# Patient Record
Sex: FEMALE | Race: WHITE | Employment: OTHER | URBAN - METROPOLITAN AREA
[De-identification: names, ages, dates, MRNs, and addresses within clinical notes are randomized per-mention and may not be internally consistent; named-entity substitution may affect disease eponyms.]

---

## 2017-08-13 PROBLEM — E87.5 HYPERKALEMIA: Status: ACTIVE | Noted: 2017-08-13

## 2017-08-13 PROBLEM — N17.9 AKI (ACUTE KIDNEY INJURY) (HCC): Status: ACTIVE | Noted: 2017-08-13

## 2017-08-13 PROBLEM — A41.9 SEPSIS (HCC): Status: ACTIVE | Noted: 2017-08-13

## 2017-08-13 PROBLEM — E87.20 METABOLIC ACIDEMIA: Status: ACTIVE | Noted: 2017-08-13

## 2017-08-14 PROBLEM — S81.802A WOUND OF LEFT LOWER EXTREMITY: Status: ACTIVE | Noted: 2017-08-14

## 2017-08-14 PROBLEM — I10 ESSENTIAL HYPERTENSION: Chronic | Status: ACTIVE | Noted: 2017-08-14

## 2017-08-14 PROBLEM — E87.20 METABOLIC ACIDEMIA: Status: RESOLVED | Noted: 2017-08-13 | Resolved: 2017-08-14

## 2017-08-14 PROBLEM — E03.9 ACQUIRED HYPOTHYROIDISM: Chronic | Status: ACTIVE | Noted: 2017-08-14

## 2017-08-14 PROBLEM — E87.29 HIGH ANION GAP METABOLIC ACIDOSIS: Status: ACTIVE | Noted: 2017-08-14

## 2017-08-17 PROBLEM — D69.6 THROMBOCYTOPENIA (HCC): Status: ACTIVE | Noted: 2017-08-17

## 2017-08-19 PROBLEM — R79.83 HOMOCYSTEINEMIA: Status: ACTIVE | Noted: 2017-08-19

## 2017-09-06 PROBLEM — I89.0 LYMPHEDEMA OF BOTH LOWER EXTREMITIES: Chronic | Status: ACTIVE | Noted: 2017-09-06

## 2017-09-06 PROBLEM — L97.222 SKIN ULCER OF LEFT CALF WITH FAT LAYER EXPOSED (HCC): Chronic | Status: ACTIVE | Noted: 2017-09-06

## 2017-09-27 PROBLEM — S81.802A WOUND OF LEFT LOWER EXTREMITY: Status: RESOLVED | Noted: 2017-08-14 | Resolved: 2017-09-27

## 2017-09-27 PROBLEM — D69.6 THROMBOCYTOPENIA (HCC): Status: RESOLVED | Noted: 2017-08-17 | Resolved: 2017-09-27

## 2017-09-27 PROBLEM — A41.9 SEPSIS (HCC): Status: RESOLVED | Noted: 2017-08-13 | Resolved: 2017-09-27

## 2017-09-27 PROBLEM — E87.5 HYPERKALEMIA: Status: RESOLVED | Noted: 2017-08-13 | Resolved: 2017-09-27

## 2017-09-27 PROBLEM — I89.0 LYMPHEDEMA OF BOTH LOWER EXTREMITIES: Chronic | Status: RESOLVED | Noted: 2017-09-06 | Resolved: 2017-09-27

## 2017-09-27 PROBLEM — E87.29 HIGH ANION GAP METABOLIC ACIDOSIS: Status: RESOLVED | Noted: 2017-08-14 | Resolved: 2017-09-27

## 2017-09-27 PROBLEM — S72.001A CLOSED RIGHT HIP FRACTURE (HCC): Status: ACTIVE | Noted: 2017-09-27

## 2017-09-27 PROBLEM — R79.83 HOMOCYSTEINEMIA: Status: RESOLVED | Noted: 2017-08-19 | Resolved: 2017-09-27

## 2017-09-27 PROBLEM — N17.9 AKI (ACUTE KIDNEY INJURY) (HCC): Status: RESOLVED | Noted: 2017-08-13 | Resolved: 2017-09-27

## 2017-09-27 PROBLEM — L97.222 SKIN ULCER OF LEFT CALF WITH FAT LAYER EXPOSED (HCC): Chronic | Status: RESOLVED | Noted: 2017-09-06 | Resolved: 2017-09-27

## 2017-09-28 PROBLEM — S32.301A CLOSED FRACTURE OF RIGHT ILIAC WING (HCC): Status: ACTIVE | Noted: 2017-09-27

## 2017-10-18 PROBLEM — S91.102A OPEN WOUND OF LEFT GREAT TOE: Chronic | Status: ACTIVE | Noted: 2017-10-18

## 2017-11-22 PROBLEM — S91.102A OPEN WOUND OF LEFT GREAT TOE: Chronic | Status: RESOLVED | Noted: 2017-10-18 | Resolved: 2017-11-22

## 2017-11-27 PROBLEM — Z79.01 CHRONIC ANTICOAGULATION: Status: ACTIVE | Noted: 2017-11-27

## 2017-12-06 PROBLEM — L97.221 SKIN ULCER OF LEFT CALF, LIMITED TO BREAKDOWN OF SKIN (HCC): Chronic | Status: ACTIVE | Noted: 2017-09-06

## 2017-12-13 PROBLEM — L97.221 SKIN ULCER OF LEFT CALF, LIMITED TO BREAKDOWN OF SKIN (HCC): Chronic | Status: RESOLVED | Noted: 2017-09-06 | Resolved: 2017-12-13

## 2019-06-05 ENCOUNTER — OFFICE VISIT (OUTPATIENT)
Dept: CARDIOLOGY CLINIC | Age: 79
End: 2019-06-05
Payer: MEDICARE

## 2019-06-05 VITALS
HEART RATE: 60 BPM | HEIGHT: 62 IN | WEIGHT: 178.6 LBS | DIASTOLIC BLOOD PRESSURE: 60 MMHG | SYSTOLIC BLOOD PRESSURE: 122 MMHG | RESPIRATION RATE: 16 BRPM | BODY MASS INDEX: 32.87 KG/M2

## 2019-06-05 DIAGNOSIS — I10 ESSENTIAL HYPERTENSION: ICD-10-CM

## 2019-06-05 DIAGNOSIS — I48.0 PAF (PAROXYSMAL ATRIAL FIBRILLATION) (HCC): Primary | ICD-10-CM

## 2019-06-05 DIAGNOSIS — Z79.01 CHRONIC ANTICOAGULATION: ICD-10-CM

## 2019-06-05 PROCEDURE — 99214 OFFICE O/P EST MOD 30 MIN: CPT | Performed by: INTERNAL MEDICINE

## 2019-06-05 PROCEDURE — 93000 ELECTROCARDIOGRAM COMPLETE: CPT | Performed by: INTERNAL MEDICINE

## 2019-06-05 NOTE — PROGRESS NOTES
Immunologic/ Lymphatic / Endocrine: No anemia or bleeding tendency    Social History     Socioeconomic History    Marital status:       Spouse name: Not on file    Number of children: Not on file    Years of education: Not on file    Highest education level: Not on file   Occupational History    Not on file   Social Needs    Financial resource strain: Not on file    Food insecurity:     Worry: Not on file     Inability: Not on file    Transportation needs:     Medical: Not on file     Non-medical: Not on file   Tobacco Use    Smoking status: Never Smoker    Smokeless tobacco: Never Used   Substance and Sexual Activity    Alcohol use: No    Drug use: No    Sexual activity: Not Currently   Lifestyle    Physical activity:     Days per week: Not on file     Minutes per session: Not on file    Stress: Not on file   Relationships    Social connections:     Talks on phone: Not on file     Gets together: Not on file     Attends Congregation service: Not on file     Active member of club or organization: Not on file     Attends meetings of clubs or organizations: Not on file     Relationship status: Not on file    Intimate partner violence:     Fear of current or ex partner: Not on file     Emotionally abused: Not on file     Physically abused: Not on file     Forced sexual activity: Not on file   Other Topics Concern    Not on file   Social History Narrative    Not on file       Past Medical History:   Diagnosis Date    Acquired hypothyroidism 8/14/2017    Arthritis     Blood transfusion reaction     Chronic kidney disease     History of blood transfusion     Hypertension     Lymphedema of both lower extremities 9/6/2017    Open wound of left great toe 10/18/2017    Other disorders of kidney and ureter in diseases classified elsewhere     Pelvic fracture ([address]     Skin ulcer of left calf with fat layer exposed ([facility] 9/6/2017    Skin ulcer of left calf, limited to breakdown of skin ([address] 9/6/2017    Ulcer of left lower leg ([facility] 3/24/2014    Venous stasis ulcer of left calf limited to breakdown of skin ([facility] 3/24/2014       PHYSICAL EXAM:  CONSTITUTIONAL:  Well developed, well nourished    Vitals:    06/05/19 1028   BP: 122/60   Pulse: 60   Resp: 16   Weight: 178 lb 9.6 oz (81 kg)   Height: 5' 2\" (1.575 m)     HEAD & FACE: Normocephalic. Symmetric. EYES: No xanthelasma. Conjunctivae not injected. EARS, NOSE, MOUTH & THROAT: Good dentition. No oral pallor or cyanosis. NECK: No JVD at 30 degrees. No thyromegaly. RESPIRATORY: Clear to auscultation and percussion in all fields. No use of accessory muscle or intercostal retractions. CARDIOVASCULAR: Regular rate and rhythm. No lifts or thrills on palpitation. Auscultation with normal S1-S2 in intensity and splitting. No carotid bruits. Abdominal aorta not enlarged. Femoral arteries without bruits. Pedal pulses 1+. 1+ bilateral edema. ABDOMEN: Soft without hepatic or splenic enlargement. No tenderness. MUSCULOSKELETAL: No kyphosis or scoliosis of the back. Good muscle strength and tone. No muscle atrophy. Slow gait and inability to undergo exercise stress testing. EXTREMITIES: No clubbing or cyanosis. SKIN: No Xanthomas or ulcerations. NEUROLOGIC: Oriented to time, place and person. Normal mood and affect. LYMPHATIC:  No palpable neck or supraclavicular nodes. No splenomegaly. EKG: Normal sinus rhythm. No change compared to prior tracing. ASSESSMENT:                                                     ORDERS:       Diagnosis Orders   1. PAF (paroxysmal atrial fibrillation) (HCC)  EKG 12 lead   2. Essential hypertension  EKG 12 lead   3. Chronic anticoagulation  EKG 12 lead     Above assessment cardiac issues stable. PLAN:   See above orders. Reviewed prior records and testing. .  Assessment of medication compliance. Same dose of Eliquis pending review of recent labs.   Discussed risks benefits of anticoagulation in detail with patient. After thorough discussion we'll continue chronic [address]. Discussed issues that would prompt earlier evaluation. Same cardiac medications. Follow-up office visit in 12 months.

## 2022-09-20 ENCOUNTER — HOSPITAL ENCOUNTER (INPATIENT)
Age: 82
LOS: 5 days | Discharge: NURSING, REHAB OR LONG-TERM CARE | DRG: 291 | End: 2022-09-26
Attending: STUDENT IN AN ORGANIZED HEALTH CARE EDUCATION/TRAINING PROGRAM | Admitting: INTERNAL MEDICINE
Payer: MEDICARE

## 2022-09-20 ENCOUNTER — APPOINTMENT (OUTPATIENT)
Dept: CT IMAGING | Age: 82
DRG: 291 | End: 2022-09-20
Payer: MEDICARE

## 2022-09-20 ENCOUNTER — APPOINTMENT (OUTPATIENT)
Dept: GENERAL RADIOLOGY | Age: 82
DRG: 291 | End: 2022-09-20
Payer: MEDICARE

## 2022-09-20 DIAGNOSIS — I21.4 NSTEMI (NON-ST ELEVATED MYOCARDIAL INFARCTION) (HCC): Primary | ICD-10-CM

## 2022-09-20 DIAGNOSIS — R29.6 FALLS FREQUENTLY: ICD-10-CM

## 2022-09-20 DIAGNOSIS — N30.01 ACUTE CYSTITIS WITH HEMATURIA: ICD-10-CM

## 2022-09-20 LAB
ALBUMIN SERPL-MCNC: 3.9 G/DL (ref 3.5–5.2)
ALP BLD-CCNC: 85 U/L (ref 35–104)
ALT SERPL-CCNC: 12 U/L (ref 0–32)
ANION GAP SERPL CALCULATED.3IONS-SCNC: 12 MMOL/L (ref 7–16)
AST SERPL-CCNC: 28 U/L (ref 0–31)
BACTERIA: ABNORMAL /HPF
BASOPHILS ABSOLUTE: 0 E9/L (ref 0–0.2)
BASOPHILS RELATIVE PERCENT: 0 % (ref 0–2)
BILIRUB SERPL-MCNC: 0.5 MG/DL (ref 0–1.2)
BILIRUBIN URINE: NEGATIVE
BLOOD, URINE: ABNORMAL
BUN BLDV-MCNC: 29 MG/DL (ref 6–23)
CALCIUM SERPL-MCNC: 9 MG/DL (ref 8.6–10.2)
CHLORIDE BLD-SCNC: 101 MMOL/L (ref 98–107)
CLARITY: CLEAR
CO2: 23 MMOL/L (ref 22–29)
COLOR: YELLOW
CREAT SERPL-MCNC: 1.1 MG/DL (ref 0.5–1)
CRYSTALS, UA: ABNORMAL /HPF
EOSINOPHILS ABSOLUTE: 0 E9/L (ref 0.05–0.5)
EOSINOPHILS RELATIVE PERCENT: 0 % (ref 0–6)
GFR AFRICAN AMERICAN: 58
GFR NON-AFRICAN AMERICAN: 48 ML/MIN/1.73
GLUCOSE BLD-MCNC: 144 MG/DL (ref 74–99)
GLUCOSE URINE: NEGATIVE MG/DL
HCT VFR BLD CALC: 33.4 % (ref 34–48)
HEMOGLOBIN: 10.8 G/DL (ref 11.5–15.5)
HYPOCHROMIA: ABNORMAL
KETONES, URINE: NEGATIVE MG/DL
LEUKOCYTE ESTERASE, URINE: ABNORMAL
LYMPHOCYTES ABSOLUTE: 0.71 E9/L (ref 1.5–4)
LYMPHOCYTES RELATIVE PERCENT: 3.5 % (ref 20–42)
MCH RBC QN AUTO: 28.2 PG (ref 26–35)
MCHC RBC AUTO-ENTMCNC: 32.3 % (ref 32–34.5)
MCV RBC AUTO: 87.2 FL (ref 80–99.9)
MONOCYTES ABSOLUTE: 0.71 E9/L (ref 0.1–0.95)
MONOCYTES RELATIVE PERCENT: 3.5 % (ref 2–12)
NEUTROPHILS ABSOLUTE: 16.55 E9/L (ref 1.8–7.3)
NEUTROPHILS RELATIVE PERCENT: 93 % (ref 43–80)
NITRITE, URINE: POSITIVE
NUCLEATED RED BLOOD CELLS: 0 /100 WBC
PDW BLD-RTO: 13.6 FL (ref 11.5–15)
PH UA: 6.5 (ref 5–9)
PLATELET # BLD: 178 E9/L (ref 130–450)
PMV BLD AUTO: 9.9 FL (ref 7–12)
POLYCHROMASIA: ABNORMAL
POTASSIUM REFLEX MAGNESIUM: 4.6 MMOL/L (ref 3.5–5)
PRO-BNP: 3134 PG/ML (ref 0–450)
PROTEIN UA: NEGATIVE MG/DL
RBC # BLD: 3.83 E12/L (ref 3.5–5.5)
RBC UA: ABNORMAL /HPF (ref 0–2)
SODIUM BLD-SCNC: 136 MMOL/L (ref 132–146)
SPECIFIC GRAVITY UA: 1.01 (ref 1–1.03)
TOTAL PROTEIN: 7 G/DL (ref 6.4–8.3)
TROPONIN, HIGH SENSITIVITY: 86 NG/L (ref 0–9)
UROBILINOGEN, URINE: 1 E.U./DL
WAXY CASTS: ABNORMAL /LPF
WBC # BLD: 17.8 E9/L (ref 4.5–11.5)
WBC UA: ABNORMAL /HPF (ref 0–5)

## 2022-09-20 PROCEDURE — 73030 X-RAY EXAM OF SHOULDER: CPT

## 2022-09-20 PROCEDURE — 71045 X-RAY EXAM CHEST 1 VIEW: CPT

## 2022-09-20 PROCEDURE — 70450 CT HEAD/BRAIN W/O DYE: CPT

## 2022-09-20 PROCEDURE — 73562 X-RAY EXAM OF KNEE 3: CPT

## 2022-09-20 PROCEDURE — 87088 URINE BACTERIA CULTURE: CPT

## 2022-09-20 PROCEDURE — 81001 URINALYSIS AUTO W/SCOPE: CPT

## 2022-09-20 PROCEDURE — 6370000000 HC RX 637 (ALT 250 FOR IP): Performed by: STUDENT IN AN ORGANIZED HEALTH CARE EDUCATION/TRAINING PROGRAM

## 2022-09-20 PROCEDURE — 80053 COMPREHEN METABOLIC PANEL: CPT

## 2022-09-20 PROCEDURE — 84484 ASSAY OF TROPONIN QUANT: CPT

## 2022-09-20 PROCEDURE — 6360000004 HC RX CONTRAST MEDICATION: Performed by: RADIOLOGY

## 2022-09-20 PROCEDURE — 85025 COMPLETE CBC W/AUTO DIFF WBC: CPT

## 2022-09-20 PROCEDURE — 93005 ELECTROCARDIOGRAM TRACING: CPT | Performed by: STUDENT IN AN ORGANIZED HEALTH CARE EDUCATION/TRAINING PROGRAM

## 2022-09-20 PROCEDURE — 71275 CT ANGIOGRAPHY CHEST: CPT

## 2022-09-20 PROCEDURE — 73521 X-RAY EXAM HIPS BI 2 VIEWS: CPT

## 2022-09-20 PROCEDURE — 2580000003 HC RX 258: Performed by: STUDENT IN AN ORGANIZED HEALTH CARE EDUCATION/TRAINING PROGRAM

## 2022-09-20 PROCEDURE — 99285 EMERGENCY DEPT VISIT HI MDM: CPT

## 2022-09-20 PROCEDURE — 83880 ASSAY OF NATRIURETIC PEPTIDE: CPT

## 2022-09-20 RX ORDER — ACETAMINOPHEN 325 MG/1
650 TABLET ORAL ONCE
Status: COMPLETED | OUTPATIENT
Start: 2022-09-20 | End: 2022-09-20

## 2022-09-20 RX ORDER — 0.9 % SODIUM CHLORIDE 0.9 %
500 INTRAVENOUS SOLUTION INTRAVENOUS ONCE
Status: COMPLETED | OUTPATIENT
Start: 2022-09-20 | End: 2022-09-21

## 2022-09-20 RX ADMIN — SODIUM CHLORIDE 500 ML: 9 INJECTION, SOLUTION INTRAVENOUS at 22:51

## 2022-09-20 RX ADMIN — IOPAMIDOL 70 ML: 755 INJECTION, SOLUTION INTRAVENOUS at 21:25

## 2022-09-20 RX ADMIN — ACETAMINOPHEN 650 MG: 325 TABLET ORAL at 22:54

## 2022-09-20 ASSESSMENT — PAIN DESCRIPTION - LOCATION: LOCATION: KNEE

## 2022-09-20 ASSESSMENT — PAIN SCALES - GENERAL
PAINLEVEL_OUTOF10: 8
PAINLEVEL_OUTOF10: 6

## 2022-09-20 ASSESSMENT — PAIN DESCRIPTION - ORIENTATION: ORIENTATION: RIGHT

## 2022-09-20 NOTE — Clinical Note
Discharge Plan[de-identified] Other/[facility])   Telemetry/Cardiac Monitoring Required?: Yes

## 2022-09-20 NOTE — ED PROVIDER NOTES
normal.   Cardiovascular:      Rate and Rhythm: Normal rate and regular rhythm. Heart sounds: Normal heart sounds. No murmur heard. Pulmonary:      Effort: Pulmonary effort is normal. No respiratory distress. Breath sounds: Normal breath sounds. No stridor. Abdominal:      General: There is no distension. Palpations: Abdomen is soft. There is no mass. Musculoskeletal:         General: No swelling or tenderness. Cervical back: Normal range of motion and neck supple. Comments: Chronic skin changes to the bilateral lower extremities     Skin:     General: Skin is warm and dry. Coloration: Skin is not jaundiced or pale. Neurological:      General: No focal deficit present. Mental Status: She is alert. Cranial Nerves: No cranial nerve deficit. Procedures    MDM       ED Course as of 09/23/22 0153   Tue Sep 20, 2022   2358 EKG: This EKG is signed and interpreted by me. Rate: 99  Rhythm: Sinus  Interpretation: non-specific EKG, no ST-Elevations or Depressions; , QRS 74, QTc 420  Comparison: no significant changes when compared to the prior    [BB]   Wed Sep 21, 2022   0117 Spoke with April for Dr. Mc Miguel, discussed the patient. She is requesting we speak with cardiology prior to admission. [BB]      ED Course User Index  [BB] Roly MckeonDO Mcginnisothy CLAIRE Kaitlin presents to the ED for evaluation of fall. The patient was hypoxic on exam therefor labs were ordered. Workup in the ED revealed imaging was negative for acute findings. Labs however were consistent with UTI for which patient was given rocephin. EKG was not showing an ST Elevations or depressions. Troponin of 86 with repeat of 101. The patient denied any chest pain or shortness of breath. Spoke with cardiology not felt to be cardiac in nature, and patient is able to stay at [address] [facility]. Patient is already on anticoagulation.  Patient requires continued workup and management of their symptoms and will be admitted to the hospital for further evaluation and treatment.      --------------------------------------------- PAST HISTORY ---------------------------------------------  Past Medical History:  has a past medical history of Acquired hypothyroidism, Arthritis, Blood transfusion reaction, Chronic kidney disease, History of blood transfusion, Hypertension, Lymphedema of both lower extremities, Open wound of left great toe, Other disorders of kidney and ureter in diseases classified elsewhere, Pelvic fracture ([address] Skin ulcer of left calf with fat layer exposed ([address] Skin ulcer of left calf, limited to breakdown of skin ([address] Ulcer of left lower leg ([address] and Venous stasis ulcer of left calf limited to breakdown of skin ([address] Past Surgical History:  has a past surgical history that includes fracture surgery (2010); Tonsillectomy; Hip fracture surgery (Left, 08/08/2014); Colonoscopy; Endoscopy, colon, diagnostic; Total hip arthroplasty (Left, 10/17/2014); and eye surgery. Social History:  reports that she has never smoked. She has never used smokeless tobacco. She reports that she does not drink alcohol and does not use drugs. Family History: family history includes Mult Sclerosis in her father. The patients home medications have been reviewed.     Allergies: Aspirin, Other, and Tape [adhesive tape]    -------------------------------------------------- RESULTS -------------------------------------------------    LABS:  Results for orders placed or performed during the hospital encounter of 09/20/22   Culture, Urine    Specimen: Urine, clean catch   Result Value Ref Range    Urine Culture, Routine       Growth present, evaluating for:  Gram negative rods  Gram positive organism     Culture, Blood 1    Specimen: Blood   Result Value Ref Range    Blood Culture, Routine 24 Hours no growth    Culture, Blood 2    Specimen: Blood   Result Value Ref Range    Culture, Blood 2 24 Hours no growth    CBC with Auto Differential   Result Value Ref Range    WBC 17.8 (H) 4.5 - 11.5 E9/L    RBC 3.83 3.50 - 5.50 E12/L    Hemoglobin 10.8 (L) 11.5 - 15.5 g/dL    Hematocrit 33.4 (L) 34.0 - 48.0 %    MCV 87.2 80.0 - 99.9 fL    MCH 28.2 26.0 - 35.0 pg    MCHC 32.3 32.0 - 34.5 %    RDW 13.6 11.5 - 15.0 fL    Platelets 676 634 - 345 E9/L    MPV 9.9 7.0 - 12.0 fL    Neutrophils % 93.0 (H) 43.0 - 80.0 %    Lymphocytes % 3.5 (L) 20.0 - 42.0 %    Monocytes % 3.5 2.0 - 12.0 %    Eosinophils % 0.0 0.0 - 6.0 %    Basophils % 0.0 0.0 - 2.0 %    Neutrophils Absolute 16.55 (H) 1.80 - 7.30 E9/L    Lymphocytes Absolute 0.71 (L) 1.50 - 4.00 E9/L    Monocytes Absolute 0.71 0.10 - 0.95 E9/L    Eosinophils Absolute 0.00 (L) 0.05 - 0.50 E9/L    Basophils Absolute 0.00 0.00 - 0.20 E9/L    nRBC 0.0 /100 WBC    Polychromasia 1+     Hypochromia 1+    Comprehensive Metabolic Panel w/ Reflex to MG   Result Value Ref Range    Sodium 136 132 - 146 mmol/L    Potassium reflex Magnesium 4.6 3.5 - 5.0 mmol/L    Chloride 101 98 - 107 mmol/L    CO2 23 22 - 29 mmol/L    Anion Gap 12 7 - 16 mmol/L    Glucose 144 (H) 74 - 99 mg/dL    BUN 29 (H) 6 - 23 mg/dL    Creatinine 1.1 (H) 0.5 - 1.0 mg/dL    GFR Non-African American 48 >=60 mL/min/1.73    GFR African American 58     Calcium 9.0 8.6 - 10.2 mg/dL    Total Protein 7.0 6.4 - 8.3 g/dL    Albumin 3.9 3.5 - 5.2 g/dL    Total Bilirubin 0.5 0.0 - 1.2 mg/dL    Alkaline Phosphatase 85 35 - 104 U/L    ALT 12 0 - 32 U/L    AST 28 0 - 31 U/L   Troponin   Result Value Ref Range    Troponin, High Sensitivity 86 (H) 0 - 9 ng/L   Brain Natriuretic Peptide   Result Value Ref Range    Pro-BNP 3,134 (H) 0 - 450 pg/mL   Urinalysis with Microscopic   Result Value Ref Range    Color, UA Yellow Straw/Yellow    Clarity, UA Clear Clear    Glucose, Ur Negative Negative mg/dL    Bilirubin Urine Negative Negative    Ketones, Urine Negative Negative mg/dL    Specific Gravity, UA 1.010 1.005 - 1.030    Blood, Urine LARGE (A) Negative    pH, UA 6.5 5.0 - 9.0    Protein, UA Negative Negative mg/dL    Urobilinogen, Urine 1.0 <2.0 E.U./dL    Nitrite, Urine POSITIVE (A) Negative    Leukocyte Esterase, Urine SMALL (A) Negative    Waxy Casts, UA 1-2 (A) None Seen /LPF    WBC, UA 0-1 0 - 5 /HPF    RBC, UA 2-5 0 - 2 /HPF    Bacteria, UA MANY (A) None Seen /HPF    Crystals, UA Rare (A) None Seen /HPF   Troponin   Result Value Ref Range    Troponin, High Sensitivity 101 (H) 0 - 9 ng/L   Basic Metabolic Panel w/ Reflex to MG   Result Value Ref Range    Sodium 136 132 - 146 mmol/L    Potassium reflex Magnesium 4.1 3.5 - 5.0 mmol/L    Chloride 101 98 - 107 mmol/L    CO2 23 22 - 29 mmol/L    Anion Gap 12 7 - 16 mmol/L    Glucose 122 (H) 74 - 99 mg/dL    BUN 28 (H) 6 - 23 mg/dL    Creatinine 1.1 (H) 0.5 - 1.0 mg/dL    GFR Non-African American 48 >=60 mL/min/1.73    GFR African American 58     Calcium 8.7 8.6 - 10.2 mg/dL   CBC with Auto Differential   Result Value Ref Range    WBC 13.0 (H) 4.5 - 11.5 E9/L    RBC 3.48 (L) 3.50 - 5.50 E12/L    Hemoglobin 9.9 (L) 11.5 - 15.5 g/dL    Hematocrit 30.0 (L) 34.0 - 48.0 %    MCV 86.2 80.0 - 99.9 fL    MCH 28.4 26.0 - 35.0 pg    MCHC 33.0 32.0 - 34.5 %    RDW 13.7 11.5 - 15.0 fL    Platelets 829 123 - 103 E9/L    MPV 10.5 7.0 - 12.0 fL    Neutrophils % 92.3 (H) 43.0 - 80.0 %    Immature Granulocytes % 0.5 0.0 - 5.0 %    Lymphocytes % 4.2 (L) 20.0 - 42.0 %    Monocytes % 2.9 2.0 - 12.0 %    Eosinophils % 0.0 0.0 - 6.0 %    Basophils % 0.1 0.0 - 2.0 %    Neutrophils Absolute 11.99 (H) 1.80 - 7.30 E9/L    Immature Granulocytes # 0.06 E9/L    Lymphocytes Absolute 0.54 (L) 1.50 - 4.00 E9/L    Monocytes Absolute 0.37 0.10 - 0.95 E9/L    Eosinophils Absolute 0.00 (L) 0.05 - 0.50 E9/L    Basophils Absolute 0.01 0.00 - 0.20 E9/L    Polychromasia 1+     Poikilocytes 1+     Ovalocytes 1+    Lipid, Fasting   Result Value Ref Range    Cholesterol, Fasting 125 0 - 199 mg/dL    Triglyceride, Fasting 77 0 - 149 mg/dL    HDL 44 >40 mg/dL    LDL Calculated 66 0 - 99 mg/dL    VLDL Cholesterol Calculated 15 mg/dL   Hemoglobin A1C   Result Value Ref Range    Hemoglobin A1C 5.3 4.0 - 5.6 %   Troponin   Result Value Ref Range    Troponin, High Sensitivity 110 (H) 0 - 9 ng/L   TSH   Result Value Ref Range    TSH 1.340 0.270 - 4.200 uIU/mL   Troponin   Result Value Ref Range    Troponin, High Sensitivity 142 (H) 0 - 9 ng/L   Troponin   Result Value Ref Range    Troponin, High Sensitivity 112 (H) 0 - 9 ng/L   Troponin   Result Value Ref Range    Troponin, High Sensitivity 103 (H) 0 - 9 ng/L   CK   Result Value Ref Range    Total  (H) 20 - 180 U/L   Basic Metabolic Panel   Result Value Ref Range    Sodium 139 132 - 146 mmol/L    Potassium 3.7 3.5 - 5.0 mmol/L    Chloride 107 98 - 107 mmol/L    CO2 24 22 - 29 mmol/L    Anion Gap 8 7 - 16 mmol/L    Glucose 105 (H) 74 - 99 mg/dL    BUN 34 (H) 6 - 23 mg/dL    Creatinine 1.1 (H) 0.5 - 1.0 mg/dL    GFR Non-African American 48 >=60 mL/min/1.73    GFR African American 58     Calcium 7.9 (L) 8.6 - 10.2 mg/dL   Troponin   Result Value Ref Range    Troponin, High Sensitivity 101 (H) 0 - 9 ng/L   CK   Result Value Ref Range    Total  (H) 20 - 180 U/L   EKG 12 Lead   Result Value Ref Range    Ventricular Rate 99 BPM    Atrial Rate 99 BPM    P-R Interval 162 ms    QRS Duration 74 ms    Q-T Interval 328 ms    QTc Calculation (Bazett) 420 ms    P Axis 114 degrees    R Axis 44 degrees    T Axis 36 degrees   EKG 12 Lead   Result Value Ref Range    Ventricular Rate 76 BPM    Atrial Rate 76 BPM    P-R Interval 164 ms    QRS Duration 86 ms    Q-T Interval 402 ms    QTc Calculation (Bazett) 452 ms    P Axis 31 degrees    R Axis 30 degrees    T Axis 25 degrees       RADIOLOGY:  CTA PULMONARY W CONTRAST   Final Result   1. No evidence of pulmonary embolism or acute pulmonary abnormality. 2.  Bibasilar atelectasis.       RECOMMENDATIONS:   Unavailable         XR SHOULDER RIGHT (MIN 2 VIEWS)   Final Result   No evidence of acute trauma. Decreased acromial humeral interval.         XR KNEE LEFT (3 VIEWS)   Final Result   No acute abnormality of the knee. Patellofemoral and medial compartment degenerative changes. XR KNEE RIGHT (3 VIEWS)   Final Result   No acute abnormality of the knee. Narrowing and degenerative changes patellofemoral and medial compartments. XR HIP BILATERAL W AP PELVIS (2 VIEWS)   Final Result   No findings to suggest acute fracture or dislocation. XR CHEST PORTABLE   Final Result   No acute process. CT Head WO Contrast   Final Result   No acute intracranial abnormality.             ------------------------- NURSING NOTES AND VITALS REVIEWED ---------------------------  Date / Time Roomed:  9/20/2022  5:02 PM  ED Bed Assignment:  [id number]    The nursing notes within the ED encounter and vital signs as below have been reviewed. Patient Vitals for the past 24 hrs:   BP Temp Temp src Pulse Resp SpO2 Weight   09/22/22 1934 (!) 133/47 97.9 °F (36.6 °C) Oral 68 16 97 % --   09/22/22 1507 (!) 112/54 97.2 °F (36.2 °C) Oral 64 16 95 % --   09/22/22 1230 (!) 109/57 98.2 °F (36.8 °C) Oral 78 16 95 % --   09/22/22 0730 (!) 101/58 99.2 °F (37.3 °C) Oral 64 18 93 % --   09/22/22 0331 (!) 110/57 98 °F (36.7 °C) Oral 57 16 94 % 181 lb 8 oz (82.3 kg)       Oxygen Saturation Interpretation: Normal    ------------------------------------------ PROGRESS NOTES ------------------------------------------  Counseling:  I have spoken with the patient and discussed todays results, in addition to providing specific details for the plan of care and counseling regarding the diagnosis and prognosis. Their questions are answered at this time and they are agreeable with the plan of admission.    --------------------------------- ADDITIONAL PROVIDER NOTES ---------------------------------  Consultations:  Spoke with April for Dr. Valentin Hood. Discussed case.   They will admit the patient. This patient's ED course included: a personal history and physicial examination, re-evaluation prior to disposition, multiple bedside re-evaluations, IV medications, cardiac monitoring, continuous pulse oximetry, and complex medical decision making and emergency management    This patient has remained hemodynamically stable during their ED course. Diagnosis:  1. NSTEMI (non-ST elevated myocardial infarction) ([address]    2. Falls frequently    3. Acute cystitis with hematuria        Disposition:  Patient's disposition: Admit to telemetry  Patient's condition is stable.      Rebel Ortega DO  09/23/22 [id number]

## 2022-09-21 PROBLEM — R79.89 ELEVATED TROPONIN: Status: ACTIVE | Noted: 2022-09-21
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
## 2022-09-21 PROBLEM — R77.8 ELEVATED TROPONIN: Status: ACTIVE | Noted: 2022-09-21

## 2022-09-21 PROBLEM — D72.829 LEUKOCYTOSIS: Status: ACTIVE | Noted: 2022-09-21

## 2022-09-21 PROBLEM — I21.4 NSTEMI (NON-ST ELEVATED MYOCARDIAL INFARCTION) (HCC): Status: ACTIVE | Noted: 2022-09-21

## 2022-09-21 PROBLEM — N39.0 UTI (URINARY TRACT INFECTION): Status: ACTIVE | Noted: 2022-09-21

## 2022-09-21 PROBLEM — R09.02 HYPOXIA: Status: ACTIVE | Noted: 2022-09-21

## 2022-09-21 LAB
ANION GAP SERPL CALCULATED.3IONS-SCNC: 12 MMOL/L (ref 7–16)
BASOPHILS ABSOLUTE: 0.01 E9/L (ref 0–0.2)
BASOPHILS RELATIVE PERCENT: 0.1 % (ref 0–2)
BUN BLDV-MCNC: 28 MG/DL (ref 6–23)
CALCIUM SERPL-MCNC: 8.7 MG/DL (ref 8.6–10.2)
CHLORIDE BLD-SCNC: 101 MMOL/L (ref 98–107)
CHOLESTEROL, FASTING: 125 MG/DL (ref 0–199)
CO2: 23 MMOL/L (ref 22–29)
CREAT SERPL-MCNC: 1.1 MG/DL (ref 0.5–1)
EKG ATRIAL RATE: 76 BPM
EKG P AXIS: 31 DEGREES
EKG P-R INTERVAL: 164 MS
EKG Q-T INTERVAL: 402 MS
EKG QRS DURATION: 86 MS
EKG QTC CALCULATION (BAZETT): 452 MS
EKG R AXIS: 30 DEGREES
EKG T AXIS: 25 DEGREES
EKG VENTRICULAR RATE: 76 BPM
EOSINOPHILS ABSOLUTE: 0 E9/L (ref 0.05–0.5)
EOSINOPHILS RELATIVE PERCENT: 0 % (ref 0–6)
GFR AFRICAN AMERICAN: 58
GFR NON-AFRICAN AMERICAN: 48 ML/MIN/1.73
GLUCOSE BLD-MCNC: 122 MG/DL (ref 74–99)
HBA1C MFR BLD: 5.3 % (ref 4–5.6)
HCT VFR BLD CALC: 30 % (ref 34–48)
HDLC SERPL-MCNC: 44 MG/DL
HEMOGLOBIN: 9.9 G/DL (ref 11.5–15.5)
IMMATURE GRANULOCYTES #: 0.06 E9/L
IMMATURE GRANULOCYTES %: 0.5 % (ref 0–5)
LDL CHOLESTEROL CALCULATED: 66 MG/DL (ref 0–99)
LV EF: 63 %
LVEF MODALITY: NORMAL
LYMPHOCYTES ABSOLUTE: 0.54 E9/L (ref 1.5–4)
LYMPHOCYTES RELATIVE PERCENT: 4.2 % (ref 20–42)
MCH RBC QN AUTO: 28.4 PG (ref 26–35)
MCHC RBC AUTO-ENTMCNC: 33 % (ref 32–34.5)
MCV RBC AUTO: 86.2 FL (ref 80–99.9)
MONOCYTES ABSOLUTE: 0.37 E9/L (ref 0.1–0.95)
MONOCYTES RELATIVE PERCENT: 2.9 % (ref 2–12)
NEUTROPHILS ABSOLUTE: 11.99 E9/L (ref 1.8–7.3)
NEUTROPHILS RELATIVE PERCENT: 92.3 % (ref 43–80)
OVALOCYTES: ABNORMAL
PDW BLD-RTO: 13.7 FL (ref 11.5–15)
PLATELET # BLD: 165 E9/L (ref 130–450)
PMV BLD AUTO: 10.5 FL (ref 7–12)
POIKILOCYTES: ABNORMAL
POLYCHROMASIA: ABNORMAL
POTASSIUM REFLEX MAGNESIUM: 4.1 MMOL/L (ref 3.5–5)
RBC # BLD: 3.48 E12/L (ref 3.5–5.5)
SODIUM BLD-SCNC: 136 MMOL/L (ref 132–146)
TOTAL CK: 437 U/L (ref 20–180)
TRIGLYCERIDE, FASTING: 77 MG/DL (ref 0–149)
TROPONIN, HIGH SENSITIVITY: 101 NG/L (ref 0–9)
TROPONIN, HIGH SENSITIVITY: 110 NG/L (ref 0–9)
TROPONIN, HIGH SENSITIVITY: 112 NG/L (ref 0–9)
TROPONIN, HIGH SENSITIVITY: 142 NG/L (ref 0–9)
TSH SERPL DL<=0.05 MIU/L-ACNC: 1.34 UIU/ML (ref 0.27–4.2)
VLDLC SERPL CALC-MCNC: 15 MG/DL
WBC # BLD: 13 E9/L (ref 4.5–11.5)

## 2022-09-21 PROCEDURE — 2580000003 HC RX 258: Performed by: STUDENT IN AN ORGANIZED HEALTH CARE EDUCATION/TRAINING PROGRAM

## 2022-09-21 PROCEDURE — 97165 OT EVAL LOW COMPLEX 30 MIN: CPT

## 2022-09-21 PROCEDURE — 2060000000 HC ICU INTERMEDIATE R&B

## 2022-09-21 PROCEDURE — 85025 COMPLETE CBC W/AUTO DIFF WBC: CPT

## 2022-09-21 PROCEDURE — 93306 TTE W/DOPPLER COMPLETE: CPT

## 2022-09-21 PROCEDURE — 97530 THERAPEUTIC ACTIVITIES: CPT

## 2022-09-21 PROCEDURE — 96374 THER/PROPH/DIAG INJ IV PUSH: CPT

## 2022-09-21 PROCEDURE — 99222 1ST HOSP IP/OBS MODERATE 55: CPT | Performed by: INTERNAL MEDICINE

## 2022-09-21 PROCEDURE — APPSS60 APP SPLIT SHARED TIME 46-60 MINUTES

## 2022-09-21 PROCEDURE — 93005 ELECTROCARDIOGRAM TRACING: CPT

## 2022-09-21 PROCEDURE — 97161 PT EVAL LOW COMPLEX 20 MIN: CPT

## 2022-09-21 PROCEDURE — 36415 COLL VENOUS BLD VENIPUNCTURE: CPT

## 2022-09-21 PROCEDURE — 80061 LIPID PANEL: CPT

## 2022-09-21 PROCEDURE — 83036 HEMOGLOBIN GLYCOSYLATED A1C: CPT

## 2022-09-21 PROCEDURE — 6360000002 HC RX W HCPCS: Performed by: STUDENT IN AN ORGANIZED HEALTH CARE EDUCATION/TRAINING PROGRAM

## 2022-09-21 PROCEDURE — 2580000003 HC RX 258: Performed by: NURSE PRACTITIONER

## 2022-09-21 PROCEDURE — 87040 BLOOD CULTURE FOR BACTERIA: CPT

## 2022-09-21 PROCEDURE — 80048 BASIC METABOLIC PNL TOTAL CA: CPT

## 2022-09-21 PROCEDURE — 84484 ASSAY OF TROPONIN QUANT: CPT

## 2022-09-21 PROCEDURE — 2500000003 HC RX 250 WO HCPCS: Performed by: INTERNAL MEDICINE

## 2022-09-21 PROCEDURE — 84443 ASSAY THYROID STIM HORMONE: CPT

## 2022-09-21 PROCEDURE — 82550 ASSAY OF CK (CPK): CPT

## 2022-09-21 PROCEDURE — 6370000000 HC RX 637 (ALT 250 FOR IP): Performed by: NURSE PRACTITIONER

## 2022-09-21 RX ORDER — SODIUM CHLORIDE 0.9 % (FLUSH) 0.9 %
5-40 SYRINGE (ML) INJECTION EVERY 12 HOURS SCHEDULED
Status: DISCONTINUED | OUTPATIENT
Start: 2022-09-21 | End: 2022-09-26 | Stop reason: HOSPADM

## 2022-09-21 RX ORDER — ACETAMINOPHEN 650 MG/1
650 SUPPOSITORY RECTAL EVERY 6 HOURS PRN
Status: DISCONTINUED | OUTPATIENT
Start: 2022-09-21 | End: 2022-09-26 | Stop reason: HOSPADM

## 2022-09-21 RX ORDER — SODIUM CHLORIDE 9 MG/ML
INJECTION, SOLUTION INTRAVENOUS CONTINUOUS
Status: DISCONTINUED | OUTPATIENT
Start: 2022-09-21 | End: 2022-09-24

## 2022-09-21 RX ORDER — ACETAMINOPHEN 325 MG/1
650 TABLET ORAL EVERY 6 HOURS PRN
Status: DISCONTINUED | OUTPATIENT
Start: 2022-09-21 | End: 2022-09-26 | Stop reason: HOSPADM

## 2022-09-21 RX ORDER — LISINOPRIL 20 MG/1
40 TABLET ORAL DAILY
Status: DISCONTINUED | OUTPATIENT
Start: 2022-09-21 | End: 2022-09-26 | Stop reason: HOSPADM

## 2022-09-21 RX ORDER — AMLODIPINE BESYLATE 10 MG/1
10 TABLET ORAL DAILY
Status: DISCONTINUED | OUTPATIENT
Start: 2022-09-21 | End: 2022-09-26 | Stop reason: HOSPADM

## 2022-09-21 RX ORDER — ONDANSETRON 2 MG/ML
4 INJECTION INTRAMUSCULAR; INTRAVENOUS EVERY 6 HOURS PRN
Status: DISCONTINUED | OUTPATIENT
Start: 2022-09-21 | End: 2022-09-26 | Stop reason: HOSPADM

## 2022-09-21 RX ORDER — LEVOTHYROXINE SODIUM 0.07 MG/1
75 TABLET ORAL DAILY
Status: DISCONTINUED | OUTPATIENT
Start: 2022-09-21 | End: 2022-09-26 | Stop reason: HOSPADM

## 2022-09-21 RX ORDER — SODIUM CHLORIDE 9 MG/ML
INJECTION, SOLUTION INTRAVENOUS PRN
Status: DISCONTINUED | OUTPATIENT
Start: 2022-09-21 | End: 2022-09-25 | Stop reason: SDUPTHER

## 2022-09-21 RX ORDER — IPRATROPIUM BROMIDE AND ALBUTEROL SULFATE 2.5; .5 MG/3ML; MG/3ML
1 SOLUTION RESPIRATORY (INHALATION) EVERY 6 HOURS PRN
Status: DISCONTINUED | OUTPATIENT
Start: 2022-09-21 | End: 2022-09-26 | Stop reason: HOSPADM

## 2022-09-21 RX ORDER — AMLODIPINE BESYLATE AND BENAZEPRIL HYDROCHLORIDE 10; 40 MG/1; MG/1
1 CAPSULE ORAL DAILY
Status: DISCONTINUED | OUTPATIENT
Start: 2022-09-21 | End: 2022-09-21 | Stop reason: CLARIF

## 2022-09-21 RX ORDER — ONDANSETRON 4 MG/1
4 TABLET, ORALLY DISINTEGRATING ORAL EVERY 8 HOURS PRN
Status: DISCONTINUED | OUTPATIENT
Start: 2022-09-21 | End: 2022-09-26 | Stop reason: HOSPADM

## 2022-09-21 RX ORDER — SODIUM CHLORIDE 0.9 % (FLUSH) 0.9 %
5-40 SYRINGE (ML) INJECTION PRN
Status: DISCONTINUED | OUTPATIENT
Start: 2022-09-21 | End: 2022-09-26 | Stop reason: HOSPADM

## 2022-09-21 RX ORDER — BUMETANIDE 0.25 MG/ML
1 INJECTION, SOLUTION INTRAMUSCULAR; INTRAVENOUS 2 TIMES DAILY
Status: DISCONTINUED | OUTPATIENT
Start: 2022-09-21 | End: 2022-09-26 | Stop reason: HOSPADM

## 2022-09-21 RX ADMIN — METOPROLOL TARTRATE 25 MG: 25 TABLET, FILM COATED ORAL at 20:53

## 2022-09-21 RX ADMIN — BUMETANIDE 1 MG: 0.25 INJECTION, SOLUTION INTRAMUSCULAR; INTRAVENOUS at 10:46

## 2022-09-21 RX ADMIN — AMLODIPINE BESYLATE 10 MG: 10 TABLET ORAL at 08:16

## 2022-09-21 RX ADMIN — ACETAMINOPHEN 650 MG: 325 TABLET ORAL at 05:42

## 2022-09-21 RX ADMIN — CEFTRIAXONE 1000 MG: 1 INJECTION, POWDER, FOR SOLUTION INTRAMUSCULAR; INTRAVENOUS at 00:36

## 2022-09-21 RX ADMIN — SODIUM CHLORIDE: 9 INJECTION, SOLUTION INTRAVENOUS at 05:42

## 2022-09-21 RX ADMIN — LEVOTHYROXINE SODIUM 75 MCG: 75 TABLET ORAL at 08:16

## 2022-09-21 RX ADMIN — METOPROLOL TARTRATE 25 MG: 25 TABLET, FILM COATED ORAL at 08:16

## 2022-09-21 RX ADMIN — ACETAMINOPHEN 650 MG: 325 TABLET ORAL at 15:19

## 2022-09-21 RX ADMIN — SODIUM CHLORIDE, PRESERVATIVE FREE 10 ML: 5 INJECTION INTRAVENOUS at 08:17

## 2022-09-21 RX ADMIN — APIXABAN 5 MG: 5 TABLET, FILM COATED ORAL at 08:16

## 2022-09-21 RX ADMIN — APIXABAN 5 MG: 5 TABLET, FILM COATED ORAL at 20:33

## 2022-09-21 RX ADMIN — BUMETANIDE 1 MG: 0.25 INJECTION, SOLUTION INTRAMUSCULAR; INTRAVENOUS at 20:33

## 2022-09-21 RX ADMIN — SODIUM CHLORIDE, PRESERVATIVE FREE 10 ML: 5 INJECTION INTRAVENOUS at 20:34

## 2022-09-21 RX ADMIN — LISINOPRIL 40 MG: 20 TABLET ORAL at 08:16

## 2022-09-21 ASSESSMENT — ENCOUNTER SYMPTOMS
SHORTNESS OF BREATH: 0
DIARRHEA: 0
WHEEZING: 0
SORE THROAT: 0
EYE PAIN: 0
EYE REDNESS: 0
SINUS PRESSURE: 0
BACK PAIN: 0
COUGH: 0
EYE DISCHARGE: 0
VOMITING: 0
ABDOMINAL DISTENTION: 0
NAUSEA: 0

## 2022-09-21 ASSESSMENT — PAIN SCALES - GENERAL
PAINLEVEL_OUTOF10: 0
PAINLEVEL_OUTOF10: 3
PAINLEVEL_OUTOF10: 0

## 2022-09-21 ASSESSMENT — PAIN DESCRIPTION - DESCRIPTORS: DESCRIPTORS: ACHING

## 2022-09-21 ASSESSMENT — PAIN - FUNCTIONAL ASSESSMENT: PAIN_FUNCTIONAL_ASSESSMENT: ACTIVITIES ARE NOT PREVENTED

## 2022-09-21 ASSESSMENT — PAIN DESCRIPTION - LOCATION: LOCATION: SHOULDER

## 2022-09-21 ASSESSMENT — PAIN DESCRIPTION - ORIENTATION: ORIENTATION: RIGHT

## 2022-09-21 NOTE — PROGRESS NOTES
modifications for safe mobility and completion of ADLs                             Therapeutic activity to improve functional performance during ADLs. Therapeutic exercise to improve tolerance and functional strength for ADLs    Balance retraining/tolerance tasks for facilitation of postural control with dynamic challenges during ADLs . Positioning to improve functional independence      Recommended Adaptive Equipment: TBD     Home Living: Pt lives alone, 1 story, 3 steps/rail.  Daughter lives close by    [place] setup: tub/shower, extended tub bench    Equipment owned: walker,     Prior Level of Function: Independent with ADLs , assist  with IADLs; ambulated with walker   Driving: yes     Pain Level: chronic knee pain ;   Cognition: A&O:  pleasant   Memory:  fair +   Sequencing:  fair+   Problem solving:  fair    Judgement/safety:  fair      Functional Assessment:  AM-PAC Daily Activity Raw Score: 14/24   Initial Eval Status  Date: 9/21/22 Treatment Status  Date: STGs = LTGs  Time frame: 10-14 days   Feeding SBA/set-up   Supervision    Grooming Mod A, seated   Decrease standing tolerance and balance  SBA    UB Dressing Mod  A  SBA    LB Dressing Max  A  Assist with threading brief over feet and pulling up over hips   Min A    Bathing Mod A   Moy    Toileting Max A   Assist with hygiene and clothing management   Min A   Bed Mobility  Max A  Supine <>sit   Min A    Functional Transfers Mod A  Sit- stand from bed, BSC   Min/CGA   Functional Mobility Mod A ,w/walker   SPT bed <> BSC   Unsteadiness and overall weakness noted   Min/CGA  with good tolerance    Balance Sitting:     Static:  SBA- EOB     Dynamic:Mod A   Standing: mod A   Independent/supervision - sitting  Min/CGA- standing    Activity Tolerance No SOB observed   Good  with ADL activity    Visual/  Perceptual Glasses: reading                 Hand Dominance right   AROM (PROM) Strength Additional Info:    LANRE FITZPATRICK additionally includes thorough review of current medical information, gathering information on past medical history/social history and prior level of function, interpretation of standardized testing/informal observation of tasks, assessment of data and development of plan of care and goals.             Nicholas Moyer  OTR/L  OT [id number]

## 2022-09-21 NOTE — H&P
Hospital Medicine History & Physical      PCP: Aide Agrawal MD    Date of Admission: 9/20/2022    Date of Service: . SEPT 21, 2022    Chief Complaint:  FALL      History Of Present Illness:     80 y.o. female presented with FALL, STATES SHE TIPPED OVER THE DOG BLANKET AND FELL ON HER RIGHT SHOULDER. HAS A KNOWN HISTORY OF PAF, FOUND TO HAVE AN ELEVATED TT. Trula Blew DIAGNOSED WITH NSTEMI     Past Medical History:          Diagnosis Date    Acquired hypothyroidism 8/14/2017    Arthritis     Blood transfusion reaction     Chronic kidney disease     History of blood transfusion     Hypertension     Lymphedema of both lower extremities 9/6/2017    Open wound of left great toe 10/18/2017    Other disorders of kidney and ureter in diseases classified elsewhere     Pelvic fracture ([address]     Skin ulcer of left calf with fat layer exposed ([address] 9/6/2017    Skin ulcer of left calf, limited to breakdown of skin ([address] 9/6/2017    Ulcer of left lower leg ([address] 3/24/2014    Venous stasis ulcer of left calf limited to breakdown of skin ([address] 3/24/2014       Past Surgical History:          Procedure Laterality Date    COLONOSCOPY      ENDOSCOPY, COLON, DIAGNOSTIC      EYE SURGERY      cateract and lazor surgery 2015    FRACTURE SURGERY  2010    RT HIP    HIP FRACTURE SURGERY Left 08/08/2014    ORIF left hip    TONSILLECTOMY      as child    TOTAL HIP ARTHROPLASTY Left 10/17/2014    revision with removal of hardware       Medications Prior to Admission:      Prior to Admission medications    Medication Sig Start Date End Date Taking?  Authorizing Provider   bumetanide (BUMEX) 1 MG tablet  11/22/17   Historical Provider, MD   acetaminophen (TYLENOL) 325 MG tablet Take 650 mg by mouth every 4 hours as needed for Pain    Historical Provider, MD   calcium-vitamin D (OSCAL-500) 500-200 MG-UNIT per tablet Take 1 tablet by mouth 2 times daily 8/22/17   Mindi Martell MD   apixaban Adri Rodriges) 5 MG TABS tablet Take 1 tablet by mouth 2 times daily 8/28/17   Kenrick Sanz MD   metoprolol tartrate (LOPRESSOR) 25 MG tablet Take 1 tablet by mouth 2 times daily 8/21/17   Kenrick Sanz MD   Cholecalciferol (VITAMIN D) 2000 units TABS tablet Take 2.5 tablets by mouth 2 times daily 8/21/17   Kenrick Sanz MD   amLODIPine-benazepril (LOTREL) 10-40 MG per capsule Take 1 capsule by mouth daily    Historical Provider, MD   levothyroxine (SYNTHROID) 75 MCG tablet Take 75 mcg by mouth Daily    Historical Provider, MD       Allergies:  Aspirin, Other, and Tape [adhesive tape]    Social History:      The patient currently lives ALONE    TOBACCO:   reports that she has never smoked. She has never used smokeless tobacco.  ETOH:   reports no history of alcohol use. Family History:       Reviewed in detail and negative for DM, CAD, Cancer, CVA. Positive as follows:        Problem Relation Age of Onset    Mult Sclerosis Father        REVIEW OF SYSTEMS:   Pertinent positives as noted in the HPI. All other systems reviewed and negative. PHYSICAL EXAM:    BP (!) 102/43   Pulse 77   Temp 99.3 °F (37.4 °C) (Oral)   Resp 17   Ht 5' 2\" (1.575 m)   Wt 176 lb (79.8 kg)   SpO2 92%   BMI 32.19 kg/m²     General appearance:  No apparent distress, appears stated age and cooperative. HEENT:  Normal cephalic, atraumatic without obvious deformity. Pupils equal, round, and reactive to light. Extra ocular muscles intact. Conjunctivae/corneas clear. PROPTOSIS   Neck: Supple, with full range of motion. No jugular venous distention. Trachea midline. Respiratory:  Normal respiratory effort. Clear to auscultation, bilaterally without Rales/Wheezes/Rhonchi. Cardiovascular: IRREG  Abdomen: Soft, non-tender, non-distended with normal bowel sounds. Musculoskeletal:  No clubbing, cyanosis POS edema bilaterally.  PAIN WITH ROM RIGHT SHOULDER    Skin: ECCHYMOSIS ON ARMS  Neurologic:  Neurovascularly intact without any focal sensory/motor deficits. Cranial nerves: II-XII intact, grossly non-focal.  Psychiatric:  Alert and oriented, thought content appropriate, normal insight        Labs:     Recent Labs     09/20/22 1913 09/21/22  0455   WBC 17.8* 13.0*   HGB 10.8* 9.9*   HCT 33.4* 30.0*    165     Recent Labs     09/20/22 1913 09/21/22  0455    136   K 4.6 4.1    101   CO2 23 23   BUN 29* 28*   CREATININE 1.1* 1.1*   CALCIUM 9.0 8.7     Recent Labs     09/20/22 1913   AST 28   ALT 12   BILITOT 0.5   ALKPHOS 85     No results for input(s): INR in the last 72 hours. Recent Labs     09/21/22  0842   CKTOTAL 437*       Urinalysis:      Lab Results   Component Value Date/Time    NITRU POSITIVE 09/20/2022 10:51 PM    WBCUA 0-1 09/20/2022 10:51 PM    BACTERIA MANY 09/20/2022 10:51 PM    RBCUA 2-5 09/20/2022 10:51 PM    BLOODU LARGE 09/20/2022 10:51 PM    SPECGRAV 1.010 09/20/2022 10:51 PM    GLUCOSEU Negative 09/20/2022 10:51 PM       Radiology:     CXR: I have reviewed the CXR with the following interpretation:   EKG:  I have reviewed the EKG with the following interpretation:   CTA PULMONARY W CONTRAST   Final Result   1. No evidence of pulmonary embolism or acute pulmonary abnormality. 2.  Bibasilar atelectasis. RECOMMENDATIONS:   Unavailable         XR SHOULDER RIGHT (MIN 2 VIEWS)   Final Result   No evidence of acute trauma. Decreased acromial humeral interval.         XR KNEE LEFT (3 VIEWS)   Final Result   No acute abnormality of the knee. Patellofemoral and medial compartment degenerative changes. XR KNEE RIGHT (3 VIEWS)   Final Result   No acute abnormality of the knee. Narrowing and degenerative changes patellofemoral and medial compartments. XR HIP BILATERAL W AP PELVIS (2 VIEWS)   Final Result   No findings to suggest acute fracture or dislocation.          XR CHEST PORTABLE   Final Result   No acute process. CT Head WO Contrast   Final Result   No acute intracranial abnormality. ASSESSMENT:    Active Hospital Problems    Diagnosis Date Noted    NSTEMI (non-ST elevated myocardial infarction) ([facility] [I21.4] 09/21/2022     Priority: Medium    UTI (urinary tract infection) [N39.0] 09/21/2022     Priority: Medium    Hypoxia [R09.02] 09/21/2022     Priority: Medium    Fall [W19. XXXA] 09/21/2022     Priority: Medium    Elevated troponin [R77.8] 09/21/2022     Priority: Medium    Leukocytosis [D72.829] 09/21/2022     Priority: Medium    Chronic anticoagulation [Z79.01] 11/27/2017    Essential hypertension [I10] 08/14/2017    Acquired hypothyroidism [E03.9] 08/14/2017    Sepsis ([facility] [A41.9] 08/13/2017    History of DVT (deep vein thrombosis) [Z86.718] 08/19/2013   UTI  MILD RHABDOMYOLYSIS FROM FALL    PLAN:  CARD   LOPRESSOR   SYNTHROID   ROCEPHIN  BUMEX         DVT Prophylaxis:  ELIQUIS  Diet: ADULT DIET; Regular; Low Fat/Low Chol/High Fiber/MIGUEL A  Code Status: Full Code    PT/OT Eval Status: ORDERED    Dispo - ERIC    Electronically signed by Mardy Angelucci, DO on 9/21/2022 at 3:03 PM FACOI       Thank you Cherelle Gardiner MD for the opportunity to be involved in this patient's care.  If you have any questions or concerns please feel free to contact me at [phone]

## 2022-09-21 NOTE — PROGRESS NOTES
Physical Therapy  Facility/Department: [address] INTERMEDIATE  Physical Therapy Initial Assessment    Name: Samia Cherry  : 1940  MRN: [id number]  Date of Service: 2022    Patient Diagnosis(es): The primary encounter diagnosis was NSTEMI (non-ST elevated myocardial infarction) ([address] Diagnoses of Falls frequently and Acute cystitis with hematuria were also pertinent to this visit. Past Medical History:  has a past medical history of Acquired hypothyroidism, Arthritis, Blood transfusion reaction, Chronic kidney disease, History of blood transfusion, Hypertension, Lymphedema of both lower extremities, Open wound of left great toe, Other disorders of kidney and ureter in diseases classified elsewhere, Pelvic fracture ([address] Skin ulcer of left calf with fat layer exposed ([address] Skin ulcer of left calf, limited to breakdown of skin ([address] Ulcer of left lower leg ([address] and Venous stasis ulcer of left calf limited to breakdown of skin ([address] Past Surgical History:  has a past surgical history that includes fracture surgery (); Tonsillectomy; Hip fracture surgery (Left, 2014); Colonoscopy; Endoscopy, colon, diagnostic; Total hip arthroplasty (Left, 10/17/2014); and eye surgery. Referring provider:   TRAVIS Mora - CNP    PT Order:  PT eval and treat     Evaluating PT:  Penelope Blue PT, DPT PT [id number]    Room #:  3098/8595-T  Diagnosis:  Falls frequently [R29.6]  NSTEMI (non-ST elevated myocardial infarction) ([address] [I21.4]  Acute cystitis with hematuria [N30.01]  Precautions:  fall risk  Equipment Needs:  none. Pt reported owing a walker    SUBJECTIVE:    Pt lives alone in a 1 story home with 3 stairs to enter and 2 rail. Bed and bath is on frist floor. Pt ambulated with walker PTA. Pt's daughter lives close by. OBJECTIVE:   Initial Evaluation  Date:  Treatment Short Term/ Long Term   Goals   Was pt agreeable to Eval/treatment? yes     Does pt have pain?  B knee pain Bed Mobility  Rolling: NT  Supine to sit: Mod A  Sit to supine: Max A  Scooting: Mod A to sitting EOB  Min A   Transfers Sit to stand: Mod A  Stand to sit: Mod A  Stand pivot: Mod A with w/w  SBA   Ambulation    3 feet with w/w Mod A   25+ feet with w/w SBA    Stair negotiation: ascended and descended  NT      ROM BLE:  WFL except B knees lacking flexion     Strength BLE:  grossly 3/5  Grossly 4-/5   Balance Sitting EOB:  SBA  Dynamic Standing: Mod A with w/w  Sitting EOB:  independent  Dynamic Standing:  SBA with w/w   AM-PAC 6 Clicks 44/08       Pt is A & O x 3  Sensation:  Pt denies numbness and tingling to extremities    Patient education  Pt educated on PT objectives during eval and while in the hospital, hand placement during transfers. Patient response to education:   Pt verbalized understanding Pt demonstrated skill Pt requires further education in this area   yes With cueing yes     ASSESSMENT:    Conditions Requiring Skilled Therapeutic Intervention:    [x]Decreased strength     [x]Decreased ROM  [x]Decreased functional mobility  [x]Decreased balance   [x]Decreased endurance   []Decreased posture  []Decreased sensation  []Decreased coordination   []Decreased vision  []Decreased safety awareness   [x]Increased pain       Comments:  Pt found in bed. No report of dizziness during functional mobility. Cueing required for hand placement during transfers. Pt with slow gait speed and takes labored steps due to LE pain. Pt incontinent of bladder once standing. Pt with decreased eccentric control when sitting down on the bed due to lack of knee flexion in B LEs. At end of eval, pt left in bed with call light in reach and bed alarm on.      Pt's/ family goals   1. None stated    Prognosis is good for reaching above PT goals. Patient and or family understand(s) diagnosis, prognosis, and plan of care.   yes    PHYSICAL THERAPY PLAN OF CARE:    PT POC is established based on physician order and patient diagnosis     Diagnosis:  Falls frequently [R29.6]  NSTEMI (non-ST elevated myocardial infarction) ([facility] [I21.4]  Acute cystitis with hematuria [N30.01]    Current Treatment Recommendations:     [x] Strengthening to improve independence with functional mobility   [] ROM to improve independence with functional mobility   [x] Balance Training to improve static/dynamic balance and to reduce fall risk  [x] Endurance Training to improve activity tolerance during functional mobility   [x] Transfer Training to improve safety and independence with all functional transfers   [x] Gait Training to improve gait mechanics, endurance and assess need for appropriate assistive device  [] Stair Training in preparation for safe discharge home and/or into the community   [] Positioning to prevent skin breakdown and contractures  [x] Safety and Education Training   [x] Patient/Caregiver Education   [] HEP  [] Other     PT long term treatment goals are located in above grid    Frequency of treatments: 2-5x/week x 1-2 weeks. Time in  1146  Time out  1205    Evaluation Time includes thorough review of current medical information, gathering information on past medical history/social history and prior level of function, completion of standardized testing/informal observation of tasks, assessment of data and education on plan of care and goals.     CPT codes:  [x] Low Complexity PT evaluation 29069  [] Moderate Complexity PT evaluation 35630  [] High Complexity PT evaluation 59276  [] PT Re-evaluation 58248  [] Therapeutic activities 69590 __minutes  [] Therapeutic exercises 13291 __ minutes      Yue Duval, PT, DPT  PT [id number]

## 2022-09-21 NOTE — CONSULTS
Inpatient Cardiology Consultation      Reason for Consult:  NSTEMI    Consulting Physician: Dr Ana Scales     Requesting Physician:  Maura Wills DO    Date of Consultation: 9/21/2022    HISTORY OF PRESENT ILLNESS:   Patient is an 55-year-old female who is known to [facility] cardiology through Dr. Joe Lang. She was last seen outpatient 6/5/2019 for PAF and hypertension with no changes. PMHx: PAF on Eliquis, hypertension, hypothyroidism, history of DVT, CKD, lymphedema bilateral lower extremities, blood transfusion reaction, arthritis, venous stasis    Last echo 8/2017: EF 70%. Mild LVH. Mild AS. Mild AR. Mild TR.  RVSP 53 mmHg. HPI:  Patient presented to ER 9/20/2022 for mechanical fall onto her right side striking her right shoulder and leg. The patient was found to have a UTI and elevated troponin was been to the hospital.  VS upon arrival 97.6, 16 respirations, 101 pulse, 171/76, 92% room air. Labs: Potassium 4.1, BUN 28, creatinine 1.1 (2017 1.1-5.6)), GFR 48, glucose 122, serum calcium 8.7, proBNP 3134, troponin 86> 101> 110 (2017 0.03), albumin 3.9, WBC 17.8> 13.0, H&H 10.8 >9.9/30.0, platelet 912, blood and urine cultures pending, UA infected  CT head: No acute process  CTA pulmonary: No PE. Bibasilar atelectasis. CXR: No acute process. Upon assessment today 9/21/2022 patient is lying semi-Munoz in hospital bed on room air. Patient is alert and oriented, speaking full sentences, and is in no apparent distress at this time. The patient reports yesterday she was walking when she tripped over a dog toy and fell onto her right side. She did not hit her head and she had no LOC. In ER patient was found to have UTI and elevated troponin. Patient denies any new or recent chest pain, SOB, MARIANO, palpitations, diaphoresis, nausea, orthopnea, or PND. She reports she lives alone and has no trouble with ADLs. She does not fall often. She has no history of tobacco or alcohol abuse.   She reports she takes all of her medications at home as prescribed. She denies any history of heart failure. VS today 100.1, 18 respirations, 86 pulse, 128/45, 91% on room air. Please note: past medical records were reviewed per electronic medical record (EMR) - see detailed reports under Past Medical/ Surgical History. Past Medical History:    PAF on Eliquis  Hypertension  Hypothyroidism  History of DVT  CKD  Lymphedema bilateral lower extremities  Blood transfusion reaction  Arthritis  Skin ulcer of left calf 2017  Pelvic fracture history  Venous stasis ulcer  Colonoscopy, endoscopy, eye surgery, right hip fracture surgery, ORIF left hip, tonsillectomy    Cardiac testing:  echo 8/2017: EF 70%. Mild LVH. Mild AS. Mild AR. Mild TR.  RVSP 53 mmHg. Medications Prior to admit:  Prior to Admission medications    Medication Sig Start Date End Date Taking?  Authorizing Provider   bumetanide (BUMEX) 1 MG tablet  11/22/17   Historical Provider, MD   acetaminophen (TYLENOL) 325 MG tablet Take 650 mg by mouth every 4 hours as needed for Pain    Historical Provider, MD   calcium-vitamin D (Shad Moulder) 500-200 MG-UNIT per tablet Take 1 tablet by mouth 2 times daily 8/22/17   Kylie Mclean MD   apixaban Georgana Space) 5 MG TABS tablet Take 1 tablet by mouth 2 times daily 8/28/17   Han Segal MD   metoprolol tartrate (LOPRESSOR) 25 MG tablet Take 1 tablet by mouth 2 times daily 8/21/17   Han Segal MD   Cholecalciferol (VITAMIN D) 2000 units TABS tablet Take 2.5 tablets by mouth 2 times daily 8/21/17   Han Segal MD   amLODIPine-benazepril (LOTREL) 10-40 MG per capsule Take 1 capsule by mouth daily    Historical Provider, MD   levothyroxine (SYNTHROID) 75 MCG tablet Take 75 mcg by mouth Daily    Historical Provider, MD       Current Medications:    Current Facility-Administered Medications: sodium chloride flush 0.9 % injection 5-40 mL, 5-40 mL, IntraVENous, 2 times per day  sodium chloride flush 0.9 % injection 5-40 mL, 5-40 mL, IntraVENous, PRN  0.9 % sodium chloride infusion, , IntraVENous, PRN  ondansetron (ZOFRAN-ODT) disintegrating tablet 4 mg, 4 mg, Oral, Q8H PRN **OR** ondansetron (ZOFRAN) injection 4 mg, 4 mg, IntraVENous, Q6H PRN  acetaminophen (TYLENOL) tablet 650 mg, 650 mg, Oral, Q6H PRN **OR** acetaminophen (TYLENOL) suppository 650 mg, 650 mg, Rectal, Q6H PRN  bisacodyl (DULCOLAX) EC tablet 5 mg, 5 mg, Oral, Daily PRN  ipratropium-albuterol (DUONEB) nebulizer solution 1 ampule, 1 ampule, Inhalation, Q6H PRN  cefTRIAXone (ROCEPHIN) 1,000 mg in sterile water 10 mL IV syringe, 1,000 mg, IntraVENous, Q24H  apixaban (ELIQUIS) tablet 5 mg, 5 mg, Oral, BID  calcium-vitamin D (OSCAL-500) 500-200 MG-UNIT per tablet 1 tablet, 1 tablet, Oral, BID  levothyroxine (SYNTHROID) tablet 75 mcg, 75 mcg, Oral, Daily  metoprolol tartrate (LOPRESSOR) tablet 25 mg, 25 mg, Oral, BID  0.9 % sodium chloride infusion, , IntraVENous, Continuous  amLODIPine (NORVASC) tablet 10 mg, 10 mg, Oral, Daily **AND** lisinopril (PRINIVIL;ZESTRIL) tablet 40 mg, 40 mg, Oral, Daily    Allergies:  Aspirin, Other, and Tape Palma Jolanta tape]    Social History:    Social History     Socioeconomic History    Marital status:       Spouse name: Not on file    Number of children: Not on file    Years of education: Not on file    Highest education level: Not on file   Occupational History    Not on file   Tobacco Use    Smoking status: Never    Smokeless tobacco: Never   Substance and Sexual Activity    Alcohol use: No    Drug use: No    Sexual activity: Not Currently   Other Topics Concern    Not on file   Social History Narrative    Not on file     Social Determinants of Health     Financial Resource Strain: Not on file   Food Insecurity: Not on file   Transportation Needs: Not on file   Physical Activity: Not on file   Stress: Not on file   Social Connections: Not on file   Intimate Partner Violence: Not on file   Housing Stability: Not on file       Family History:   Family History   Problem Relation Age of Onset    Mult Sclerosis Father          REVIEW OF SYSTEMS:     Constitutional: Denies fevers, chills, night sweats, and fatigue  HEENT: Denies headaches, nose bleeds, and blurred vision,oral pain, abscess or lesion. Musculoskeletal: Denies pain to BLE with ambulation. Mechanical fall yesterday. Chronic bilateral lower leg edema and hannah color due to venous stasis. Neurological: Denies dizziness and lightheadedness, numbness and tingling  Cardiovascular: Denies chest pain, palpitations, and feelings of heart racing. Respiratory: Denies orthopnea and PND, SOB/MARIANO. Gastrointestinal: Denies heartburn, nausea/vomiting, diarrhea and constipation, black/bloody, and tarry stools. Genitourinary: Denies dysuria and hematuria  Hematologic: Denies excessive bruising or bleeding  Lymphatic: Denies lumps and bumps to neck, axilla, breast, and groin  Endocrine: Denies excessive thirst. Denies intolerance to hot and cold  GYN: Postmenopausal state; Denies vaginal bleeding. Psychiatric: Denies anxiety and depression. PHYSICAL EXAM:   BP (!) 128/45   Pulse 86   Temp (!) 101.1 °F (38.4 °C) (Oral)   Resp 18   Ht 5' 2\" (1.575 m)   Wt 176 lb (79.8 kg)   SpO2 91%   BMI 32.19 kg/m²   CONST:  Well developed, well nourished 70-year-old  female who appears stated age. Awake, alert, cooperative, no apparent distress  HEENT:   Head- Normocephalic, atraumatic   Eyes- Conjunctivae pink, anicteric  Throat- Oral mucosa pink and moist  Neck-  No stridor, trachea midline, no jugular venous distention. No adenopathy   CHEST: Chest symmetrical and non-tender to palpation.  No accessory muscle use or intercostal retractions  RESPIRATORY: Lung sounds -crackles bilateral lower lobes  CARDIOVASCULAR:     No carotid bruit  Heart Inspection- shows no noted pulsations  Heart Palpation- no heaves or thrills; PMI is non-displaced   Heart Ausculation- Regular rate and rhythm, 2/6 murmur heard best at Erb's point no s3, s4 or rub   PV: Nonpitting trace edema just below the knee bilaterally with chronic discoloration to lower legs from venous stasis. No varicosities. Pedal pulses palpable, no clubbing or cyanosis   ABDOMEN: Soft, non-tender to light palpation. Bowel sounds present. No palpable masses no organomegaly; no abdominal bruit  MS: Good muscle strength and tone. No atrophy or abnormal movements. : Deferred  SKIN: Warm and dry no statis dermatitis or ulcers   NEURO / PSYCH: Oriented to person, place and time. Speech clear and appropriate. Follows all commands. Pleasant affect     DATA:    ECG: Normal sinus rhythm with sinus arrhythmia, vent rate 99, TX interval 162, QRS duration 74, QTc 420. Tele strips:   Diagnostic:    Labs:   CBC:   Recent Labs     09/20/22 1913 09/21/22  0455   WBC 17.8* 13.0*   HGB 10.8* 9.9*   HCT 33.4* 30.0*    165     BMP:   Recent Labs     09/20/22 1913 09/21/22  0455    136   K 4.6 4.1   CO2 23 23   BUN 29* 28*   CREATININE 1.1* 1.1*   LABGLOM 48 48   CALCIUM 9.0 8.7     FASTING LIPID PANEL:  Lab Results   Component Value Date/Time    HDL 44 09/21/2022 04:55 AM    LDLCALC 66 09/21/2022 04:55 AM     LIVER PROFILE:  Recent Labs     09/20/22 1913   AST 28   ALT 12   LABALBU 3.9      Latest Reference Range & Units 9/20/22 19:13 9/21/22 00:34 9/21/22 04:55   Pro-BNP 0 - 450 pg/mL 3,134 (H)     Troponin, High Sensitivity 0 - 9 ng/L 86 (H) 101 (H) 110 (H)   (H): Data is abnormally high    CXR:      Impression   No acute process. CTA pulm: Impression   1. No evidence of pulmonary embolism or acute pulmonary abnormality. 2.  Bibasilar atelectasis. Assessment:  Elevated troponin trend 86> 101> 110<142 inpatient denying chest pain or any other anginal equivalency. EKG is nonischemic. Possibly 2/2 UTI/anemia. Elevated proBNP 3134.   Patient clinically mildly hypervolemic with crackles in bilateral lower lobes and trace edema of her

## 2022-09-21 NOTE — PLAN OF CARE
Problem: Pain  Goal: Verbalizes/displays adequate comfort level or baseline comfort level  Outcome: Progressing     Problem: Safety - Adult  Goal: Free from fall injury  Outcome: Progressing  Flowsheets (Taken 9/21/2022 1038)  Free From Fall Injury: Based on caregiver fall risk screen, instruct family/caregiver to ask for assistance with transferring infant if caregiver noted to have fall risk factors     Problem: Skin/Tissue Integrity  Goal: Absence of new skin breakdown  Description: 1. Monitor for areas of redness and/or skin breakdown  2. Assess vascular access sites hourly  3. Every 4-6 hours minimum:  Change oxygen saturation probe site  4. Every 4-6 hours:  If on nasal continuous positive airway pressure, respiratory therapy assess nares and determine need for appliance change or resting period.   Outcome: Progressing

## 2022-09-21 NOTE — CARE COORDINATION
Social Work / Discharge Planning : Patient admitted after fall at home. Cardiology on board. Await treatment plan. Patient resides by herself in a one floor home. Patient has a cane and uses a walker. Patient has past history at  [facility]. She also has been in [facility] [place] and [facility]. Patient PCP is DR Gerardo Vogel and she uses [facility] pharmacy. Patiet does have therapy ordered and await therapy recommendations to better assist with discharge planning. SW to follow.  Electronically signed by ALBINO Harris on 9/21/22 at 1:20 PM EDT

## 2022-09-21 NOTE — PROGRESS NOTES
0444 Obtained nurse to nurse report from José [address] at this time. 0400 New admission. Alert and oriented X 4. Called consult to cardiology at this time. Initiated cardiac monitoring. Call light and bedside table within reach.

## 2022-09-22 LAB
ANION GAP SERPL CALCULATED.3IONS-SCNC: 8 MMOL/L (ref 7–16)
BUN BLDV-MCNC: 34 MG/DL (ref 6–23)
CALCIUM SERPL-MCNC: 7.9 MG/DL (ref 8.6–10.2)
CHLORIDE BLD-SCNC: 107 MMOL/L (ref 98–107)
CO2: 24 MMOL/L (ref 22–29)
CREAT SERPL-MCNC: 1.1 MG/DL (ref 0.5–1)
EKG ATRIAL RATE: 99 BPM
EKG P AXIS: 114 DEGREES
EKG P-R INTERVAL: 162 MS
EKG Q-T INTERVAL: 328 MS
EKG QRS DURATION: 74 MS
EKG QTC CALCULATION (BAZETT): 420 MS
EKG R AXIS: 44 DEGREES
EKG T AXIS: 36 DEGREES
EKG VENTRICULAR RATE: 99 BPM
GFR AFRICAN AMERICAN: 58
GFR NON-AFRICAN AMERICAN: 48 ML/MIN/1.73
GLUCOSE BLD-MCNC: 105 MG/DL (ref 74–99)
POTASSIUM SERPL-SCNC: 3.7 MMOL/L (ref 3.5–5)
SODIUM BLD-SCNC: 139 MMOL/L (ref 132–146)
TOTAL CK: 242 U/L (ref 20–180)
TROPONIN, HIGH SENSITIVITY: 101 NG/L (ref 0–9)
TROPONIN, HIGH SENSITIVITY: 103 NG/L (ref 0–9)

## 2022-09-22 PROCEDURE — 2500000003 HC RX 250 WO HCPCS: Performed by: INTERNAL MEDICINE

## 2022-09-22 PROCEDURE — 2580000003 HC RX 258: Performed by: NURSE PRACTITIONER

## 2022-09-22 PROCEDURE — 80048 BASIC METABOLIC PNL TOTAL CA: CPT

## 2022-09-22 PROCEDURE — 2060000000 HC ICU INTERMEDIATE R&B

## 2022-09-22 PROCEDURE — 82550 ASSAY OF CK (CPK): CPT

## 2022-09-22 PROCEDURE — 6360000002 HC RX W HCPCS: Performed by: NURSE PRACTITIONER

## 2022-09-22 PROCEDURE — 84484 ASSAY OF TROPONIN QUANT: CPT

## 2022-09-22 PROCEDURE — 99233 SBSQ HOSP IP/OBS HIGH 50: CPT | Performed by: INTERNAL MEDICINE

## 2022-09-22 PROCEDURE — 97535 SELF CARE MNGMENT TRAINING: CPT

## 2022-09-22 PROCEDURE — 36415 COLL VENOUS BLD VENIPUNCTURE: CPT

## 2022-09-22 PROCEDURE — 97530 THERAPEUTIC ACTIVITIES: CPT

## 2022-09-22 PROCEDURE — 6370000000 HC RX 637 (ALT 250 FOR IP): Performed by: NURSE PRACTITIONER

## 2022-09-22 RX ADMIN — BUMETANIDE 1 MG: 0.25 INJECTION, SOLUTION INTRAMUSCULAR; INTRAVENOUS at 09:39

## 2022-09-22 RX ADMIN — WATER 1000 MG: 1 INJECTION INTRAMUSCULAR; INTRAVENOUS; SUBCUTANEOUS at 23:04

## 2022-09-22 RX ADMIN — CALCIUM CARBONATE-VITAMIN D TAB 500 MG-200 UNIT 1 TABLET: 500-200 TAB at 10:36

## 2022-09-22 RX ADMIN — APIXABAN 5 MG: 5 TABLET, FILM COATED ORAL at 09:39

## 2022-09-22 RX ADMIN — SODIUM CHLORIDE, PRESERVATIVE FREE 10 ML: 5 INJECTION INTRAVENOUS at 09:39

## 2022-09-22 RX ADMIN — LEVOTHYROXINE SODIUM 75 MCG: 75 TABLET ORAL at 06:42

## 2022-09-22 RX ADMIN — CALCIUM CARBONATE-VITAMIN D TAB 500 MG-200 UNIT 1 TABLET: 500-200 TAB at 20:54

## 2022-09-22 RX ADMIN — METOPROLOL TARTRATE 25 MG: 25 TABLET, FILM COATED ORAL at 09:39

## 2022-09-22 RX ADMIN — SODIUM CHLORIDE, PRESERVATIVE FREE 10 ML: 5 INJECTION INTRAVENOUS at 20:54

## 2022-09-22 RX ADMIN — BUMETANIDE 1 MG: 0.25 INJECTION, SOLUTION INTRAMUSCULAR; INTRAVENOUS at 20:53

## 2022-09-22 RX ADMIN — SODIUM CHLORIDE: 9 INJECTION, SOLUTION INTRAVENOUS at 00:41

## 2022-09-22 RX ADMIN — SODIUM CHLORIDE: 9 INJECTION, SOLUTION INTRAVENOUS at 20:58

## 2022-09-22 RX ADMIN — WATER 1000 MG: 1 INJECTION INTRAMUSCULAR; INTRAVENOUS; SUBCUTANEOUS at 00:37

## 2022-09-22 RX ADMIN — ACETAMINOPHEN 650 MG: 325 TABLET ORAL at 12:41

## 2022-09-22 RX ADMIN — APIXABAN 5 MG: 5 TABLET, FILM COATED ORAL at 20:54

## 2022-09-22 RX ADMIN — METOPROLOL TARTRATE 25 MG: 25 TABLET, FILM COATED ORAL at 20:53

## 2022-09-22 RX ADMIN — CALCIUM CARBONATE-VITAMIN D TAB 500 MG-200 UNIT 1 TABLET: 500-200 TAB at 00:37

## 2022-09-22 ASSESSMENT — PAIN DESCRIPTION - ORIENTATION
ORIENTATION: RIGHT

## 2022-09-22 ASSESSMENT — PAIN SCALES - GENERAL
PAINLEVEL_OUTOF10: 2
PAINLEVEL_OUTOF10: 6

## 2022-09-22 ASSESSMENT — PAIN DESCRIPTION - DESCRIPTORS
DESCRIPTORS: ACHING;PRESSURE
DESCRIPTORS: ACHING
DESCRIPTORS: ACHING

## 2022-09-22 ASSESSMENT — PAIN DESCRIPTION - LOCATION
LOCATION: SHOULDER
LOCATION: HIP;SHOULDER
LOCATION: SHOULDER

## 2022-09-22 ASSESSMENT — PAIN - FUNCTIONAL ASSESSMENT
PAIN_FUNCTIONAL_ASSESSMENT: ACTIVITIES ARE NOT PREVENTED

## 2022-09-22 NOTE — PROGRESS NOTES
Occupational Therapy  OT BEDSIDE TREATMENT NOTE      Date:2022  Patient Name: Dusty Chang  MRN: [id number]  : 1940  Room: [address] [place], [place] [address]A       Evaluating OT: Storm Self OTR/L   [id number]       Referring Provider:TRAVIS Mora CNP    Specific Provider Orders/Date:OT eval and treat 2022       Diagnosis:  Falls frequently [R29.6]  NSTEMI (non-ST elevated myocardial infarction) ([facility] [address] [I21.4]  Acute cystitis with hematuria [N30.01]   XR R shoulder:  no evidence of acute trauma or fracture or dislocation.     decreased acromial humeral interval  XR R knee and hip :  No findings to suggest acute fracture or dislocation    Pertinent Medical History: HTN, lymphedema LEs, hip surgery       Precautions:  Fall Risk      Assessment of current deficits    [x] Functional mobility            [x]ADLs           [x] Strength                  []Cognition    [x] Functional transfers          [x] IADLs         [x] Safety Awareness   [x]Endurance    [] Fine Coordination                         [x] Balance      [] Vision/perception   []Sensation      []Gross Motor Coordination             [] ROM           [] Delirium                   [] Motor Control      OT PLAN OF CARE   OT POC based on physician orders, patient diagnosis and results of clinical assessment     Frequency/Duration  2-4 days/wk for 2 weeks PRN   Specific OT Treatment Interventions to include:   ADL retraining/adapted techniques and AE recommendations to increase functional independence within precautions                    Energy conservation techniques to improve tolerance for selfcare routine   Functional transfer/mobility training/DME recommendations for increased independence, safety and fall prevention         Patient/family education to increase safety and functional independence             Environmental modifications for safe mobility and completion of ADLs                             Therapeutic activity to improve functional performance during ADLs. Therapeutic exercise to improve tolerance and functional strength for ADLs    Balance retraining/tolerance tasks for facilitation of postural control with dynamic challenges during ADLs . Positioning to improve functional independence        Recommended Adaptive Equipment: TBD      Home Living: Pt lives alone, 1 story, 3 steps/rail. Daughter lives close by    [place] setup: tub/shower, extended tub bench    Equipment owned: walker,      Prior Level of Function: Independent with ADLs , assist  with IADLs; ambulated with walker   Driving: yes      Pain Level: Pt reports pain all over. Cognition: Alert and oriented. Functional Assessment:  -PAC Daily Activity Raw Score: 14/24    Initial Eval Status  Date: 9/21/22 Treatment Status  Date:9/22/22  STGs = LTGs  Time frame: 10-14 days   Feeding SBA/set-up    Supervision    Grooming Mod A, seated   Decrease standing tolerance and balance Min A   SBA    UB Dressing Mod  A   SBA    LB Dressing Max  A  Assist with threading brief over feet and pulling up over hips  Mod A   Min A    Bathing Mod A    Moy    Toileting Max A   Assist with hygiene and clothing management  Pt states she just returned from the bathroom. Min A   Bed Mobility  Max A  Supine <>sit  Min  A supine to sit   Min A    Functional Transfers Mod A  Sit- stand from bed, BSC  Min  A   Min/CGA   Functional Mobility Mod A ,w/walker   SPT bed <> BSC   Unsteadiness and overall weakness noted   min  A using w/w in room. Min cues for hand placement. Min/CGA  with good tolerance    Balance Sitting:     Static:  SBA- EOB     Dynamic:Mod A   Standing: mod A    Independent/supervision - sitting  Min/CGA- standing    Activity Tolerance No SOB observed  Fair   Good  with ADL activity      Comments:  Pt agreeable to therapy this PM.  Limited tolerance for trunk flexion for ADL.  Pt remained seated in the chair at the end of the session. Chair alarm activated. Education/treatment:  ADL retraining with facilitation of movement to increase self care skills. Therapeutic activity to address balance and endurance for ADL and transfers. Pt education of walker and transfer safety. Pt has made  progress towards set goals.        Time In: 2:30   Time Out: 2:55     Min Units   Therapeutic Ex 92404     Therapeutic Activities 09232 95 0   ADL/Self Care 11384 10 1   Orthotic Management 41451     Neuro Re-Ed 86649     Non-Billable Time     TOTAL TIMED TREATMENT [facility] [place]/L [id number]

## 2022-09-22 NOTE — PROGRESS NOTES
Physician Progress Note      Jose Guadalupe Murray  CSN #:                  [id number]  :                       1940  ADMIT DATE:       2022 5:02 PM  [address] [place] DATE:  Mary Josue  PROVIDER #:        Art Amandeep DO          QUERY TEXT:    Dear Attending Physician,  Patient admitted with UTI, elevated Troponin  and mild hypervolemia . Noted   documentation of \"NSTEMI  \" per H/P. In order to support the diagnosis of   NSTEMI , please include additional clinical indicators in your documentation. Or please document if the diagnosis of NSTEMI  has been ruled out after   further study. The medical record reflects the following:  Risk Factors:  UTI , chronic lymphedema, HTN, CKD  Clinical Indicators: Per PCP ,\". .. NSTEMI  . Tony Amend Tony Amend \"  Per CARDIO   ,\". Tony Amend Tony Amend Chronic bilateral lower leg  Nonpitting  edema and hannah color due to   venous stasis. ... 1. Elevated troponin trend 86> 101> 110<142 inpatient denying   chest pain or any other anginal equivalency. EKG is nonischemic. Possibly   2/2 UTI/anemia. 2.Elevated proBNP 3134. .. mildly hypervolemic with crackles in   bilateral lower lobes and trace edema of her legs. Tony Amend Tony Amend \"  Treatment: IV Bumex, IV ATB    Thank you,  Dayne Bailey RN CCDS  Clinical Documentation Improvement Specialist  Options provided:  -- NSTEMI present as evidenced by, Please document evidence. -- NSTEMI was ruled out  -- Other - I will add my own diagnosis  -- Disagree - Not applicable / Not valid  -- Disagree - Clinically unable to determine / Unknown  -- Refer to Clinical Documentation Reviewer    PROVIDER RESPONSE TEXT:    NSTEMI is present as evidenced by ELEVATED TT    Query created by: Pascual Motley on 2022 3:44 PM      QUERY TEXT:    Dear Attending Physician,  Patient admitted with UTI, elevated Troponin. Noted documentation of \" mild   hypervolemia \" on  per Cardiology consultant. If possible, please   document in progress notes and discharge summary:     The medical record reflects the following:  Risk Factors:  UTI , chronic lymphedema, HTN, CKD  Clinical Indicators: Per PCP 9/21,\". .. NSTEMI  . Solange Pennington \"  Per CARDIO   9/21,\". Solange Narvaez Gorge Chronic bilateral lower leg  Nonpitting  edema and hannah color due to   venous stasis. ... 1. Elevated troponin trend 86> 101> 110<142 inpatient denying   chest pain or any other anginal equivalency. EKG is nonischemic. Possibly   2/2 UTI/anemia. 2.Elevated proBNP 3134. .. mildly hypervolemic with crackles in   bilateral lower lobes and trace edema of her legs. Solange Pennington \"  Treatment: IV Bumex, IV ATB    Thank you,  Chris Hodge RN CCDS  Clinical Documentation Improvement Specialist  Options provided:  -- Fluid overload confirmed present on admission  -- Fluid overload  ruled out, Please document the likely cause of mildly   hypervolemic with crackles in bilateral lower lobes and trace edema of her   legs. -- Other - I will add my own diagnosis  -- Disagree - Not applicable / Not valid  -- Disagree - Clinically unable to determine / Unknown  -- Refer to Clinical Documentation Reviewer    PROVIDER RESPONSE TEXT:    The diagnosis of fluid overload was confirmed as present on admission. Query created by: Deb Breen on 9/21/2022 3:47 PM      QUERY TEXT:    Dear Attending Physician,    Pt admitted  s/p fall at home, UTI, elevated Troponin. Pt noted to have \"   mild rhabdomyolysis \"  or clinical indicators of rhabdomyolysis i.e.   hematuria, elevated CK, myoglobin). If possible, please document in progress   notes and discharge summary if you are evaluating and/or treating any of the   following: The medical record reflects the following:  Risk Factors:  mechanical fall onto her right side striking her right shoulder   and leg, advanced age, HTN,CKD  Clinical Indicators: Per PCP 9/21,\". .. MILD RHABDOMYOLYSIS FROM FALL . Solange Gorge Solange Gorge \"     9/21 29/1.1 -->  9/22 28/1.1--> 9/22 34/1.1 ,  TCK 9/21 437   9/22 242  Treatment: IVF, IV ATB, IV Bumex    Per [email or web address]  Traumatic rhabdomyolysis cause examples: crush syndrome, prolonged   immobilization  Nontraumatic rhabdomyolysis cause examples:  marked exertion, hyperthermia,   metabolic myopathy, drugs or toxins, infections, electrolyte disorders. Thank you,  Sharita Rainey RN CCDS  Clinical Documentation Improvement Specialist  Options provided:  -- Traumatic rhabdomyolysis  -- Nontraumatic rhabdomyolysis  -- Other - I will add my own diagnosis  -- Disagree - Not applicable / Not valid  -- Disagree - Clinically unable to determine / Unknown  -- Refer to Clinical Documentation Reviewer    PROVIDER RESPONSE TEXT:    This patient has traumatic rhabdomyolysis.     Query created by: Law Blount on 9/22/2022 1:16 PM      Electronically signed by:  Kierra Corea DO 9/22/2022 2:51 PM

## 2022-09-22 NOTE — PROGRESS NOTES
Inpatient Cardiology Consultation follow-up visit    Reason for Consult:  NSTEMI    Consulting Physician: Dr Stephan Hunt     Requesting Physician:  Eve Thao DO    Date of Consultation: 9/22/2022    Transthoracic echocardiogram September 21, 2022:  Ejection fraction is visually estimated at 60 to 65%. Mild asymmetric septal hypertrophy. There is doppler evidence of stage I diastolic dysfunction. Normal right ventricular size and function. Agitated saline injected for shunt evaluation revealed no evidence of shunt. Mild mitral regurgitation is present. The aortic valve appears mildly sclerotic. Mild aortic stenosis. Mild tricuspid regurgitation. RVSP is 50 mmHg. Pulmonary hypertension is moderate . Physiologic and/or trace pulmonic regurgitation present. PMHx: PAF on Eliquis, hypertension, hypothyroidism, history of DVT, CKD, lymphedema bilateral lower extremities, blood transfusion reaction, arthritis, venous stasis    Last echo 8/2017: EF 70%. Mild LVH. Mild AS. Mild AR. Mild TR.  RVSP 53 mmHg. Past Medical History:    PAF on Eliquis  Hypertension  Hypothyroidism  History of DVT  CKD  Lymphedema bilateral lower extremities  Blood transfusion reaction  Arthritis  Skin ulcer of left calf 2017  Pelvic fracture history  Venous stasis ulcer  Colonoscopy, endoscopy, eye surgery, right hip fracture surgery, ORIF left hip, tonsillectomy    Cardiac testing:  echo 8/2017: EF 70%. Mild LVH. Mild AS. Mild AR. Mild TR.  RVSP 53 mmHg. Medications Prior to admit:  Prior to Admission medications    Medication Sig Start Date End Date Taking?  Authorizing Provider   bumetanide (BUMEX) 1 MG tablet  11/22/17   Historical Provider, MD   acetaminophen (TYLENOL) 325 MG tablet Take 650 mg by mouth every 4 hours as needed for Pain    Historical Provider, MD   calcium-vitamin D (OSCAL-500) 500-200 MG-UNIT per tablet Take 1 tablet by mouth 2 times daily 8/22/17   Tiffany Smith MD   apixaban [facility]) IntraVENous, BID    Allergies:  Aspirin, Other, and Tape Florie Roes tape]    Social History:    Social History     Socioeconomic History    Marital status:      Spouse name: Not on file    Number of children: Not on file    Years of education: Not on file    Highest education level: Not on file   Occupational History    Not on file   Tobacco Use    Smoking status: Never    Smokeless tobacco: Never   Substance and Sexual Activity    Alcohol use: No    Drug use: No    Sexual activity: Not Currently   Other Topics Concern    Not on file   Social History Narrative    Not on file     Social Determinants of Health     Financial Resource Strain: Not on file   Food Insecurity: Not on file   Transportation Needs: Not on file   Physical Activity: Not on file   Stress: Not on file   Social Connections: Not on file   Intimate Partner Violence: Not on file   Housing Stability: Not on file       Family History:   Family History   Problem Relation Age of Onset    Mult Sclerosis Father          REVIEW OF SYSTEMS:     Constitutional: Denies fevers, chills, night sweats, and fatigue  HEENT: Denies headaches, nose bleeds, and blurred vision,oral pain, abscess or lesion. Musculoskeletal: Denies pain to BLE with ambulation. Mechanical fall yesterday. Chronic bilateral lower leg edema and hannah color due to venous stasis. Neurological: Denies dizziness and lightheadedness, numbness and tingling  Cardiovascular: Denies chest pain, palpitations, and feelings of heart racing. Respiratory: Denies orthopnea and PND, SOB/MARIANO. Gastrointestinal: Denies heartburn, nausea/vomiting, diarrhea and constipation, black/bloody, and tarry stools. Genitourinary: Denies dysuria and hematuria  Hematologic: Denies excessive bruising or bleeding  Lymphatic: Denies lumps and bumps to neck, axilla, breast, and groin  Endocrine: Denies excessive thirst. Denies intolerance to hot and cold  GYN: Postmenopausal state; Denies vaginal bleeding. Psychiatric: Denies anxiety and depression. PHYSICAL EXAM:   BP (!) 112/54   Pulse 64   Temp 97.2 °F (36.2 °C) (Oral)   Resp 16   Ht 5' 2\" (1.575 m)   Wt 181 lb 8 oz (82.3 kg)   SpO2 95%   BMI 33.20 kg/m²   CONST:  Well developed, well nourished 40-year-old  female who appears stated age. Awake, alert, cooperative, no apparent distress  HEENT:   Head- Normocephalic, atraumatic   Eyes- Conjunctivae pink, anicteric  Throat- Oral mucosa pink and moist  Neck-  No stridor, trachea midline, no jugular venous distention. No adenopathy   CHEST: Chest symmetrical and non-tender to palpation. No accessory muscle use or intercostal retractions  RESPIRATORY: Lung sounds -crackles bilateral lower lobes  CARDIOVASCULAR:     No carotid bruit  Heart Inspection- shows no noted pulsations  Heart Palpation- no heaves or thrills; PMI is non-displaced   Heart Ausculation- Regular rate and rhythm, 2/6 murmur heard best at Erb's point no s3, s4 or rub   PV: Nonpitting trace edema just below the knee bilaterally with chronic discoloration to lower legs from venous stasis. No varicosities. Pedal pulses palpable, no clubbing or cyanosis   ABDOMEN: Soft, non-tender to light palpation. Bowel sounds present. No palpable masses no organomegaly; no abdominal bruit  MS: Good muscle strength and tone. No atrophy or abnormal movements. : Deferred  SKIN: Warm and dry no statis dermatitis or ulcers   NEURO / PSYCH: Oriented to person, place and time. Speech clear and appropriate. Follows all commands. Pleasant affect     DATA:    ECG: Normal sinus rhythm with sinus arrhythmia, vent rate 99, GA interval 162, QRS duration 74, QTc 420.   Tele strips:   Diagnostic:    Labs:   CBC:   Recent Labs     09/20/22  1913 09/21/22  0455   WBC 17.8* 13.0*   HGB 10.8* 9.9*   HCT 33.4* 30.0*    165     BMP:   Recent Labs     09/21/22 0455 09/22/22  0420    139   K 4.1 3.7   CO2 23 24   BUN 28* 34*   CREATININE 1.1* 1.1* LABGLOM 48 48   CALCIUM 8.7 7.9*     FASTING LIPID PANEL:  Lab Results   Component Value Date/Time    HDL 44 09/21/2022 04:55 AM    LDLCALC 66 09/21/2022 04:55 AM     LIVER PROFILE:  Recent Labs     09/20/22 1913   AST 28   ALT 12   LABALBU 3.9      Latest Reference Range & Units 9/20/22 19:13 9/21/22 00:34 9/21/22 04:55   Pro-BNP 0 - 450 pg/mL 3,134 (H)     Troponin, High Sensitivity 0 - 9 ng/L 86 (H) 101 (H) 110 (H)   (H): Data is abnormally high    CXR:      Impression   No acute process. CTA pulm: Impression   1. No evidence of pulmonary embolism or acute pulmonary abnormality. 2.  Bibasilar atelectasis. Transthoracic echocardiogram September 21, 2022:  Ejection fraction is visually estimated at 60 to 65%. Mild asymmetric septal hypertrophy. There is doppler evidence of stage I diastolic dysfunction. Normal right ventricular size and function. Agitated saline injected for shunt evaluation revealed no evidence of shunt. Mild mitral regurgitation is present. The aortic valve appears mildly sclerotic. Mild aortic stenosis. Mild tricuspid regurgitation. RVSP is 50 mmHg. Pulmonary hypertension is moderate . Physiologic and/or trace pulmonic regurgitation present. Assessment/plan:  Elevated troponin trend 86> 101> 110<142 inpatient denying chest pain or any other anginal equivalency. EKG is nonischemic. Possibly 2/2 UTI/anemia. Echocardiogram revealed normal systolic function as shown above  Ischemic evaluation once stable and infectious etiology resolved    Heart failure with preserved ejection fraction   Continue on diuresis and monitor closely   Hold antihypertensive medications given low blood pressure     Mechanical fall yesterday with right shoulder injury. Primary service  CKD with creatinine this admission   Monitor closely while on diuresis  Pulmonary hypertension    UTI, being treated with ATB by primary service. PAF on Eliquis and beta-blocker.   Hypertension, soft at this time.   Hypothyroidism on HRT  History of DVT  Chronic lymphedema bilateral lower extremities due to venous stasis  History of blood transfusion reaction  Arthritis        Electronically signed by Purnima Espinoza MD on 9/22/2022 at 6:34 PM

## 2022-09-22 NOTE — PROGRESS NOTES
P Quality Flow/Interdisciplinary Rounds Progress Note        Quality Flow Rounds held on September 22, 2022    Disciplines Attending:  Bedside Nurse, , and Nursing Unit Leadership    Quentin Valverde was admitted on 9/20/2022  5:02 PM    Anticipated Discharge Date:       Disposition:    Sathya Score:  Sathya Scale Score: 17    Readmission Risk              Risk of Unplanned Readmission:  14           Discussed patient goal for the day, patient clinical progression, and barriers to discharge. The following Goal(s) of the Day/Commitment(s) have been identified:   IVF, IV Bumex.       Courtney Frias RN  September 22, 2022

## 2022-09-22 NOTE — PROGRESS NOTES
Hospitalist Progress Note      PCP: Madison Alicia MD    Date of Admission: 9/20/2022        Hospital Course:  80 y.o. female presented with FALL, STATES SHE TIPPED OVER THE DOG BLANKET AND FELL ON HER RIGHT SHOULDER. HAS A KNOWN HISTORY OF PAF, FOUND TO HAVE AN ELEVATED TT. Stella Deutsch DIAGNOSED WITH NSTEMI         Subjective:   FEELING BETTER        Medications:  Reviewed    Infusion Medications    sodium chloride      sodium chloride 50 mL/hr at 09/22/22 0041     Scheduled Medications    sodium chloride flush  5-40 mL IntraVENous 2 times per day    cefTRIAXone (ROCEPHIN) IV  1,000 mg IntraVENous Q24H    apixaban  5 mg Oral BID    oyster shell calcium w/D  1 tablet Oral BID    levothyroxine  75 mcg Oral Daily    metoprolol tartrate  25 mg Oral BID    [Held by provider] amLODIPine  10 mg Oral Daily    And    [Held by provider] lisinopril  40 mg Oral Daily    bumetanide  1 mg IntraVENous BID     PRN Meds: sodium chloride flush, sodium chloride, ondansetron **OR** ondansetron, acetaminophen **OR** acetaminophen, bisacodyl, ipratropium-albuterol, perflutren lipid microspheres    No intake or output data in the 24 hours ending 09/22/22 1447    Exam:    BP (!) 109/57   Pulse 78   Temp 98.2 °F (36.8 °C) (Oral)   Resp 16   Ht 5' 2\" (1.575 m)   Wt 181 lb 8 oz (82.3 kg)   SpO2 95%   BMI 33.20 kg/m²       HEENT:  Normal cephalic, atraumatic without obvious deformity. Pupils equal, round, and reactive to light. Extra ocular muscles intact. Conjunctivae/corneas clear. PROPTOSIS   Neck: Supple, with full range of motion. No jugular venous distention. Trachea midline. Respiratory:  Normal respiratory effort. Clear to auscultation, bilaterally without Rales/Wheezes/Rhonchi. Cardiovascular: IRREG  Abdomen: Soft, non-tender, non-distended with normal bowel sounds. Musculoskeletal:  No clubbing, cyanosis  POS edema bilaterally.  PAIN WITH ROM RIGHT SHOULDER    Skin: ECCHYMOSIS ON ARMS  Neurologic:  Neurovascularly intact without any focal sensory/motor deficits. Cranial nerves: II-XII intact, grossly non-focal.  Psychiatric:  Alert and oriented, thought content appropriate, normal insight               Labs:   Recent Labs     09/20/22 1913 09/21/22  0455   WBC 17.8* 13.0*   HGB 10.8* 9.9*   HCT 33.4* 30.0*    165     Recent Labs     09/20/22 1913 09/21/22  0455 09/22/22  0420    136 139   K 4.6 4.1 3.7    101 107   CO2 23 23 24   BUN 29* 28* 34*   CREATININE 1.1* 1.1* 1.1*   CALCIUM 9.0 8.7 7.9*     Recent Labs     09/20/22 1913   AST 28   ALT 12   BILITOT 0.5   ALKPHOS 85     No results for input(s): INR in the last 72 hours. Recent Labs     09/21/22 0842 09/22/22  1105   CKTOTAL 437* 242*     Recent Labs     09/20/22 1913   AST 28   ALT 12   BILITOT 0.5   ALKPHOS 85     No results for input(s): LACTA in the last 72 hours. No results found for: Natalia Heck  No results found for: AMMONIA    Assessment:    Active Hospital Problems    Diagnosis Date Noted    [facility] [I21.4] 09/21/2022     Priority: Medium    UTI (urinary tract infection) [N39.0] 09/21/2022     Priority: Medium    Hypoxia [R09.02] 09/21/2022     Priority: Medium    Fall [W19. XXXA] 09/21/2022     Priority: Medium    Elevated troponin [R77.8] 09/21/2022     Priority: Medium    Leukocytosis [D72.829] 09/21/2022     Priority: Medium    Chronic anticoagulation [Z79.01] 11/27/2017    Essential hypertension [I10] 08/14/2017    Acquired hypothyroidism [E03.9] 08/14/2017    Sepsis ([facility] [A41.9] 08/13/2017    History of DVT (deep vein thrombosis) [Z86.718] 08/19/2013   UTI  MILD RHABDOMYOLYSIS FROM FALL     PLAN:  CARD   LOPRESSOR   SYNTHROID   ROCEPHIN  BUMEX           DVT Prophylaxis:  ELIQUIS  Diet: ADULT DIET;  Regular; Low Fat/Low Chol/High Fiber/MIGUEL A  Code Status: Full Code     PT/OT Eval Status: ORDERED     Dispo - ERIC  Electronically signed by Ysabel Fountain DO on 9/22/2022 at 2:47 PM Seema Berry

## 2022-09-23 LAB
ANION GAP SERPL CALCULATED.3IONS-SCNC: 10 MMOL/L (ref 7–16)
BUN BLDV-MCNC: 33 MG/DL (ref 6–23)
CALCIUM SERPL-MCNC: 7.8 MG/DL (ref 8.6–10.2)
CHLORIDE BLD-SCNC: 107 MMOL/L (ref 98–107)
CO2: 23 MMOL/L (ref 22–29)
CREAT SERPL-MCNC: 1 MG/DL (ref 0.5–1)
GFR AFRICAN AMERICAN: >60
GFR NON-AFRICAN AMERICAN: 53 ML/MIN/1.73
GLUCOSE BLD-MCNC: 97 MG/DL (ref 74–99)
POTASSIUM SERPL-SCNC: 3.2 MMOL/L (ref 3.5–5)
SODIUM BLD-SCNC: 140 MMOL/L (ref 132–146)
URINE CULTURE, ROUTINE: NORMAL

## 2022-09-23 PROCEDURE — 2500000003 HC RX 250 WO HCPCS: Performed by: INTERNAL MEDICINE

## 2022-09-23 PROCEDURE — 36415 COLL VENOUS BLD VENIPUNCTURE: CPT

## 2022-09-23 PROCEDURE — 6370000000 HC RX 637 (ALT 250 FOR IP): Performed by: NURSE PRACTITIONER

## 2022-09-23 PROCEDURE — 99233 SBSQ HOSP IP/OBS HIGH 50: CPT | Performed by: INTERNAL MEDICINE

## 2022-09-23 PROCEDURE — 2580000003 HC RX 258: Performed by: NURSE PRACTITIONER

## 2022-09-23 PROCEDURE — 97530 THERAPEUTIC ACTIVITIES: CPT

## 2022-09-23 PROCEDURE — 80048 BASIC METABOLIC PNL TOTAL CA: CPT

## 2022-09-23 PROCEDURE — 97535 SELF CARE MNGMENT TRAINING: CPT

## 2022-09-23 PROCEDURE — 6370000000 HC RX 637 (ALT 250 FOR IP): Performed by: INTERNAL MEDICINE

## 2022-09-23 PROCEDURE — 2060000000 HC ICU INTERMEDIATE R&B

## 2022-09-23 RX ORDER — POTASSIUM CHLORIDE 20 MEQ/1
40 TABLET, EXTENDED RELEASE ORAL 2 TIMES DAILY WITH MEALS
Status: COMPLETED | OUTPATIENT
Start: 2022-09-23 | End: 2022-09-23

## 2022-09-23 RX ADMIN — POTASSIUM CHLORIDE 40 MEQ: 1500 TABLET, EXTENDED RELEASE ORAL at 17:15

## 2022-09-23 RX ADMIN — ACETAMINOPHEN 650 MG: 325 TABLET ORAL at 10:16

## 2022-09-23 RX ADMIN — APIXABAN 5 MG: 5 TABLET, FILM COATED ORAL at 10:03

## 2022-09-23 RX ADMIN — SODIUM CHLORIDE, PRESERVATIVE FREE 10 ML: 5 INJECTION INTRAVENOUS at 10:04

## 2022-09-23 RX ADMIN — SODIUM CHLORIDE, PRESERVATIVE FREE 10 ML: 5 INJECTION INTRAVENOUS at 20:19

## 2022-09-23 RX ADMIN — CALCIUM CARBONATE-VITAMIN D TAB 500 MG-200 UNIT 1 TABLET: 500-200 TAB at 10:03

## 2022-09-23 RX ADMIN — BUMETANIDE 1 MG: 0.25 INJECTION, SOLUTION INTRAMUSCULAR; INTRAVENOUS at 20:18

## 2022-09-23 RX ADMIN — CALCIUM CARBONATE-VITAMIN D TAB 500 MG-200 UNIT 1 TABLET: 500-200 TAB at 20:18

## 2022-09-23 RX ADMIN — LEVOTHYROXINE SODIUM 75 MCG: 75 TABLET ORAL at 05:25

## 2022-09-23 RX ADMIN — SODIUM CHLORIDE: 9 INJECTION, SOLUTION INTRAVENOUS at 18:34

## 2022-09-23 RX ADMIN — METOPROLOL TARTRATE 25 MG: 25 TABLET, FILM COATED ORAL at 10:03

## 2022-09-23 RX ADMIN — POTASSIUM CHLORIDE 40 MEQ: 1500 TABLET, EXTENDED RELEASE ORAL at 10:16

## 2022-09-23 RX ADMIN — BUMETANIDE 1 MG: 0.25 INJECTION, SOLUTION INTRAMUSCULAR; INTRAVENOUS at 10:03

## 2022-09-23 RX ADMIN — METOPROLOL TARTRATE 25 MG: 25 TABLET, FILM COATED ORAL at 20:18

## 2022-09-23 RX ADMIN — APIXABAN 5 MG: 5 TABLET, FILM COATED ORAL at 20:18

## 2022-09-23 ASSESSMENT — PAIN - FUNCTIONAL ASSESSMENT: PAIN_FUNCTIONAL_ASSESSMENT: ACTIVITIES ARE NOT PREVENTED

## 2022-09-23 ASSESSMENT — PAIN SCALES - GENERAL
PAINLEVEL_OUTOF10: 1
PAINLEVEL_OUTOF10: 2
PAINLEVEL_OUTOF10: 0

## 2022-09-23 ASSESSMENT — PAIN DESCRIPTION - LOCATION: LOCATION: SHOULDER

## 2022-09-23 ASSESSMENT — PAIN DESCRIPTION - DESCRIPTORS: DESCRIPTORS: ACHING;DISCOMFORT;DULL

## 2022-09-23 ASSESSMENT — PAIN DESCRIPTION - ORIENTATION: ORIENTATION: RIGHT

## 2022-09-23 NOTE — PROGRESS NOTES
Physical Therapy  Facility/Department: [address] INTERMEDIATE  Physical Therapy Treatment Note    Name: Natalya West  : 1940  MRN: [id number]  Date of Service: 2022    Patient Diagnosis(es): The primary encounter diagnosis was NSTEMI (non-ST elevated myocardial infarction) ([address] Diagnoses of Falls frequently and Acute cystitis with hematuria were also pertinent to this visit. Past Medical History:  has a past medical history of Acquired hypothyroidism, Arthritis, Blood transfusion reaction, Chronic kidney disease, History of blood transfusion, Hypertension, Lymphedema of both lower extremities, Open wound of left great toe, Other disorders of kidney and ureter in diseases classified elsewhere, Pelvic fracture ([address] Skin ulcer of left calf with fat layer exposed ([address] Skin ulcer of left calf, limited to breakdown of skin ([address] Ulcer of left lower leg ([address] and Venous stasis ulcer of left calf limited to breakdown of skin ([address] Past Surgical History:  has a past surgical history that includes fracture surgery (); Tonsillectomy; Hip fracture surgery (Left, 2014); Colonoscopy; Endoscopy, colon, diagnostic; Total hip arthroplasty (Left, 10/17/2014); and eye surgery. Referring provider:   TRAVIS Mora - CNP    PT Order:  PT eval and treat     Evaluating PT:  Mere Kimbrough, PT, DPT PT [id number]    Room #:  [address] [place], [place] [address]-  Diagnosis:  Falls frequently [R29.6]  NSTEMI (non-ST elevated myocardial infarction) ([address] [I21.4]  Acute cystitis with hematuria [N30.01]  Precautions:  fall risk  Equipment Needs:  none. Pt reported owing a walker    SUBJECTIVE:    Pt lives alone in a 1 story home with 3 stairs to enter and 2 rail. Bed and bath is on frist floor. Pt ambulated with walker PTA. Pt's daughter lives close by. OBJECTIVE:   Initial Evaluation  Date:  Treatment  2022 Short Term/ Long Term   Goals   Was pt agreeable to Eval/treatment? yes yes    Does pt have pain?  B knee pain B knee pain    Bed Mobility  Rolling: NT  Supine to sit: Mod A  Sit to supine: Max A  Scooting: Mod A to sitting EOB  Min A   Transfers Sit to stand: Mod A  Stand to sit: Mod A  Stand pivot: Mod A with w/w Sit <> stand: Min A SBA   Ambulation    3 feet with w/w Mod A  30 feet x 1 using [employer] for support Min A for balance 25+ feet with w/w SBA    Stair negotiation: ascended and descended  NT  NT    ROM BLE:  WFL except B knees lacking flexion     Strength BLE:  grossly 3/5  Grossly 4-/5   Balance Sitting EOB:  SBA  Dynamic Standing: Mod A with w/w  Sitting EOB:  independent  Dynamic Standing:  SBA with w/w   AM-PAC 6 Clicks 40/82 78/77        Pt is alert and able to follow instruction  Balance: poor dynamic using [employer] for support    Pt performed therapeutic exercise of the following: Pt instructed to perform quad/glut sets and ankle pumps to tolerance, displayed ankle pumps and glut sets sitting in the chair    Patient education/treatment  Pt was educated on therapeutic exercise promoting circulation and strengthening, UE usage to assist with transfer safety, gait mechanics promoting upright posture and staying within [employer] base of support    Patient response to education:   Pt verbalized understanding Pt demonstrated skill Pt requires further education in this area   yes With instruction yes     ASSESSMENT:   Comments: Elizabeth slow and consistent. Pt unsteady throughout, required constant hands on assist for balance and safety. Pt fatigued after activity. Pt unsafe to gait or transfer alone presently. Pt was left in a bedside chair as found with call light in reach, waffle cushion in place. Chair/bed alarm: chair alarm active    Time in 1036   Time out 1050   Total Treatment Time 14 minutes   CPT codes:     Therapeutic activities 37733 14 minutes   Therapeutic exercises 69219 0 minutes       Pt is making fair progress toward established Physical Therapy goals.   Continue with physical therapy current plan of care.     Madeline Lainez PTA   License Number: [id number]

## 2022-09-23 NOTE — DISCHARGE INSTR - COC
Continuity of Care Form    Patient Name: Juan Niño   :  1940  MRN:  [id number]    Admit date:  2022  Discharge date:  2022    Code Status Order: Full Code   Advance Directives:     Admitting Physician:  No admitting provider for patient encounter. PCP: Sae Cruz MD    Discharging Nurse: [facility] Unit/Room#: 0798/7540-S  Discharging Unit Phone Number: [phone]    Emergency Contact:   Extended Emergency Contact Information  Primary Emergency Contact: Mendy Armando  Home Phone: [phone]  Relation: Brother/Sister  Secondary Emergency Contact: [facility]  Home Phone: [phone]  Relation: Child    Past Surgical History:  Past Surgical History:   Procedure Laterality Date    COLONOSCOPY      ENDOSCOPY, COLON, DIAGNOSTIC      EYE SURGERY      cateract and lazor surgery 2015    FRACTURE SURGERY      RT HIP    HIP FRACTURE SURGERY Left 2014    ORIF left hip    TONSILLECTOMY      as child    TOTAL HIP ARTHROPLASTY Left 10/17/2014    revision with removal of hardware       Immunization History:   Immunization History   Administered Date(s) Administered    COVID-19, PFIZER PURPLE top, DILUTE for use, (age 15 y+), 30mcg/0.3mL 2021, 2021, 2021       Active Problems:  Patient Active Problem List   Diagnosis Code    History of DVT (deep vein thrombosis) Z86.718    Sepsis ([facility] A41.9    Essential hypertension I10    Acquired hypothyroidism E03.9    Closed fracture of right iliac wing (HCC) S32.301A    Chronic anticoagulation Z79.01    NSTEMI (non-ST elevated myocardial infarction) ([facility] I21.4    UTI (urinary tract infection) N39.0    Hypoxia R09.02    Fall W19. XXXA    Elevated troponin R77.8    Leukocytosis D72.829       Isolation/Infection:   Isolation            No Isolation          Patient Infection Status       None to display            Nurse Assessment:  Last Vital Signs: BP (!) 122/50   Pulse 75   Temp 99.2 °F (37.3 °C) (Oral) Resp 18   Ht 5' 2\" (1.575 m)   Wt 181 lb 8 oz (82.3 kg)   SpO2 95%   BMI 33.20 kg/m²     Last documented pain score (0-10 scale): Pain Level: 2  Last Weight:   Wt Readings from Last 1 Encounters:   09/22/22 181 lb 8 oz (82.3 kg)     Mental Status:  oriented, alert, and coherent    IV Access:  - None    Nursing Mobility/ADLs:  Walking   Assisted  Transfer  Assisted  Bathing  Assisted  Dressing  Assisted  Toileting  Assisted  Feeding  Independent  Med Admin  Independent  Med Delivery   whole    Wound Care Documentation and Therapy:  Pressure Ulcer 09/06/17 Leg Left; Lower #2 (1-5-17 acquired) (Active)   Number of days: 1843        Elimination:  Continence: Bowel: Yes  Bladder: Yes  Urinary Catheter: None   Colostomy/Ileostomy/Ileal Conduit: No       Date of Last BM: 9/25/2022    No intake or output data in the 24 hours ending 09/23/22 1029  No intake/output data recorded. Safety Concerns:     History of Falls (last 30 days) and At Risk for Falls    Impairments/Disabilities:      Vision and Hearing    Nutrition Therapy:  Current Nutrition Therapy:   - Oral Diet:  Low Fat    Routes of Feeding: Oral  Liquids: No Restrictions  Daily Fluid Restriction: no  Last Modified Barium Swallow with Video (Video Swallowing Test): not done    Treatments at the Time of Hospital Discharge:   Respiratory Treatments:     Oxygen Therapy:  is not on home oxygen therapy. Ventilator:    - No ventilator support    Rehab Therapies: Physical Therapy and Occupational Therapy  Weight Bearing Status/Restrictions:    Other Medical Equipment (for information only, NOT a DME order):  walker  Other Treatments: none    Patient's personal belongings (please select all that are sent with patient):  Glasses, Hearing Aides bilateral, Dentures upper and lower    RN SIGNATURE:  Electronically signed by Jacinta Mg RN on 9/26/22 at 2:34 PM EDT    CASE MANAGEMENT/SOCIAL WORK SECTION    Inpatient Status Date: ***    Readmission Risk Assessment Score:  Readmission Risk              Risk of Unplanned Readmission:  13           Discharging to Facility/ Agency   Name: [address] [place] [address]  POSOO:[phone]  Fax:[phone]    Dialysis Facility (if applicable)   Name:  Address:  Dialysis Schedule:  Phone:  Fax:    / signature: {Esignature:[id number]}Electronically signed by ALBINO Knott on 9/23/22 at 10:30 AM EDT     PHYSICIAN SECTION    Prognosis: {Prognosis:3940838024}    Condition at Discharge: Stable    Rehab Potential (if transferring to Rehab): {Prognosis:6478317951}    Recommended Labs or Other Treatments After Discharge: ***    Physician Certification: I certify the above information and transfer of Lewis Merino  is necessary for the continuing treatment of the diagnosis listed and that she requires Ricardo Morales for less 30 days.      Update Admission H&P: {CHP DME Changes in MNFFM:[id number]}    PHYSICIAN SIGNATURE:  Electronically signed by Surya Ro DO on 9/26/22 at 9:16 AM EDT

## 2022-09-23 NOTE — PROGRESS NOTES
Hospitalist Progress Note      PCP: Annmarie Gracia MD    Date of Admission: 9/20/2022        Hospital Course:  80 y.o. female presented with FALL, STATES SHE TIPPED OVER THE DOG BLANKET AND FELL ON HER RIGHT SHOULDER. HAS A KNOWN HISTORY OF PAF, FOUND TO HAVE AN ELEVATED TT. Lula Lea DIAGNOSED WITH NSTEMI  ON ADMISSION, CARDIOLOGY SAYS NO. Subjective:  FEELING BETTER           Medications:  Reviewed    Infusion Medications    sodium chloride      sodium chloride 50 mL/hr at 09/22/22 2058     Scheduled Medications    potassium chloride  40 mEq Oral BID WC    sodium chloride flush  5-40 mL IntraVENous 2 times per day    apixaban  5 mg Oral BID    oyster shell calcium w/D  1 tablet Oral BID    levothyroxine  75 mcg Oral Daily    metoprolol tartrate  25 mg Oral BID    [Held by provider] amLODIPine  10 mg Oral Daily    And    [Held by provider] lisinopril  40 mg Oral Daily    bumetanide  1 mg IntraVENous BID     PRN Meds: sodium chloride flush, sodium chloride, ondansetron **OR** ondansetron, acetaminophen **OR** acetaminophen, bisacodyl, ipratropium-albuterol, perflutren lipid microspheres    No intake or output data in the 24 hours ending 09/23/22 1514    Exam:    BP (!) 122/50   Pulse 75   Temp 99.2 °F (37.3 °C) (Oral)   Resp 18   Ht 5' 2\" (1.575 m)   Wt 181 lb 8 oz (82.3 kg)   SpO2 96%   BMI 33.20 kg/m²       General appearance:  No apparent distress, appears stated age and cooperative. HEENT:  Normal cephalic, atraumatic without obvious deformity. Pupils equal, round, and reactive to light. Extra ocular muscles intact. Conjunctivae/corneas clear. PROPTOSIS   Neck: Supple, with full range of motion. No jugular venous distention. Trachea midline. Respiratory:  Normal respiratory effort. Clear to auscultation, bilaterally without Rales/Wheezes/Rhonchi. Cardiovascular: IRREG  Abdomen: Soft, non-tender, non-distended with normal bowel sounds.   Musculoskeletal:  No clubbing, cyanosis POS edema bilaterally. PAIN WITH ROM RIGHT SHOULDER    Skin: ECCHYMOSIS ON ARMS  Neurologic:  Neurovascularly intact without any focal sensory/motor deficits. Cranial nerves: II-XII intact, grossly non-focal.  Psychiatric:  Alert and oriented,             Labs:   Recent Labs     09/20/22 1913 09/21/22  0455   WBC 17.8* 13.0*   HGB 10.8* 9.9*   HCT 33.4* 30.0*    165     Recent Labs     09/21/22  0455 09/22/22  0420 09/23/22  0534    139 140   K 4.1 3.7 3.2*    107 107   CO2 23 24 23   BUN 28* 34* 33*   CREATININE 1.1* 1.1* 1.0   CALCIUM 8.7 7.9* 7.8*     Recent Labs     09/20/22 1913   AST 28   ALT 12   BILITOT 0.5   ALKPHOS 85     No results for input(s): INR in the last 72 hours. Recent Labs     09/21/22  0842 09/22/22  1105   CKTOTAL 437* 242*     Recent Labs     09/20/22 1913   AST 28   ALT 12   BILITOT 0.5   ALKPHOS 85     No results for input(s): LACTA in the last 72 hours. No results found for: Janice Seo  No results found for: AMMONIA    Assessment:  NSTEMI RULED OUT BY CARDIOLOGY  UTI RULED OUT  MILD RHABDOMYOLYSIS FROM FALL   HYPOTHYROID    PLAN:  CARD   LOPRESSOR   SYNTHROID  BUMEX           DVT Prophylaxis:  ELIQUIS  Diet: ADULT DIET;  Regular; Low Fat/Low Chol/High Fiber/MIGUEL A  Code Status: Full Code     PT/OT Eval Status: ORDERED     Dispo - ERIC      Electronically signed by Briana Love DO on 9/23/2022 at 3:14 PM Caren Doll

## 2022-09-23 NOTE — PROGRESS NOTES
apixaban (ELIQUIS) 5 MG TABS tablet Take 1 tablet by mouth 2 times daily 8/28/17   Soledad Brock MD   metoprolol tartrate (LOPRESSOR) 25 MG tablet Take 1 tablet by mouth 2 times daily 8/21/17   Soledad Brock MD   Cholecalciferol (VITAMIN D) 2000 units TABS tablet Take 2.5 tablets by mouth 2 times daily 8/21/17   Soledad Brock MD   amLODIPine-benazepril (LOTREL) 10-40 MG per capsule Take 1 capsule by mouth daily    Historical Provider, MD   levothyroxine (SYNTHROID) 75 MCG tablet Take 75 mcg by mouth Daily    Historical Provider, MD       Current Medications:    Current Facility-Administered Medications: potassium chloride (KLOR-CON M) extended release tablet 40 mEq, 40 mEq, Oral, BID WC  sodium chloride flush 0.9 % injection 5-40 mL, 5-40 mL, IntraVENous, 2 times per day  sodium chloride flush 0.9 % injection 5-40 mL, 5-40 mL, IntraVENous, PRN  0.9 % sodium chloride infusion, , IntraVENous, PRN  ondansetron (ZOFRAN-ODT) disintegrating tablet 4 mg, 4 mg, Oral, Q8H PRN **OR** ondansetron (ZOFRAN) injection 4 mg, 4 mg, IntraVENous, Q6H PRN  acetaminophen (TYLENOL) tablet 650 mg, 650 mg, Oral, Q6H PRN **OR** acetaminophen (TYLENOL) suppository 650 mg, 650 mg, Rectal, Q6H PRN  bisacodyl (DULCOLAX) EC tablet 5 mg, 5 mg, Oral, Daily PRN  ipratropium-albuterol (DUONEB) nebulizer solution 1 ampule, 1 ampule, Inhalation, Q6H PRN  apixaban (ELIQUIS) tablet 5 mg, 5 mg, Oral, BID  oyster shell calcium w/D 500-200 MG-UNIT tablet 1 tablet, 1 tablet, Oral, BID  levothyroxine (SYNTHROID) tablet 75 mcg, 75 mcg, Oral, Daily  metoprolol tartrate (LOPRESSOR) tablet 25 mg, 25 mg, Oral, BID  0.9 % sodium chloride infusion, , IntraVENous, Continuous  [Held by provider] amLODIPine (NORVASC) tablet 10 mg, 10 mg, Oral, Daily **AND** [Held by provider] lisinopril (PRINIVIL;ZESTRIL) tablet 40 mg, 40 mg, Oral, Daily  perflutren lipid microspheres (DEFINITY) injection 1.65 mg, 1.5 mL, IntraVENous, ONCE PRN  bumetanide (BUMEX) injection 1 mg, 1 mg, IntraVENous, BID    Allergies:  Aspirin, Other, and Tape Palma Jolanta tape]    Social History:    Social History     Socioeconomic History    Marital status:      Spouse name: Not on file    Number of children: Not on file    Years of education: Not on file    Highest education level: Not on file   Occupational History    Not on file   Tobacco Use    Smoking status: Never    Smokeless tobacco: Never   Substance and Sexual Activity    Alcohol use: No    Drug use: No    Sexual activity: Not Currently   Other Topics Concern    Not on file   Social History Narrative    Not on file     Social Determinants of Health     Financial Resource Strain: Not on file   Food Insecurity: Not on file   Transportation Needs: Not on file   Physical Activity: Not on file   Stress: Not on file   Social Connections: Not on file   Intimate Partner Violence: Not on file   Housing Stability: Not on file       Family History:   Family History   Problem Relation Age of Onset    Mult Sclerosis Father          REVIEW OF SYSTEMS:     Constitutional: Denies fevers, chills, night sweats, and fatigue  HEENT: Denies headaches, nose bleeds, and blurred vision,oral pain, abscess or lesion. Musculoskeletal: Denies pain to BLE with ambulation. Mechanical fall yesterday. Chronic bilateral lower leg edema and hannah color due to venous stasis. Neurological: Denies dizziness and lightheadedness, numbness and tingling  Cardiovascular: Denies chest pain, palpitations, and feelings of heart racing. Respiratory: Denies orthopnea and PND, SOB/MARIANO. Gastrointestinal: Denies heartburn, nausea/vomiting, diarrhea and constipation, black/bloody, and tarry stools.    Genitourinary: Denies dysuria and hematuria  Hematologic: Denies excessive bruising or bleeding  Lymphatic: Denies lumps and bumps to neck, axilla, breast, and groin  Endocrine: Denies excessive thirst. Denies intolerance to hot and cold  GYN: Postmenopausal state; Denies vaginal bleeding. Psychiatric: Denies anxiety and depression. PHYSICAL EXAM:   BP (!) 122/50   Pulse 75   Temp 99.2 °F (37.3 °C) (Oral)   Resp 18   Ht 5' 2\" (1.575 m)   Wt 181 lb 8 oz (82.3 kg)   SpO2 95%   BMI 33.20 kg/m²   CONST:  Well developed, well nourished 70-year-old  female who appears stated age. Awake, alert, cooperative, no apparent distress  HEENT:   Head- Normocephalic, atraumatic   Eyes- Conjunctivae pink, anicteric  Throat- Oral mucosa pink and moist  Neck-  No stridor, trachea midline, no jugular venous distention. No adenopathy   CHEST: Chest symmetrical and non-tender to palpation. No accessory muscle use or intercostal retractions  RESPIRATORY: Lung sounds -crackles bilateral lower lobes  CARDIOVASCULAR:     No carotid bruit  Heart Inspection- shows no noted pulsations  Heart Palpation- no heaves or thrills; PMI is non-displaced   Heart Ausculation- Regular rate and rhythm, 2/6 murmur heard best at Erb's point no s3, s4 or rub   PV: Nonpitting trace edema just below the knee bilaterally with chronic discoloration to lower legs from venous stasis. No varicosities. Pedal pulses palpable, no clubbing or cyanosis   ABDOMEN: Soft, non-tender to light palpation. Bowel sounds present. No palpable masses no organomegaly; no abdominal bruit  MS: Good muscle strength and tone. No atrophy or abnormal movements. : Deferred  SKIN: Warm and dry no statis dermatitis or ulcers   NEURO / PSYCH: Oriented to person, place and time. Speech clear and appropriate. Follows all commands. Pleasant affect     DATA:    ECG: Normal sinus rhythm with sinus arrhythmia, vent rate 99, ME interval 162, QRS duration 74, QTc 420.   Tele strips:   Diagnostic:    Labs:   CBC:   Recent Labs     09/20/22  1913 09/21/22  0455   WBC 17.8* 13.0*   HGB 10.8* 9.9*   HCT 33.4* 30.0*    165     BMP:   Recent Labs     09/22/22  0420 09/23/22  0534    140   K 3.7 3.2*   CO2 24 23   BUN 34* 33*   CREATININE 1.1* 1. 0   LABGLOM 48 53   CALCIUM 7.9* 7.8*     FASTING LIPID PANEL:  Lab Results   Component Value Date/Time    HDL 44 09/21/2022 04:55 AM    LDLCALC 66 09/21/2022 04:55 AM     LIVER PROFILE:  Recent Labs     09/20/22 1913   AST 28   ALT 12   LABALBU 3.9      Latest Reference Range & Units 9/20/22 19:13 9/21/22 00:34 9/21/22 04:55   Pro-BNP 0 - 450 pg/mL 3,134 (H)     Troponin, High Sensitivity 0 - 9 ng/L 86 (H) 101 (H) 110 (H)   (H): Data is abnormally high    CXR:      Impression   No acute process. CTA pulm: Impression   1. No evidence of pulmonary embolism or acute pulmonary abnormality. 2.  Bibasilar atelectasis. Transthoracic echocardiogram September 21, 2022:  Summary  Ejection fraction is visually estimated at 60 to 65%. Mild asymmetric septal hypertrophy. There is doppler evidence of stage I diastolic dysfunction. Normal right ventricular size and function. Agitated saline injected for shunt evaluation revealed no evidence of shunt. Mild mitral regurgitation is present. The aortic valve appears mildly sclerotic. Mild aortic stenosis. Mild tricuspid regurgitation. RVSP is 50 mmHg. Pulmonary hypertension is moderate . Physiologic and/or trace pulmonic regurgitation present. Assessment/plan:  Elevated troponin trend 86> 101> 110<142 inpatient denying chest pain or any other anginal equivalency. EKG is nonischemic. Possibly 2/2 UTI/anemia. Echo shows normal systolic function  Arrange for Lexiscan myocardial perfusion stress test when patient is stable    Heart failure with preserved ejection fraction with elevated proBNP 3134. Continue diuresis and monitor BUN/creatinine closely and change to p.o. diuresis if BUN/creatinine rise  Monitor electrolyte and keep potassium at 4 magnesium at 2  Low-salt diet and monitor ins and outs and daily weights  Echo result as shown above    Mechanical fall. No head injury or LOC.   Patient on Eliquis and received CT head which was negative. CKD with creatinine this admission 1.1. Leukocytosis downtrending 2/2 UTI  UTI, being treated with ATB by primary service. PAF on Eliquis.   Currently NSR   Continue on Eliquis for anticoagulation and beta-blocker for rate control  Hypertension  Resume rest of cardiac meds when blood pressure is stable (Norvasc and lisinopril are being held currently to avoid hypotension and worsening kidney function while receiving diuresis)  Hypothyroidism on HRT  History of DVT  Chronic lymphedema bilateral lower extremities due to venous stasis  History of blood transfusion reaction  Arthritis        Electronically signed by Purnima Espinoza MD on 9/23/2022 at 12:51 PM

## 2022-09-23 NOTE — PROGRESS NOTES
Occupational Therapy  OT BEDSIDE TREATMENT NOTE      Date:2022  Patient Name: Radha Todd  MRN: [id number]  : 1940  Room: [address] [place], [place] [address]       Evaluating OT: Katey Jenkins OTR/L   [id number]       Referring Provider:TRAVIS Mora CNP    Specific Provider Orders/Date:OT eval and treat 2022       Diagnosis:  Falls frequently [R29.6]  NSTEMI (non-ST elevated myocardial infarction) ([facility] [I21.4]  Acute cystitis with hematuria [N30.01]   XR R shoulder:  no evidence of acute trauma or fracture or dislocation.     decreased acromial humeral interval  XR R knee and hip :  No findings to suggest acute fracture or dislocation    Pertinent Medical History: HTN, lymphedema LEs, hip surgery       Precautions:  Fall Risk      Assessment of current deficits    [x] Functional mobility            [x]ADLs           [x] Strength                  []Cognition    [x] Functional transfers          [x] IADLs         [x] Safety Awareness   [x]Endurance    [] Fine Coordination                         [x] Balance      [] Vision/perception   []Sensation      []Gross Motor Coordination             [] ROM           [] Delirium                   [] Motor Control      OT PLAN OF CARE   OT POC based on physician orders, patient diagnosis and results of clinical assessment     Frequency/Duration  2-4 days/wk for 2 weeks PRN   Specific OT Treatment Interventions to include:   ADL retraining/adapted techniques and AE recommendations to increase functional independence within precautions                    Energy conservation techniques to improve tolerance for selfcare routine   Functional transfer/mobility training/DME recommendations for increased independence, safety and fall prevention         Patient/family education to increase safety and functional independence             Environmental modifications for safe mobility and completion of ADLs                             Therapeutic activity to improve functional sitting  Min/CGA- standing    Activity Tolerance No SOB observed  Fair   Good  with ADL activity      Comments:  Pt agreeable to therapy this AB. Fair tolerance for trunk flexion for ADL. Pt remained seated in the chair at the end of the session. Chair alarm activated. Education/treatment:  ADL retraining with facilitation of movement to increase self care skills. Therapeutic activity to address balance and endurance for ADL and transfers. Pt education of walker and transfer safety. Pt has made  progress towards set goals.        Time In: 8:45   Time Out: 9:15     Min Units   Therapeutic Ex 89418     Therapeutic Activities 67514 10 1   ADL/Self Care 84410 20 1   Orthotic Management 64604     Neuro Re-Ed 08518     Non-Billable Time     TOTAL TIMED TREATMENT 30 [facility] CAMILLA/L 25005

## 2022-09-23 NOTE — PROGRESS NOTES
[facility] Quality Flow/Interdisciplinary Rounds Progress Note        Quality Flow Rounds held on September 23, 2022    Disciplines Attending:  Bedside Nurse and     Roshni Drew was admitted on 9/20/2022  5:02 PM    Anticipated Discharge Date:  Expected Discharge Date: 09/24/22    Disposition:    Sathya Score:  Sathya Scale Score: 17    Readmission Risk              Risk of Unplanned Readmission:  14           Discussed patient goal for the day, patient clinical progression, and barriers to discharge. The following Goal(s) of the Day/Commitment(s) have been identified:   IV Bumex, IV Rocephin, IVF.       Delroy Valenzuela RN  September 23, 2022

## 2022-09-23 NOTE — CARE COORDINATION
Social Work / Discharge Planning: SW followed up with patient to obtain SNF choices: 1.) [facility] 2.) [facility] 3.) [address] and 4.) [facility]. Referral called to Pastor Tapia from [facility]. Await acceptance / denial. Will need pre-cert. SW to follow. Electronically signed by ALBINO Elizabeth on 9/23/22 at 9:58 AM EDT     Addendum: [facility] accepted and will submit pre-cert once PT in. Will need COVID. [address], HENS and transport forms completed. SW to follow.  Electronically signed by ALBINO Elizabeth on 9/23/22 at 10:26 AM EDT

## 2022-09-24 LAB
ANION GAP SERPL CALCULATED.3IONS-SCNC: 9 MMOL/L (ref 7–16)
BUN BLDV-MCNC: 27 MG/DL (ref 6–23)
CALCIUM SERPL-MCNC: 8.3 MG/DL (ref 8.6–10.2)
CHLORIDE BLD-SCNC: 110 MMOL/L (ref 98–107)
CO2: 22 MMOL/L (ref 22–29)
CREAT SERPL-MCNC: 0.8 MG/DL (ref 0.5–1)
GFR AFRICAN AMERICAN: >60
GFR NON-AFRICAN AMERICAN: >60 ML/MIN/1.73
GLUCOSE BLD-MCNC: 89 MG/DL (ref 74–99)
POTASSIUM SERPL-SCNC: 4.2 MMOL/L (ref 3.5–5)
SODIUM BLD-SCNC: 141 MMOL/L (ref 132–146)

## 2022-09-24 PROCEDURE — 2500000003 HC RX 250 WO HCPCS: Performed by: INTERNAL MEDICINE

## 2022-09-24 PROCEDURE — 99233 SBSQ HOSP IP/OBS HIGH 50: CPT | Performed by: INTERNAL MEDICINE

## 2022-09-24 PROCEDURE — 36415 COLL VENOUS BLD VENIPUNCTURE: CPT

## 2022-09-24 PROCEDURE — 6370000000 HC RX 637 (ALT 250 FOR IP): Performed by: NURSE PRACTITIONER

## 2022-09-24 PROCEDURE — 2060000000 HC ICU INTERMEDIATE R&B

## 2022-09-24 PROCEDURE — 80048 BASIC METABOLIC PNL TOTAL CA: CPT

## 2022-09-24 PROCEDURE — 2580000003 HC RX 258: Performed by: NURSE PRACTITIONER

## 2022-09-24 RX ADMIN — METOPROLOL TARTRATE 25 MG: 25 TABLET, FILM COATED ORAL at 08:34

## 2022-09-24 RX ADMIN — CALCIUM CARBONATE-VITAMIN D TAB 500 MG-200 UNIT 1 TABLET: 500-200 TAB at 08:34

## 2022-09-24 RX ADMIN — LEVOTHYROXINE SODIUM 75 MCG: 75 TABLET ORAL at 05:54

## 2022-09-24 RX ADMIN — METOPROLOL TARTRATE 25 MG: 25 TABLET, FILM COATED ORAL at 21:26

## 2022-09-24 RX ADMIN — SODIUM CHLORIDE, PRESERVATIVE FREE 10 ML: 5 INJECTION INTRAVENOUS at 08:34

## 2022-09-24 RX ADMIN — APIXABAN 5 MG: 5 TABLET, FILM COATED ORAL at 08:34

## 2022-09-24 RX ADMIN — CALCIUM CARBONATE-VITAMIN D TAB 500 MG-200 UNIT 1 TABLET: 500-200 TAB at 21:26

## 2022-09-24 RX ADMIN — SODIUM CHLORIDE, PRESERVATIVE FREE 10 ML: 5 INJECTION INTRAVENOUS at 21:26

## 2022-09-24 RX ADMIN — APIXABAN 5 MG: 5 TABLET, FILM COATED ORAL at 21:26

## 2022-09-24 RX ADMIN — ACETAMINOPHEN 650 MG: 325 TABLET ORAL at 08:34

## 2022-09-24 RX ADMIN — ACETAMINOPHEN 650 MG: 325 TABLET ORAL at 17:31

## 2022-09-24 RX ADMIN — BUMETANIDE 1 MG: 0.25 INJECTION, SOLUTION INTRAMUSCULAR; INTRAVENOUS at 21:26

## 2022-09-24 RX ADMIN — BUMETANIDE 1 MG: 0.25 INJECTION, SOLUTION INTRAMUSCULAR; INTRAVENOUS at 08:34

## 2022-09-24 ASSESSMENT — PAIN DESCRIPTION - LOCATION
LOCATION: SHOULDER
LOCATION: SHOULDER;HIP
LOCATION: SHOULDER;HIP

## 2022-09-24 ASSESSMENT — PAIN SCALES - GENERAL
PAINLEVEL_OUTOF10: 1
PAINLEVEL_OUTOF10: 3
PAINLEVEL_OUTOF10: 0
PAINLEVEL_OUTOF10: 2
PAINLEVEL_OUTOF10: 3

## 2022-09-24 ASSESSMENT — PAIN - FUNCTIONAL ASSESSMENT
PAIN_FUNCTIONAL_ASSESSMENT: ACTIVITIES ARE NOT PREVENTED
PAIN_FUNCTIONAL_ASSESSMENT: ACTIVITIES ARE NOT PREVENTED

## 2022-09-24 ASSESSMENT — PAIN DESCRIPTION - ORIENTATION
ORIENTATION: RIGHT
ORIENTATION: RIGHT

## 2022-09-24 ASSESSMENT — PAIN DESCRIPTION - DESCRIPTORS
DESCRIPTORS: ACHING;DISCOMFORT;DULL
DESCRIPTORS: ACHING;DISCOMFORT;DULL

## 2022-09-24 ASSESSMENT — PAIN DESCRIPTION - PAIN TYPE: TYPE: ACUTE PAIN

## 2022-09-24 NOTE — PROGRESS NOTES
[id number] [facility] Cardiology Inpatient Progress Note    Patient is a 80 y.o. female of Diana Aldana MD seen in hospital follow up. Chief complaint: Elevated troponin. HPI: No CP or SOB.     Past Medical History:    PAF on Eliquis  Hypertension  Hypothyroidism  History of DVT  CKD  Lymphedema bilateral lower extremities  Blood transfusion reaction  Arthritis  Skin ulcer of left calf 2017  Pelvic fracture history  Venous stasis ulcer  Colonoscopy, endoscopy, eye surgery, right hip fracture surgery, ORIF left hip, tonsillectomy    Patient Active Problem List   Diagnosis    History of DVT (deep vein thrombosis)    Sepsis (HCC)    Essential hypertension    Acquired hypothyroidism    Closed fracture of right iliac wing (HCC)    Chronic anticoagulation    NSTEMI (non-ST elevated myocardial infarction) (HCC)    UTI (urinary tract infection)    Hypoxia    Fall    Elevated troponin    Leukocytosis       Allergies   Allergen Reactions    Aspirin Itching    Other Nausea And Vomiting     FLU SHOT    Tape [Adhesive Tape] Swelling       Current Facility-Administered Medications   Medication Dose Route Frequency Provider Last Rate Last Admin    sodium chloride flush 0.9 % injection 5-40 mL  5-40 mL IntraVENous 2 times per day April TRAVIS Gaspar - CNP   10 mL at 09/24/22 0834    sodium chloride flush 0.9 % injection 5-40 mL  5-40 mL IntraVENous PRN April JUDY GasparN - CNP        0.9 % sodium chloride infusion   IntraVENous PRN April JUDY GasparN - CNP        ondansetron (ZOFRAN-ODT) disintegrating tablet 4 mg  4 mg Oral Q8H PRN April JUDY GasparN - ARNALDO        Or    ondansetron [facility]) injection 4 mg  4 mg IntraVENous Q6H PRN April JUDY GasparN - CNP        acetaminophen (TYLENOL) tablet 650 mg  650 mg Oral Q6H PRN April JUDY GasparN - CNP   650 mg at 09/24/22 2000    Or    acetaminophen (TYLENOL) suppository 650 mg  650 mg Rectal Q6H PRN April JUDY GasparN - CNP        bisacodyl (DULCOLAX) EC tablet 5 mg  5 mg Oral Daily PRN April TRAVIS Gaspar CNP        ipratropium-albuterol (DUONEB) nebulizer solution 1 ampule  1 ampule Inhalation Q6H PRN April TRAVIS Gaspar CNP        apixaban [facility]) tablet 5 mg  5 mg Oral BID April TRAVIS Gaspar CNP   5 mg at 09/24/22 0834    oyster shell calcium w/D 500-200 MG-UNIT tablet 1 tablet  1 tablet Oral BID April TRAVIS Gasapr CNP   1 tablet at 09/24/22 9676    levothyroxine (SYNTHROID) tablet 75 mcg  75 mcg Oral Daily April TRAVIS Gaspar CNP   75 mcg at 09/24/22 0554    metoprolol tartrate (LOPRESSOR) tablet 25 mg  25 mg Oral BID April TRAVIS Gaspar CNP   25 mg at 09/24/22 0834    0.9 % sodium chloride infusion   IntraVENous Continuous April TRAVIS Gaspar CNP 50 mL/hr at 09/23/22 1834 New Bag at 09/23/22 1834    [Held by provider] amLODIPine (NORVASC) tablet 10 mg  10 mg Oral Daily April TRAVIS Gaspar CNP   10 mg at 09/21/22 0927    And    [Held by provider] lisinopril (PRINIVIL;ZESTRIL) tablet 40 mg  40 mg Oral Daily April TRAVIS Gaspar CNP   40 mg at 09/21/22 8843    perflutren lipid microspheres (DEFINITY) injection 1.65 mg  1.5 mL IntraVENous ONCE PRN TRAVIS Serra CNP        bumetanide (BUMEX) injection 1 mg  1 mg IntraVENous BID Ibeth Mcmillan MD   1 mg at 09/24/22 1298       Review of systems:   Heart: as above   Lungs: as above   Eyes: denies changes in vision or discharge. Ears: denies changes in hearing or pain. Nose: denies epistaxis or masses   Throat: denies sore throat or trouble swallowing. Neuro: denies numbness, tingling, tremors. Skin: denies rashes or itching. : denies hematuria, dysuria   GI: denies vomiting, diarrhea   Psych: denies mood changed, anxiety, depression.     Physical Exam   BP (!) 148/70   Pulse 76   Temp 98.5 °F (36.9 °C) (Oral)   Resp 18   Ht 5' 2\" (1.575 m)   Wt 181 lb (82.1 kg)   SpO2 92%   BMI 33.11 kg/m²   Constitutional: Oriented to person, place, and time. No distress. Well developed. Head: Normocephalic and atraumatic. Neck: Neck supple. No hepatojugular reflux. No JVD present. Carotid bruit is not present. No tracheal deviation present. No thyromegaly present. Cardiovascular: Normal rate, regular rhythm, normal heart sounds. intact distal pulses. No gallop and no friction rub. No murmur heard. Pulmonary: Breath sounds normal. No respiratory distress. No wheezes. No rales. Chest: Effort normal. No tenderness. Abdominal: Soft. Bowel sounds are normal. No distension or mass. No tenderness, rebound or guarding. Musculoskeletal: . No tenderness. No clubbing or cyanosis. Extremitites: Intact distal pulses. No edema  Neurological: Alert and oriented to person, place, and time. Skin: Skin is warm and dry. No rash noted. Not diaphoretic. No erythema. Psychiatric: Normal mood and affect. Behavior is normal.   Lymphadenopathy: No cervical adenopathy. No groin adenopathy.     CBC:   Lab Results   Component Value Date/Time    WBC 13.0 09/21/2022 04:55 AM    RBC 3.48 09/21/2022 04:55 AM    RBC  10/21/2014 02:20 PM      RBC           [id number]    released     10/25/14  00:54  SCC    RBC  10/21/2014 02:20 PM      RBC           [id number]    transfused   10/21/14  19:59  SCC    HGB 9.9 09/21/2022 04:55 AM    HCT 30.0 09/21/2022 04:55 AM    MCV 86.2 09/21/2022 04:55 AM    MCH 28.4 09/21/2022 04:55 AM    MCHC 33.0 09/21/2022 04:55 AM    RDW 13.7 09/21/2022 04:55 AM     09/21/2022 04:55 AM    MPV 10.5 09/21/2022 04:55 AM     BMP:   Lab Results   Component Value Date/Time     09/24/2022 02:40 AM    K 4.2 09/24/2022 02:40 AM    K 4.1 09/21/2022 04:55 AM     09/24/2022 02:40 AM    CO2 22 09/24/2022 02:40 AM    BUN 27 09/24/2022 02:40 AM    LABALBU 3.9 09/20/2022 07:13 PM    CREATININE 0.8 09/24/2022 02:40 AM    CALCIUM 8.3 09/24/2022 02:40 AM    GFRAA >60 09/24/2022 02:40 AM    LABGLOM >60 09/24/2022 02:40 AM     Magnesium:    Lab Results   Component Value Date/Time    MG 1.8 09/28/2017 05:13 AM     Cardiac Enzymes:   Lab Results   Component Value Date    CKTOTAL 242 (H) 09/22/2022    CKTOTAL 437 (H) 09/21/2022    CKTOTAL 41 08/15/2017    CKMB 3.6 08/15/2017    CKMB 11.2 (H) 08/06/2014    CKMB 1.3 07/11/2011    TROPHS 101 (H) 09/22/2022    TROPHS 103 (H) 09/22/2022    TROPHS 112 (H) 09/21/2022      PT/INR:    Lab Results   Component Value Date/Time    PROTIME 11.1 08/15/2017 08:40 PM    PROTIME 11.6 07/12/2011 05:45 AM    INR 1.0 08/15/2017 08:40 PM     TSH:    Lab Results   Component Value Date/Time    TSH 1.340 09/21/2022 08:42 AM     Rhythm Strip: normal sinus rhythm. CTA pulm 9/20/2022:  Impression   1. No evidence of pulmonary embolism or acute pulmonary abnormality. 2.  Bibasilar atelectasis. Transthoracic echocardiogram September 21, 2022:  Summary  Ejection fraction is visually estimated at 60 to 65%. Mild asymmetric septal hypertrophy. There is doppler evidence of stage I diastolic dysfunction. Normal right ventricular size and function. Agitated saline injected for shunt evaluation revealed no evidence of shunt. Mild mitral regurgitation is present. The aortic valve appears mildly sclerotic. Mild aortic stenosis. Mild tricuspid regurgitation. RVSP is 50 mmHg. Pulmonary hypertension is moderate . Physiologic and/or trace pulmonic regurgitation present. ASSESSMENT & PLAN:    Patient Active Problem List   Diagnosis    History of DVT (deep vein thrombosis)    Sepsis ([facility]    Essential hypertension    Acquired hypothyroidism    Closed fracture of right iliac wing (HCC)    Chronic anticoagulation    NSTEMI (non-ST elevated myocardial infarction) ([facility]    UTI (urinary tract infection)    Hypoxia    Fall    Elevated troponin    Leukocytosis     1. Elevated troponin:    Echo okay. Eventual ischemic evaluation.      BB. No ASAS due to eliquis. 2. HFpEF:     Diurese. BB. Typically on ACEI. 3. PAF: Eliquis/BB. 4. HTN: Observe. 5. CKD: Follow labs. 6. Mechanical fall    7. UTI: Per primary service. 8. Hypothyroidism: On HRT    9. Hx of DVT    Carole Bardales D.O.   Cardiologist  Cardiology, [facility]

## 2022-09-24 NOTE — PLAN OF CARE
Problem: Pain  Goal: Verbalizes/displays adequate comfort level or baseline comfort level  9/24/2022 1039 by Catrina Maloney RN  Outcome: Progressing  9/23/2022 2152 by Gale Graff RN  Outcome: Progressing     Problem: Safety - Adult  Goal: Free from fall injury  9/24/2022 1039 by Catrina Maloney RN  Outcome: Progressing  9/23/2022 2152 by Gale Graff RN  Outcome: Progressing     Problem: Skin/Tissue Integrity  Goal: Absence of new skin breakdown  Description: 1. Monitor for areas of redness and/or skin breakdown  2. Assess vascular access sites hourly  3. Every 4-6 hours minimum:  Change oxygen saturation probe site  4. Every 4-6 hours:  If on nasal continuous positive airway pressure, respiratory therapy assess nares and determine need for appliance change or resting period.   9/24/2022 1039 by Catrina Maloney RN  Outcome: Progressing  9/23/2022 2152 by Gale Graff RN  Outcome: Progressing

## 2022-09-24 NOTE — PROGRESS NOTES
test, if abnormal will need heart cath  Beta-blockers    UTI  Monitor for temps-culture negative    Patient is cleared for discharge awaiting pre-CERT    DVT Prophylaxis   PT/OT  Discharge planning     TRAVIS Islas CNP  6:14 AM  9/24/2022     Above note edited to reflect my thoughts     I personally saw, examined and provided care for the patient. Radiographs, labs and medication list were reviewed by me independently. The case was discussed in detail and plans for care were established. Review of Anita MAURER CNP, documentation was conducted and revisions were made as appropriate directly by me. I agree with the above documented exam, problem list, and plan of care.      Jacinta Miller MD  6:38 PM  9/24/2022

## 2022-09-24 NOTE — PLAN OF CARE
Problem: Pain  Goal: Verbalizes/displays adequate comfort level or baseline comfort level  9/23/2022 2152 by Ruben Henao RN  Outcome: Progressing  9/23/2022 1156 by Malgorzata Adams RN  Outcome: Progressing     Problem: Safety - Adult  Goal: Free from fall injury  9/23/2022 2152 by Ruben Henao RN  Outcome: Progressing  9/23/2022 1156 by Malgorzata Adams RN  Outcome: Progressing     Problem: Skin/Tissue Integrity  Goal: Absence of new skin breakdown  Description: 1. Monitor for areas of redness and/or skin breakdown  2. Assess vascular access sites hourly  3. Every 4-6 hours minimum:  Change oxygen saturation probe site  4. Every 4-6 hours:  If on nasal continuous positive airway pressure, respiratory therapy assess nares and determine need for appliance change or resting period.   9/23/2022 2152 by Ruben Henao RN  Outcome: Progressing  9/23/2022 1156 by Malgorzata Adams RN  Outcome: Progressing

## 2022-09-25 ENCOUNTER — APPOINTMENT (OUTPATIENT)
Dept: NUCLEAR MEDICINE | Age: 82
DRG: 291 | End: 2022-09-25
Payer: MEDICARE

## 2022-09-25 LAB
ANION GAP SERPL CALCULATED.3IONS-SCNC: 7 MMOL/L (ref 7–16)
BUN BLDV-MCNC: 25 MG/DL (ref 6–23)
CALCIUM SERPL-MCNC: 8.4 MG/DL (ref 8.6–10.2)
CHLORIDE BLD-SCNC: 106 MMOL/L (ref 98–107)
CO2: 26 MMOL/L (ref 22–29)
CREAT SERPL-MCNC: 1.1 MG/DL (ref 0.5–1)
GFR AFRICAN AMERICAN: 58
GFR NON-AFRICAN AMERICAN: 48 ML/MIN/1.73
GLUCOSE BLD-MCNC: 87 MG/DL (ref 74–99)
HCT VFR BLD CALC: 26.2 % (ref 34–48)
HEMOGLOBIN: 8.3 G/DL (ref 11.5–15.5)
LV EF: 67 %
LVEF MODALITY: NORMAL
MCH RBC QN AUTO: 28 PG (ref 26–35)
MCHC RBC AUTO-ENTMCNC: 31.7 % (ref 32–34.5)
MCV RBC AUTO: 88.5 FL (ref 80–99.9)
PDW BLD-RTO: 13.4 FL (ref 11.5–15)
PLATELET # BLD: 180 E9/L (ref 130–450)
PMV BLD AUTO: 10.5 FL (ref 7–12)
POTASSIUM SERPL-SCNC: 4.1 MMOL/L (ref 3.5–5)
RBC # BLD: 2.96 E12/L (ref 3.5–5.5)
SODIUM BLD-SCNC: 139 MMOL/L (ref 132–146)
WBC # BLD: 5.1 E9/L (ref 4.5–11.5)

## 2022-09-25 PROCEDURE — 3430000000 HC RX DIAGNOSTIC RADIOPHARMACEUTICAL: Performed by: RADIOLOGY

## 2022-09-25 PROCEDURE — 93017 CV STRESS TEST TRACING ONLY: CPT

## 2022-09-25 PROCEDURE — 93018 CV STRESS TEST I&R ONLY: CPT | Performed by: INTERNAL MEDICINE

## 2022-09-25 PROCEDURE — 2060000000 HC ICU INTERMEDIATE R&B

## 2022-09-25 PROCEDURE — A9500 TC99M SESTAMIBI: HCPCS | Performed by: RADIOLOGY

## 2022-09-25 PROCEDURE — 2580000003 HC RX 258: Performed by: NURSE PRACTITIONER

## 2022-09-25 PROCEDURE — 6370000000 HC RX 637 (ALT 250 FOR IP): Performed by: NURSE PRACTITIONER

## 2022-09-25 PROCEDURE — 93016 CV STRESS TEST SUPVJ ONLY: CPT | Performed by: INTERNAL MEDICINE

## 2022-09-25 PROCEDURE — 85027 COMPLETE CBC AUTOMATED: CPT

## 2022-09-25 PROCEDURE — 2580000003 HC RX 258: Performed by: INTERNAL MEDICINE

## 2022-09-25 PROCEDURE — 78452 HT MUSCLE IMAGE SPECT MULT: CPT

## 2022-09-25 PROCEDURE — 99233 SBSQ HOSP IP/OBS HIGH 50: CPT | Performed by: INTERNAL MEDICINE

## 2022-09-25 PROCEDURE — 78452 HT MUSCLE IMAGE SPECT MULT: CPT | Performed by: INTERNAL MEDICINE

## 2022-09-25 PROCEDURE — 80048 BASIC METABOLIC PNL TOTAL CA: CPT

## 2022-09-25 PROCEDURE — 6360000002 HC RX W HCPCS: Performed by: INTERNAL MEDICINE

## 2022-09-25 PROCEDURE — 36415 COLL VENOUS BLD VENIPUNCTURE: CPT

## 2022-09-25 PROCEDURE — 2500000003 HC RX 250 WO HCPCS: Performed by: INTERNAL MEDICINE

## 2022-09-25 RX ORDER — SODIUM CHLORIDE 9 MG/ML
INJECTION, SOLUTION INTRAVENOUS PRN
Status: ACTIVE | OUTPATIENT
Start: 2022-09-25 | End: 2022-09-26

## 2022-09-25 RX ADMIN — REGADENOSON 0.4 MG: 0.08 INJECTION, SOLUTION INTRAVENOUS at 09:45

## 2022-09-25 RX ADMIN — LEVOTHYROXINE SODIUM 75 MCG: 75 TABLET ORAL at 06:03

## 2022-09-25 RX ADMIN — APIXABAN 5 MG: 5 TABLET, FILM COATED ORAL at 21:13

## 2022-09-25 RX ADMIN — Medication 30 MILLICURIE: at 08:27

## 2022-09-25 RX ADMIN — SODIUM CHLORIDE, PRESERVATIVE FREE 10 ML: 5 INJECTION INTRAVENOUS at 11:44

## 2022-09-25 RX ADMIN — SODIUM CHLORIDE, PRESERVATIVE FREE 10 ML: 5 INJECTION INTRAVENOUS at 21:13

## 2022-09-25 RX ADMIN — CALCIUM CARBONATE-VITAMIN D TAB 500 MG-200 UNIT 1 TABLET: 500-200 TAB at 21:13

## 2022-09-25 RX ADMIN — SODIUM CHLORIDE 100 ML: 9 INJECTION, SOLUTION INTRAVENOUS at 09:43

## 2022-09-25 RX ADMIN — BUMETANIDE 1 MG: 0.25 INJECTION, SOLUTION INTRAMUSCULAR; INTRAVENOUS at 21:13

## 2022-09-25 RX ADMIN — ACETAMINOPHEN 650 MG: 325 TABLET ORAL at 10:57

## 2022-09-25 RX ADMIN — METOPROLOL TARTRATE 25 MG: 25 TABLET, FILM COATED ORAL at 21:13

## 2022-09-25 ASSESSMENT — PAIN DESCRIPTION - LOCATION: LOCATION: HIP;SHOULDER

## 2022-09-25 ASSESSMENT — PAIN DESCRIPTION - ORIENTATION: ORIENTATION: RIGHT

## 2022-09-25 ASSESSMENT — PAIN SCALES - GENERAL
PAINLEVEL_OUTOF10: 1
PAINLEVEL_OUTOF10: 3

## 2022-09-25 NOTE — PROGRESS NOTES
[facility]                                Progress note    Subjective: The patient is awake and alert. Lying in bed without any complaints  Pain is well controlled  Awaiting stress test today. All questions answered  No acute events overnight. Denies chest pain, angina, SOB     Objective:    /62   Pulse 73   Temp 98.7 °F (37.1 °C) (Oral)   Resp 18   Ht 5' 2\" (1.575 m)   Wt 179 lb 3.2 oz (81.3 kg)   SpO2 92%   BMI 32.78 kg/m²     In: -   Out: 1400   In: -   Out: 1400 [Urine:1400]    General appearance: NAD, conversant  HEENT: AT/NC, MMM, exophthalmos  Neck: FROM, supple  Lungs: Clear to auscultation  CV: irregular no MRGs  Vasc: Radial pulses 2+  Abdomen: Soft, non-tender; no masses or HSM  Extremities: No peripheral edema or digital cyanosis, ecchymotic areas on arm  Skin: no rash, lesions or ulcers  Psych: Alert and oriented to person, place and time  Neuro: Alert and interactive     Recent Labs     09/25/22  0225   WBC 5.1   HGB 8.3*   HCT 26.2*          Recent Labs     09/23/22  0534 09/24/22  0240 09/25/22  0225    141 139   K 3.2* 4.2 4.1    110* 106   CO2 23 22 26   BUN 33* 27* 25*   CREATININE 1.0 0.8 1.1*   CALCIUM 7.8* 8.3* 8.4*       Assessment:    Principal Problem:    NSTEMI (non-ST elevated myocardial infarction) ([facility])  Active Problems:    UTI (urinary tract infection)    Hypoxia    Fall    Elevated troponin    Leukocytosis    History of DVT (deep vein thrombosis)    Sepsis ([facility] [address]    Essential hypertension    Acquired hypothyroidism    Chronic anticoagulation  Resolved Problems:    * No resolved hospital problems.  *      Plan:  20-year-old female presents to the ED with complaints of right shoulder pain after a mechanical fall at home and is on anticoagulation is admitted to telemetry unit with    NSTEMI  Monitor troponins-112>103>101  Cardiology consulted-appreciate input+  Echo-60 to 65%, mild aortic stenosis, mild tricuspid regurg, moderate pulmonary hypertension  Ischemic evaluation stress test - negative - low risk study   Beta-blockers    UTI  Monitor for temps-culture negative      DVT Prophylaxis   PT/OT  Discharge planning - to [facility] once bed avail     TRAVIS Jenkins CNP  9:48 AM  9/25/2022     Above note edited to reflect my thoughts     I personally saw, examined and provided care for the patient. Radiographs, labs and medication list were reviewed by me independently. The case was discussed in detail and plans for care were established. Review of Anita MAURER CNP, documentation was conducted and revisions were made as appropriate directly by me. I agree with the above documented exam, problem list, and plan of care.      Steve Hickey MD  6:40 PM  9/25/2022

## 2022-09-25 NOTE — PLAN OF CARE
Problem: Pain  Goal: Verbalizes/displays adequate comfort level or baseline comfort level  9/25/2022 0942 by Victoriano Traylor RN  Outcome: Progressing  9/24/2022 2222 by Jaime Godinez RN  Outcome: Progressing     Problem: Safety - Adult  Goal: Free from fall injury  9/25/2022 0942 by Victoriano Traylor RN  Outcome: Progressing  9/24/2022 2222 by Jaime Godinez RN  Outcome: Progressing     Problem: Skin/Tissue Integrity  Goal: Absence of new skin breakdown  Description: 1. Monitor for areas of redness and/or skin breakdown  2. Assess vascular access sites hourly  3. Every 4-6 hours minimum:  Change oxygen saturation probe site  4. Every 4-6 hours:  If on nasal continuous positive airway pressure, respiratory therapy assess nares and determine need for appliance change or resting period.   9/25/2022 0942 by Victoriano Traylor RN  Outcome: Progressing  9/24/2022 2222 by Jaime Godinez RN  Outcome: Progressing

## 2022-09-25 NOTE — PROCEDURES
Lexiscan Stress EKG Report:    Dx CP    Baseline EKG: normal sinus rhythm, nonspecific ST and T waves changes. Stress EKG: No ST-T changes. Arrhythmias: None. Symptoms: None. Summary:  Unremarkable lexiscan stress EKG. See separate report for stress perfusion results. Ericka Elliott D.O.   Cardiologist  Cardiology, [facility]

## 2022-09-25 NOTE — PLAN OF CARE
Problem: Pain  Goal: Verbalizes/displays adequate comfort level or baseline comfort level  9/24/2022 2222 by Monica Sanchez RN  Outcome: Progressing  9/24/2022 1039 by Kina Simmons RN  Outcome: Progressing     Problem: Safety - Adult  Goal: Free from fall injury  9/24/2022 2222 by Monica Sanchez RN  Outcome: Progressing  9/24/2022 1039 by Kina Simmons RN  Outcome: Progressing     Problem: Skin/Tissue Integrity  Goal: Absence of new skin breakdown  Description: 1. Monitor for areas of redness and/or skin breakdown  2. Assess vascular access sites hourly  3. Every 4-6 hours minimum:  Change oxygen saturation probe site  4. Every 4-6 hours:  If on nasal continuous positive airway pressure, respiratory therapy assess nares and determine need for appliance change or resting period.   9/24/2022 2222 by Monica Sanchez RN  Outcome: Progressing  9/24/2022 1039 by Kina Simmons RN  Outcome: Progressing

## 2022-09-25 NOTE — PROGRESS NOTES
acetaminophen (TYLENOL) suppository 650 mg  650 mg Rectal Q6H PRN April TRAVIS Gaspar CNP        bisacodyl (DULCOLAX) EC tablet 5 mg  5 mg Oral Daily PRN April TRAVIS Gaspar CNP        ipratropium-albuterol (DUONEB) nebulizer solution 1 ampule  1 ampule Inhalation Q6H PRN April TRAVIS Gaspar CNP        apixaban [facility]) tablet 5 mg  5 mg Oral BID April TRAVIS Gaspar CNP   5 mg at 09/24/22 2126    oyster shell calcium w/D 500-200 MG-UNIT tablet 1 tablet  1 tablet Oral BID April TRAVIS Gaspar CNP   1 tablet at 09/24/22 2126    levothyroxine (SYNTHROID) tablet 75 mcg  75 mcg Oral Daily April TRAVIS Gaspar CNP   75 mcg at 09/25/22 0603    metoprolol tartrate (LOPRESSOR) tablet 25 mg  25 mg Oral BID April DarvinTRAVIS Gongora CNP   25 mg at 09/24/22 2126    [Held by provider] amLODIPine (NORVASC) tablet 10 mg  10 mg Oral Daily April StoreyTRAVIS Gongora CNP   10 mg at 09/21/22 7017    And    [Held by provider] lisinopril (PRINIVIL;ZESTRIL) tablet 40 mg  40 mg Oral Daily April TRAVIS Gaspar CNP   40 mg at 09/21/22 7571    perflutren lipid microspheres (DEFINITY) injection 1.65 mg  1.5 mL IntraVENous ONCE PRN TRAVIS Barclay CNP        bumetanide (BUMEX) injection 1 mg  1 mg IntraVENous BID Iesha Mcmillan MD   1 mg at 09/24/22 2126       Review of systems:   Heart: as above   Lungs: as above   Eyes: denies changes in vision or discharge. Ears: denies changes in hearing or pain. Nose: denies epistaxis or masses   Throat: denies sore throat or trouble swallowing. Neuro: denies numbness, tingling, tremors. Skin: denies rashes or itching. : denies hematuria, dysuria   GI: denies vomiting, diarrhea   Psych: denies mood changed, anxiety, depression.     Physical Exam   /62   Pulse 73   Temp 98.7 °F (37.1 °C) (Oral)   Resp 18   Ht 5' 2\" (1.575 m)   Wt 179 lb 3.2 oz (81.3 kg)   SpO2 92%   BMI 32.78 kg/m² Constitutional: Oriented to person, place, and time. No distress. Well developed. Head: Normocephalic and atraumatic. Neck: Neck supple. No hepatojugular reflux. No JVD present. Carotid bruit is not present. No tracheal deviation present. No thyromegaly present. Cardiovascular: Normal rate, regular rhythm, normal heart sounds. intact distal pulses. No gallop and no friction rub. No murmur heard. Pulmonary: Breath sounds normal. No respiratory distress. No wheezes. No rales. Chest: Effort normal. No tenderness. Abdominal: Soft. Bowel sounds are normal. No distension or mass. No tenderness, rebound or guarding. Musculoskeletal: . No tenderness. No clubbing or cyanosis. Extremitites: Intact distal pulses. No edema  Neurological: Alert and oriented to person, place, and time. Skin: Skin is warm and dry. No rash noted. Not diaphoretic. No erythema. Psychiatric: Normal mood and affect. Behavior is normal.   Lymphadenopathy: No cervical adenopathy. No groin adenopathy.     CBC:   Lab Results   Component Value Date/Time    WBC 5.1 09/25/2022 02:25 AM    RBC 2.96 09/25/2022 02:25 AM    RBC  10/21/2014 02:20 PM      RBC           [id number]    released     10/25/14  00:54  SCC    RBC  10/21/2014 02:20 PM      RBC           [id number]    transfused   10/21/14  19:59  SCC    HGB 8.3 09/25/2022 02:25 AM    HCT 26.2 09/25/2022 02:25 AM    MCV 88.5 09/25/2022 02:25 AM    MCH 28.0 09/25/2022 02:25 AM    MCHC 31.7 09/25/2022 02:25 AM    RDW 13.4 09/25/2022 02:25 AM     09/25/2022 02:25 AM    MPV 10.5 09/25/2022 02:25 AM     BMP:   Lab Results   Component Value Date/Time     09/25/2022 02:25 AM    K 4.1 09/25/2022 02:25 AM    K 4.1 09/21/2022 04:55 AM     09/25/2022 02:25 AM    CO2 26 09/25/2022 02:25 AM    BUN 25 09/25/2022 02:25 AM    LABALBU 3.9 09/20/2022 07:13 PM    CREATININE 1.1 09/25/2022 02:25 AM    CALCIUM 8.4 09/25/2022 02:25 AM    GFRAA 58 09/25/2022 02:25 AM LABGLOM 48 09/25/2022 02:25 AM     Magnesium:    Lab Results   Component Value Date/Time    MG 1.8 09/28/2017 05:13 AM     Cardiac Enzymes:   Lab Results   Component Value Date    CKTOTAL 242 (H) 09/22/2022    CKTOTAL 437 (H) 09/21/2022    CKTOTAL 41 08/15/2017    CKMB 3.6 08/15/2017    CKMB 11.2 (H) 08/06/2014    CKMB 1.3 07/11/2011    TROPHS 101 (H) 09/22/2022    TROPHS 103 (H) 09/22/2022    TROPHS 112 (H) 09/21/2022      PT/INR:    Lab Results   Component Value Date/Time    PROTIME 11.1 08/15/2017 08:40 PM    PROTIME 11.6 07/12/2011 05:45 AM    INR 1.0 08/15/2017 08:40 PM     TSH:    Lab Results   Component Value Date/Time    TSH 1.340 09/21/2022 08:42 AM     Rhythm Strip: normal sinus rhythm. CTA pulm 9/20/2022:  Impression   1. No evidence of pulmonary embolism or acute pulmonary abnormality. 2.  Bibasilar atelectasis. Transthoracic echocardiogram September 21, 2022:  Ejection fraction is visually estimated at 60 to 65%. Mild asymmetric septal hypertrophy. There is doppler evidence of stage I diastolic dysfunction. Normal right ventricular size and function. Agitated saline injected for shunt evaluation revealed no evidence of shunt. Mild mitral regurgitation is present. The aortic valve appears mildly sclerotic. Mild aortic stenosis. Mild tricuspid regurgitation. RVSP is 50 mmHg. Pulmonary hypertension is moderate . Physiologic and/or trace pulmonic regurgitation present. ASSESSMENT & PLAN:    Patient Active Problem List   Diagnosis    History of DVT (deep vein thrombosis)    Sepsis ([address]    Essential hypertension    Acquired hypothyroidism    Closed fracture of right iliac wing (HCC)    Chronic anticoagulation    NSTEMI (non-ST elevated myocardial infarction) ([address]    UTI (urinary tract infection)    Hypoxia    Fall    Elevated troponin    Leukocytosis     1. Elevated troponin:    Echo okay. Pharm stress today. BB. No ASAS due to eliquis. 2. HFpEF:     Diuresed.     BB. Typically on ACEI. 3. PAF: Eliquis/BB. 4. HTN: Observe. 5. CKD: Follow labs. 6. Anemia: Follow labs. 7. UTI: Per primary service. 8. Hypothyroidism: On HRT    9. Hx of DVT    10. Mechanical fall    Addison Encarnacion D.O.   Cardiologist  Cardiology, [facility]

## 2022-09-25 NOTE — PROGRESS NOTES
At 0935 BP was taken pre stress test = 81/58, notified Dr. Angela Meadows. Ordered to hang 500 cc of NSS and to run at 100 ml during the stress test.  Stress test was completed by Dr. Raphael Tucker, nurse and nuclear tech all wearing procedural masks.   Patient tolerated stress test well and is eating and drinking her snack post test.

## 2022-09-26 VITALS
TEMPERATURE: 100 F | WEIGHT: 180.2 LBS | BODY MASS INDEX: 33.16 KG/M2 | HEART RATE: 76 BPM | DIASTOLIC BLOOD PRESSURE: 65 MMHG | RESPIRATION RATE: 18 BRPM | OXYGEN SATURATION: 94 % | HEIGHT: 62 IN | SYSTOLIC BLOOD PRESSURE: 143 MMHG

## 2022-09-26 LAB
BLOOD CULTURE, ROUTINE: NORMAL
CULTURE, BLOOD 2: NORMAL
SARS-COV-2, NAAT: NOT DETECTED

## 2022-09-26 PROCEDURE — 6370000000 HC RX 637 (ALT 250 FOR IP): Performed by: NURSE PRACTITIONER

## 2022-09-26 PROCEDURE — 99233 SBSQ HOSP IP/OBS HIGH 50: CPT | Performed by: INTERNAL MEDICINE

## 2022-09-26 PROCEDURE — 2580000003 HC RX 258: Performed by: NURSE PRACTITIONER

## 2022-09-26 PROCEDURE — 6370000000 HC RX 637 (ALT 250 FOR IP): Performed by: INTERNAL MEDICINE

## 2022-09-26 PROCEDURE — 2500000003 HC RX 250 WO HCPCS: Performed by: INTERNAL MEDICINE

## 2022-09-26 PROCEDURE — 87635 SARS-COV-2 COVID-19 AMP PRB: CPT

## 2022-09-26 RX ORDER — IPRATROPIUM BROMIDE AND ALBUTEROL SULFATE 2.5; .5 MG/3ML; MG/3ML
3 SOLUTION RESPIRATORY (INHALATION) EVERY 6 HOURS PRN
Qty: 360 ML | DISCHARGE
Start: 2022-09-26

## 2022-09-26 RX ADMIN — CALCIUM CARBONATE-VITAMIN D TAB 500 MG-200 UNIT 1 TABLET: 500-200 TAB at 08:03

## 2022-09-26 RX ADMIN — METOPROLOL TARTRATE 25 MG: 25 TABLET, FILM COATED ORAL at 08:03

## 2022-09-26 RX ADMIN — ACETAMINOPHEN 650 MG: 325 TABLET ORAL at 08:03

## 2022-09-26 RX ADMIN — PETROLATUM: 420 OINTMENT TOPICAL at 08:09

## 2022-09-26 RX ADMIN — LEVOTHYROXINE SODIUM 75 MCG: 75 TABLET ORAL at 04:56

## 2022-09-26 RX ADMIN — BUMETANIDE 1 MG: 0.25 INJECTION, SOLUTION INTRAMUSCULAR; INTRAVENOUS at 08:03

## 2022-09-26 RX ADMIN — APIXABAN 5 MG: 5 TABLET, FILM COATED ORAL at 08:04

## 2022-09-26 RX ADMIN — SODIUM CHLORIDE, PRESERVATIVE FREE 10 ML: 5 INJECTION INTRAVENOUS at 08:04

## 2022-09-26 ASSESSMENT — PAIN DESCRIPTION - LOCATION: LOCATION: SHOULDER;HIP

## 2022-09-26 ASSESSMENT — PAIN DESCRIPTION - DESCRIPTORS: DESCRIPTORS: ACHING

## 2022-09-26 ASSESSMENT — PAIN SCALES - GENERAL
PAINLEVEL_OUTOF10: 8
PAINLEVEL_OUTOF10: 4

## 2022-09-26 ASSESSMENT — PAIN DESCRIPTION - ORIENTATION: ORIENTATION: LEFT

## 2022-09-26 NOTE — PROGRESS NOTES
[address] [facility] Cardiology Inpatient Progress Note    Patient is a 80 y.o. female of Aide Agrawal MD seen in hospital follow up. Chief complaint: Elevated troponin. HPI: No CP or SOB.     Past Medical History:    PAF on Eliquis  Hypertension  Hypothyroidism  History of DVT  CKD  Lymphedema bilateral lower extremities  Blood transfusion reaction  Arthritis  Skin ulcer of left calf 2017  Pelvic fracture history  Venous stasis ulcer  Colonoscopy, endoscopy, eye surgery, right hip fracture surgery, ORIF left hip, tonsillectomy    Patient Active Problem List   Diagnosis    History of DVT (deep vein thrombosis)    Sepsis (HCC)    Essential hypertension    Acquired hypothyroidism    Closed fracture of right iliac wing (HCC)    Chronic anticoagulation    NSTEMI (non-ST elevated myocardial infarction) (HCC)    UTI (urinary tract infection)    Hypoxia    Fall    Elevated troponin    Leukocytosis       Allergies   Allergen Reactions    Aspirin Itching    Other Nausea And Vomiting     FLU SHOT    Tape Hemalatha Basques Tape] Swelling       Current Facility-Administered Medications   Medication Dose Route Frequency Provider Last Rate Last Admin    white petrolatum ointment   Topical BID Chet Mclean,         sodium chloride flush 0.9 % injection 5-40 mL  5-40 mL IntraVENous 2 times per day April TRAVIS Gaspar - CNP   10 mL at 09/25/22 2113    sodium chloride flush 0.9 % injection 5-40 mL  5-40 mL IntraVENous PRN April JUDY GasparN - CNP        ondansetron (ZOFRAN-ODT) disintegrating tablet 4 mg  4 mg Oral Q8H PRN April TRAVIS Gaspar - ARNALDO        Or    ondansetron [facility]) injection 4 mg  4 mg IntraVENous Q6H PRN April JUDY GasparN - CNP        acetaminophen (TYLENOL) tablet 650 mg  650 mg Oral Q6H PRN April Hubert APRN - CNP   650 mg at 09/25/22 1057    Or    acetaminophen (TYLENOL) suppository 650 mg  650 mg Rectal Q6H PRN April Hubert APRN - CNP        bisacodyl Neck: Neck supple. No hepatojugular reflux. No JVD present. Carotid bruit is not present. No tracheal deviation present. No thyromegaly present. Cardiovascular: Normal rate, regular rhythm, normal heart sounds. intact distal pulses. No gallop and no friction rub. No murmur heard. Pulmonary: Breath sounds normal. No respiratory distress. No wheezes. No rales. Chest: Effort normal. No tenderness. Abdominal: Soft. Bowel sounds are normal. No distension or mass. No tenderness, rebound or guarding. Musculoskeletal: . No tenderness. No clubbing or cyanosis. Extremitites: Intact distal pulses. No edema  Neurological: Alert and oriented to person, place, and time. Skin: Skin is warm and dry. No rash noted. Not diaphoretic. No erythema. Psychiatric: Normal mood and affect. Behavior is normal.   Lymphadenopathy: No cervical adenopathy. No groin adenopathy.     CBC:   Lab Results   Component Value Date/Time    WBC 5.1 09/25/2022 02:25 AM    RBC 2.96 09/25/2022 02:25 AM    RBC  10/21/2014 02:20 PM      RBC           [id number]    released     10/25/14  00:54  SCC    RBC  10/21/2014 02:20 PM      RBC           [id number]    transfused   10/21/14  19:59  SCC    HGB 8.3 09/25/2022 02:25 AM    HCT 26.2 09/25/2022 02:25 AM    MCV 88.5 09/25/2022 02:25 AM    MCH 28.0 09/25/2022 02:25 AM    MCHC 31.7 09/25/2022 02:25 AM    RDW 13.4 09/25/2022 02:25 AM     09/25/2022 02:25 AM    MPV 10.5 09/25/2022 02:25 AM     BMP:   Lab Results   Component Value Date/Time     09/25/2022 02:25 AM    K 4.1 09/25/2022 02:25 AM    K 4.1 09/21/2022 04:55 AM     09/25/2022 02:25 AM    CO2 26 09/25/2022 02:25 AM    BUN 25 09/25/2022 02:25 AM    LABALBU 3.9 09/20/2022 07:13 PM    CREATININE 1.1 09/25/2022 02:25 AM    CALCIUM 8.4 09/25/2022 02:25 AM    GFRAA 58 09/25/2022 02:25 AM    LABGLOM 48 09/25/2022 02:25 AM     Magnesium:    Lab Results   Component Value Date/Time    MG 1.8 09/28/2017 05:13 AM Cardiac Enzymes:   Lab Results   Component Value Date    CKTOTAL 242 (H) 09/22/2022    CKTOTAL 437 (H) 09/21/2022    CKTOTAL 41 08/15/2017    CKMB 3.6 08/15/2017    CKMB 11.2 (H) 08/06/2014    CKMB 1.3 07/11/2011    TROPHS 101 (H) 09/22/2022    TROPHS 103 (H) 09/22/2022    TROPHS 112 (H) 09/21/2022      PT/INR:    Lab Results   Component Value Date/Time    PROTIME 11.1 08/15/2017 08:40 PM    PROTIME 11.6 07/12/2011 05:45 AM    INR 1.0 08/15/2017 08:40 PM     TSH:    Lab Results   Component Value Date/Time    TSH 1.340 09/21/2022 08:42 AM     Rhythm Strip: normal sinus rhythm. CTA pulm 9/20/2022:  Impression   1. No evidence of pulmonary embolism or acute pulmonary abnormality. 2.  Bibasilar atelectasis. Transthoracic echocardiogram September 21, 2022:  Ejection fraction is visually estimated at 60 to 65%. Mild asymmetric septal hypertrophy. There is doppler evidence of stage I diastolic dysfunction. Normal right ventricular size and function. Agitated saline injected for shunt evaluation revealed no evidence of shunt. Mild mitral regurgitation is present. The aortic valve appears mildly sclerotic. Mild aortic stenosis. Mild tricuspid regurgitation. RVSP is 50 mmHg. Pulmonary hypertension is moderate . Physiologic and/or trace pulmonic regurgitation present. Stress Summary 9/25.2022: The myocardial perfusion imaging was normal with significant inferior   attenuation. Overall left ventricular systolic function was normal without regional   wall motion abnormalities. Overall low risk study.      ASSESSMENT & PLAN:    Patient Active Problem List   Diagnosis    History of DVT (deep vein thrombosis)    Sepsis ([address]    Essential hypertension    Acquired hypothyroidism    Closed fracture of right iliac wing (HCC)    Chronic anticoagulation    NSTEMI (non-ST elevated myocardial infarction) ([address]    UTI (urinary tract infection)    Hypoxia    Fall    Elevated troponin Leukocytosis     1. Elevated troponin:    Echo okay. Pharm stress low risk. BB. No ASAS due to eliquis. 2. HFpEF:     Diuresed. BB. Typically on ACEI, restart when able. 3. PAF: Eliquis/BB. 4. HTN: Observe. 5. CKD: Follow labs. 6. Anemia: Follow labs. 7. UTI: Per primary service. 8. Hypothyroidism: On HRT    9. Hx of DVT    10. Mechanical fall    Patient stable from CV standpoint. Please call if needed. DUSTIN. Addison Encarnacion D.O.   Cardiologist  Cardiology, [facility]

## 2022-09-26 NOTE — PROGRESS NOTES
Physician Progress Note      Benjamin Monterroso  CSN #:                  [id number]  :                       1940  ADMIT DATE:       2022 5:02 PM  [address] [place] DATE:  RESPONDING  PROVIDER #:        Keith Dwyer DO          QUERY TEXT:    Dear Attending Physician,    Pt admitted with mild rhabdomyolysis from a fall at home ,elevated Troponins   and has CHF documented. If possible, please document in progress notes and   discharge summary further specificity regarding the acuity of CHF:    The medical record reflects the following:  Risk Factors: Hx chronic diastolic CHF ,venous stasis, HTN,CKD, PAF  Clinical Indicators: Per CARDIO ,\". Honobia Crumble Joseline Crumble Elevated troponin: Echo okay. Pharm   stress low risk. BB. No ASAS due to eliquis. ?2. HFpEF:  Diuresed. Joseline Crumble Joseline Crumble \"   BNP 3,134  Treatment: IV Bumex    Thank you,  Leigh Mishra RN CCDS  Clinical Documentation Improvement Specialist  Options provided:  -- Acute on Chronic Diastolic CHF/HFpEF  -- Chronic Diastolic CHF/HFpEF  -- Other - I will add my own diagnosis  -- Disagree - Not applicable / Not valid  -- Disagree - Clinically unable to determine / Unknown  -- Refer to Clinical Documentation Reviewer    PROVIDER RESPONSE TEXT:    This patient is in acute on chronic diastolic CHF/HFpEF.     Query created by: Abby Guerrero on 2022 1:57 PM      Electronically signed by:  Kirstie Taylor DO 2022 2:07 PM

## 2022-09-26 NOTE — PROGRESS NOTES
Consult left inner ankle wound  Alert and oriented. Sitting in chair dressed. States is going to [facility] today  Left medial leg, had followed with dr. Nohemi Capps    Anterior area, discolored. Brownish / purple ? Skin tear  Posterior area yellow wound. 1x2 obs . Periwounds both red, lower leg mostly pink, patches os white moist, crusted  Multiple area- large amount serous drainage  Opticel and abds, kerlex  Wear support hose at home but quit when legs swelled and got blister. . pitting edema  Plans for transfer to [facility] [place] today  Alyson Dill.  Seng Parisi, CNS, Wound Care

## 2022-09-26 NOTE — PROGRESS NOTES
Hospitalist Progress Note      PCP: Jose Bellamy MD    Date of Admission: 9/20/2022        Hospital Course:  80 y.o. female presented with FALL, STATES SHE TIPPED OVER THE DOG BLANKET AND FELL ON HER RIGHT SHOULDER. HAS A KNOWN HISTORY OF PAF, FOUND TO HAVE AN ELEVATED TT. Mitzi Joegood DIAGNOSED WITH NSTEMI  ON ADMISSION, CARDIOLOGY SAYS NO.   HAD A Unremarkable lexiscan stress EKG        Subjective:  NO COMPLAINTS          Medications:  Reviewed    Infusion Medications   Scheduled Medications    white petrolatum   Topical BID    sodium chloride flush  5-40 mL IntraVENous 2 times per day    apixaban  5 mg Oral BID    oyster shell calcium w/D  1 tablet Oral BID    levothyroxine  75 mcg Oral Daily    metoprolol tartrate  25 mg Oral BID    [Held by provider] amLODIPine  10 mg Oral Daily    And    [Held by provider] lisinopril  40 mg Oral Daily    bumetanide  1 mg IntraVENous BID     PRN Meds: sodium chloride flush, ondansetron **OR** ondansetron, acetaminophen **OR** acetaminophen, bisacodyl, ipratropium-albuterol, perflutren lipid microspheres      Intake/Output Summary (Last 24 hours) at 9/26/2022 1414  Last data filed at 9/26/2022 0342  Gross per 24 hour   Intake --   Output 600 ml   Net -600 ml       Exam:    BP (!) 143/65   Pulse 76   Temp 100 °F (37.8 °C) (Oral)   Resp 18   Ht 5' 2\" (1.575 m)   Wt 180 lb 3.2 oz (81.7 kg)   SpO2 94%   BMI 32.96 kg/m²       General appearance:  No apparent distress,   HEENT:  Normal cephalic, atraumatic without obvious deformity. Pupils equal, round, and reactive to light. Extra ocular muscles intact. Conjunctivae/corneas clear. PROPTOSIS   Neck: Supple, with full range of motion. No jugular venous distention. Trachea midline. Respiratory:  Normal respiratory effort. Clear to auscultation, bilaterally without Rales/Wheezes/Rhonchi. Cardiovascular: IRREG  Abdomen: Soft, non-tender, non-distended with normal bowel sounds.   Musculoskeletal:  No clubbing, cyanosis POS edema bilaterally. PAIN WITH ROM RIGHT SHOULDER    Skin: ECCHYMOSIS ON ARMS  Neurologic:  Neurovascularly intact without any focal sensory/motor deficits. Cranial nerves: II-XII intact, grossly non-focal.  Psychiatric:  Alert and oriented,                 Labs:   Recent Labs     09/25/22 0225   WBC 5.1   HGB 8.3*   HCT 26.2*        Recent Labs     09/24/22  0240 09/25/22 0225    139   K 4.2 4.1   * 106   CO2 22 26   BUN 27* 25*   CREATININE 0.8 1.1*   CALCIUM 8.3* 8.4*     No results for input(s): AST, ALT, BILIDIR, BILITOT, ALKPHOS in the last 72 hours. No results for input(s): INR in the last 72 hours. No results for input(s): Rhae Childes in the last 72 hours. No results for input(s): AST, ALT, ALB, BILIDIR, BILITOT, ALKPHOS in the last 72 hours. No results for input(s): LACTA in the last 72 hours. No results found for: Roselie Kin  No results found for: AMMONIA    Assessment:    Active Hospital Problems    Diagnosis Date Noted    NSTEMI (non-ST elevated myocardial infarction) ([facility] [I21.4] 09/21/2022     Priority: Medium    UTI (urinary tract infection) [N39.0] 09/21/2022     Priority: Medium    Hypoxia [R09.02] 09/21/2022     Priority: Medium    Fall [W19. XXXA] 09/21/2022     Priority: Medium    Elevated troponin [R77.8] 09/21/2022     Priority: Medium    Leukocytosis [D72.829] 09/21/2022     Priority: Medium    Chronic anticoagulation [Z79.01] 11/27/2017    Essential hypertension [I10] 08/14/2017    Acquired hypothyroidism [E03.9] 08/14/2017    Sepsis ([facility] [A41.9] 08/13/2017    History of DVT (deep vein thrombosis) [Z86.718] 08/19/2013       Plan:DC TO ERIC  Electronically signed by Reyes Alvarez DO on 9/26/2022 at 2:14 PM [facility]

## 2022-09-27 LAB
ALBUMIN SERPL-MCNC: 2.8 G/DL (ref 3.5–5.2)
ALP BLD-CCNC: 56 U/L (ref 35–104)
ALT SERPL-CCNC: 9 U/L (ref 0–32)
ANION GAP SERPL CALCULATED.3IONS-SCNC: 13 MMOL/L (ref 7–16)
AST SERPL-CCNC: 14 U/L (ref 0–31)
BASOPHILS ABSOLUTE: 0.02 E9/L (ref 0–0.2)
BASOPHILS RELATIVE PERCENT: 0.3 % (ref 0–2)
BILIRUB SERPL-MCNC: 0.4 MG/DL (ref 0–1.2)
BUN BLDV-MCNC: 34 MG/DL (ref 6–23)
CALCIUM SERPL-MCNC: 8.8 MG/DL (ref 8.6–10.2)
CHLORIDE BLD-SCNC: 101 MMOL/L (ref 98–107)
CO2: 27 MMOL/L (ref 22–29)
CREAT SERPL-MCNC: 1 MG/DL (ref 0.5–1)
EOSINOPHILS ABSOLUTE: 0.16 E9/L (ref 0.05–0.5)
EOSINOPHILS RELATIVE PERCENT: 2.1 % (ref 0–6)
GFR AFRICAN AMERICAN: >60
GFR NON-AFRICAN AMERICAN: 53 ML/MIN/1.73
GLUCOSE BLD-MCNC: 89 MG/DL (ref 74–99)
HCT VFR BLD CALC: 25.9 % (ref 34–48)
HEMOGLOBIN: 8.5 G/DL (ref 11.5–15.5)
IMMATURE GRANULOCYTES #: 0.04 E9/L
IMMATURE GRANULOCYTES %: 0.5 % (ref 0–5)
LYMPHOCYTES ABSOLUTE: 0.94 E9/L (ref 1.5–4)
LYMPHOCYTES RELATIVE PERCENT: 12.2 % (ref 20–42)
MCH RBC QN AUTO: 28.7 PG (ref 26–35)
MCHC RBC AUTO-ENTMCNC: 32.8 % (ref 32–34.5)
MCV RBC AUTO: 87.5 FL (ref 80–99.9)
MONOCYTES ABSOLUTE: 0.73 E9/L (ref 0.1–0.95)
MONOCYTES RELATIVE PERCENT: 9.4 % (ref 2–12)
NEUTROPHILS ABSOLUTE: 5.84 E9/L (ref 1.8–7.3)
NEUTROPHILS RELATIVE PERCENT: 75.5 % (ref 43–80)
PDW BLD-RTO: 13.3 FL (ref 11.5–15)
PLATELET # BLD: 195 E9/L (ref 130–450)
PMV BLD AUTO: 10.8 FL (ref 7–12)
POTASSIUM SERPL-SCNC: 3.6 MMOL/L (ref 3.5–5)
RBC # BLD: 2.96 E12/L (ref 3.5–5.5)
SODIUM BLD-SCNC: 141 MMOL/L (ref 132–146)
TOTAL PROTEIN: 5.6 G/DL (ref 6.4–8.3)
TSH SERPL DL<=0.05 MIU/L-ACNC: 1.75 UIU/ML (ref 0.27–4.2)
VITAMIN D 25-HYDROXY: 36 NG/ML (ref 30–100)
WBC # BLD: 7.7 E9/L (ref 4.5–11.5)

## 2022-09-27 NOTE — PROGRESS NOTES
Physician Progress Note      PATIENT:               Yas Jimenez  CSN #:                  [id number]  :                       1940  ADMIT DATE:       2022 5:02 PM  Elvis Espino DATE:        2022 4:42 PM  RESPONDING  PROVIDER #:        Mat Dixon DO          QUERY TEXT:    Dear Attending Physician,    Pt admitted with mild rhabdomyolysis from a fall at home ,elevated Troponins   and BNP and has CHF documented per ordered Cardiology consultant notes . If   possible please document in the progress notes and discharge summary:    The medical record reflects the following:  Risk Factors: Hx chronic diastolic CHF ,venous stasis, HTN,CKD, PAF  Clinical Indicators: Per Cardio ,\". Lula Rast Lula Rast Heart failure with preserved ejection   fraction Continue on diuresis and monitor closely . Lula Rast Lula Rast \"   Per   Cardio,\". Lula Rast Lula Rast Elevated troponin: Echo okay. Pharm stress low risk. BB. No   ASAS due to eliquis. ?2. HFpEF:  Diuresed. Lula Rast Lula Rast \"   Per Cardio ,\". Lula Rast Lula Rast This   patient is in acute on chronic diastolic CHF/HFpEF. ... \"  BNP  3,134  Treatment: IV Bumex-initiated     Thank you,  Ragini Avery RN CCDS  Clinical Documentation Improvement Specialist  Options provided:  -- Acute on Chronic Diastolic CHF confirmed present on admission  -- Acute on Chronic Diastolic CHF confirmed not present on admission  -- Other - I will add my own diagnosis  -- Disagree - Not applicable / Not valid  -- Disagree - Clinically unable to determine / Unknown  -- Refer to Clinical Documentation Reviewer    PROVIDER RESPONSE TEXT:    This patient is in acute on chronic diastolic CHF confirmed present on   admission.     Query created by: Radha Sheriff on 2022 8:03 AM      Electronically signed by:  Mat Dixon DO 2022 8:05 AM

## 2022-10-04 LAB
ALBUMIN SERPL-MCNC: 2.6 G/DL (ref 3.5–5.2)
ALP BLD-CCNC: 79 U/L (ref 35–104)
ALT SERPL-CCNC: 11 U/L (ref 0–32)
ANION GAP SERPL CALCULATED.3IONS-SCNC: 10 MMOL/L (ref 7–16)
AST SERPL-CCNC: 13 U/L (ref 0–31)
BILIRUB SERPL-MCNC: <0.2 MG/DL (ref 0–1.2)
BUN BLDV-MCNC: 32 MG/DL (ref 6–23)
CALCIUM SERPL-MCNC: 9.3 MG/DL (ref 8.6–10.2)
CHLORIDE BLD-SCNC: 112 MMOL/L (ref 98–107)
CO2: 23 MMOL/L (ref 22–29)
CREAT SERPL-MCNC: 0.8 MG/DL (ref 0.5–1)
GFR AFRICAN AMERICAN: >60
GFR NON-AFRICAN AMERICAN: >60 ML/MIN/1.73
GLUCOSE BLD-MCNC: 89 MG/DL (ref 74–99)
HCT VFR BLD CALC: 23.8 % (ref 34–48)
HEMOGLOBIN: 7.4 G/DL (ref 11.5–15.5)
MCH RBC QN AUTO: 28.2 PG (ref 26–35)
MCHC RBC AUTO-ENTMCNC: 31.1 % (ref 32–34.5)
MCV RBC AUTO: 90.8 FL (ref 80–99.9)
PDW BLD-RTO: 13.2 FL (ref 11.5–15)
PLATELET # BLD: 269 E9/L (ref 130–450)
PMV BLD AUTO: 10.2 FL (ref 7–12)
POTASSIUM SERPL-SCNC: 4.8 MMOL/L (ref 3.5–5)
RBC # BLD: 2.62 E12/L (ref 3.5–5.5)
SODIUM BLD-SCNC: 145 MMOL/L (ref 132–146)
TOTAL PROTEIN: 5.5 G/DL (ref 6.4–8.3)
WBC # BLD: 5 E9/L (ref 4.5–11.5)

## 2022-10-06 LAB
HCT VFR BLD CALC: 23.8 % (ref 34–48)
HEMOGLOBIN: 7.3 G/DL (ref 11.5–15.5)
MCH RBC QN AUTO: 27.7 PG (ref 26–35)
MCHC RBC AUTO-ENTMCNC: 30.7 % (ref 32–34.5)
MCV RBC AUTO: 90.2 FL (ref 80–99.9)
PDW BLD-RTO: 13.4 FL (ref 11.5–15)
PLATELET # BLD: 271 E9/L (ref 130–450)
PMV BLD AUTO: 10 FL (ref 7–12)
RBC # BLD: 2.64 E12/L (ref 3.5–5.5)
WBC # BLD: 4.7 E9/L (ref 4.5–11.5)

## 2022-10-07 NOTE — DISCHARGE INSTRUCTIONS
Visit Discharge/Physician Orders    Discharge condition: Stable    Assessment of pain at discharge:MINIMAL    Anesthetic used: 4% LIDOCAINE    Discharge to: Home    Left via:Private automobile    Accompanied by: accompanied by child    ECF/HHA: [facility]    Dressing Orders:LEFT LEG ULCERS CLEANSE WITH NORMAL SALINE APPLY ALGINATE ABD [address] [place] [address] Road AND PRN. LAMISIL TO DRY FLAKY SKIN  Treatment Orders:  EAT FOODS HIGH IN PROTEIN AND VITAMIN C'    MULTIVITAMIN DAILY    CULTURE OBTAINED    [facility] followup visit ________1 WEEK___________________  (Please note your next appointment above and if you are unable to keep, kindly give a 24 hour notice. Thank you.)    Physician signature:__________________________      If you experience any of the following, please call the [address] [place] [address] during business hours:    * Increase in Pain  * Temperature over 101  * Increase in drainage from your wound  * Drainage with a foul odor  * Bleeding  * Increase in swelling  * Need for compression bandage changes due to slippage, breakthrough drainage. If you need medical attention outside of the business hours of the [address] [place] [address] please contact your PCP or go to the nearest emergency room.

## 2022-10-14 ENCOUNTER — HOSPITAL ENCOUNTER (OUTPATIENT)
Dept: WOUND CARE | Age: 82
Discharge: HOME | End: 2022-10-14
Payer: MEDICARE

## 2022-10-14 VITALS
HEIGHT: 62 IN | DIASTOLIC BLOOD PRESSURE: 56 MMHG | WEIGHT: 180 LBS | BODY MASS INDEX: 33.13 KG/M2 | HEART RATE: 69 BPM | RESPIRATION RATE: 18 BRPM | SYSTOLIC BLOOD PRESSURE: 165 MMHG | TEMPERATURE: 97.3 F

## 2022-10-14 DIAGNOSIS — L97.922 NON-PRESSURE ULCER OF LOWER EXTREMITY, LEFT, WITH FAT LAYER EXPOSED (HCC): ICD-10-CM

## 2022-10-14 DIAGNOSIS — R60.0 BILATERAL LOWER EXTREMITY EDEMA: ICD-10-CM

## 2022-10-14 DIAGNOSIS — Z86.79 HISTORY OF VASCULAR DISEASE: ICD-10-CM

## 2022-10-14 DIAGNOSIS — L97.429 HEEL ULCER, LEFT, WITH UNSPECIFIED SEVERITY (HCC): ICD-10-CM

## 2022-10-14 PROCEDURE — 11045 DBRDMT SUBQ TISS EACH ADDL: CPT | Performed by: NURSE PRACTITIONER

## 2022-10-14 PROCEDURE — 87205 SMEAR GRAM STAIN: CPT

## 2022-10-14 PROCEDURE — 11042 DBRDMT SUBQ TIS 1ST 20SQCM/<: CPT | Performed by: NURSE PRACTITIONER

## 2022-10-14 PROCEDURE — 87075 CULTR BACTERIA EXCEPT BLOOD: CPT

## 2022-10-14 PROCEDURE — 87077 CULTURE AEROBIC IDENTIFY: CPT

## 2022-10-14 PROCEDURE — 87070 CULTURE OTHR SPECIMN AEROBIC: CPT

## 2022-10-14 PROCEDURE — 87186 SC STD MICRODIL/AGAR DIL: CPT

## 2022-10-14 PROCEDURE — 99204 OFFICE O/P NEW MOD 45 MIN: CPT | Performed by: NURSE PRACTITIONER

## 2022-10-14 PROCEDURE — 87106 FUNGI IDENTIFICATION YEAST: CPT

## 2022-10-14 NOTE — PLAN OF CARE
Problem: Cognitive:  Goal: Knowledge of wound care  Description: Knowledge of wound care  Outcome: Progressing  Goal: Understands risk factors for wounds  Description: Understands risk factors for wounds  Outcome: Progressing     Problem: Wound:  Goal: Will show signs of wound healing; wound closure and no evidence of infection  Description: Will show signs of wound healing; wound closure and no evidence of infection  Outcome: Progressing     Problem: Venous:  Goal: Signs of wound healing will improve  Description: Signs of wound healing will improve  Outcome: Progressing

## 2022-10-17 ENCOUNTER — TELEPHONE (OUTPATIENT)
Dept: ADMINISTRATIVE | Age: 82
End: 2022-10-17

## 2022-10-17 NOTE — TELEPHONE ENCOUNTER
Please call patient she needs HFU discharged 10/07, she would like to see Dr Leny Srinivasan instead of [facility] please call her and advise.  Saw Dr David

## 2022-10-18 LAB
GRAM STAIN RESULT: ABNORMAL
ORGANISM: ABNORMAL
WOUND/ABSCESS: ABNORMAL
WOUND/ABSCESS: ABNORMAL

## 2022-10-18 NOTE — DISCHARGE INSTRUCTIONS
Visit Discharge/Physician Orders     Discharge condition: Stable     Assessment of pain at discharge:MINIMAL     Anesthetic used: 4% LIDOCAINE     Discharge to: Home     Left via:Private automobile     Accompanied by: accompanied by child     ECF/HHA: [facility]     Dressing Orders:LEFT LEG ULCERS CLEANSE WITH NORMAL SALINE APPLY ALGINATE ABD [address] [place] [address] Road AND PRN. LAMISIL TO DRY FLAKY SKIN. Prescription sent for Doxycycline, begin to take as prescribed. Treatment Orders:  EAT FOODS HIGH IN PROTEIN AND VITAMIN C.      MULTIVITAMIN DAILY     CULTURE reviewed     [facility] followup visit ________1 WEEK__Nguyen Teixeira_____2 weeks Dr. Baez____________  (Please note your next appointment above and if you are unable to keep, kindly give a 24 hour notice. Thank you.)     Physician signature:__________________________        If you experience any of the following, please call the [address] [place] [address] during business hours:     * Increase in Pain  * Temperature over 101  * Increase in drainage from your wound  * Drainage with a foul odor  * Bleeding  * Increase in swelling  * Need for compression bandage changes due to slippage, breakthrough drainage. If you need medical attention outside of the business hours of the [address] [place] [address] please contact your PCP or go to the nearest emergency room.

## 2022-10-19 LAB
ANAEROBIC CULTURE: ABNORMAL
ANAEROBIC CULTURE: ABNORMAL
ORGANISM: ABNORMAL

## 2022-10-21 ENCOUNTER — HOSPITAL ENCOUNTER (OUTPATIENT)
Dept: WOUND CARE | Age: 82
Discharge: HOME | End: 2022-10-21
Payer: MEDICARE

## 2022-10-21 VITALS
RESPIRATION RATE: 16 BRPM | WEIGHT: 180 LBS | TEMPERATURE: 96.5 F | HEART RATE: 63 BPM | BODY MASS INDEX: 33.13 KG/M2 | SYSTOLIC BLOOD PRESSURE: 160 MMHG | DIASTOLIC BLOOD PRESSURE: 70 MMHG | HEIGHT: 62 IN

## 2022-10-21 DIAGNOSIS — L97.922 NON-PRESSURE ULCER OF LOWER EXTREMITY, LEFT, WITH FAT LAYER EXPOSED (HCC): Primary | ICD-10-CM

## 2022-10-21 PROBLEM — R60.0 BILATERAL LOWER EXTREMITY EDEMA: Status: ACTIVE | Noted: 2022-10-21

## 2022-10-21 PROBLEM — N39.0 UTI (URINARY TRACT INFECTION): Status: RESOLVED | Noted: 2022-09-21 | Resolved: 2022-10-21
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
## 2022-10-21 PROBLEM — L97.429 HEEL ULCER, LEFT, WITH UNSPECIFIED SEVERITY (HCC): Status: ACTIVE | Noted: 2022-10-21

## 2022-10-21 PROBLEM — R79.89 ELEVATED TROPONIN: Status: RESOLVED | Noted: 2022-09-21 | Resolved: 2022-10-21

## 2022-10-21 PROBLEM — R77.8 ELEVATED TROPONIN: Status: RESOLVED | Noted: 2022-09-21 | Resolved: 2022-10-21

## 2022-10-21 PROCEDURE — 11042 DBRDMT SUBQ TIS 1ST 20SQCM/<: CPT

## 2022-10-21 RX ORDER — FERROUS SULFATE 325(65) MG
325 TABLET ORAL
COMMUNITY

## 2022-10-21 RX ORDER — DOXYCYCLINE HYCLATE 100 MG
100 TABLET ORAL 2 TIMES DAILY
Qty: 20 TABLET | Refills: 0 | Status: SHIPPED | OUTPATIENT
Start: 2022-10-21 | End: 2022-10-31

## 2022-10-21 NOTE — PROGRESS NOTES
[facility]  History and Physical/Consultation  Podiatry    Referring Physician : Juliette Cabral MD  Georgie Moss  MEDICAL RECORD NUMBER:  [id number]  AGE: 80 y.o. GENDER: female  : 1940  EPISODE DATE:  10/14/2022  Subjective:     Chief Complaint   Patient presents with    Wound Check     Left leg          HISTORY of PRESENT ILLNESS HPI     Georgie Moss is a 80 y.o. female who presents today for wound/ulcer evaluation. History of Wound Context:  The patient has had a wounds of medial, lateral, posterior left leg and left heel which was first noted approximately three weeks ago. This has not been treated. On their initial visit to the wound healing center, 10/14/2022,  the patient has noted that the wound has not been improving. The patient has had similar previous wounds in the past.      Pt is not on abx at time of initial visit.     10/14/22  Patient presents with left leg erythema, pruritis and dry scaly skin to left leg   Eschar present on left heel   Patient is not diabetic  Patient has a history of peripheral vascular disease - taking Eliquis  Vascular studies ordered   Wounds debrided   Aquacel and Spandagrip, Lamisil to surrounding inflamed tissue       Wound/Ulcer Pain Timing/Severity: none  Quality of pain: N/A  Severity:  0 / 10   Modifying Factors: None  Associated Signs/Symptoms: edema and erythema    Ulcer Identification:  Ulcer Type: venous and undetermined  Contributing Factors: edema, venous stasis, and lymphedema    Diabetic/Pressure/Non Pressure Ulcers onl y:  Ulcer: Non-Pressure ulcer, fat layer exposed    If patient has diabetic lower extremity wounds  [place] Classification of diabetic lower extremity wounds:    Grade Description   []  0 No open wound   []  1 Superficial ulcer involving the full skin thickness   []  2 Deep ulcer involves ligament, tendon, joint capsule, or fascia  No bone involvement or abscess presence   []  3 Deep Ulcer with abcess formation and/or osteomyelitis   []  4 Localized gangrene   []  5 Extensive gangrene of the foot     Wound: N/A        PAST MEDICAL HISTORY      Diagnosis Date    Acquired hypothyroidism 8/14/2017    Arthritis     Blood transfusion reaction     Chronic kidney disease     History of blood transfusion     Hypertension     Lymphedema of both lower extremities 9/6/2017    Open wound of left great toe 10/18/2017    Other disorders of kidney and ureter in diseases classified elsewhere     Pelvic fracture (HCC)     Skin ulcer of left calf with fat layer exposed ([address] 9/6/2017    Skin ulcer of left calf, limited to breakdown of skin ([address] 9/6/2017    Ulcer of left lower leg ([address] 3/24/2014    Venous stasis ulcer of left calf limited to breakdown of skin ([address] 3/24/2014     Past Surgical History:   Procedure Laterality Date    COLONOSCOPY      ENDOSCOPY, COLON, DIAGNOSTIC      EYE SURGERY      cateract and lazor surgery 2015    FRACTURE SURGERY  2010    RT HIP    HIP FRACTURE SURGERY Left 08/08/2014    ORIF left hip    TONSILLECTOMY      as child    TOTAL HIP ARTHROPLASTY Left 10/17/2014    revision with removal of hardware     Family History   Problem Relation Age of Onset    Mult Sclerosis Father      Social History     Tobacco Use    Smoking status: Never    Smokeless tobacco: Never   Substance Use Topics    Alcohol use: No    Drug use: No     Allergies   Allergen Reactions    Aspirin Itching    Other Nausea And Vomiting     FLU SHOT    Tape [Adhesive Tape] Swelling     Current Outpatient Medications on File Prior to Encounter   Medication Sig Dispense Refill    ipratropium-albuterol (DUONEB) 0.5-2.5 (3) MG/3ML SOLN nebulizer solution Inhale 3 mLs into the lungs every 6 hours as needed for Shortness of Breath 360 mL     acetaminophen (TYLENOL) 325 MG tablet Take 650 mg by mouth every 4 hours as needed for Pain      calcium-vitamin D (OSCAL-500) 500-200 MG-UNIT per tablet Take 1 tablet by mouth 2 times daily 30 tablet 3    apixaban (ELIQUIS) 5 MG TABS tablet Take 1 tablet by mouth 2 times daily 60 tablet 0    metoprolol tartrate (LOPRESSOR) 25 MG tablet Take 1 tablet by mouth 2 times daily 60 tablet 0    Cholecalciferol (VITAMIN D) 2000 units TABS tablet Take 2.5 tablets by mouth 2 times daily 150 tablet 0    levothyroxine (SYNTHROID) 75 MCG tablet Take 75 mcg by mouth Daily       No current facility-administered medications on file prior to encounter.        REVIEW OF SYSTEMS   ROS : All others Negative if blank [], Positive if [x]  General Vascular   [] Fevers [] Claudication   [] Chills [] Rest Pain   Skin Neurologic   [x] Tissue Loss [] Lower extremity neuropathy     Objective:    BP (!) 165/56   Pulse 69   Temp 97.3 °F (36.3 °C) (Temporal)   Resp 18   Ht 5' 2\" (1.575 m)   Wt 180 lb (81.6 kg)   BMI 32.92 kg/m²   Wt Readings from Last 3 Encounters:   10/14/22 180 lb (81.6 kg)   09/26/22 180 lb 3.2 oz (81.7 kg)   06/05/19 178 lb 9.6 oz (81 kg)       PHYSICAL EXAM   CONSTITUTIONAL:   Awake, alert, cooperative   PSYCHIATRIC :  Oriented to time, place and person      normal insight to disease process  EXTREMITIES:   R LE Open wounds are noted   Skin color is abnormal with erythema, dry, flaky skin   Edema is  noted   Sensation intact to light touch   Palpation of the foot does not cause pain   4/5 strength DF/PF  L LE Open wounds are not noted   Skin color is abnormal with erythema, dry, flaky skin   Edema is  noted   Sensation intact to light touch   Palpation of the foot does not cause pain   4/5 strength DF/PF  R dorsalis pedis +1 L dorsalis pedis +1     Assessment:     Problem List Items Addressed This Visit       Bilateral lower extremity edema    Non-pressure ulcer of lower extremity, left, with fat layer exposed ([address]       Pre Debridement Measurements:  Are located in the [place]  Documentation Flow Sheet  Post Debridement Measurements:  Wound/Ulcer Descriptions are Pre Debridement except measurements:     Pressure Ulcer 09/06/17 Leg Left; Lower #2 (1-5-17 acquired) (Active)   Number of days: 1871       Wound 10/14/22 Ankle Left;Medial #1 (Active)   Wound Image   10/14/22 1331   Wound Etiology Venous 10/14/22 1331   Dressing Status New dressing applied 10/14/22 1432   Wound Cleansed Cleansed with saline 10/14/22 1432   Dressing/Treatment Alginate;ABD;Dry dressing 10/14/22 1432   Wound Length (cm) 0.7 cm 10/14/22 1331   Wound Width (cm) 1.1 cm 10/14/22 1331   Wound Depth (cm) 0.2 cm 10/14/22 1331   Wound Surface Area (cm^2) 0.77 cm^2 10/14/22 1331   Wound Volume (cm^3) 0.154 cm^3 10/14/22 1331   Wound Assessment Fibrin;Slough 10/14/22 1331   Drainage Amount Moderate 10/14/22 1331   Drainage Description Serous; Yellow 10/14/22 1331   Odor None 10/14/22 1331   Patrizia-wound Assessment Edematous 10/14/22 1331   Number of days: 6       Wound 10/14/22 Ankle Left;Lateral #2 (Active)   Wound Image   10/14/22 1331   Wound Etiology Venous 10/14/22 1331   Wound Length (cm) 0.5 cm 10/14/22 1331   Wound Width (cm) 0.2 cm 10/14/22 1331   Wound Depth (cm) 0.2 cm 10/14/22 1331   Wound Surface Area (cm^2) 0.1 cm^2 10/14/22 1331   Wound Volume (cm^3) 0.02 cm^3 10/14/22 1331   Wound Assessment Pink/red 10/14/22 1331   Drainage Amount Moderate 10/14/22 1331   Drainage Description Yellow;Serous 10/14/22 1331   Odor None 10/14/22 1331   Patrizia-wound Assessment Edematous 10/14/22 1331   Number of days: 6       Wound 10/14/22 Heel Left #3 (Active)   Wound Image   10/14/22 1331   Wound Etiology Deep tissue/Injury 10/14/22 1331   Wound Length (cm) 1 cm 10/14/22 1331   Wound Width (cm) 1.5 cm 10/14/22 1331   Wound Depth (cm) 0.1 cm 10/14/22 1331   Wound Surface Area (cm^2) 1.5 cm^2 10/14/22 1331   Wound Volume (cm^3) 0.15 cm^3 10/14/22 1331   Post-Procedure Length (cm) 1 cm 10/14/22 1417   Post-Procedure Width (cm) 1.6 cm 10/14/22 1417   Post-Procedure Depth (cm) 0.3 cm 10/14/22 1417   Post-Procedure Surface Area (cm^2) 1.6 cm^2 10/14/22 1417   Post-Procedure Volume (cm^3) 0.48 cm^3 10/14/22 1417   Wound Assessment Other (Comment);Eschar dry 10/14/22 1331   Drainage Amount None 10/14/22 1331   Patrizia-wound Assessment Edematous 10/14/22 1331   Number of days: 6       Wound 10/14/22 Pretibial Left;Posterior #4 BLOCK (Active)   Wound Image   10/14/22 1331   Wound Etiology Venous 10/14/22 1331   Wound Length (cm) 10.5 cm 10/14/22 1331   Wound Width (cm) 5 cm 10/14/22 1331   Wound Depth (cm) 0.2 cm 10/14/22 1331   Wound Surface Area (cm^2) 52.5 cm^2 10/14/22 1331   Wound Volume (cm^3) 10.5 cm^3 10/14/22 1331   Post-Procedure Length (cm) 10.5 cm 10/14/22 1417   Post-Procedure Width (cm) 5 cm 10/14/22 1417   Post-Procedure Depth (cm) 0.3 cm 10/14/22 1417   Post-Procedure Surface Area (cm^2) 52.5 cm^2 10/14/22 1417   Post-Procedure Volume (cm^3) 15.75 cm^3 10/14/22 1417   Wound Assessment Fibrin;Pink/red 10/14/22 1331   Drainage Amount Moderate 10/14/22 1331   Drainage Description Serosanguinous 10/14/22 1331   Odor None 10/14/22 1331   Patrizia-wound Assessment Fragile;Edematous 10/14/22 1331   Number of days: 6          Procedure Note  Indications:  Based on my examination of this patient's wound(s)/ulcer(s) today, debridement is required to promote healing and evaluate the wound base. Performed by: TRAVIS Lobato CNP    Consent obtained:  Yes    Time out taken:  Yes    Pain Control: Anesthetic  Anesthetic: 4% Lidocaine Liquid Topical     Debridement:Excisional Debridement    Using curette the wound(s)/ulcer(s) was/were sharply debrided down through and including the removal of dermis and subcutaneous tissue.         Devitalized Tissue Debrided:  fibrin, biofilm, and slough to stimulate bleeding to promote healing, post debridement good bleeding base and wound edges noted    Wound/Ulcer #: 1, 2, 3, and 4    Percent of Wound/Ulcer Debrided: 100%    Total Surface Area Debrided:  52.5 sq cm     Estimated Blood Loss:  Minimal  Hemostasis Achieved:  by pressure    Procedural Pain:

## 2022-10-24 NOTE — DISCHARGE INSTRUCTIONS
Visit Discharge/Physician Orders     Discharge condition: Stable     Assessment of pain at discharge:MINIMAL     Anesthetic used: 4% LIDOCAINE     Discharge to: Home     Left via:Private automobile     Accompanied by: accompanied by child     ECF/HHA: [facility]     Dressing Orders:LEFT LEG ULCERS CLEANSE WITH NORMAL SALINE APPLY gentamicin cream ALGINATE ABD KERLIX AND SPANDIGRIP CHANGE DAILY AND PRN. LAMISIL TO DRY FLAKY SKIN. Treatment Orders:  EAT FOODS HIGH IN PROTEIN AND VITAMIN C.      MULTIVITAMIN DAILY          [address] [place] [address],[address] followup visit ________1 WEEK__Nguyen Teixeira_____11/10/22 Dr. Baez____________  (Please note your next appointment above and if you are unable to keep, kindly give a 24 hour notice. Thank you.)     Physician signature:__________________________        If you experience any of the following, please call the [facility] during business hours:     * Increase in Pain  * Temperature over 101  * Increase in drainage from your wound  * Drainage with a foul odor  * Bleeding  * Increase in swelling  * Need for compression bandage changes due to slippage, breakthrough drainage. If you need medical attention outside of the business hours of the [address] please contact your PCP or go to the nearest emergency room.

## 2022-10-27 PROBLEM — Z86.79: Status: ACTIVE | Noted: 2022-10-27

## 2022-10-28 ENCOUNTER — HOSPITAL ENCOUNTER (OUTPATIENT)
Dept: WOUND CARE | Age: 82
Discharge: HOME | End: 2022-10-28
Payer: MEDICARE

## 2022-10-28 VITALS
DIASTOLIC BLOOD PRESSURE: 59 MMHG | RESPIRATION RATE: 18 BRPM | SYSTOLIC BLOOD PRESSURE: 158 MMHG | HEART RATE: 66 BPM | TEMPERATURE: 96.3 F

## 2022-10-28 DIAGNOSIS — L97.922 NON-PRESSURE ULCER OF LOWER EXTREMITY, LEFT, WITH FAT LAYER EXPOSED (HCC): Primary | ICD-10-CM

## 2022-10-28 PROCEDURE — 99213 OFFICE O/P EST LOW 20 MIN: CPT

## 2022-10-28 PROCEDURE — 6370000000 HC RX 637 (ALT 250 FOR IP): Performed by: NURSE PRACTITIONER

## 2022-10-28 RX ORDER — LIDOCAINE HYDROCHLORIDE 40 MG/ML
SOLUTION TOPICAL ONCE
Status: COMPLETED | OUTPATIENT
Start: 2022-10-28 | End: 2022-10-28

## 2022-10-28 RX ORDER — GENTAMICIN SULFATE 1 MG/G
OINTMENT TOPICAL
Qty: 30 G | Refills: 2 | Status: SHIPPED | OUTPATIENT
Start: 2022-10-28 | End: 2022-11-04

## 2022-10-28 RX ADMIN — LIDOCAINE HYDROCHLORIDE 15 ML: 40 SOLUTION TOPICAL at 14:55

## 2022-11-02 NOTE — DISCHARGE INSTRUCTIONS
Visit Discharge/Physician Orders     Discharge condition: Stable     Assessment of pain at discharge:MINIMAL     Anesthetic used: 4% LIDOCAINE     Discharge to: Home     Left via:Private automobile     Accompanied by: accompanied by child     ECF/HHA: [facility]     Dressing Orders:LEFT LEG ULCERS CLEANSE WITH NORMAL SALINE APPLY gentamicin cream ALGINATE ABD KERLIX AND SPANDIGRIP CHANGE DAILY AND PRN. LAMISIL TO DRY FLAKY SKIN. Treatment Orders:  EAT FOODS HIGH IN PROTEIN AND VITAMIN C.      MULTIVITAMIN DAILY           [address] [place] [address],[address] followup visit ________2 WEEK__Nguyen Teixeira_____11/10/22 Dr. Baez____________  (Please note your next appointment above and if you are unable to keep, kindly give a 24 hour notice. Thank you.)     Physician signature:__________________________        If you experience any of the following, please call the [facility] during business hours:     * Increase in Pain  * Temperature over 101  * Increase in drainage from your wound  * Drainage with a foul odor  * Bleeding  * Increase in swelling  * Need for compression bandage changes due to slippage, breakthrough drainage. If you need medical attention outside of the business hours of the [facility] [address] please contact your PCP or go to the nearest emergency room.

## 2022-11-04 ENCOUNTER — HOSPITAL ENCOUNTER (OUTPATIENT)
Dept: WOUND CARE | Age: 82
Discharge: HOME | End: 2022-11-04
Payer: MEDICARE

## 2022-11-04 VITALS
RESPIRATION RATE: 18 BRPM | BODY MASS INDEX: 33.13 KG/M2 | WEIGHT: 180 LBS | HEART RATE: 59 BPM | TEMPERATURE: 97.5 F | HEIGHT: 62 IN

## 2022-11-04 PROCEDURE — 99213 OFFICE O/P EST LOW 20 MIN: CPT

## 2022-11-04 RX ORDER — GINSENG 100 MG
CAPSULE ORAL ONCE
Status: CANCELLED | OUTPATIENT
Start: 2022-11-04 | End: 2022-11-04

## 2022-11-04 RX ORDER — LIDOCAINE HYDROCHLORIDE 40 MG/ML
SOLUTION TOPICAL ONCE
Status: CANCELLED | OUTPATIENT
Start: 2022-11-04 | End: 2022-11-04

## 2022-11-04 RX ORDER — LIDOCAINE HYDROCHLORIDE 20 MG/ML
JELLY TOPICAL ONCE
Status: CANCELLED | OUTPATIENT
Start: 2022-11-04 | End: 2022-11-04

## 2022-11-04 RX ORDER — CLOBETASOL PROPIONATE 0.5 MG/G
OINTMENT TOPICAL ONCE
Status: CANCELLED | OUTPATIENT
Start: 2022-11-04 | End: 2022-11-04

## 2022-11-04 RX ORDER — LIDOCAINE 50 MG/G
OINTMENT TOPICAL ONCE
Status: CANCELLED | OUTPATIENT
Start: 2022-11-04 | End: 2022-11-04

## 2022-11-04 RX ORDER — BACITRACIN, NEOMYCIN, POLYMYXIN B 400; 3.5; 5 [USP'U]/G; MG/G; [USP'U]/G
OINTMENT TOPICAL ONCE
Status: CANCELLED | OUTPATIENT
Start: 2022-11-04 | End: 2022-11-04

## 2022-11-04 RX ORDER — LIDOCAINE 40 MG/G
CREAM TOPICAL ONCE
Status: CANCELLED | OUTPATIENT
Start: 2022-11-04 | End: 2022-11-04

## 2022-11-04 RX ORDER — BETAMETHASONE DIPROPIONATE 0.05 %
OINTMENT (GRAM) TOPICAL ONCE
Status: CANCELLED | OUTPATIENT
Start: 2022-11-04 | End: 2022-11-04

## 2022-11-04 RX ORDER — GENTAMICIN SULFATE 1 MG/G
OINTMENT TOPICAL ONCE
Status: CANCELLED | OUTPATIENT
Start: 2022-11-04 | End: 2022-11-04

## 2022-11-04 RX ORDER — BACITRACIN ZINC AND POLYMYXIN B SULFATE 500; 1000 [USP'U]/G; [USP'U]/G
OINTMENT TOPICAL ONCE
Status: CANCELLED | OUTPATIENT
Start: 2022-11-04 | End: 2022-11-04

## 2022-11-10 ENCOUNTER — HOSPITAL ENCOUNTER (OUTPATIENT)
Dept: WOUND CARE | Age: 82
Discharge: HOME | End: 2022-11-10
Payer: MEDICARE

## 2022-11-10 VITALS
RESPIRATION RATE: 18 BRPM | TEMPERATURE: 96.8 F | HEART RATE: 63 BPM | BODY MASS INDEX: 33.13 KG/M2 | HEIGHT: 62 IN | SYSTOLIC BLOOD PRESSURE: 152 MMHG | WEIGHT: 180 LBS | DIASTOLIC BLOOD PRESSURE: 54 MMHG

## 2022-11-10 DIAGNOSIS — L97.922 NON-PRESSURE ULCER OF LOWER EXTREMITY, LEFT, WITH FAT LAYER EXPOSED (HCC): Primary | ICD-10-CM

## 2022-11-10 DIAGNOSIS — L97.429 HEEL ULCER, LEFT, WITH UNSPECIFIED SEVERITY (HCC): ICD-10-CM

## 2022-11-10 PROCEDURE — 99213 OFFICE O/P EST LOW 20 MIN: CPT

## 2022-11-10 RX ORDER — BACITRACIN ZINC AND POLYMYXIN B SULFATE 500; 1000 [USP'U]/G; [USP'U]/G
OINTMENT TOPICAL ONCE
Status: CANCELLED | OUTPATIENT
Start: 2022-11-10 | End: 2022-11-10

## 2022-11-10 RX ORDER — BETAMETHASONE DIPROPIONATE 0.05 %
OINTMENT (GRAM) TOPICAL ONCE
Status: CANCELLED | OUTPATIENT
Start: 2022-11-10 | End: 2022-11-10

## 2022-11-10 RX ORDER — FLUCONAZOLE 100 MG/1
200 TABLET ORAL DAILY
Status: DISCONTINUED | OUTPATIENT
Start: 2022-11-10 | End: 2022-11-11 | Stop reason: HOSPADM

## 2022-11-10 RX ORDER — BACITRACIN, NEOMYCIN, POLYMYXIN B 400; 3.5; 5 [USP'U]/G; MG/G; [USP'U]/G
OINTMENT TOPICAL ONCE
Status: CANCELLED | OUTPATIENT
Start: 2022-11-10 | End: 2022-11-10

## 2022-11-10 RX ORDER — LIDOCAINE 50 MG/G
OINTMENT TOPICAL ONCE
Status: CANCELLED | OUTPATIENT
Start: 2022-11-10 | End: 2022-11-10

## 2022-11-10 RX ORDER — LIDOCAINE 40 MG/G
CREAM TOPICAL ONCE
Status: CANCELLED | OUTPATIENT
Start: 2022-11-10 | End: 2022-11-10

## 2022-11-10 RX ORDER — LIDOCAINE HYDROCHLORIDE 20 MG/ML
JELLY TOPICAL ONCE
Status: CANCELLED | OUTPATIENT
Start: 2022-11-10 | End: 2022-11-10

## 2022-11-10 RX ORDER — CLOBETASOL PROPIONATE 0.5 MG/G
OINTMENT TOPICAL ONCE
Status: CANCELLED | OUTPATIENT
Start: 2022-11-10 | End: 2022-11-10

## 2022-11-10 RX ORDER — GINSENG 100 MG
CAPSULE ORAL ONCE
Status: CANCELLED | OUTPATIENT
Start: 2022-11-10 | End: 2022-11-10

## 2022-11-10 RX ORDER — LIDOCAINE HYDROCHLORIDE 40 MG/ML
SOLUTION TOPICAL ONCE
Status: CANCELLED | OUTPATIENT
Start: 2022-11-10 | End: 2022-11-10

## 2022-11-10 RX ORDER — CLOBETASOL PROPIONATE 0.5 MG/G
CREAM TOPICAL 2 TIMES DAILY
Status: DISCONTINUED | OUTPATIENT
Start: 2022-11-10 | End: 2022-11-11 | Stop reason: HOSPADM

## 2022-11-10 RX ORDER — GENTAMICIN SULFATE 1 MG/G
OINTMENT TOPICAL ONCE
Status: CANCELLED | OUTPATIENT
Start: 2022-11-10 | End: 2022-11-10

## 2022-11-10 NOTE — PLAN OF CARE
Problem: Wound:  Goal: Will show signs of wound healing; wound closure and no evidence of infection  Description: Will show signs of wound healing; wound closure and no evidence of infection  Outcome: Progressing     Problem: Venous:  Goal: Signs of wound healing will improve  Description: Signs of wound healing will improve  Outcome: Progressing     Problem: Pain  Goal: Verbalizes/displays adequate comfort level or baseline comfort level  Outcome: Progressing

## 2022-11-10 NOTE — PROGRESS NOTES
[facility]aling Center Followup Visit Note    Referring Physician : Mervin Jaeger MD  Naye Meeks  MEDICAL RECORD NUMBER:  [id number]  AGE: 80 y.o. GENDER: female  : 1940  EPISODE DATE:  11/10/2022  Elements of this note, including Diagnosis,  Interval History, Past Medical/Surgical/Family/Social Histories, ROS, physical exam, and Assessment and Plan were copied and pasted from Previous. Updates have been made where noted and reflect current exam and medical decision making from the DOS of this encounter. Subjective:     Chief Complaint   Patient presents with    Wound Check     Left leg        HISTORY of PRESENT ILLNESS HPI   Naye Meeks is a 80 y.o. female who presents today in regards to follow up evaluation and treatment of wound/ulcer. That patient's past medical, family and social hx were reviewed and changes were made if present. History of Wound Context:    The patient has had a wounds of medial, lateral, posterior left leg and left heel which was first noted approximately three weeks ago. This has not been treated. On their initial visit to the wound healing center, 10/14/2022,  the patient has noted that the wound has not been improving.   The patient has had similar previous wounds in the past.    Hernan La  11/10/22   HERE WITH DAUGHTER   HAS LEFT LE CHRONIC WOUND FOR >2 MONTHS  HAS PAIN   RECENT CX 10/14 CORYNEBACTERIUM MRSA  FAILED DOXYCYCLINE     10/14/22  Patient presents with left leg erythema, pruritis and dry scaly skin to left leg   Eschar present on left heel   Patient is not diabetic  Patient has a history of peripheral vascular disease - taking Eliquis  Vascular studies ordered   Wounds debrided   Aquacel and Spandagrip, Lamisil to surrounding inflamed tissue   10/14 RLE WOUND CX mrsa/CORYNEBACTERIUM/CANDIDA        Current Outpatient Medications:     ferrous sulfate (IRON 325) 325 (65 Fe) MG tablet, Take 325 mg by mouth daily (with breakfast), Disp: , Rfl: ipratropium-albuterol (DUONEB) 0.5-2.5 (3) MG/3ML SOLN nebulizer solution, Inhale 3 mLs into the lungs every 6 hours as needed for Shortness of Breath, Disp: 360 mL, Rfl:     acetaminophen (TYLENOL) 325 MG tablet, Take 650 mg by mouth every 4 hours as needed for Pain, Disp: , Rfl:     calcium-vitamin D (OSCAL-500) 500-200 MG-UNIT per tablet, Take 1 tablet by mouth 2 times daily, Disp: 30 tablet, Rfl: 3    apixaban (ELIQUIS) 5 MG TABS tablet, Take 1 tablet by mouth 2 times daily, Disp: 60 tablet, Rfl: 0    metoprolol tartrate (LOPRESSOR) 25 MG tablet, Take 1 tablet by mouth 2 times daily, Disp: 60 tablet, Rfl: 0    Cholecalciferol (VITAMIN D) 2000 units TABS tablet, Take 2.5 tablets by mouth 2 times daily, Disp: 150 tablet, Rfl: 0    levothyroxine (SYNTHROID) 75 MCG tablet, Take 75 mcg by mouth Daily, Disp: , Rfl:     Current Facility-Administered Medications:     fluconazole (DIFLUCAN) tablet 200 mg, 200 mg, Oral, Daily, Wolfgang Cortez MD    clobetasol (TEMOVATE) 0.05 % cream, , Topical, BID, Wolfgang Cortez MD        PAST MEDICAL HISTORY      Diagnosis Date    Acquired hypothyroidism 8/14/2017    Arthritis     Blood transfusion reaction     Chronic kidney disease     History of blood transfusion     Hypertension     Lymphedema of both lower extremities 9/6/2017    Open wound of left great toe 10/18/2017    Other disorders of kidney and ureter in diseases classified elsewhere     Pelvic fracture (HCC)     Skin ulcer of left calf with fat layer exposed ([address] 9/6/2017    Skin ulcer of left calf, limited to breakdown of skin ([address] 9/6/2017    Ulcer of left lower leg ([address] 3/24/2014    Venous stasis ulcer of left calf limited to breakdown of skin ([address] 3/24/2014     Past Surgical History:   Procedure Laterality Date    COLONOSCOPY      ENDOSCOPY, COLON, DIAGNOSTIC      EYE SURGERY      cateract and lazor surgery 2015    FRACTURE SURGERY  2010    RT HIP    HIP FRACTURE SURGERY Left 08/08/2014    ORIF left hip TONSILLECTOMY      as child    TOTAL HIP ARTHROPLASTY Left 10/17/2014    revision with removal of hardware     Family History   Problem Relation Age of Onset    Mult Sclerosis Father      Social History     Tobacco Use    Smoking status: Never    Smokeless tobacco: Never   Vaping Use    Vaping Use: Never used   Substance Use Topics    Alcohol use: No    Drug use: No     Allergies   Allergen Reactions    Aspirin Itching    Other Nausea And Vomiting     FLU SHOT    Tape [Adhesive Tape] Swelling          REVIEW OF SYSTEMS See HPI    Objective:    BP (!) 152/54   Pulse 63   Temp 96.8 °F (36 °C) (Temporal)   Resp 18   Ht 5' 2\" (1.575 m)   Wt 180 lb (81.6 kg)   BMI 32.92 kg/m²   Wt Readings from Last 3 Encounters:   11/10/22 180 lb (81.6 kg)   11/04/22 180 lb (81.6 kg)   10/21/22 180 lb (81.6 kg)     PHYSICAL EXAM  CONSTITUTIONAL:   Awake, alert, cooperative  IN WC  HEENT: AT/NC  NECK:  supple, symmetrical  HEART: S1/S2  RS: CTAB  ABD: SOFT NT/ND  EXT:   EDEMA NOT WARM LEFT LE SEEPING   SKIN:  Open wound Present            Wound Care Documentation:  Pressure Ulcer 09/06/17 Leg Left; Lower #2 (1-5-17 acquired) (Active)   Number of days: 1891       Wound 10/14/22 Ankle Left;Medial #1 (Active)   Wound Image   10/14/22 1331   Wound Etiology Venous 10/14/22 1331   Dressing Status New dressing applied 11/04/22 1532   Wound Cleansed Cleansed with saline 11/04/22 1532   Dressing/Treatment Alginate;ABD 11/04/22 1532   Offloading for Diabetic Foot Ulcers Offloading not required 10/28/22 1622   Wound Length (cm) 1 cm 11/10/22 1330   Wound Width (cm) 4 cm 11/10/22 1330   Wound Depth (cm) 0.2 cm 11/10/22 1330   Wound Surface Area (cm^2) 4 cm^2 11/10/22 1330   Change in Wound Size % (l*w) -419.48 11/10/22 1330   Wound Volume (cm^3) 0.8 cm^3 11/10/22 1330   Wound Healing % -419 11/10/22 1330   Post-Procedure Length (cm) 1 cm 10/21/22 1549   Post-Procedure Width (cm) 1.2 cm 10/21/22 1549   Post-Procedure Depth (cm) 0.4 cm 10/21/22 1549   Post-Procedure Surface Area (cm^2) 1.2 cm^2 10/21/22 1549   Post-Procedure Volume (cm^3) 0.48 cm^3 10/21/22 1549   Wound Assessment Fibrin;Pale granulation tissue 11/10/22 1330   Drainage Amount Moderate 11/10/22 1330   Drainage Description Yellow 11/10/22 1330   Odor None 11/10/22 1330   Patrizia-wound Assessment Maceration 11/10/22 1330   Number of days: 27       Wound 10/14/22 Ankle Left;Lateral #2 (Active)   Wound Image   10/14/22 1331   Wound Etiology Venous 10/14/22 1331   Wound Cleansed Cleansed with saline 11/04/22 1532   Dressing/Treatment Alginate;ABD 11/04/22 1532   Offloading for Diabetic Foot Ulcers Offloading not required 11/04/22 1532   Wound Length (cm) 0.5 cm 11/10/22 1330   Wound Width (cm) 1.5 cm 11/10/22 1330   Wound Depth (cm) 0.2 cm 11/10/22 1330   Wound Surface Area (cm^2) 0.75 cm^2 11/10/22 1330   Change in Wound Size % (l*w) -650 11/10/22 1330   Wound Volume (cm^3) 0.15 cm^3 11/10/22 1330   Wound Healing % -650 11/10/22 1330   Post-Procedure Length (cm) 0.5 cm 10/21/22 1549   Post-Procedure Width (cm) 0.6 cm 10/21/22 1549   Post-Procedure Depth (cm) 0.2 cm 10/21/22 1549   Post-Procedure Surface Area (cm^2) 0.3 cm^2 10/21/22 1549   Post-Procedure Volume (cm^3) 0.06 cm^3 10/21/22 1549   Wound Assessment Fibrin;Pale granulation tissue 11/10/22 1330   Drainage Amount Moderate 11/10/22 1330   Drainage Description Yellow 11/10/22 1330   Odor None 11/10/22 1330   Patrizia-wound Assessment Maceration 11/10/22 1330   Number of days: 27       Wound 10/14/22 Heel Left #3 (Active)   Wound Image   10/28/22 1437   Wound Etiology Deep tissue/Injury 10/14/22 1331   Dressing Status New dressing applied 10/28/22 1622   Wound Cleansed Cleansed with saline 10/28/22 1622   Dressing/Treatment ABD;Roll gauze 10/28/22 1622   Offloading for Diabetic Foot Ulcers Offloading not required 10/28/22 1622   Wound Length (cm) 0 cm 10/28/22 1437   Wound Width (cm) 0 cm 10/28/22 1437   Wound Depth (cm) 0 cm 10/28/22 1437 Wound Surface Area (cm^2) 0 cm^2 10/28/22 1437   Change in Wound Size % (l*w) 100 10/28/22 1437   Wound Volume (cm^3) 0 cm^3 10/28/22 1437   Wound Healing % 100 10/28/22 1437   Post-Procedure Length (cm) 0.6 cm 10/21/22 1549   Post-Procedure Width (cm) 0.8 cm 10/21/22 1549   Post-Procedure Depth (cm) 0.2 cm 10/21/22 1549   Post-Procedure Surface Area (cm^2) 0.48 cm^2 10/21/22 1549   Post-Procedure Volume (cm^3) 0.096 cm^3 10/21/22 1549   Wound Assessment Dry 10/21/22 1447   Drainage Amount None 10/21/22 1447   Odor None 10/21/22 1447   Patrizia-wound Assessment Dry/flaky 10/21/22 1447   Number of days: 27       Wound 10/14/22 Pretibial Left;Posterior #4 BLOCK (Active)   Wound Image   10/14/22 1331   Wound Etiology Venous 10/14/22 1331   Wound Cleansed Cleansed with saline 11/04/22 1532   Dressing/Treatment Alginate;ABD 11/04/22 1532   Offloading for Diabetic Foot Ulcers Offloading not required 11/04/22 1532   Wound Length (cm) 1 cm 11/04/22 1440   Wound Width (cm) 2 cm 11/04/22 1440   Wound Depth (cm) 0.1 cm 11/04/22 1440   Wound Surface Area (cm^2) 2 cm^2 11/04/22 1440   Change in Wound Size % (l*w) 96.19 11/04/22 1440   Wound Volume (cm^3) 0.2 cm^3 11/04/22 1440   Wound Healing % 98 11/04/22 1440   Post-Procedure Length (cm) 1 cm 10/21/22 1549   Post-Procedure Width (cm) 5 cm 10/21/22 1549   Post-Procedure Depth (cm) 0.2 cm 10/21/22 1549   Post-Procedure Surface Area (cm^2) 5 cm^2 10/21/22 1549   Post-Procedure Volume (cm^3) 1 cm^3 10/21/22 1549   Wound Assessment Fibrin;Pale granulation tissue 11/04/22 1440   Drainage Amount Moderate 11/04/22 1440   Drainage Description Yellow 11/04/22 1440   Odor None 11/04/22 1440   Patrizia-wound Assessment Maceration 11/04/22 1440   Number of days: 27       CBC:   Lab Results   Component Value Date/Time    WBC 4.7 10/06/2022 05:20 AM    RBC 2.64 10/06/2022 05:20 AM    RBC  10/21/2014 02:20 PM      RBC           [id number]    released     10/25/14  00:54  SCC    RBC 10/21/2014 02:20 PM      RBC           [id number]    transfused   10/21/14  19:59  SCC    HGB 7.3 10/06/2022 05:20 AM    HCT 23.8 10/06/2022 05:20 AM    MCV 90.2 10/06/2022 05:20 AM    MCH 27.7 10/06/2022 05:20 AM    MCHC 30.7 10/06/2022 05:20 AM    RDW 13.4 10/06/2022 05:20 AM     10/06/2022 05:20 AM    MPV 10.0 10/06/2022 05:20 AM    MPV 10.2 10/04/2022 04:55 AM    MPV 10.8 09/27/2022 04:29 AM     CMP:    Lab Results   Component Value Date/Time     10/04/2022 04:55 AM    K 4.8 10/04/2022 04:55 AM    K 4.1 09/21/2022 04:55 AM     10/04/2022 04:55 AM    CO2 23 10/04/2022 04:55 AM    BUN 32 10/04/2022 04:55 AM    CREATININE 0.8 10/04/2022 04:55 AM    GFRAA >60 10/04/2022 04:55 AM    LABGLOM >60 10/04/2022 04:55 AM    GLUCOSE 89 10/04/2022 04:55 AM    GLUCOSE 127 07/12/2011 05:45 AM    PROT 5.5 10/04/2022 04:55 AM    CALCIUM 9.3 10/04/2022 04:55 AM    BILITOT <0.2 10/04/2022 04:55 AM    ALKPHOS 79 10/04/2022 04:55 AM    AST 13 10/04/2022 04:55 AM    ALT 11 10/04/2022 04:55 AM     ESR:   Lab Results   Component Value Date/Time    SEDRATE 25 08/15/2017 10:35 AM    SEDRATE 45 08/13/2017 09:39 PM    SEDRATE 56 08/13/2014 02:10 PM     CRP:  Lab Results   Component Value Date/Time    CRP 11.6 08/13/2017 09:39 PM         U/A:    Lab Results   Component Value Date/Time    COLORU Yellow 09/20/2022 10:51 PM    PHUR 6.5 09/20/2022 10:51 PM    LABCAST MODERATE 08/08/2014 12:00 PM    WBCUA 0-1 09/20/2022 10:51 PM    RBCUA 2-5 09/20/2022 10:51 PM    BACTERIA MANY 09/20/2022 10:51 PM    CLARITYU Clear 09/20/2022 10:51 PM    SPECGRAV 1.010 09/20/2022 10:51 PM    LEUKOCYTESUR SMALL 09/20/2022 10:51 PM    UROBILINOGEN 1.0 09/20/2022 10:51 PM    BILIRUBINUR Negative 09/20/2022 10:51 PM    BLOODU LARGE 09/20/2022 10:51 PM    GLUCOSEU Negative 09/20/2022 10:51 PM    AMORPHOUS RARE 08/13/2014 01:30 PM          Assessment:       Problem List Items Addressed This Visit       Heel ulcer, left, with unspecified severity ([address]    Non-pressure ulcer of lower extremity, left, with fat layer exposed ([address] - Primary    Relevant Orders    Initiate Outpatient Wound Care Protocol    Culture, Wound Aerobic Only       PERIPHERAL EDEMA  VENOUS STASIS/LEFT ULCERS  EVAL FOR PAD      Orders Placed This Encounter   Procedures    Initiate Outpatient Wound Care Protocol    Culture, Wound Aerobic Only     Orders Placed This Encounter   Medications    fluconazole (DIFLUCAN) tablet 200 mg     Order Specific Question:   Antimicrobial Indications     Answer:   Skin and Soft Tissue Infection     Order Specific Question:   Skin duration of therapy     Answer:    Other     Order Specific Question:   Other Skin and Soft Tissue Infection Duration     Answer:   14    clobetasol (TEMOVATE) 0.05 % cream   F/U 3 WEEKS    Orders Placed This Encounter    Initiate Outpatient Wound Care Protocol     Cleanse wound with saline    If wound contains bioburden or contamination cleanse with wound cleanser or antimicrobial solution     For normal periwound tissue without irritation nor maceration, apply topical skin protectant    For periwound tissue with irritation and/or maceration, apply zinc based product, topical steroid cream/ointment, or equivalent     For wounds with dry firm black eschar and/or without exudate, apply betadine and leave open to air      For wounds with scant/small to no exudate or drainage, apply wound gel, hydrocolloid, polymer, or equivalent and cover with secondary dressing/foam      For wounds with moderate/large exudate or drainage, apply alginate, hydrofiber, polymer, or equivalent and cover with secondary dressing/foam    For wounds with nonviable tissue requiring removal, apply chemical or mechanical debrider and cover with secondary dressing/foam    For wounds with tunneling, dead space, or cavity, fill or pack with strip/gauze/kerlex to fit and cover with secondary dressing/foam    For wounds with adequate granulation or epithelization, apply wound gel, hydrocolloid, polymer, collagen, or transparent film, and cover secondary dry dressing/foam    For wounds that need additional secondary dressing to help pad or control additional drainage/exudates, add foam, absorbent pad or hydrocolloid    For wounds with suspected or known infection, apply antimicrobial mesh and/or antimicrobial alginate/hydrofiber, or antimicrobial solution moistened gauze/kerlex, or equivalent and cover with secondary dressing/foam    Compression Management needed for edema control, apply multilayer compression or tubular garment or equivalent    Offloading Management needed for pressure relief, apply offloading shoe/boot or equivalent     Standing Status:   Standing     Number of Occurrences:   1    Culture, Wound Aerobic Only     LEFT     Standing Status:   Future     Standing Expiration Date:   11/10/2023     Order Specific Question:   Description:     Answer:   Aerobic Only    fluconazole (DIFLUCAN) tablet 200 mg     Order Specific Question:   Antimicrobial Indications     Answer:   Skin and Soft Tissue Infection     Order Specific Question:   Skin duration of therapy     Answer: Other     Order Specific Question:   Other Skin and Soft Tissue Infection Duration     Answer:   14    clobetasol (TEMOVATE) 0.05 % cream       Plan:   Treatment Note please see attached Discharge Instructions    Written patient dismissal instructions given to patient and signed by patient or POA. Discharge Instructions               Visit Discharge/Physician Orders     Discharge condition: Stable     Assessment of pain at discharge:MINIMAL     Anesthetic used: 4% LIDOCAINE     Discharge to: Home     Left via:Private automobile     Accompanied by: accompanied by child     [facility]F/HHA: [facility]     Dressing Orders:LEFT LEG ULCERS CLEANSE WITH NORMAL SALINE APPLY gentamicin cream ALGINATE ABD KERLIX AND SPANDIGRIP CHANGE DAILY AND PRN. LAMISIL TO DRY FLAKY SKIN.       Treatment Orders:  EAT FOODS HIGH IN PROTEIN AND VITAMIN C.      MULTIVITAMIN DAILY     Arterial studies ordered     Oral antifungal  and steroid ointment sent to [address] followup visit ________1 WEEK__Nguyen Teixeira_____3 weeks Dr. Baez____________  (Please note your next appointment above and if you are unable to keep, kindly give a 24 hour notice. Thank you.)     Physician signature:__________________________        If you experience any of the following, please call the [facility] during business hours:     * Increase in Pain  * Temperature over 101  * Increase in drainage from your wound  * Drainage with a foul odor  * Bleeding  * Increase in swelling  * Need for compression bandage changes due to slippage, breakthrough drainage. If you need medical attention outside of the business hours of the [facility] please contact your PCP or go to the nearest emergency room.                                                                            Electronically signed by Cheng Quach MD on 11/10/2022 at 2:08 PM

## 2022-11-10 NOTE — DISCHARGE INSTRUCTIONS
Visit Discharge/Physician Orders     Discharge condition: Stable     Assessment of pain at discharge:MINIMAL     Anesthetic used: 4% LIDOCAINE     Discharge to: Home     Left via:Private automobile     Accompanied by: accompanied by child     ECF/HHA: [facility]     Dressing Orders:LEFT LEG ULCERS CLEANSE WITH NORMAL SALINE APPLY gentamicin cream ALGINATE ABD KERLIX AND SPANDIGRIP CHANGE DAILY AND PRN. LAMISIL TO DRY FLAKY SKIN. Treatment Orders:  EAT FOODS HIGH IN PROTEIN AND VITAMIN C.      MULTIVITAMIN DAILY     Arterial studies ordered     Oral antifungal  and steroid ointment sent to [facility] followup visit ________1 WEEK__Nguyen Teixeira_____3 weeks Dr. Baez____________  (Please note your next appointment above and if you are unable to keep, kindly give a 24 hour notice. Thank you.)     Physician signature:__________________________        If you experience any of the following, please call the [facility] during business hours:     * Increase in Pain  * Temperature over 101  * Increase in drainage from your wound  * Drainage with a foul odor  * Bleeding  * Increase in swelling  * Need for compression bandage changes due to slippage, breakthrough drainage. If you need medical attention outside of the business hours of the [facility] please contact your PCP or go to the nearest emergency room.

## 2022-11-11 ENCOUNTER — OFFICE VISIT (OUTPATIENT)
Dept: CARDIOLOGY CLINIC | Age: 82
End: 2022-11-11
Payer: MEDICARE

## 2022-11-11 VITALS
OXYGEN SATURATION: 99 % | HEART RATE: 60 BPM | BODY MASS INDEX: 29.52 KG/M2 | WEIGHT: 160.4 LBS | DIASTOLIC BLOOD PRESSURE: 60 MMHG | RESPIRATION RATE: 16 BRPM | SYSTOLIC BLOOD PRESSURE: 122 MMHG | HEIGHT: 62 IN

## 2022-11-11 DIAGNOSIS — I48.0 PAF (PAROXYSMAL ATRIAL FIBRILLATION) (HCC): Primary | ICD-10-CM

## 2022-11-11 PROCEDURE — 99213 OFFICE O/P EST LOW 20 MIN: CPT | Performed by: NURSE PRACTITIONER

## 2022-11-11 PROCEDURE — 93000 ELECTROCARDIOGRAM COMPLETE: CPT | Performed by: INTERNAL MEDICINE

## 2022-11-11 PROCEDURE — 1123F ACP DISCUSS/DSCN MKR DOCD: CPT | Performed by: NURSE PRACTITIONER

## 2022-11-11 PROCEDURE — 3078F DIAST BP <80 MM HG: CPT | Performed by: NURSE PRACTITIONER

## 2022-11-11 PROCEDURE — 3074F SYST BP LT 130 MM HG: CPT | Performed by: NURSE PRACTITIONER

## 2022-11-11 RX ORDER — CLOBETASOL PROPIONATE 0.5 MG/G
CREAM TOPICAL
Qty: 45 G | Refills: 0 | Status: SHIPPED | OUTPATIENT
Start: 2022-11-11

## 2022-11-11 RX ORDER — ASCORBIC ACID 500 MG
500 TABLET ORAL DAILY
COMMUNITY

## 2022-11-11 RX ORDER — BUMETANIDE 1 MG/1
1 TABLET ORAL DAILY
Qty: 30 TABLET | Refills: 3 | Status: SHIPPED | OUTPATIENT
Start: 2022-11-11

## 2022-11-11 RX ORDER — FLUCONAZOLE 100 MG/1
200 TABLET ORAL DAILY
Qty: 28 TABLET | Refills: 0 | Status: SHIPPED | OUTPATIENT
Start: 2022-11-11 | End: 2022-11-25

## 2022-11-11 NOTE — PATIENT INSTRUCTIONS
Continue current cardiac medications    2. Scheduled to get blood work at PCP next week    3. Follow up with Dr. Lizette Foreman in 1-2 months - sooner if needed    4. Weigh yourself daily    -Stay Hydrated, 64 oz     -Diet should sodium restricted to 2 grams    -Again watch your daily weight trends and if you gain water weight please follow below instructions.    -If you gain 3-5 pounds in 2-3 days OR notice that you are retaining fluid in anyway just like you did before then take an extra dose of your water pill (Bumetanide/Bumex) every day until you lose the weight or feel better.     -If you notice that you have taken more than 2 extra doses in 1 week then please call and let us know. -If at any time you feel that you are retaining fluid, your medications are not working, or you feel ill in anyway, then please call us for either same day appointment or the next day, and for instructions. Our goal is to keep you out of the emergency room and the hospital and we have ways to do it. You just need to call us in a timely manner.     -If you become sick for other reasons, and notice that you are not urinating as much, the urine is very dark, you have significant diarrhea or vomiting, then please DO NOT take your water pill and CALL US immediately.

## 2022-11-11 NOTE — PROGRESS NOTES
[facility]  Office Visit         Reason for Visit: Heart failure    Primary Cardiologist: Dr. Jacqueline Coto         History of Present Illness:     Ms. Roque Opitz is a 80year old female with a PMHx of chronic HFpEF, chronic lymphedema, PAF, HTN, CKD, chronic anemia, hypothyroidism, prior DVT, obesity. She presents today in hospital follow-up, since discharge from the hospital she has been compliant with all of her current cardiac medications. She reports good urinary response to oral bumetanide with clear yellow urine production. She is following routinely with the wound clinic given chronic ongoing left lower extremity wound. She does state bilateral lower extremity edema has improved since recent hospitalization. She denies any dizziness, lightheadedness, near-syncope, syncope, strokelike symptoms, orthopnea, PND, chest pain, pressure, heaviness, palpitations, abdominal bloating, early satiety.       Patient Active Problem List    Diagnosis Date Noted    History of vascular disease 10/27/2022     Priority: Medium    Bilateral lower extremity edema 10/21/2022     Priority: Medium    Non-pressure ulcer of lower extremity, left, with fat layer exposed ([address] 10/21/2022     Priority: Medium    Heel ulcer, left, with unspecified severity ([address] 10/21/2022     Priority: Medium    NSTEMI (non-ST elevated myocardial infarction) ([address] 09/21/2022     Priority: Medium    Hypoxia 09/21/2022     Priority: Medium    Leukocytosis 09/21/2022     Priority: Medium    Chronic anticoagulation 11/27/2017    Closed fracture of right iliac wing ([address] 09/27/2017    Essential hypertension 08/14/2017    Acquired hypothyroidism 08/14/2017    Sepsis ([address] 08/13/2017    History of DVT (deep vein thrombosis) 08/19/2013       Past Medical History:    PAF on Eliquis  Hypertension  Hypothyroidism  History of DVT  CKD  Lymphedema bilateral lower extremities  Blood transfusion reaction  Arthritis  Skin ulcer of left calf 2017  Pelvic fracture history  Venous stasis ulcer  Colonoscopy, endoscopy, eye surgery, right hip fracture surgery, ORIF left hip, tonsillectomy      Past Medical History:   Diagnosis Date    Acquired hypothyroidism 8/14/2017    Arthritis     Blood transfusion reaction     Chronic kidney disease     History of blood transfusion     Hypertension     Lymphedema of both lower extremities 9/6/2017    Open wound of left great toe 10/18/2017    Other disorders of kidney and ureter in diseases classified elsewhere     Pelvic fracture ([address]     Skin ulcer of left calf with fat layer exposed ([address] 9/6/2017    Skin ulcer of left calf, limited to breakdown of skin ([address] 9/6/2017    Ulcer of left lower leg ([address] 3/24/2014    Venous stasis ulcer of left calf limited to breakdown of skin ([address] 3/24/2014         Past Surgical History:   Procedure Laterality Date    COLONOSCOPY      ENDOSCOPY, COLON, DIAGNOSTIC      EYE SURGERY      cateract and lazor surgery 2015    FRACTURE SURGERY  2010    RT HIP    HIP FRACTURE SURGERY Left 08/08/2014    ORIF left hip    TONSILLECTOMY      as child    TOTAL HIP ARTHROPLASTY Left 10/17/2014    revision with removal of hardware         Allergies   Allergen Reactions    Aspirin Itching    Other Nausea And Vomiting     FLU SHOT    Tape [Adhesive Tape] Swelling         Outpatient Medications Marked as Taking for the 11/11/22 encounter (Office Visit) with TRAVIS Shaffer CNP   Medication Sig Dispense Refill    vitamin C (ASCORBIC ACID) 500 MG tablet Take 500 mg by mouth daily      ferrous sulfate (IRON 325) 325 (65 Fe) MG tablet Take 325 mg by mouth daily (with breakfast)      acetaminophen (TYLENOL) 325 MG tablet Take 650 mg by mouth every 4 hours as needed for Pain      calcium-vitamin D (OSCAL-500) 500-200 MG-UNIT per tablet Take 1 tablet by mouth 2 times daily (Patient taking differently: Take 1 tablet by mouth daily) 30 tablet 3    apixaban (ELIQUIS) 5 MG TABS tablet Take 1 tablet by mouth 2 times daily 60 tablet 0    metoprolol tartrate (LOPRESSOR) 25 MG tablet Take 1 tablet by mouth 2 times daily 60 tablet 0    Cholecalciferol (VITAMIN D) 2000 units TABS tablet Take 2.5 tablets by mouth 2 times daily (Patient taking differently: Take 4,000 Units by mouth daily) 150 tablet 0    levothyroxine (SYNTHROID) 75 MCG tablet Take 75 mcg by mouth Daily         Review of Systems:   Cardiac: As per HPI  General: No fever, chills, rigors  Pulmonary: As per HPI  HEENT: No visual disturbances, difficult swallowing  GI: No nausea, vomiting, abdominal pain  : No dysuria or hematuria  Endocrine: No thyroid disease or diabetes  Musculoskeletal: POWELL x 4, no focal motor deficits  Skin: Intact, no rashes  Neuro/Psych: No headache or seizures        Weights: Wt Readings from Last 3 Encounters:   11/11/22 160 lb 6.4 oz (72.8 kg)   11/10/22 180 lb (81.6 kg)   11/04/22 180 lb (81.6 kg)         Physical Examination:     /60 (Site: Right Upper Arm, Position: Sitting, Cuff Size: Medium Adult)   Pulse 60   Resp 16   Ht 5' 2\" (1.575 m)   Wt 160 lb 6.4 oz (72.8 kg)   SpO2 99%   BMI 29.34 kg/m²     CONSTITUTIONAL: Alert and oriented times 3, no acute distress and cooperative to examination with proper mood and affect. SKIN: Skin color, texture, turgor normal. No rashes or lesions. LYMPH: no cervical nodes, no inguinal nodes  HEENT: Head is normocephalic, atraumatic. EOMI, PERRLA. NECK: Supple, symmetrical, trachea midline, no adenopathy, thyroid symmetric, not enlarged and no tenderness, skin normal.  CHEST/LUNGS: chest symmetric with normal A/P diameter, normal respiratory rate and rhythm, lungs clear to auscultation without wheezes, rales or rhonchi. No accessory muscle use. Scars None   CARDIOVASCULAR: Heart sounds are normal.  Regular rate and rhythm without murmur, gallop or rub. Normal S1 and S2. . Carotid and femoral pulses 2+/4 and equal bilaterally. ABDOMEN: Obese. No and Laparoscopic scar(s) present.  Normal bowel sounds. No bruits. soft, nondistended, no masses or organomegaly. no evidence of hernia. Percussion: Normal without hepatosplenomegally. Tenderness: absent. RECTAL: deferred, not clinically indicated  NEUROLOGIC: There are no focalizing motor or sensory deficits. CN II-XII are grossly intact. [place] EXTREMITIES: no cyanosis, no clubbing. 1-2+ bilateral lower extremity edema. Bandage on LLE. All the following diagnostics were personally reviewed and interpreted by me. LAB DATA:     9/27/22 04:29 10/4/22 04:55   Sodium 141 145   Potassium 3.6 4.8   Chloride 101 112 (H)   CO2 27 23   BUN,BUNPL 34 (H) 32 (H)   Creatinine 1.0 0.8   Anion Gap 13 10   GFR Non- 53 >60   GFR African American >60 >60   Glucose, Random 89 89   CALCIUM, SERUM, 413275 8.8 9.3   Total Protein 5.6 (L) 5.5 (L)   Albumin 2.8 (L) 2.6 (L)   Alk Phos 56 79   ALT 9 11   AST 14 13   Bilirubin 0.4 <0.2   TSH 1.750    Vit D, 25-Hydroxy 36    WBC 7.7 5.0   RBC 2.96 (L) 2.62 (L)   Hemoglobin Quant 8.5 (L) 7.4 (L)   Hematocrit 25.9 (L) 23.8 (L)   MCV 87.5 90.8   MCH 28.7 28.2   MCHC 32.8 31.1 (L)   MPV 10.8 10.2   RDW 13.3 13.2   Platelet Count 478 530       IMAGING:    NM Stress (9/25/2022)  FINDINGS:   The overall quality of the study was fair. Left ventricular cavity size was noted to be normal.  Rotational analog analysis demonstrated significant gut uptake attenuation. The gated SPECT stress imaging in the short, vertical long, and horizontal long axis demonstrated normal homogeneous tracer distribution throughout the myocardium. The resting images are normal   Gated SPECT left ventricular ejection fraction was calculated to be 67%, with normal wall motion. Impression  The myocardial perfusion imaging was normal with significant inferior attenuation. Overall left ventricular systolic function was normal without regional wall motion abnormalities. Overall low risk study.        CARDIAC TESTING:    NM Stress (9/25/2022)  FINDINGS:   The overall quality of the study was fair. Left ventricular cavity size was noted to be normal.  Rotational analog analysis demonstrated significant gut uptake attenuation. The gated SPECT stress imaging in the short, vertical long, and horizontal long axis demonstrated normal homogeneous tracer  distribution throughout the myocardium. The resting images are normal   Gated SPECT left ventricular ejection fraction was calculated to be  67%, with normal wall motion. Impression: The myocardial perfusion imaging was normal with significant inferior attenuation. Overall left ventricular systolic function was normal without regional wall motion bnormalities. Overall low risk study. TTE (9/22/2022)   Summary:   Ejection fraction is visually estimated at 60 to 65%. Mild asymmetric septal hypertrophy. There is doppler evidence of stage I diastolic dysfunction. Normal right ventricular size and function. Agitated saline injected for shunt evaluation revealed no evidence of shunt. Mild mitral regurgitation is present. The aortic valve appears mildly sclerotic. Mild aortic stenosis. Mild tricuspid regurgitation. RVSP is 50 mmHg. Pulmonary hypertension is moderate . Physiologic and/or trace pulmonic regurgitation present. EKG  Sinus Rhythm       ASSESSMENT:  Chronic HFpEF  ACC stage C / NYHA class III  Chronic lymphedema   PAF - OAC with Eliquis   HTN  CKD  Chronic anemia  Hypothyroidism  Hx of DVT  Recent UTI - will hold off on SGLT2i        PLAN:  Continue current cardiac medications    2. Scheduled to get blood work at PCP next week    3. Follow up with Dr. Mark Goldsmith in 1-2 months - sooner if needed    4.  Weigh yourself daily    -Stay Hydrated, 64 oz     -Diet should sodium restricted to 2 grams    -Again watch your daily weight trends and if you gain water weight please follow below instructions.    -If you gain 3-5 pounds in 2-3 days OR notice that you are retaining fluid in anyway just like you did before then take an extra dose of your water pill (Bumetanide/Bumex) every day until you lose the weight or feel better.     -If you notice that you have taken more than 2 extra doses in 1 week then please call and let us know. -If at any time you feel that you are retaining fluid, your medications are not working, or you feel ill in anyway, then please call us for either same day appointment or the next day, and for instructions. Our goal is to keep you out of the emergency room and the hospital and we have ways to do it. You just need to call us in a timely manner.     -If you become sick for other reasons, and notice that you are not urinating as much, the urine is very dark, you have significant diarrhea or vomiting, then please DO NOT take your water pill and CALL US immediately.            Manuel Jeans APRN-CNP  [facility]

## 2022-11-13 LAB
GRAM STAIN RESULT: ABNORMAL
ORGANISM: ABNORMAL
WOUND/ABSCESS: ABNORMAL
WOUND/ABSCESS: ABNORMAL

## 2022-11-15 NOTE — DISCHARGE INSTRUCTIONS
Visit Discharge/Physician Orders     Discharge condition: Stable     Assessment of pain at discharge:MINIMAL     Anesthetic used: 4% LIDOCAINE     Discharge to: Home     Left via:Private automobile     Accompanied by: accompanied by child     ECF/HHA: [facility]     Dressing Orders:LEFT LEG ULCERS CLEANSE WITH NORMAL SALINE APPLY gentamicin cream ALGINATE ABD KERLIX AND SPANDIGRIP CHANGE DAILY AND PRN. Refill ordered for clobetasol     Treatment Orders:  EAT FOODS HIGH IN PROTEIN AND VITAMIN C.      MULTIVITAMIN DAILY      Arterial studies ordered      Oral antifungal  and steroid ointment sent to [address] followup visit ________3 WEEKs__Nguyen Teixeira_____12/1/22 at 2pm Dr. Baez____________  (Please note your next appointment above and if you are unable to keep, kindly give a 24 hour notice. Thank you.)     Physician signature:__________________________        If you experience any of the following, please call the [email or web address] during business hours:     * Increase in Pain  * Temperature over 101  * Increase in drainage from your wound  * Drainage with a foul odor  * Bleeding  * Increase in swelling  * Need for compression bandage changes due to slippage, breakthrough drainage. If you need medical attention outside of the business hours of the [email or web address] please contact your PCP or go to the nearest emergency room.

## 2022-11-16 ENCOUNTER — TELEPHONE (OUTPATIENT)
Dept: WOUND CARE | Age: 82
End: 2022-11-16

## 2022-11-16 RX ORDER — SULFAMETHOXAZOLE AND TRIMETHOPRIM 800; 160 MG/1; MG/1
1 TABLET ORAL DAILY
Qty: 14 TABLET | Refills: 0 | Status: SHIPPED | OUTPATIENT
Start: 2022-11-16 | End: 2022-11-30

## 2022-11-16 NOTE — TELEPHONE ENCOUNTER
Called patient to inform of wound culture results and antibiotic sent to pharmacy per Dr Hiram Kothari. Left voice message. [facility] from [facility] called back per patient request. [facility] was informed for patient to continue all other medications and wound care orders with the new antibiotic to start too.

## 2022-11-18 ENCOUNTER — HOSPITAL ENCOUNTER (OUTPATIENT)
Dept: WOUND CARE | Age: 82
Discharge: HOME | End: 2022-11-18
Payer: MEDICARE

## 2022-11-18 VITALS
SYSTOLIC BLOOD PRESSURE: 156 MMHG | WEIGHT: 160 LBS | RESPIRATION RATE: 16 BRPM | DIASTOLIC BLOOD PRESSURE: 49 MMHG | HEIGHT: 62 IN | TEMPERATURE: 97.1 F | BODY MASS INDEX: 29.44 KG/M2 | HEART RATE: 67 BPM

## 2022-11-18 DIAGNOSIS — L97.922 NON-PRESSURE ULCER OF LOWER EXTREMITY, LEFT, WITH FAT LAYER EXPOSED (HCC): Primary | ICD-10-CM

## 2022-11-18 PROCEDURE — 11042 DBRDMT SUBQ TIS 1ST 20SQCM/<: CPT

## 2022-11-18 PROCEDURE — 6370000000 HC RX 637 (ALT 250 FOR IP): Performed by: NURSE PRACTITIONER

## 2022-11-18 RX ORDER — BACITRACIN, NEOMYCIN, POLYMYXIN B 400; 3.5; 5 [USP'U]/G; MG/G; [USP'U]/G
OINTMENT TOPICAL ONCE
Status: CANCELLED | OUTPATIENT
Start: 2022-11-18 | End: 2022-11-18

## 2022-11-18 RX ORDER — LIDOCAINE 40 MG/G
CREAM TOPICAL ONCE
Status: CANCELLED | OUTPATIENT
Start: 2022-11-18 | End: 2022-11-18

## 2022-11-18 RX ORDER — CLOBETASOL PROPIONATE 0.5 MG/G
OINTMENT TOPICAL ONCE
Status: CANCELLED | OUTPATIENT
Start: 2022-11-18 | End: 2022-11-18

## 2022-11-18 RX ORDER — LIDOCAINE HYDROCHLORIDE 20 MG/ML
JELLY TOPICAL ONCE
Status: CANCELLED | OUTPATIENT
Start: 2022-11-18 | End: 2022-11-18

## 2022-11-18 RX ORDER — GENTAMICIN SULFATE 1 MG/G
OINTMENT TOPICAL ONCE
Status: CANCELLED | OUTPATIENT
Start: 2022-11-18 | End: 2022-11-18

## 2022-11-18 RX ORDER — LIDOCAINE 50 MG/G
OINTMENT TOPICAL ONCE
Status: CANCELLED | OUTPATIENT
Start: 2022-11-18 | End: 2022-11-18

## 2022-11-18 RX ORDER — BACITRACIN ZINC AND POLYMYXIN B SULFATE 500; 1000 [USP'U]/G; [USP'U]/G
OINTMENT TOPICAL ONCE
Status: CANCELLED | OUTPATIENT
Start: 2022-11-18 | End: 2022-11-18

## 2022-11-18 RX ORDER — GINSENG 100 MG
CAPSULE ORAL ONCE
Status: CANCELLED | OUTPATIENT
Start: 2022-11-18 | End: 2022-11-18

## 2022-11-18 RX ORDER — LIDOCAINE HYDROCHLORIDE 40 MG/ML
SOLUTION TOPICAL ONCE
Status: CANCELLED | OUTPATIENT
Start: 2022-11-18 | End: 2022-11-18

## 2022-11-18 RX ORDER — BETAMETHASONE DIPROPIONATE 0.05 %
OINTMENT (GRAM) TOPICAL ONCE
Status: CANCELLED | OUTPATIENT
Start: 2022-11-18 | End: 2022-11-18

## 2022-11-18 RX ORDER — LIDOCAINE HYDROCHLORIDE 40 MG/ML
SOLUTION TOPICAL ONCE
Status: COMPLETED | OUTPATIENT
Start: 2022-11-18 | End: 2022-11-18

## 2022-11-18 RX ADMIN — LIDOCAINE HYDROCHLORIDE 10 ML: 40 SOLUTION TOPICAL at 14:26

## 2022-12-01 ENCOUNTER — HOSPITAL ENCOUNTER (OUTPATIENT)
Dept: WOUND CARE | Age: 82
Discharge: HOME | End: 2022-12-01
Payer: MEDICARE

## 2022-12-01 VITALS
RESPIRATION RATE: 16 BRPM | SYSTOLIC BLOOD PRESSURE: 163 MMHG | DIASTOLIC BLOOD PRESSURE: 54 MMHG | TEMPERATURE: 97.6 F | HEART RATE: 59 BPM

## 2022-12-01 DIAGNOSIS — L97.922 NON-PRESSURE ULCER OF LOWER EXTREMITY, LEFT, WITH FAT LAYER EXPOSED (HCC): Primary | ICD-10-CM

## 2022-12-01 PROCEDURE — 99213 OFFICE O/P EST LOW 20 MIN: CPT

## 2022-12-01 RX ORDER — BACITRACIN, NEOMYCIN, POLYMYXIN B 400; 3.5; 5 [USP'U]/G; MG/G; [USP'U]/G
OINTMENT TOPICAL ONCE
OUTPATIENT
Start: 2022-12-01 | End: 2022-12-01

## 2022-12-01 RX ORDER — LIDOCAINE HYDROCHLORIDE 40 MG/ML
SOLUTION TOPICAL ONCE
OUTPATIENT
Start: 2022-12-01 | End: 2022-12-01

## 2022-12-01 RX ORDER — BETAMETHASONE DIPROPIONATE 0.05 %
OINTMENT (GRAM) TOPICAL ONCE
OUTPATIENT
Start: 2022-12-01 | End: 2022-12-01

## 2022-12-01 RX ORDER — BACITRACIN ZINC AND POLYMYXIN B SULFATE 500; 1000 [USP'U]/G; [USP'U]/G
OINTMENT TOPICAL ONCE
OUTPATIENT
Start: 2022-12-01 | End: 2022-12-01

## 2022-12-01 RX ORDER — GINSENG 100 MG
CAPSULE ORAL ONCE
OUTPATIENT
Start: 2022-12-01 | End: 2022-12-01

## 2022-12-01 RX ORDER — LIDOCAINE 40 MG/G
CREAM TOPICAL ONCE
OUTPATIENT
Start: 2022-12-01 | End: 2022-12-01

## 2022-12-01 RX ORDER — CLOBETASOL PROPIONATE 0.5 MG/G
OINTMENT TOPICAL ONCE
OUTPATIENT
Start: 2022-12-01 | End: 2022-12-01

## 2022-12-01 RX ORDER — LIDOCAINE HYDROCHLORIDE 20 MG/ML
JELLY TOPICAL ONCE
OUTPATIENT
Start: 2022-12-01 | End: 2022-12-01

## 2022-12-01 RX ORDER — GENTAMICIN SULFATE 1 MG/G
OINTMENT TOPICAL ONCE
OUTPATIENT
Start: 2022-12-01 | End: 2022-12-01

## 2022-12-01 RX ORDER — LIDOCAINE 50 MG/G
OINTMENT TOPICAL ONCE
OUTPATIENT
Start: 2022-12-01 | End: 2022-12-01

## 2022-12-01 NOTE — DISCHARGE INSTRUCTIONS
Visit Discharge/Physician Orders     Discharge condition: Stable     Assessment of pain at discharge:MINIMAL     Anesthetic used: 4% LIDOCAINE     Discharge to: Home     Left via:Private automobile     Accompanied by: accompanied by child     ECF/HHA: [facility]     Dressing Orders:LEFT LEG ULCERS CLEANSE WITH NORMAL SALINE APPLY gentamicin cream ALGINATE ABD KERLIX AND SPANDIGRIP CHANGE DAILY AND PRN. Refill ordered for clobetasol     Treatment Orders:  EAT FOODS HIGH IN PROTEIN AND VITAMIN C.      MULTIVITAMIN DAILY      Arterial studies ordered - received from Erin reviewed (in media)     [facility] followup visit ________1 WEEK__Nguyen Teixeira_____12/15/22 Dr. Baez____________  (Please note your next appointment above and if you are unable to keep, kindly give a 24 hour notice. Thank you.)     Physician signature:__________________________        If you experience any of the following, please call the [facility] during business hours:     * Increase in Pain  * Temperature over 101  * Increase in drainage from your wound  * Drainage with a foul odor  * Bleeding  * Increase in swelling  * Need for compression bandage changes due to slippage, breakthrough drainage. If you need medical attention outside of the business hours of the [facility] please contact your PCP or go to the nearest emergency room.

## 2022-12-01 NOTE — PROGRESS NOTES
[facility] Followup Visit Note    Referring Physician : Ivis Naqvi MD  Henna Martinez  MEDICAL RECORD NUMBER:  [id number]  AGE: 80 y.o. GENDER: female  : 1940  EPISODE DATE:  2022  Elements of this note, including Diagnosis,  Interval History, Past Medical/Surgical/Family/Social Histories, ROS, physical exam, and Assessment and Plan were copied and pasted from Previous. Updates have been made where noted and reflect current exam and medical decision making from the DOS of this encounter. Subjective:     Chief Complaint   Patient presents with    Wound Check     Left leg        HISTORY of PRESENT ILLNESS HPI   Henna Martinez is a 80 y.o. female who presents today in regards to follow up evaluation and treatment of wound/ulcer. That patient's past medical, family and social hx were reviewed and changes were made if present. History of Wound Context:    The patient has had a wounds of medial, lateral, posterior left leg and left heel which was first noted approximately three weeks ago. This has not been treated. On their initial visit to the wound healing center, 10/14/2022,  the patient has noted that the wound has not been improving.   The patient has had similar previous wounds in the past.    Bisi Cristal  22   HERE WITH SON  PT FEELS BETTER HAS NO ITCH USING GENT AND ALGINATE TO WOUND  USING STEROIDS AROUND WOUND   S/P BACTRIM AND DIFLUCAN     11/10/2022  HERE WITH DAUGHTER   HAS LEFT LE CHRONIC WOUND FOR >2 MONTHS  HAS PAIN   RECENT CX 10/14 CORYNEBACTERIUM MRSA  FAILED DOXYCYCLINE     10/14/22  Patient presents with left leg erythema, pruritis and dry scaly skin to left leg   Eschar present on left heel   Patient is not diabetic  Patient has a history of peripheral vascular disease - taking Eliquis  Vascular studies ordered   Wounds debrided   Aquacel and Spandagrip, Lamisil to surrounding inflamed tissue   10/14 RLE WOUND CX mrsa/CORYNEBACTERIUM/CANDIDA        Current Outpatient Medications:     vitamin C (ASCORBIC ACID) 500 MG tablet, Take 500 mg by mouth daily, Disp: , Rfl:     bumetanide (BUMEX) 1 MG tablet, Take 1 tablet by mouth daily, Disp: 30 tablet, Rfl: 3    clobetasol (TEMOVATE) 0.05 % cream, Apply topically 2 times daily. , Disp: 45 g, Rfl: 0    ferrous sulfate (IRON 325) 325 (65 Fe) MG tablet, Take 325 mg by mouth daily (with breakfast), Disp: , Rfl:     acetaminophen (TYLENOL) 325 MG tablet, Take 650 mg by mouth every 4 hours as needed for Pain, Disp: , Rfl:     calcium-vitamin D (OSCAL-500) 500-200 MG-UNIT per tablet, Take 1 tablet by mouth 2 times daily (Patient taking differently: Take 1 tablet by mouth daily), Disp: 30 tablet, Rfl: 3    apixaban (ELIQUIS) 5 MG TABS tablet, Take 1 tablet by mouth 2 times daily, Disp: 60 tablet, Rfl: 0    metoprolol tartrate (LOPRESSOR) 25 MG tablet, Take 1 tablet by mouth 2 times daily, Disp: 60 tablet, Rfl: 0    Cholecalciferol (VITAMIN D) 2000 units TABS tablet, Take 2.5 tablets by mouth 2 times daily (Patient taking differently: Take 4,000 Units by mouth daily), Disp: 150 tablet, Rfl: 0    levothyroxine (SYNTHROID) 75 MCG tablet, Take 75 mcg by mouth Daily, Disp: , Rfl:         PAST MEDICAL HISTORY      Diagnosis Date    Acquired hypothyroidism 8/14/2017    Arthritis     Blood transfusion reaction     Chronic kidney disease     History of blood transfusion     Hypertension     Lymphedema of both lower extremities 9/6/2017    Open wound of left great toe 10/18/2017    Other disorders of kidney and ureter in diseases classified elsewhere     Pelvic fracture (HCC)     Skin ulcer of left calf with fat layer exposed ([address] 9/6/2017    Skin ulcer of left calf, limited to breakdown of skin ([address] 9/6/2017    Ulcer of left lower leg ([address] 3/24/2014    Venous stasis ulcer of left calf limited to breakdown of skin ([address] 3/24/2014     Past Surgical History:   Procedure Laterality Date    COLONOSCOPY      ENDOSCOPY, COLON, DIAGNOSTIC      EYE SURGERY      cateract and lazor surgery 2015    FRACTURE SURGERY  2010    RT HIP    HIP FRACTURE SURGERY Left 08/08/2014    ORIF left hip    TONSILLECTOMY      as child    TOTAL HIP ARTHROPLASTY Left 10/17/2014    revision with removal of hardware     Family History   Problem Relation Age of Onset    Mult Sclerosis Father      Social History     Tobacco Use    Smoking status: Never    Smokeless tobacco: Never   Vaping Use    Vaping Use: Never used   Substance Use Topics    Alcohol use: Not Currently    Drug use: Never     Allergies   Allergen Reactions    Aspirin Itching    Other Nausea And Vomiting     FLU SHOT    Tape [Adhesive Tape] Swelling          REVIEW OF SYSTEMS See HPI    Objective:    BP (!) 163/54   Pulse 59   Temp 97.6 °F (36.4 °C) (Temporal)   Resp 16   Wt Readings from Last 3 Encounters:   11/18/22 160 lb (72.6 kg)   11/11/22 160 lb 6.4 oz (72.8 kg)   11/10/22 180 lb (81.6 kg)     PHYSICAL EXAM  CONSTITUTIONAL:   Awake, alert, cooperative  IN WC  HEENT: AT/NC  NECK:  supple, symmetrical  HEART: S1/S2  RS: CTAB  ABD: SOFT NT/ND  EXT:   EDEMA NOT WARM LEFT LE SEEPING   SKIN:  Open wound Present  12/1/2022        11/10/2022            Wound Care Documentation:  Pressure Ulcer 09/06/17 Leg Left; Lower #2 (1-5-17 acquired) (Active)   Number of days: 1891       Wound 10/14/22 Ankle Left;Medial #1 (Active)   Wound Image   10/14/22 1331   Wound Etiology Venous 10/14/22 1331   Dressing Status New dressing applied 11/04/22 1532   Wound Cleansed Cleansed with saline 11/04/22 1532   Dressing/Treatment Alginate;ABD 11/04/22 1532   Offloading for Diabetic Foot Ulcers Offloading not required 10/28/22 1622   Wound Length (cm) 1 cm 11/10/22 1330   Wound Width (cm) 4 cm 11/10/22 1330   Wound Depth (cm) 0.2 cm 11/10/22 1330   Wound Surface Area (cm^2) 4 cm^2 11/10/22 1330   Change in Wound Size % (l*w) -419.48 11/10/22 1330   Wound Volume (cm^3) 0.8 cm^3 11/10/22 1330   Wound Healing % -419 11/10/22 1330 Post-Procedure Length (cm) 1 cm 10/21/22 1549   Post-Procedure Width (cm) 1.2 cm 10/21/22 1549   Post-Procedure Depth (cm) 0.4 cm 10/21/22 1549   Post-Procedure Surface Area (cm^2) 1.2 cm^2 10/21/22 1549   Post-Procedure Volume (cm^3) 0.48 cm^3 10/21/22 1549   Wound Assessment Fibrin;Pale granulation tissue 11/10/22 1330   Drainage Amount Moderate 11/10/22 1330   Drainage Description Yellow 11/10/22 1330   Odor None 11/10/22 1330   Patrizia-wound Assessment Maceration 11/10/22 1330   Number of days: 27       Wound 10/14/22 Ankle Left;Lateral #2 (Active)   Wound Image   10/14/22 1331   Wound Etiology Venous 10/14/22 1331   Wound Cleansed Cleansed with saline 11/04/22 1532   Dressing/Treatment Alginate;ABD 11/04/22 1532   Offloading for Diabetic Foot Ulcers Offloading not required 11/04/22 1532   Wound Length (cm) 0.5 cm 11/10/22 1330   Wound Width (cm) 1.5 cm 11/10/22 1330   Wound Depth (cm) 0.2 cm 11/10/22 1330   Wound Surface Area (cm^2) 0.75 cm^2 11/10/22 1330   Change in Wound Size % (l*w) -650 11/10/22 1330   Wound Volume (cm^3) 0.15 cm^3 11/10/22 1330   Wound Healing % -650 11/10/22 1330   Post-Procedure Length (cm) 0.5 cm 10/21/22 1549   Post-Procedure Width (cm) 0.6 cm 10/21/22 1549   Post-Procedure Depth (cm) 0.2 cm 10/21/22 1549   Post-Procedure Surface Area (cm^2) 0.3 cm^2 10/21/22 1549   Post-Procedure Volume (cm^3) 0.06 cm^3 10/21/22 1549   Wound Assessment Fibrin;Pale granulation tissue 11/10/22 1330   Drainage Amount Moderate 11/10/22 1330   Drainage Description Yellow 11/10/22 1330   Odor None 11/10/22 1330   Patrizia-wound Assessment Maceration 11/10/22 1330   Number of days: 27       Wound 10/14/22 Heel Left #3 (Active)   Wound Image   10/28/22 1437   Wound Etiology Deep tissue/Injury 10/14/22 1331   Dressing Status New dressing applied 10/28/22 1622   Wound Cleansed Cleansed with saline 10/28/22 1622   Dressing/Treatment ABD;Roll gauze 10/28/22 1622   Offloading for Diabetic Foot Ulcers Offloading not required 10/28/22 1622   Wound Length (cm) 0 cm 10/28/22 1437   Wound Width (cm) 0 cm 10/28/22 1437   Wound Depth (cm) 0 cm 10/28/22 1437   Wound Surface Area (cm^2) 0 cm^2 10/28/22 1437   Change in Wound Size % (l*w) 100 10/28/22 1437   Wound Volume (cm^3) 0 cm^3 10/28/22 1437   Wound Healing % 100 10/28/22 1437   Post-Procedure Length (cm) 0.6 cm 10/21/22 1549   Post-Procedure Width (cm) 0.8 cm 10/21/22 1549   Post-Procedure Depth (cm) 0.2 cm 10/21/22 1549   Post-Procedure Surface Area (cm^2) 0.48 cm^2 10/21/22 1549   Post-Procedure Volume (cm^3) 0.096 cm^3 10/21/22 1549   Wound Assessment Dry 10/21/22 1447   Drainage Amount None 10/21/22 1447   Odor None 10/21/22 1447   Patrizia-wound Assessment Dry/flaky 10/21/22 1447   Number of days: 27       Wound 10/14/22 Pretibial Left;Posterior #4 BLOCK (Active)   Wound Image   10/14/22 1331   Wound Etiology Venous 10/14/22 1331   Wound Cleansed Cleansed with saline 11/04/22 1532   Dressing/Treatment Alginate;ABD 11/04/22 1532   Offloading for Diabetic Foot Ulcers Offloading not required 11/04/22 1532   Wound Length (cm) 1 cm 11/04/22 1440   Wound Width (cm) 2 cm 11/04/22 1440   Wound Depth (cm) 0.1 cm 11/04/22 1440   Wound Surface Area (cm^2) 2 cm^2 11/04/22 1440   Change in Wound Size % (l*w) 96.19 11/04/22 1440   Wound Volume (cm^3) 0.2 cm^3 11/04/22 1440   Wound Healing % 98 11/04/22 1440   Post-Procedure Length (cm) 1 cm 10/21/22 1549   Post-Procedure Width (cm) 5 cm 10/21/22 1549   Post-Procedure Depth (cm) 0.2 cm 10/21/22 1549   Post-Procedure Surface Area (cm^2) 5 cm^2 10/21/22 1549   Post-Procedure Volume (cm^3) 1 cm^3 10/21/22 1549   Wound Assessment Fibrin;Pale granulation tissue 11/04/22 1440   Drainage Amount Moderate 11/04/22 1440   Drainage Description Yellow 11/04/22 1440   Odor None 11/04/22 1440   Patrizia-wound Assessment Maceration 11/04/22 1440   Number of days: 27       CBC:   Lab Results   Component Value Date/Time    WBC 4.7 10/06/2022 05:20 AM    RBC 2.64 10/06/2022 05:20 AM    RBC  10/21/2014 02:20 PM      RBC           [id number]    released     10/25/14  00:54  SCC    RBC  10/21/2014 02:20 PM      RBC           [id number]    transfused   10/21/14  19:59  SCC    HGB 7.3 10/06/2022 05:20 AM    HCT 23.8 10/06/2022 05:20 AM    MCV 90.2 10/06/2022 05:20 AM    MCH 27.7 10/06/2022 05:20 AM    MCHC 30.7 10/06/2022 05:20 AM    RDW 13.4 10/06/2022 05:20 AM     10/06/2022 05:20 AM    MPV 10.0 10/06/2022 05:20 AM    MPV 10.2 10/04/2022 04:55 AM    MPV 10.8 09/27/2022 04:29 AM     CMP:    Lab Results   Component Value Date/Time     10/04/2022 04:55 AM    K 4.8 10/04/2022 04:55 AM    K 4.1 09/21/2022 04:55 AM     10/04/2022 04:55 AM    CO2 23 10/04/2022 04:55 AM    BUN 32 10/04/2022 04:55 AM    CREATININE 0.8 10/04/2022 04:55 AM    GFRAA >60 10/04/2022 04:55 AM    LABGLOM >60 10/04/2022 04:55 AM    GLUCOSE 89 10/04/2022 04:55 AM    GLUCOSE 127 07/12/2011 05:45 AM    PROT 5.5 10/04/2022 04:55 AM    CALCIUM 9.3 10/04/2022 04:55 AM    BILITOT <0.2 10/04/2022 04:55 AM    ALKPHOS 79 10/04/2022 04:55 AM    AST 13 10/04/2022 04:55 AM    ALT 11 10/04/2022 04:55 AM     ESR:   Lab Results   Component Value Date/Time    SEDRATE 25 08/15/2017 10:35 AM    SEDRATE 45 08/13/2017 09:39 PM    SEDRATE 56 08/13/2014 02:10 PM     CRP:  Lab Results   Component Value Date/Time    CRP 11.6 08/13/2017 09:39 PM         U/A:    Lab Results   Component Value Date/Time    COLORU Yellow 09/20/2022 10:51 PM    PHUR 6.5 09/20/2022 10:51 PM    LABCAST MODERATE 08/08/2014 12:00 PM    WBCUA 0-1 09/20/2022 10:51 PM    RBCUA 2-5 09/20/2022 10:51 PM    BACTERIA MANY 09/20/2022 10:51 PM    CLARITYU Clear 09/20/2022 10:51 PM    SPECGRAV 1.010 09/20/2022 10:51 PM    LEUKOCYTESUR SMALL 09/20/2022 10:51 PM    UROBILINOGEN 1.0 09/20/2022 10:51 PM    BILIRUBINUR Negative 09/20/2022 10:51 PM    BLOODU LARGE 09/20/2022 10:51 PM    GLUCOSEU Negative 09/20/2022 10:51 PM    AMORPHOUS RARE 08/13/2014 01:30 PM          Assessment:       Problem List Items Addressed This Visit       Non-pressure ulcer of lower extremity, left, with fat layer exposed ([address] - Primary    Relevant Orders    Initiate Outpatient Wound Care Protocol       PERIPHERAL EDEMA  VENOUS STASIS/LEFT ULCERS  EVAL FOR PAD  LOOKS BETTER   CONT WOUND CARE  Sign and symptoms of infection discussed with patent. To notify office if pt does not tolerate or develop side effects from antibiotics. Patient to go to ER or call office if symptoms worsened. Orders Placed This Encounter   Procedures    Initiate Outpatient Wound Care Protocol     No orders of the defined types were placed in this encounter.     F/U PRN     Orders Placed This Encounter    Initiate Outpatient Wound Care Protocol     Cleanse wound with saline    If wound contains bioburden or contamination cleanse with wound cleanser or antimicrobial solution     For normal periwound tissue without irritation nor maceration, apply topical skin protectant    For periwound tissue with irritation and/or maceration, apply zinc based product, topical steroid cream/ointment, or equivalent     For wounds with dry firm black eschar and/or without exudate, apply betadine and leave open to air      For wounds with scant/small to no exudate or drainage, apply wound gel, hydrocolloid, polymer, or equivalent and cover with secondary dressing/foam      For wounds with moderate/large exudate or drainage, apply alginate, hydrofiber, polymer, or equivalent and cover with secondary dressing/foam    For wounds with nonviable tissue requiring removal, apply chemical or mechanical debrider and cover with secondary dressing/foam    For wounds with tunneling, dead space, or cavity, fill or pack with strip/gauze/kerlex to fit and cover with secondary dressing/foam    For wounds with adequate granulation or epithelization, apply wound gel, hydrocolloid, polymer, collagen, or transparent film, and cover secondary dry dressing/foam    For wounds that need additional secondary dressing to help pad or control additional drainage/exudates, add foam, absorbent pad or hydrocolloid    For wounds with suspected or known infection, apply antimicrobial mesh and/or antimicrobial alginate/hydrofiber, or antimicrobial solution moistened gauze/kerlex, or equivalent and cover with secondary dressing/foam    Compression Management needed for edema control, apply multilayer compression or tubular garment or equivalent    Offloading Management needed for pressure relief, apply offloading shoe/boot or equivalent     Standing Status:   Standing     Number of Occurrences:   1       Plan:   Treatment Note please see attached Discharge Instructions    Written patient dismissal instructions given to patient and signed by patient or POA. Discharge Instructions         Visit Discharge/Physician Orders     Discharge condition: Stable     Assessment of pain at discharge:MINIMAL     Anesthetic used: 4% LIDOCAINE     Discharge to: Home     Left via:Private automobile     Accompanied by: accompanied by child     ECF/HHA: [facility]     Dressing Orders:LEFT LEG ULCERS CLEANSE WITH NORMAL SALINE APPLY gentamicin cream ALGINATE ABD KERLIX AND SPANDIGRIP CHANGE DAILY AND PRN. Refill ordered for clobetasol     Treatment Orders:  EAT FOODS HIGH IN PROTEIN AND VITAMIN C.      MULTIVITAMIN DAILY      Arterial studies ordered      Oral antifungal  and steroid ointment sent to [address] followup visit ________3 WEEKs__Nguyen Teixeira_____12/1/22 at 2pm Dr. Baez____________  (Please note your next appointment above and if you are unable to keep, kindly give a 24 hour notice.  Thank you.)     Physician signature:__________________________        If you experience any of the following, please call the [address] [place] [address] during business hours:     * Increase in Pain  * Temperature over 101  * Increase in drainage from your wound  * Drainage with a foul odor  * Bleeding  * Increase in swelling  * Need for compression bandage changes due to slippage, breakthrough drainage. If you need medical attention outside of the business hours of the [address] [place] [address] Road please contact your PCP or go to the nearest emergency room.                                                                                                                                  Electronically signed by Hernan Smyth MD on 12/1/2022 at 3:01 PM

## 2022-12-05 NOTE — DISCHARGE INSTRUCTIONS
Visit Discharge/Physician Orders     Discharge condition: Stable     Assessment of pain at discharge:MINIMAL     Anesthetic used: 4% LIDOCAINE     Discharge to: Home     Left via:Private automobile     Accompanied by: accompanied by child     ECF/HHA: [facility]     Dressing Orders:LEFT LEG ULCERS CLEANSE WITH NORMAL SALINE APPLY gentamicin cream ALGINATE  [facility] [address] or every other day. Refill ordered for clobetasol     Treatment Orders:  EAT FOODS HIGH IN PROTEIN AND VITAMIN C. May use aquafor for dry skin on legs. MULTIVITAMIN DAILY      Arterial studies ordered - received from Erin reviewed (in media)     [facility] followup visit ________1 WEEK__Nguyen Teixeira_________________  (Please note your next appointment above and if you are unable to keep, kindly give a 24 hour notice. Thank you.)     Physician signature:__________________________        If you experience any of the following, please call the [address] [place] [address] during business hours:     * Increase in Pain  * Temperature over 101  * Increase in drainage from your wound  * Drainage with a foul odor  * Bleeding  * Increase in swelling  * Need for compression bandage changes due to slippage, breakthrough drainage. If you need medical attention outside of the business hours of the [address] [place] [address] [address] please contact your PCP or go to the nearest emergency room.

## 2022-12-09 ENCOUNTER — HOSPITAL ENCOUNTER (OUTPATIENT)
Dept: WOUND CARE | Age: 82
Discharge: HOME | End: 2022-12-09
Payer: MEDICARE

## 2022-12-09 VITALS
HEART RATE: 60 BPM | TEMPERATURE: 97.6 F | RESPIRATION RATE: 16 BRPM | DIASTOLIC BLOOD PRESSURE: 70 MMHG | SYSTOLIC BLOOD PRESSURE: 120 MMHG

## 2022-12-09 DIAGNOSIS — L97.922 NON-PRESSURE ULCER OF LOWER EXTREMITY, LEFT, WITH FAT LAYER EXPOSED (HCC): Primary | ICD-10-CM

## 2022-12-09 PROCEDURE — 6370000000 HC RX 637 (ALT 250 FOR IP): Performed by: NURSE PRACTITIONER

## 2022-12-09 PROCEDURE — 11042 DBRDMT SUBQ TIS 1ST 20SQCM/<: CPT

## 2022-12-09 RX ORDER — LIDOCAINE HYDROCHLORIDE 20 MG/ML
JELLY TOPICAL ONCE
OUTPATIENT
Start: 2022-12-09 | End: 2022-12-09

## 2022-12-09 RX ORDER — LIDOCAINE HYDROCHLORIDE 40 MG/ML
SOLUTION TOPICAL ONCE
OUTPATIENT
Start: 2022-12-09 | End: 2022-12-09

## 2022-12-09 RX ORDER — CLOBETASOL PROPIONATE 0.5 MG/G
OINTMENT TOPICAL ONCE
OUTPATIENT
Start: 2022-12-09 | End: 2022-12-09

## 2022-12-09 RX ORDER — GINSENG 100 MG
CAPSULE ORAL ONCE
OUTPATIENT
Start: 2022-12-09 | End: 2022-12-09

## 2022-12-09 RX ORDER — BETAMETHASONE DIPROPIONATE 0.05 %
OINTMENT (GRAM) TOPICAL ONCE
OUTPATIENT
Start: 2022-12-09 | End: 2022-12-09

## 2022-12-09 RX ORDER — GENTAMICIN SULFATE 1 MG/G
OINTMENT TOPICAL ONCE
OUTPATIENT
Start: 2022-12-09 | End: 2022-12-09

## 2022-12-09 RX ORDER — BACITRACIN, NEOMYCIN, POLYMYXIN B 400; 3.5; 5 [USP'U]/G; MG/G; [USP'U]/G
OINTMENT TOPICAL ONCE
OUTPATIENT
Start: 2022-12-09 | End: 2022-12-09

## 2022-12-09 RX ORDER — LIDOCAINE 50 MG/G
OINTMENT TOPICAL ONCE
OUTPATIENT
Start: 2022-12-09 | End: 2022-12-09

## 2022-12-09 RX ORDER — BACITRACIN ZINC AND POLYMYXIN B SULFATE 500; 1000 [USP'U]/G; [USP'U]/G
OINTMENT TOPICAL ONCE
OUTPATIENT
Start: 2022-12-09 | End: 2022-12-09

## 2022-12-09 RX ORDER — LIDOCAINE 40 MG/G
CREAM TOPICAL ONCE
OUTPATIENT
Start: 2022-12-09 | End: 2022-12-09

## 2022-12-09 RX ORDER — LIDOCAINE HYDROCHLORIDE 40 MG/ML
SOLUTION TOPICAL ONCE
Status: COMPLETED | OUTPATIENT
Start: 2022-12-09 | End: 2022-12-09

## 2022-12-09 RX ADMIN — LIDOCAINE HYDROCHLORIDE 6 ML: 40 SOLUTION TOPICAL at 14:29

## 2022-12-09 NOTE — DISCHARGE INSTR - COC
Continuity of Care Form    Patient Name: Alecia Gamble   :  1940  MRN:  [id number]    Admit date:  2022  Discharge date:  ***    Code Status Order: Prior   Advance Directives:     Admitting Physician:  No admitting provider for patient encounter. PCP: Yas Peter MD    Discharging Nurse: [facility] Unit/Room#: No information available for this encounter.   Discharging Unit Phone Number: ***    Emergency Contact:   Extended Emergency Contact Information  Primary Emergency Contact: Mendy Armando  Home Phone: [phone]  Relation: Brother/Sister  Secondary Emergency Contact: [facility]  Home Phone: [phone]  Relation: Child    Past Surgical History:  Past Surgical History:   Procedure Laterality Date    COLONOSCOPY      ENDOSCOPY, COLON, DIAGNOSTIC      EYE SURGERY      cateract and lazor surgery 2015    FRACTURE SURGERY  2010    RT HIP    HIP FRACTURE SURGERY Left 2014    ORIF left hip    TONSILLECTOMY      as child    TOTAL HIP ARTHROPLASTY Left 10/17/2014    revision with removal of hardware       Immunization History:   Immunization History   Administered Date(s) Administered    COVID-19, PFIZER PURPLE top, DILUTE for use, (age 15 y+), 30mcg/0.3mL 2021, 2021, 2021       Active Problems:  Patient Active Problem List   Diagnosis Code    History of DVT (deep vein thrombosis) Z86.718    Sepsis ([address] A41.9    Essential hypertension I10    Acquired hypothyroidism E03.9    Closed fracture of right iliac wing (HCC) S32.301A    Chronic anticoagulation Z79.01    NSTEMI (non-ST elevated myocardial infarction) ([address] I21.4    Hypoxia R09.02    Leukocytosis D72.829    Bilateral lower extremity edema R60.0    Non-pressure ulcer of lower extremity, left, with fat layer exposed ([address] L97.922    Heel ulcer, left, with unspecified severity ([address] L97.429    History of vascular disease Z86.79       Isolation/Infection:   Isolation            No Isolation          Patient Infection Status       Infection Onset Added Last Indicated Last Indicated By Review Planned Expiration Resolved Resolved By    MRSA 10/14/22 10/18/22 11/10/22 Culture, Wound        Left lower leg ulcer 11/10/22  Right leg ulcer, 10/14/2022            Nurse Assessment:  Last Vital Signs: /70   Pulse 60   Temp 97.6 °F (36.4 °C) (Temporal)   Resp 16     Last documented pain score (0-10 scale):    Last Weight:   Wt Readings from Last 1 Encounters:   22 160 lb (72.6 kg)     Mental Status:  {IP PT MENTAL STATUS:}    IV Access:  { AIDEN IV ACCESS:309972852}    Nursing Mobility/ADLs:  Walking   {P DME STW}  Transfer  {P DME JACOBY:550216120}  Bathing  {P DME BCKK:872845339}  Dressing  {P DME LJXW:169234474}  Toileting  {P DME IKQD:158454987}  Feeding  {[facility] DME PIWM:645759512}  Med Admin  {P DME WJEU:619810587}  Med Delivery   { AIDEN MED Delivery:751049070}    Wound Care Documentation and Therapy:  Pressure Ulcer 17 Leg Left; Lower #2 (1-5-17 acquired) (Active)   Number of days: 1920       Wound 10/14/22 Ankle Left;Medial #1 (Active)   Wound Image   22 1420   Wound Etiology Venous 10/14/22 1331   Dressing Status New dressing applied 22 1510   Wound Cleansed Cleansed with saline 22 1510   Dressing/Treatment Alginate;ABD;Hydrating gel;Roll gauze 22 1510   Offloading for Diabetic Foot Ulcers Offloading not required 22 1520   Wound Length (cm) 1.5 cm 22 1418   Wound Width (cm) 3.5 cm 22 1418   Wound Depth (cm) 0.2 cm 22 1418   Wound Surface Area (cm^2) 5.25 cm^2 22 1418   Change in Wound Size % (l*w) -581.82 22 1418   Wound Volume (cm^3) 1.05 cm^3 22 1418   Wound Healing % -582 22 1418   Post-Procedure Length (cm) 1 cm 22 1520   Post-Procedure Width (cm) 1.8 cm 22 1520   Post-Procedure Depth (cm) 0.3 cm 22 1520   Post-Procedure Surface Area (cm^2) 1.8 cm^2 22 1520   Post-Procedure Volume (cm^3) 0.54 cm^3 11/18/22 1520   Wound Assessment Fibrin;Pink/red 12/09/22 1418   Drainage Amount Small 12/09/22 1418   Drainage Description Yellow 12/09/22 1418   Odor None 12/09/22 1418   Patrizia-wound Assessment Fragile 12/09/22 1418   Number of days: 56       Wound 10/14/22 Ankle Left;Lateral #2 (Active)   Wound Image   11/18/22 1420   Wound Etiology Venous 10/14/22 1331   Wound Cleansed Vashe 11/18/22 1520   Dressing/Treatment Alginate;ABD 11/18/22 1520   Offloading for Diabetic Foot Ulcers Offloading not required 11/18/22 1520   Wound Length (cm) 1.4 cm 12/09/22 1418   Wound Width (cm) 0.6 cm 12/09/22 1418   Wound Depth (cm) 0.2 cm 12/09/22 1418   Wound Surface Area (cm^2) 0.84 cm^2 12/09/22 1418   Change in Wound Size % (l*w) -740 12/09/22 1418   Wound Volume (cm^3) 0.168 cm^3 12/09/22 1418   Wound Healing % -740 12/09/22 1418   Post-Procedure Length (cm) 1.7 cm 11/18/22 1520   Post-Procedure Width (cm) 1.4 cm 11/18/22 1520   Post-Procedure Depth (cm) 0.3 cm 11/18/22 1520   Post-Procedure Surface Area (cm^2) 2.38 cm^2 11/18/22 1520   Post-Procedure Volume (cm^3) 0.714 cm^3 11/18/22 1520   Wound Assessment Fibrin;Pink/red 12/09/22 1418   Drainage Amount Small 12/09/22 1418   Drainage Description Yellow 12/09/22 1418   Odor None 12/09/22 1418   Patrizia-wound Assessment Fragile 12/09/22 1418   Number of days: 56       Wound 10/14/22 Pretibial Left;Posterior #4 BLOCK (Active)   Wound Image   12/01/22 1423   Wound Etiology Venous 10/14/22 1331   Wound Cleansed Vashe 11/18/22 1520   Dressing/Treatment Alginate;ABD 11/18/22 1520   Offloading for Diabetic Foot Ulcers Offloading not required 11/18/22 1520   Wound Length (cm) 0 cm 12/01/22 1423   Wound Width (cm) 0 cm 12/01/22 1423   Wound Depth (cm) 0 cm 12/01/22 1423   Wound Surface Area (cm^2) 0 cm^2 12/01/22 1423   Change in Wound Size % (l*w) 100 12/01/22 1423   Wound Volume (cm^3) 0 cm^3 12/01/22 1423   Wound Healing % 100 12/01/22 1423   Post-Procedure Length (cm) 1 cm 22 1520   Post-Procedure Width (cm) 4 cm 22 1520   Post-Procedure Depth (cm) 0.2 cm 22 1520   Post-Procedure Surface Area (cm^2) 4 cm^2 22 1520   Post-Procedure Volume (cm^3) 0.8 cm^3 22 1520   Wound Assessment Dry 22 1423   Drainage Amount None 22 1423   Drainage Description Yellow 22 1420   Odor None 22 1423   Patrizia-wound Assessment Intact 22 1423   Number of days: 56        Elimination:  Continence: Bowel: {YES / XH:51546}  Bladder: {YES / HB:19412}  Urinary Catheter: {Urinary Catheter:734014716}   Colostomy/Ileostomy/Ileal Conduit: {YES / YA:38714}       Date of Last BM: ***  No intake or output data in the 24 hours ending 22 1534  No intake/output data recorded.     Safety Concerns:     508 [address] Safety Concerns:780907250}    Impairments/Disabilities:      508 [address] Impairments/Disabilities:276942333}    Nutrition Therapy:  Current Nutrition Therapy:   508 IP[address] Diet List:326195472}    Routes of Feeding: {CHP DME Other Feedings:430668145}  Liquids: {Slp liquid thickness:26890}  Daily Fluid Restriction: {CHP DME Yes amt example:036679914}  Last Modified Barium Swallow with Video (Video Swallowing Test): {Done Not Done JDNL:176459735}    Treatments at the Time of Hospital Discharge:   Respiratory Treatments: ***  Oxygen Therapy:  {Therapy; copd oxygen:99288}  Ventilator:    {[facility] Vent HRFF:207842295}    Rehab Therapies: {THERAPEUTIC INTERVENTION:9464732506}  Weight Bearing Status/Restrictions: [address] Weight Bearin}  Other Medical Equipment (for information only, NOT a DME order):  {EQUIPMENT:737214130}  Other Treatments: ***    Patient's personal belongings (please select all that are sent with patient):  {[facility] DME Belongings:498639306}    RN SIGNATURE:  {Esignature:451753367}    CASE MANAGEMENT/SOCIAL WORK SECTION    Inpatient Status Date: ***    Readmission Risk Assessment Score:  Readmission Risk              Risk of Unplanned Readmission:  0           Discharging to Facility/ Agency   Name:   Address:  Phone:  Fax:    Dialysis Facility (if applicable)   Name:  Address:  Dialysis Schedule:  Phone:  Fax:    / signature: {Esignature:055594457}    PHYSICIAN SECTION    Prognosis: {Prognosis:5361256701}    Condition at Discharge: Mary Howard Patient Condition:327151119}    Rehab Potential (if transferring to Rehab): {Prognosis:0401762752}    Recommended Labs or Other Treatments After Discharge: ***    Physician Certification: I certify the above information and transfer of Citlaly Cotter  is necessary for the continuing treatment of the diagnosis listed and that she requires {Admit to Appropriate Level of Care:72109} for {GREATER/LESS:539738218} 30 days.      Update Admission H&P: {CHP DME Changes in NIDSH:898760122}    PHYSICIAN SIGNATURE:  {Esignature:313545086}

## 2022-12-12 RX ORDER — BUMETANIDE 1 MG/1
TABLET ORAL
Qty: 90 TABLET | Refills: 1 | Status: SHIPPED | OUTPATIENT
Start: 2022-12-12

## 2022-12-12 NOTE — DISCHARGE INSTRUCTIONS
Visit Discharge/Physician Orders     Discharge condition: Stable     Assessment of pain at discharge:MINIMAL     Anesthetic used: 4% LIDOCAINE     Discharge to: Home     Left via:Private automobile     Accompanied by: accompanied by child     ECF/HHA: [facility]     Dressing Orders:LEFT LEG ULCERS CLEANSE WITH NORMAL SALINE APPLY gentamicin cream ALGINATE  [address] or every other day. Refill ordered for clobetasol     Treatment Orders:  EAT FOODS HIGH IN PROTEIN AND VITAMIN C. May use aquafor for dry skin on legs. MULTIVITAMIN DAILY      Arterial studies ordered - received from Erin reviewed (in media)     [facility] [facility] followup visit ________1 WEEK__Nguyen Teixeira_________________  (Please note your next appointment above and if you are unable to keep, kindly give a 24 hour notice. Thank you.)     Physician signature:__________________________        If you experience any of the following, please call the [address] [place] [address] during business hours:     * Increase in Pain  * Temperature over 101  * Increase in drainage from your wound  * Drainage with a foul odor  * Bleeding  * Increase in swelling  * Need for compression bandage changes due to slippage, breakthrough drainage. If you need medical attention outside of the business hours of the [address] [place] [address] [address] please contact your PCP or go to the nearest emergency room.

## 2022-12-13 NOTE — PROGRESS NOTES
Wound Healing Center  History and Physical/Consultation  Podiatry    Referring Physician : Alex Amaro MD  Nurys Hernandez  MEDICAL RECORD NUMBER:  [id number]  AGE: 80 y.o. GENDER: female  : 1940  EPISODE DATE:  10/21/2022  Subjective:     Chief Complaint   Patient presents with    Wound Check     Left leg         HISTORY of PRESENT ILLNESS HPI     Nurys Hernandez is a 80 y.o. female who presents today for wound/ulcer evaluation. History of Wound Context:  The patient has had a wounds of medial, lateral, posterior left leg and left heel which was first noted approximately three weeks ago. This has not been treated. On their initial visit to the wound healing center, 10/14/2022,  the patient has noted that the wound has not been improving. The patient has had similar previous wounds in the past.      Pt is not on abx at time of initial visit.     10/14/22  Patient presents with left leg erythema, pruritis and dry scaly skin to left leg   Eschar present on left heel   Patient is not diabetic  Patient has a history of peripheral vascular disease - taking Eliquis  Vascular studies ordered   Wounds debrided   Aquacel and Spandagrip, Lamisil to surrounding inflamed tissue     10/21/22  Wounds overall improving   Wounds debrided   Doxy 100mg BIB [id number] prescribed   Continue Aquacel and Spandagrip, Lamisil to surrounding inflamed tissue       Wound/Ulcer Pain Timing/Severity: none  Quality of pain: N/A  Severity:  0 / 10   Modifying Factors: None  Associated Signs/Symptoms: edema and erythema    Ulcer Identification:  Ulcer Type: venous and undetermined  Contributing Factors: edema, venous stasis, and lymphedema    Diabetic/Pressure/Non Pressure Ulcers onl y:  Ulcer: Non-Pressure ulcer, fat layer exposed    If patient has diabetic lower extremity wounds  [place] Classification of diabetic lower extremity wounds:    Grade Description   []  0 No open wound   []  1 Superficial ulcer involving the full skin thickness   []  2 Deep ulcer involves ligament, tendon, joint capsule, or fascia  No bone involvement or abscess presence   []  3 Deep Ulcer with abcess formation and/or osteomyelitis   []  4 Localized gangrene   []  5 Extensive gangrene of the foot     Wound: N/A        PAST MEDICAL HISTORY      Diagnosis Date    Acquired hypothyroidism 8/14/2017    Arthritis     Blood transfusion reaction     Chronic kidney disease     History of blood transfusion     Hypertension     Lymphedema of both lower extremities 9/6/2017    Open wound of left great toe 10/18/2017    Other disorders of kidney and ureter in diseases classified elsewhere     Pelvic fracture (HCC)     Skin ulcer of left calf with fat layer exposed ([address] 9/6/2017    Skin ulcer of left calf, limited to breakdown of skin ([address] 9/6/2017    Ulcer of left lower leg ([address] 3/24/2014    Venous stasis ulcer of left calf limited to breakdown of skin ([address] 3/24/2014     Past Surgical History:   Procedure Laterality Date    COLONOSCOPY      ENDOSCOPY, COLON, DIAGNOSTIC      EYE SURGERY      cateract and lazor surgery 2015    FRACTURE SURGERY  2010    RT HIP    HIP FRACTURE SURGERY Left 08/08/2014    ORIF left hip    TONSILLECTOMY      as child    TOTAL HIP ARTHROPLASTY Left 10/17/2014    revision with removal of hardware     Family History   Problem Relation Age of Onset    Mult Sclerosis Father      Social History     Tobacco Use    Smoking status: Never    Smokeless tobacco: Never   Vaping Use    Vaping Use: Never used   Substance Use Topics    Alcohol use: Not Currently    Drug use: Never     Allergies   Allergen Reactions    Aspirin Itching    Other Nausea And Vomiting     FLU SHOT    Tape [Adhesive Tape] Swelling     Current Outpatient Medications on File Prior to Encounter   Medication Sig Dispense Refill    ferrous sulfate (IRON 325) 325 (65 Fe) MG tablet Take 325 mg by mouth daily (with breakfast)      acetaminophen (TYLENOL) 325 MG tablet Take 650 mg by mouth every 4 hours as needed for Pain      calcium-vitamin D (OSCAL-500) 500-200 MG-UNIT per tablet Take 1 tablet by mouth 2 times daily (Patient taking differently: Take 1 tablet by mouth daily) 30 tablet 3    apixaban (ELIQUIS) 5 MG TABS tablet Take 1 tablet by mouth 2 times daily 60 tablet 0    metoprolol tartrate (LOPRESSOR) 25 MG tablet Take 1 tablet by mouth 2 times daily 60 tablet 0    Cholecalciferol (VITAMIN D) 2000 units TABS tablet Take 2.5 tablets by mouth 2 times daily (Patient taking differently: Take 4,000 Units by mouth daily) 150 tablet 0    levothyroxine (SYNTHROID) 75 MCG tablet Take 75 mcg by mouth Daily       No current facility-administered medications on file prior to encounter.        REVIEW OF SYSTEMS   ROS : All others Negative if blank [], Positive if [x]  General Vascular   [] Fevers [] Claudication   [] Chills [] Rest Pain   Skin Neurologic   [x] Tissue Loss [] Lower extremity neuropathy     Objective:    BP (!) 160/70   Pulse 63   Temp (!) 96.5 °F (35.8 °C) (Temporal)   Resp 16   Ht 5' 2\" (1.575 m)   Wt 180 lb (81.6 kg)   BMI 32.92 kg/m²   Wt Readings from Last 3 Encounters:   11/18/22 160 lb (72.6 kg)   11/11/22 160 lb 6.4 oz (72.8 kg)   11/10/22 180 lb (81.6 kg)       PHYSICAL EXAM   CONSTITUTIONAL:   Awake, alert, cooperative   PSYCHIATRIC :  Oriented to time, place and person      normal insight to disease process  EXTREMITIES:   R LE Open wounds are noted   Skin color is abnormal with erythema, dry, flaky skin   Edema is  noted   Sensation intact to light touch   Palpation of the foot does not cause pain   4/5 strength DF/PF  L LE Open wounds are not noted   Skin color is abnormal with erythema, dry, flaky skin   Edema is  noted   Sensation intact to light touch   Palpation of the foot does not cause pain   4/5 strength DF/PF  R dorsalis pedis +1 L dorsalis pedis +1     Assessment:     Problem List Items Addressed This Visit       Non-pressure ulcer of lower extremity, left, with fat layer exposed ([facility] - Primary       Pre Debridement Measurements:  Are located in the [place]  Documentation Flow Sheet  Post Debridement Measurements:  Wound/Ulcer Descriptions are Pre Debridement except measurements:     Pressure Ulcer 09/06/17 Leg Left; Lower #2 (1-5-17 acquired) (Active)   Number of days: 1924       Wound 10/14/22 Ankle Left;Medial #1 (Active)   Wound Image   11/18/22 1420   Wound Etiology Venous 10/14/22 1331   Dressing Status New dressing applied 12/09/22 1614   Wound Cleansed Cleansed with saline 12/09/22 1614   Dressing/Treatment ABD; Alginate;Dry dressing;Roll gauze 12/09/22 1614   Offloading for Diabetic Foot Ulcers Offloading not required 12/09/22 1614   Wound Length (cm) 1.5 cm 12/09/22 1418   Wound Width (cm) 3.5 cm 12/09/22 1418   Wound Depth (cm) 0.2 cm 12/09/22 1418   Wound Surface Area (cm^2) 5.25 cm^2 12/09/22 1418   Change in Wound Size % (l*w) -581.82 12/09/22 1418   Wound Volume (cm^3) 1.05 cm^3 12/09/22 1418   Wound Healing % -582 12/09/22 1418   Post-Procedure Length (cm) 1.5 cm 12/09/22 1537   Post-Procedure Width (cm) 3.5 cm 12/09/22 1537   Post-Procedure Depth (cm) 0.3 cm 12/09/22 1537   Post-Procedure Surface Area (cm^2) 5.25 cm^2 12/09/22 1537   Post-Procedure Volume (cm^3) 1.575 cm^3 12/09/22 1537   Wound Assessment Fibrin;Pink/red 12/09/22 1418   Drainage Amount Small 12/09/22 1418   Drainage Description Yellow 12/09/22 1418   Odor None 12/09/22 1418   Patrizia-wound Assessment Fragile 12/09/22 1418   Number of days: 60       Wound 10/14/22 Ankle Left;Lateral #2 (Active)   Wound Image   11/18/22 1420   Wound Etiology Venous 10/14/22 1331   Wound Cleansed Cleansed with saline 12/09/22 1614   Dressing/Treatment ABD; Alginate;Dry dressing;Roll gauze 12/09/22 1614   Offloading for Diabetic Foot Ulcers Offloading not required 12/09/22 1614   Wound Length (cm) 1.4 cm 12/09/22 1418   Wound Width (cm) 0.6 cm 12/09/22 1418   Wound Depth (cm) 0.2 cm 12/09/22 1418   Wound Surface Area (cm^2) 0.84 cm^2 12/09/22 1418   Change in Wound Size % (l*w) -740 12/09/22 1418   Wound Volume (cm^3) 0.168 cm^3 12/09/22 1418   Wound Healing % -740 12/09/22 1418   Post-Procedure Length (cm) 1.4 cm 12/09/22 1537   Post-Procedure Width (cm) 0.6 cm 12/09/22 1537   Post-Procedure Depth (cm) 0.3 cm 12/09/22 1537   Post-Procedure Surface Area (cm^2) 0.84 cm^2 12/09/22 1537   Post-Procedure Volume (cm^3) 0.252 cm^3 12/09/22 1537   Wound Assessment Fibrin;Pink/red 12/09/22 1418   Drainage Amount Small 12/09/22 1418   Drainage Description Yellow 12/09/22 1418   Odor None 12/09/22 1418   Patrizia-wound Assessment Fragile 12/09/22 1418   Number of days: 60       Wound 10/14/22 Pretibial Left;Posterior #4 BLOCK (Active)   Wound Image   12/01/22 1423   Wound Etiology Venous 10/14/22 1331   Wound Cleansed Vashe 11/18/22 1520   Dressing/Treatment Alginate;ABD 11/18/22 1520   Offloading for Diabetic Foot Ulcers Offloading not required 11/18/22 1520   Wound Length (cm) 0 cm 12/01/22 1423   Wound Width (cm) 0 cm 12/01/22 1423   Wound Depth (cm) 0 cm 12/01/22 1423   Wound Surface Area (cm^2) 0 cm^2 12/01/22 1423   Change in Wound Size % (l*w) 100 12/01/22 1423   Wound Volume (cm^3) 0 cm^3 12/01/22 1423   Wound Healing % 100 12/01/22 1423   Post-Procedure Length (cm) 1 cm 11/18/22 1520   Post-Procedure Width (cm) 4 cm 11/18/22 1520   Post-Procedure Depth (cm) 0.2 cm 11/18/22 1520   Post-Procedure Surface Area (cm^2) 4 cm^2 11/18/22 1520   Post-Procedure Volume (cm^3) 0.8 cm^3 11/18/22 1520   Wound Assessment Dry 12/01/22 1423   Drainage Amount None 12/01/22 1423   Drainage Description Yellow 11/18/22 1420   Odor None 12/01/22 1423   Patrizia-wound Assessment Intact 12/01/22 1423   Number of days: 60      Wound #3 Left heel      Procedure Note  Indications:  Based on my examination of this patient's wound(s)/ulcer(s) today, debridement is required to promote healing and evaluate the wound base.     Performed by: Elio Clements Ren Matter - CNP    Consent obtained:  Yes    Time out taken:  Yes    Pain Control: Anesthetic  Anesthetic: 4% Lidocaine Liquid Topical     Debridement:Excisional Debridement    Using curette the wound(s)/ulcer(s) was/were sharply debrided down through and including the removal of dermis and subcutaneous tissue. Devitalized Tissue Debrided:  fibrin, biofilm, slough, and necrotic/eschar to stimulate bleeding to promote healing, post debridement good bleeding base and wound edges noted    Wound/Ulcer #: 1, 2, 3, and 4    Percent of Wound/Ulcer Debrided: 100%    Total Surface Area Debrided:  6.98 sq cm     Estimated Blood Loss:  Minimal  Hemostasis Achieved:  by pressure    Procedural Pain:  2  / 10   Post Procedural Pain:  2 / 10     Response to treatment:  Well tolerated by patient. A culture was not done. Plan:     Pt has never been a smoker   - Discussed relationship of smoking and negative affects on wound healing   - Emphasized importance of tobacco avoidace/cessation     In my professional opinion and based off the information that is available at this time this patient has appropriate indication for HBO Therapy: No    Treatment Note please see attached Discharge Instructions    Written patient dismissal instructions given to patient and signed by patient or POA. Discharge Instructions         Visit Discharge/Physician Orders     Discharge condition: Stable     Assessment of pain at discharge:MINIMAL     Anesthetic used: 4% LIDOCAINE     Discharge to: Home     Left via:Private automobile     Accompanied by: accompanied by child     [facility]F/HHA: [facility]     Dressing Orders:LEFT LEG ULCERS CLEANSE WITH NORMAL SALINE APPLY ALGINATE  [address] AND PRN. LAMISIL TO DRY FLAKY SKIN. Prescription sent for Doxycycline, begin to take as prescribed.     Treatment Orders:  EAT FOODS HIGH IN PROTEIN AND VITAMIN C.      MULTIVITAMIN DAILY     CULTURE reviewed     [facility] followup visit ________1 WEEK__Nguyen Teixeira_____2 weeks Dr. Baez____________  (Please note your next appointment above and if you are unable to keep, kindly give a 24 hour notice. Thank you.)     Physician signature:__________________________        If you experience any of the following, please call the [facility] during business hours:     * Increase in Pain  * Temperature over 101  * Increase in drainage from your wound  * Drainage with a foul odor  * Bleeding  * Increase in swelling  * Need for compression bandage changes due to slippage, breakthrough drainage. If you need medical attention outside of the business hours of the [facility] please contact your PCP or go to the nearest emergency room.                      Electronically signed by TRAVIS Valverde CNP on 12/13/2022 at 2:32 PM

## 2022-12-13 NOTE — PROGRESS NOTES
[facility]  History and Physical/Consultation  Podiatry    Referring Physician : Genaro Herrera MD  Citlaly Cotter  MEDICAL RECORD NUMBER:  [id number]  AGE: 80 y.o. GENDER: female  : 1940  EPISODE DATE:  10/28/2022  Subjective:     Chief Complaint   Patient presents with    Wound Check     Leg left         HISTORY of PRESENT ILLNESS HPI     Citlaly Cotter is a 80 y.o. female who presents today for wound/ulcer evaluation. History of Wound Context:  The patient has had a wounds of medial, lateral, posterior left leg and left heel which was first noted approximately three weeks ago. This has not been treated. On their initial visit to the wound healing center, 10/14/2022,  the patient has noted that the wound has not been improving. The patient has had similar previous wounds in the past.      Pt is not on abx at time of initial visit.     10/14/22  Patient presents with left leg erythema, pruritis and dry scaly skin to left leg   Eschar present on left heel   Patient is not diabetic  Patient has a history of peripheral vascular disease - taking Eliquis  Vascular studies ordered   Wounds debrided   Aquacel and Spandagrip, Lamisil to surrounding inflamed tissue     10/21/22  Wounds overall improving   Wounds debrided   Doxy 100mg BIB [id number] prescribed   Continue Aquacel and Spandagrip, Lamisil to surrounding inflamed tissue     10/28/22  Wounds overall appearance improving, decreased erythema   Wounds not debrided today  Gentamycin cream prescribed   Continue Aquacel and Spandagrip, Lamisil to surrounding inflamed tissue   Consult to Dr. Anup BARKLEY Pain Timing/Severity: none  Quality of pain: N/A  Severity:  0 / 10   Modifying Factors: None  Associated Signs/Symptoms: edema and erythema    Ulcer Identification:  Ulcer Type: venous and undetermined  Contributing Factors: edema, venous stasis, and lymphedema    Diabetic/Pressure/Non Pressure Ulcers onl y:  Ulcer: Non-Pressure ulcer, fat layer exposed    If patient has diabetic lower extremity wounds  [place] Classification of diabetic lower extremity wounds:    Grade Description   []  0 No open wound   []  1 Superficial ulcer involving the full skin thickness   []  2 Deep ulcer involves ligament, tendon, joint capsule, or fascia  No bone involvement or abscess presence   []  3 Deep Ulcer with abcess formation and/or osteomyelitis   []  4 Localized gangrene   []  5 Extensive gangrene of the foot     Wound: N/A        PAST MEDICAL HISTORY      Diagnosis Date    Acquired hypothyroidism 8/14/2017    Arthritis     Blood transfusion reaction     Chronic kidney disease     History of blood transfusion     Hypertension     Lymphedema of both lower extremities 9/6/2017    Open wound of left great toe 10/18/2017    Other disorders of kidney and ureter in diseases classified elsewhere     Pelvic fracture (HCC)     Skin ulcer of left calf with fat layer exposed ([address] 9/6/2017    Skin ulcer of left calf, limited to breakdown of skin ([address] 9/6/2017    Ulcer of left lower leg ([address] 3/24/2014    Venous stasis ulcer of left calf limited to breakdown of skin ([address] 3/24/2014     Past Surgical History:   Procedure Laterality Date    COLONOSCOPY      ENDOSCOPY, COLON, DIAGNOSTIC      EYE SURGERY      cateract and lazor surgery 2015    FRACTURE SURGERY  2010    RT HIP    HIP FRACTURE SURGERY Left 08/08/2014    ORIF left hip    TONSILLECTOMY      as child    TOTAL HIP ARTHROPLASTY Left 10/17/2014    revision with removal of hardware     Family History   Problem Relation Age of Onset    Mult Sclerosis Father      Social History     Tobacco Use    Smoking status: Never    Smokeless tobacco: Never   Vaping Use    Vaping Use: Never used   Substance Use Topics    Alcohol use: Not Currently    Drug use: Never     Allergies   Allergen Reactions    Aspirin Itching    Other Nausea And Vomiting     FLU SHOT    Tape [Adhesive Tape] Swelling     Current Outpatient Medications on File Prior to Encounter   Medication Sig Dispense Refill    ferrous sulfate (IRON 325) 325 (65 Fe) MG tablet Take 325 mg by mouth daily (with breakfast)      acetaminophen (TYLENOL) 325 MG tablet Take 650 mg by mouth every 4 hours as needed for Pain      calcium-vitamin D (OSCAL-500) 500-200 MG-UNIT per tablet Take 1 tablet by mouth 2 times daily (Patient taking differently: Take 1 tablet by mouth daily) 30 tablet 3    apixaban (ELIQUIS) 5 MG TABS tablet Take 1 tablet by mouth 2 times daily 60 tablet 0    metoprolol tartrate (LOPRESSOR) 25 MG tablet Take 1 tablet by mouth 2 times daily 60 tablet 0    Cholecalciferol (VITAMIN D) 2000 units TABS tablet Take 2.5 tablets by mouth 2 times daily (Patient taking differently: Take 4,000 Units by mouth daily) 150 tablet 0    levothyroxine (SYNTHROID) 75 MCG tablet Take 75 mcg by mouth Daily       No current facility-administered medications on file prior to encounter.        REVIEW OF SYSTEMS   ROS : All others Negative if blank [], Positive if [x]  General Vascular   [] Fevers [] Claudication   [] Chills [] Rest Pain   Skin Neurologic   [x] Tissue Loss [] Lower extremity neuropathy     Objective:    BP (!) 158/59   Pulse 66   Temp (!) 96.3 °F (35.7 °C) (Temporal)   Resp 18   Wt Readings from Last 3 Encounters:   11/18/22 160 lb (72.6 kg)   11/11/22 160 lb 6.4 oz (72.8 kg)   11/10/22 180 lb (81.6 kg)       PHYSICAL EXAM   CONSTITUTIONAL:   Awake, alert, cooperative   PSYCHIATRIC :  Oriented to time, place and person      normal insight to disease process  EXTREMITIES:   R LE Open wounds are noted   Skin color is abnormal with erythema, dry, flaky skin   Edema is  noted   Sensation intact to light touch   Palpation of the foot does not cause pain   4/5 strength DF/PF  L LE Open wounds are not noted   Skin color is abnormal with erythema, dry, flaky skin   Edema is  noted   Sensation intact to light touch   Palpation of the foot does not cause pain   4/5 strength DF/PF  R dorsalis pedis +1 L dorsalis pedis +1     Assessment:     Problem List Items Addressed This Visit       Bilateral lower extremity edema    Non-pressure ulcer of lower extremity, left, with fat layer exposed ([address] - Primary       Pre Debridement Measurements:  Are located in the [facility]  Documentation Flow Sheet  Post Debridement Measurements:  Wound/Ulcer Descriptions are Pre Debridement except measurements:     Pressure Ulcer 09/06/17 Leg Left; Lower #2 (1-5-17 acquired) (Active)   Number of days: 1924       Wound 10/14/22 Ankle Left;Medial #1 (Active)   Wound Image   11/18/22 1420   Wound Etiology Venous 10/14/22 1331   Dressing Status New dressing applied 12/09/22 1614   Wound Cleansed Cleansed with saline 12/09/22 1614   Dressing/Treatment ABD; Alginate;Dry dressing;Roll gauze 12/09/22 1614   Offloading for Diabetic Foot Ulcers Offloading not required 12/09/22 1614   Wound Length (cm) 1.5 cm 12/09/22 1418   Wound Width (cm) 3.5 cm 12/09/22 1418   Wound Depth (cm) 0.2 cm 12/09/22 1418   Wound Surface Area (cm^2) 5.25 cm^2 12/09/22 1418   Change in Wound Size % (l*w) -581.82 12/09/22 1418   Wound Volume (cm^3) 1.05 cm^3 12/09/22 1418   Wound Healing % -582 12/09/22 1418   Post-Procedure Length (cm) 1.5 cm 12/09/22 1537   Post-Procedure Width (cm) 3.5 cm 12/09/22 1537   Post-Procedure Depth (cm) 0.3 cm 12/09/22 1537   Post-Procedure Surface Area (cm^2) 5.25 cm^2 12/09/22 1537   Post-Procedure Volume (cm^3) 1.575 cm^3 12/09/22 1537   Wound Assessment Fibrin;Pink/red 12/09/22 1418   Drainage Amount Small 12/09/22 1418   Drainage Description Yellow 12/09/22 1418   Odor None 12/09/22 1418   Patrizia-wound Assessment Fragile 12/09/22 1418   Number of days: 60       Wound 10/14/22 Ankle Left;Lateral #2 (Active)   Wound Image   11/18/22 1420   Wound Etiology Venous 10/14/22 1331   Wound Cleansed Cleansed with saline 12/09/22 1614   Dressing/Treatment ABD; Alginate;Dry dressing;Roll gauze 12/09/22 [address] for Diabetic Foot Ulcers Offloading not required 12/09/22 1614   Wound Length (cm) 1.4 cm 12/09/22 1418   Wound Width (cm) 0.6 cm 12/09/22 1418   Wound Depth (cm) 0.2 cm 12/09/22 1418   Wound Surface Area (cm^2) 0.84 cm^2 12/09/22 1418   Change in Wound Size % (l*w) -740 12/09/22 1418   Wound Volume (cm^3) 0.168 cm^3 12/09/22 1418   Wound Healing % -740 12/09/22 1418   Post-Procedure Length (cm) 1.4 cm 12/09/22 1537   Post-Procedure Width (cm) 0.6 cm 12/09/22 1537   Post-Procedure Depth (cm) 0.3 cm 12/09/22 1537   Post-Procedure Surface Area (cm^2) 0.84 cm^2 12/09/22 1537   Post-Procedure Volume (cm^3) 0.252 cm^3 12/09/22 1537   Wound Assessment Fibrin;Pink/red 12/09/22 1418   Drainage Amount Small 12/09/22 1418   Drainage Description Yellow 12/09/22 1418   Odor None 12/09/22 1418   Patrizia-wound Assessment Fragile 12/09/22 1418   Number of days: 60       Wound 10/14/22 Pretibial Left;Posterior #4 BLOCK (Active)   Wound Image   12/01/22 1423   Wound Etiology Venous 10/14/22 1331   Wound Cleansed Vashe 11/18/22 1520   Dressing/Treatment Alginate;ABD 11/18/22 1520   Offloading for Diabetic Foot Ulcers Offloading not required 11/18/22 1520   Wound Length (cm) 0 cm 12/01/22 1423   Wound Width (cm) 0 cm 12/01/22 1423   Wound Depth (cm) 0 cm 12/01/22 1423   Wound Surface Area (cm^2) 0 cm^2 12/01/22 1423   Change in Wound Size % (l*w) 100 12/01/22 1423   Wound Volume (cm^3) 0 cm^3 12/01/22 1423   Wound Healing % 100 12/01/22 1423   Post-Procedure Length (cm) 1 cm 11/18/22 1520   Post-Procedure Width (cm) 4 cm 11/18/22 1520   Post-Procedure Depth (cm) 0.2 cm 11/18/22 1520   Post-Procedure Surface Area (cm^2) 4 cm^2 11/18/22 1520   Post-Procedure Volume (cm^3) 0.8 cm^3 11/18/22 1520   Wound Assessment Dry 12/01/22 1423   Drainage Amount None 12/01/22 1423   Drainage Description Yellow 11/18/22 1420   Odor None 12/01/22 1423   Patrizia-wound Assessment Intact 12/01/22 1423   Number of days: 60      Wound #3 Left heel      Procedure Note  Indications:  Based on my examination of this patient's wound(s)/ulcer(s) today, debridement is required to promote healing and evaluate the wound base. Performed by: TRAVIS Howell CNP    Consent obtained:  Yes    Time out taken:  Yes    Pain Control: Anesthetic  Anesthetic: 4% Lidocaine Liquid Topical     Debridement:Other (comment)No debridement - patient refused     Response to treatment:  Well tolerated by patient. A culture was not done. Plan:     Pt has never been a smoker   - Discussed relationship of smoking and negative affects on wound healing   - Emphasized importance of tobacco avoidace/cessation     In my professional opinion and based off the information that is available at this time this patient has appropriate indication for HBO Therapy: No    Treatment Note please see attached Discharge Instructions    Written patient dismissal instructions given to patient and signed by patient or POA. Discharge Instructions         Visit Discharge/Physician Orders     Discharge condition: Stable     Assessment of pain at discharge:MINIMAL     Anesthetic used: 4% LIDOCAINE     Discharge to: Home     Left via:Private automobile     Accompanied by: accompanied by child     ECF/H[facility]: [facility]     Dressing Orders:LEFT LEG ULCERS CLEANSE WITH NORMAL SALINE APPLY gentamicin cream ALGINATE ABD KERLIX AND SPANDIGRIP CHANGE DAILY AND PRN. LAMISIL TO DRY FLAKY SKIN. Treatment Orders:  EAT FOODS HIGH IN PROTEIN AND VITAMIN C.      MULTIVITAMIN DAILY          [address] [facility] followup visit ________1 WEEK__Nguyen Teixeira_____11/10/22 Dr. Baez____________  (Please note your next appointment above and if you are unable to keep, kindly give a 24 hour notice.  Thank you.)     Physician signature:__________________________        If you experience any of the following, please call the [address] during business hours:     * Increase in Pain  * Temperature over 101  * Increase in drainage from your wound  * Drainage with a foul odor  * Bleeding  * Increase in swelling  * Need for compression bandage changes due to slippage, breakthrough drainage. If you need medical attention outside of the business hours of the [address] [place] [address] Road please contact your PCP or go to the nearest emergency room.                                                 Electronically signed by TRAVIS Lobato CNP on 12/13/2022 at 2:57 PM

## 2022-12-13 NOTE — PROGRESS NOTES
[facility]  History and Physical/Consultation  Podiatry    Referring Physician : Mercer Cooks, MD  Dm Osborne  MEDICAL RECORD NUMBER:  [id number]  AGE: 80 y.o. GENDER: female  : 1940  EPISODE DATE:  2022  Subjective:     Chief Complaint   Patient presents with    Wound Check     Left leg         HISTORY of PRESENT ILLNESS HPI     Dm Osborne is a 80 y.o. female who presents today for wound/ulcer evaluation. History of Wound Context:  The patient has had a wounds of medial, lateral, posterior left leg and left heel which was first noted approximately three weeks ago. This has not been treated. On their initial visit to the wound healing center, 10/14/2022,  the patient has noted that the wound has not been improving. The patient has had similar previous wounds in the past.      Pt is not on abx at time of initial visit.     10/14/22  Patient presents with left leg erythema, pruritis and dry scaly skin to left leg   Eschar present on left heel   Patient is not diabetic  Patient has a history of peripheral vascular disease - taking Eliquis  Vascular studies ordered   Wounds debrided   Aquacel and Spandagrip, Lamisil to surrounding inflamed tissue     10/21/22  Wounds overall improving   Wounds debrided   Doxy 100mg BIB [id number] prescribed   Continue Aquacel and Spandagrip, Lamisil to surrounding inflamed tissue     10/28/22  Wounds overall appearance improving, decreased erythema   Left heel wound healed   Wounds not debrided today  Gentamycin cream prescribed   Continue Aquacel and Spandagrip, Lamisil to surrounding inflamed tissue   Consult to Dr. Orlin BARKLEY       22  Wounds overall appearance improving   Wounds debrided   Continue Gent, Aquacel and Spandagrip, Lamisil to surrounding inflamed tissue     Wound/Ulcer Pain Timing/Severity: none  Quality of pain: N/A  Severity:  0 / 10   Modifying Factors: None  Associated Signs/Symptoms: edema and erythema    Ulcer Identification:  Ulcer Type: venous and undetermined  Contributing Factors: edema, venous stasis, and lymphedema    Diabetic/Pressure/Non Pressure Ulcers onl y:  Ulcer: Non-Pressure ulcer, fat layer exposed    If patient has diabetic lower extremity wounds  [place] Classification of diabetic lower extremity wounds:    Grade Description   []  0 No open wound   []  1 Superficial ulcer involving the full skin thickness   []  2 Deep ulcer involves ligament, tendon, joint capsule, or fascia  No bone involvement or abscess presence   []  3 Deep Ulcer with abcess formation and/or osteomyelitis   []  4 Localized gangrene   []  5 Extensive gangrene of the foot     Wound: N/A        PAST MEDICAL HISTORY      Diagnosis Date    Acquired hypothyroidism 8/14/2017    Arthritis     Blood transfusion reaction     Chronic kidney disease     History of blood transfusion     Hypertension     Lymphedema of both lower extremities 9/6/2017    Open wound of left great toe 10/18/2017    Other disorders of kidney and ureter in diseases classified elsewhere     Pelvic fracture (HCC)     Skin ulcer of left calf with fat layer exposed ([address] 9/6/2017    Skin ulcer of left calf, limited to breakdown of skin ([address] 9/6/2017    Ulcer of left lower leg ([address] 3/24/2014    Venous stasis ulcer of left calf limited to breakdown of skin ([address] 3/24/2014     Past Surgical History:   Procedure Laterality Date    COLONOSCOPY      ENDOSCOPY, COLON, DIAGNOSTIC      EYE SURGERY      cateract and lazor surgery 2015    FRACTURE SURGERY  2010    RT HIP    HIP FRACTURE SURGERY Left 08/08/2014    ORIF left hip    TONSILLECTOMY      as child    TOTAL HIP ARTHROPLASTY Left 10/17/2014    revision with removal of hardware     Family History   Problem Relation Age of Onset    Mult Sclerosis Father      Social History     Tobacco Use    Smoking status: Never    Smokeless tobacco: Never   Vaping Use    Vaping Use: Never used   Substance Use Topics    Alcohol use: Not Currently    Drug use: Never     Allergies   Allergen Reactions    Aspirin Itching    Other Nausea And Vomiting     FLU SHOT    Tape [Adhesive Tape] Swelling     Current Outpatient Medications on File Prior to Encounter   Medication Sig Dispense Refill    ferrous sulfate (IRON 325) 325 (65 Fe) MG tablet Take 325 mg by mouth daily (with breakfast)      acetaminophen (TYLENOL) 325 MG tablet Take 650 mg by mouth every 4 hours as needed for Pain      calcium-vitamin D (OSCAL-500) 500-200 MG-UNIT per tablet Take 1 tablet by mouth 2 times daily (Patient taking differently: Take 1 tablet by mouth daily) 30 tablet 3    apixaban (ELIQUIS) 5 MG TABS tablet Take 1 tablet by mouth 2 times daily 60 tablet 0    metoprolol tartrate (LOPRESSOR) 25 MG tablet Take 1 tablet by mouth 2 times daily 60 tablet 0    Cholecalciferol (VITAMIN D) 2000 units TABS tablet Take 2.5 tablets by mouth 2 times daily (Patient taking differently: Take 4,000 Units by mouth daily) 150 tablet 0    levothyroxine (SYNTHROID) 75 MCG tablet Take 75 mcg by mouth Daily       No current facility-administered medications on file prior to encounter.        REVIEW OF SYSTEMS   ROS : All others Negative if blank [], Positive if [x]  General Vascular   [] Fevers [] Claudication   [] Chills [] Rest Pain   Skin Neurologic   [x] Tissue Loss [] Lower extremity neuropathy     Objective:    Pulse 59   Temp 97.5 °F (36.4 °C) (Temporal)   Resp 18   Ht 5' 2\" (1.575 m)   Wt 180 lb (81.6 kg)   BMI 32.92 kg/m²   Wt Readings from Last 3 Encounters:   11/18/22 160 lb (72.6 kg)   11/11/22 160 lb 6.4 oz (72.8 kg)   11/10/22 180 lb (81.6 kg)       PHYSICAL EXAM   CONSTITUTIONAL:   Awake, alert, cooperative   PSYCHIATRIC :  Oriented to time, place and person      normal insight to disease process  EXTREMITIES:   R LE Open wounds are noted   Skin color is abnormal with erythema, dry, flaky skin   Edema is  noted   Sensation intact to light touch   Palpation of the foot does not cause pain   4/5 strength DF/PF  L LE Open wounds are not noted   Skin color is abnormal with erythema, dry, flaky skin   Edema is  noted   Sensation intact to light touch   Palpation of the foot does not cause pain   4/5 strength DF/PF  R dorsalis pedis +1 L dorsalis pedis +1     Assessment:     Problem List Items Addressed This Visit       Heel ulcer, left, with unspecified severity ([address] - Primary    Non-pressure ulcer of lower extremity, left, with fat layer exposed ([facility]       Pre Debridement Measurements:  Are located in the [place]  Documentation Flow Sheet  Post Debridement Measurements:  Wound/Ulcer Descriptions are Pre Debridement except measurements:     Pressure Ulcer 09/06/17 Leg Left; Lower #2 (1-5-17 acquired) (Active)   Number of days: 1924       Wound 10/14/22 Ankle Left;Medial #1 (Active)   Wound Image   11/18/22 1420   Wound Etiology Venous 10/14/22 1331   Dressing Status New dressing applied 12/09/22 1614   Wound Cleansed Cleansed with saline 12/09/22 1614   Dressing/Treatment ABD; Alginate;Dry dressing;Roll gauze 12/09/22 1614   Offloading for Diabetic Foot Ulcers Offloading not required 12/09/22 1614   Wound Length (cm) 1.5 cm 12/09/22 1418   Wound Width (cm) 3.5 cm 12/09/22 1418   Wound Depth (cm) 0.2 cm 12/09/22 1418   Wound Surface Area (cm^2) 5.25 cm^2 12/09/22 1418   Change in Wound Size % (l*w) -581.82 12/09/22 1418   Wound Volume (cm^3) 1.05 cm^3 12/09/22 1418   Wound Healing % -582 12/09/22 1418   Post-Procedure Length (cm) 1.5 cm 12/09/22 1537   Post-Procedure Width (cm) 3.5 cm 12/09/22 1537   Post-Procedure Depth (cm) 0.3 cm 12/09/22 1537   Post-Procedure Surface Area (cm^2) 5.25 cm^2 12/09/22 1537   Post-Procedure Volume (cm^3) 1.575 cm^3 12/09/22 1537   Wound Assessment Fibrin;Pink/red 12/09/22 1418   Drainage Amount Small 12/09/22 1418   Drainage Description Yellow 12/09/22 1418   Odor None 12/09/22 1418   Patrizia-wound Assessment Fragile 12/09/22 1418   Number of days: 60 Wound 10/14/22 Ankle Left;Lateral #2 (Active)   Wound Image   11/18/22 1420   Wound Etiology Venous 10/14/22 1331   Wound Cleansed Cleansed with saline 12/09/22 1614   Dressing/Treatment ABD; Alginate;Dry dressing;Roll gauze 12/09/22 1614   Offloading for Diabetic Foot Ulcers Offloading not required 12/09/22 1614   Wound Length (cm) 1.4 cm 12/09/22 1418   Wound Width (cm) 0.6 cm 12/09/22 1418   Wound Depth (cm) 0.2 cm 12/09/22 1418   Wound Surface Area (cm^2) 0.84 cm^2 12/09/22 1418   Change in Wound Size % (l*w) -740 12/09/22 1418   Wound Volume (cm^3) 0.168 cm^3 12/09/22 1418   Wound Healing % -740 12/09/22 1418   Post-Procedure Length (cm) 1.4 cm 12/09/22 1537   Post-Procedure Width (cm) 0.6 cm 12/09/22 1537   Post-Procedure Depth (cm) 0.3 cm 12/09/22 1537   Post-Procedure Surface Area (cm^2) 0.84 cm^2 12/09/22 1537   Post-Procedure Volume (cm^3) 0.252 cm^3 12/09/22 1537   Wound Assessment Fibrin;Pink/red 12/09/22 1418   Drainage Amount Small 12/09/22 1418   Drainage Description Yellow 12/09/22 1418   Odor None 12/09/22 1418   Patrizia-wound Assessment Fragile 12/09/22 1418   Number of days: 60       Wound 10/14/22 Pretibial Left;Posterior #4 BLOCK (Active)   Wound Image   12/01/22 1423   Wound Etiology Venous 10/14/22 1331   Wound Cleansed Vashe 11/18/22 1520   Dressing/Treatment Alginate;ABD 11/18/22 1520   Offloading for Diabetic Foot Ulcers Offloading not required 11/18/22 1520   Wound Length (cm) 0 cm 12/01/22 1423   Wound Width (cm) 0 cm 12/01/22 1423   Wound Depth (cm) 0 cm 12/01/22 1423   Wound Surface Area (cm^2) 0 cm^2 12/01/22 1423   Change in Wound Size % (l*w) 100 12/01/22 1423   Wound Volume (cm^3) 0 cm^3 12/01/22 1423   Wound Healing % 100 12/01/22 1423   Post-Procedure Length (cm) 1 cm 11/18/22 1520   Post-Procedure Width (cm) 4 cm 11/18/22 1520   Post-Procedure Depth (cm) 0.2 cm 11/18/22 1520   Post-Procedure Surface Area (cm^2) 4 cm^2 11/18/22 1520   Post-Procedure Volume (cm^3) 0.8 cm^3 11/18/22 1520   Wound Assessment Dry 12/01/22 1423   Drainage Amount None 12/01/22 1423   Drainage Description Yellow 11/18/22 1420   Odor None 12/01/22 1423   Patrizia-wound Assessment Intact 12/01/22 1423   Number of days: 60        Procedure Note  Indications:  Based on my examination of this patient's wound(s)/ulcer(s) today, debridement is required to promote healing and evaluate the wound base. Performed by: Regan Bernheim, APRN - CNP    Consent obtained:  Yes    Time out taken:  Yes    Pain Control: Anesthetic  Anesthetic: 4% Lidocaine Liquid Topical     Debridement:Excisional Debridement    Using curette the wound(s)/ulcer(s) was/were sharply debrided down through and including the removal of subcutaneous tissue. Devitalized Tissue Debrided:  fibrin, biofilm, and slough to stimulate bleeding to promote healing, post debridement good bleeding base and wound edges noted    Wound/Ulcer #: 1, 2, and 4    Percent of Wound/Ulcer Debrided: 20%    Total Surface Area Debrided:  20 sq cm     Estimated Blood Loss:  Minimal  Hemostasis Achieved:  by pressure    Procedural Pain:  3  / 10   Post Procedural Pain:  3 / 10     Response to treatment:  Well tolerated by patient. A culture was not done. Plan:     Pt has never been a smoker   - Discussed relationship of smoking and negative affects on wound healing   - Emphasized importance of tobacco avoidace/cessation     In my professional opinion and based off the information that is available at this time this patient has appropriate indication for HBO Therapy: No    Treatment Note please see attached Discharge Instructions    Written patient dismissal instructions given to patient and signed by patient or POA.          Discharge Instructions         Visit Discharge/Physician Orders     Discharge condition: Stable     Assessment of pain at discharge:MINIMAL     Anesthetic used: 4% LIDOCAINE     Discharge to: Home     Left via:Private automobile     Accompanied by: accompanied by child     ECF/H[facility]: [facility]     Dressing Orders:LEFT LEG ULCERS CLEANSE WITH NORMAL SALINE APPLY gentamicin cream ALGINATE ABD KERLIX AND SPANDIGRIP CHANGE DAILY AND PRN. LAMISIL TO DRY FLAKY SKIN. Treatment Orders:  EAT FOODS HIGH IN PROTEIN AND VITAMIN C.      MULTIVITAMIN DAILY           [address] [place] [address],[address] followup visit ________2 WEEK__Nguyen Teixeira_____11/10/22 Dr. Baez____________  (Please note your next appointment above and if you are unable to keep, kindly give a 24 hour notice. Thank you.)     Physician signature:__________________________        If you experience any of the following, please call the [facility] during business hours:     * Increase in Pain  * Temperature over 101  * Increase in drainage from your wound  * Drainage with a foul odor  * Bleeding  * Increase in swelling  * Need for compression bandage changes due to slippage, breakthrough drainage. If you need medical attention outside of the business hours of the [facility] please contact your PCP or go to the nearest emergency room.                                                                            Electronically signed by TRAVIS Alexis CNP on 12/13/2022 at 3:41 PM

## 2022-12-13 NOTE — PROGRESS NOTES
[facility]  History and Physical/Consultation  Podiatry    Referring Physician : Annie Omer MD  Won Mckeon  MEDICAL RECORD NUMBER:  [id number]  AGE: 80 y.o. GENDER: female  : 1940  EPISODE DATE:  2022  Subjective:     Chief Complaint   Patient presents with    Wound Check     Left leg         HISTORY of PRESENT ILLNESS HPI     Won Mckeon is a 80 y.o. female who presents today for wound/ulcer evaluation. History of Wound Context:  The patient has had a wounds of medial, lateral, posterior left leg and left heel which was first noted approximately three weeks ago. This has not been treated. On their initial visit to the wound healing center, 10/14/2022,  the patient has noted that the wound has not been improving. The patient has had similar previous wounds in the past.      Pt is not on abx at time of initial visit.     10/14/22  Patient presents with left leg erythema, pruritis and dry scaly skin to left leg   Eschar present on left heel   Patient is not diabetic  Patient has a history of peripheral vascular disease - taking Eliquis  Vascular studies ordered   Wounds debrided   Aquacel and Spandagrip, Lamisil to surrounding inflamed tissue     10/21/22  Wounds overall improving   Wounds debrided   Doxy 100mg BIB [id number] prescribed   Continue Aquacel and Spandagrip, Lamisil to surrounding inflamed tissue     10/28/22  Wounds overall appearance improving, decreased erythema   Left heel wound healed   Wounds not debrided today  Gentamycin cream prescribed   Continue Aquacel and Spandagrip, Lamisil to surrounding inflamed tissue   Consult to ID, Dr. Gideon Babcock       22  Wounds overall appearance improving   Wounds debrided   Continue Gent, Aquacel and Spandagrip, Lamisil to surrounding inflamed tissue     22  Wounds improving   Wounds debrided   Continue Gent, Aquacel and Spandagrip, Lamisil to surrounding inflamed tissue     Wound/Ulcer Pain Timing/Severity: none  Quality of pain: N/A  Severity:  0 / 10   Modifying Factors: None  Associated Signs/Symptoms: edema and erythema    Ulcer Identification:  Ulcer Type: venous and undetermined  Contributing Factors: edema, venous stasis, and lymphedema    Diabetic/Pressure/Non Pressure Ulcers onl y:  Ulcer: Non-Pressure ulcer, fat layer exposed    If patient has diabetic lower extremity wounds  [place] Classification of diabetic lower extremity wounds:    Grade Description   []  0 No open wound   []  1 Superficial ulcer involving the full skin thickness   []  2 Deep ulcer involves ligament, tendon, joint capsule, or fascia  No bone involvement or abscess presence   []  3 Deep Ulcer with abcess formation and/or osteomyelitis   []  4 Localized gangrene   []  5 Extensive gangrene of the foot     Wound: N/A        PAST MEDICAL HISTORY      Diagnosis Date    Acquired hypothyroidism 8/14/2017    Arthritis     Blood transfusion reaction     Chronic kidney disease     History of blood transfusion     Hypertension     Lymphedema of both lower extremities 9/6/2017    Open wound of left great toe 10/18/2017    Other disorders of kidney and ureter in diseases classified elsewhere     Pelvic fracture (HCC)     Skin ulcer of left calf with fat layer exposed ([address] 9/6/2017    Skin ulcer of left calf, limited to breakdown of skin ([address] 9/6/2017    Ulcer of left lower leg ([address] 3/24/2014    Venous stasis ulcer of left calf limited to breakdown of skin ([address] 3/24/2014     Past Surgical History:   Procedure Laterality Date    COLONOSCOPY      ENDOSCOPY, COLON, DIAGNOSTIC      EYE SURGERY      cateract and lazor surgery 2015    FRACTURE SURGERY  2010    RT HIP    HIP FRACTURE SURGERY Left 08/08/2014    ORIF left hip    TONSILLECTOMY      as child    TOTAL HIP ARTHROPLASTY Left 10/17/2014    revision with removal of hardware     Family History   Problem Relation Age of Onset    Mult Sclerosis Father      Social History     Tobacco Use    Smoking status: Never    Smokeless tobacco: Never   Vaping Use    Vaping Use: Never used   Substance Use Topics    Alcohol use: Not Currently    Drug use: Never     Allergies   Allergen Reactions    Aspirin Itching    Other Nausea And Vomiting     FLU SHOT    Tape [Adhesive Tape] Swelling     Current Outpatient Medications on File Prior to Encounter   Medication Sig Dispense Refill    vitamin C (ASCORBIC ACID) 500 MG tablet Take 500 mg by mouth daily      clobetasol (TEMOVATE) 0.05 % cream Apply topically 2 times daily. 45 g 0    ferrous sulfate (IRON 325) 325 (65 Fe) MG tablet Take 325 mg by mouth daily (with breakfast)      acetaminophen (TYLENOL) 325 MG tablet Take 650 mg by mouth every 4 hours as needed for Pain      calcium-vitamin D (OSCAL-500) 500-200 MG-UNIT per tablet Take 1 tablet by mouth 2 times daily (Patient taking differently: Take 1 tablet by mouth daily) 30 tablet 3    apixaban (ELIQUIS) 5 MG TABS tablet Take 1 tablet by mouth 2 times daily 60 tablet 0    metoprolol tartrate (LOPRESSOR) 25 MG tablet Take 1 tablet by mouth 2 times daily 60 tablet 0    Cholecalciferol (VITAMIN D) 2000 units TABS tablet Take 2.5 tablets by mouth 2 times daily (Patient taking differently: Take 4,000 Units by mouth daily) 150 tablet 0    levothyroxine (SYNTHROID) 75 MCG tablet Take 75 mcg by mouth Daily       No current facility-administered medications on file prior to encounter.        REVIEW OF SYSTEMS   ROS : All others Negative if blank [], Positive if [x]  General Vascular   [] Fevers [] Claudication   [] Chills [] Rest Pain   Skin Neurologic   [x] Tissue Loss [] Lower extremity neuropathy     Objective:    BP (!) 156/49   Pulse 67   Temp 97.1 °F (36.2 °C) (Temporal)   Resp 16   Ht 5' 2\" (1.575 m)   Wt 160 lb (72.6 kg)   BMI 29.26 kg/m²   Wt Readings from Last 3 Encounters:   11/18/22 160 lb (72.6 kg)   11/11/22 160 lb 6.4 oz (72.8 kg)   11/10/22 180 lb (81.6 kg)       PHYSICAL EXAM   CONSTITUTIONAL:   Awake, alert, cooperative   PSYCHIATRIC :  Oriented to time, place and person      normal insight to disease process  EXTREMITIES:   R LE Open wounds are noted   Skin color is abnormal with erythema, dry, flaky skin   Edema is  noted   Sensation intact to light touch   Palpation of the foot does not cause pain   4/5 strength DF/PF  L LE Open wounds are not noted   Skin color is abnormal with erythema, dry, flaky skin   Edema is  noted   Sensation intact to light touch   Palpation of the foot does not cause pain   4/5 strength DF/PF  R dorsalis pedis +1 L dorsalis pedis +1     Assessment:     Problem List Items Addressed This Visit       Non-pressure ulcer of lower extremity, left, with fat layer exposed ([address] - Primary       Pre Debridement Measurements:  Are located in the [facility]  Documentation Flow Sheet  Post Debridement Measurements:  Wound/Ulcer Descriptions are Pre Debridement except measurements:     Pressure Ulcer 09/06/17 Leg Left; Lower #2 (1-5-17 acquired) (Active)   Number of days: 1924       Wound 10/14/22 Ankle Left;Medial #1 (Active)   Wound Image   11/18/22 1420   Wound Etiology Venous 10/14/22 1331   Dressing Status New dressing applied 12/09/22 1614   Wound Cleansed Cleansed with saline 12/09/22 1614   Dressing/Treatment ABD; Alginate;Dry dressing;Roll gauze 12/09/22 1614   Offloading for Diabetic Foot Ulcers Offloading not required 12/09/22 1614   Wound Length (cm) 1.5 cm 12/09/22 1418   Wound Width (cm) 3.5 cm 12/09/22 1418   Wound Depth (cm) 0.2 cm 12/09/22 1418   Wound Surface Area (cm^2) 5.25 cm^2 12/09/22 1418   Change in Wound Size % (l*w) -581.82 12/09/22 1418   Wound Volume (cm^3) 1.05 cm^3 12/09/22 1418   Wound Healing % -582 12/09/22 1418   Post-Procedure Length (cm) 1.5 cm 12/09/22 1537   Post-Procedure Width (cm) 3.5 cm 12/09/22 1537   Post-Procedure Depth (cm) 0.3 cm 12/09/22 1537   Post-Procedure Surface Area (cm^2) 5.25 cm^2 12/09/22 1537   Post-Procedure Volume (cm^3) 1.575 cm^3 12/09/22 1537 Wound Assessment Fibrin;Pink/red 12/09/22 1418   Drainage Amount Small 12/09/22 1418   Drainage Description Yellow 12/09/22 1418   Odor None 12/09/22 1418   Patrizia-wound Assessment Fragile 12/09/22 1418   Number of days: 60       Wound 10/14/22 Ankle Left;Lateral #2 (Active)   Wound Image   11/18/22 1420   Wound Etiology Venous 10/14/22 1331   Wound Cleansed Cleansed with saline 12/09/22 1614   Dressing/Treatment ABD; Alginate;Dry dressing;Roll gauze 12/09/22 1614   Offloading for Diabetic Foot Ulcers Offloading not required 12/09/22 1614   Wound Length (cm) 1.4 cm 12/09/22 1418   Wound Width (cm) 0.6 cm 12/09/22 1418   Wound Depth (cm) 0.2 cm 12/09/22 1418   Wound Surface Area (cm^2) 0.84 cm^2 12/09/22 1418   Change in Wound Size % (l*w) -740 12/09/22 1418   Wound Volume (cm^3) 0.168 cm^3 12/09/22 1418   Wound Healing % -740 12/09/22 1418   Post-Procedure Length (cm) 1.4 cm 12/09/22 1537   Post-Procedure Width (cm) 0.6 cm 12/09/22 1537   Post-Procedure Depth (cm) 0.3 cm 12/09/22 1537   Post-Procedure Surface Area (cm^2) 0.84 cm^2 12/09/22 1537   Post-Procedure Volume (cm^3) 0.252 cm^3 12/09/22 1537   Wound Assessment Fibrin;Pink/red 12/09/22 1418   Drainage Amount Small 12/09/22 1418   Drainage Description Yellow 12/09/22 1418   Odor None 12/09/22 1418   Patrizia-wound Assessment Fragile 12/09/22 1418   Number of days: 60       Wound 10/14/22 Pretibial Left;Posterior #4 BLOCK (Active)   Wound Image   12/01/22 1423   Wound Etiology Venous 10/14/22 1331   Wound Cleansed Vashe 11/18/22 1520   Dressing/Treatment Alginate;ABD 11/18/22 1520   Offloading for Diabetic Foot Ulcers Offloading not required 11/18/22 1520   Wound Length (cm) 0 cm 12/01/22 1423   Wound Width (cm) 0 cm 12/01/22 1423   Wound Depth (cm) 0 cm 12/01/22 1423   Wound Surface Area (cm^2) 0 cm^2 12/01/22 1423   Change in Wound Size % (l*w) 100 12/01/22 1423   Wound Volume (cm^3) 0 cm^3 12/01/22 1423   Wound Healing % 100 12/01/22 1423   Post-Procedure Length (cm) 1 cm 11/18/22 1520   Post-Procedure Width (cm) 4 cm 11/18/22 1520   Post-Procedure Depth (cm) 0.2 cm 11/18/22 1520   Post-Procedure Surface Area (cm^2) 4 cm^2 11/18/22 1520   Post-Procedure Volume (cm^3) 0.8 cm^3 11/18/22 1520   Wound Assessment Dry 12/01/22 1423   Drainage Amount None 12/01/22 1423   Drainage Description Yellow 11/18/22 1420   Odor None 12/01/22 1423   Patrizia-wound Assessment Intact 12/01/22 1423   Number of days: 60        Procedure Note  Indications:  Based on my examination of this patient's wound(s)/ulcer(s) today, debridement is required to promote healing and evaluate the wound base. Performed by: TRAVIS Michel CNP    Consent obtained:  Yes    Time out taken:  Yes    Pain Control: Anesthetic  Anesthetic: 4% Lidocaine Liquid Topical     Debridement:Excisional Debridement    Using curette the wound(s)/ulcer(s) was/were sharply debrided down through and including the removal of subcutaneous tissue. Devitalized Tissue Debrided:  fibrin, biofilm, and slough to stimulate bleeding to promote healing, post debridement good bleeding base and wound edges noted    Wound/Ulcer #: 1, 2, and 4    Percent of Wound/Ulcer Debrided: 100%    Total Surface Area Debrided:  8.18 sq cm     Estimated Blood Loss:  Minimal  Hemostasis Achieved:  by pressure    Procedural Pain:  3  / 10   Post Procedural Pain:  3 / 10     Response to treatment:  Well tolerated by patient. A culture was not done. Plan:     Pt has never been a smoker   - Discussed relationship of smoking and negative affects on wound healing   - Emphasized importance of tobacco avoidace/cessation     In my professional opinion and based off the information that is available at this time this patient has appropriate indication for HBO Therapy: No    Treatment Note please see attached Discharge Instructions    Written patient dismissal instructions given to patient and signed by patient or POA.          Discharge Instructions         Visit Discharge/Physician Orders     Discharge condition: Stable     Assessment of pain at discharge:MINIMAL     Anesthetic used: 4% LIDOCAINE     Discharge to: Home     Left via:Private automobile     Accompanied by: accompanied by child     ECF/HHA: [facility]     Dressing Orders:LEFT LEG ULCERS CLEANSE WITH NORMAL SALINE APPLY gentamicin cream ALGINATE ABD KERLIX AND SPANDIGRIP CHANGE DAILY AND PRN. Refill ordered for clobetasol     Treatment Orders:  EAT FOODS HIGH IN PROTEIN AND VITAMIN C.      MULTIVITAMIN DAILY      Arterial studies ordered      Oral antifungal  and steroid ointment sent to [address] followup visit ________3 WEEKs__Nguyen Teixeira_____12/1/22 at 2pm Dr. Baez____________  (Please note your next appointment above and if you are unable to keep, kindly give a 24 hour notice. Thank you.)     Physician signature:__________________________        If you experience any of the following, please call the [facility] during business hours:     * Increase in Pain  * Temperature over 101  * Increase in drainage from your wound  * Drainage with a foul odor  * Bleeding  * Increase in swelling  * Need for compression bandage changes due to slippage, breakthrough drainage. If you need medical attention outside of the business hours of the [facility] please contact your PCP or go to the nearest emergency room.                                                                                                       Electronically signed by TRAVIS Shepard CNP on 12/13/2022 at 3:51 PM

## 2022-12-16 ENCOUNTER — HOSPITAL ENCOUNTER (OUTPATIENT)
Dept: WOUND CARE | Age: 82
Discharge: HOME | End: 2022-12-16

## 2022-12-20 NOTE — DISCHARGE INSTRUCTIONS
Visit Discharge/Physician Orders     Discharge condition: Stable     Assessment of pain at discharge:MINIMAL     Anesthetic used: 4% LIDOCAINE     Discharge to: Home     Left via:Private automobile     Accompanied by: accompanied by child     ECF/HHA: [facility]     Dressing Orders:LEFT LEG ULCERS CLEANSE WITH NORMAL SALINE APPLY gentamicin cream ALGINATE  [address] or every other day. Refill ordered for clobetasol     Treatment Orders:  EAT FOODS HIGH IN PROTEIN AND VITAMIN C. May use aquafor for dry skin on legs. MULTIVITAMIN DAILY      Arterial studies ordered - received from Erin reviewed (in media)     [facility] followup visit ________1 WEEK__Nguyen Teixeira_________________  (Please note your next appointment above and if you are unable to keep, kindly give a 24 hour notice. Thank you.)     Physician signature:__________________________        If you experience any of the following, please call the [address] [place] [address] during business hours:     * Increase in Pain  * Temperature over 101  * Increase in drainage from your wound  * Drainage with a foul odor  * Bleeding  * Increase in swelling  * Need for compression bandage changes due to slippage, breakthrough drainage. If you need medical attention outside of the business hours of the [address] [place] [address] please contact your PCP or go to the nearest emergency room.

## 2022-12-23 ENCOUNTER — HOSPITAL ENCOUNTER (OUTPATIENT)
Dept: WOUND CARE | Age: 82
Discharge: HOME | End: 2022-12-23

## 2023-01-03 NOTE — DISCHARGE INSTRUCTIONS
Visit Discharge/Physician Orders     Discharge condition: Stable     Assessment of pain at discharge:MINIMAL     Anesthetic used: 4% LIDOCAINE     Discharge to: Home     Left via:Private automobile     Accompanied by: accompanied by child     ECF/HHA: [facility]     Dressing Orders:LEFT LEG ULCERS CLEANSE WITH NORMAL SALINE APPLY gentamicin cream ALGINATE  [address] or every other day. Script sent in for Medrol dose pack. Begin to take as directed. Treatment Orders:  EAT FOODS HIGH IN PROTEIN AND VITAMIN C. Discontinue aquafor. MULTIVITAMIN DAILY      Arterial studies ordered - received from Erin reviewed (in media)     [facility] followup visit ________1 WEEK__Nguyen Teixeira_________________  (Please note your next appointment above and if you are unable to keep, kindly give a 24 hour notice.  Thank you.)     Physician signature:__________________________        If you experience any of the following, please call the [address] during business hours:     * Increase in Pain  * Temperature over 101

## 2023-01-06 ENCOUNTER — HOSPITAL ENCOUNTER (OUTPATIENT)
Dept: WOUND CARE | Age: 83
Discharge: HOME | End: 2023-01-06
Payer: MEDICARE

## 2023-01-06 VITALS
WEIGHT: 160 LBS | HEIGHT: 62 IN | TEMPERATURE: 97 F | HEART RATE: 66 BPM | SYSTOLIC BLOOD PRESSURE: 190 MMHG | DIASTOLIC BLOOD PRESSURE: 72 MMHG | RESPIRATION RATE: 16 BRPM | BODY MASS INDEX: 29.44 KG/M2

## 2023-01-06 DIAGNOSIS — L97.922 NON-PRESSURE ULCER OF LOWER EXTREMITY, LEFT, WITH FAT LAYER EXPOSED (HCC): Primary | ICD-10-CM

## 2023-01-06 PROCEDURE — 6370000000 HC RX 637 (ALT 250 FOR IP): Performed by: NURSE PRACTITIONER

## 2023-01-06 PROCEDURE — 11042 DBRDMT SUBQ TIS 1ST 20SQCM/<: CPT

## 2023-01-06 RX ORDER — GINSENG 100 MG
CAPSULE ORAL ONCE
OUTPATIENT
Start: 2023-01-06 | End: 2023-01-06

## 2023-01-06 RX ORDER — GENTAMICIN SULFATE 1 MG/G
OINTMENT TOPICAL ONCE
OUTPATIENT
Start: 2023-01-06 | End: 2023-01-06

## 2023-01-06 RX ORDER — BACITRACIN, NEOMYCIN, POLYMYXIN B 400; 3.5; 5 [USP'U]/G; MG/G; [USP'U]/G
OINTMENT TOPICAL ONCE
OUTPATIENT
Start: 2023-01-06 | End: 2023-01-06

## 2023-01-06 RX ORDER — LIDOCAINE 50 MG/G
OINTMENT TOPICAL ONCE
OUTPATIENT
Start: 2023-01-06 | End: 2023-01-06

## 2023-01-06 RX ORDER — LIDOCAINE HYDROCHLORIDE 20 MG/ML
JELLY TOPICAL ONCE
OUTPATIENT
Start: 2023-01-06 | End: 2023-01-06

## 2023-01-06 RX ORDER — LIDOCAINE 40 MG/G
CREAM TOPICAL ONCE
OUTPATIENT
Start: 2023-01-06 | End: 2023-01-06

## 2023-01-06 RX ORDER — METHYLPREDNISOLONE 4 MG/1
TABLET ORAL
Qty: 1 KIT | Refills: 0 | Status: SHIPPED | OUTPATIENT
Start: 2023-01-06 | End: 2023-01-12

## 2023-01-06 RX ORDER — BETAMETHASONE DIPROPIONATE 0.05 %
OINTMENT (GRAM) TOPICAL ONCE
OUTPATIENT
Start: 2023-01-06 | End: 2023-01-06

## 2023-01-06 RX ORDER — CLOBETASOL PROPIONATE 0.5 MG/G
OINTMENT TOPICAL ONCE
OUTPATIENT
Start: 2023-01-06 | End: 2023-01-06

## 2023-01-06 RX ORDER — LIDOCAINE HYDROCHLORIDE 40 MG/ML
SOLUTION TOPICAL ONCE
OUTPATIENT
Start: 2023-01-06 | End: 2023-01-06

## 2023-01-06 RX ORDER — BACITRACIN ZINC AND POLYMYXIN B SULFATE 500; 1000 [USP'U]/G; [USP'U]/G
OINTMENT TOPICAL ONCE
OUTPATIENT
Start: 2023-01-06 | End: 2023-01-06

## 2023-01-06 RX ORDER — LIDOCAINE HYDROCHLORIDE 40 MG/ML
SOLUTION TOPICAL ONCE
Status: COMPLETED | OUTPATIENT
Start: 2023-01-06 | End: 2023-01-06

## 2023-01-06 RX ADMIN — LIDOCAINE HYDROCHLORIDE 10 ML: 40 SOLUTION TOPICAL at 16:13

## 2023-01-17 NOTE — DISCHARGE INSTRUCTIONS
Visit Discharge/Physician Orders     Discharge condition: Stable     Assessment of pain at discharge:MINIMAL     Anesthetic used: 4% LIDOCAINE     Discharge to: Home     Left via:Private automobile     Accompanied by: accompanied by child     ECF/HHA: [facility]     Dressing Orders:LEFT LEG ULCERS CLEANSE WITH NORMAL SALINE APPLY drawtex,  ABD,  KERLIX AND SPANDIGRIP CHANGE DAILY or every other day. Stop Gentamycin for now. Wound culture taken in clinic today       Treatment Orders:  EAT FOODS HIGH IN PROTEIN AND VITAMIN C. Discontinue aquafor. MULTIVITAMIN DAILY      Arterial studies ordered - received from Erin reviewed (in media)     [facility] followup visit _______2 WEEK__Nguyen Teixeira_________________  (Please note your next appointment above and if you are unable to keep, kindly give a 24 hour notice.  Thank you.)     Physician signature:__________________________        If you experience any of the following, please call the [facility] [address] during business hours:     * Increase in Pain  * Temperature over 101

## 2023-01-20 ENCOUNTER — HOSPITAL ENCOUNTER (OUTPATIENT)
Dept: WOUND CARE | Age: 83
Discharge: HOME | End: 2023-01-20
Payer: MEDICARE

## 2023-01-20 VITALS — DIASTOLIC BLOOD PRESSURE: 80 MMHG | TEMPERATURE: 97.8 F | SYSTOLIC BLOOD PRESSURE: 192 MMHG | HEART RATE: 64 BPM

## 2023-01-20 DIAGNOSIS — L97.922 NON-PRESSURE ULCER OF LOWER EXTREMITY, LEFT, WITH FAT LAYER EXPOSED (HCC): Primary | ICD-10-CM

## 2023-01-20 PROCEDURE — 87205 SMEAR GRAM STAIN: CPT

## 2023-01-20 PROCEDURE — 87075 CULTR BACTERIA EXCEPT BLOOD: CPT

## 2023-01-20 PROCEDURE — 6370000000 HC RX 637 (ALT 250 FOR IP): Performed by: NURSE PRACTITIONER

## 2023-01-20 PROCEDURE — 87070 CULTURE OTHR SPECIMN AEROBIC: CPT

## 2023-01-20 PROCEDURE — 11042 DBRDMT SUBQ TIS 1ST 20SQCM/<: CPT

## 2023-01-20 RX ORDER — LIDOCAINE 50 MG/G
OINTMENT TOPICAL ONCE
OUTPATIENT
Start: 2023-01-20 | End: 2023-01-20

## 2023-01-20 RX ORDER — LIDOCAINE HYDROCHLORIDE 40 MG/ML
SOLUTION TOPICAL ONCE
Status: COMPLETED | OUTPATIENT
Start: 2023-01-20 | End: 2023-01-20

## 2023-01-20 RX ORDER — LIDOCAINE HYDROCHLORIDE 20 MG/ML
JELLY TOPICAL ONCE
OUTPATIENT
Start: 2023-01-20 | End: 2023-01-20

## 2023-01-20 RX ORDER — BACITRACIN ZINC AND POLYMYXIN B SULFATE 500; 1000 [USP'U]/G; [USP'U]/G
OINTMENT TOPICAL ONCE
OUTPATIENT
Start: 2023-01-20 | End: 2023-01-20

## 2023-01-20 RX ORDER — GENTAMICIN SULFATE 1 MG/G
OINTMENT TOPICAL ONCE
OUTPATIENT
Start: 2023-01-20 | End: 2023-01-20

## 2023-01-20 RX ORDER — LIDOCAINE HYDROCHLORIDE 40 MG/ML
SOLUTION TOPICAL ONCE
OUTPATIENT
Start: 2023-01-20 | End: 2023-01-20

## 2023-01-20 RX ORDER — CLOBETASOL PROPIONATE 0.5 MG/G
OINTMENT TOPICAL ONCE
OUTPATIENT
Start: 2023-01-20 | End: 2023-01-20

## 2023-01-20 RX ORDER — GINSENG 100 MG
CAPSULE ORAL ONCE
OUTPATIENT
Start: 2023-01-20 | End: 2023-01-20

## 2023-01-20 RX ORDER — LIDOCAINE 40 MG/G
CREAM TOPICAL ONCE
OUTPATIENT
Start: 2023-01-20 | End: 2023-01-20

## 2023-01-20 RX ORDER — BACITRACIN, NEOMYCIN, POLYMYXIN B 400; 3.5; 5 [USP'U]/G; MG/G; [USP'U]/G
OINTMENT TOPICAL ONCE
OUTPATIENT
Start: 2023-01-20 | End: 2023-01-20

## 2023-01-20 RX ORDER — BETAMETHASONE DIPROPIONATE 0.05 %
OINTMENT (GRAM) TOPICAL ONCE
OUTPATIENT
Start: 2023-01-20 | End: 2023-01-20

## 2023-01-20 RX ADMIN — LIDOCAINE HYDROCHLORIDE 5 ML: 40 SOLUTION TOPICAL at 14:52

## 2023-01-20 NOTE — PROGRESS NOTES
[facility]  History and Physical/Consultation  Podiatry    Referring Physician : Marquez Goyal MD  Tri Balderas  MEDICAL RECORD NUMBER:  [id number]  AGE: 80 y.o. GENDER: female  : 1940  EPISODE DATE:  2023  Subjective:     Chief Complaint   Patient presents with    Wound Check     Left foot           HISTORY of PRESENT ILLNESS HPI     Tri Balderas is a 80 y.o. female who presents today for wound/ulcer evaluation. History of Wound Context:  The patient has had a wounds of medial, lateral, posterior left leg and left heel which was first noted approximately three weeks ago. This has not been treated. On their initial visit to the wound healing center, 10/14/2022,  the patient has noted that the wound has not been improving. The patient has had similar previous wounds in the past.      Pt is not on abx at time of initial visit.     10/14/22  Patient presents with left leg erythema, pruritis and dry scaly skin to left leg   Eschar present on left heel   Patient is not diabetic  Patient has a history of peripheral vascular disease - taking Eliquis  Vascular studies ordered   Wounds debrided   Aquacel and Spandagrip, Lamisil to surrounding inflamed tissue     10/21/22  Wounds overall improving   Wounds debrided   Doxy 100mg BIB [id number] prescribed   Continue Aquacel and Spandagrip, Lamisil to surrounding inflamed tissue     10/28/22  Wounds overall appearance improving, decreased erythema   Left heel wound healed   Wounds not debrided today  Gentamycin cream prescribed   Continue Aquacel and Spandagrip, Lamisil to surrounding inflamed tissue   Consult to ID, Dr. Ramo Aviles       22  Wounds overall appearance improving   Wounds debrided   Continue Gent, Aquacel and Spandagrip, Lamisil to surrounding inflamed tissue     22  Wounds improving   Wounds debrided   Continue Gent, Aquacel and Spandagrip, Lamisil to surrounding inflamed tissue     23  Wounds measuring slightly larger, increased drainage   Wounds debrided   Culture obtained   Discontinue Gent  Drawtex and spandagrip daily     Wound/Ulcer Pain Timing/Severity: none  Quality of pain: N/A  Severity:  0 / 10   Modifying Factors: None  Associated Signs/Symptoms: edema and erythema    Ulcer Identification:  Ulcer Type: venous and undetermined  Contributing Factors: edema, venous stasis, and lymphedema    Diabetic/Pressure/Non Pressure Ulcers onl y:  Ulcer: Non-Pressure ulcer, fat layer exposed    If patient has diabetic lower extremity wounds  [place] Classification of diabetic lower extremity wounds:    Grade Description   []  0 No open wound   []  1 Superficial ulcer involving the full skin thickness   []  2 Deep ulcer involves ligament, tendon, joint capsule, or fascia  No bone involvement or abscess presence   []  3 Deep Ulcer with abcess formation and/or osteomyelitis   []  4 Localized gangrene   []  5 Extensive gangrene of the foot     Wound: N/A        PAST MEDICAL HISTORY      Diagnosis Date    Acquired hypothyroidism 8/14/2017    Arthritis     Blood transfusion reaction     Chronic kidney disease     History of blood transfusion     Hypertension     Lymphedema of both lower extremities 9/6/2017    Open wound of left great toe 10/18/2017    Other disorders of kidney and ureter in diseases classified elsewhere     Pelvic fracture (HCC)     Skin ulcer of left calf with fat layer exposed ([address] 9/6/2017    Skin ulcer of left calf, limited to breakdown of skin ([address] 9/6/2017    Ulcer of left lower leg ([address] 3/24/2014    Venous stasis ulcer of left calf limited to breakdown of skin ([address] 3/24/2014     Past Surgical History:   Procedure Laterality Date    COLONOSCOPY      ENDOSCOPY, COLON, DIAGNOSTIC      EYE SURGERY      cateract and lazor surgery 2015    FRACTURE SURGERY  2010    RT HIP    HIP FRACTURE SURGERY Left 08/08/2014    ORIF left hip    TONSILLECTOMY      as child    TOTAL HIP ARTHROPLASTY Left 10/17/2014    revision with removal of hardware     Family History   Problem Relation Age of Onset    Mult Sclerosis Father      Social History     Tobacco Use    Smoking status: Never    Smokeless tobacco: Never   Vaping Use    Vaping Use: Never used   Substance Use Topics    Alcohol use: Not Currently    Drug use: Never     Allergies   Allergen Reactions    Aspirin Itching    Other Nausea And Vomiting     FLU SHOT    Tape [Adhesive Tape] Swelling     Current Outpatient Medications on File Prior to Encounter   Medication Sig Dispense Refill    bumetanide (BUMEX) 1 MG tablet TAKE 1 TABLET BY MOUTH EVERY DAY 90 tablet 1    vitamin C (ASCORBIC ACID) 500 MG tablet Take 500 mg by mouth daily      clobetasol (TEMOVATE) 0.05 % cream Apply topically 2 times daily. 45 g 0    ferrous sulfate (IRON 325) 325 (65 Fe) MG tablet Take 325 mg by mouth daily (with breakfast)      acetaminophen (TYLENOL) 325 MG tablet Take 650 mg by mouth every 4 hours as needed for Pain      calcium-vitamin D (OSCAL-500) 500-200 MG-UNIT per tablet Take 1 tablet by mouth 2 times daily (Patient taking differently: Take 1 tablet by mouth daily) 30 tablet 3    apixaban (ELIQUIS) 5 MG TABS tablet Take 1 tablet by mouth 2 times daily 60 tablet 0    metoprolol tartrate (LOPRESSOR) 25 MG tablet Take 1 tablet by mouth 2 times daily 60 tablet 0    Cholecalciferol (VITAMIN D) 2000 units TABS tablet Take 2.5 tablets by mouth 2 times daily (Patient taking differently: Take 4,000 Units by mouth daily) 150 tablet 0    levothyroxine (SYNTHROID) 75 MCG tablet Take 75 mcg by mouth Daily       No current facility-administered medications on file prior to encounter.        REVIEW OF SYSTEMS   ROS : All others Negative if blank [], Positive if [x]  General Vascular   [] Fevers [] Claudication   [] Chills [] Rest Pain   Skin Neurologic   [x] Tissue Loss [] Lower extremity neuropathy     Objective:    BP (!) 192/80   Pulse 64   Temp 97.8 °F (36.6 °C) (Temporal)   Wt Readings from Last 3 Encounters:   01/06/23 160 lb (72.6 kg)   11/18/22 160 lb (72.6 kg)   11/11/22 160 lb 6.4 oz (72.8 kg)       PHYSICAL EXAM   CONSTITUTIONAL:   Awake, alert, cooperative   PSYCHIATRIC :  Oriented to time, place and person      normal insight to disease process  EXTREMITIES:   R LE Open wounds are noted   Skin color is abnormal with erythema, dry, flaky skin   Edema is  noted   Sensation intact to light touch   Palpation of the foot does not cause pain   4/5 strength DF/PF  L LE Open wounds are not noted   Skin color is abnormal with erythema, dry, flaky skin   Edema is  noted   Sensation intact to light touch   Palpation of the foot does not cause pain   4/5 strength DF/PF  R dorsalis pedis +1 L dorsalis pedis +1     Assessment:     Problem List Items Addressed This Visit       Non-pressure ulcer of lower extremity, left, with fat layer exposed ([facility] - Primary    Relevant Orders    Initiate Outpatient Wound Care Protocol       Pre Debridement Measurements:  Are located in the [place]  Documentation Flow Sheet  Post Debridement Measurements:  Wound/Ulcer Descriptions are Pre Debridement except measurements:     Pressure Ulcer 09/06/17 Leg Left; Lower #2 (1-5-17 acquired) (Active)   Number of days: 1962       Wound 10/14/22 Ankle Left;Medial #1 (Active)   Wound Image   01/06/23 1605   Wound Etiology Venous 10/14/22 1331   Dressing Status New dressing applied 01/06/23 1744   Wound Cleansed Cleansed with saline 01/06/23 1744   Dressing/Treatment ABD; Alginate;Roll gauze 01/06/23 1744   Offloading for Diabetic Foot Ulcers Offloading not required 01/06/23 1744   Wound Length (cm) 2.5 cm 01/20/23 1453   Wound Width (cm) 3.5 cm 01/20/23 1453   Wound Depth (cm) 0.2 cm 01/20/23 1453   Wound Surface Area (cm^2) 8.75 cm^2 01/20/23 1453   Change in Wound Size % (l*w) -1036.36 01/20/23 1453   Wound Volume (cm^3) 1.75 cm^3 01/20/23 1453   Wound Healing % -1036 01/20/23 1453   Post-Procedure Length (cm) 2.5 cm 01/20/23 1610   Post-Procedure Width (cm) 3.5 cm 01/20/23 1610   Post-Procedure Depth (cm) 0.3 cm 01/20/23 1610   Post-Procedure Surface Area (cm^2) 8.75 cm^2 01/20/23 1610   Post-Procedure Volume (cm^3) 2.625 cm^3 01/20/23 1610   Wound Assessment Fibrin;Pink/red 01/20/23 1453   Drainage Amount Small 01/20/23 1453   Drainage Description Yellow 01/20/23 1453   Odor None 01/20/23 1453   Patrizia-wound Assessment Intact 01/20/23 1453   Number of days: 98       Wound 10/14/22 Ankle Left;Lateral #2 (Active)   Wound Image   01/06/23 1605   Wound Etiology Venous 10/14/22 1331   Dressing Status New dressing applied 01/06/23 1744   Wound Cleansed Cleansed with saline 01/06/23 1744   Dressing/Treatment ABD;Alginate;Dry dressing;Roll gauze 01/06/23 1744   Offloading for Diabetic Foot Ulcers Offloading not ordered 01/06/23 1744   Wound Length (cm) 1.4 cm 01/20/23 1453   Wound Width (cm) 0.7 cm 01/20/23 1453   Wound Depth (cm) 0.2 cm 01/20/23 1453   Wound Surface Area (cm^2) 0.98 cm^2 01/20/23 1453   Change in Wound Size % (l*w) -880 01/20/23 1453   Wound Volume (cm^3) 0.196 cm^3 01/20/23 1453   Wound Healing % -880 01/20/23 1453   Post-Procedure Length (cm) 1.4 cm 01/20/23 1610   Post-Procedure Width (cm) 0.7 cm 01/20/23 1610   Post-Procedure Depth (cm) 0.3 cm 01/20/23 1610   Post-Procedure Surface Area (cm^2) 0.98 cm^2 01/20/23 1610   Post-Procedure Volume (cm^3) 0.294 cm^3 01/20/23 1610   Wound Assessment Fibrin;Pink/red 01/20/23 1453   Drainage Amount Small 01/20/23 1453   Drainage Description Yellow 01/20/23 1453   Odor None 01/20/23 1453   Patrizia-wound Assessment Intact 01/20/23 1453   Number of days: 98       Wound 10/14/22 Pretibial Left;Posterior #4 BLOCK (Active)   Wound Image   12/01/22 1423   Wound Etiology Venous 10/14/22 1331   Wound Cleansed Vashe 11/18/22 1520   Dressing/Treatment Alginate;ABD 11/18/22 1520   Offloading for Diabetic Foot Ulcers Offloading not required 11/18/22 1520   Wound Length (cm) 0 cm 12/01/22 1423   Wound  Width (cm) 0 cm 12/01/22 1423   Wound Depth (cm) 0 cm 12/01/22 1423   Wound Surface Area (cm^2) 0 cm^2 12/01/22 1423   Change in Wound Size % (l*w) 100 12/01/22 1423   Wound Volume (cm^3) 0 cm^3 12/01/22 1423   Wound Healing % 100 12/01/22 1423   Post-Procedure Length (cm) 1 cm 11/18/22 1520   Post-Procedure Width (cm) 4 cm 11/18/22 1520   Post-Procedure Depth (cm) 0.2 cm 11/18/22 1520   Post-Procedure Surface Area (cm^2) 4 cm^2 11/18/22 1520   Post-Procedure Volume (cm^3) 0.8 cm^3 11/18/22 1520   Wound Assessment Dry 12/01/22 1423   Drainage Amount None 12/01/22 1423   Drainage Description Yellow 11/18/22 1420   Odor None 12/01/22 1423   Patrizia-wound Assessment Intact 12/01/22 1423   Number of days: 98        Procedure Note  Indications:  Based on my examination of this patient's wound(s)/ulcer(s) today, debridement is required to promote healing and evaluate the wound base. Performed by: TRAVIS Vitale CNP    Consent obtained:  Yes    Time out taken:  Yes    Pain Control: Anesthetic  Anesthetic: 4% Lidocaine Liquid Topical     Debridement:Excisional Debridement    Using curette the wound(s)/ulcer(s) was/were sharply debrided down through and including the removal of subcutaneous tissue. Devitalized Tissue Debrided:  fibrin, biofilm, and slough to stimulate bleeding to promote healing, post debridement good bleeding base and wound edges noted    Wound/Ulcer #: 1 and 2    Percent of Wound/Ulcer Debrided: 100%    Total Surface Area Debrided:  4.98 sq cm     Estimated Blood Loss:  Minimal  Hemostasis Achieved:  by pressure    Procedural Pain:  3  / 10   Post Procedural Pain:  3 / 10     Response to treatment:  Well tolerated by patient. A culture was done.       Plan:     Pt has never been a smoker   - Discussed relationship of smoking and negative affects on wound healing   - Emphasized importance of tobacco avoidace/cessation     In my professional opinion and based off the information that is available at this time this patient has appropriate indication for HBO Therapy: No    Treatment Note please see attached Discharge Instructions    Written patient dismissal instructions given to patient and signed by patient or POA.         Discharge Instructions         Visit Discharge/Physician Orders     Discharge condition: Stable     Assessment of pain at discharge:MINIMAL     Anesthetic used: 4% LIDOCAINE     Discharge to: Home     Left via:Private automobile     Accompanied by: accompanied by child     ECF/HHA: [facility]     Dressing Orders:LEFT LEG ULCERS CLEANSE WITH NORMAL SALINE APPLY drawtex,  ABD,  KERLIX AND SPANDIGRIP CHANGE DAILY or every other day. Stop Gentamycin for now.    Wound culture taken in clinic today       Treatment Orders:  EAT FOODS HIGH IN PROTEIN AND VITAMIN C. Discontinue aquafor.     MULTIVITAMIN DAILY      Arterial studies ordered - received from [place] reviewed (in media)     [facility] followup visit _______2 WEEK__Nguyen Teixeira_________________  (Please note your next appointment above and if you are unable to keep, kindly give a 24 hour notice. Thank you.)     Physician signature:__________________________        If you experience any of the following, please call the Wound Care Center during business hours:     * Increase in Pain  * Temperature over 101                    Electronically signed by TRAVIS Bob CNP on 1/20/2023 at 6:24 PM

## 2023-01-22 LAB
GRAM STAIN RESULT: ABNORMAL
ORGANISM: ABNORMAL
WOUND/ABSCESS: ABNORMAL

## 2023-01-31 NOTE — DISCHARGE INSTRUCTIONS
Visit Discharge/Physician Orders     Discharge condition: Stable     Assessment of pain at discharge:MINIMAL     Anesthetic used: 4% LIDOCAINE     Discharge to: Home     Left via:Private automobile     Accompanied by: accompanied by child     YOAVF/HHA: DANIEL *new order     Dressing Orders:LEFT LEG ULCERS CLEANSE WITH NORMAL SALINE APPLY ALGINATE AG,  ABD,  KERLIX AND SPANDIGRIP CHANGE DAILY or every other day. Stop Gentamycin for now. Wound culture reviewed in clinic today        Treatment Orders:  EAT FOODS HIGH IN PROTEIN AND VITAMIN C. Discontinue aquafor. MULTIVITAMIN DAILY      Arterial studies ordered - received from [place] reviewed (in media)     [facility] followup visit _______2 WEEK__Nguyen Teixeira_________________  (Please note your next appointment above and if you are unable to keep, kindly give a 24 hour notice.  Thank you.)     Physician signature:__________________________        If you experience any of the following, please call the [facility] [address] during business hours:     * Increase in Pain  * Temperature over 101

## 2023-02-03 ENCOUNTER — HOSPITAL ENCOUNTER (OUTPATIENT)
Dept: WOUND CARE | Age: 83
Discharge: HOME | End: 2023-02-03
Payer: MEDICARE

## 2023-02-03 VITALS
HEART RATE: 62 BPM | DIASTOLIC BLOOD PRESSURE: 62 MMHG | TEMPERATURE: 97.4 F | RESPIRATION RATE: 17 BRPM | SYSTOLIC BLOOD PRESSURE: 191 MMHG

## 2023-02-03 DIAGNOSIS — L97.922 NON-PRESSURE ULCER OF LOWER EXTREMITY, LEFT, WITH FAT LAYER EXPOSED (HCC): Primary | ICD-10-CM

## 2023-02-03 PROCEDURE — 6370000000 HC RX 637 (ALT 250 FOR IP): Performed by: NURSE PRACTITIONER

## 2023-02-03 PROCEDURE — 11042 DBRDMT SUBQ TIS 1ST 20SQCM/<: CPT

## 2023-02-03 RX ORDER — GENTAMICIN SULFATE 1 MG/G
OINTMENT TOPICAL ONCE
OUTPATIENT
Start: 2023-02-03 | End: 2023-02-03

## 2023-02-03 RX ORDER — CLOBETASOL PROPIONATE 0.5 MG/G
OINTMENT TOPICAL ONCE
OUTPATIENT
Start: 2023-02-03 | End: 2023-02-03

## 2023-02-03 RX ORDER — BACITRACIN, NEOMYCIN, POLYMYXIN B 400; 3.5; 5 [USP'U]/G; MG/G; [USP'U]/G
OINTMENT TOPICAL ONCE
OUTPATIENT
Start: 2023-02-03 | End: 2023-02-03

## 2023-02-03 RX ORDER — LIDOCAINE HYDROCHLORIDE 40 MG/ML
SOLUTION TOPICAL ONCE
Status: COMPLETED | OUTPATIENT
Start: 2023-02-03 | End: 2023-02-03

## 2023-02-03 RX ORDER — BACITRACIN ZINC AND POLYMYXIN B SULFATE 500; 1000 [USP'U]/G; [USP'U]/G
OINTMENT TOPICAL ONCE
OUTPATIENT
Start: 2023-02-03 | End: 2023-02-03

## 2023-02-03 RX ORDER — LIDOCAINE HYDROCHLORIDE 40 MG/ML
SOLUTION TOPICAL ONCE
OUTPATIENT
Start: 2023-02-03 | End: 2023-02-03

## 2023-02-03 RX ORDER — LIDOCAINE 50 MG/G
OINTMENT TOPICAL ONCE
OUTPATIENT
Start: 2023-02-03 | End: 2023-02-03

## 2023-02-03 RX ORDER — LIDOCAINE HYDROCHLORIDE 20 MG/ML
JELLY TOPICAL ONCE
OUTPATIENT
Start: 2023-02-03 | End: 2023-02-03

## 2023-02-03 RX ORDER — BETAMETHASONE DIPROPIONATE 0.05 %
OINTMENT (GRAM) TOPICAL ONCE
OUTPATIENT
Start: 2023-02-03 | End: 2023-02-03

## 2023-02-03 RX ORDER — LIDOCAINE 40 MG/G
CREAM TOPICAL ONCE
OUTPATIENT
Start: 2023-02-03 | End: 2023-02-03

## 2023-02-03 RX ORDER — GINSENG 100 MG
CAPSULE ORAL ONCE
OUTPATIENT
Start: 2023-02-03 | End: 2023-02-03

## 2023-02-03 RX ADMIN — LIDOCAINE HYDROCHLORIDE: 40 SOLUTION TOPICAL at 14:56

## 2023-02-03 NOTE — PROGRESS NOTES
[facility]  History and Physical/Consultation  Podiatry    Referring Physician : Jamaal Medeiros MD  Yamilet Loera  MEDICAL RECORD NUMBER:  [id number]  AGE: 80 y.o. GENDER: female  : 1940  EPISODE DATE:  2/3/2023  Subjective:     Chief Complaint   Patient presents with    Wound Check     Left leg         HISTORY of PRESENT ILLNESS HPI     Yamilet Loera is a 80 y.o. female who presents today for wound/ulcer evaluation. History of Wound Context:  The patient has had a wounds of medial, lateral, posterior left leg and left heel which was first noted approximately three weeks ago. This has not been treated. On their initial visit to the wound healing center, 10/14/2022,  the patient has noted that the wound has not been improving. The patient has had similar previous wounds in the past.      Pt is not on abx at time of initial visit.     10/14/22  Patient presents with left leg erythema, pruritis and dry scaly skin to left leg   Eschar present on left heel   Patient is not diabetic  Patient has a history of peripheral vascular disease - taking Eliquis  Vascular studies ordered   Wounds debrided   Aquacel and Spandagrip, Lamisil to surrounding inflamed tissue     10/21/22  Wounds overall improving   Wounds debrided   Doxy 100mg BIB [id number] prescribed   Continue Aquacel and Spandagrip, Lamisil to surrounding inflamed tissue     10/28/22  Wounds overall appearance improving, decreased erythema   Left heel wound healed   Wounds not debrided today  Gentamycin cream prescribed   Continue Aquacel and Spandagrip, Lamisil to surrounding inflamed tissue   Consult to Dr. Felix BARKLEY       22  Wounds overall appearance improving   Wounds debrided   Continue Gent, Aquacel and Spandagrip, Lamisil to surrounding inflamed tissue     22  Wounds improving   Wounds debrided   Continue Gent, Aquacel and Spandagrip, Lamisil to surrounding inflamed tissue     23  Wounds measuring slightly larger, increased drainage   Wounds debrided   Culture obtained   Discontinue Gent  Drawtex and spandagrip daily     2/3/23  Wound size stable, decreased drainage   Wounds debrided   Patient taking doxy per PCP  Aquacel ag, ABD and spandagrip daily     Wound/Ulcer Pain Timing/Severity: none  Quality of pain: N/A  Severity:  0 / 10   Modifying Factors: None  Associated Signs/Symptoms: edema and erythema    Ulcer Identification:  Ulcer Type: venous and undetermined  Contributing Factors: edema, venous stasis, and lymphedema    Diabetic/Pressure/Non Pressure Ulcers onl y:  Ulcer: Non-Pressure ulcer, fat layer exposed    If patient has diabetic lower extremity wounds  [place] Classification of diabetic lower extremity wounds:    Grade Description   []  0 No open wound   []  1 Superficial ulcer involving the full skin thickness   []  2 Deep ulcer involves ligament, tendon, joint capsule, or fascia  No bone involvement or abscess presence   []  3 Deep Ulcer with abcess formation and/or osteomyelitis   []  4 Localized gangrene   []  5 Extensive gangrene of the foot     Wound: N/A        PAST MEDICAL HISTORY      Diagnosis Date    Acquired hypothyroidism 8/14/2017    Arthritis     Blood transfusion reaction     Chronic kidney disease     History of blood transfusion     Hypertension     Lymphedema of both lower extremities 9/6/2017    Open wound of left great toe 10/18/2017    Other disorders of kidney and ureter in diseases classified elsewhere     Pelvic fracture (HCC)     Skin ulcer of left calf with fat layer exposed ([address] 9/6/2017    Skin ulcer of left calf, limited to breakdown of skin ([address] 9/6/2017    Ulcer of left lower leg ([address] 3/24/2014    Venous stasis ulcer of left calf limited to breakdown of skin ([address] 3/24/2014     Past Surgical History:   Procedure Laterality Date    COLONOSCOPY      ENDOSCOPY, COLON, DIAGNOSTIC      EYE SURGERY      cateract and lazor surgery 2015    FRACTURE SURGERY  2010    RT HIP    HIP FRACTURE SURGERY Left 08/08/2014    ORIF left hip    TONSILLECTOMY      as child    TOTAL HIP ARTHROPLASTY Left 10/17/2014    revision with removal of hardware     Family History   Problem Relation Age of Onset    Mult Sclerosis Father      Social History     Tobacco Use    Smoking status: Never    Smokeless tobacco: Never   Vaping Use    Vaping Use: Never used   Substance Use Topics    Alcohol use: Not Currently    Drug use: Never     Allergies   Allergen Reactions    Aspirin Itching    Other Nausea And Vomiting     FLU SHOT    Tape [Adhesive Tape] Swelling     Current Outpatient Medications on File Prior to Encounter   Medication Sig Dispense Refill    doxycycline hyclate (VIBRA-TABS) 100 MG tablet Take 1 tablet by mouth 2 times daily for 10 days 20 tablet 0    bumetanide (BUMEX) 1 MG tablet TAKE 1 TABLET BY MOUTH EVERY DAY 90 tablet 1    vitamin C (ASCORBIC ACID) 500 MG tablet Take 500 mg by mouth daily      clobetasol (TEMOVATE) 0.05 % cream Apply topically 2 times daily. 45 g 0    ferrous sulfate (IRON 325) 325 (65 Fe) MG tablet Take 325 mg by mouth daily (with breakfast)      acetaminophen (TYLENOL) 325 MG tablet Take 650 mg by mouth every 4 hours as needed for Pain      calcium-vitamin D (OSCAL-500) 500-200 MG-UNIT per tablet Take 1 tablet by mouth 2 times daily (Patient taking differently: Take 1 tablet by mouth daily) 30 tablet 3    apixaban (ELIQUIS) 5 MG TABS tablet Take 1 tablet by mouth 2 times daily 60 tablet 0    metoprolol tartrate (LOPRESSOR) 25 MG tablet Take 1 tablet by mouth 2 times daily 60 tablet 0    Cholecalciferol (VITAMIN D) 2000 units TABS tablet Take 2.5 tablets by mouth 2 times daily (Patient taking differently: Take 4,000 Units by mouth daily) 150 tablet 0    levothyroxine (SYNTHROID) 75 MCG tablet Take 75 mcg by mouth Daily       No current facility-administered medications on file prior to encounter.        REVIEW OF SYSTEMS   ROS : All others Negative if blank [], Positive if [x]  General Vascular   [] Fevers [] Claudication   [] Chills [] Rest Pain   Skin Neurologic   [x] Tissue Loss [] Lower extremity neuropathy     Objective:    BP (!) 191/62   Pulse 62   Temp 97.4 °F (36.3 °C) (Temporal)   Resp 17   Wt Readings from Last 3 Encounters:   01/06/23 160 lb (72.6 kg)   11/18/22 160 lb (72.6 kg)   11/11/22 160 lb 6.4 oz (72.8 kg)       PHYSICAL EXAM   CONSTITUTIONAL:   Awake, alert, cooperative   PSYCHIATRIC :  Oriented to time, place and person      normal insight to disease process  EXTREMITIES:   R LE Open wounds are noted   Skin color is abnormal with erythema, dry, flaky skin   Edema is  noted   Sensation intact to light touch   Palpation of the foot does not cause pain   4/5 strength DF/PF  L LE Open wounds are not noted   Skin color is abnormal with erythema, dry, flaky skin   Edema is  noted   Sensation intact to light touch   Palpation of the foot does not cause pain   4/5 strength DF/PF  R dorsalis pedis +1 L dorsalis pedis +1     Assessment:     Problem List Items Addressed This Visit       Non-pressure ulcer of lower extremity, left, with fat layer exposed ([facility] - Primary    Relevant Orders    Initiate Outpatient Wound Care Protocol       Pre Debridement Measurements:  Are located in the [place]  Documentation Flow Sheet  Post Debridement Measurements:  Wound/Ulcer Descriptions are Pre Debridement except measurements:     Pressure Ulcer 09/06/17 Leg Left; Lower #2 (1-5-17 acquired) (Active)   Number of days: 1976       Wound 10/14/22 Ankle Left;Medial #1 (Active)   Wound Image   01/06/23 1605   Wound Etiology Venous 10/14/22 1331   Dressing Status New dressing applied 01/06/23 1744   Wound Cleansed Cleansed with saline 01/06/23 1744   Dressing/Treatment ABD; Alginate;Roll gauze 01/06/23 1744   Offloading for Diabetic Foot Ulcers Offloading not required 01/06/23 1744   Wound Length (cm) 2.6 cm 02/03/23 1450   Wound Width (cm) 3.7 cm 02/03/23 1450   Wound Depth (cm) 0.2 cm 02/03/23 1450   Wound Surface Area (cm^2) 9.62 cm^2 02/03/23 1450   Change in Wound Size % (l*w) -1149.35 02/03/23 1450   Wound Volume (cm^3) 1.924 cm^3 02/03/23 1450   Wound Healing % -1149 02/03/23 1450   Post-Procedure Length (cm) 2.6 cm 02/03/23 1524   Post-Procedure Width (cm) 3.7 cm 02/03/23 1524   Post-Procedure Depth (cm) 0.3 cm 02/03/23 1524   Post-Procedure Surface Area (cm^2) 9.62 cm^2 02/03/23 1524   Post-Procedure Volume (cm^3) 2. 886 cm^3 02/03/23 1524   Wound Assessment Fibrin;Pink/red 02/03/23 1450   Drainage Amount Small 02/03/23 1450   Drainage Description Yellow 02/03/23 1450   Odor None 02/03/23 1450   Patrizia-wound Assessment Intact 02/03/23 1450   Number of days: 112       Wound 10/14/22 Ankle Left;Lateral #2 (Active)   Wound Image   02/03/23 1450   Wound Etiology Venous 10/14/22 1331   Dressing Status New dressing applied 01/06/23 1744   Wound Cleansed Cleansed with saline 01/06/23 1744   Dressing/Treatment ABD; Alginate;Dry dressing;Roll gauze 01/06/23 1744   Offloading for Diabetic Foot Ulcers Offloading not ordered 01/06/23 1744   Wound Length (cm) 1.3 cm 02/03/23 1450   Wound Width (cm) 0.8 cm 02/03/23 1450   Wound Depth (cm) 0.2 cm 02/03/23 1450   Wound Surface Area (cm^2) 1.04 cm^2 02/03/23 1450   Change in Wound Size % (l*w) -940 02/03/23 1450   Wound Volume (cm^3) 0.208 cm^3 02/03/23 1450   Wound Healing % -940 02/03/23 1450   Post-Procedure Length (cm) 1.3 cm 02/03/23 1524   Post-Procedure Width (cm) 0.8 cm 02/03/23 1524   Post-Procedure Depth (cm) 0.3 cm 02/03/23 1524   Post-Procedure Surface Area (cm^2) 1.04 cm^2 02/03/23 1524   Post-Procedure Volume (cm^3) 0.312 cm^3 02/03/23 1524   Wound Assessment Fibrin 02/03/23 1450   Drainage Amount Small 02/03/23 1450   Drainage Description Yellow 02/03/23 1450   Odor None 02/03/23 1450   Patrizia-wound Assessment Intact 02/03/23 1450   Number of days: 112            Procedure Note  Indications:  Based on my examination of this patient's wound(s)/ulcer(s) today, debridement is required to promote healing and evaluate the wound base. Performed by: TRAVIS Singh CNP    Consent obtained:  Yes    Time out taken:  Yes    Pain Control: Anesthetic  Anesthetic: 4% Lidocaine Liquid Topical     Debridement:Excisional Debridement    Using curette the wound(s)/ulcer(s) was/were sharply debrided down through and including the removal of subcutaneous tissue. Devitalized Tissue Debrided:  fibrin, biofilm, and slough to stimulate bleeding to promote healing, post debridement good bleeding base and wound edges noted    Wound/Ulcer #: 1 and 2    Percent of Wound/Ulcer Debrided: 100%    Total Surface Area Debrided:  10.66 sq cm     Estimated Blood Loss:  Minimal  Hemostasis Achieved:  by pressure    Procedural Pain:  3  / 10   Post Procedural Pain:  3 / 10     Response to treatment:  Well tolerated by patient. A culture was done. Plan:     Pt has never been a smoker   - Discussed relationship of smoking and negative affects on wound healing   - Emphasized importance of tobacco avoidace/cessation     In my professional opinion and based off the information that is available at this time this patient has appropriate indication for HBO Therapy: No    Treatment Note please see attached Discharge Instructions    Written patient dismissal instructions given to patient and signed by patient or POA. Discharge Instructions         Visit Discharge/Physician Orders     Discharge condition: Stable     Assessment of pain at discharge:MINIMAL     Anesthetic used: 4% LIDOCAINE     Discharge to: Home     Left via:Private automobile     Accompanied by: accompanied by child     F/HHA: [facility] *new order     Dressing Orders:LEFT LEG ULCERS CLEANSE WITH NORMAL SALINE APPLY ALGINATE AG,  ABD,  KERLIX AND SPANDIGRIP CHANGE DAILY or every other day. Stop Gentamycin for now.      Wound culture reviewed in clinic today        Treatment Orders:  EAT FOODS HIGH IN PROTEIN AND VITAMIN C. Discontinue aquafor. MULTIVITAMIN DAILY      Arterial studies ordered - received from Erin reviewed (in media)     [facility] followup visit _______2 WEEK__Nguyen Teixeira_________________  (Please note your next appointment above and if you are unable to keep, kindly give a 24 hour notice.  Thank you.)     Physician signature:__________________________        If you experience any of the following, please call the [address] [place] [address] Road during business hours:     * Increase in Pain  * Temperature over 101                                               Electronically signed by TRAVIS Shepard CNP on 2/3/2023 at 3:31 PM

## 2023-02-15 NOTE — DISCHARGE INSTRUCTIONS
Visit Discharge/Physician Orders     Discharge condition: Stable     Assessment of pain at discharge:MINIMAL     Anesthetic used: 4% LIDOCAINE     Discharge to: Home     Left via:Private automobile     Accompanied by: accompanied by child     ECF/HHA: [facility] *new order     Dressing Orders:LEFT LEG ULCERS CLEANSE WITH NORMAL SALINE APPLY Gentamycin,  PLAIN alginate, ABD,  KERLIX AND SPANDIGRIP CHANGE DAILY or every other day. Treatment Orders:  EAT FOODS HIGH IN PROTEIN AND VITAMIN C. Apply aquafor to dry skin. MULTIVITAMIN DAILY      Arterial studies ordered - received from Erin reviewed (in media)     [facility] followup visit _______2 WEEK__Nguyen Teixeira_________________  (Please note your next appointment above and if you are unable to keep, kindly give a 24 hour notice.  Thank you.)     Physician signature:__________________________        If you experience any of the following, please call the [facility] during business hours:     * Increase in Pain  * Temperature over 101

## 2023-02-17 ENCOUNTER — HOSPITAL ENCOUNTER (OUTPATIENT)
Dept: WOUND CARE | Age: 83
Discharge: HOME | End: 2023-02-17
Payer: MEDICARE

## 2023-02-17 VITALS
RESPIRATION RATE: 16 BRPM | TEMPERATURE: 96.9 F | DIASTOLIC BLOOD PRESSURE: 78 MMHG | HEART RATE: 64 BPM | SYSTOLIC BLOOD PRESSURE: 170 MMHG

## 2023-02-17 DIAGNOSIS — L97.922 NON-PRESSURE ULCER OF LOWER EXTREMITY, LEFT, WITH FAT LAYER EXPOSED (HCC): Primary | ICD-10-CM

## 2023-02-17 PROCEDURE — 6370000000 HC RX 637 (ALT 250 FOR IP): Performed by: NURSE PRACTITIONER

## 2023-02-17 PROCEDURE — 11042 DBRDMT SUBQ TIS 1ST 20SQCM/<: CPT

## 2023-02-17 RX ORDER — LIDOCAINE 40 MG/G
CREAM TOPICAL ONCE
OUTPATIENT
Start: 2023-02-17 | End: 2023-02-17

## 2023-02-17 RX ORDER — CLOBETASOL PROPIONATE 0.5 MG/G
OINTMENT TOPICAL ONCE
OUTPATIENT
Start: 2023-02-17 | End: 2023-02-17

## 2023-02-17 RX ORDER — BACITRACIN ZINC AND POLYMYXIN B SULFATE 500; 1000 [USP'U]/G; [USP'U]/G
OINTMENT TOPICAL ONCE
OUTPATIENT
Start: 2023-02-17 | End: 2023-02-17

## 2023-02-17 RX ORDER — LIDOCAINE HYDROCHLORIDE 20 MG/ML
JELLY TOPICAL ONCE
OUTPATIENT
Start: 2023-02-17 | End: 2023-02-17

## 2023-02-17 RX ORDER — GENTAMICIN SULFATE 1 MG/G
OINTMENT TOPICAL ONCE
OUTPATIENT
Start: 2023-02-17 | End: 2023-02-17

## 2023-02-17 RX ORDER — LIDOCAINE 50 MG/G
OINTMENT TOPICAL ONCE
OUTPATIENT
Start: 2023-02-17 | End: 2023-02-17

## 2023-02-17 RX ORDER — GINSENG 100 MG
CAPSULE ORAL ONCE
OUTPATIENT
Start: 2023-02-17 | End: 2023-02-17

## 2023-02-17 RX ORDER — LIDOCAINE HYDROCHLORIDE 40 MG/ML
SOLUTION TOPICAL ONCE
Status: COMPLETED | OUTPATIENT
Start: 2023-02-17 | End: 2023-02-17

## 2023-02-17 RX ORDER — BETAMETHASONE DIPROPIONATE 0.05 %
OINTMENT (GRAM) TOPICAL ONCE
OUTPATIENT
Start: 2023-02-17 | End: 2023-02-17

## 2023-02-17 RX ORDER — LIDOCAINE HYDROCHLORIDE 40 MG/ML
SOLUTION TOPICAL ONCE
OUTPATIENT
Start: 2023-02-17 | End: 2023-02-17

## 2023-02-17 RX ORDER — BACITRACIN, NEOMYCIN, POLYMYXIN B 400; 3.5; 5 [USP'U]/G; MG/G; [USP'U]/G
OINTMENT TOPICAL ONCE
OUTPATIENT
Start: 2023-02-17 | End: 2023-02-17

## 2023-02-17 RX ADMIN — LIDOCAINE HYDROCHLORIDE 5 ML: 40 SOLUTION TOPICAL at 15:49

## 2023-02-17 NOTE — PROGRESS NOTES
[facility]  History and Physical/Consultation  Podiatry    Referring Physician : Cherelle Gardiner MD  Roseanna Farrell  MEDICAL RECORD NUMBER:  [id number]  AGE: 80 y.o. GENDER: female  : 1940  EPISODE DATE:  2023  Subjective:     Chief Complaint   Patient presents with    Wound Check     Left ankle           HISTORY of PRESENT ILLNESS HPI     Roseanna Farrell is a 80 y.o. female who presents today for wound/ulcer evaluation. History of Wound Context:  The patient has had a wounds of medial, lateral, posterior left leg and left heel which was first noted approximately three weeks ago. This has not been treated. On their initial visit to the wound healing center, 10/14/2022,  the patient has noted that the wound has not been improving. The patient has had similar previous wounds in the past.      Pt is not on abx at time of initial visit.     10/14/22  Patient presents with left leg erythema, pruritis and dry scaly skin to left leg   Eschar present on left heel   Patient is not diabetic  Patient has a history of peripheral vascular disease - taking Eliquis  Vascular studies ordered   Wounds debrided   Aquacel and Spandagrip, Lamisil to surrounding inflamed tissue     10/21/22  Wounds overall improving   Wounds debrided   Doxy 100mg BIB [id number] prescribed   Continue Aquacel and Spandagrip, Lamisil to surrounding inflamed tissue     10/28/22  Wounds overall appearance improving, decreased erythema   Left heel wound healed   Wounds not debrided today  Gentamycin cream prescribed   Continue Aquacel and Spandagrip, Lamisil to surrounding inflamed tissue   Consult to ID, Dr. Harris Abbasi       22  Wounds overall appearance improving   Wounds debrided   Continue Gent, Aquacel and Spandagrip, Lamisil to surrounding inflamed tissue     22  Wounds improving   Wounds debrided   Continue Gent, Aquacel and Spandagrip, Lamisil to surrounding inflamed tissue     23  Wounds measuring slightly larger, increased drainage   Wounds debrided   Culture obtained   Discontinue Gent  Drawtex and spandagrip daily     2/3/23  Wound size stable, decreased drainage   Wounds debrided   Patient taking doxy per PCP  Aquacel ag, ABD and spandagrip daily     2/17/23  Wounds measuring larger   Wounds debrided   Aquacel and Gent ointment, ABD and spandagrip daily     Wound/Ulcer Pain Timing/Severity: none  Quality of pain: N/A  Severity:  0 / 10   Modifying Factors: None  Associated Signs/Symptoms: edema and erythema    Ulcer Identification:  Ulcer Type: venous and undetermined  Contributing Factors: edema, venous stasis, and lymphedema    Diabetic/Pressure/Non Pressure Ulcers onl y:  Ulcer: Non-Pressure ulcer, fat layer exposed    If patient has diabetic lower extremity wounds  [place] Classification of diabetic lower extremity wounds:    Grade Description   []  0 No open wound   []  1 Superficial ulcer involving the full skin thickness   []  2 Deep ulcer involves ligament, tendon, joint capsule, or fascia  No bone involvement or abscess presence   []  3 Deep Ulcer with abcess formation and/or osteomyelitis   []  4 Localized gangrene   []  5 Extensive gangrene of the foot     Wound: N/A        PAST MEDICAL HISTORY      Diagnosis Date    Acquired hypothyroidism 8/14/2017    Arthritis     Blood transfusion reaction     Chronic kidney disease     History of blood transfusion     Hypertension     Lymphedema of both lower extremities 9/6/2017    Open wound of left great toe 10/18/2017    Other disorders of kidney and ureter in diseases classified elsewhere     Pelvic fracture (HCC)     Skin ulcer of left calf with fat layer exposed ([address] 9/6/2017    Skin ulcer of left calf, limited to breakdown of skin ([address] 9/6/2017    Ulcer of left lower leg ([address] 3/24/2014    Venous stasis ulcer of left calf limited to breakdown of skin ([address] 3/24/2014     Past Surgical History:   Procedure Laterality Date    COLONOSCOPY      ENDOSCOPY, COLON, DIAGNOSTIC      EYE SURGERY      cateract and lazor surgery 2015    FRACTURE SURGERY  2010    RT HIP    HIP FRACTURE SURGERY Left 08/08/2014    ORIF left hip    TONSILLECTOMY      as child    TOTAL HIP ARTHROPLASTY Left 10/17/2014    revision with removal of hardware     Family History   Problem Relation Age of Onset    Mult Sclerosis Father      Social History     Tobacco Use    Smoking status: Never    Smokeless tobacco: Never   Vaping Use    Vaping Use: Never used   Substance Use Topics    Alcohol use: Not Currently    Drug use: Never     Allergies   Allergen Reactions    Aspirin Itching    Other Nausea And Vomiting     FLU SHOT    Tape [Adhesive Tape] Swelling     Current Outpatient Medications on File Prior to Encounter   Medication Sig Dispense Refill    bumetanide (BUMEX) 1 MG tablet TAKE 1 TABLET BY MOUTH EVERY DAY 90 tablet 1    vitamin C (ASCORBIC ACID) 500 MG tablet Take 500 mg by mouth daily      clobetasol (TEMOVATE) 0.05 % cream Apply topically 2 times daily. 45 g 0    ferrous sulfate (IRON 325) 325 (65 Fe) MG tablet Take 325 mg by mouth daily (with breakfast)      acetaminophen (TYLENOL) 325 MG tablet Take 650 mg by mouth every 4 hours as needed for Pain      calcium-vitamin D (OSCAL-500) 500-200 MG-UNIT per tablet Take 1 tablet by mouth 2 times daily (Patient taking differently: Take 1 tablet by mouth daily) 30 tablet 3    apixaban (ELIQUIS) 5 MG TABS tablet Take 1 tablet by mouth 2 times daily 60 tablet 0    metoprolol tartrate (LOPRESSOR) 25 MG tablet Take 1 tablet by mouth 2 times daily 60 tablet 0    Cholecalciferol (VITAMIN D) 2000 units TABS tablet Take 2.5 tablets by mouth 2 times daily (Patient taking differently: Take 4,000 Units by mouth daily) 150 tablet 0    levothyroxine (SYNTHROID) 75 MCG tablet Take 75 mcg by mouth Daily       No current facility-administered medications on file prior to encounter.        REVIEW OF SYSTEMS   ROS : All others Negative if blank [], Positive if [x]  General Vascular   [] Fevers [] Claudication   [] Chills [] Rest Pain   Skin Neurologic   [x] Tissue Loss [] Lower extremity neuropathy     Objective:    BP (!) 170/78   Pulse 64   Temp 96.9 °F (36.1 °C) (Temporal)   Resp 16   Wt Readings from Last 3 Encounters:   01/06/23 160 lb (72.6 kg)   11/18/22 160 lb (72.6 kg)   11/11/22 160 lb 6.4 oz (72.8 kg)       PHYSICAL EXAM   CONSTITUTIONAL:   Awake, alert, cooperative   PSYCHIATRIC :  Oriented to time, place and person      normal insight to disease process  EXTREMITIES:   R LE Open wounds are noted   Skin color is abnormal with erythema, dry, flaky skin   Edema is  noted   Sensation intact to light touch   Palpation of the foot does not cause pain   4/5 strength DF/PF  L LE Open wounds are not noted   Skin color is abnormal with erythema, dry, flaky skin   Edema is  noted   Sensation intact to light touch   Palpation of the foot does not cause pain   4/5 strength DF/PF  R dorsalis pedis +1 L dorsalis pedis +1     Assessment:     Problem List Items Addressed This Visit       Non-pressure ulcer of lower extremity, left, with fat layer exposed ([facility] - Primary    Relevant Orders    Initiate Outpatient Wound Care Protocol       Pre Debridement Measurements:  Are located in the [place]  Documentation Flow Sheet  Post Debridement Measurements:  Wound/Ulcer Descriptions are Pre Debridement except measurements:  Pressure Ulcer 09/06/17 Leg Left; Lower #2 (1-5-17 acquired) (Active)   Number of days: 1990       Wound 10/14/22 Ankle Left;Medial #1 (Active)   Wound Image   01/06/23 1605   Wound Etiology Venous 10/14/22 1331   Dressing Status New dressing applied 01/06/23 1744   Wound Cleansed Cleansed with saline 02/17/23 1653   Dressing/Treatment ABD; Alginate;Roll gauze 02/17/23 1653   Offloading for Diabetic Foot Ulcers Offloading not required 02/17/23 1653   Wound Length (cm) 2.6 cm 02/17/23 1553   Wound Width (cm) 4.1 cm 02/17/23 1553   Wound Depth (cm) 0.2 cm 02/17/23 1553   Wound Surface Area (cm^2) 10.66 cm^2 02/17/23 1553   Change in Wound Size % (l*w) -1284.42 02/17/23 1553   Wound Volume (cm^3) 2.132 cm^3 02/17/23 1553   Wound Healing % -1284 02/17/23 1553   Post-Procedure Length (cm) 2.6 cm 02/17/23 1653   Post-Procedure Width (cm) 4.1 cm 02/17/23 1653   Post-Procedure Depth (cm) 0.3 cm 02/17/23 1653   Post-Procedure Surface Area (cm^2) 10.66 cm^2 02/17/23 1653   Post-Procedure Volume (cm^3) 3.198 cm^3 02/17/23 1653   Wound Assessment Fibrin;Pink/red 02/17/23 1553   Drainage Amount Small 02/17/23 1553   Drainage Description Yellow 02/17/23 1553   Odor None 02/17/23 1553   Patrizia-wound Assessment Intact 02/17/23 1553   Number of days: 126       Wound 10/14/22 Ankle Left;Lateral #2 (Active)   Wound Image   02/03/23 1450   Wound Etiology Venous 10/14/22 1331   Dressing Status New dressing applied 02/17/23 1653   Wound Cleansed Cleansed with saline 02/17/23 1653   Dressing/Treatment ABD; Alginate;Dry dressing;Roll gauze 02/17/23 1653   Offloading for Diabetic Foot Ulcers Offloading not required 02/17/23 1653   Wound Length (cm) 1.4 cm 02/17/23 1553   Wound Width (cm) 1.1 cm 02/17/23 1553   Wound Depth (cm) 0.1 cm 02/17/23 1553   Wound Surface Area (cm^2) 1.54 cm^2 02/17/23 1553   Change in Wound Size % (l*w) -1440 02/17/23 1553   Wound Volume (cm^3) 0.154 cm^3 02/17/23 1553   Wound Healing % -670 02/17/23 1553   Post-Procedure Length (cm) 1.4 cm 02/17/23 1653   Post-Procedure Width (cm) 1.1 cm 02/17/23 1653   Post-Procedure Depth (cm) 0.2 cm 02/17/23 1653   Post-Procedure Surface Area (cm^2) 1.54 cm^2 02/17/23 1653   Post-Procedure Volume (cm^3) 0.308 cm^3 02/17/23 1653   Wound Assessment Fibrin;Pink/red 02/17/23 1553   Drainage Amount Small 02/17/23 1553   Drainage Description Yellow 02/17/23 1553   Odor None 02/17/23 1553   Patrizia-wound Assessment Intact 02/17/23 1553   Number of days: 126               Procedure Note  Indications:  Based on my examination of this patient's wound(s)/ulcer(s) today, debridement is required to promote healing and evaluate the wound base. Performed by: TRAVIS Don CNP    Consent obtained:  Yes    Time out taken:  Yes    Pain Control: Anesthetic  Anesthetic: 4% Lidocaine Liquid Topical     Debridement:Excisional Debridement    Using curette the wound(s)/ulcer(s) was/were sharply debrided down through and including the removal of subcutaneous tissue. Devitalized Tissue Debrided:  fibrin, biofilm, and slough to stimulate bleeding to promote healing, post debridement good bleeding base and wound edges noted    Wound/Ulcer #: 1 and 2    Percent of Wound/Ulcer Debrided: 100%    Total Surface Area Debrided:  12.2 sq cm     Estimated Blood Loss:  Minimal  Hemostasis Achieved:  by pressure    Procedural Pain:  3  / 10   Post Procedural Pain:  3 / 10     Response to treatment:  Well tolerated by patient. A culture was done. Plan:     Pt has never been a smoker   - Discussed relationship of smoking and negative affects on wound healing   - Emphasized importance of tobacco avoidace/cessation     In my professional opinion and based off the information that is available at this time this patient has appropriate indication for HBO Therapy: No    Treatment Note please see attached Discharge Instructions    Written patient dismissal instructions given to patient and signed by patient or POA. Discharge Instructions         Visit Discharge/Physician Orders     Discharge condition: Stable     Assessment of pain at discharge:MINIMAL     Anesthetic used: 4% LIDOCAINE     Discharge to: Home     Left via:Private automobile     Accompanied by: accompanied by child     ECF/HHA: [facility] *new order     Dressing Orders:LEFT LEG ULCERS CLEANSE WITH NORMAL SALINE APPLY Gentamycin,  PLAIN alginate, ABD,  KERLIX AND SPANDIGRIP CHANGE DAILY or every other day. Treatment Orders:  EAT FOODS HIGH IN PROTEIN AND VITAMIN C.  Apply aquafor to dry skin. MULTIVITAMIN DAILY      Arterial studies ordered - received from Erin reviewed (in media)     [facility] followup visit _______2 WEEK__Nguyen Teixeira_________________  (Please note your next appointment above and if you are unable to keep, kindly give a 24 hour notice.  Thank you.)     Physician signature:__________________________        If you experience any of the following, please call the [address] [place] [address] during business hours:     * Increase in Pain  * Temperature over 101                                                                          Electronically signed by TRAVIS Grant CNP on 2/17/2023 at 6:47 PM

## 2023-03-03 ENCOUNTER — HOSPITAL ENCOUNTER (OUTPATIENT)
Dept: WOUND CARE | Age: 83
Discharge: HOME | End: 2023-03-03
Payer: MEDICARE

## 2023-03-03 VITALS
TEMPERATURE: 97.8 F | HEART RATE: 68 BPM | SYSTOLIC BLOOD PRESSURE: 142 MMHG | DIASTOLIC BLOOD PRESSURE: 76 MMHG | BODY MASS INDEX: 29.26 KG/M2 | HEIGHT: 62 IN | RESPIRATION RATE: 16 BRPM

## 2023-03-03 DIAGNOSIS — L97.922 NON-PRESSURE ULCER OF LOWER EXTREMITY, LEFT, WITH FAT LAYER EXPOSED (HCC): Primary | ICD-10-CM

## 2023-03-03 PROCEDURE — 11042 DBRDMT SUBQ TIS 1ST 20SQCM/<: CPT

## 2023-03-03 PROCEDURE — 6370000000 HC RX 637 (ALT 250 FOR IP): Performed by: NURSE PRACTITIONER

## 2023-03-03 RX ORDER — LIDOCAINE HYDROCHLORIDE 40 MG/ML
SOLUTION TOPICAL ONCE
Status: COMPLETED | OUTPATIENT
Start: 2023-03-03 | End: 2023-03-03

## 2023-03-03 RX ORDER — GINSENG 100 MG
CAPSULE ORAL ONCE
OUTPATIENT
Start: 2023-03-03 | End: 2023-03-03

## 2023-03-03 RX ORDER — LIDOCAINE HYDROCHLORIDE 40 MG/ML
SOLUTION TOPICAL ONCE
OUTPATIENT
Start: 2023-03-03 | End: 2023-03-03

## 2023-03-03 RX ORDER — LIDOCAINE HYDROCHLORIDE 20 MG/ML
JELLY TOPICAL ONCE
OUTPATIENT
Start: 2023-03-03 | End: 2023-03-03

## 2023-03-03 RX ORDER — BACITRACIN, NEOMYCIN, POLYMYXIN B 400; 3.5; 5 [USP'U]/G; MG/G; [USP'U]/G
OINTMENT TOPICAL ONCE
OUTPATIENT
Start: 2023-03-03 | End: 2023-03-03

## 2023-03-03 RX ORDER — CLOBETASOL PROPIONATE 0.5 MG/G
OINTMENT TOPICAL ONCE
OUTPATIENT
Start: 2023-03-03 | End: 2023-03-03

## 2023-03-03 RX ORDER — BETAMETHASONE DIPROPIONATE 0.05 %
OINTMENT (GRAM) TOPICAL ONCE
OUTPATIENT
Start: 2023-03-03 | End: 2023-03-03

## 2023-03-03 RX ORDER — LIDOCAINE 40 MG/G
CREAM TOPICAL ONCE
OUTPATIENT
Start: 2023-03-03 | End: 2023-03-03

## 2023-03-03 RX ORDER — BACITRACIN ZINC AND POLYMYXIN B SULFATE 500; 1000 [USP'U]/G; [USP'U]/G
OINTMENT TOPICAL ONCE
OUTPATIENT
Start: 2023-03-03 | End: 2023-03-03

## 2023-03-03 RX ORDER — METHYLPREDNISOLONE 4 MG/1
TABLET ORAL
Qty: 1 KIT | Refills: 0 | Status: SHIPPED | OUTPATIENT
Start: 2023-03-03 | End: 2023-03-09

## 2023-03-03 RX ORDER — LIDOCAINE 50 MG/G
OINTMENT TOPICAL ONCE
OUTPATIENT
Start: 2023-03-03 | End: 2023-03-03

## 2023-03-03 RX ORDER — GENTAMICIN SULFATE 1 MG/G
OINTMENT TOPICAL ONCE
OUTPATIENT
Start: 2023-03-03 | End: 2023-03-03

## 2023-03-03 RX ADMIN — LIDOCAINE HYDROCHLORIDE 10 ML: 40 SOLUTION TOPICAL at 15:42

## 2023-03-03 NOTE — DISCHARGE INSTRUCTIONS
Visit Discharge/Physician Orders     Discharge condition: Stable     Assessment of pain at discharge:MINIMAL     Anesthetic used: 4% LIDOCAINE     Discharge to: Home     Left via:Private automobile     Accompanied by: accompanied by child     ECF/HHA: [facility] *new order     Dressing Orders:LEFT LEG and foot ULCERS CLEANSE WITH NORMAL SALINE APPLY plain collagen, ABD,  KERLIX AND SPANDIGRIP CHANGE DAILY or every other day. Treatment Orders:  EAT FOODS HIGH IN PROTEIN AND VITAMIN C. Apply aquafor to dry skin. Medrol dose pack script sent, begin to take as directed     MULTIVITAMIN DAILY      Arterial studies ordered - received from [facility] reviewed (in media)     [facility] followup visit _______1 WEEK__Nguyen Teixeira_________________  (Please note your next appointment above and if you are unable to keep, kindly give a 24 hour notice.  Thank you.)     Physician signature:__________________________        If you experience any of the following, please call the [address] [place] [address] Road during business hours:     * Increase in Pain  * Temperature over 101

## 2023-03-04 NOTE — PROGRESS NOTES
[facility]  History and Physical/Consultation  Podiatry    Referring Physician : Rachel Guidry MD  Liz Goyal  MEDICAL RECORD NUMBER:  [id number]  AGE: 80 y.o. GENDER: female  : 1940  EPISODE DATE:  3/3/2023  Subjective:     Chief Complaint   Patient presents with    Wound Check     Left ankle         HISTORY of PRESENT ILLNESS HPI     Liz Goyal is a 80 y.o. female who presents today for wound/ulcer evaluation. History of Wound Context:  The patient has had a wounds of medial, lateral, posterior left leg and left heel which was first noted approximately three weeks ago. This has not been treated. On their initial visit to the wound healing center, 10/14/2022,  the patient has noted that the wound has not been improving. The patient has had similar previous wounds in the past.      Pt is not on abx at time of initial visit.     10/14/22  Patient presents with left leg erythema, pruritis and dry scaly skin to left leg   Eschar present on left heel   Patient is not diabetic  Patient has a history of peripheral vascular disease - taking Eliquis  Vascular studies ordered   Wounds debrided   Aquacel and Spandagrip, Lamisil to surrounding inflamed tissue     10/21/22  Wounds overall improving   Wounds debrided   Doxy 100mg BIB [id number] prescribed   Continue Aquacel and Spandagrip, Lamisil to surrounding inflamed tissue     10/28/22  Wounds overall appearance improving, decreased erythema   Left heel wound healed   Wounds not debrided today  Gentamycin cream prescribed   Continue Aquacel and Spandagrip, Lamisil to surrounding inflamed tissue   Consult to ID, Dr. Snoja Sinclair       22  Wounds overall appearance improving   Wounds debrided   Continue Gent, Aquacel and Spandagrip, Lamisil to surrounding inflamed tissue     22  Wounds improving   Wounds debrided   Continue Gent, Aquacel and Spandagrip, Lamisil to surrounding inflamed tissue     23  Wounds measuring slightly larger, increased drainage   Wounds debrided   Culture obtained   Discontinue Gent  Drawtex and spandagrip daily     2/3/23  Wound size stable, decreased drainage   Wounds debrided   Patient taking doxy per PCP  Aquacel ag, ABD and spandagrip daily     2/17/23  Wounds measuring larger   Wounds debrided   Aquacel and Gent ointment, ABD and spandagrip daily     3/3/23  Wounds measuring larger   Lower extremity erythema present   Medrol dose lexus prescribed   Wounds debrided   Promogram and spandagrip     Wound/Ulcer Pain Timing/Severity: none  Quality of pain: N/A  Severity:  0 / 10   Modifying Factors: None  Associated Signs/Symptoms: edema and erythema    Ulcer Identification:  Ulcer Type: venous and undetermined  Contributing Factors: edema, venous stasis, and lymphedema    Diabetic/Pressure/Non Pressure Ulcers onl y:  Ulcer: Non-Pressure ulcer, fat layer exposed    If patient has diabetic lower extremity wounds  [place] Classification of diabetic lower extremity wounds:    Grade Description   []  0 No open wound   []  1 Superficial ulcer involving the full skin thickness   []  2 Deep ulcer involves ligament, tendon, joint capsule, or fascia  No bone involvement or abscess presence   []  3 Deep Ulcer with abcess formation and/or osteomyelitis   []  4 Localized gangrene   []  5 Extensive gangrene of the foot     Wound: N/A        PAST MEDICAL HISTORY      Diagnosis Date    Acquired hypothyroidism 8/14/2017    Arthritis     Blood transfusion reaction     Chronic kidney disease     History of blood transfusion     Hypertension     Lymphedema of both lower extremities 9/6/2017    Open wound of left great toe 10/18/2017    Other disorders of kidney and ureter in diseases classified elsewhere     Pelvic fracture (HCC)     Skin ulcer of left calf with fat layer exposed ([address] 9/6/2017    Skin ulcer of left calf, limited to breakdown of skin ([address] 9/6/2017    Ulcer of left lower leg ([address] 3/24/2014    Venous stasis ulcer of left calf limited to breakdown of skin ([facility] 3/24/2014     Past Surgical History:   Procedure Laterality Date    COLONOSCOPY      ENDOSCOPY, COLON, DIAGNOSTIC      EYE SURGERY      cateract and lazor surgery 2015    FRACTURE SURGERY  2010    RT HIP    HIP FRACTURE SURGERY Left 08/08/2014    ORIF left hip    TONSILLECTOMY      as child    TOTAL HIP ARTHROPLASTY Left 10/17/2014    revision with removal of hardware     Family History   Problem Relation Age of Onset    Mult Sclerosis Father      Social History     Tobacco Use    Smoking status: Never    Smokeless tobacco: Never   Vaping Use    Vaping Use: Never used   Substance Use Topics    Alcohol use: Not Currently    Drug use: Never     Allergies   Allergen Reactions    Aspirin Itching    Other Nausea And Vomiting     FLU SHOT    Tape [Adhesive Tape] Swelling     Current Outpatient Medications on File Prior to Encounter   Medication Sig Dispense Refill    bumetanide (BUMEX) 1 MG tablet TAKE 1 TABLET BY MOUTH EVERY DAY 90 tablet 1    vitamin C (ASCORBIC ACID) 500 MG tablet Take 500 mg by mouth daily      clobetasol (TEMOVATE) 0.05 % cream Apply topically 2 times daily.  45 g 0    ferrous sulfate (IRON 325) 325 (65 Fe) MG tablet Take 325 mg by mouth daily (with breakfast)      acetaminophen (TYLENOL) 325 MG tablet Take 650 mg by mouth every 4 hours as needed for Pain      calcium-vitamin D (OSCAL-500) 500-200 MG-UNIT per tablet Take 1 tablet by mouth 2 times daily (Patient taking differently: Take 1 tablet by mouth daily) 30 tablet 3    apixaban (ELIQUIS) 5 MG TABS tablet Take 1 tablet by mouth 2 times daily 60 tablet 0    metoprolol tartrate (LOPRESSOR) 25 MG tablet Take 1 tablet by mouth 2 times daily 60 tablet 0    Cholecalciferol (VITAMIN D) 2000 units TABS tablet Take 2.5 tablets by mouth 2 times daily (Patient taking differently: Take 4,000 Units by mouth daily) 150 tablet 0    levothyroxine (SYNTHROID) 75 MCG tablet Take 75 mcg by mouth Daily       No current facility-administered medications on file prior to encounter. REVIEW OF SYSTEMS   ROS : All others Negative if blank [], Positive if [x]  General Vascular   [] Fevers [] Claudication   [] Chills [] Rest Pain   Skin Neurologic   [x] Tissue Loss [] Lower extremity neuropathy     Objective:    BP (!) 142/76   Pulse 68   Temp 97.8 °F (36.6 °C) (Temporal)   Resp 16   Ht 5' 2\" (1.575 m)   BMI 29.26 kg/m²   Wt Readings from Last 3 Encounters:   01/06/23 160 lb (72.6 kg)   11/18/22 160 lb (72.6 kg)   11/11/22 160 lb 6.4 oz (72.8 kg)       PHYSICAL EXAM   CONSTITUTIONAL:   Awake, alert, cooperative   PSYCHIATRIC :  Oriented to time, place and person      normal insight to disease process  EXTREMITIES:   R LE Open wounds are noted   Skin color is abnormal with erythema, dry, flaky skin   Edema is  noted   Sensation intact to light touch   Palpation of the foot does not cause pain   4/5 strength DF/PF  L LE Open wounds are not noted   Skin color is abnormal with erythema, dry, flaky skin   Edema is  noted   Sensation intact to light touch   Palpation of the foot does not cause pain   4/5 strength DF/PF  R dorsalis pedis +1 L dorsalis pedis +1     Assessment:     Problem List Items Addressed This Visit       Non-pressure ulcer of lower extremity, left, with fat layer exposed ([facility] [address] - Primary    Relevant Orders    Initiate Outpatient Wound Care Protocol       Pre Debridement Measurements:  Are located in the [place]  Documentation Flow Sheet  Post Debridement Measurements:  Wound/Ulcer Descriptions are Pre Debridement except measurements:  Pressure Ulcer 09/06/17 Leg Left; Lower #2 (1-5-17 acquired) (Active)   Number of days: 2004       Wound 10/14/22 Ankle Left;Medial #1 (Active)   Wound Image   03/03/23 1537   Wound Etiology Venous 10/14/22 1331   Dressing Status New dressing applied 01/06/23 1744   Wound Cleansed Cleansed with saline 03/03/23 1624   Dressing/Treatment Collagen;Dry dressing 03/03/23 1624 Offloading for Diabetic Foot Ulcers Offloading not required 02/17/23 1653   Wound Length (cm) 3.9 cm 03/03/23 1537   Wound Width (cm) 3.9 cm 03/03/23 1537   Wound Depth (cm) 0.2 cm 03/03/23 1537   Wound Surface Area (cm^2) 15.21 cm^2 03/03/23 1537   Change in Wound Size % (l*w) -1875.32 03/03/23 1537   Wound Volume (cm^3) 3.042 cm^3 03/03/23 1537   Wound Healing % -1875 03/03/23 1537   Post-Procedure Length (cm) 3.9 cm 03/03/23 1624   Post-Procedure Width (cm) 3.9 cm 03/03/23 1624   Post-Procedure Depth (cm) 0.3 cm 03/03/23 1624   Post-Procedure Surface Area (cm^2) 15.21 cm^2 03/03/23 1624   Post-Procedure Volume (cm^3) 4.563 cm^3 03/03/23 1624   Wound Assessment Fibrin;Pink/red 03/03/23 1537   Drainage Amount Moderate 03/03/23 1537   Drainage Description Yellow 03/03/23 1537   Odor None 03/03/23 1537   Patrizia-wound Assessment Maceration 03/03/23 1537   Number of days: 140       Wound 10/14/22 Ankle Left;Lateral #2 (Active)   Wound Image   03/03/23 1537   Wound Etiology Venous 10/14/22 1331   Dressing Status New dressing applied 02/17/23 1653   Wound Cleansed Cleansed with saline 02/17/23 1653   Dressing/Treatment ABD; Alginate;Dry dressing;Roll gauze 02/17/23 1653   Offloading for Diabetic Foot Ulcers Offloading not required 02/17/23 1653   Wound Length (cm) 1.5 cm 03/03/23 1537   Wound Width (cm) 1.2 cm 03/03/23 1537   Wound Depth (cm) 0.1 cm 03/03/23 1537   Wound Surface Area (cm^2) 1.8 cm^2 03/03/23 1537   Change in Wound Size % (l*w) -1700 03/03/23 1537   Wound Volume (cm^3) 0.18 cm^3 03/03/23 1537   Wound Healing % -800 03/03/23 1537   Post-Procedure Length (cm) 1.5 cm 03/03/23 1624   Post-Procedure Width (cm) 1.2 cm 03/03/23 1624   Post-Procedure Depth (cm) 0.2 cm 03/03/23 1624   Post-Procedure Surface Area (cm^2) 1.8 cm^2 03/03/23 1624   Post-Procedure Volume (cm^3) 0.36 cm^3 03/03/23 1624   Wound Assessment Fibrin;Pale granulation tissue 03/03/23 1537   Drainage Amount Small 03/03/23 1537   Drainage Description Yellow 03/03/23 1537   Odor None 03/03/23 1537   Patrizia-wound Assessment Maceration 03/03/23 1537   Number of days: 140       Wound 03/03/23 Foot Anterior; Left #3 (Active)   Wound Image   03/03/23 1537   Wound Cleansed Cleansed with saline 03/03/23 1624   Dressing/Treatment Collagen;Dry dressing 03/03/23 1624   Wound Length (cm) 1.5 cm 03/03/23 1537   Wound Width (cm) 0.3 cm 03/03/23 1537   Wound Depth (cm) 0.1 cm 03/03/23 1537   Wound Surface Area (cm^2) 0.45 cm^2 03/03/23 1537   Wound Volume (cm^3) 0.045 cm^3 03/03/23 1537   Post-Procedure Length (cm) 1.5 cm 03/03/23 1624   Post-Procedure Width (cm) 0.3 cm 03/03/23 1624   Post-Procedure Depth (cm) 0.2 cm 03/03/23 1624   Post-Procedure Surface Area (cm^2) 0.45 cm^2 03/03/23 1624   Post-Procedure Volume (cm^3) 0.09 cm^3 03/03/23 1624   Wound Assessment Fibrin;Pale granulation tissue 03/03/23 1537   Drainage Amount Small 03/03/23 1537   Drainage Description Yellow 03/03/23 1537   Odor None 03/03/23 1537   Patrizia-wound Assessment Maceration 03/03/23 1537   Number of days: 0               Procedure Note  Indications:  Based on my examination of this patient's wound(s)/ulcer(s) today, debridement is required to promote healing and evaluate the wound base. Performed by: TRAVIS Morse CNP    Consent obtained:  Yes    Time out taken:  Yes    Pain Control: Anesthetic  Anesthetic: 4% Lidocaine Liquid Topical     Debridement:Excisional Debridement    Using curette the wound(s)/ulcer(s) was/were sharply debrided down through and including the removal of subcutaneous tissue.         Devitalized Tissue Debrided:  fibrin, biofilm, and slough to stimulate bleeding to promote healing, post debridement good bleeding base and wound edges noted    Wound/Ulcer #: 1 and 2    Percent of Wound/Ulcer Debrided: 100%    Total Surface Area Debrided:  17.46 sq cm     Estimated Blood Loss:  Minimal  Hemostasis Achieved:  by pressure    Procedural Pain:  3  / 10   Post Procedural Pain:  3 / 10     Response to treatment:  Well tolerated by patient. A culture was done. Plan:     Pt has never been a smoker   - Discussed relationship of smoking and negative affects on wound healing   - Emphasized importance of tobacco avoidace/cessation     In my professional opinion and based off the information that is available at this time this patient has appropriate indication for HBO Therapy: No    Treatment Note please see attached Discharge Instructions    Written patient dismissal instructions given to patient and signed by patient or POA. Discharge Instructions         Visit Discharge/Physician Orders     Discharge condition: Stable     Assessment of pain at discharge:MINIMAL     Anesthetic used: 4% LIDOCAINE     Discharge to: Home     Left via:Private automobile     Accompanied by: accompanied by child     ECF/[facility]HA: [address] [facility] *new order     Dressing Orders:LEFT LEG and foot ULCERS CLEANSE WITH NORMAL SALINE APPLY plain collagen, ABD,  KERLIX AND SPANDIGRIP CHANGE DAILY or every other day. Treatment Orders:  EAT FOODS HIGH IN PROTEIN AND VITAMIN C. Apply aquafor to dry skin. Medrol dose pack script sent, begin to take as directed     MULTIVITAMIN DAILY      Arterial studies ordered - received from [facility] reviewed (in media)     [facility] followup visit _______1 WEEK__Nguyen Teixeira_________________  (Please note your next appointment above and if you are unable to keep, kindly give a 24 hour notice.  Thank you.)     Physician signature:__________________________        If you experience any of the following, please call the [address] during business hours:     * Increase in Pain  * Temperature over 101                                                                                                     Electronically signed by Kristopher Sicard, APRN - CNP on 3/3/2023 at 7:14 PM

## 2023-03-07 NOTE — DISCHARGE INSTRUCTIONS
Visit Discharge/Physician Orders     Discharge condition: Stable     Assessment of pain at discharge:MINIMAL     Anesthetic used: 4% LIDOCAINE     Discharge to: Home     Left via:Private automobile     Accompanied by: accompanied by child     ECF/HHA: [address] [facility] *new order     Dressing Orders:LEFT LEG and foot ULCERS CLEANSE WITH NORMAL SALINE APPLY plain collagen, ABD,  KERLIX AND SPANDIGRIP CHANGE DAILY or every other day. Treatment Orders:  EAT FOODS HIGH IN PROTEIN AND VITAMIN C. Apply aquafor to dry skin. Culture taken in clinic today  Script sent for Bactrim, begin to take as directed     MULTIVITAMIN DAILY      Arterial studies ordered - received from [facility] reviewed (in media)     [facility] followup visit _______1 WEEK__Nguyen Teixeira_________________  (Please note your next appointment above and if you are unable to keep, kindly give a 24 hour notice.  Thank you.)     Physician signature:__________________________        If you experience any of the following, please call the [address] [place] [address] Road during business hours:     * Increase in Pain  * Temperature over 101

## 2023-03-10 ENCOUNTER — HOSPITAL ENCOUNTER (OUTPATIENT)
Dept: WOUND CARE | Age: 83
Discharge: HOME | End: 2023-03-10
Payer: MEDICARE

## 2023-03-10 VITALS
TEMPERATURE: 96.6 F | RESPIRATION RATE: 16 BRPM | DIASTOLIC BLOOD PRESSURE: 88 MMHG | SYSTOLIC BLOOD PRESSURE: 162 MMHG | HEIGHT: 62 IN | WEIGHT: 160 LBS | HEART RATE: 66 BPM | BODY MASS INDEX: 29.44 KG/M2

## 2023-03-10 DIAGNOSIS — L97.429 HEEL ULCER, LEFT, WITH UNSPECIFIED SEVERITY (HCC): ICD-10-CM

## 2023-03-10 DIAGNOSIS — L97.922 NON-PRESSURE ULCER OF LOWER EXTREMITY, LEFT, WITH FAT LAYER EXPOSED (HCC): Primary | ICD-10-CM

## 2023-03-10 PROCEDURE — 87205 SMEAR GRAM STAIN: CPT

## 2023-03-10 PROCEDURE — 87186 SC STD MICRODIL/AGAR DIL: CPT

## 2023-03-10 PROCEDURE — 87077 CULTURE AEROBIC IDENTIFY: CPT

## 2023-03-10 PROCEDURE — 87070 CULTURE OTHR SPECIMN AEROBIC: CPT

## 2023-03-10 PROCEDURE — 11042 DBRDMT SUBQ TIS 1ST 20SQCM/<: CPT

## 2023-03-10 PROCEDURE — 87075 CULTR BACTERIA EXCEPT BLOOD: CPT

## 2023-03-10 RX ORDER — BACITRACIN ZINC AND POLYMYXIN B SULFATE 500; 1000 [USP'U]/G; [USP'U]/G
OINTMENT TOPICAL ONCE
OUTPATIENT
Start: 2023-03-10 | End: 2023-03-10

## 2023-03-10 RX ORDER — LIDOCAINE HYDROCHLORIDE 20 MG/ML
JELLY TOPICAL ONCE
OUTPATIENT
Start: 2023-03-10 | End: 2023-03-10

## 2023-03-10 RX ORDER — GENTAMICIN SULFATE 1 MG/G
OINTMENT TOPICAL ONCE
OUTPATIENT
Start: 2023-03-10 | End: 2023-03-10

## 2023-03-10 RX ORDER — BACITRACIN, NEOMYCIN, POLYMYXIN B 400; 3.5; 5 [USP'U]/G; MG/G; [USP'U]/G
OINTMENT TOPICAL ONCE
OUTPATIENT
Start: 2023-03-10 | End: 2023-03-10

## 2023-03-10 RX ORDER — LIDOCAINE HYDROCHLORIDE 40 MG/ML
SOLUTION TOPICAL ONCE
OUTPATIENT
Start: 2023-03-10 | End: 2023-03-10

## 2023-03-10 RX ORDER — SULFAMETHOXAZOLE AND TRIMETHOPRIM 800; 160 MG/1; MG/1
1 TABLET ORAL 2 TIMES DAILY
Qty: 20 TABLET | Refills: 0 | Status: SHIPPED | OUTPATIENT
Start: 2023-03-10 | End: 2023-03-20

## 2023-03-10 RX ORDER — LIDOCAINE 50 MG/G
OINTMENT TOPICAL ONCE
OUTPATIENT
Start: 2023-03-10 | End: 2023-03-10

## 2023-03-10 RX ORDER — GINSENG 100 MG
CAPSULE ORAL ONCE
OUTPATIENT
Start: 2023-03-10 | End: 2023-03-10

## 2023-03-10 RX ORDER — CLOBETASOL PROPIONATE 0.5 MG/G
OINTMENT TOPICAL ONCE
OUTPATIENT
Start: 2023-03-10 | End: 2023-03-10

## 2023-03-10 RX ORDER — BETAMETHASONE DIPROPIONATE 0.05 %
OINTMENT (GRAM) TOPICAL ONCE
OUTPATIENT
Start: 2023-03-10 | End: 2023-03-10

## 2023-03-10 RX ORDER — LIDOCAINE 40 MG/G
CREAM TOPICAL ONCE
OUTPATIENT
Start: 2023-03-10 | End: 2023-03-10

## 2023-03-10 NOTE — PROGRESS NOTES
[facility]  History and Physical/Consultation  Podiatry    Referring Physician : Aide Agrawal MD  Kitty Godinez  MEDICAL RECORD NUMBER:  [id number]  AGE: 80 y.o. GENDER: female  : 1940  EPISODE DATE:  3/10/2023  Subjective:     Chief Complaint   Patient presents with    Wound Check     Left leg         HISTORY of PRESENT ILLNESS HPI     Kitty Godinez is a 80 y.o. female who presents today for wound/ulcer evaluation. History of Wound Context:  The patient has had a wounds of medial, lateral, posterior left leg and left heel which was first noted approximately three weeks ago. This has not been treated. On their initial visit to the wound healing center, 10/14/2022,  the patient has noted that the wound has not been improving. The patient has had similar previous wounds in the past.      Pt is not on abx at time of initial visit.     10/14/22  Patient presents with left leg erythema, pruritis and dry scaly skin to left leg   Eschar present on left heel   Patient is not diabetic  Patient has a history of peripheral vascular disease - taking Eliquis  Vascular studies ordered   Wounds debrided   Aquacel and Spandagrip, Lamisil to surrounding inflamed tissue     10/21/22  Wounds overall improving   Wounds debrided   Doxy 100mg BIB [id number] prescribed   Continue Aquacel and Spandagrip, Lamisil to surrounding inflamed tissue     10/28/22  Wounds overall appearance improving, decreased erythema   Left heel wound healed   Wounds not debrided today  Gentamycin cream prescribed   Continue Aquacel and Spandagrip, Lamisil to surrounding inflamed tissue   Consult to Dr. Justin BARKLEY Patient       22  Wounds overall appearance improving   Wounds debrided   Continue Gent, Aquacel and Spandagrip, Lamisil to surrounding inflamed tissue     22  Wounds improving   Wounds debrided   Continue Gent, Aquacel and Spandagrip, Lamisil to surrounding inflamed tissue     23  Wounds measuring slightly larger, increased drainage   Wounds debrided   Culture obtained   Discontinue Gent  Drawtex and spandagrip daily     2/3/23  Wound size stable, decreased drainage   Wounds debrided   Patient taking doxy per PCP  Aquacel ag, ABD and spandagrip daily     2/17/23  Wounds measuring larger   Wounds debrided   Aquacel and Gent ointment, ABD and spandagrip daily     3/3/23  Wounds measuring larger   Lower extremity erythema present   Medrol dose lexus prescribed   Wounds debrided   Promogram and spandagrip     3/10/23  Medial wounds measuring larger   Lateral/anterior wound measuring smaller   Lower extremity erythema improving    Wounds debrided   Culture obtained   Bactrim 800-160 BID [id number] prescribed   Continue Promogram and spandagrip     Wound/Ulcer Pain Timing/Severity: none  Quality of pain: N/A  Severity:  0 / 10   Modifying Factors: None  Associated Signs/Symptoms: edema and erythema    Ulcer Identification:  Ulcer Type: venous and undetermined  Contributing Factors: edema, venous stasis, and lymphedema    Diabetic/Pressure/Non Pressure Ulcers onl y:  Ulcer: Non-Pressure ulcer, fat layer exposed    If patient has diabetic lower extremity wounds  Castrejon Classification of diabetic lower extremity wounds:    Grade Description   []  0 No open wound   []  1 Superficial ulcer involving the full skin thickness   []  2 Deep ulcer involves ligament, tendon, joint capsule, or fascia  No bone involvement or abscess presence   []  3 Deep Ulcer with abcess formation and/or osteomyelitis   []  4 Localized gangrene   []  5 Extensive gangrene of the foot     Wound: N/A        PAST MEDICAL HISTORY      Diagnosis Date    Acquired hypothyroidism 8/14/2017    Arthritis     Blood transfusion reaction     Chronic kidney disease     History of blood transfusion     Hypertension     Lymphedema of both lower extremities 9/6/2017    Open wound of left great toe 10/18/2017    Other disorders of kidney and ureter in diseases classified elsewhere     Pelvic fracture (HCC)     Skin ulcer of left calf with fat layer exposed (HCC) 9/6/2017    Skin ulcer of left calf, limited to breakdown of skin (HCC) 9/6/2017    Ulcer of left lower leg (HCC) 3/24/2014    Venous stasis ulcer of left calf limited to breakdown of skin (HCC) 3/24/2014     Past Surgical History:   Procedure Laterality Date    COLONOSCOPY      ENDOSCOPY, COLON, DIAGNOSTIC      EYE SURGERY      cateract and lazor surgery 2015    FRACTURE SURGERY  2010    RT HIP    HIP FRACTURE SURGERY Left 08/08/2014    ORIF left hip    TONSILLECTOMY      as child    TOTAL HIP ARTHROPLASTY Left 10/17/2014    revision with removal of hardware     Family History   Problem Relation Age of Onset    Mult Sclerosis Father      Social History     Tobacco Use    Smoking status: Never    Smokeless tobacco: Never   Vaping Use    Vaping Use: Never used   Substance Use Topics    Alcohol use: Not Currently    Drug use: Never     Allergies   Allergen Reactions    Aspirin Itching    Other Nausea And Vomiting     FLU SHOT    Tape [Adhesive Tape] Swelling     Current Outpatient Medications on File Prior to Encounter   Medication Sig Dispense Refill    bumetanide (BUMEX) 1 MG tablet TAKE 1 TABLET BY MOUTH EVERY DAY 90 tablet 1    vitamin C (ASCORBIC ACID) 500 MG tablet Take 500 mg by mouth daily      clobetasol (TEMOVATE) 0.05 % cream Apply topically 2 times daily. 45 g 0    ferrous sulfate (IRON 325) 325 (65 Fe) MG tablet Take 325 mg by mouth daily (with breakfast)      acetaminophen (TYLENOL) 325 MG tablet Take 650 mg by mouth every 4 hours as needed for Pain      calcium-vitamin D (OSCAL-500) 500-200 MG-UNIT per tablet Take 1 tablet by mouth 2 times daily (Patient taking differently: Take 1 tablet by mouth daily) 30 tablet 3    apixaban (ELIQUIS) 5 MG TABS tablet Take 1 tablet by mouth 2 times daily 60 tablet 0    metoprolol tartrate (LOPRESSOR) 25 MG tablet Take 1 tablet by mouth 2 times daily 60 tablet 0     Cholecalciferol (VITAMIN D) 2000 units TABS tablet Take 2.5 tablets by mouth 2 times daily (Patient taking differently: Take 4,000 Units by mouth daily) 150 tablet 0    levothyroxine (SYNTHROID) 75 MCG tablet Take 75 mcg by mouth Daily       No current facility-administered medications on file prior to encounter. REVIEW OF SYSTEMS   ROS : All others Negative if blank [], Positive if [x]  General Vascular   [] Fevers [] Claudication   [] Chills [] Rest Pain   Skin Neurologic   [x] Tissue Loss [] Lower extremity neuropathy     Objective:    BP (!) 162/88   Pulse 66   Temp (!) 96.6 °F (35.9 °C) (Temporal)   Resp 16   Ht 5' 2\" (1.575 m)   Wt 160 lb (72.6 kg)   BMI 29.26 kg/m²   Wt Readings from Last 3 Encounters:   03/10/23 160 lb (72.6 kg)   01/06/23 160 lb (72.6 kg)   11/18/22 160 lb (72.6 kg)       PHYSICAL EXAM   CONSTITUTIONAL:   Awake, alert, cooperative   PSYCHIATRIC :  Oriented to time, place and person      normal insight to disease process  EXTREMITIES:   R LE Open wounds are noted   Skin color is abnormal with erythema, dry, flaky skin   Edema is  noted   Sensation intact to light touch   Palpation of the foot does not cause pain   4/5 strength DF/PF  L LE Open wounds are not noted   Skin color is abnormal with erythema, dry, flaky skin   Edema is  noted   Sensation intact to light touch   Palpation of the foot does not cause pain   4/5 strength DF/PF  R dorsalis pedis +1 L dorsalis pedis +1     Assessment:     Problem List Items Addressed This Visit       Heel ulcer, left, with unspecified severity ([address]    Non-pressure ulcer of lower extremity, left, with fat layer exposed ([address] - Primary    Relevant Orders    Initiate Outpatient Wound Care Protocol       Pre Debridement Measurements:  Are located in the [facility]  Documentation Flow Sheet  Post Debridement Measurements:  Wound/Ulcer Descriptions are Pre Debridement except measurements:  Pressure Ulcer 09/06/17 Leg Left; Lower #2 (1-5-17 acquired) (Active)   Number of days: 2011       Wound 10/14/22 Ankle Left;Medial #1 (Active)   Wound Image   03/03/23 1537   Wound Etiology Venous 10/14/22 1331   Dressing Status New dressing applied 03/10/23 1644   Wound Cleansed Cleansed with saline 03/10/23 1644   Dressing/Treatment Collagen;Dry dressing 03/10/23 1644   Offloading for Diabetic Foot Ulcers Offloading not required 02/17/23 1653   Wound Length (cm) 3.1 cm 03/10/23 1554   Wound Width (cm) 4.3 cm 03/10/23 1554   Wound Depth (cm) 0.2 cm 03/10/23 1554   Wound Surface Area (cm^2) 13.33 cm^2 03/10/23 1554   Change in Wound Size % (l*w) -1631.17 03/10/23 1554   Wound Volume (cm^3) 2.666 cm^3 03/10/23 1554   Wound Healing % -1631 03/10/23 1554   Post-Procedure Length (cm) 3.1 cm 03/10/23 1627   Post-Procedure Width (cm) 4.3 cm 03/10/23 1627   Post-Procedure Depth (cm) 0.3 cm 03/10/23 1627   Post-Procedure Surface Area (cm^2) 13.33 cm^2 03/10/23 1627   Post-Procedure Volume (cm^3) 3.999 cm^3 03/10/23 1627   Wound Assessment Fibrin;Granulation tissue 03/10/23 1554   Drainage Amount Moderate 03/10/23 1554   Drainage Description Yellow 03/10/23 1554   Odor None 03/10/23 1554   Patrizia-wound Assessment Intact 03/10/23 1554   Number of days: 147       Wound 10/14/22 Ankle Left;Lateral #2 (Active)   Wound Image   03/03/23 1537   Wound Etiology Venous 10/14/22 1331   Dressing Status New dressing applied 03/10/23 1644   Wound Cleansed Cleansed with saline 03/10/23 1644   Dressing/Treatment Collagen;Dry dressing 03/10/23 1644   Offloading for Diabetic Foot Ulcers Offloading not required 02/17/23 1653   Wound Length (cm) 1.6 cm 03/10/23 1554   Wound Width (cm) 1.2 cm 03/10/23 1554   Wound Depth (cm) 0.1 cm 03/10/23 1554   Wound Surface Area (cm^2) 1.92 cm^2 03/10/23 1554   Change in Wound Size % (l*w) -1820 03/10/23 1554   Wound Volume (cm^3) 0.192 cm^3 03/10/23 1554   Wound Healing % -860 03/10/23 1554   Post-Procedure Length (cm) 1.6 cm 03/10/23 1627   Post-Procedure Width (cm) 1.2 cm 03/10/23 1627   Post-Procedure Depth (cm) 0.2 cm 03/10/23 1627   Post-Procedure Surface Area (cm^2) 1.92 cm^2 03/10/23 1627   Post-Procedure Volume (cm^3) 0.384 cm^3 03/10/23 1627   Wound Assessment Fibrin;Granulation tissue 03/10/23 1554   Drainage Amount Moderate 03/10/23 1554   Drainage Description Serosanguinous 03/10/23 1554   Odor None 03/10/23 1554   Patrizia-wound Assessment Intact 03/10/23 1554   Number of days: 147       Wound 03/03/23 Foot Anterior; Left #3 (Active)   Wound Image   03/03/23 1537   Dressing Status New dressing applied 03/10/23 1644   Wound Cleansed Cleansed with saline 03/10/23 1644   Dressing/Treatment Collagen;Dry dressing 03/10/23 1644   Wound Length (cm) 0.5 cm 03/10/23 1554   Wound Width (cm) 0.2 cm 03/10/23 1554   Wound Depth (cm) 0.1 cm 03/10/23 1554   Wound Surface Area (cm^2) 0.1 cm^2 03/10/23 1554   Change in Wound Size % (l*w) 77.78 03/10/23 1554   Wound Volume (cm^3) 0.01 cm^3 03/10/23 1554   Wound Healing % 78 03/10/23 1554   Post-Procedure Length (cm) 0.5 cm 03/10/23 1627   Post-Procedure Width (cm) 0.2 cm 03/10/23 1627   Post-Procedure Depth (cm) 0.2 cm 03/10/23 1627   Post-Procedure Surface Area (cm^2) 0.1 cm^2 03/10/23 1627   Post-Procedure Volume (cm^3) 0.02 cm^3 03/10/23 1627   Wound Assessment Fibrin 03/10/23 1554   Drainage Amount Scant 03/10/23 1554   Drainage Description Yellow 03/10/23 1554   Odor None 03/10/23 1554   Patrizia-wound Assessment Intact 03/10/23 1554   Number of days: 7               Procedure Note  Indications:  Based on my examination of this patient's wound(s)/ulcer(s) today, debridement is required to promote healing and evaluate the wound base.     Performed by: TRAVIS Perez CNP    Consent obtained:  Yes    Time out taken:  Yes    Pain Control: Anesthetic  Anesthetic: 4% Lidocaine Liquid Topical     Debridement:Excisional Debridement    Using curette the wound(s)/ulcer(s) was/were sharply debrided down through and including the removal of subcutaneous tissue. Devitalized Tissue Debrided:  fibrin, biofilm, and slough to stimulate bleeding to promote healing, post debridement good bleeding base and wound edges noted    Wound/Ulcer #: 1 and 2    Percent of Wound/Ulcer Debrided: 100%    Total Surface Area Debrided:  15.35 sq cm     Estimated Blood Loss:  Minimal  Hemostasis Achieved:  by pressure    Procedural Pain:  3  / 10   Post Procedural Pain:  3 / 10     Response to treatment:  Well tolerated by patient. A culture was done. Plan:     Pt has never been a smoker   - Discussed relationship of smoking and negative affects on wound healing   - Emphasized importance of tobacco avoidace/cessation     In my professional opinion and based off the information that is available at this time this patient has appropriate indication for HBO Therapy: No    Treatment Note please see attached Discharge Instructions    Written patient dismissal instructions given to patient and signed by patient or POA. Discharge Instructions         Visit Discharge/Physician Orders     Discharge condition: Stable     Assessment of pain at discharge:MINIMAL     Anesthetic used: 4% LIDOCAINE     Discharge to: Home     Left via:Private automobile     Accompanied by: accompanied by child     ECF/HHA: [facility] *new order     Dressing Orders:LEFT LEG and foot ULCERS CLEANSE WITH NORMAL SALINE APPLY plain collagen, ABD,  KERLIX AND SPANDIGRIP CHANGE DAILY or every other day. Treatment Orders:  EAT FOODS HIGH IN PROTEIN AND VITAMIN C. Apply aquafor to dry skin. Culture taken in clinic today  Script sent for Bactrim, begin to take as directed     MULTIVITAMIN DAILY      Arterial studies ordered - received from [facility] reviewed (in media)     [facility] followup visit _______1 WEEK__Nguyen Teixeira_________________  (Please note your next appointment above and if you are unable to keep, kindly give a 24 hour notice.  Thank you.) Physician signature:__________________________        If you experience any of the following, please call the [address] during business hours:     * Increase in Pain  * Temperature over 101                                                                                                                                Electronically signed by TRAVIS Quan CNP on 3/10/2023 at 6:32 PM

## 2023-03-12 LAB
GRAM STAIN RESULT: ABNORMAL
ORGANISM: ABNORMAL
ORGANISM: ABNORMAL
WOUND/ABSCESS: ABNORMAL
WOUND/ABSCESS: ABNORMAL

## 2023-03-21 NOTE — DISCHARGE INSTRUCTIONS
Visit Discharge/Physician Orders     Discharge condition: Stable     Assessment of pain at discharge:MINIMAL     Anesthetic used: 4% LIDOCAINE     Discharge to: Home     Left via:Private automobile     Accompanied by: accompanied by child     ECF/HHA: [address] [facility]      Dressing Orders:LEFT LEG and foot ULCERS CLEANSE WITH NORMAL SALINE APPLY plain collagen, ABD,  KERLIX AND SPANDIGRIP CHANGE DAILY or every other day. Treatment Orders:  EAT FOODS HIGH IN PROTEIN AND VITAMIN C. Apply aquafor to dry skin. Wear prevalon boots while at home resting, elevate legs     MULTIVITAMIN DAILY           [address] [facility] followup visit _______2 WEEKS__PK_________________  (Please note your next appointment above and if you are unable to keep, kindly give a 24 hour notice.  Thank you.)     Physician signature:__________________________        If you experience any of the following, please call the [address] during business hours:     * Increase in Pain  * Temperature over 101

## 2023-03-24 ENCOUNTER — HOSPITAL ENCOUNTER (OUTPATIENT)
Dept: WOUND CARE | Age: 83
Discharge: HOME | End: 2023-03-24
Payer: MEDICARE

## 2023-03-24 VITALS
SYSTOLIC BLOOD PRESSURE: 152 MMHG | TEMPERATURE: 96.5 F | RESPIRATION RATE: 16 BRPM | HEART RATE: 60 BPM | DIASTOLIC BLOOD PRESSURE: 68 MMHG

## 2023-03-24 DIAGNOSIS — L97.922 NON-PRESSURE ULCER OF LOWER EXTREMITY, LEFT, WITH FAT LAYER EXPOSED (HCC): Primary | ICD-10-CM

## 2023-03-24 DIAGNOSIS — L97.429 HEEL ULCER, LEFT, WITH UNSPECIFIED SEVERITY (HCC): ICD-10-CM

## 2023-03-24 PROCEDURE — 11042 DBRDMT SUBQ TIS 1ST 20SQCM/<: CPT

## 2023-03-24 PROCEDURE — 6370000000 HC RX 637 (ALT 250 FOR IP): Performed by: NURSE PRACTITIONER

## 2023-03-24 RX ORDER — LIDOCAINE HYDROCHLORIDE 40 MG/ML
SOLUTION TOPICAL ONCE
Status: COMPLETED | OUTPATIENT
Start: 2023-03-24 | End: 2023-03-24

## 2023-03-24 RX ORDER — LIDOCAINE HYDROCHLORIDE 20 MG/ML
JELLY TOPICAL ONCE
OUTPATIENT
Start: 2023-03-24 | End: 2023-03-24

## 2023-03-24 RX ORDER — LIDOCAINE 40 MG/G
CREAM TOPICAL ONCE
OUTPATIENT
Start: 2023-03-24 | End: 2023-03-24

## 2023-03-24 RX ORDER — BETAMETHASONE DIPROPIONATE 0.05 %
OINTMENT (GRAM) TOPICAL ONCE
OUTPATIENT
Start: 2023-03-24 | End: 2023-03-24

## 2023-03-24 RX ORDER — LIDOCAINE 50 MG/G
OINTMENT TOPICAL ONCE
OUTPATIENT
Start: 2023-03-24 | End: 2023-03-24

## 2023-03-24 RX ORDER — GINSENG 100 MG
CAPSULE ORAL ONCE
OUTPATIENT
Start: 2023-03-24 | End: 2023-03-24

## 2023-03-24 RX ORDER — BACITRACIN ZINC AND POLYMYXIN B SULFATE 500; 1000 [USP'U]/G; [USP'U]/G
OINTMENT TOPICAL ONCE
OUTPATIENT
Start: 2023-03-24 | End: 2023-03-24

## 2023-03-24 RX ORDER — CLOBETASOL PROPIONATE 0.5 MG/G
OINTMENT TOPICAL ONCE
OUTPATIENT
Start: 2023-03-24 | End: 2023-03-24

## 2023-03-24 RX ORDER — BACITRACIN, NEOMYCIN, POLYMYXIN B 400; 3.5; 5 [USP'U]/G; MG/G; [USP'U]/G
OINTMENT TOPICAL ONCE
OUTPATIENT
Start: 2023-03-24 | End: 2023-03-24

## 2023-03-24 RX ORDER — LIDOCAINE HYDROCHLORIDE 40 MG/ML
SOLUTION TOPICAL ONCE
OUTPATIENT
Start: 2023-03-24 | End: 2023-03-24

## 2023-03-24 RX ORDER — GENTAMICIN SULFATE 1 MG/G
OINTMENT TOPICAL ONCE
OUTPATIENT
Start: 2023-03-24 | End: 2023-03-24

## 2023-03-24 RX ADMIN — LIDOCAINE HYDROCHLORIDE 5 ML: 40 SOLUTION TOPICAL at 15:09

## 2023-03-24 NOTE — PROGRESS NOTES
and if you are unable to keep, kindly give a 24 hour notice.  Thank you.)     Physician signature:__________________________        If you experience any of the following, please call the [address] [place] [address] Road during business hours:     * Increase in Pain  * Temperature over 101                                                                                                                                                           Electronically signed by TRAVIS Garcia CNP on 3/24/2023 at 6:38 PM

## 2023-04-03 NOTE — DISCHARGE INSTRUCTIONS
Visit Discharge/Physician Orders     Discharge condition: Stable     Assessment of pain at discharge:MINIMAL     Anesthetic used: 4% lidocaine solution     Discharge to: Home     Left via:Private automobile     Accompanied by:  child     ECF/HHA: [facility] (note order change)     Dressing Orders: LEFT LEG and FOOT ULCERS-Cleanse with normal saline,  apply  silver alginate, ABD pad, and coban 2. Change Monday with home care and Friday at [facility]. Apply spandagrip to right leg. On in am and off in pm.     Elevate legs as much as possible above level of the heart. Treatment Orders:Eat a diet high in protein and vitamin C. Take a multiple vitamin daily unless contraindicated. Venous US to be scheduled. [facility] followup visit :1 week Trice(Friday)________________________  (Please note your next appointment above and if you are unable to keep, kindly give a 24 hour notice.  Thank you.)     Physician signature:__________________________      If you experience any of the following, please call the [address] [place] [address] Road during business hours:     * Increase in Pain  * Temperature over 101

## 2023-04-05 ENCOUNTER — HOSPITAL ENCOUNTER (OUTPATIENT)
Dept: WOUND CARE | Age: 83
Discharge: HOME | End: 2023-04-05
Payer: MEDICARE

## 2023-04-05 VITALS
TEMPERATURE: 97.7 F | RESPIRATION RATE: 16 BRPM | HEIGHT: 63 IN | WEIGHT: 160 LBS | HEART RATE: 58 BPM | SYSTOLIC BLOOD PRESSURE: 163 MMHG | DIASTOLIC BLOOD PRESSURE: 59 MMHG | BODY MASS INDEX: 28.35 KG/M2

## 2023-04-05 DIAGNOSIS — L97.322 VENOUS STASIS ULCER OF LEFT ANKLE WITH FAT LAYER EXPOSED WITHOUT VARICOSE VEINS (HCC): ICD-10-CM

## 2023-04-05 DIAGNOSIS — I87.2 VENOUS STASIS ULCER OF LEFT ANKLE WITH FAT LAYER EXPOSED WITHOUT VARICOSE VEINS (HCC): ICD-10-CM

## 2023-04-05 DIAGNOSIS — L97.922 NON-PRESSURE ULCER OF LOWER EXTREMITY, LEFT, WITH FAT LAYER EXPOSED (HCC): Primary | ICD-10-CM

## 2023-04-05 PROCEDURE — 11042 DBRDMT SUBQ TIS 1ST 20SQCM/<: CPT

## 2023-04-05 RX ORDER — LIDOCAINE 40 MG/G
CREAM TOPICAL ONCE
OUTPATIENT
Start: 2023-04-05 | End: 2023-04-05

## 2023-04-05 RX ORDER — CLOBETASOL PROPIONATE 0.5 MG/G
OINTMENT TOPICAL ONCE
OUTPATIENT
Start: 2023-04-05 | End: 2023-04-05

## 2023-04-05 RX ORDER — BACITRACIN, NEOMYCIN, POLYMYXIN B 400; 3.5; 5 [USP'U]/G; MG/G; [USP'U]/G
OINTMENT TOPICAL ONCE
OUTPATIENT
Start: 2023-04-05 | End: 2023-04-05

## 2023-04-05 RX ORDER — BETAMETHASONE DIPROPIONATE 0.05 %
OINTMENT (GRAM) TOPICAL ONCE
OUTPATIENT
Start: 2023-04-05 | End: 2023-04-05

## 2023-04-05 RX ORDER — LIDOCAINE HYDROCHLORIDE 20 MG/ML
JELLY TOPICAL ONCE
OUTPATIENT
Start: 2023-04-05 | End: 2023-04-05

## 2023-04-05 RX ORDER — GENTAMICIN SULFATE 1 MG/G
OINTMENT TOPICAL ONCE
OUTPATIENT
Start: 2023-04-05 | End: 2023-04-05

## 2023-04-05 RX ORDER — BACITRACIN ZINC AND POLYMYXIN B SULFATE 500; 1000 [USP'U]/G; [USP'U]/G
OINTMENT TOPICAL ONCE
OUTPATIENT
Start: 2023-04-05 | End: 2023-04-05

## 2023-04-05 RX ORDER — M-VIT,TX,IRON,MINS/CALC/FOLIC 27MG-0.4MG
1 TABLET ORAL DAILY
COMMUNITY

## 2023-04-05 RX ORDER — GINSENG 100 MG
CAPSULE ORAL ONCE
OUTPATIENT
Start: 2023-04-05 | End: 2023-04-05

## 2023-04-05 RX ORDER — LIDOCAINE HYDROCHLORIDE 40 MG/ML
SOLUTION TOPICAL ONCE
OUTPATIENT
Start: 2023-04-05 | End: 2023-04-05

## 2023-04-05 RX ORDER — LIDOCAINE 50 MG/G
OINTMENT TOPICAL ONCE
OUTPATIENT
Start: 2023-04-05 | End: 2023-04-05

## 2023-04-05 NOTE — PROGRESS NOTES
tablet Take 1 tablet by mouth 2 times daily (Patient taking differently: Take 1 tablet by mouth daily) 30 tablet 3    apixaban (ELIQUIS) 5 MG TABS tablet Take 1 tablet by mouth 2 times daily 60 tablet 0    metoprolol tartrate (LOPRESSOR) 25 MG tablet Take 1 tablet by mouth 2 times daily (Patient not taking: Reported on 4/5/2023) 60 tablet 0    levothyroxine (SYNTHROID) 75 MCG tablet Take 1 tablet by mouth Daily       No current facility-administered medications on file prior to encounter.      REVIEW OF SYSTEMS   ROS : All others Negative if blank [], Positive if [x]  General Urinary   [] Fevers [] Hematuria   [] Chills [] Dysuria   [] Weight Loss Vascular   Skin [] Claudication   [x] Tissue Loss [] Rest Pain   Eyes Neurologic   [] Wears Glasses/Contacts [] Stroke/TIA   [] Vision Changes [] Focal weakness   Respiratory [] Slurred Speech    [] Shortness of breath ENT   Cardiovascular [] Difficulty swallowing   [] Chest Pain Gastrointestinal   [] Shortness of breath with exertion [] Abdominal Pain    [] Melena       [] Hematochezia               Objective:    BP (!) 163/59   Pulse 58   Temp 97.7 °F (36.5 °C) (Temporal)   Resp 16   Ht 5' 3\" (1.6 m)   Wt 160 lb (72.6 kg)   BMI 28.34 kg/m²   Wt Readings from Last 3 Encounters:   04/05/23 160 lb (72.6 kg)   03/10/23 160 lb (72.6 kg)   01/06/23 160 lb (72.6 kg)     PHYSICAL EXAM  CONSTITUTIONAL:   Awake, alert, cooperative   PSYCHIATRIC :  Oriented to time, place and person      normal insight to disease process  ENT:  External ears and nose without lesions    Hearing deficits is  noted  NECK: Supple, symmetrical, trachea midline    Thyroid goiter not appreciated   LUNGS:  No increased work of breathing                  Clear to auscultation bilaterally   CARDIOVASCULAR:  S1S2  ABDOMEN:  soft, non-distended, non-tender    Aorta is not palpable   Lymphatics : Cervical lymphadenopathy is not noted     Femoral lymphadenopathy is not noted  SKIN:   Skin color is abnormal

## 2023-04-05 NOTE — PLAN OF CARE
Problem: Wound:  Goal: Will show signs of wound healing; wound closure and no evidence of infection  Description: Will show signs of wound healing; wound closure and no evidence of infection  4/5/2023 1102 by Damaso Kurtz RN  Outcome: Progressing  4/5/2023 1056 by Damaso Kurtz RN  Outcome: Progressing     Problem: Venous:  Goal: Signs of wound healing will improve  Description: Signs of wound healing will improve  Outcome: Progressing     Problem: Smoking cessation:  Goal: Ability to formulate a plan to maintain a tobacco-free life will be supported  Description: Ability to formulate a plan to maintain a tobacco-free life will be supported  Outcome: Progressing

## 2023-04-18 ENCOUNTER — HOSPITAL ENCOUNTER (OUTPATIENT)
Dept: ULTRASOUND IMAGING | Age: 83
Discharge: HOME | End: 2023-04-20
Payer: MEDICARE

## 2023-04-18 DIAGNOSIS — L97.322 VENOUS STASIS ULCER OF LEFT ANKLE WITH FAT LAYER EXPOSED WITHOUT VARICOSE VEINS (HCC): ICD-10-CM

## 2023-04-18 DIAGNOSIS — I87.2 VENOUS STASIS ULCER OF LEFT ANKLE WITH FAT LAYER EXPOSED WITHOUT VARICOSE VEINS (HCC): ICD-10-CM

## 2023-04-18 DIAGNOSIS — L97.922 NON-PRESSURE ULCER OF LOWER EXTREMITY, LEFT, WITH FAT LAYER EXPOSED (HCC): ICD-10-CM

## 2023-04-18 PROCEDURE — 93971 EXTREMITY STUDY: CPT

## 2023-04-18 NOTE — DISCHARGE INSTRUCTIONS
Visit Discharge/Physician Orders     Discharge condition: Stable     Assessment of pain at discharge:MINIMAL     Anesthetic used: 4% lidocaine solution     Discharge to: Home     Left via:Private automobile     Accompanied by:  child     ECF/HHA: [facility] (note order change)     Dressing Orders: LEFT LEG and FOOT ULCERS-Cleanse with normal saline,  apply  tyson, ABD pad, and coban 2. Change Monday with home care and Friday at [facility]. Apply spandagrip to right leg. On in am and off in pm.      Elevate legs as much as possible above level of the heart. Treatment Orders:Eat a diet high in protein and vitamin C. Take a multiple vitamin daily unless contraindicated. Venous US to be scheduled. [facility] followup visit :1 week Trice(Friday)________________________  (Please note your next appointment above and if you are unable to keep, kindly give a 24 hour notice.  Thank you.)     Physician signature:__________________________      If you experience any of the following, please call the [address] [place] [address] [address] during business hours:     * Increase in Pain  * Temperature over 101

## 2023-04-21 ENCOUNTER — HOSPITAL ENCOUNTER (OUTPATIENT)
Dept: WOUND CARE | Age: 83
Discharge: HOME | End: 2023-04-21
Payer: MEDICARE

## 2023-04-21 VITALS
SYSTOLIC BLOOD PRESSURE: 168 MMHG | RESPIRATION RATE: 16 BRPM | WEIGHT: 160 LBS | HEIGHT: 63 IN | TEMPERATURE: 97.8 F | DIASTOLIC BLOOD PRESSURE: 61 MMHG | BODY MASS INDEX: 28.35 KG/M2 | HEART RATE: 65 BPM

## 2023-04-21 DIAGNOSIS — I87.2 VENOUS STASIS ULCER OF LEFT ANKLE WITH FAT LAYER EXPOSED WITHOUT VARICOSE VEINS (HCC): Primary | ICD-10-CM

## 2023-04-21 DIAGNOSIS — L97.322 VENOUS STASIS ULCER OF LEFT ANKLE WITH FAT LAYER EXPOSED WITHOUT VARICOSE VEINS (HCC): Primary | ICD-10-CM

## 2023-04-21 PROCEDURE — 11042 DBRDMT SUBQ TIS 1ST 20SQCM/<: CPT

## 2023-04-21 PROCEDURE — 6370000000 HC RX 637 (ALT 250 FOR IP): Performed by: NURSE PRACTITIONER

## 2023-04-21 RX ORDER — LIDOCAINE HYDROCHLORIDE 20 MG/ML
JELLY TOPICAL ONCE
OUTPATIENT
Start: 2023-04-21 | End: 2023-04-21

## 2023-04-21 RX ORDER — LIDOCAINE HYDROCHLORIDE 40 MG/ML
SOLUTION TOPICAL ONCE
Status: COMPLETED | OUTPATIENT
Start: 2023-04-21 | End: 2023-04-21

## 2023-04-21 RX ORDER — LIDOCAINE 40 MG/G
CREAM TOPICAL ONCE
OUTPATIENT
Start: 2023-04-21 | End: 2023-04-21

## 2023-04-21 RX ORDER — LIDOCAINE 50 MG/G
OINTMENT TOPICAL ONCE
OUTPATIENT
Start: 2023-04-21 | End: 2023-04-21

## 2023-04-21 RX ORDER — BACITRACIN, NEOMYCIN, POLYMYXIN B 400; 3.5; 5 [USP'U]/G; MG/G; [USP'U]/G
OINTMENT TOPICAL ONCE
OUTPATIENT
Start: 2023-04-21 | End: 2023-04-21

## 2023-04-21 RX ORDER — BETAMETHASONE DIPROPIONATE 0.05 %
OINTMENT (GRAM) TOPICAL ONCE
OUTPATIENT
Start: 2023-04-21 | End: 2023-04-21

## 2023-04-21 RX ORDER — BACITRACIN ZINC AND POLYMYXIN B SULFATE 500; 1000 [USP'U]/G; [USP'U]/G
OINTMENT TOPICAL ONCE
OUTPATIENT
Start: 2023-04-21 | End: 2023-04-21

## 2023-04-21 RX ORDER — GINSENG 100 MG
CAPSULE ORAL ONCE
OUTPATIENT
Start: 2023-04-21 | End: 2023-04-21

## 2023-04-21 RX ORDER — CLOBETASOL PROPIONATE 0.5 MG/G
OINTMENT TOPICAL ONCE
OUTPATIENT
Start: 2023-04-21 | End: 2023-04-21

## 2023-04-21 RX ORDER — GENTAMICIN SULFATE 1 MG/G
OINTMENT TOPICAL ONCE
OUTPATIENT
Start: 2023-04-21 | End: 2023-04-21

## 2023-04-21 RX ORDER — LIDOCAINE HYDROCHLORIDE 40 MG/ML
SOLUTION TOPICAL ONCE
OUTPATIENT
Start: 2023-04-21 | End: 2023-04-21

## 2023-04-21 RX ADMIN — LIDOCAINE HYDROCHLORIDE 10 ML: 40 SOLUTION TOPICAL at 13:41

## 2023-04-21 NOTE — PROGRESS NOTES
Wound Healing % -1700 04/21/23 1334   Post-Procedure Length (cm) 1.5 cm 04/21/23 1422   Post-Procedure Width (cm) 1.3 cm 04/21/23 1422   Post-Procedure Depth (cm) 0.3 cm 04/21/23 1422   Post-Procedure Surface Area (cm^2) 1.95 cm^2 04/21/23 1422   Post-Procedure Volume (cm^3) 0.585 cm^3 04/21/23 1422   Wound Assessment Fibrin;Pink/red 04/21/23 1334   Drainage Amount Moderate 04/21/23 1334   Drainage Description Yellow 04/21/23 1334   Odor None 04/21/23 1334   Patrizia-wound Assessment Maceration 04/21/23 1334   Number of days: 189       Wound 03/03/23 Foot Anterior; Left #3 (Active)   Wound Image   04/14/23 1401   Dressing Status New dressing applied 04/21/23 1533   Wound Cleansed Cleansed with saline 04/21/23 1533   Dressing/Treatment Collagen with Ag;Dry dressing 04/21/23 1533   Wound Length (cm) 1.2 cm 04/21/23 1334   Wound Width (cm) 0.2 cm 04/21/23 1334   Wound Depth (cm) 0.1 cm 04/21/23 1334   Wound Surface Area (cm^2) 0.24 cm^2 04/21/23 1334   Change in Wound Size % (l*w) 46.67 04/21/23 1334   Wound Volume (cm^3) 0.024 cm^3 04/21/23 1334   Wound Healing % 47 04/21/23 1334   Post-Procedure Length (cm) 1.3 cm 04/21/23 1422   Post-Procedure Width (cm) 0.3 cm 04/21/23 1422   Post-Procedure Depth (cm) 0.2 cm 04/21/23 1422   Post-Procedure Surface Area (cm^2) 0.39 cm^2 04/21/23 1422   Post-Procedure Volume (cm^3) 0.078 cm^3 04/21/23 1422   Wound Assessment Fibrin;Pink/red 04/21/23 1334   Drainage Amount Small 04/21/23 1334   Drainage Description Yellow 04/21/23 1334   Odor None 04/21/23 1334   Patrizia-wound Assessment Maceration 04/21/23 1334   Number of days: 48           Procedure Note  Indications:  Based on my examination of this patient's wound(s)/ulcer(s) today, debridement is required to promote healing and evaluate the wound base.     Performed by: TRAVIS Alvarez CNP    Consent obtained:  Yes    Time out taken:  Yes    Pain Control: Anesthetic  Anesthetic: 4% Lidocaine Liquid Topical

## 2023-04-21 NOTE — PLAN OF CARE
Problem: Wound:  Goal: Will show signs of wound healing; wound closure and no evidence of infection  Description: Will show signs of wound healing; wound closure and no evidence of infection  4/21/2023 1415 by Rocky Daniel RN  Outcome: Progressing  4/21/2023 1328 by Rocky Daniel RN  Outcome: Progressing     Problem: Venous:  Goal: Signs of wound healing will improve  Description: Signs of wound healing will improve  4/21/2023 1415 by Rocky Daniel RN  Outcome: Progressing  4/21/2023 1328 by Rocky Daniel RN  Outcome: Progressing     Problem: Smoking cessation:  Goal: Ability to formulate a plan to maintain a tobacco-free life will be supported  Description: Ability to formulate a plan to maintain a tobacco-free life will be supported  Outcome: Progressing

## 2023-04-25 NOTE — DISCHARGE INSTRUCTIONS
Visit Discharge/Physician Orders     Discharge condition: Stable     Assessment of pain at discharge:MINIMAL     Anesthetic used: 4% lidocaine solution     Discharge to: Home     Left via:Private automobile     Accompanied by:  child     ECF/HHA: [facility] (note order change)     Dressing Orders: LEFT LEG and FOOT ULCERS-Cleanse with normal saline,  apply  plain alginate, ABD pad, and profore lite. Change Monday with home care and Friday at [facility]. Apply spandagrip to right leg. On in am and off in pm.  Culture taken in clinic today      Elevate legs as much as possible above level of the heart. Treatment Orders:Eat a diet high in protein and vitamin C. Take a multiple vitamin daily unless contraindicated. Venous US to be scheduled. [facility] followup visit :1 week Trice(Friday)________________________  (Please note your next appointment above and if you are unable to keep, kindly give a 24 hour notice.  Thank you.)     Physician signature:__________________________      If you experience any of the following, please call the [address] [place] [address] Road during business hours:     * Increase in Pain  * Temperature over 101

## 2023-04-28 ENCOUNTER — HOSPITAL ENCOUNTER (OUTPATIENT)
Dept: WOUND CARE | Age: 83
Discharge: HOME | End: 2023-04-28
Payer: MEDICARE

## 2023-04-28 VITALS
DIASTOLIC BLOOD PRESSURE: 82 MMHG | HEART RATE: 75 BPM | SYSTOLIC BLOOD PRESSURE: 159 MMHG | RESPIRATION RATE: 16 BRPM | WEIGHT: 160 LBS | BODY MASS INDEX: 28.35 KG/M2 | HEIGHT: 63 IN | TEMPERATURE: 97.7 F

## 2023-04-28 DIAGNOSIS — I87.2 VENOUS STASIS ULCER OF LEFT ANKLE WITH FAT LAYER EXPOSED WITHOUT VARICOSE VEINS (HCC): Primary | ICD-10-CM

## 2023-04-28 DIAGNOSIS — L97.322 VENOUS STASIS ULCER OF LEFT ANKLE WITH FAT LAYER EXPOSED WITHOUT VARICOSE VEINS (HCC): Primary | ICD-10-CM

## 2023-04-28 DIAGNOSIS — L97.429 HEEL ULCER, LEFT, WITH UNSPECIFIED SEVERITY (HCC): ICD-10-CM

## 2023-04-28 PROCEDURE — 11042 DBRDMT SUBQ TIS 1ST 20SQCM/<: CPT

## 2023-04-28 PROCEDURE — 6370000000 HC RX 637 (ALT 250 FOR IP): Performed by: NURSE PRACTITIONER

## 2023-04-28 PROCEDURE — 87070 CULTURE OTHR SPECIMN AEROBIC: CPT

## 2023-04-28 PROCEDURE — 87077 CULTURE AEROBIC IDENTIFY: CPT

## 2023-04-28 PROCEDURE — 87186 SC STD MICRODIL/AGAR DIL: CPT

## 2023-04-28 PROCEDURE — 87205 SMEAR GRAM STAIN: CPT

## 2023-04-28 PROCEDURE — 87075 CULTR BACTERIA EXCEPT BLOOD: CPT

## 2023-04-28 RX ORDER — LIDOCAINE HYDROCHLORIDE 40 MG/ML
SOLUTION TOPICAL ONCE
Status: COMPLETED | OUTPATIENT
Start: 2023-04-28 | End: 2023-04-28

## 2023-04-28 RX ORDER — LIDOCAINE HYDROCHLORIDE 20 MG/ML
JELLY TOPICAL ONCE
OUTPATIENT
Start: 2023-04-28 | End: 2023-04-28

## 2023-04-28 RX ORDER — BACITRACIN ZINC AND POLYMYXIN B SULFATE 500; 1000 [USP'U]/G; [USP'U]/G
OINTMENT TOPICAL ONCE
OUTPATIENT
Start: 2023-04-28 | End: 2023-04-28

## 2023-04-28 RX ORDER — LIDOCAINE HYDROCHLORIDE 40 MG/ML
SOLUTION TOPICAL ONCE
OUTPATIENT
Start: 2023-04-28 | End: 2023-04-28

## 2023-04-28 RX ORDER — GINSENG 100 MG
CAPSULE ORAL ONCE
OUTPATIENT
Start: 2023-04-28 | End: 2023-04-28

## 2023-04-28 RX ORDER — BETAMETHASONE DIPROPIONATE 0.05 %
OINTMENT (GRAM) TOPICAL ONCE
OUTPATIENT
Start: 2023-04-28 | End: 2023-04-28

## 2023-04-28 RX ORDER — CLOBETASOL PROPIONATE 0.5 MG/G
OINTMENT TOPICAL ONCE
OUTPATIENT
Start: 2023-04-28 | End: 2023-04-28

## 2023-04-28 RX ORDER — LIDOCAINE 50 MG/G
OINTMENT TOPICAL ONCE
OUTPATIENT
Start: 2023-04-28 | End: 2023-04-28

## 2023-04-28 RX ORDER — BACITRACIN, NEOMYCIN, POLYMYXIN B 400; 3.5; 5 [USP'U]/G; MG/G; [USP'U]/G
OINTMENT TOPICAL ONCE
OUTPATIENT
Start: 2023-04-28 | End: 2023-04-28

## 2023-04-28 RX ORDER — GENTAMICIN SULFATE 1 MG/G
OINTMENT TOPICAL ONCE
OUTPATIENT
Start: 2023-04-28 | End: 2023-04-28

## 2023-04-28 RX ORDER — LIDOCAINE 40 MG/G
CREAM TOPICAL ONCE
OUTPATIENT
Start: 2023-04-28 | End: 2023-04-28

## 2023-04-28 RX ADMIN — LIDOCAINE HYDROCHLORIDE 15 ML: 40 SOLUTION TOPICAL at 13:33

## 2023-04-28 NOTE — PROGRESS NOTES
(TYLENOL) 325 MG tablet Take 2 tablets by mouth every 4 hours as needed for Pain      calcium-vitamin D (OSCAL-500) 500-200 MG-UNIT per tablet Take 1 tablet by mouth 2 times daily (Patient not taking: Reported on 4/21/2023) 30 tablet 3    apixaban (ELIQUIS) 5 MG TABS tablet Take 1 tablet by mouth 2 times daily 60 tablet 0    metoprolol tartrate (LOPRESSOR) 25 MG tablet Take 1 tablet by mouth 2 times daily (Patient not taking: Reported on 4/5/2023) 60 tablet 0    levothyroxine (SYNTHROID) 75 MCG tablet Take 1 tablet by mouth Daily       No current facility-administered medications on file prior to encounter.        REVIEW OF SYSTEMS   ROS : All others Negative if blank [], Positive if [x]  General Vascular   [] Fevers [] Claudication   [] Chills [] Rest Pain   Skin Neurologic   [x] Tissue Loss [] Lower extremity neuropathy     Objective:    BP (!) 159/82   Pulse 75   Temp 97.7 °F (36.5 °C) (Temporal)   Resp 16   Ht 5' 3\" (1.6 m)   Wt 160 lb (72.6 kg)   BMI 28.34 kg/m²   Wt Readings from Last 3 Encounters:   04/28/23 160 lb (72.6 kg)   04/21/23 160 lb (72.6 kg)   04/05/23 160 lb (72.6 kg)       PHYSICAL EXAM   CONSTITUTIONAL:   Awake, alert, cooperative   PSYCHIATRIC :  Oriented to time, place and person      normal insight to disease process  EXTREMITIES:   R LE Open wounds are noted   Skin color is abnormal with erythema, dry, flaky skin   Edema is  noted   Sensation intact to light touch   Palpation of the foot does not cause pain   4/5 strength DF/PF  L LE Open wounds are not noted   Skin color is abnormal with erythema, dry, flaky skin   Edema is  noted   Sensation intact to light touch   Palpation of the foot does not cause pain   4/5 strength DF/PF  R dorsalis pedis +1 L dorsalis pedis +1     Assessment:     Problem List Items Addressed This Visit       Heel ulcer, left, with unspecified severity (HCC)    Venous stasis ulcer of left ankle with fat layer exposed without varicose veins ([address] - Primary

## 2023-04-30 LAB
BACTERIA WND AEROBE CULT: ABNORMAL
BACTERIA WND AEROBE CULT: ABNORMAL
GRAM STN SPEC: ABNORMAL
ORGANISM: ABNORMAL

## 2023-05-02 PROBLEM — R89.5 POSITIVE CULTURE FINDINGS IN WOUND: Status: ACTIVE | Noted: 2023-05-02

## 2023-05-02 LAB — BACTERIA SPEC ANAEROBE CULT: NORMAL

## 2023-05-02 NOTE — DISCHARGE INSTRUCTIONS
Visit Discharge/Physician Orders     Discharge condition: Stable     Assessment of pain at discharge:MINIMAL     Anesthetic used: 4% lidocaine solution     Discharge to: Home     Left via:Private automobile     Accompanied by:  child     ECF/HHA: [facility]      Dressing Orders: LEFT LEG and FOOT ULCERS-Cleanse with normal saline,  apply  plain alginate, ABD pad, and profore lite. Change Monday with home care and Friday at [facility]. Apply spandagrip to right leg. On in am and off in pm.  Culture taken in clinic today- reviewed      Elevate legs as much as possible above level of the heart. Treatment Orders:Eat a diet high in protein and vitamin C. Take a multiple vitamin daily unless contraindicated. [facility] followup visit :                 1 week Trice(Friday)________________________  (Please note your next appointment above and if you are unable to keep, kindly give a 24 hour notice.  Thank you.)     Physician signature:__________________________      If you experience any of the following, please call the [address] [place] [address] Road during business hours:     * Increase in Pain  * Temperature over 101

## 2023-05-05 ENCOUNTER — HOSPITAL ENCOUNTER (OUTPATIENT)
Dept: WOUND CARE | Age: 83
Discharge: HOME | End: 2023-05-05
Payer: MEDICARE

## 2023-05-05 VITALS
SYSTOLIC BLOOD PRESSURE: 160 MMHG | WEIGHT: 160 LBS | DIASTOLIC BLOOD PRESSURE: 58 MMHG | TEMPERATURE: 98.5 F | BODY MASS INDEX: 28.35 KG/M2 | HEART RATE: 66 BPM | RESPIRATION RATE: 16 BRPM | HEIGHT: 63 IN

## 2023-05-05 DIAGNOSIS — R89.5 POSITIVE CULTURE FINDINGS IN WOUND: ICD-10-CM

## 2023-05-05 DIAGNOSIS — I87.2 VENOUS STASIS ULCER OF LEFT ANKLE WITH FAT LAYER EXPOSED WITHOUT VARICOSE VEINS (HCC): Primary | ICD-10-CM

## 2023-05-05 DIAGNOSIS — I73.9 PVD (PERIPHERAL VASCULAR DISEASE) (HCC): ICD-10-CM

## 2023-05-05 DIAGNOSIS — L97.322 VENOUS STASIS ULCER OF LEFT ANKLE WITH FAT LAYER EXPOSED WITHOUT VARICOSE VEINS (HCC): Primary | ICD-10-CM

## 2023-05-05 DIAGNOSIS — Z86.79 HISTORY OF VASCULAR DISEASE: ICD-10-CM

## 2023-05-05 DIAGNOSIS — R60.0 BILATERAL LOWER EXTREMITY EDEMA: ICD-10-CM

## 2023-05-05 PROCEDURE — 11042 DBRDMT SUBQ TIS 1ST 20SQCM/<: CPT

## 2023-05-05 PROCEDURE — 6370000000 HC RX 637 (ALT 250 FOR IP): Performed by: NURSE PRACTITIONER

## 2023-05-05 RX ORDER — GENTAMICIN SULFATE 1 MG/G
OINTMENT TOPICAL ONCE
OUTPATIENT
Start: 2023-05-05 | End: 2023-05-05

## 2023-05-05 RX ORDER — BETAMETHASONE DIPROPIONATE 0.05 %
OINTMENT (GRAM) TOPICAL ONCE
OUTPATIENT
Start: 2023-05-05 | End: 2023-05-05

## 2023-05-05 RX ORDER — LIDOCAINE HYDROCHLORIDE 20 MG/ML
JELLY TOPICAL ONCE
OUTPATIENT
Start: 2023-05-05 | End: 2023-05-05

## 2023-05-05 RX ORDER — LIDOCAINE 40 MG/G
CREAM TOPICAL ONCE
OUTPATIENT
Start: 2023-05-05 | End: 2023-05-05

## 2023-05-05 RX ORDER — BACITRACIN, NEOMYCIN, POLYMYXIN B 400; 3.5; 5 [USP'U]/G; MG/G; [USP'U]/G
OINTMENT TOPICAL ONCE
OUTPATIENT
Start: 2023-05-05 | End: 2023-05-05

## 2023-05-05 RX ORDER — BACITRACIN ZINC AND POLYMYXIN B SULFATE 500; 1000 [USP'U]/G; [USP'U]/G
OINTMENT TOPICAL ONCE
OUTPATIENT
Start: 2023-05-05 | End: 2023-05-05

## 2023-05-05 RX ORDER — LIDOCAINE HYDROCHLORIDE 40 MG/ML
SOLUTION TOPICAL ONCE
Status: COMPLETED | OUTPATIENT
Start: 2023-05-05 | End: 2023-05-05

## 2023-05-05 RX ORDER — GINSENG 100 MG
CAPSULE ORAL ONCE
OUTPATIENT
Start: 2023-05-05 | End: 2023-05-05

## 2023-05-05 RX ORDER — CLOBETASOL PROPIONATE 0.5 MG/G
OINTMENT TOPICAL ONCE
OUTPATIENT
Start: 2023-05-05 | End: 2023-05-05

## 2023-05-05 RX ORDER — LIDOCAINE HYDROCHLORIDE 40 MG/ML
SOLUTION TOPICAL ONCE
OUTPATIENT
Start: 2023-05-05 | End: 2023-05-05

## 2023-05-05 RX ORDER — LIDOCAINE 50 MG/G
OINTMENT TOPICAL ONCE
OUTPATIENT
Start: 2023-05-05 | End: 2023-05-05

## 2023-05-05 RX ADMIN — LIDOCAINE HYDROCHLORIDE 10 ML: 40 SOLUTION TOPICAL at 13:15

## 2023-05-05 NOTE — PLAN OF CARE
Problem: Wound:  Goal: Will show signs of wound healing; wound closure and no evidence of infection  Description: Will show signs of wound healing; wound closure and no evidence of infection  5/5/2023 1424 by Kwabena Godoy RN  Outcome: Progressing  5/5/2023 1355 by Kwabena Godoy RN  Outcome: Progressing

## 2023-05-05 NOTE — PROGRESS NOTES
or POA. Discharge Instructions            Visit Discharge/Physician Orders     Discharge condition: Stable     Assessment of pain at discharge:MINIMAL     Anesthetic used: 4% lidocaine solution     Discharge to: Home     Left via:Private automobile     Accompanied by:  child     ECF/HHA: [facility]      Dressing Orders: LEFT LEG and FOOT ULCERS-Cleanse with normal saline,  apply  plain alginate, ABD pad, and profore lite. Change Monday with home care and Friday at [facility]. Apply spandagrip to right leg. On in am and off in pm.  Culture taken in clinic today- reviewed      Elevate legs as much as possible above level of the heart. Treatment Orders:Eat a diet high in protein and vitamin C. Take a multiple vitamin daily unless contraindicated. [facility] followup visit :                 1 week Triec(Friday)________________________  (Please note your next appointment above and if you are unable to keep, kindly give a 24 hour notice.  Thank you.)     Physician signature:__________________________      If you experience any of the following, please call the [address] [place] [address] Road during business hours:     * Increase in Pain  * Temperature over 101                                                                            Electronically signed by TRAVIS Mitchell CNP on 5/5/2023 at 2:37 PM

## 2023-05-09 NOTE — DISCHARGE INSTRUCTIONS
Visit Discharge/Physician Orders     Discharge condition: Stable     Assessment of pain at discharge:MINIMAL     Anesthetic used: 4% lidocaine solution     Discharge to: Home     Left via:Private automobile     Accompanied by:  caregiver     ECF/HHA: [place] WRAP NOT TOO TIGHT AND MUST WRAP UP TO KNEE     Dressing Orders: LEFT LEG ULCERS-Cleanse with normal saline,  apply  plain alginate, ABD pad, and profore lite. Change Monday with home care and Friday at [address] [place] [address],[address]. Wrap from base of toes to just below knees. Keep wraps clean and dry. If wrap gets wet or falls more than 2 inches, remove wrap completely and call wound center at [phone]. Keep wound covered with dressing material (plain alginate) and secure with dry dressing. Apply spandagrip to right leg. On in am and off in pm.  Apply Aquafor to dry skin left leg     Elevate legs as much as possible above level of the heart. Vascular studies scheduled     Treatment Orders:Eat a diet high in protein and vitamin C. Take a multiple vitamin daily unless contraindicated. [address] [place] [address],[address] followup visit :                 1 week Trice(Friday)________________________  (Please note your next appointment above and if you are unable to keep, kindly give a 24 hour notice.  Thank you.)     Physician signature:__________________________      If you experience any of the following, please call the [address] during business hours:     * Increase in Pain  * Temperature over 101

## 2023-05-12 ENCOUNTER — HOSPITAL ENCOUNTER (OUTPATIENT)
Dept: WOUND CARE | Age: 83
Discharge: HOME | End: 2023-05-12
Payer: MEDICARE

## 2023-05-12 VITALS
DIASTOLIC BLOOD PRESSURE: 70 MMHG | HEART RATE: 58 BPM | RESPIRATION RATE: 16 BRPM | TEMPERATURE: 97 F | SYSTOLIC BLOOD PRESSURE: 140 MMHG

## 2023-05-12 DIAGNOSIS — I87.2 VENOUS STASIS ULCER OF LEFT ANKLE WITH FAT LAYER EXPOSED WITHOUT VARICOSE VEINS (HCC): Primary | ICD-10-CM

## 2023-05-12 DIAGNOSIS — L97.429 HEEL ULCER, LEFT, WITH UNSPECIFIED SEVERITY (HCC): ICD-10-CM

## 2023-05-12 DIAGNOSIS — L97.322 VENOUS STASIS ULCER OF LEFT ANKLE WITH FAT LAYER EXPOSED WITHOUT VARICOSE VEINS (HCC): Primary | ICD-10-CM

## 2023-05-12 DIAGNOSIS — R60.0 BILATERAL LOWER EXTREMITY EDEMA: ICD-10-CM

## 2023-05-12 PROCEDURE — 11042 DBRDMT SUBQ TIS 1ST 20SQCM/<: CPT

## 2023-05-12 PROCEDURE — 6370000000 HC RX 637 (ALT 250 FOR IP): Performed by: NURSE PRACTITIONER

## 2023-05-12 RX ORDER — BACITRACIN ZINC AND POLYMYXIN B SULFATE 500; 1000 [USP'U]/G; [USP'U]/G
OINTMENT TOPICAL ONCE
OUTPATIENT
Start: 2023-05-12 | End: 2023-05-12

## 2023-05-12 RX ORDER — LIDOCAINE 50 MG/G
OINTMENT TOPICAL ONCE
OUTPATIENT
Start: 2023-05-12 | End: 2023-05-12

## 2023-05-12 RX ORDER — LIDOCAINE 40 MG/G
CREAM TOPICAL ONCE
OUTPATIENT
Start: 2023-05-12 | End: 2023-05-12

## 2023-05-12 RX ORDER — LIDOCAINE HYDROCHLORIDE 20 MG/ML
JELLY TOPICAL ONCE
OUTPATIENT
Start: 2023-05-12 | End: 2023-05-12

## 2023-05-12 RX ORDER — BACITRACIN, NEOMYCIN, POLYMYXIN B 400; 3.5; 5 [USP'U]/G; MG/G; [USP'U]/G
OINTMENT TOPICAL ONCE
OUTPATIENT
Start: 2023-05-12 | End: 2023-05-12

## 2023-05-12 RX ORDER — GENTAMICIN SULFATE 1 MG/G
OINTMENT TOPICAL ONCE
OUTPATIENT
Start: 2023-05-12 | End: 2023-05-12

## 2023-05-12 RX ORDER — LIDOCAINE HYDROCHLORIDE 40 MG/ML
SOLUTION TOPICAL ONCE
Status: COMPLETED | OUTPATIENT
Start: 2023-05-12 | End: 2023-05-12

## 2023-05-12 RX ORDER — LIDOCAINE HYDROCHLORIDE 40 MG/ML
SOLUTION TOPICAL ONCE
OUTPATIENT
Start: 2023-05-12 | End: 2023-05-12

## 2023-05-12 RX ORDER — CLOBETASOL PROPIONATE 0.5 MG/G
OINTMENT TOPICAL ONCE
OUTPATIENT
Start: 2023-05-12 | End: 2023-05-12

## 2023-05-12 RX ORDER — GINSENG 100 MG
CAPSULE ORAL ONCE
OUTPATIENT
Start: 2023-05-12 | End: 2023-05-12

## 2023-05-12 RX ORDER — BETAMETHASONE DIPROPIONATE 0.05 %
OINTMENT (GRAM) TOPICAL ONCE
OUTPATIENT
Start: 2023-05-12 | End: 2023-05-12

## 2023-05-12 RX ADMIN — LIDOCAINE HYDROCHLORIDE 7 ML: 40 SOLUTION TOPICAL at 13:37

## 2023-05-12 NOTE — PROGRESS NOTES
Depth (cm) 0 cm 05/12/23 1411   Post-Procedure Surface Area (cm^2) 0 cm^2 05/12/23 1411   Post-Procedure Volume (cm^3) 0 cm^3 05/12/23 1411   Wound Assessment Granulation tissue 05/12/23 1328   Drainage Amount None 05/12/23 1328   Drainage Description Yellow 05/05/23 1316   Odor None 05/12/23 1328   Patrizia-wound Assessment Fragile; Intact 05/12/23 1328   Number of days: 69           Procedure Note  Indications:  Based on my examination of this patient's wound(s)/ulcer(s) today, debridement is required to promote healing and evaluate the wound base. Performed by: TRAVIS Wallace CNP    Consent obtained:  Yes    Time out taken:  Yes    Pain Control: Anesthetic  Anesthetic: 4% Lidocaine Liquid Topical     Debridement:Excisional Debridement    Using curette the wound(s)/ulcer(s) was/were sharply debrided down through and including the removal of subcutaneous tissue. Devitalized Tissue Debrided:  fibrin, biofilm, and slough to stimulate bleeding to promote healing, post debridement good bleeding base and wound edges noted    Wound/Ulcer #: 1, 2, and 3    Percent of Wound/Ulcer Debrided: 100%    Total Surface Area Debrided:  17.78 sq cm     Estimated Blood Loss:  Minimal  Hemostasis Achieved:  by pressure    Procedural Pain:  3  / 10   Post Procedural Pain:  3 / 10     Response to treatment:  Well tolerated by patient. A culture was done. Plan:     Pt has never been a smoker   - Discussed relationship of smoking and negative affects on wound healing   - Emphasized importance of tobacco avoidace/cessation     In my professional opinion and based off the information that is available at this time this patient has appropriate indication for HBO Therapy: No    Treatment Note please see attached Discharge Instructions    Written patient dismissal instructions given to patient and signed by patient or POA.          Discharge Instructions         Visit Discharge/Physician Orders     Discharge condition:

## 2023-05-16 NOTE — DISCHARGE INSTRUCTIONS
Visit Discharge/Physician Orders     Discharge condition: Stable     Assessment of pain at discharge:MINIMAL     Anesthetic used: 4% lidocaine solution     Discharge to: Home     Left via:Private automobile     Accompanied by:  caregiver     ECF/HHA: [place] WRAP NOT TOO TIGHT AND MUST WRAP UP TO KNEE     Dressing Orders: LEFT LEG ULCERS-Cleanse with normal saline,  apply  plain alginate, ABD pad, and profore lite. Change Monday with home care and Friday at [address] [place] [address],[address]. Wrap from base of toes to just below knees. Keep wraps clean and dry. If wrap gets wet or falls more than 2 inches, remove wrap completely and call wound center at [phone]. Keep wound covered with dressing material (plain alginate) and secure with dry dressing. Apply spandagrip to right leg. On in am and off in pm.  Apply Aquafor to dry skin left leg     Elevate legs as much as possible above level of the heart. Vascular studies 6/2 at 2:30pm     Treatment Orders:Eat a diet high in protein and vitamin C. Take a multiple vitamin daily unless contraindicated. 5/24 at 11am Dr. Luis العلي at [address] followup visit :   2 week Trice(Friday)____1 Dr. GHOSH____________________  (Please note your next appointment above and if you are unable to keep, kindly give a 24 hour notice.  Thank you.)     Physician signature:__________________________      If you experience any of the following, please call the [address] [place] [address] during business hours:     * Increase in Pain  * Temperature over 101

## 2023-05-19 ENCOUNTER — HOSPITAL ENCOUNTER (OUTPATIENT)
Dept: WOUND CARE | Age: 83
Discharge: HOME | End: 2023-05-19
Payer: MEDICARE

## 2023-05-19 VITALS
BODY MASS INDEX: 28.35 KG/M2 | HEART RATE: 65 BPM | SYSTOLIC BLOOD PRESSURE: 172 MMHG | WEIGHT: 160 LBS | RESPIRATION RATE: 16 BRPM | DIASTOLIC BLOOD PRESSURE: 68 MMHG | HEIGHT: 63 IN | TEMPERATURE: 96.2 F

## 2023-05-19 DIAGNOSIS — L97.322 VENOUS STASIS ULCER OF LEFT ANKLE WITH FAT LAYER EXPOSED WITHOUT VARICOSE VEINS (HCC): Primary | ICD-10-CM

## 2023-05-19 DIAGNOSIS — I87.2 VENOUS STASIS ULCER OF LEFT ANKLE WITH FAT LAYER EXPOSED WITHOUT VARICOSE VEINS (HCC): Primary | ICD-10-CM

## 2023-05-19 PROCEDURE — 6370000000 HC RX 637 (ALT 250 FOR IP): Performed by: NURSE PRACTITIONER

## 2023-05-19 PROCEDURE — 11042 DBRDMT SUBQ TIS 1ST 20SQCM/<: CPT

## 2023-05-19 RX ORDER — BACITRACIN ZINC AND POLYMYXIN B SULFATE 500; 1000 [USP'U]/G; [USP'U]/G
OINTMENT TOPICAL ONCE
OUTPATIENT
Start: 2023-05-19 | End: 2023-05-19

## 2023-05-19 RX ORDER — LIDOCAINE 40 MG/G
CREAM TOPICAL ONCE
OUTPATIENT
Start: 2023-05-19 | End: 2023-05-19

## 2023-05-19 RX ORDER — LIDOCAINE 50 MG/G
OINTMENT TOPICAL ONCE
OUTPATIENT
Start: 2023-05-19 | End: 2023-05-19

## 2023-05-19 RX ORDER — BACITRACIN, NEOMYCIN, POLYMYXIN B 400; 3.5; 5 [USP'U]/G; MG/G; [USP'U]/G
OINTMENT TOPICAL ONCE
OUTPATIENT
Start: 2023-05-19 | End: 2023-05-19

## 2023-05-19 RX ORDER — GINSENG 100 MG
CAPSULE ORAL ONCE
OUTPATIENT
Start: 2023-05-19 | End: 2023-05-19

## 2023-05-19 RX ORDER — LIDOCAINE HYDROCHLORIDE 40 MG/ML
SOLUTION TOPICAL ONCE
OUTPATIENT
Start: 2023-05-19 | End: 2023-05-19

## 2023-05-19 RX ORDER — GENTAMICIN SULFATE 1 MG/G
OINTMENT TOPICAL ONCE
OUTPATIENT
Start: 2023-05-19 | End: 2023-05-19

## 2023-05-19 RX ORDER — CLOBETASOL PROPIONATE 0.5 MG/G
OINTMENT TOPICAL ONCE
OUTPATIENT
Start: 2023-05-19 | End: 2023-05-19

## 2023-05-19 RX ORDER — LIDOCAINE HYDROCHLORIDE 20 MG/ML
JELLY TOPICAL ONCE
OUTPATIENT
Start: 2023-05-19 | End: 2023-05-19

## 2023-05-19 RX ORDER — LIDOCAINE HYDROCHLORIDE 40 MG/ML
SOLUTION TOPICAL ONCE
Status: COMPLETED | OUTPATIENT
Start: 2023-05-19 | End: 2023-05-19

## 2023-05-19 RX ORDER — BETAMETHASONE DIPROPIONATE 0.05 %
OINTMENT (GRAM) TOPICAL ONCE
OUTPATIENT
Start: 2023-05-19 | End: 2023-05-19

## 2023-05-19 RX ADMIN — LIDOCAINE HYDROCHLORIDE 10 ML: 40 SOLUTION TOPICAL at 13:19

## 2023-05-19 NOTE — PLAN OF CARE
Problem: Cognitive:  Goal: Knowledge of wound care  Outcome: Progressing  Goal: Understands risk factors for wounds  Outcome: Progressing     Problem: Wound:  Goal: Will show signs of wound healing; wound closure and no evidence of infection  Outcome: Progressing

## 2023-05-23 NOTE — DISCHARGE INSTRUCTIONS
Visit Discharge/Physician Orders     Discharge condition: Stable     Assessment of pain at discharge:MINIMAL     Anesthetic used: 4% lidocaine solution     Discharge to: Home     Left via:Private automobile     Accompanied by:  caregiver     ECF/HHA: [place] WRAP NOT TOO TIGHT AND MUST WRAP UP TO KNEE     Dressing Orders: LEFT LEG ULCERS-Cleanse with normal saline,  apply  plain alginate, ABD pad, and profore lite. Change Monday with home care and Friday at [facility]. Wrap from base of toes to just below knees. Keep wraps clean and dry. If wrap gets wet or falls more than 2 inches, remove wrap completely and call wound center at [phone]. Keep wound covered with dressing material (plain alginate) and secure with dry dressing. Apply spandagrip to right leg. On in am and off in pm.    Apply Aquafor to dry skin left leg     Elevate legs as much as possible above level of the heart. Vascular studies 6/2 at 2:30pm     Treatment Orders:Eat a diet high in protein and vitamin C. Take a multiple vitamin daily unless contraindicated. to decide on if she wants ablation in future-not at this time     [facility] followup visit : 1 week Trice(Friday)___________________________6 weeks Dr. GHOSH_________________________  (Please note your next appointment above and if you are unable to keep, kindly give a 24 hour notice.  Thank you.)     Physician signature:__________________________      If you experience any of the following, please call the [address] [place] [address] [address] during business hours:     * Increase in Pain  * Temperature over 101

## 2023-05-24 ENCOUNTER — HOSPITAL ENCOUNTER (OUTPATIENT)
Dept: WOUND CARE | Age: 83
Discharge: HOME | End: 2023-05-24
Payer: MEDICARE

## 2023-05-24 VITALS
WEIGHT: 160 LBS | RESPIRATION RATE: 18 BRPM | DIASTOLIC BLOOD PRESSURE: 66 MMHG | TEMPERATURE: 96.4 F | SYSTOLIC BLOOD PRESSURE: 158 MMHG | HEART RATE: 66 BPM | BODY MASS INDEX: 28.35 KG/M2 | HEIGHT: 63 IN

## 2023-05-24 DIAGNOSIS — I87.2 VENOUS STASIS ULCER OF LEFT ANKLE WITH FAT LAYER EXPOSED WITHOUT VARICOSE VEINS (HCC): Primary | ICD-10-CM

## 2023-05-24 DIAGNOSIS — L97.322 VENOUS STASIS ULCER OF LEFT ANKLE WITH FAT LAYER EXPOSED WITHOUT VARICOSE VEINS (HCC): Primary | ICD-10-CM

## 2023-05-24 PROCEDURE — 11042 DBRDMT SUBQ TIS 1ST 20SQCM/<: CPT

## 2023-05-24 PROCEDURE — 11042 DBRDMT SUBQ TIS 1ST 20SQCM/<: CPT | Performed by: SURGERY

## 2023-05-24 RX ORDER — GENTAMICIN SULFATE 1 MG/G
OINTMENT TOPICAL ONCE
OUTPATIENT
Start: 2023-05-24 | End: 2023-05-24

## 2023-05-24 RX ORDER — LIDOCAINE HYDROCHLORIDE 40 MG/ML
SOLUTION TOPICAL ONCE
OUTPATIENT
Start: 2023-05-24 | End: 2023-05-24

## 2023-05-24 RX ORDER — LIDOCAINE HYDROCHLORIDE 20 MG/ML
JELLY TOPICAL ONCE
OUTPATIENT
Start: 2023-05-24 | End: 2023-05-24

## 2023-05-24 RX ORDER — LIDOCAINE 50 MG/G
OINTMENT TOPICAL ONCE
OUTPATIENT
Start: 2023-05-24 | End: 2023-05-24

## 2023-05-24 RX ORDER — CLOBETASOL PROPIONATE 0.5 MG/G
OINTMENT TOPICAL ONCE
OUTPATIENT
Start: 2023-05-24 | End: 2023-05-24

## 2023-05-24 RX ORDER — GINSENG 100 MG
CAPSULE ORAL ONCE
OUTPATIENT
Start: 2023-05-24 | End: 2023-05-24

## 2023-05-24 RX ORDER — BETAMETHASONE DIPROPIONATE 0.05 %
OINTMENT (GRAM) TOPICAL ONCE
OUTPATIENT
Start: 2023-05-24 | End: 2023-05-24

## 2023-05-24 RX ORDER — BACITRACIN, NEOMYCIN, POLYMYXIN B 400; 3.5; 5 [USP'U]/G; MG/G; [USP'U]/G
OINTMENT TOPICAL ONCE
OUTPATIENT
Start: 2023-05-24 | End: 2023-05-24

## 2023-05-24 RX ORDER — BACITRACIN ZINC AND POLYMYXIN B SULFATE 500; 1000 [USP'U]/G; [USP'U]/G
OINTMENT TOPICAL ONCE
OUTPATIENT
Start: 2023-05-24 | End: 2023-05-24

## 2023-05-24 RX ORDER — LIDOCAINE HYDROCHLORIDE 40 MG/ML
SOLUTION TOPICAL ONCE
Status: COMPLETED | OUTPATIENT
Start: 2023-05-24 | End: 2023-05-24

## 2023-05-24 RX ORDER — LIDOCAINE 40 MG/G
CREAM TOPICAL ONCE
OUTPATIENT
Start: 2023-05-24 | End: 2023-05-24

## 2023-05-24 RX ADMIN — LIDOCAINE HYDROCHLORIDE 10 ML: 40 SOLUTION TOPICAL at 11:09

## 2023-05-24 ASSESSMENT — PAIN SCALES - GENERAL: PAINLEVEL_OUTOF10: 0

## 2023-05-24 NOTE — PROGRESS NOTES
Wound Healing Center  History and Physical/Consultation  Podiatry    Referring Physician : Joann Winston  Francois Fontenot  MEDICAL RECORD NUMBER:  [id number]  AGE: 80 y.o. GENDER: female  : 1940  EPISODE DATE:  2023  Subjective:     Chief Complaint   Patient presents with    Wound Check     Left ankle         HISTORY of PRESENT ILLNESS HPI     Francois Fontenot is a 80 y.o. female who presents today for wound/ulcer evaluation. History of Wound Context:  The patient has had a wounds of medial, lateral, posterior left leg and left heel which was first noted approximately three weeks ago. This has not been treated. On their initial visit to the wound healing center, 10/14/2022,  the patient has noted that the wound has not been improving. The patient has had similar previous wounds in the past.      Pt is not on abx at time of initial visit.     10/14/22  Patient presents with left leg erythema, pruritis and dry scaly skin to left leg   Eschar present on left heel   Patient is not diabetic  Patient has a history of peripheral vascular disease - taking Eliquis  Vascular studies ordered   Wounds debrided   Aquacel and Spandagrip, Lamisil to surrounding inflamed tissue     10/21/22  Wounds overall improving   Wounds debrided   Doxy 100mg BIB [id number] prescribed   Continue Aquacel and Spandagrip, Lamisil to surrounding inflamed tissue     10/28/22  Wounds overall appearance improving, decreased erythema   Left heel wound healed   Wounds not debrided today  Gentamycin cream prescribed   Continue Aquacel and Spandagrip, Lamisil to surrounding inflamed tissue   Consult to ID, Dr. Mak Jung       22  Wounds overall appearance improving   Wounds debrided   Continue Gent, Aquacel and Spandagrip, Lamisil to surrounding inflamed tissue     22  Wounds improving   Wounds debrided   Continue Gent, Aquacel and Spandagrip, Lamisil to surrounding inflamed tissue     23  Wounds measuring slightly

## 2023-06-02 ENCOUNTER — HOSPITAL ENCOUNTER (OUTPATIENT)
Dept: INTERVENTIONAL RADIOLOGY/VASCULAR | Age: 83
End: 2023-06-02
Payer: MEDICARE

## 2023-06-02 DIAGNOSIS — L97.322 VENOUS STASIS ULCER OF LEFT ANKLE WITH FAT LAYER EXPOSED WITHOUT VARICOSE VEINS (HCC): ICD-10-CM

## 2023-06-02 DIAGNOSIS — R60.0 BILATERAL LOWER EXTREMITY EDEMA: ICD-10-CM

## 2023-06-02 DIAGNOSIS — Z86.79 HISTORY OF VASCULAR DISEASE: ICD-10-CM

## 2023-06-02 DIAGNOSIS — I73.9 PVD (PERIPHERAL VASCULAR DISEASE) (HCC): ICD-10-CM

## 2023-06-02 DIAGNOSIS — I87.2 VENOUS STASIS ULCER OF LEFT ANKLE WITH FAT LAYER EXPOSED WITHOUT VARICOSE VEINS (HCC): ICD-10-CM

## 2023-06-02 PROCEDURE — 93922 UPR/L XTREMITY ART 2 LEVELS: CPT

## 2023-06-05 NOTE — DISCHARGE INSTRUCTIONS
Visit Discharge/Physician Orders     Discharge condition: Stable     Assessment of pain at discharge:MINIMAL     Anesthetic used: 4% lidocaine solution     Discharge to: Home     Left via:Private automobile     Accompanied by:  caregiver     ECF/HHA: [place] WRAP NOT TOO TIGHT AND MUST WRAP UP TO KNEE     Dressing Orders: LEFT LEG ULCERS-Cleanse with normal saline,  apply  plain alginate, ABD pad, and profore lite. Change Monday with home care and Friday at [facility]. Wrap from base of toes to just below knees. Keep wraps clean and dry. If wrap gets wet or falls more than 2 inches, remove wrap completely and call wound center at [phone]. Keep wound covered with dressing material (plain alginate) and secure with dry dressing. Apply spandagrip to right leg. On in am and off in pm.     Apply Aquafor to dry skin left leg     Elevate legs as much as possible above level of the heart. Vascular studies 6/2 at 2:30pm     Treatment Orders:Eat a diet high in protein and vitamin C. Take a multiple vitamin daily unless contraindicated. to decide on if she wants ablation in future-not at this time  Wound culture taken in clinic today  Prescription for clobetasol sent to pharmacy     [facility] followup visit : 1 week Trice(Friday)___________________________6 weeks Dr. GHOSH_________________________  (Please note your next appointment above and if you are unable to keep, kindly give a 24 hour notice.  Thank you.)     Physician signature:__________________________      If you experience any of the following, please call the [facility] [address] [facility] [address] during business hours:     * Increase in Pain  * Temperature over 101

## 2023-06-09 ENCOUNTER — HOSPITAL ENCOUNTER (OUTPATIENT)
Dept: WOUND CARE | Age: 83
Discharge: HOME | End: 2023-06-09
Payer: MEDICARE

## 2023-06-09 DIAGNOSIS — L53.9 ERYTHEMA OF LOWER EXTREMITY: ICD-10-CM

## 2023-06-09 DIAGNOSIS — R60.0 BILATERAL LOWER EXTREMITY EDEMA: ICD-10-CM

## 2023-06-09 DIAGNOSIS — I87.2 VENOUS STASIS ULCER OF LEFT ANKLE WITH FAT LAYER EXPOSED WITHOUT VARICOSE VEINS (HCC): Primary | ICD-10-CM

## 2023-06-09 DIAGNOSIS — L97.429 HEEL ULCER, LEFT, WITH UNSPECIFIED SEVERITY (HCC): ICD-10-CM

## 2023-06-09 DIAGNOSIS — L97.322 VENOUS STASIS ULCER OF LEFT ANKLE WITH FAT LAYER EXPOSED WITHOUT VARICOSE VEINS (HCC): Primary | ICD-10-CM

## 2023-06-09 PROCEDURE — 87186 SC STD MICRODIL/AGAR DIL: CPT

## 2023-06-09 PROCEDURE — 11042 DBRDMT SUBQ TIS 1ST 20SQCM/<: CPT

## 2023-06-09 PROCEDURE — 87205 SMEAR GRAM STAIN: CPT

## 2023-06-09 PROCEDURE — 87077 CULTURE AEROBIC IDENTIFY: CPT

## 2023-06-09 PROCEDURE — 87075 CULTR BACTERIA EXCEPT BLOOD: CPT

## 2023-06-09 PROCEDURE — 6370000000 HC RX 637 (ALT 250 FOR IP): Performed by: NURSE PRACTITIONER

## 2023-06-09 PROCEDURE — 87070 CULTURE OTHR SPECIMN AEROBIC: CPT

## 2023-06-09 RX ORDER — GENTAMICIN SULFATE 1 MG/G
OINTMENT TOPICAL ONCE
OUTPATIENT
Start: 2023-06-09 | End: 2023-06-09

## 2023-06-09 RX ORDER — BACITRACIN ZINC AND POLYMYXIN B SULFATE 500; 1000 [USP'U]/G; [USP'U]/G
OINTMENT TOPICAL ONCE
OUTPATIENT
Start: 2023-06-09 | End: 2023-06-09

## 2023-06-09 RX ORDER — LIDOCAINE 50 MG/G
OINTMENT TOPICAL ONCE
OUTPATIENT
Start: 2023-06-09 | End: 2023-06-09

## 2023-06-09 RX ORDER — IBUPROFEN 200 MG
TABLET ORAL ONCE
OUTPATIENT
Start: 2023-06-09 | End: 2023-06-09

## 2023-06-09 RX ORDER — GINSENG 100 MG
CAPSULE ORAL ONCE
OUTPATIENT
Start: 2023-06-09 | End: 2023-06-09

## 2023-06-09 RX ORDER — LIDOCAINE HYDROCHLORIDE 20 MG/ML
JELLY TOPICAL ONCE
OUTPATIENT
Start: 2023-06-09 | End: 2023-06-09

## 2023-06-09 RX ORDER — LIDOCAINE HYDROCHLORIDE 40 MG/ML
SOLUTION TOPICAL ONCE
Status: COMPLETED | OUTPATIENT
Start: 2023-06-09 | End: 2023-06-09

## 2023-06-09 RX ORDER — BETAMETHASONE DIPROPIONATE 0.05 %
OINTMENT (GRAM) TOPICAL ONCE
OUTPATIENT
Start: 2023-06-09 | End: 2023-06-09

## 2023-06-09 RX ORDER — LIDOCAINE 40 MG/G
CREAM TOPICAL ONCE
OUTPATIENT
Start: 2023-06-09 | End: 2023-06-09

## 2023-06-09 RX ORDER — CLOBETASOL PROPIONATE 0.5 MG/G
OINTMENT TOPICAL ONCE
OUTPATIENT
Start: 2023-06-09 | End: 2023-06-09

## 2023-06-09 RX ORDER — CLOBETASOL PROPIONATE 0.5 MG/G
CREAM TOPICAL
Qty: 60 G | Refills: 1 | Status: SHIPPED | OUTPATIENT
Start: 2023-06-09

## 2023-06-09 RX ORDER — LIDOCAINE HYDROCHLORIDE 40 MG/ML
SOLUTION TOPICAL ONCE
OUTPATIENT
Start: 2023-06-09 | End: 2023-06-09

## 2023-06-09 RX ADMIN — LIDOCAINE HYDROCHLORIDE 5 ML: 40 SOLUTION TOPICAL at 13:20

## 2023-06-09 NOTE — PROGRESS NOTES
- CNP    Consent obtained:  Yes    Time out taken:  Yes    Pain Control: Anesthetic  Anesthetic: 4% Lidocaine Liquid Topical     Debridement:Excisional Debridement    Using curette the wound(s)/ulcer(s) was/were sharply debrided down through and including the removal of subcutaneous tissue. Devitalized Tissue Debrided:  fibrin, biofilm, and slough to stimulate bleeding to promote healing, post debridement good bleeding base and wound edges noted    Wound/Ulcer #: 1 and 2    Percent of Wound/Ulcer Debrided: 100%    Total Surface Area Debrided:  17.36 sq cm     Estimated Blood Loss:  Minimal  Hemostasis Achieved:  by pressure    Procedural Pain:  3  / 10   Post Procedural Pain:  3 / 10     Response to treatment:  Well tolerated by patient. A culture was done. Plan:     Pt has never been a smoker   - Discussed relationship of smoking and negative affects on wound healing   - Emphasized importance of tobacco avoidace/cessation     In my professional opinion and based off the information that is available at this time this patient has appropriate indication for HBO Therapy: No    Treatment Note please see attached Discharge Instructions    Written patient dismissal instructions given to patient and signed by patient or POA. Discharge Instructions         Visit Discharge/Physician Orders     Discharge condition: Stable     Assessment of pain at discharge:MINIMAL     Anesthetic used: 4% lidocaine solution     Discharge to: Home     Left via:Private automobile     Accompanied by:  caregiver     ECF/HHA: [place] WRAP NOT TOO TIGHT AND MUST WRAP UP TO KNEE     Dressing Orders: LEFT LEG ULCERS-Cleanse with normal saline,  apply  plain alginate, ABD pad, and profore lite. Change Monday with home care and Friday at [facility]. Wrap from base of toes to just below knees. Keep wraps clean and dry.  If wrap gets wet or falls more than 2 inches, remove wrap completely and

## 2023-06-23 ENCOUNTER — HOSPITAL ENCOUNTER (OUTPATIENT)
Dept: WOUND CARE | Age: 83
Discharge: HOME | End: 2023-06-23
Payer: MEDICARE

## 2023-06-23 VITALS
HEIGHT: 63 IN | SYSTOLIC BLOOD PRESSURE: 155 MMHG | WEIGHT: 160 LBS | DIASTOLIC BLOOD PRESSURE: 66 MMHG | RESPIRATION RATE: 18 BRPM | HEART RATE: 63 BPM | TEMPERATURE: 96.2 F | BODY MASS INDEX: 28.35 KG/M2

## 2023-06-23 DIAGNOSIS — L97.322 VENOUS STASIS ULCER OF LEFT ANKLE WITH FAT LAYER EXPOSED WITHOUT VARICOSE VEINS (HCC): Primary | ICD-10-CM

## 2023-06-23 DIAGNOSIS — I87.2 VENOUS STASIS ULCER OF LEFT ANKLE WITH FAT LAYER EXPOSED WITHOUT VARICOSE VEINS (HCC): Primary | ICD-10-CM

## 2023-06-23 PROCEDURE — 11042 DBRDMT SUBQ TIS 1ST 20SQCM/<: CPT

## 2023-06-23 PROCEDURE — 6370000000 HC RX 637 (ALT 250 FOR IP): Performed by: NURSE PRACTITIONER

## 2023-06-23 RX ORDER — CLOBETASOL PROPIONATE 0.5 MG/G
OINTMENT TOPICAL ONCE
OUTPATIENT
Start: 2023-06-23 | End: 2023-06-23

## 2023-06-23 RX ORDER — LIDOCAINE 50 MG/G
OINTMENT TOPICAL ONCE
OUTPATIENT
Start: 2023-06-23 | End: 2023-06-23

## 2023-06-23 RX ORDER — IBUPROFEN 200 MG
TABLET ORAL ONCE
OUTPATIENT
Start: 2023-06-23 | End: 2023-06-23

## 2023-06-23 RX ORDER — LIDOCAINE 40 MG/G
CREAM TOPICAL ONCE
OUTPATIENT
Start: 2023-06-23 | End: 2023-06-23

## 2023-06-23 RX ORDER — LIDOCAINE HYDROCHLORIDE 20 MG/ML
JELLY TOPICAL ONCE
OUTPATIENT
Start: 2023-06-23 | End: 2023-06-23

## 2023-06-23 RX ORDER — BACITRACIN ZINC AND POLYMYXIN B SULFATE 500; 1000 [USP'U]/G; [USP'U]/G
OINTMENT TOPICAL ONCE
OUTPATIENT
Start: 2023-06-23 | End: 2023-06-23

## 2023-06-23 RX ORDER — LIDOCAINE HYDROCHLORIDE 40 MG/ML
SOLUTION TOPICAL ONCE
OUTPATIENT
Start: 2023-06-23 | End: 2023-06-23

## 2023-06-23 RX ORDER — BETAMETHASONE DIPROPIONATE 0.05 %
OINTMENT (GRAM) TOPICAL ONCE
OUTPATIENT
Start: 2023-06-23 | End: 2023-06-23

## 2023-06-23 RX ORDER — LIDOCAINE HYDROCHLORIDE 40 MG/ML
SOLUTION TOPICAL ONCE
Status: COMPLETED | OUTPATIENT
Start: 2023-06-23 | End: 2023-06-23

## 2023-06-23 RX ORDER — GENTAMICIN SULFATE 1 MG/G
OINTMENT TOPICAL ONCE
OUTPATIENT
Start: 2023-06-23 | End: 2023-06-23

## 2023-06-23 RX ORDER — GINSENG 100 MG
CAPSULE ORAL ONCE
OUTPATIENT
Start: 2023-06-23 | End: 2023-06-23

## 2023-06-23 RX ADMIN — LIDOCAINE HYDROCHLORIDE 10 ML: 40 SOLUTION TOPICAL at 13:40

## 2023-06-30 ENCOUNTER — HOSPITAL ENCOUNTER (OUTPATIENT)
Dept: WOUND CARE | Age: 83
Discharge: HOME | End: 2023-06-30
Payer: MEDICARE

## 2023-06-30 VITALS — HEART RATE: 69 BPM | TEMPERATURE: 97.1 F | RESPIRATION RATE: 16 BRPM

## 2023-06-30 DIAGNOSIS — L97.322 VENOUS STASIS ULCER OF LEFT ANKLE WITH FAT LAYER EXPOSED WITHOUT VARICOSE VEINS (HCC): Primary | ICD-10-CM

## 2023-06-30 DIAGNOSIS — I87.2 VENOUS STASIS ULCER OF LEFT ANKLE WITH FAT LAYER EXPOSED WITHOUT VARICOSE VEINS (HCC): Primary | ICD-10-CM

## 2023-06-30 PROCEDURE — 87205 SMEAR GRAM STAIN: CPT

## 2023-06-30 PROCEDURE — 87186 SC STD MICRODIL/AGAR DIL: CPT

## 2023-06-30 PROCEDURE — 87070 CULTURE OTHR SPECIMN AEROBIC: CPT

## 2023-06-30 PROCEDURE — 87077 CULTURE AEROBIC IDENTIFY: CPT

## 2023-06-30 PROCEDURE — 11042 DBRDMT SUBQ TIS 1ST 20SQCM/<: CPT

## 2023-06-30 PROCEDURE — 87075 CULTR BACTERIA EXCEPT BLOOD: CPT

## 2023-06-30 PROCEDURE — 6370000000 HC RX 637 (ALT 250 FOR IP): Performed by: NURSE PRACTITIONER

## 2023-06-30 RX ORDER — GENTAMICIN SULFATE 1 MG/G
OINTMENT TOPICAL ONCE
OUTPATIENT
Start: 2023-06-30 | End: 2023-06-30

## 2023-06-30 RX ORDER — BETAMETHASONE DIPROPIONATE 0.05 %
OINTMENT (GRAM) TOPICAL ONCE
OUTPATIENT
Start: 2023-06-30 | End: 2023-06-30

## 2023-06-30 RX ORDER — GINSENG 100 MG
CAPSULE ORAL ONCE
OUTPATIENT
Start: 2023-06-30 | End: 2023-06-30

## 2023-06-30 RX ORDER — LIDOCAINE HYDROCHLORIDE 20 MG/ML
JELLY TOPICAL ONCE
OUTPATIENT
Start: 2023-06-30 | End: 2023-06-30

## 2023-06-30 RX ORDER — LIDOCAINE 40 MG/G
CREAM TOPICAL ONCE
OUTPATIENT
Start: 2023-06-30 | End: 2023-06-30

## 2023-06-30 RX ORDER — LIDOCAINE 50 MG/G
OINTMENT TOPICAL ONCE
OUTPATIENT
Start: 2023-06-30 | End: 2023-06-30

## 2023-06-30 RX ORDER — CLOBETASOL PROPIONATE 0.5 MG/G
OINTMENT TOPICAL ONCE
OUTPATIENT
Start: 2023-06-30 | End: 2023-06-30

## 2023-06-30 RX ORDER — IBUPROFEN 200 MG
TABLET ORAL ONCE
OUTPATIENT
Start: 2023-06-30 | End: 2023-06-30

## 2023-06-30 RX ORDER — LIDOCAINE HYDROCHLORIDE 40 MG/ML
SOLUTION TOPICAL ONCE
Status: COMPLETED | OUTPATIENT
Start: 2023-06-30 | End: 2023-06-30

## 2023-06-30 RX ORDER — LIDOCAINE HYDROCHLORIDE 40 MG/ML
SOLUTION TOPICAL ONCE
OUTPATIENT
Start: 2023-06-30 | End: 2023-06-30

## 2023-06-30 RX ORDER — BACITRACIN ZINC AND POLYMYXIN B SULFATE 500; 1000 [USP'U]/G; [USP'U]/G
OINTMENT TOPICAL ONCE
OUTPATIENT
Start: 2023-06-30 | End: 2023-06-30

## 2023-06-30 RX ADMIN — LIDOCAINE HYDROCHLORIDE 5 ML: 40 SOLUTION TOPICAL at 13:13

## 2023-07-02 LAB
BACTERIA WND AEROBE CULT: NORMAL
GRAM STN SPEC: NORMAL

## 2023-07-04 LAB
BACTERIA SPEC ANAEROBE CULT: ABNORMAL
BACTERIA WND AEROBE CULT: ABNORMAL
BACTERIA WND AEROBE CULT: ABNORMAL
GRAM STN SPEC: ABNORMAL
ORGANISM: ABNORMAL
ORGANISM: ABNORMAL

## 2023-07-05 NOTE — DISCHARGE INSTRUCTIONS
REMINDERS:     - for any significant flare in asthma ( and or cough for any other reason) -- PHONE day 3-7 to ask for refill of Symbicort or any med that has an inhaled steroid to use max dose every day for at least ONE week and until all shortness of breath, cough, wheezing, chest tightness are GONE -- besides ALSO using Albuterol 4x/day and at night every 4 hours if needed.     - Please call me for further instructions if top number of blood pressure is ever under 110 more than once since I last saw you.    1)   Please return to lab for FASTING bloodwork any day with order on file for 6/1 or by 3 days ahead of July 27th appointment with Dr. Lau.    2) Please your NEXT appointment with me for Aug 1st for Medicare Wellness Visit PLUS for your recheck meds / blood pressure / asthma, but please CALL/see me before then if any problems or questions.    NOTE :     -   to get everything taken care of during the Medicare Wellness Visit, please arrive 15 - 20 minutes ahead of your appointment time for the additional check in that my medical assistant needs to do.   -  The Medicare Wellness questionnaire is now 4 sides of a page, so please fill it out 1-2 weeks ahead and bring it with you.    [employer] -- Medicare is requiring that all Primary Care Physicians see patients for a Medicare Wellness Visit which has NO charge to you for the Medicare Wellness portion, and is added on to a routine office visit.    3) NOTE: please bring your blood pressure machine next time with LARGE cuff and every year to check against our cuff.  NEED TO SWITCH from regular to LARGE BP cuff for your Blood Pressure machine - and AFTER we check accuracy of your BP machine next time ---- Please keep checking and recording your blood pressure 1-2 days every month FOREVER, checking only after relaxing for 15-20 minutes first each time when not stressed, no pain, and no headache.   - please bring BP record to all appointments with me.   -  Visit Discharge/Physician Orders     Discharge condition: Stable     Assessment of pain at discharge:MINIMAL     Anesthetic used: 4% lidocaine solution     Discharge to: Home     Left via:Private automobile     Accompanied by:  caregiver     ECF/HHA: [facility] WRAP NOT TOO TIGHT AND MUST WRAP UP TO KNEE     Dressing Orders: LEFT LEG ULCERS-Cleanse with normal saline,  apply  plain alginate, ABD pad, and profore lite. Change Monday with home care and Friday at [address] [place] [address]. Apply Clobetasol to dry skin on left leg (not on wounds). Wrap from base of toes to just below knees. Keep wraps clean and dry. If wrap gets wet or falls more than 2 inches, remove wrap completely and call wound center at [phone]. Keep wound covered with dressing material (plain alginate) and secure with dry dressing. Apply spandagrip to right leg. On in am and off in pm.        Elevate legs as much as possible above level of the heart. Vascular studies 6/2 at 2:30pm     Treatment Orders:Eat a diet high in protein and vitamin C. Take a multiple vitamin daily unless contraindicated. To decide on if she wants ablation in future-not at this time  Wound culture taken in clinic today-reviewed  Antibiotic ordered, begin to take as prescribed  Blood work ordered, please obtain as soon as possible     [address] [place] [address] followup visit : 2 weeks Trice(Friday)___________________________6 weeks Dr. GHOSH_(July 12)________________________  (Please note your next appointment above and if you are unable to keep, kindly give a 24 hour notice.  Thank you.)     Physician signature:__________________________      If you experience any of the following, please call the [address] [place] [address] during business hours:     Increase in Pain  * Temperature over 101  * Increase in drainage from your wound  * Drainage with a foul odor  * Bleeding  * Increase in swelling  * Need for compression bandage changes due to slippage, breakthrough PLEASE KEEP BP RECORD WITH YOUR 'S LICENSE.     - Please call before next appointment if 2 or more of every 5 new BPs is over 140/90 for top or bottom number.  -----------------------------  Outpatient Current Medications as of as of 4/30/2018       Disp Refills Start End    metoPROLOL tartrate (LOPRESSOR) 50 MG tablet  (Taking) 180 tablet 2 3/26/2018     Sig - Route: TAKE 1 TABLET BY MOUTH  EVERY 12 HOURS - Oral    Class: Eprescribe    diclofenac (VOLTAREN) 1 % gel  (Taking) 900 g 1 2/28/2018     Sig - Route: Apply topically 4 times daily. Apply 2 grams to hand joints and 4 grams to knee joints 2-4x/day; dispense 3 months - Topical    Class: Eprescribe    montelukast (SINGULAIR) 10 MG tablet  (Taking) 90 tablet 1 2/28/2018     Sig - Route: Take 1 tablet by mouth daily. - Oral    Class: Eprescribe    trazodone (DESYREL) 150 MG tablet  (Taking) 90 tablet 1 2/28/2018     Sig - Route: Take 1 tablet by mouth nightly. - Oral    Class: Eprescribe    levothyroxine (SYNTHROID, LEVOTHROID) 175 MCG tablet  (Taking) 90 tablet 1 2/28/2018     Sig - Route: Take 1 tablet by mouth daily (before breakfast). - Oral    Class: Eprescribe    pravastatin (PRAVACHOL) 40 MG tablet  (Taking) 90 tablet 1 2/28/2018     Sig - Route: Take 1 tablet by mouth nightly. - Oral    Class: Eprescribe    traMADol (ULTRAM) 50 MG tablet  (Taking) 90 tablet 5 2/28/2018     Sig - Route: Take 1 tablet by mouth 3 times daily. With meals every day to control arthritis pain - Oral    Class: Eprescribe    albuterol (PROAIR HFA) 108 (90 Base) MCG/ACT inhaler  (Taking) 3 Inhaler 0 11/21/2017     Sig - Route: Inhale 2 puffs into the lungs 4 times daily as needed (for cough any asthma symptom). - Inhalation    Class: Eprescribe    losartan (COZAAR) 100 MG tablet  (Taking) 90 tablet 3 7/28/2017     Sig - Route: Take 1 tablet by mouth daily. - Oral    Class: Eprescribe    aspirin 81 MG chewable tablet  (Taking) 30 tablet 2 3/20/2017     Sig - Route: Chew 1  tablet by mouth daily. - Oral    Class: Eprescribe    biotin 1000 MCG tablet  (Taking)        Sig - Route: Take by mouth daily. - Oral    Class: Historical Med    Omega-3 Fatty Acids (FISH OIL) 1000 MG capsule  (Taking)        Sig - Route: Take 1 g by mouth daily. 1 daily - Oral    Class: Historical Med    hydroxychloroquine (PLAQUENIL) 200 MG tablet  (Taking)   12/2/2009     Sig - Route: Indications: Rheumatoid Arthritis  - Oral    Class: Historical Med    Cholecalciferol (VITAMIN D) 2000 UNIT tablet  (Taking)   8/22/2011     Sig - Route: Take 2,000 Units by mouth daily. - Oral    Class: Historical Med    Multiple Vitamins-Minerals (CENTRUM) tablet  (Taking)   8/22/2011     Sig - Route: Take 1 tablet by mouth daily (before breakfast). - Oral    Class: Historical Med

## 2023-07-07 ENCOUNTER — HOSPITAL ENCOUNTER (OUTPATIENT)
Dept: WOUND CARE | Age: 83
Discharge: HOME | End: 2023-07-07
Payer: MEDICARE

## 2023-07-07 ENCOUNTER — HOSPITAL ENCOUNTER (OUTPATIENT)
Age: 83
Discharge: HOME | End: 2023-07-07
Payer: MEDICARE

## 2023-07-07 DIAGNOSIS — L97.429 HEEL ULCER, LEFT, WITH UNSPECIFIED SEVERITY (HCC): ICD-10-CM

## 2023-07-07 DIAGNOSIS — I87.2 VENOUS STASIS ULCER OF LEFT ANKLE WITH FAT LAYER EXPOSED WITHOUT VARICOSE VEINS (HCC): Primary | ICD-10-CM

## 2023-07-07 DIAGNOSIS — I87.2 VENOUS STASIS ULCER OF LEFT ANKLE WITH FAT LAYER EXPOSED WITHOUT VARICOSE VEINS (HCC): ICD-10-CM

## 2023-07-07 DIAGNOSIS — L97.322 VENOUS STASIS ULCER OF LEFT ANKLE WITH FAT LAYER EXPOSED WITHOUT VARICOSE VEINS (HCC): ICD-10-CM

## 2023-07-07 DIAGNOSIS — L97.322 VENOUS STASIS ULCER OF LEFT ANKLE WITH FAT LAYER EXPOSED WITHOUT VARICOSE VEINS (HCC): Primary | ICD-10-CM

## 2023-07-07 PROBLEM — R89.5 POSITIVE CULTURE FINDINGS IN WOUND: Status: RESOLVED | Noted: 2023-05-02 | Resolved: 2023-07-07

## 2023-07-07 PROBLEM — R60.0 BILATERAL LOWER EXTREMITY EDEMA: Status: RESOLVED | Noted: 2022-10-21 | Resolved: 2023-07-07

## 2023-07-07 PROBLEM — R09.02 HYPOXIA: Status: RESOLVED | Noted: 2022-09-21 | Resolved: 2023-07-07

## 2023-07-07 PROBLEM — D72.829 LEUKOCYTOSIS: Status: RESOLVED | Noted: 2022-09-21 | Resolved: 2023-07-07

## 2023-07-07 PROBLEM — L53.9 ERYTHEMA OF LOWER EXTREMITY: Status: RESOLVED | Noted: 2023-06-09 | Resolved: 2023-07-07

## 2023-07-07 PROBLEM — A41.9 SEPSIS (HCC): Status: RESOLVED | Noted: 2017-08-13 | Resolved: 2023-07-07

## 2023-07-07 LAB
ALBUMIN SERPL-MCNC: 3.7 G/DL (ref 3.5–5.2)
ALP SERPL-CCNC: 103 U/L (ref 35–104)
ALT SERPL-CCNC: 12 U/L (ref 0–32)
ANION GAP SERPL CALCULATED.3IONS-SCNC: 8 MMOL/L (ref 7–16)
AST SERPL-CCNC: 17 U/L (ref 0–31)
BILIRUB SERPL-MCNC: 0.2 MG/DL (ref 0–1.2)
BUN SERPL-MCNC: 27 MG/DL (ref 6–23)
CALCIUM SERPL-MCNC: 9.2 MG/DL (ref 8.6–10.2)
CHLORIDE SERPL-SCNC: 104 MMOL/L (ref 98–107)
CO2 SERPL-SCNC: 29 MMOL/L (ref 22–29)
CREAT SERPL-MCNC: 0.8 MG/DL (ref 0.5–1)
ERYTHROCYTE [DISTWIDTH] IN BLOOD BY AUTOMATED COUNT: 13.3 FL (ref 11.5–15)
GLUCOSE SERPL-MCNC: 88 MG/DL (ref 74–99)
HCT VFR BLD AUTO: 33.6 % (ref 34–48)
HGB BLD-MCNC: 10.4 G/DL (ref 11.5–15.5)
MCH RBC QN AUTO: 28 PG (ref 26–35)
MCHC RBC AUTO-ENTMCNC: 31 % (ref 32–34.5)
MCV RBC AUTO: 90.3 FL (ref 80–99.9)
PLATELET # BLD AUTO: 177 E9/L (ref 130–450)
PMV BLD AUTO: 10.5 FL (ref 7–12)
POTASSIUM SERPL-SCNC: 4 MMOL/L (ref 3.5–5)
PROT SERPL-MCNC: 6.3 G/DL (ref 6.4–8.3)
RBC # BLD AUTO: 3.72 E12/L (ref 3.5–5.5)
SODIUM SERPL-SCNC: 141 MMOL/L (ref 132–146)
WBC # BLD: 4.5 E9/L (ref 4.5–11.5)

## 2023-07-07 PROCEDURE — 11042 DBRDMT SUBQ TIS 1ST 20SQCM/<: CPT

## 2023-07-07 PROCEDURE — 85027 COMPLETE CBC AUTOMATED: CPT

## 2023-07-07 PROCEDURE — 36415 COLL VENOUS BLD VENIPUNCTURE: CPT

## 2023-07-07 PROCEDURE — 6370000000 HC RX 637 (ALT 250 FOR IP): Performed by: NURSE PRACTITIONER

## 2023-07-07 PROCEDURE — 80053 COMPREHEN METABOLIC PANEL: CPT

## 2023-07-07 RX ORDER — SULFAMETHOXAZOLE AND TRIMETHOPRIM 800; 160 MG/1; MG/1
1 TABLET ORAL 2 TIMES DAILY
Qty: 20 TABLET | Refills: 0 | Status: SHIPPED | OUTPATIENT
Start: 2023-07-07 | End: 2023-07-17

## 2023-07-07 RX ORDER — GENTAMICIN SULFATE 1 MG/G
OINTMENT TOPICAL ONCE
OUTPATIENT
Start: 2023-07-07 | End: 2023-07-07

## 2023-07-07 RX ORDER — LIDOCAINE HYDROCHLORIDE 20 MG/ML
JELLY TOPICAL ONCE
OUTPATIENT
Start: 2023-07-07 | End: 2023-07-07

## 2023-07-07 RX ORDER — BACITRACIN ZINC AND POLYMYXIN B SULFATE 500; 1000 [USP'U]/G; [USP'U]/G
OINTMENT TOPICAL ONCE
OUTPATIENT
Start: 2023-07-07 | End: 2023-07-07

## 2023-07-07 RX ORDER — CLOBETASOL PROPIONATE 0.5 MG/G
OINTMENT TOPICAL ONCE
OUTPATIENT
Start: 2023-07-07 | End: 2023-07-07

## 2023-07-07 RX ORDER — BETAMETHASONE DIPROPIONATE 0.05 %
OINTMENT (GRAM) TOPICAL ONCE
OUTPATIENT
Start: 2023-07-07 | End: 2023-07-07

## 2023-07-07 RX ORDER — LIDOCAINE 40 MG/G
CREAM TOPICAL ONCE
OUTPATIENT
Start: 2023-07-07 | End: 2023-07-07

## 2023-07-07 RX ORDER — IBUPROFEN 200 MG
TABLET ORAL ONCE
OUTPATIENT
Start: 2023-07-07 | End: 2023-07-07

## 2023-07-07 RX ORDER — LIDOCAINE HYDROCHLORIDE 40 MG/ML
SOLUTION TOPICAL ONCE
OUTPATIENT
Start: 2023-07-07 | End: 2023-07-07

## 2023-07-07 RX ORDER — LIDOCAINE 50 MG/G
OINTMENT TOPICAL ONCE
OUTPATIENT
Start: 2023-07-07 | End: 2023-07-07

## 2023-07-07 RX ORDER — GINSENG 100 MG
CAPSULE ORAL ONCE
OUTPATIENT
Start: 2023-07-07 | End: 2023-07-07

## 2023-07-07 RX ORDER — LIDOCAINE HYDROCHLORIDE 40 MG/ML
SOLUTION TOPICAL ONCE
Status: COMPLETED | OUTPATIENT
Start: 2023-07-07 | End: 2023-07-07

## 2023-07-07 RX ADMIN — LIDOCAINE HYDROCHLORIDE 5 ML: 40 SOLUTION TOPICAL at 13:42

## 2023-07-07 NOTE — PROGRESS NOTES
07/07/23 1342   Wound Volume (cm^3) 0.07 cm^3 07/07/23 1342   Wound Healing % -250 07/07/23 1342   Post-Procedure Length (cm) 0.7 cm 07/07/23 1409   Post-Procedure Width (cm) 0.5 cm 07/07/23 1409   Post-Procedure Depth (cm) 0.3 cm 07/07/23 1409   Post-Procedure Surface Area (cm^2) 0.35 cm^2 07/07/23 1409   Post-Procedure Volume (cm^3) 0.105 cm^3 07/07/23 1409   Wound Assessment Fibrin;Pink/red 07/07/23 1342   Drainage Amount Moderate 07/07/23 1342   Drainage Description Serosanguinous 07/07/23 1342   Odor Mild 07/07/23 1342   Patrizia-wound Assessment Maceration 07/07/23 1342   Number of days: 266           Procedure Note  Indications:  Based on my examination of this patient's wound(s)/ulcer(s) today, debridement is required to promote healing and evaluate the wound base. Performed by: Srinivas Martinez MD    Consent obtained:  Yes    Time out taken:  Yes    Pain Control: Anesthetic  Anesthetic: 4% Lidocaine Liquid Topical     Debridement:Excisional Debridement    Using curette the wound(s)/ulcer(s) was/were sharply debrided down through and including the removal of subcutaneous tissue. Devitalized Tissue Debrided:  fibrin, biofilm, and slough to stimulate bleeding to promote healing, post debridement good bleeding base and wound edges noted    Wound/Ulcer #: 1 and 2    Percent of Wound/Ulcer Debrided: 90%    Total Surface Area Debrided:  15 sq cm     Estimated Blood Loss:  Minimal  Hemostasis Achieved:  by pressure    Procedural Pain:  3  / 10   Post Procedural Pain:  3 / 10     Response to treatment:  Well tolerated by patient. A culture was done.       Plan:     Pt has never been a smoker   - Discussed relationship of smoking and negative affects on wound healing   - Emphasized importance of tobacco avoidace/cessation     In my professional opinion and based off the information that is available at this time this patient has appropriate indication for HBO Therapy: No    Treatment Note please see

## 2023-07-11 NOTE — DISCHARGE INSTRUCTIONS
Visit Discharge/Physician Orders     Discharge condition: Stable     Assessment of pain at discharge:MINIMAL     Anesthetic used: 4% lidocaine solution     Discharge to: Home     Left via:Private automobile     Accompanied by:  caregiver     ECF/HHA: [address] [facility] WRAP UP TO KNEE     Dressing Orders: LEFT LEG ULCERS-Cleanse with normal saline,  apply  plain alginate, ABD pad, and profore lite. Change Monday with home care and Friday at [facility]. Apply Clobetasol to dry skin on left leg (not on wounds). Wrap from base of toes to just below knees. Keep wraps clean and dry. If wrap gets wet or falls more than 2 inches, remove wrap completely and call wound center at [phone]. Keep wound covered with dressing material (plain alginate) and secure with dry dressing. Apply spandagrip to right leg. On in am and off in pm.     Elevate legs as much as possible above level of the heart. Treatment Orders:Eat a diet high in protein and vitamin C. Take a multiple vitamin daily unless contraindicated. To decide on if she wants ablation in future w/PK-not at this time call PK if you decide      [facility] followup visit : 1 week Trice(Friday)__________________________  (Please note your next appointment above and if you are unable to keep, kindly give a 24 hour notice. Thank you.)     Physician signature:__________________________      If you experience any of the following, please call the [facility] during business hours:     Increase in Pain  * Temperature over 101  * Increase in drainage from your wound  * Drainage with a foul odor  * Bleeding  * Increase in swelling  * Need for compression bandage changes due to slippage, breakthrough drainage. If you need medical attention outside of the business hours of the [facility] please contact your PCP or go to the nearest emergency room.

## 2023-07-12 ENCOUNTER — HOSPITAL ENCOUNTER (OUTPATIENT)
Dept: WOUND CARE | Age: 83
Discharge: HOME | End: 2023-07-12
Payer: MEDICARE

## 2023-07-12 VITALS
HEIGHT: 63 IN | WEIGHT: 160 LBS | HEART RATE: 62 BPM | RESPIRATION RATE: 18 BRPM | DIASTOLIC BLOOD PRESSURE: 68 MMHG | TEMPERATURE: 98.1 F | SYSTOLIC BLOOD PRESSURE: 160 MMHG | BODY MASS INDEX: 28.35 KG/M2

## 2023-07-12 DIAGNOSIS — I87.2 VENOUS STASIS ULCER OF LEFT ANKLE WITH FAT LAYER EXPOSED WITHOUT VARICOSE VEINS (HCC): Primary | ICD-10-CM

## 2023-07-12 DIAGNOSIS — L97.322 VENOUS STASIS ULCER OF LEFT ANKLE WITH FAT LAYER EXPOSED WITHOUT VARICOSE VEINS (HCC): Primary | ICD-10-CM

## 2023-07-12 PROCEDURE — 11042 DBRDMT SUBQ TIS 1ST 20SQCM/<: CPT

## 2023-07-12 RX ORDER — LIDOCAINE HYDROCHLORIDE 40 MG/ML
SOLUTION TOPICAL ONCE
Status: COMPLETED | OUTPATIENT
Start: 2023-07-12 | End: 2023-07-12

## 2023-07-12 RX ORDER — GENTAMICIN SULFATE 1 MG/G
OINTMENT TOPICAL ONCE
OUTPATIENT
Start: 2023-07-12 | End: 2023-07-12

## 2023-07-12 RX ORDER — LIDOCAINE 50 MG/G
OINTMENT TOPICAL ONCE
OUTPATIENT
Start: 2023-07-12 | End: 2023-07-12

## 2023-07-12 RX ORDER — BACITRACIN ZINC AND POLYMYXIN B SULFATE 500; 1000 [USP'U]/G; [USP'U]/G
OINTMENT TOPICAL ONCE
OUTPATIENT
Start: 2023-07-12 | End: 2023-07-12

## 2023-07-12 RX ORDER — IBUPROFEN 200 MG
TABLET ORAL ONCE
OUTPATIENT
Start: 2023-07-12 | End: 2023-07-12

## 2023-07-12 RX ORDER — LIDOCAINE HYDROCHLORIDE 40 MG/ML
SOLUTION TOPICAL ONCE
OUTPATIENT
Start: 2023-07-12 | End: 2023-07-12

## 2023-07-12 RX ORDER — CLOBETASOL PROPIONATE 0.5 MG/G
OINTMENT TOPICAL ONCE
OUTPATIENT
Start: 2023-07-12 | End: 2023-07-12

## 2023-07-12 RX ORDER — LIDOCAINE HYDROCHLORIDE 20 MG/ML
JELLY TOPICAL ONCE
OUTPATIENT
Start: 2023-07-12 | End: 2023-07-12

## 2023-07-12 RX ORDER — GINSENG 100 MG
CAPSULE ORAL ONCE
OUTPATIENT
Start: 2023-07-12 | End: 2023-07-12

## 2023-07-12 RX ORDER — BETAMETHASONE DIPROPIONATE 0.05 %
OINTMENT (GRAM) TOPICAL ONCE
OUTPATIENT
Start: 2023-07-12 | End: 2023-07-12

## 2023-07-12 RX ORDER — LIDOCAINE 40 MG/G
CREAM TOPICAL ONCE
OUTPATIENT
Start: 2023-07-12 | End: 2023-07-12

## 2023-07-12 RX ADMIN — LIDOCAINE HYDROCHLORIDE 4 ML: 40 SOLUTION TOPICAL at 11:34

## 2023-07-12 ASSESSMENT — PAIN SCALES - GENERAL: PAINLEVEL_OUTOF10: 0

## 2023-07-12 NOTE — PROGRESS NOTES
Wound Healing Center  History and Physical/Consultation  Podiatry    Referring Physician : Matilde Pinzon  Marco Lowe  MEDICAL RECORD NUMBER:  [id number]  AGE: 80 y.o. GENDER: female  : 1940  EPISODE DATE:  2023  Subjective:     Chief Complaint   Patient presents with    Wound Check     Left leg and ankle wounds         HISTORY of PRESENT ILLNESS HPI     Marco Lowe is a 80 y.o. female who presents today for wound/ulcer evaluation. History of Wound Context:  The patient has had a wounds of medial, lateral, posterior left leg and left heel which was first noted approximately 2022  This has not been treated. On their initial visit to the wound healing center, 10/14/2022,  the patient has noted that the wound has not been improving. The patient has had similar previous wounds in the past.      Pt is not on abx at time of initial visit.     10/14/22  Patient presents with left leg erythema, pruritis and dry scaly skin to left leg   Eschar present on left heel   Patient is not diabetic  Patient has a history of peripheral vascular disease - taking Eliquis  Vascular studies ordered   Wounds debrided   Aquacel and Spandagrip, Lamisil to surrounding inflamed tissue     10/21/22  Wounds overall improving   Wounds debrided   Doxy 100mg BIB [id number] prescribed   Continue Aquacel and Spandagrip, Lamisil to surrounding inflamed tissue     10/28/22  Wounds overall appearance improving, decreased erythema   Left heel wound healed   Wounds not debrided today  Gentamycin cream prescribed   Continue Aquacel and Spandagrip, Lamisil to surrounding inflamed tissue   Consult to ID, Dr. Chuy Gómez       22  Wounds overall appearance improving   Wounds debrided   Continue Gent, Aquacel and Spandagrip, Lamisil to surrounding inflamed tissue     22  Wounds improving   Wounds debrided   Continue Gent, Aquacel and Spandagrip, Lamisil to surrounding inflamed tissue     23  Wounds measuring

## 2023-07-18 NOTE — DISCHARGE INSTRUCTIONS
Visit Discharge/Physician Orders     Discharge condition: Stable     Assessment of pain at discharge:MINIMAL     Anesthetic used: 4% lidocaine solution     Discharge to: Home     Left via:Private automobile     Accompanied by:  caregiver     ECF/HHA: [facility] WRAP UP TO KNEE     Dressing Orders: LEFT LEG ULCERS-Cleanse with normal saline,  apply  plain alginate, ABD pad, and profore lite. Change Monday with home care and Friday at [facility]. Apply Clobetasol to dry skin on left leg (not on wounds). Wrap from base of toes to just below knees. Keep wraps clean and dry. If wrap gets wet or falls more than 2 inches, remove wrap completely and call wound center at [phone]. Keep wound covered with dressing material (plain alginate) and secure with dry dressing. Apply spandagrip to right leg. On in am and off in pm.     Elevate legs as much as possible above level of the heart. Treatment Orders:Eat a diet high in protein and vitamin C. Take a multiple vitamin daily unless contraindicated. To decide on if she wants ablation in future w/PK-not at this time call PK if you decide      [facility] followup visit :  2 weeks Trice(Friday)__________________________  (Please note your next appointment above and if you are unable to keep, kindly give a 24 hour notice. Thank you.)     Physician signature:__________________________      If you experience any of the following, please call the [facility] during business hours:     Increase in Pain  * Temperature over 101  * Increase in drainage from your wound  * Drainage with a foul odor  * Bleeding  * Increase in swelling  * Need for compression bandage changes due to slippage, breakthrough drainage. If you need medical attention outside of the business hours of the [facility] please contact your PCP or go to the nearest emergency room.

## 2023-07-21 ENCOUNTER — HOSPITAL ENCOUNTER (OUTPATIENT)
Dept: WOUND CARE | Age: 83
Discharge: HOME | End: 2023-07-21
Payer: MEDICARE

## 2023-07-21 VITALS
DIASTOLIC BLOOD PRESSURE: 66 MMHG | BODY MASS INDEX: 28.35 KG/M2 | HEART RATE: 64 BPM | SYSTOLIC BLOOD PRESSURE: 184 MMHG | RESPIRATION RATE: 18 BRPM | WEIGHT: 160 LBS | HEIGHT: 63 IN | TEMPERATURE: 96.1 F

## 2023-07-21 DIAGNOSIS — I87.2 VENOUS STASIS ULCER OF LEFT ANKLE WITH FAT LAYER EXPOSED WITHOUT VARICOSE VEINS (HCC): Primary | ICD-10-CM

## 2023-07-21 DIAGNOSIS — L97.322 VENOUS STASIS ULCER OF LEFT ANKLE WITH FAT LAYER EXPOSED WITHOUT VARICOSE VEINS (HCC): Primary | ICD-10-CM

## 2023-07-21 PROCEDURE — 11042 DBRDMT SUBQ TIS 1ST 20SQCM/<: CPT

## 2023-07-21 PROCEDURE — 6370000000 HC RX 637 (ALT 250 FOR IP): Performed by: NURSE PRACTITIONER

## 2023-07-21 RX ORDER — GENTAMICIN SULFATE 1 MG/G
OINTMENT TOPICAL ONCE
OUTPATIENT
Start: 2023-07-21 | End: 2023-07-21

## 2023-07-21 RX ORDER — IBUPROFEN 200 MG
TABLET ORAL ONCE
OUTPATIENT
Start: 2023-07-21 | End: 2023-07-21

## 2023-07-21 RX ORDER — CLOBETASOL PROPIONATE 0.5 MG/G
OINTMENT TOPICAL ONCE
OUTPATIENT
Start: 2023-07-21 | End: 2023-07-21

## 2023-07-21 RX ORDER — LIDOCAINE 50 MG/G
OINTMENT TOPICAL ONCE
OUTPATIENT
Start: 2023-07-21 | End: 2023-07-21

## 2023-07-21 RX ORDER — LIDOCAINE 40 MG/G
CREAM TOPICAL ONCE
OUTPATIENT
Start: 2023-07-21 | End: 2023-07-21

## 2023-07-21 RX ORDER — LIDOCAINE HYDROCHLORIDE 20 MG/ML
JELLY TOPICAL ONCE
OUTPATIENT
Start: 2023-07-21 | End: 2023-07-21

## 2023-07-21 RX ORDER — LIDOCAINE HYDROCHLORIDE 40 MG/ML
SOLUTION TOPICAL ONCE
Status: COMPLETED | OUTPATIENT
Start: 2023-07-21 | End: 2023-07-21

## 2023-07-21 RX ORDER — BACITRACIN ZINC AND POLYMYXIN B SULFATE 500; 1000 [USP'U]/G; [USP'U]/G
OINTMENT TOPICAL ONCE
OUTPATIENT
Start: 2023-07-21 | End: 2023-07-21

## 2023-07-21 RX ORDER — LIDOCAINE HYDROCHLORIDE 40 MG/ML
SOLUTION TOPICAL ONCE
OUTPATIENT
Start: 2023-07-21 | End: 2023-07-21

## 2023-07-21 RX ORDER — BETAMETHASONE DIPROPIONATE 0.05 %
OINTMENT (GRAM) TOPICAL ONCE
OUTPATIENT
Start: 2023-07-21 | End: 2023-07-21

## 2023-07-21 RX ORDER — GINSENG 100 MG
CAPSULE ORAL ONCE
OUTPATIENT
Start: 2023-07-21 | End: 2023-07-21

## 2023-07-21 RX ADMIN — LIDOCAINE HYDROCHLORIDE 10 ML: 40 SOLUTION TOPICAL at 13:31

## 2023-07-21 NOTE — PROGRESS NOTES
pressure    Procedural Pain:  3  / 10   Post Procedural Pain:  3 / 10     Response to treatment:  Well tolerated by patient. Plan:     Pt has never been a smoker   - Discussed relationship of smoking and negative affects on wound healing   - Emphasized importance of tobacco avoidace/cessation     In my professional opinion and based off the information that is available at this time this patient has appropriate indication for HBO Therapy: No    Treatment Note please see attached Discharge Instructions    Written patient dismissal instructions given to patient and signed by patient or POA. Discharge Instructions         Visit Discharge/Physician Orders     Discharge condition: Stable     Assessment of pain at discharge:MINIMAL     Anesthetic used: 4% lidocaine solution     Discharge to: Home     Left via:Private automobile     Accompanied by:  caregiver     ECF/HHA: [address] [facility] WRAP UP TO KNEE     Dressing Orders: LEFT LEG ULCERS-Cleanse with normal saline,  apply  plain alginate, ABD pad, and profore lite. Change Monday with home care and Friday at [facility]. Apply Clobetasol to dry skin on left leg (not on wounds). Wrap from base of toes to just below knees. Keep wraps clean and dry. If wrap gets wet or falls more than 2 inches, remove wrap completely and call wound center at [phone]. Keep wound covered with dressing material (plain alginate) and secure with dry dressing. Apply spandagrip to right leg. On in am and off in pm.     Elevate legs as much as possible above level of the heart. Treatment Orders:Eat a diet high in protein and vitamin C. Take a multiple vitamin daily unless contraindicated.       To decide on if she wants ablation in future w/PK-not at this time call PK if you decide      [facility] followup visit :  2 weeks Trice(Friday)__________________________  (Please note your next appointment above and if you are unable to keep, kindly give a

## 2023-08-01 NOTE — DISCHARGE INSTRUCTIONS
Visit Discharge/Physician Orders     Discharge condition: Stable     Assessment of pain at discharge:MINIMAL     Anesthetic used: 4% lidocaine solution     Discharge to: Home     Left via:Private automobile     Accompanied by:  caregiver     ECF/HHA: [address] WRAP UP TO KNEE. PLEASE NOTE: PATIENT DOES NOT HAVE FOLLOW UP AT WOUND CARE TILL AUGUST 25TH     Dressing Orders: LEFT LEG ULCERS-Cleanse with normal saline,  apply  plain alginate, ABD pad, and profore lite. Change Monday with home care and Friday at [facility]. Apply Clobetasol to dry skin on left leg (not on wounds). Wrap from base of toes to just below knees. Keep wraps clean and dry. If wrap gets wet or falls more than 2 inches, remove wrap completely and call wound center at [phone]. Keep wound covered with dressing material (plain alginate) and secure with dry dressing. Apply spandagrip to right leg. On in am and off in pm.     Elevate legs as much as possible above level of the heart. Treatment Orders:Eat a diet high in protein and vitamin C. Take a multiple vitamin daily unless contraindicated. To decide on if she wants ablation in future w/PK-not at this time call PK if you decide      [facility] followup visit :  3 weeks Trcie(Friday)__________________________  (Please note your next appointment above and if you are unable to keep, kindly give a 24 hour notice. Thank you.)     Physician signature:__________________________      If you experience any of the following, please call the [facility] during business hours:     Increase in Pain  * Temperature over 101  * Increase in drainage from your wound  * Drainage with a foul odor  * Bleeding  * Increase in swelling  * Need for compression bandage changes due to slippage, breakthrough drainage. If you need medical attention outside of the business hours of the [facility] please contact your PCP or go to the nearest emergency room.

## 2023-08-04 ENCOUNTER — HOSPITAL ENCOUNTER (OUTPATIENT)
Dept: WOUND CARE | Age: 83
Discharge: HOME | End: 2023-08-04
Payer: MEDICARE

## 2023-08-04 VITALS
RESPIRATION RATE: 16 BRPM | TEMPERATURE: 98.6 F | HEIGHT: 63 IN | WEIGHT: 160 LBS | HEART RATE: 63 BPM | BODY MASS INDEX: 28.35 KG/M2 | SYSTOLIC BLOOD PRESSURE: 148 MMHG | DIASTOLIC BLOOD PRESSURE: 54 MMHG

## 2023-08-04 DIAGNOSIS — L97.322 VENOUS STASIS ULCER OF LEFT ANKLE WITH FAT LAYER EXPOSED WITHOUT VARICOSE VEINS (HCC): Primary | ICD-10-CM

## 2023-08-04 DIAGNOSIS — I87.2 VENOUS STASIS ULCER OF LEFT ANKLE WITH FAT LAYER EXPOSED WITHOUT VARICOSE VEINS (HCC): Primary | ICD-10-CM

## 2023-08-04 PROCEDURE — 6370000000 HC RX 637 (ALT 250 FOR IP): Performed by: NURSE PRACTITIONER

## 2023-08-04 PROCEDURE — 11042 DBRDMT SUBQ TIS 1ST 20SQCM/<: CPT

## 2023-08-04 RX ORDER — BETAMETHASONE DIPROPIONATE 0.05 %
OINTMENT (GRAM) TOPICAL ONCE
OUTPATIENT
Start: 2023-08-04 | End: 2023-08-04

## 2023-08-04 RX ORDER — LISINOPRIL 10 MG/1
10 TABLET ORAL DAILY
COMMUNITY

## 2023-08-04 RX ORDER — LIDOCAINE 40 MG/G
CREAM TOPICAL ONCE
OUTPATIENT
Start: 2023-08-04 | End: 2023-08-04

## 2023-08-04 RX ORDER — LIDOCAINE 50 MG/G
OINTMENT TOPICAL ONCE
OUTPATIENT
Start: 2023-08-04 | End: 2023-08-04

## 2023-08-04 RX ORDER — LIDOCAINE HYDROCHLORIDE 20 MG/ML
JELLY TOPICAL ONCE
OUTPATIENT
Start: 2023-08-04 | End: 2023-08-04

## 2023-08-04 RX ORDER — BACITRACIN ZINC AND POLYMYXIN B SULFATE 500; 1000 [USP'U]/G; [USP'U]/G
OINTMENT TOPICAL ONCE
OUTPATIENT
Start: 2023-08-04 | End: 2023-08-04

## 2023-08-04 RX ORDER — LIDOCAINE HYDROCHLORIDE 40 MG/ML
SOLUTION TOPICAL ONCE
Status: COMPLETED | OUTPATIENT
Start: 2023-08-04 | End: 2023-08-04

## 2023-08-04 RX ORDER — GINSENG 100 MG
CAPSULE ORAL ONCE
OUTPATIENT
Start: 2023-08-04 | End: 2023-08-04

## 2023-08-04 RX ORDER — GENTAMICIN SULFATE 1 MG/G
OINTMENT TOPICAL ONCE
OUTPATIENT
Start: 2023-08-04 | End: 2023-08-04

## 2023-08-04 RX ORDER — CLOBETASOL PROPIONATE 0.5 MG/G
OINTMENT TOPICAL ONCE
OUTPATIENT
Start: 2023-08-04 | End: 2023-08-04

## 2023-08-04 RX ORDER — FERROUS SULFATE 325(65) MG
325 TABLET ORAL
COMMUNITY

## 2023-08-04 RX ORDER — LIDOCAINE HYDROCHLORIDE 40 MG/ML
SOLUTION TOPICAL ONCE
OUTPATIENT
Start: 2023-08-04 | End: 2023-08-04

## 2023-08-04 RX ORDER — IBUPROFEN 200 MG
TABLET ORAL ONCE
OUTPATIENT
Start: 2023-08-04 | End: 2023-08-04

## 2023-08-04 RX ADMIN — LIDOCAINE HYDROCHLORIDE 5 ML: 40 SOLUTION ORAL at 13:32

## 2023-08-04 NOTE — PROGRESS NOTES
Margins Attached edges 07/12/23 1129   Wound Thickness Description not for Pressure Injury Full thickness 07/12/23 1129   Number of days: 294       Wound 10/14/22 Ankle Left;Lateral #2 (Active)   Wound Image   08/04/23 1329   Wound Etiology Venous 10/14/22 1331   Dressing Status New dressing applied 07/21/23 1405   Wound Cleansed Cleansed with saline 07/21/23 1405   Dressing/Treatment Alginate;ABD 07/21/23 1405   Offloading for Diabetic Foot Ulcers Offloading not required; Offloading ordered 07/12/23 1143   Wound Length (cm) 1 cm 08/04/23 1329   Wound Width (cm) 0.2 cm 08/04/23 1329   Wound Depth (cm) 0.1 cm 08/04/23 1329   Wound Surface Area (cm^2) 0.2 cm^2 08/04/23 1329   Change in Wound Size % (l*w) -100 08/04/23 1329   Wound Volume (cm^3) 0.02 cm^3 08/04/23 1329   Wound Healing % 0 08/04/23 1329   Post-Procedure Length (cm) 1 cm 08/04/23 1356   Post-Procedure Width (cm) 0.2 cm 08/04/23 1356   Post-Procedure Depth (cm) 0.2 cm 08/04/23 1356   Post-Procedure Surface Area (cm^2) 0.2 cm^2 08/04/23 1356   Post-Procedure Volume (cm^3) 0.04 cm^3 08/04/23 1356   Wound Assessment Pink/red 08/04/23 1329   Drainage Amount Scant 08/04/23 1329   Drainage Description Serous 08/04/23 1329   Odor None 08/04/23 1329   Patrizia-wound Assessment Intact 08/04/23 1329   Margins Attached edges 07/12/23 1129   Wound Thickness Description not for Pressure Injury Full thickness 07/12/23 1129   Number of days: 294           Procedure Note  Indications:  Based on my examination of this patient's wound(s)/ulcer(s) today, debridement is required to promote healing and evaluate the wound base. Performed by: TRAVIS Chandra CNP    Consent obtained:  Yes    Time out taken:  Yes    Pain Control: Anesthetic  Anesthetic: 4% Lidocaine Liquid Topical     Debridement:Excisional Debridement    Using curette the wound(s)/ulcer(s) was/were sharply debrided down through and including the removal of subcutaneous tissue.         Devitalized Tissue

## 2023-08-22 NOTE — DISCHARGE INSTRUCTIONS
Visit Discharge/Physician Orders     Discharge condition: Stable     Assessment of pain at discharge:MINIMAL     Anesthetic used: 4% lidocaine solution     Discharge to: Home     Left via:Private automobile     Accompanied by:  caregiver     ECF/HHA: [address] WRAP UP TO KNEE. PLEASE NOTE: PATIENT DOES NOT HAVE FOLLOW UP AT WOUND CARE TILL AUGUST 25TH     Dressing Orders: MEDIAL LEFT LEG ULCER-Cleanse with normal saline,  apply  plain alginate, ABD pad, and profore lite. Lateral ankle healed. Change Monday with home care and Friday at [facility]. Apply Clobetasol to dry skin on left leg (not on wounds). Wrap from base of toes to just below knees. Keep wraps clean and dry. If wrap gets wet or falls more than 2 inches, remove wrap completely and call wound center at [phone]. Keep wound covered with dressing material (plain alginate) and secure with dry dressing. Apply spandagrip to right leg. On in am and off in pm.     Elevate legs as much as possible above level of the heart. Treatment Orders:Eat a diet high in protein and vitamin C. Take a multiple vitamin daily unless contraindicated. To decide on if she wants ablation in future w/PK-not at this time call PK if you decide      [facility] followup visit :  3 weeks Trice(Friday)__________________________  (Please note your next appointment above and if you are unable to keep, kindly give a 24 hour notice. Thank you.)     Physician signature:__________________________      If you experience any of the following, please call the [facility] during business hours:     Increase in Pain  * Temperature over 101  * Increase in drainage from your wound  * Drainage with a foul odor  * Bleeding  * Increase in swelling  * Need for compression bandage changes due to slippage, breakthrough drainage.      If you need medical attention outside of the business hours of the [facility] please contact your PCP or go to

## 2023-08-25 ENCOUNTER — HOSPITAL ENCOUNTER (OUTPATIENT)
Dept: WOUND CARE | Age: 83
Discharge: HOME | End: 2023-08-25
Payer: MEDICARE

## 2023-08-25 VITALS
TEMPERATURE: 98.6 F | HEIGHT: 63 IN | SYSTOLIC BLOOD PRESSURE: 112 MMHG | DIASTOLIC BLOOD PRESSURE: 60 MMHG | RESPIRATION RATE: 16 BRPM | WEIGHT: 160 LBS | BODY MASS INDEX: 28.35 KG/M2 | HEART RATE: 66 BPM

## 2023-08-25 DIAGNOSIS — L97.322 VENOUS STASIS ULCER OF LEFT ANKLE WITH FAT LAYER EXPOSED WITHOUT VARICOSE VEINS (HCC): Primary | ICD-10-CM

## 2023-08-25 DIAGNOSIS — I87.2 VENOUS STASIS ULCER OF LEFT ANKLE WITH FAT LAYER EXPOSED WITHOUT VARICOSE VEINS (HCC): Primary | ICD-10-CM

## 2023-08-25 PROCEDURE — 11042 DBRDMT SUBQ TIS 1ST 20SQCM/<: CPT

## 2023-08-25 PROCEDURE — 6370000000 HC RX 637 (ALT 250 FOR IP): Performed by: NURSE PRACTITIONER

## 2023-08-25 RX ORDER — LIDOCAINE HYDROCHLORIDE 40 MG/ML
SOLUTION TOPICAL ONCE
OUTPATIENT
Start: 2023-08-25 | End: 2023-08-25

## 2023-08-25 RX ORDER — IBUPROFEN 200 MG
TABLET ORAL ONCE
OUTPATIENT
Start: 2023-08-25 | End: 2023-08-25

## 2023-08-25 RX ORDER — BACITRACIN ZINC AND POLYMYXIN B SULFATE 500; 1000 [USP'U]/G; [USP'U]/G
OINTMENT TOPICAL ONCE
OUTPATIENT
Start: 2023-08-25 | End: 2023-08-25

## 2023-08-25 RX ORDER — LIDOCAINE HYDROCHLORIDE 20 MG/ML
JELLY TOPICAL ONCE
OUTPATIENT
Start: 2023-08-25 | End: 2023-08-25

## 2023-08-25 RX ORDER — CLOBETASOL PROPIONATE 0.5 MG/G
OINTMENT TOPICAL ONCE
OUTPATIENT
Start: 2023-08-25 | End: 2023-08-25

## 2023-08-25 RX ORDER — GINSENG 100 MG
CAPSULE ORAL ONCE
OUTPATIENT
Start: 2023-08-25 | End: 2023-08-25

## 2023-08-25 RX ORDER — LIDOCAINE HYDROCHLORIDE 40 MG/ML
SOLUTION TOPICAL ONCE
Status: COMPLETED | OUTPATIENT
Start: 2023-08-25 | End: 2023-08-25

## 2023-08-25 RX ORDER — LIDOCAINE 50 MG/G
OINTMENT TOPICAL ONCE
OUTPATIENT
Start: 2023-08-25 | End: 2023-08-25

## 2023-08-25 RX ORDER — BETAMETHASONE DIPROPIONATE 0.05 %
OINTMENT (GRAM) TOPICAL ONCE
OUTPATIENT
Start: 2023-08-25 | End: 2023-08-25

## 2023-08-25 RX ORDER — LIDOCAINE 40 MG/G
CREAM TOPICAL ONCE
OUTPATIENT
Start: 2023-08-25 | End: 2023-08-25

## 2023-08-25 RX ORDER — GENTAMICIN SULFATE 1 MG/G
OINTMENT TOPICAL ONCE
OUTPATIENT
Start: 2023-08-25 | End: 2023-08-25

## 2023-08-25 RX ADMIN — LIDOCAINE HYDROCHLORIDE 10 ML: 40 SOLUTION TOPICAL at 13:56

## 2023-08-25 NOTE — PROGRESS NOTES
Wound Healing Center  History and Physical/Consultation  Podiatry    Referring Physician : Matilde Palomino  Sherren Asters  MEDICAL RECORD NUMBER:  [id number]  AGE: 80 y.o. GENDER: female  : 1940  EPISODE DATE:  2023  Subjective:     Chief Complaint   Patient presents with    Wound Check     Left leg         HISTORY of PRESENT ILLNESS HPI     Sherren Asters is a 80 y.o. female who presents today for wound/ulcer evaluation. History of Wound Context:  The patient has had a wounds of medial, lateral, posterior left leg and left heel which was first noted approximately 2022  This has not been treated. On their initial visit to the wound healing center, 10/14/2022,  the patient has noted that the wound has not been improving. The patient has had similar previous wounds in the past.      Pt is not on abx at time of initial visit.     10/14/22  Patient presents with left leg erythema, pruritis and dry scaly skin to left leg   Eschar present on left heel   Patient is not diabetic  Patient has a history of peripheral vascular disease - taking Eliquis  Vascular studies ordered   Wounds debrided   Aquacel and Spandagrip, Lamisil to surrounding inflamed tissue     10/21/22  Wounds overall improving   Wounds debrided   Doxy 100mg BIB [id number] prescribed   Continue Aquacel and Spandagrip, Lamisil to surrounding inflamed tissue     10/28/22  Wounds overall appearance improving, decreased erythema   Left heel wound healed   Wounds not debrided today  Gentamycin cream prescribed   Continue Aquacel and Spandagrip, Lamisil to surrounding inflamed tissue   Consult to ID, Dr. Saumya Wiley       22  Wounds overall appearance improving   Wounds debrided   Continue Gent, Aquacel and Spandagrip, Lamisil to surrounding inflamed tissue     22  Wounds improving   Wounds debrided   Continue Gent, Aquacel and Spandagrip, Lamisil to surrounding inflamed tissue     23  Wounds measuring slightly larger,

## 2023-09-08 NOTE — DISCHARGE INSTRUCTIONS
Visit Discharge/Physician Orders     Discharge condition: Stable     Assessment of pain at discharge:MINIMAL     Anesthetic used: 4% lidocaine solution     Discharge to: Home     Left via:Private automobile     Accompanied by:  caregiver     ECF/HHA: [address] WRAP UP TO KNEE. PLEASE NOTE: PATIENT DOES NOT HAVE FOLLOW UP AT WOUND CARE TILL AUGUST 25TH     Dressing Orders: MEDIAL LEFT LEG ULCER-Cleanse with normal saline,  apply  plain alginate, ABD pad, and profore lite. Lateral ankle healed. Change Monday with home care and Friday at [facility]. Apply Clobetasol to dry skin on left leg (not on wounds). Wrap from base of toes to just below knees. Keep wraps clean and dry. If wrap gets wet or falls more than 2 inches, remove wrap completely and call wound center at [phone]. Keep wound covered with dressing material (plain alginate) and secure with dry dressing. Apply spandagrip to right leg. On in am and off in pm.  Elevate legs as much as possible above level of the heart. Wound culture taken in clinic today        Treatment Orders:Eat a diet high in protein and vitamin C. Take a multiple vitamin daily unless contraindicated. To decide on if she wants ablation in future w/PK-not at this time call PK if you decide      [facility] followup visit :  2 weeks Trice(Friday)__________________________  (Please note your next appointment above and if you are unable to keep, kindly give a 24 hour notice. Thank you.)     Physician signature:__________________________      If you experience any of the following, please call the [address] [place] [address] during business hours:     Increase in Pain  * Temperature over 101  * Increase in drainage from your wound  * Drainage with a foul odor  * Bleeding  * Increase in swelling  * Need for compression bandage changes due to slippage, breakthrough drainage.      If you need medical attention outside of the business hours of the Wound Care

## 2023-09-15 ENCOUNTER — HOSPITAL ENCOUNTER (OUTPATIENT)
Dept: WOUND CARE | Age: 83
Discharge: HOME | End: 2023-09-15
Payer: MEDICARE

## 2023-09-15 VITALS
SYSTOLIC BLOOD PRESSURE: 106 MMHG | WEIGHT: 160 LBS | HEART RATE: 61 BPM | DIASTOLIC BLOOD PRESSURE: 72 MMHG | BODY MASS INDEX: 28.35 KG/M2 | RESPIRATION RATE: 16 BRPM | HEIGHT: 63 IN | TEMPERATURE: 98.1 F

## 2023-09-15 DIAGNOSIS — L97.322 VENOUS STASIS ULCER OF LEFT ANKLE WITH FAT LAYER EXPOSED WITHOUT VARICOSE VEINS (HCC): Primary | ICD-10-CM

## 2023-09-15 DIAGNOSIS — I87.2 VENOUS STASIS ULCER OF LEFT ANKLE WITH FAT LAYER EXPOSED WITHOUT VARICOSE VEINS (HCC): Primary | ICD-10-CM

## 2023-09-15 DIAGNOSIS — Z86.79 HISTORY OF VASCULAR DISEASE: ICD-10-CM

## 2023-09-15 PROCEDURE — 11042 DBRDMT SUBQ TIS 1ST 20SQCM/<: CPT

## 2023-09-15 PROCEDURE — 87205 SMEAR GRAM STAIN: CPT

## 2023-09-15 PROCEDURE — 86403 PARTICLE AGGLUT ANTBDY SCRN: CPT

## 2023-09-15 PROCEDURE — 87075 CULTR BACTERIA EXCEPT BLOOD: CPT

## 2023-09-15 PROCEDURE — 6370000000 HC RX 637 (ALT 250 FOR IP): Performed by: NURSE PRACTITIONER

## 2023-09-15 PROCEDURE — 87070 CULTURE OTHR SPECIMN AEROBIC: CPT

## 2023-09-15 RX ORDER — CLOBETASOL PROPIONATE 0.5 MG/G
OINTMENT TOPICAL ONCE
OUTPATIENT
Start: 2023-09-15 | End: 2023-09-15

## 2023-09-15 RX ORDER — BACITRACIN ZINC AND POLYMYXIN B SULFATE 500; 1000 [USP'U]/G; [USP'U]/G
OINTMENT TOPICAL ONCE
OUTPATIENT
Start: 2023-09-15 | End: 2023-09-15

## 2023-09-15 RX ORDER — GINSENG 100 MG
CAPSULE ORAL ONCE
OUTPATIENT
Start: 2023-09-15 | End: 2023-09-15

## 2023-09-15 RX ORDER — LIDOCAINE HYDROCHLORIDE 20 MG/ML
JELLY TOPICAL ONCE
OUTPATIENT
Start: 2023-09-15 | End: 2023-09-15

## 2023-09-15 RX ORDER — LIDOCAINE 50 MG/G
OINTMENT TOPICAL ONCE
OUTPATIENT
Start: 2023-09-15 | End: 2023-09-15

## 2023-09-15 RX ORDER — IBUPROFEN 200 MG
TABLET ORAL ONCE
OUTPATIENT
Start: 2023-09-15 | End: 2023-09-15

## 2023-09-15 RX ORDER — LIDOCAINE HYDROCHLORIDE 40 MG/ML
SOLUTION TOPICAL ONCE
OUTPATIENT
Start: 2023-09-15 | End: 2023-09-15

## 2023-09-15 RX ORDER — GENTAMICIN SULFATE 1 MG/G
OINTMENT TOPICAL ONCE
OUTPATIENT
Start: 2023-09-15 | End: 2023-09-15

## 2023-09-15 RX ORDER — LIDOCAINE 40 MG/G
CREAM TOPICAL ONCE
OUTPATIENT
Start: 2023-09-15 | End: 2023-09-15

## 2023-09-15 RX ORDER — BETAMETHASONE DIPROPIONATE 0.05 %
OINTMENT (GRAM) TOPICAL ONCE
OUTPATIENT
Start: 2023-09-15 | End: 2023-09-15

## 2023-09-15 RX ORDER — LIDOCAINE HYDROCHLORIDE 40 MG/ML
SOLUTION TOPICAL ONCE
Status: COMPLETED | OUTPATIENT
Start: 2023-09-15 | End: 2023-09-15

## 2023-09-15 RX ADMIN — LIDOCAINE HYDROCHLORIDE 5 ML: 40 SOLUTION TOPICAL at 13:30

## 2023-09-15 NOTE — PROGRESS NOTES
Wound Healing Center  History and Physical/Consultation  Podiatry    Referring Physician : Matilde Dumas  Siobhan Edward  MEDICAL RECORD NUMBER:  [id number]  AGE: 80 y.o. GENDER: female  : 1940  EPISODE DATE:  9/15/2023  Subjective:     Chief Complaint   Patient presents with    Wound Check     L leg         HISTORY of PRESENT ILLNESS HPI     Siobhan Edward is a 80 y.o. female who presents today for wound/ulcer evaluation. History of Wound Context:  The patient has had a wounds of medial, lateral, posterior left leg and left heel which was first noted approximately 2022  This has not been treated. On their initial visit to the wound healing center, 10/14/2022,  the patient has noted that the wound has not been improving. The patient has had similar previous wounds in the past.      Pt is not on abx at time of initial visit.     10/14/22  Patient presents with left leg erythema, pruritis and dry scaly skin to left leg   Eschar present on left heel   Patient is not diabetic  Patient has a history of peripheral vascular disease - taking Eliquis  Vascular studies ordered   Wounds debrided   Aquacel and Spandagrip, Lamisil to surrounding inflamed tissue     10/21/22  Wounds overall improving   Wounds debrided   Doxy 100mg BIB [id number] prescribed   Continue Aquacel and Spandagrip, Lamisil to surrounding inflamed tissue     10/28/22  Wounds overall appearance improving, decreased erythema   Left heel wound healed   Wounds not debrided today  Gentamycin cream prescribed   Continue Aquacel and Spandagrip, Lamisil to surrounding inflamed tissue   Consult to ID, Dr. Amber Valenzuela       22  Wounds overall appearance improving   Wounds debrided   Continue Gent, Aquacel and Spandagrip, Lamisil to surrounding inflamed tissue     22  Wounds improving   Wounds debrided   Continue Gent, Aquacel and Spandagrip, Lamisil to surrounding inflamed tissue     23  Wounds measuring slightly larger, increased

## 2023-09-17 LAB
MICROORGANISM SPEC CULT: ABNORMAL
MICROORGANISM/AGENT SPEC: ABNORMAL
SPECIMEN DESCRIPTION: ABNORMAL

## 2023-09-18 LAB
MICROORGANISM SPEC CULT: ABNORMAL
MICROORGANISM SPEC CULT: ABNORMAL
MICROORGANISM SPEC CULT: NORMAL
MICROORGANISM/AGENT SPEC: ABNORMAL
SPECIMEN DESCRIPTION: ABNORMAL
SPECIMEN DESCRIPTION: NORMAL

## 2023-09-22 NOTE — DISCHARGE INSTRUCTIONS
Visit Discharge/Physician Orders     Discharge condition: Stable     Assessment of pain at discharge:MINIMAL     Anesthetic used: 4% lidocaine solution     Discharge to: Home     Left via:Private automobile     Accompanied by:  caregiver     ECF/HHA: [facility] . NO LEG WRAP COMPRESSION TILL NEXT WOUND CARE APPT. NEW WOUND CARE ORDERS TODAY. PLEASE SEE PATIENT 3X WEEK NEW ORDERS. PATIENT HAS 2 WEEK FOLLOW UP AT [address] [place] [address]. Dressing Orders: MEDIAL LEFT LEG ULCER-Cleanse with normal saline,  DISCONTINUE ALGINATE. Begin to apply [facility] collagen dry dressing and secure into place. Change dressing every [address] . Lateral ankle healed. APPLY SPANDAGRIP to left leg. Wrap from base of toes to just below knees. Keep wraps clean and dry. If wrap gets wet or falls more than 2 inches, remove wrap completely and re dress wound as directed. Reyna Goodman Keep wound covered with dressing material (plain alginate) and secure with dry dressing. Elevate legs as much as possible above level of the heart. Treatment Orders:Eat a diet high in protein and vitamin C. Take a multiple vitamin daily unless contraindicated. To decide on if she wants ablation in future w/PK-not at this time call PK if you decide      [address] [place] [address] followup visit :  2 weeks Trice(Friday)__________________________  (Please note your next appointment above and if you are unable to keep, kindly give a 24 hour notice. Thank you.)     Physician signature:__________________________      If you experience any of the following, please call the [facility] during business hours:     Increase in Pain  * Temperature over 101  * Increase in drainage from your wound  * Drainage with a foul odor  * Bleeding  * Increase in swelling  * Need for compression bandage changes due to slippage, breakthrough drainage.      If you need medical attention outside of the business hours of the [facility] please contact your PCP or go to the

## 2023-09-29 ENCOUNTER — HOSPITAL ENCOUNTER (OUTPATIENT)
Dept: WOUND CARE | Age: 83
Discharge: HOME | End: 2023-09-29
Payer: MEDICARE

## 2023-09-29 VITALS
SYSTOLIC BLOOD PRESSURE: 128 MMHG | RESPIRATION RATE: 16 BRPM | DIASTOLIC BLOOD PRESSURE: 74 MMHG | TEMPERATURE: 97.2 F | HEART RATE: 68 BPM

## 2023-09-29 DIAGNOSIS — L97.322 VENOUS STASIS ULCER OF LEFT ANKLE WITH FAT LAYER EXPOSED WITHOUT VARICOSE VEINS (HCC): Primary | ICD-10-CM

## 2023-09-29 DIAGNOSIS — I87.2 VENOUS STASIS ULCER OF LEFT ANKLE WITH FAT LAYER EXPOSED WITHOUT VARICOSE VEINS (HCC): Primary | ICD-10-CM

## 2023-09-29 PROCEDURE — 6370000000 HC RX 637 (ALT 250 FOR IP): Performed by: NURSE PRACTITIONER

## 2023-09-29 PROCEDURE — 11042 DBRDMT SUBQ TIS 1ST 20SQCM/<: CPT

## 2023-09-29 RX ORDER — GINSENG 100 MG
CAPSULE ORAL ONCE
OUTPATIENT
Start: 2023-09-29 | End: 2023-09-29

## 2023-09-29 RX ORDER — BACITRACIN ZINC AND POLYMYXIN B SULFATE 500; 1000 [USP'U]/G; [USP'U]/G
OINTMENT TOPICAL ONCE
OUTPATIENT
Start: 2023-09-29 | End: 2023-09-29

## 2023-09-29 RX ORDER — IBUPROFEN 200 MG
TABLET ORAL ONCE
OUTPATIENT
Start: 2023-09-29 | End: 2023-09-29

## 2023-09-29 RX ORDER — LIDOCAINE HYDROCHLORIDE 20 MG/ML
JELLY TOPICAL ONCE
OUTPATIENT
Start: 2023-09-29 | End: 2023-09-29

## 2023-09-29 RX ORDER — LIDOCAINE HYDROCHLORIDE 40 MG/ML
SOLUTION TOPICAL ONCE
Status: COMPLETED | OUTPATIENT
Start: 2023-09-29 | End: 2023-09-29

## 2023-09-29 RX ORDER — LIDOCAINE 40 MG/G
CREAM TOPICAL ONCE
OUTPATIENT
Start: 2023-09-29 | End: 2023-09-29

## 2023-09-29 RX ORDER — BETAMETHASONE DIPROPIONATE 0.05 %
OINTMENT (GRAM) TOPICAL ONCE
OUTPATIENT
Start: 2023-09-29 | End: 2023-09-29

## 2023-09-29 RX ORDER — CLOBETASOL PROPIONATE 0.5 MG/G
OINTMENT TOPICAL ONCE
OUTPATIENT
Start: 2023-09-29 | End: 2023-09-29

## 2023-09-29 RX ORDER — GENTAMICIN SULFATE 1 MG/G
OINTMENT TOPICAL ONCE
OUTPATIENT
Start: 2023-09-29 | End: 2023-09-29

## 2023-09-29 RX ORDER — TRIAMCINOLONE ACETONIDE 1 MG/G
OINTMENT TOPICAL ONCE
OUTPATIENT
Start: 2023-09-29 | End: 2023-09-29

## 2023-09-29 RX ORDER — LIDOCAINE HYDROCHLORIDE 40 MG/ML
SOLUTION TOPICAL ONCE
OUTPATIENT
Start: 2023-09-29 | End: 2023-09-29

## 2023-09-29 RX ORDER — LIDOCAINE 50 MG/G
OINTMENT TOPICAL ONCE
OUTPATIENT
Start: 2023-09-29 | End: 2023-09-29

## 2023-09-29 RX ADMIN — LIDOCAINE HYDROCHLORIDE: 40 SOLUTION TOPICAL at 13:42

## 2023-09-29 NOTE — PROGRESS NOTES
debrided   Patient to continue clindamycin as prescribed   Compression wrap rolled down causing proximal edema   Hold compression wrap this week   Promogram and spandagrip    Wound/Ulcer Pain Timing/Severity: none  Quality of pain: N/A  Severity:  0 / 10   Modifying Factors: None  Associated Signs/Symptoms: edema and erythema    Ulcer Identification:  Ulcer Type: venous and undetermined  Contributing Factors: edema, venous stasis, and lymphedema    Diabetic/Pressure/Non Pressure Ulcers onl y:  Ulcer: Non-Pressure ulcer, fat layer exposed    If patient has diabetic lower extremity wounds  [place] Classification of diabetic lower extremity wounds:    Grade Description   []  0 No open wound   []  1 Superficial ulcer involving the full skin thickness   []  2 Deep ulcer involves ligament, tendon, joint capsule, or fascia  No bone involvement or abscess presence   []  3 Deep Ulcer with abcess formation and/or osteomyelitis   []  4 Localized gangrene   []  5 Extensive gangrene of the foot     Wound: N/A        PAST MEDICAL HISTORY      Diagnosis Date    Acquired hypothyroidism 8/14/2017    Arthritis     Blood transfusion reaction     Chronic kidney disease     History of blood transfusion     Hypertension     Lymphedema of both lower extremities 9/6/2017    Open wound of left great toe 10/18/2017    Other disorders of kidney and ureter in diseases classified elsewhere     Pelvic fracture (HCC)     Skin ulcer of left calf with fat layer exposed ([address]) 9/6/2017    Skin ulcer of left calf, limited to breakdown of skin ([address]) 9/6/2017    Ulcer of left lower leg ([address]) 3/24/2014    Venous stasis ulcer of left calf limited to breakdown of skin ([address]) 3/24/2014     Past Surgical History:   Procedure Laterality Date    COLONOSCOPY      ENDOSCOPY, COLON, DIAGNOSTIC      EYE SURGERY      cateract and lazor surgery 2015    FRACTURE SURGERY  2010    RT HIP    HIP FRACTURE SURGERY Left 08/08/2014    ORIF left hip    TONSILLECTOMY      as

## 2023-10-11 NOTE — DISCHARGE INSTRUCTIONS
Pt admitted with constipation for 4 days, monitoring at this time, pt had small bm today, IVF cont and will cont to monitor   Visit Discharge/Physician Orders     Discharge condition: Stable     Assessment of pain at discharge:MINIMAL     Anesthetic used: 4% lidocaine solution     Discharge to: Home     Left via:Private automobile     Accompanied by:  caregiver     ECF/HHA: [facility]     Dressing Orders: MEDIAL LEFT LEG ULCER-Cleanse with normal saline, apply collagen dry dressing and secure into place. Change dressing every [address] . Elevate legs as much as possible above level of the heart. Treatment Orders:Eat a diet high in protein and vitamin C. Take a multiple vitamin daily unless contraindicated. To decide on if she wants ablation in future w/PK-not at this time call PK if you decide     Xray of the right foot ordered- to be completed prior to next visit      [facility] followup visit :  2 weeks Trice(Friday)__________________________  (Please note your next appointment above and if you are unable to keep, kindly give a 24 hour notice. Thank you.)     Physician signature:__________________________      If you experience any of the following, please call the [facility] during business hours:     Increase in Pain  * Temperature over 101  * Increase in drainage from your wound  * Drainage with a foul odor  * Bleeding  * Increase in swelling  * Need for compression bandage changes due to slippage, breakthrough drainage. If you need medical attention outside of the business hours of the [facility] please contact your PCP or go to the nearest emergency room.

## 2023-10-13 ENCOUNTER — HOSPITAL ENCOUNTER (OUTPATIENT)
Age: 83
End: 2023-10-13
Payer: MEDICARE

## 2023-10-13 ENCOUNTER — HOSPITAL ENCOUNTER (OUTPATIENT)
Dept: WOUND CARE | Age: 83
Discharge: HOME | End: 2023-10-13
Payer: MEDICARE

## 2023-10-13 ENCOUNTER — HOSPITAL ENCOUNTER (OUTPATIENT)
Dept: GENERAL RADIOLOGY | Age: 83
End: 2023-10-13
Payer: MEDICARE

## 2023-10-13 VITALS
HEART RATE: 72 BPM | SYSTOLIC BLOOD PRESSURE: 126 MMHG | TEMPERATURE: 97.4 F | DIASTOLIC BLOOD PRESSURE: 72 MMHG | RESPIRATION RATE: 16 BRPM

## 2023-10-13 DIAGNOSIS — L97.322 VENOUS STASIS ULCER OF LEFT ANKLE WITH FAT LAYER EXPOSED WITHOUT VARICOSE VEINS (HCC): Primary | ICD-10-CM

## 2023-10-13 DIAGNOSIS — L97.429 HEEL ULCER, LEFT, WITH UNSPECIFIED SEVERITY (HCC): ICD-10-CM

## 2023-10-13 DIAGNOSIS — I87.2 VENOUS STASIS ULCER OF LEFT ANKLE WITH FAT LAYER EXPOSED WITHOUT VARICOSE VEINS (HCC): Primary | ICD-10-CM

## 2023-10-13 DIAGNOSIS — M79.672 PAIN OF LEFT HEEL: ICD-10-CM

## 2023-10-13 PROCEDURE — 73630 X-RAY EXAM OF FOOT: CPT

## 2023-10-13 PROCEDURE — 11042 DBRDMT SUBQ TIS 1ST 20SQCM/<: CPT

## 2023-10-13 PROCEDURE — 6370000000 HC RX 637 (ALT 250 FOR IP): Performed by: NURSE PRACTITIONER

## 2023-10-13 RX ORDER — IBUPROFEN 200 MG
TABLET ORAL ONCE
OUTPATIENT
Start: 2023-10-13 | End: 2023-10-13

## 2023-10-13 RX ORDER — BETAMETHASONE DIPROPIONATE 0.05 %
OINTMENT (GRAM) TOPICAL ONCE
OUTPATIENT
Start: 2023-10-13 | End: 2023-10-13

## 2023-10-13 RX ORDER — TRIAMCINOLONE ACETONIDE 1 MG/G
OINTMENT TOPICAL ONCE
OUTPATIENT
Start: 2023-10-13 | End: 2023-10-13

## 2023-10-13 RX ORDER — GINSENG 100 MG
CAPSULE ORAL ONCE
OUTPATIENT
Start: 2023-10-13 | End: 2023-10-13

## 2023-10-13 RX ORDER — LIDOCAINE HYDROCHLORIDE 20 MG/ML
JELLY TOPICAL ONCE
OUTPATIENT
Start: 2023-10-13 | End: 2023-10-13

## 2023-10-13 RX ORDER — LIDOCAINE HYDROCHLORIDE 40 MG/ML
SOLUTION TOPICAL ONCE
Status: COMPLETED | OUTPATIENT
Start: 2023-10-13 | End: 2023-10-13

## 2023-10-13 RX ORDER — BACITRACIN ZINC AND POLYMYXIN B SULFATE 500; 1000 [USP'U]/G; [USP'U]/G
OINTMENT TOPICAL ONCE
OUTPATIENT
Start: 2023-10-13 | End: 2023-10-13

## 2023-10-13 RX ORDER — GENTAMICIN SULFATE 1 MG/G
OINTMENT TOPICAL ONCE
OUTPATIENT
Start: 2023-10-13 | End: 2023-10-13

## 2023-10-13 RX ORDER — LIDOCAINE 50 MG/G
OINTMENT TOPICAL ONCE
OUTPATIENT
Start: 2023-10-13 | End: 2023-10-13

## 2023-10-13 RX ORDER — CLOBETASOL PROPIONATE 0.5 MG/G
OINTMENT TOPICAL ONCE
OUTPATIENT
Start: 2023-10-13 | End: 2023-10-13

## 2023-10-13 RX ORDER — LIDOCAINE HYDROCHLORIDE 40 MG/ML
SOLUTION TOPICAL ONCE
OUTPATIENT
Start: 2023-10-13 | End: 2023-10-13

## 2023-10-13 RX ORDER — LIDOCAINE 40 MG/G
CREAM TOPICAL ONCE
OUTPATIENT
Start: 2023-10-13 | End: 2023-10-13

## 2023-10-13 RX ADMIN — LIDOCAINE HYDROCHLORIDE 10 ML: 40 SOLUTION TOPICAL at 13:28

## 2023-10-25 NOTE — DISCHARGE INSTRUCTIONS
Visit Discharge/Physician Orders     Discharge condition: Stable     Assessment of pain at discharge:MINIMAL     Anesthetic used: 4% lidocaine solution     Discharge to: Home     Left via:Private automobile     Accompanied by:  caregiver     ECF/HHA: [facility]      Dressing Orders: MEDIAL LEFT LEG ULCER-Cleanse with normal saline, apply collagen dry dressing and secure into place. Change dressing every [facility] AND FRIDAY     Elevate legs as much as possible above level of the heart. Treatment Orders:Eat a diet high in protein and vitamin C. Take a multiple vitamin daily unless contraindicated. To decide on if she wants ablation in future w/PK-not at this time call PK if you decide      [address] [place] [address] followup visit :  2 weeks Trice(Friday)__________________________  (Please note your next appointment above and if you are unable to keep, kindly give a 24 hour notice. Thank you.)     Physician signature:__________________________      If you experience any of the following, please call the [facility] during business hours:     Increase in Pain  * Temperature over 101  * Increase in drainage from your wound  * Drainage with a foul odor  * Bleeding  * Increase in swelling  * Need for compression bandage changes due to slippage, breakthrough drainage. If you need medical attention outside of the business hours of the [facility] please contact your PCP or go to the nearest emergency room.

## 2023-10-27 ENCOUNTER — HOSPITAL ENCOUNTER (OUTPATIENT)
Dept: WOUND CARE | Age: 83
Discharge: HOME | End: 2023-10-27
Payer: MEDICARE

## 2023-10-27 VITALS
RESPIRATION RATE: 16 BRPM | HEART RATE: 56 BPM | DIASTOLIC BLOOD PRESSURE: 78 MMHG | SYSTOLIC BLOOD PRESSURE: 164 MMHG | TEMPERATURE: 97.9 F

## 2023-10-27 DIAGNOSIS — L97.322 VENOUS STASIS ULCER OF LEFT ANKLE WITH FAT LAYER EXPOSED WITHOUT VARICOSE VEINS (HCC): Primary | ICD-10-CM

## 2023-10-27 DIAGNOSIS — I87.2 VENOUS STASIS ULCER OF LEFT ANKLE WITH FAT LAYER EXPOSED WITHOUT VARICOSE VEINS (HCC): Primary | ICD-10-CM

## 2023-10-27 DIAGNOSIS — M79.672 PAIN OF LEFT HEEL: ICD-10-CM

## 2023-10-27 DIAGNOSIS — M77.32 CALCANEAL SPUR OF FOOT, LEFT: ICD-10-CM

## 2023-10-27 DIAGNOSIS — I73.9 PVD (PERIPHERAL VASCULAR DISEASE) (HCC): ICD-10-CM

## 2023-10-27 PROCEDURE — 6370000000 HC RX 637 (ALT 250 FOR IP): Performed by: NURSE PRACTITIONER

## 2023-10-27 PROCEDURE — 11042 DBRDMT SUBQ TIS 1ST 20SQCM/<: CPT

## 2023-10-27 RX ORDER — GENTAMICIN SULFATE 1 MG/G
OINTMENT TOPICAL ONCE
OUTPATIENT
Start: 2023-10-27 | End: 2023-10-27

## 2023-10-27 RX ORDER — BETAMETHASONE DIPROPIONATE 0.05 %
OINTMENT (GRAM) TOPICAL ONCE
OUTPATIENT
Start: 2023-10-27 | End: 2023-10-27

## 2023-10-27 RX ORDER — CLOBETASOL PROPIONATE 0.5 MG/G
OINTMENT TOPICAL ONCE
OUTPATIENT
Start: 2023-10-27 | End: 2023-10-27

## 2023-10-27 RX ORDER — LIDOCAINE HYDROCHLORIDE 40 MG/ML
SOLUTION TOPICAL ONCE
OUTPATIENT
Start: 2023-10-27 | End: 2023-10-27

## 2023-10-27 RX ORDER — GINSENG 100 MG
CAPSULE ORAL ONCE
OUTPATIENT
Start: 2023-10-27 | End: 2023-10-27

## 2023-10-27 RX ORDER — LIDOCAINE HYDROCHLORIDE 40 MG/ML
SOLUTION TOPICAL ONCE
Status: COMPLETED | OUTPATIENT
Start: 2023-10-27 | End: 2023-10-27

## 2023-10-27 RX ORDER — LIDOCAINE 40 MG/G
CREAM TOPICAL ONCE
OUTPATIENT
Start: 2023-10-27 | End: 2023-10-27

## 2023-10-27 RX ORDER — LIDOCAINE 50 MG/G
OINTMENT TOPICAL ONCE
OUTPATIENT
Start: 2023-10-27 | End: 2023-10-27

## 2023-10-27 RX ORDER — LIDOCAINE HYDROCHLORIDE 20 MG/ML
JELLY TOPICAL ONCE
OUTPATIENT
Start: 2023-10-27 | End: 2023-10-27

## 2023-10-27 RX ORDER — BACITRACIN ZINC AND POLYMYXIN B SULFATE 500; 1000 [USP'U]/G; [USP'U]/G
OINTMENT TOPICAL ONCE
OUTPATIENT
Start: 2023-10-27 | End: 2023-10-27

## 2023-10-27 RX ORDER — IBUPROFEN 200 MG
TABLET ORAL ONCE
OUTPATIENT
Start: 2023-10-27 | End: 2023-10-27

## 2023-10-27 RX ORDER — TRIAMCINOLONE ACETONIDE 1 MG/G
OINTMENT TOPICAL ONCE
OUTPATIENT
Start: 2023-10-27 | End: 2023-10-27

## 2023-10-27 RX ADMIN — LIDOCAINE HYDROCHLORIDE 10 ML: 40 SOLUTION TOPICAL at 13:22

## 2023-10-27 NOTE — PROGRESS NOTES
Wound Healing Center  History and Physical/Consultation  Podiatry    Referring Physician : Matilde Gonzalez  Lee Skinner  MEDICAL RECORD NUMBER:  [id number]  AGE: 80 y.o. GENDER: female  : 1940  EPISODE DATE:  10/27/2023  Subjective:     Chief Complaint   Patient presents with    Wound Check         HISTORY of PRESENT ILLNESS HPI     Lee Skinner is a 80 y.o. female who presents today for wound/ulcer evaluation. History of Wound Context:  The patient has had a wounds of medial, lateral, posterior left leg and left heel which was first noted approximately 2022  This has not been treated. On their initial visit to the wound healing center, 10/14/2022,  the patient has noted that the wound has not been improving. The patient has had similar previous wounds in the past.      Pt is not on abx at time of initial visit.     10/14/22  Patient presents with left leg erythema, pruritis and dry scaly skin to left leg   Eschar present on left heel   Patient is not diabetic  Patient has a history of peripheral vascular disease - taking Eliquis  Vascular studies ordered   Wounds debrided   Aquacel and Spandagrip, Lamisil to surrounding inflamed tissue     10/21/22  Wounds overall improving   Wounds debrided   Doxy 100mg BIB [id number] prescribed   Continue Aquacel and Spandagrip, Lamisil to surrounding inflamed tissue     10/28/22  Wounds overall appearance improving, decreased erythema   Left heel wound healed   Wounds not debrided today  Gentamycin cream prescribed   Continue Aquacel and Spandagrip, Lamisil to surrounding inflamed tissue   Consult to ID, Dr. Kayley Haque       22  Wounds overall appearance improving   Wounds debrided   Continue Gent, Aquacel and Spandagrip, Lamisil to surrounding inflamed tissue     22  Wounds improving   Wounds debrided   Continue Gent, Aquacel and Spandagrip, Lamisil to surrounding inflamed tissue     23  Wounds measuring slightly larger, increased

## 2023-11-08 NOTE — DISCHARGE INSTRUCTIONS
Visit Discharge/Physician Orders     Discharge condition: Stable     Assessment of pain at discharge:MINIMAL     Anesthetic used: 4% lidocaine solution     Discharge to: Home     Left via:Private automobile     Accompanied by:  caregiver     ECF/HHA: [facility]      Dressing Orders: MEDIAL LEFT LEG ULCER-Cleanse with normal saline, apply collagen dry dressing and secure into place. Change dressing every [facility] AND FRIDAY     Elevate legs as much as possible above level of the heart. Treatment Orders:Eat a diet high in protein and vitamin C. Take a multiple vitamin daily unless contraindicated. To decide on if she wants ablation in future w/PK-not at this time call PK if you decide      [facility] followup visit :  2 weeks Trice(Friday)__________________________  (Please note your next appointment above and if you are unable to keep, kindly give a 24 hour notice. Thank you.)     Physician signature:__________________________      If you experience any of the following, please call the [facility] during business hours:     Increase in Pain  * Temperature over 101  * Increase in drainage from your wound  * Drainage with a foul odor  * Bleeding  * Increase in swelling  * Need for compression bandage changes due to slippage, breakthrough drainage. If you need medical attention outside of the business hours of the [facility] please contact your PCP or go to the nearest emergency room.

## 2023-11-10 ENCOUNTER — HOSPITAL ENCOUNTER (OUTPATIENT)
Dept: WOUND CARE | Age: 83
Discharge: HOME | End: 2023-11-10
Payer: MEDICARE

## 2023-11-10 VITALS
RESPIRATION RATE: 17 BRPM | DIASTOLIC BLOOD PRESSURE: 56 MMHG | HEART RATE: 67 BPM | SYSTOLIC BLOOD PRESSURE: 153 MMHG | TEMPERATURE: 98.8 F

## 2023-11-10 DIAGNOSIS — L97.322 VENOUS STASIS ULCER OF LEFT ANKLE WITH FAT LAYER EXPOSED WITHOUT VARICOSE VEINS (HCC): Primary | ICD-10-CM

## 2023-11-10 DIAGNOSIS — I73.9 PVD (PERIPHERAL VASCULAR DISEASE) (HCC): ICD-10-CM

## 2023-11-10 DIAGNOSIS — I87.2 VENOUS STASIS ULCER OF LEFT ANKLE WITH FAT LAYER EXPOSED WITHOUT VARICOSE VEINS (HCC): Primary | ICD-10-CM

## 2023-11-10 DIAGNOSIS — M77.32 CALCANEAL SPUR OF FOOT, LEFT: ICD-10-CM

## 2023-11-10 PROBLEM — R89.5 POSITIVE CULTURE FINDINGS IN WOUND: Status: RESOLVED | Noted: 2023-05-02 | Resolved: 2023-11-10

## 2023-11-10 PROBLEM — L97.429 HEEL ULCER, LEFT, WITH UNSPECIFIED SEVERITY (HCC): Status: RESOLVED | Noted: 2022-10-21 | Resolved: 2023-11-10

## 2023-11-10 PROCEDURE — 6370000000 HC RX 637 (ALT 250 FOR IP): Performed by: NURSE PRACTITIONER

## 2023-11-10 PROCEDURE — 11042 DBRDMT SUBQ TIS 1ST 20SQCM/<: CPT

## 2023-11-10 RX ORDER — LIDOCAINE HYDROCHLORIDE 40 MG/ML
SOLUTION TOPICAL ONCE
Status: COMPLETED | OUTPATIENT
Start: 2023-11-10 | End: 2023-11-10

## 2023-11-10 RX ADMIN — LIDOCAINE HYDROCHLORIDE 10 ML: 40 SOLUTION TOPICAL at 13:16

## 2023-11-10 NOTE — PROGRESS NOTES
appearance is improved  We discussed L GSV ablation   She would like to wait and see me back in 6 weeks    6/9/23  Wound overall appearance improved, measuring slightly smaller   Wound debrided   Discussed potential graft placement   Will submit request for graft application  Continue plain Aquacel and profore lite   Clobetasol Cream prescribed for periwound inflammation/irritation     6/16/23  Wound overall appearance stable, measuring slightly larger   Wound debrided   Patient insurance denied graft coverage   Continue plain Aquacel and profore lite     6/23/23  Wound overall appearance stable, measuring slightly larger   Wound debrided   Continue plain Aquacel and profore lite      6/30/23  Wound overall appearance stable, measuring smaller   Wound debrided   Culture obtained   Continue plain Aquacel and profore lite   7/7/2023  Wounds, hematuria, wound cultures noted, MRSA, sensitive to Bactrim but resistant to doxycycline, patient was recommended to take Bactrim DS 1 tablet p.o. twice daily for 10 days, recommended lab review CBC and CMP  7/12/23  Since last seen slight improvements  Appearance slightly improved  Continue compression  Pt does not want to proceed with L GSV ablation  Continue to follow in center, fu with me as needed    7/21/23  Wound overall appearance stable, measuring smaller   Wound debrided   Continue plain Aquacel and profore lite     8/4/23  Wound overall appearance improving   Lateral ankle wound measuring smaller   Medial ankle wound measuring larger  Wound debrided   Continue plain Aquacel and profore lite     8/25/23  Lateral ankle wound healed  Wound overall appearance improving   Medial ankle wound measuring smaller  Wound debrided   Continue plain Aquacel and profore lite     9/15/23  Wound overall appearance improving, measuring larger  Wound debrided   Culture obtained   Continue plain Aquacel and profore lite     9/29/23  Wound appearance continues to improve, measuring

## 2023-11-15 NOTE — DISCHARGE INSTRUCTIONS
Visit Discharge/Physician Orders     Discharge condition: Stable     Assessment of pain at discharge:MINIMAL     Anesthetic used: 4% lidocaine solution     Discharge to: Home     Left via:Private automobile     Accompanied by:  caregiver     ECF/HHA: [facility]      Dressing Orders: MEDIAL LEFT LEG ULCER-Cleanse with normal saline, apply collagen dry dressing and secure into place. Change dressing every [facility] AND FRIDAY     Elevate legs as much as possible above level of the heart. Treatment Orders:Eat a diet high in protein and vitamin C. Take a multiple vitamin daily unless contraindicated. To decide on if she wants ablation in future w/PK-not at this time call PK if you decide      [facility] followup visit :  2 weeks Trice(Friday)__________________________  (Please note your next appointment above and if you are unable to keep, kindly give a 24 hour notice. Thank you.)     Physician signature:__________________________      If you experience any of the following, please call the [facility] during business hours:     Increase in Pain  * Temperature over 101  * Increase in drainage from your wound  * Drainage with a foul odor  * Bleeding  * Increase in swelling  * Need for compression bandage changes due to slippage, breakthrough drainage. If you need medical attention outside of the business hours of the [facility] please contact your PCP or go to the nearest emergency room.

## 2023-11-17 ENCOUNTER — HOSPITAL ENCOUNTER (OUTPATIENT)
Dept: WOUND CARE | Age: 83
Discharge: HOME | End: 2023-11-17
Payer: MEDICARE

## 2023-11-17 VITALS
RESPIRATION RATE: 16 BRPM | TEMPERATURE: 96.7 F | HEART RATE: 60 BPM | DIASTOLIC BLOOD PRESSURE: 80 MMHG | SYSTOLIC BLOOD PRESSURE: 144 MMHG

## 2023-11-17 DIAGNOSIS — I87.2 VENOUS STASIS ULCER OF LEFT ANKLE WITH FAT LAYER EXPOSED WITHOUT VARICOSE VEINS (HCC): Primary | ICD-10-CM

## 2023-11-17 DIAGNOSIS — I73.9 PVD (PERIPHERAL VASCULAR DISEASE) (HCC): ICD-10-CM

## 2023-11-17 DIAGNOSIS — L97.322 VENOUS STASIS ULCER OF LEFT ANKLE WITH FAT LAYER EXPOSED WITHOUT VARICOSE VEINS (HCC): Primary | ICD-10-CM

## 2023-11-17 PROCEDURE — 11042 DBRDMT SUBQ TIS 1ST 20SQCM/<: CPT

## 2023-11-17 RX ORDER — LIDOCAINE HYDROCHLORIDE 40 MG/ML
SOLUTION TOPICAL ONCE
Status: DISCONTINUED | OUTPATIENT
Start: 2023-11-17 | End: 2023-11-18 | Stop reason: HOSPADM

## 2023-11-17 NOTE — PROGRESS NOTES
Wound Healing Center  History and Physical/Consultation  Podiatry    Referring Physician : Matilde Ugarte  Senait Emmanuel  MEDICAL RECORD NUMBER:  [id number]  AGE: 80 y.o. GENDER: female  : 1940  EPISODE DATE:  2023  Subjective:     Chief Complaint   Patient presents with    Wound Check     Wound left leg         HISTORY of PRESENT ILLNESS HPI     Senait Emmanuel is a 80 y.o. female who presents today for wound/ulcer evaluation. History of Wound Context:  The patient has had a wounds of medial, lateral, posterior left leg and left heel which was first noted approximately 2022  This has not been treated. On their initial visit to the wound healing center, 10/14/2022,  the patient has noted that the wound has not been improving. The patient has had similar previous wounds in the past.      Pt is not on abx at time of initial visit.     10/14/22  Patient presents with left leg erythema, pruritis and dry scaly skin to left leg   Eschar present on left heel   Patient is not diabetic  Patient has a history of peripheral vascular disease - taking Eliquis  Vascular studies ordered   Wounds debrided   Aquacel and Spandagrip, Lamisil to surrounding inflamed tissue     10/21/22  Wounds overall improving   Wounds debrided   Doxy 100mg BIB [id number] prescribed   Continue Aquacel and Spandagrip, Lamisil to surrounding inflamed tissue     10/28/22  Wounds overall appearance improving, decreased erythema   Left heel wound healed   Wounds not debrided today  Gentamycin cream prescribed   Continue Aquacel and Spandagrip, Lamisil to surrounding inflamed tissue   Consult to ID, Dr. Don Kirkpatrick       22  Wounds overall appearance improving   Wounds debrided   Continue Gent, Aquacel and Spandagrip, Lamisil to surrounding inflamed tissue     22  Wounds improving   Wounds debrided   Continue Gent, Aquacel and Spandagrip, Lamisil to surrounding inflamed tissue     23  Wounds measuring slightly

## 2023-11-29 NOTE — DISCHARGE INSTRUCTIONS
Visit Discharge/Physician Orders     Discharge condition: Stable     Assessment of pain at discharge:MINIMAL     Anesthetic used: 4% lidocaine solution     Discharge to: Home     Left via:Private automobile     Accompanied by:  caregiver     ECF/HHA: [facility]     Dressing Orders: MEDIAL LEFT LEG ULCER-Cleanse with normal saline, apply plain alginate dry dressing, profore lite. Change dressing every [facility] AND FRIDAY     Elevate legs as much as possible above level of the heart. Treatment Orders:Eat a diet high in protein and vitamin C. Take a multiple vitamin daily unless contraindicated. To decide on if she wants ablation in future w/PK-not at this time call PK if you decide      [facility] followup visit :  2 weeks Trice(Friday)__________________________  (Please note your next appointment above and if you are unable to keep, kindly give a 24 hour notice. Thank you.)     Physician signature:__________________________      If you experience any of the following, please call the "[employer] during business hours:     Increase in Pain  * Temperature over 101  * Increase in drainage from your wound  * Drainage with a foul odor  * Bleeding  * Increase in swelling  * Need for compression bandage changes due to slippage, breakthrough drainage. If you need medical attention outside of the business hours of the "[employer] please contact your PCP or go to the nearest emergency room.

## 2023-12-01 ENCOUNTER — HOSPITAL ENCOUNTER (OUTPATIENT)
Dept: WOUND CARE | Age: 83
Discharge: HOME | End: 2023-12-01
Payer: MEDICARE

## 2023-12-01 VITALS
SYSTOLIC BLOOD PRESSURE: 176 MMHG | DIASTOLIC BLOOD PRESSURE: 78 MMHG | RESPIRATION RATE: 16 BRPM | HEART RATE: 69 BPM | TEMPERATURE: 96.3 F

## 2023-12-01 DIAGNOSIS — R60.0 BILATERAL LOWER EXTREMITY EDEMA: ICD-10-CM

## 2023-12-01 DIAGNOSIS — Z86.79 HISTORY OF VASCULAR DISEASE: ICD-10-CM

## 2023-12-01 DIAGNOSIS — I73.9 PVD (PERIPHERAL VASCULAR DISEASE) (HCC): ICD-10-CM

## 2023-12-01 DIAGNOSIS — L97.322 VENOUS STASIS ULCER OF LEFT ANKLE WITH FAT LAYER EXPOSED WITHOUT VARICOSE VEINS (HCC): Primary | ICD-10-CM

## 2023-12-01 DIAGNOSIS — R03.0 ELEVATED BLOOD PRESSURE READING: ICD-10-CM

## 2023-12-01 DIAGNOSIS — I87.2 VENOUS STASIS ULCER OF LEFT ANKLE WITH FAT LAYER EXPOSED WITHOUT VARICOSE VEINS (HCC): Primary | ICD-10-CM

## 2023-12-01 PROCEDURE — 11042 DBRDMT SUBQ TIS 1ST 20SQCM/<: CPT

## 2023-12-01 NOTE — PROGRESS NOTES
Pressure Injury Full thickness 07/12/23 1129   Number of days: 413           Procedure Note  Indications:  Based on my examination of this patient's wound(s)/ulcer(s) today, debridement is required to promote healing and evaluate the wound base. Performed by: Byrd Brunner, APRN - CNP    Consent obtained:  Yes    Time out taken:  Yes    Pain Control: Anesthetic  Anesthetic: 4% Lidocaine Liquid Topical     Debridement:Excisional Debridement    Using curette the wound(s)/ulcer(s) was/were sharply debrided down through and including the removal of subcutaneous tissue. Devitalized Tissue Debrided:  fibrin, biofilm, and slough to stimulate bleeding to promote healing, post debridement good bleeding base and wound edges noted    Wound/Ulcer #: 1    Percent of Wound/Ulcer Debrided: 100%    Total Surface Area Debrided:  16.66 sq cm     Estimated Blood Loss:  Minimal  Hemostasis Achieved:  by pressure    Procedural Pain:  3  / 10   Post Procedural Pain:  3 / 10     Response to treatment:  Well tolerated by patient. Plan:     Pt has never been a smoker   - Discussed relationship of smoking and negative affects on wound healing   - Emphasized importance of tobacco avoidace/cessation     In my professional opinion and based off the information that is available at this time this patient has appropriate indication for HBO Therapy: No    Treatment Note please see attached Discharge Instructions    Written patient dismissal instructions given to patient and signed by patient or POA.          Discharge Instructions         Visit Discharge/Physician Orders     Discharge condition: Stable     Assessment of pain at discharge:MINIMAL     Anesthetic used: 4% lidocaine solution     Discharge to: Home     Left via:Private automobile     Accompanied by:  caregiver     ECF/HHA: [facility]     Dressing Orders: MEDIAL LEFT LEG ULCER-Cleanse with normal saline, apply plain alginate dry dressing, ada

## 2023-12-13 NOTE — DISCHARGE INSTRUCTIONS
Visit Discharge/Physician Orders     Discharge condition: Stable     Assessment of pain at discharge:MINIMAL     Anesthetic used: 4% lidocaine solution     Discharge to: Home     Left via:Private automobile     Accompanied by:  caregiver     ECF/HHA: [facility]     Dressing Orders: MEDIAL LEFT LEG ULCER-Cleanse with normal saline, apply plain alginate dry dressing, profore lite. Change dressing every [facility] AND FRIDAY     Elevate legs as much as possible above level of the heart. Treatment Orders:Eat a diet high in protein and vitamin C. Take a multiple vitamin daily unless contraindicated. To decide on if she wants ablation in future w/PK-not at this time call PK if you decide      [address] [place] [address] followup visit :  3 weeks Trice(Friday)__________________________  (Please note your next appointment above and if you are unable to keep, kindly give a 24 hour notice. Thank you.)     Physician signature:__________________________      If you experience any of the following, please call the [facility] during business hours:     Increase in Pain  * Temperature over 101  * Increase in drainage from your wound  * Drainage with a foul odor  * Bleeding  * Increase in swelling  * Need for compression bandage changes due to slippage, breakthrough drainage. If you need medical attention outside of the business hours of the [facility] please contact your PCP or go to the nearest emergency room.

## 2023-12-15 ENCOUNTER — HOSPITAL ENCOUNTER (OUTPATIENT)
Dept: WOUND CARE | Age: 83
Discharge: HOME | End: 2023-12-15
Payer: MEDICARE

## 2023-12-15 VITALS
DIASTOLIC BLOOD PRESSURE: 80 MMHG | HEART RATE: 83 BPM | SYSTOLIC BLOOD PRESSURE: 166 MMHG | TEMPERATURE: 96.7 F | RESPIRATION RATE: 17 BRPM

## 2023-12-15 DIAGNOSIS — L97.322 VENOUS STASIS ULCER OF LEFT ANKLE WITH FAT LAYER EXPOSED WITHOUT VARICOSE VEINS (HCC): Primary | ICD-10-CM

## 2023-12-15 DIAGNOSIS — I73.9 PVD (PERIPHERAL VASCULAR DISEASE) (HCC): ICD-10-CM

## 2023-12-15 DIAGNOSIS — Z86.79 HISTORY OF VASCULAR DISEASE: ICD-10-CM

## 2023-12-15 DIAGNOSIS — R60.0 BILATERAL LOWER EXTREMITY EDEMA: ICD-10-CM

## 2023-12-15 DIAGNOSIS — I87.2 VENOUS STASIS ULCER OF LEFT ANKLE WITH FAT LAYER EXPOSED WITHOUT VARICOSE VEINS (HCC): Primary | ICD-10-CM

## 2023-12-15 DIAGNOSIS — M77.32 CALCANEAL SPUR OF FOOT, LEFT: ICD-10-CM

## 2023-12-15 PROBLEM — M79.672 PAIN OF LEFT HEEL: Status: RESOLVED | Noted: 2023-10-13 | Resolved: 2023-12-15

## 2023-12-15 PROCEDURE — 11042 DBRDMT SUBQ TIS 1ST 20SQCM/<: CPT

## 2023-12-15 RX ORDER — LIDOCAINE 40 MG/G
CREAM TOPICAL ONCE
OUTPATIENT
Start: 2023-12-15 | End: 2023-12-15

## 2023-12-15 RX ORDER — GENTAMICIN SULFATE 1 MG/G
OINTMENT TOPICAL ONCE
OUTPATIENT
Start: 2023-12-15 | End: 2023-12-15

## 2023-12-15 RX ORDER — IBUPROFEN 200 MG
TABLET ORAL ONCE
OUTPATIENT
Start: 2023-12-15 | End: 2023-12-15

## 2023-12-15 RX ORDER — BETAMETHASONE DIPROPIONATE 0.05 %
OINTMENT (GRAM) TOPICAL ONCE
OUTPATIENT
Start: 2023-12-15 | End: 2023-12-15

## 2023-12-15 RX ORDER — LIDOCAINE 50 MG/G
OINTMENT TOPICAL ONCE
OUTPATIENT
Start: 2023-12-15 | End: 2023-12-15

## 2023-12-15 RX ORDER — GINSENG 100 MG
CAPSULE ORAL ONCE
OUTPATIENT
Start: 2023-12-15 | End: 2023-12-15

## 2023-12-15 RX ORDER — BACITRACIN ZINC AND POLYMYXIN B SULFATE 500; 1000 [USP'U]/G; [USP'U]/G
OINTMENT TOPICAL ONCE
OUTPATIENT
Start: 2023-12-15 | End: 2023-12-15

## 2023-12-15 RX ORDER — LIDOCAINE HYDROCHLORIDE 40 MG/ML
SOLUTION TOPICAL ONCE
OUTPATIENT
Start: 2023-12-15 | End: 2023-12-15

## 2023-12-15 RX ORDER — TRIAMCINOLONE ACETONIDE 1 MG/G
OINTMENT TOPICAL ONCE
OUTPATIENT
Start: 2023-12-15 | End: 2023-12-15

## 2023-12-15 RX ORDER — LIDOCAINE HYDROCHLORIDE 20 MG/ML
JELLY TOPICAL ONCE
OUTPATIENT
Start: 2023-12-15 | End: 2023-12-15

## 2023-12-15 RX ORDER — CLOBETASOL PROPIONATE 0.5 MG/G
OINTMENT TOPICAL ONCE
OUTPATIENT
Start: 2023-12-15 | End: 2023-12-15

## 2023-12-15 RX ORDER — LIDOCAINE HYDROCHLORIDE 40 MG/ML
SOLUTION TOPICAL ONCE
Status: COMPLETED | OUTPATIENT
Start: 2023-12-15 | End: 2023-12-15

## 2023-12-15 RX ADMIN — LIDOCAINE HYDROCHLORIDE: 40 SOLUTION TOPICAL at 13:30

## 2023-12-15 NOTE — PROGRESS NOTES
12/15/23 1323   Odor None 12/15/23 1323   Patrizia-wound Assessment Maceration 12/15/23 1323   Margins Attached edges 07/12/23 1129   Wound Thickness Description not for Pressure Injury Full thickness 07/12/23 1129   Number of days: 427           Procedure Note  Indications:  Based on my examination of this patient's wound(s)/ulcer(s) today, debridement is required to promote healing and evaluate the wound base. Performed by: Duane Bland, APRN - CNP    Consent obtained:  Yes    Time out taken:  Yes    Pain Control: Anesthetic  Anesthetic: 4% Lidocaine Liquid Topical     Debridement:Excisional Debridement    Using curette the wound(s)/ulcer(s) was/were sharply debrided down through and including the removal of subcutaneous tissue. Devitalized Tissue Debrided:  fibrin, biofilm, and slough to stimulate bleeding to promote healing, post debridement good bleeding base and wound edges noted    Wound/Ulcer #: 1    Percent of Wound/Ulcer Debrided: 100%    Total Surface Area Debrided:  15.75 sq cm     Estimated Blood Loss:  Minimal  Hemostasis Achieved:  by pressure    Procedural Pain:  3  / 10   Post Procedural Pain:  3 / 10     Response to treatment:  Well tolerated by patient. Plan:     Pt has never been a smoker   - Discussed relationship of smoking and negative affects on wound healing   - Emphasized importance of tobacco avoidace/cessation     In my professional opinion and based off the information that is available at this time this patient has appropriate indication for HBO Therapy: No    Treatment Note please see attached Discharge Instructions    Written patient dismissal instructions given to patient and signed by patient or POA.          Discharge Instructions         Visit Discharge/Physician Orders     Discharge condition: Stable     Assessment of pain at discharge:MINIMAL     Anesthetic used: 4% lidocaine solution     Discharge to: Home     Left via:Private automobile     Accompanied by:

## 2024-01-03 NOTE — DISCHARGE INSTRUCTIONS
Visit Discharge/Physician Orders     Discharge condition: Stable     Assessment of pain at discharge:MINIMAL     Anesthetic used: 4% lidocaine solution     Discharge to: Home     Left via:Private automobile     Accompanied by:  caregiver     ECF/HHA: [facility] *do not need to come on days of patients [facility] appt     Dressing Orders: MEDIAL LEFT LEG ULCER-Cleanse with normal saline, apply plain alginate dry dressing, profore lite. Change dressing every MONDAY WEDNESDAY AND FRIDAY     Elevate legs as much as possible above level of the heart.      Treatment Orders:Eat a diet high in protein and vitamin C. Take a multiple vitamin daily unless contraindicated.      To decide on if she wants ablation in future w/PK-not at this time call PK if you decide     C&S taken 1/5/24     [facility] followup visit :  2 weeks Trice(Friday)__________________________  (Please note your next appointment above and if you are unable to keep, kindly give a 24 hour notice. Thank you.)     Physician signature:__________________________      If you experience any of the following, please call the Wound Care Center during business hours:     Increase in Pain  * Temperature over 101  * Increase in drainage from your wound  * Drainage with a foul odor  * Bleeding  * Increase in swelling  * Need for compression bandage changes due to slippage, breakthrough drainage.     If you need medical attention outside of the business hours of the W[facility] Care Centers please contact your PCP or go to the nearest emergency room.

## 2024-01-05 ENCOUNTER — HOSPITAL ENCOUNTER (OUTPATIENT)
Dept: WOUND CARE | Age: 84
Discharge: HOME | End: 2024-01-05
Payer: MEDICARE

## 2024-01-05 VITALS
TEMPERATURE: 97 F | BODY MASS INDEX: 28.35 KG/M2 | RESPIRATION RATE: 16 BRPM | HEART RATE: 72 BPM | DIASTOLIC BLOOD PRESSURE: 72 MMHG | WEIGHT: 160 LBS | SYSTOLIC BLOOD PRESSURE: 134 MMHG | HEIGHT: 63 IN

## 2024-01-05 DIAGNOSIS — I87.2 VENOUS STASIS ULCER OF LEFT ANKLE WITH FAT LAYER EXPOSED WITHOUT VARICOSE VEINS (HCC): Primary | ICD-10-CM

## 2024-01-05 DIAGNOSIS — I73.9 PVD (PERIPHERAL VASCULAR DISEASE) (HCC): ICD-10-CM

## 2024-01-05 DIAGNOSIS — Z79.01 CHRONIC ANTICOAGULATION: ICD-10-CM

## 2024-01-05 DIAGNOSIS — L97.322 VENOUS STASIS ULCER OF LEFT ANKLE WITH FAT LAYER EXPOSED WITHOUT VARICOSE VEINS (HCC): Primary | ICD-10-CM

## 2024-01-05 DIAGNOSIS — Z86.79 HISTORY OF VASCULAR DISEASE: ICD-10-CM

## 2024-01-05 DIAGNOSIS — R60.0 BILATERAL LOWER EXTREMITY EDEMA: ICD-10-CM

## 2024-01-05 PROCEDURE — 86403 PARTICLE AGGLUT ANTBDY SCRN: CPT

## 2024-01-05 PROCEDURE — 87070 CULTURE OTHR SPECIMN AEROBIC: CPT

## 2024-01-05 PROCEDURE — 87205 SMEAR GRAM STAIN: CPT

## 2024-01-05 PROCEDURE — 11042 DBRDMT SUBQ TIS 1ST 20SQCM/<: CPT

## 2024-01-05 RX ORDER — LIDOCAINE 40 MG/G
CREAM TOPICAL ONCE
OUTPATIENT
Start: 2024-01-05 | End: 2024-01-05

## 2024-01-05 RX ORDER — LIDOCAINE 50 MG/G
OINTMENT TOPICAL ONCE
OUTPATIENT
Start: 2024-01-05 | End: 2024-01-05

## 2024-01-05 RX ORDER — IBUPROFEN 200 MG
TABLET ORAL ONCE
OUTPATIENT
Start: 2024-01-05 | End: 2024-01-05

## 2024-01-05 RX ORDER — BETAMETHASONE DIPROPIONATE 0.05 %
OINTMENT (GRAM) TOPICAL ONCE
OUTPATIENT
Start: 2024-01-05 | End: 2024-01-05

## 2024-01-05 RX ORDER — LIDOCAINE HYDROCHLORIDE 40 MG/ML
SOLUTION TOPICAL ONCE
OUTPATIENT
Start: 2024-01-05 | End: 2024-01-05

## 2024-01-05 RX ORDER — LIDOCAINE HYDROCHLORIDE 20 MG/ML
JELLY TOPICAL ONCE
OUTPATIENT
Start: 2024-01-05 | End: 2024-01-05

## 2024-01-05 RX ORDER — TRIAMCINOLONE ACETONIDE 1 MG/G
OINTMENT TOPICAL ONCE
OUTPATIENT
Start: 2024-01-05 | End: 2024-01-05

## 2024-01-05 RX ORDER — CLOBETASOL PROPIONATE 0.5 MG/G
OINTMENT TOPICAL ONCE
OUTPATIENT
Start: 2024-01-05 | End: 2024-01-05

## 2024-01-05 RX ORDER — GINSENG 100 MG
CAPSULE ORAL ONCE
OUTPATIENT
Start: 2024-01-05 | End: 2024-01-05

## 2024-01-05 RX ORDER — LIDOCAINE HYDROCHLORIDE 40 MG/ML
SOLUTION TOPICAL ONCE
Status: COMPLETED | OUTPATIENT
Start: 2024-01-05 | End: 2024-01-05

## 2024-01-05 RX ORDER — GENTAMICIN SULFATE 1 MG/G
OINTMENT TOPICAL ONCE
OUTPATIENT
Start: 2024-01-05 | End: 2024-01-05

## 2024-01-05 RX ORDER — BACITRACIN ZINC AND POLYMYXIN B SULFATE 500; 1000 [USP'U]/G; [USP'U]/G
OINTMENT TOPICAL ONCE
OUTPATIENT
Start: 2024-01-05 | End: 2024-01-05

## 2024-01-05 RX ADMIN — LIDOCAINE HYDROCHLORIDE 5 ML: 40 SOLUTION TOPICAL at 13:39

## 2024-01-05 NOTE — PLAN OF CARE
Problem: Cognitive:  Goal: Knowledge of wound care  Description: Knowledge of wound care  Outcome: Progressing  Goal: Understands risk factors for wounds  Description: Understands risk factors for wounds  Outcome: Progressing     Problem: Wound:  Goal: Will show signs of wound healing; wound closure and no evidence of infection  Description: Will show signs of wound healing; wound closure and no evidence of infection  Outcome: Progressing     Problem: Compression therapy:  Goal: Will be free from complications associated with compression therapy  Description: Will be free from complications associated with compression therapy  Outcome: Adequate for Discharge

## 2024-01-07 LAB
MICROORGANISM SPEC CULT: ABNORMAL
MICROORGANISM SPEC CULT: ABNORMAL
MICROORGANISM/AGENT SPEC: ABNORMAL
SPECIMEN DESCRIPTION: ABNORMAL

## 2024-01-17 NOTE — DISCHARGE INSTRUCTIONS
Visit Discharge/Physician Orders     Discharge condition: Stable     Assessment of pain at discharge:MINIMAL     Anesthetic used: 4% lidocaine solution     Discharge to: Home     Left via:Private automobile     Accompanied by:  caregiver     ECF/HHA: [facility] *do not need to come on days of patients [facility] appt     Dressing Orders: MEDIAL LEFT LEG ULCER-Cleanse with normal saline, apply plain alginate dry dressing, profore lite. Change dressing every MONDAY WEDNESDAY AND FRIDAY     Elevate legs as much as possible above level of the heart.      Treatment Orders:Eat a diet high in protein and vitamin C. Take a multiple vitamin daily unless contraindicated.      To decide on if she wants ablation in future w/PK-not at this time call PK if you decide      C&S taken 1/5/24     [facility] followup visit :  2 weeks Trice(Friday)__________________________  (Please note your next appointment above and if you are unable to keep, kindly give a 24 hour notice. Thank you.)     Physician signature:__________________________      If you experience any of the following, please call the Wound Care Center during business hours:     Increase in Pain  * Temperature over 101  * Increase in drainage from your wound  * Drainage with a foul odor  * Bleeding  * Increase in swelling  * Need for compression bandage changes due to slippage, breakthrough drainage.     If you need medical attention outside of the business hours of the Wound Care Centers please contact your PCP or go to the nearest emergency room.

## 2024-01-19 ENCOUNTER — HOSPITAL ENCOUNTER (OUTPATIENT)
Dept: WOUND CARE | Age: 84
Discharge: HOME | End: 2024-01-19

## 2024-01-26 ENCOUNTER — HOSPITAL ENCOUNTER (OUTPATIENT)
Dept: WOUND CARE | Age: 84
Discharge: HOME | End: 2024-01-26
Payer: MEDICARE

## 2024-01-26 VITALS
RESPIRATION RATE: 16 BRPM | DIASTOLIC BLOOD PRESSURE: 82 MMHG | SYSTOLIC BLOOD PRESSURE: 160 MMHG | HEART RATE: 76 BPM | TEMPERATURE: 97.5 F

## 2024-01-26 DIAGNOSIS — R60.0 BILATERAL LOWER EXTREMITY EDEMA: ICD-10-CM

## 2024-01-26 DIAGNOSIS — L97.322 VENOUS STASIS ULCER OF LEFT ANKLE WITH FAT LAYER EXPOSED WITHOUT VARICOSE VEINS (HCC): Primary | ICD-10-CM

## 2024-01-26 DIAGNOSIS — Z86.79 HISTORY OF VASCULAR DISEASE: ICD-10-CM

## 2024-01-26 DIAGNOSIS — I87.2 VENOUS STASIS ULCER OF LEFT ANKLE WITH FAT LAYER EXPOSED WITHOUT VARICOSE VEINS (HCC): Primary | ICD-10-CM

## 2024-01-26 DIAGNOSIS — I73.9 PVD (PERIPHERAL VASCULAR DISEASE) (HCC): ICD-10-CM

## 2024-01-26 DIAGNOSIS — Z86.718 HISTORY OF DVT (DEEP VEIN THROMBOSIS): ICD-10-CM

## 2024-01-26 DIAGNOSIS — M77.32 CALCANEAL SPUR OF FOOT, LEFT: ICD-10-CM

## 2024-01-26 DIAGNOSIS — Z79.01 CHRONIC ANTICOAGULATION: ICD-10-CM

## 2024-01-26 PROCEDURE — 11042 DBRDMT SUBQ TIS 1ST 20SQCM/<: CPT

## 2024-01-26 RX ORDER — BACITRACIN ZINC AND POLYMYXIN B SULFATE 500; 1000 [USP'U]/G; [USP'U]/G
OINTMENT TOPICAL ONCE
OUTPATIENT
Start: 2024-01-26 | End: 2024-01-26

## 2024-01-26 RX ORDER — LIDOCAINE 50 MG/G
OINTMENT TOPICAL ONCE
OUTPATIENT
Start: 2024-01-26 | End: 2024-01-26

## 2024-01-26 RX ORDER — IBUPROFEN 200 MG
TABLET ORAL ONCE
OUTPATIENT
Start: 2024-01-26 | End: 2024-01-26

## 2024-01-26 RX ORDER — LIDOCAINE HYDROCHLORIDE 20 MG/ML
JELLY TOPICAL ONCE
OUTPATIENT
Start: 2024-01-26 | End: 2024-01-26

## 2024-01-26 RX ORDER — LIDOCAINE HYDROCHLORIDE 40 MG/ML
SOLUTION TOPICAL ONCE
Status: COMPLETED | OUTPATIENT
Start: 2024-01-26 | End: 2024-01-26

## 2024-01-26 RX ORDER — LIDOCAINE HYDROCHLORIDE 40 MG/ML
SOLUTION TOPICAL ONCE
OUTPATIENT
Start: 2024-01-26 | End: 2024-01-26

## 2024-01-26 RX ORDER — CLOBETASOL PROPIONATE 0.5 MG/G
OINTMENT TOPICAL ONCE
OUTPATIENT
Start: 2024-01-26 | End: 2024-01-26

## 2024-01-26 RX ORDER — GENTAMICIN SULFATE 1 MG/G
OINTMENT TOPICAL ONCE
OUTPATIENT
Start: 2024-01-26 | End: 2024-01-26

## 2024-01-26 RX ORDER — BETAMETHASONE DIPROPIONATE 0.05 %
OINTMENT (GRAM) TOPICAL ONCE
OUTPATIENT
Start: 2024-01-26 | End: 2024-01-26

## 2024-01-26 RX ORDER — LIDOCAINE 40 MG/G
CREAM TOPICAL ONCE
OUTPATIENT
Start: 2024-01-26 | End: 2024-01-26

## 2024-01-26 RX ORDER — TRIAMCINOLONE ACETONIDE 1 MG/G
OINTMENT TOPICAL ONCE
OUTPATIENT
Start: 2024-01-26 | End: 2024-01-26

## 2024-01-26 RX ORDER — GINSENG 100 MG
CAPSULE ORAL ONCE
OUTPATIENT
Start: 2024-01-26 | End: 2024-01-26

## 2024-01-26 RX ADMIN — LIDOCAINE HYDROCHLORIDE: 40 SOLUTION TOPICAL at 14:06

## 2024-01-26 NOTE — DISCHARGE INSTRUCTIONS
Visit Discharge/Physician Orders     Discharge condition: Stable     Assessment of pain at discharge:MINIMAL     Anesthetic used: 4% lidocaine solution     Discharge to: Home     Left via:Private automobile     Accompanied by:  caregiver     ECF/HHA: [facility] *do not need to come on days of patients [facility] appt     Dressing Orders: MEDIAL LEFT LEG ULCER-Cleanse with normal saline, apply plain alginate, dry dressing, profore lite. Change dressing every MONDAY WEDNESDAY AND FRIDAY     Elevate legs as much as possible above level of the heart.      Treatment Orders:Eat a diet high in protein and vitamin C. Take a multiple vitamin daily unless contraindicated.      To decide on if she wants ablation in future w/PK-not at this time call PK if you decide      [facility] followup visit :  2 weeks Trice(Friday)__________________________  (Please note your next appointment above and if you are unable to keep, kindly give a 24 hour notice. Thank you.)     Physician signature:__________________________      If you experience any of the following, please call the Wound Care Center during business hours:     Increase in Pain  * Temperature over 101  * Increase in drainage from your wound  * Drainage with a foul odor  * Bleeding  * Increase in swelling  * Need for compression bandage changes due to slippage, breakthrough drainage.     If you need medical attention outside of the business hours of the Wound Care Centers please contact your PCP or go to the nearest emergency room.

## 2024-01-26 NOTE — PROGRESS NOTES
Wound Healing Center  History and Physical/Consultation  Podiatry    Referring Physician : Klarissa Davidson DO  Gabi Madrigal  MEDICAL RECORD NUMBER:  [id number]  AGE: 83 y.o.   GENDER: female  : 1940  EPISODE DATE:  2024  Subjective:     Chief Complaint   Patient presents with    Wound Check         HISTORY of PRESENT ILLNESS HPI     Gabi Madrigal is a 83 y.o. female who presents today for wound/ulcer evaluation.   History of Wound Context:  The patient has had a wounds of medial, lateral, posterior left leg and left heel which was first noted approximately 2022  This has not been treated. On their initial visit to the wound healing center, 10/14/2022,  the patient has noted that the wound has not been improving.  The patient has had similar previous wounds in the past.      Pt is not on abx at time of initial visit.    10/14/22  Patient presents with left leg erythema, pruritis and dry scaly skin to left leg   Eschar present on left heel   Patient is not diabetic  Patient has a history of peripheral vascular disease - taking Eliquis  Vascular studies ordered   Wounds debrided   Aquacel and Spandagrip, Lamisil to surrounding inflamed tissue     10/21/22  Wounds overall improving   Wounds debrided   Doxy 100mg BIB [id number] prescribed   Continue Aquacel and Spandagrip, Lamisil to surrounding inflamed tissue     10/28/22  Wounds overall appearance improving, decreased erythema   Left heel wound healed   Wounds not debrided today  Gentamycin cream prescribed   Continue Aquacel and Spandagrip, Lamisil to surrounding inflamed tissue   Consult to Dr. Sasha BARKLEY       22  Wounds overall appearance improving   Wounds debrided   Continue Gent, Aquacel and Spandagrip, Lamisil to surrounding inflamed tissue     22  Wounds improving   Wounds debrided   Continue Gent, Aquacel and Spandagrip, Lamisil to surrounding inflamed tissue     23  Wounds measuring slightly larger, increased drainage

## 2024-02-06 NOTE — DISCHARGE INSTRUCTIONS
Visit Discharge/Physician Orders     Discharge condition: Stable     Assessment of pain at discharge:MINIMAL     Anesthetic used: 4% lidocaine solution     Discharge to: Home     Left via:Private automobile     Accompanied by:  caregiver     ECF/HHA: [facility] *do not need to come on days of patients [facility] appt     Dressing Orders: MEDIAL LEFT LEG ULCER-Cleanse with normal saline, apply plain alginate, dry dressing, profore lite. Change dressing every MONDAY WEDNESDAY AND FRIDAY     Elevate legs as much as possible above level of the heart.      Treatment Orders:Eat a diet high in protein and vitamin C. Take a multiple vitamin daily unless contraindicated.      To decide on if she wants ablation in future w/PK-not at this time call PK if you decide     2/9 culture taken- will review next week in clinic     [facility] followup visit :  _______2 weeks Trice(Friday)__________________________  (Please note your next appointment above and if you are unable to keep, kindly give a 24 hour notice. Thank you.)     Physician signature:__________________________      If you experience any of the following, please call the Wound Care Center during business hours:     Increase in Pain  * Temperature over 101  * Increase in drainage from your wound  * Drainage with a foul odor  * Bleeding  * Increase in swelling  * Need for compression bandage changes due to slippage, breakthrough drainage.     If you need medical attention outside of the business hours of the Wound Care Centers please contact your PCP or go to the nearest emergency room.

## 2024-02-09 ENCOUNTER — HOSPITAL ENCOUNTER (OUTPATIENT)
Dept: WOUND CARE | Age: 84
Discharge: HOME | End: 2024-02-09
Payer: MEDICARE

## 2024-02-09 VITALS
RESPIRATION RATE: 16 BRPM | TEMPERATURE: 98.2 F | HEART RATE: 74 BPM | DIASTOLIC BLOOD PRESSURE: 75 MMHG | SYSTOLIC BLOOD PRESSURE: 147 MMHG

## 2024-02-09 DIAGNOSIS — L97.322 VENOUS STASIS ULCER OF LEFT ANKLE WITH FAT LAYER EXPOSED WITHOUT VARICOSE VEINS (HCC): Primary | ICD-10-CM

## 2024-02-09 DIAGNOSIS — I73.9 PVD (PERIPHERAL VASCULAR DISEASE) (HCC): ICD-10-CM

## 2024-02-09 DIAGNOSIS — Z79.01 CHRONIC ANTICOAGULATION: ICD-10-CM

## 2024-02-09 DIAGNOSIS — I87.2 VENOUS STASIS ULCER OF LEFT ANKLE WITH FAT LAYER EXPOSED WITHOUT VARICOSE VEINS (HCC): Primary | ICD-10-CM

## 2024-02-09 DIAGNOSIS — Z86.79 HISTORY OF VASCULAR DISEASE: ICD-10-CM

## 2024-02-09 DIAGNOSIS — R60.0 BILATERAL LOWER EXTREMITY EDEMA: ICD-10-CM

## 2024-02-09 PROCEDURE — 86403 PARTICLE AGGLUT ANTBDY SCRN: CPT

## 2024-02-09 PROCEDURE — 87070 CULTURE OTHR SPECIMN AEROBIC: CPT

## 2024-02-09 PROCEDURE — 87205 SMEAR GRAM STAIN: CPT

## 2024-02-09 PROCEDURE — 11042 DBRDMT SUBQ TIS 1ST 20SQCM/<: CPT

## 2024-02-09 RX ORDER — BACITRACIN ZINC AND POLYMYXIN B SULFATE 500; 1000 [USP'U]/G; [USP'U]/G
OINTMENT TOPICAL ONCE
OUTPATIENT
Start: 2024-02-09 | End: 2024-02-09

## 2024-02-09 RX ORDER — GENTAMICIN SULFATE 1 MG/G
OINTMENT TOPICAL ONCE
OUTPATIENT
Start: 2024-02-09 | End: 2024-02-09

## 2024-02-09 RX ORDER — LIDOCAINE HYDROCHLORIDE 20 MG/ML
JELLY TOPICAL ONCE
OUTPATIENT
Start: 2024-02-09 | End: 2024-02-09

## 2024-02-09 RX ORDER — GINSENG 100 MG
CAPSULE ORAL ONCE
OUTPATIENT
Start: 2024-02-09 | End: 2024-02-09

## 2024-02-09 RX ORDER — BETAMETHASONE DIPROPIONATE 0.05 %
OINTMENT (GRAM) TOPICAL ONCE
OUTPATIENT
Start: 2024-02-09 | End: 2024-02-09

## 2024-02-09 RX ORDER — TRIAMCINOLONE ACETONIDE 1 MG/G
OINTMENT TOPICAL ONCE
OUTPATIENT
Start: 2024-02-09 | End: 2024-02-09

## 2024-02-09 RX ORDER — LIDOCAINE 50 MG/G
OINTMENT TOPICAL ONCE
OUTPATIENT
Start: 2024-02-09 | End: 2024-02-09

## 2024-02-09 RX ORDER — LIDOCAINE HYDROCHLORIDE 40 MG/ML
SOLUTION TOPICAL ONCE
Status: COMPLETED | OUTPATIENT
Start: 2024-02-09 | End: 2024-02-09

## 2024-02-09 RX ORDER — CLOBETASOL PROPIONATE 0.5 MG/G
OINTMENT TOPICAL ONCE
OUTPATIENT
Start: 2024-02-09 | End: 2024-02-09

## 2024-02-09 RX ORDER — LIDOCAINE 40 MG/G
CREAM TOPICAL ONCE
OUTPATIENT
Start: 2024-02-09 | End: 2024-02-09

## 2024-02-09 RX ORDER — IBUPROFEN 200 MG
TABLET ORAL ONCE
OUTPATIENT
Start: 2024-02-09 | End: 2024-02-09

## 2024-02-09 RX ORDER — LIDOCAINE HYDROCHLORIDE 40 MG/ML
SOLUTION TOPICAL ONCE
OUTPATIENT
Start: 2024-02-09 | End: 2024-02-09

## 2024-02-09 RX ADMIN — LIDOCAINE HYDROCHLORIDE 10 ML: 40 SOLUTION TOPICAL at 13:46

## 2024-02-09 NOTE — PLAN OF CARE
Problem: Cognitive:  Goal: Knowledge of wound care  Description: Knowledge of wound care  Outcome: Progressing     Problem: Wound:  Goal: Will show signs of wound healing; wound closure and no evidence of infection  Description: Will show signs of wound healing; wound closure and no evidence of infection  Outcome: Progressing     Problem: Compression therapy:  Goal: Will be free from complications associated with compression therapy  Description: Will be free from complications associated with compression therapy  Outcome: Progressing

## 2024-02-09 NOTE — PROGRESS NOTES
2/9/24  Wound measuring larger, appearance stable  Leg edema stable   Wound debrided   Culture obtained   Continue Aquacel, dry dressing and profore lite       Wound/Ulcer Pain Timing/Severity: none  Quality of pain: N/A  Severity:  0 / 10   Modifying Factors: None  Associated Signs/Symptoms: edema and erythema    Ulcer Identification:  Ulcer Type: venous and undetermined  Contributing Factors: edema, venous stasis, and lymphedema    Diabetic/Pressure/Non Pressure Ulcers onl y:  Ulcer: Non-Pressure ulcer, fat layer exposed    If patient has diabetic lower extremity wounds  Castrejon Classification of diabetic lower extremity wounds:    Grade Description   []  0 No open wound   []  1 Superficial ulcer involving the full skin thickness   []  2 Deep ulcer involves ligament, tendon, joint capsule, or fascia  No bone involvement or abscess presence   []  3 Deep Ulcer with abcess formation and/or osteomyelitis   []  4 Localized gangrene   []  5 Extensive gangrene of the foot     Wound: N/A        PAST MEDICAL HISTORY      Diagnosis Date    Acquired hypothyroidism 8/14/2017    Arthritis     Blood transfusion reaction     Chronic kidney disease     History of blood transfusion     Hypertension     Lymphedema of both lower extremities 9/6/2017    Open wound of left great toe 10/18/2017    Other disorders of kidney and ureter in diseases classified elsewhere     Pelvic fracture (HCC)     Positive culture findings in wound 5/2/2023    Skin ulcer of left calf with fat layer exposed (HCC) 9/6/2017    Skin ulcer of left calf, limited to breakdown of skin (HCC) 9/6/2017    Ulcer of left lower leg (HCC) 3/24/2014    Venous stasis ulcer of left calf limited to breakdown of skin (HCC) 3/24/2014     Past Surgical History:   Procedure Laterality Date    COLONOSCOPY      ENDOSCOPY, COLON, DIAGNOSTIC      EYE SURGERY      cateract and lazor surgery 2015    FRACTURE SURGERY  2010    RT HIP    HIP FRACTURE SURGERY Left 08/08/2014    ORIF

## 2024-02-22 NOTE — DISCHARGE INSTRUCTIONS
Visit Discharge/Physician Orders     Discharge condition: Stable     Assessment of pain at discharge:MINIMAL     Anesthetic used: 4% lidocaine solution     Discharge to: Home     Left via:Private automobile     Accompanied by:  caregiver     ECF/HHA: [facility]- Please fax with new orders     Dressing Orders: MEDIAL LEFT LEG ULCER-Cleanse with normal saline, apply GENTAMICIN CREAM, plain alginate, dry dressing. Change dressing daily and as needed. Apply bilateral spandagrips daily, on in the AM and off in the     PM. CALL IF INCREASED LOWER EXTREMITY SWELLING BEFORE NEXT APPOINTMENT     Elevate legs as much as possible above level of the heart.      Treatment Orders:Eat a diet high in protein and vitamin C. Take a multiple vitamin daily unless contraindicated.      To decide on if she wants ablation in future w/PK-not at this time call PK if you decide      2/9 culture taken- Oral Bactrim ordered, apply gentamicin cream daily with dressing changes      [facility] followup visit :  _______2 weeks Trice(Friday)__________________________  (Please note your next appointment above and if you are unable to keep, kindly give a 24 hour notice. Thank you.)     Physician signature:__________________________      If you experience any of the following, please call the Wound Care Center during business hours:     Increase in Pain  * Temperature over 101  * Increase in drainage from your wound  * Drainage with a foul odor  * Bleeding  * Increase in swelling  * Need for compression bandage changes due to slippage, breakthrough drainage.     If you need medical attention outside of the business hours of the Wound Care Centers please contact your PCP or go to the nearest emergency room.

## 2024-02-23 ENCOUNTER — HOSPITAL ENCOUNTER (OUTPATIENT)
Dept: WOUND CARE | Age: 84
Discharge: HOME | End: 2024-02-23
Payer: MEDICARE

## 2024-02-23 VITALS — DIASTOLIC BLOOD PRESSURE: 68 MMHG | RESPIRATION RATE: 18 BRPM | HEART RATE: 71 BPM | SYSTOLIC BLOOD PRESSURE: 147 MMHG

## 2024-02-23 DIAGNOSIS — Z79.01 CHRONIC ANTICOAGULATION: ICD-10-CM

## 2024-02-23 DIAGNOSIS — R89.5 POSITIVE CULTURE FINDINGS IN WOUND: ICD-10-CM

## 2024-02-23 DIAGNOSIS — I73.9 PVD (PERIPHERAL VASCULAR DISEASE) (HCC): ICD-10-CM

## 2024-02-23 DIAGNOSIS — I87.2 VENOUS STASIS ULCER OF LEFT ANKLE WITH FAT LAYER EXPOSED WITHOUT VARICOSE VEINS (HCC): Primary | ICD-10-CM

## 2024-02-23 DIAGNOSIS — M77.32 CALCANEAL SPUR OF FOOT, LEFT: ICD-10-CM

## 2024-02-23 DIAGNOSIS — Z86.79 HISTORY OF VASCULAR DISEASE: ICD-10-CM

## 2024-02-23 DIAGNOSIS — L97.322 VENOUS STASIS ULCER OF LEFT ANKLE WITH FAT LAYER EXPOSED WITHOUT VARICOSE VEINS (HCC): Primary | ICD-10-CM

## 2024-02-23 DIAGNOSIS — R60.0 BILATERAL LOWER EXTREMITY EDEMA: ICD-10-CM

## 2024-02-23 DIAGNOSIS — Z86.718 HISTORY OF DVT (DEEP VEIN THROMBOSIS): ICD-10-CM

## 2024-02-23 PROCEDURE — 11042 DBRDMT SUBQ TIS 1ST 20SQCM/<: CPT

## 2024-02-23 RX ORDER — GENTAMICIN SULFATE 1 MG/G
OINTMENT TOPICAL
Qty: 30 G | Refills: 2 | Status: SHIPPED | OUTPATIENT
Start: 2024-02-23 | End: 2024-03-01

## 2024-02-23 RX ORDER — LIDOCAINE 40 MG/G
CREAM TOPICAL ONCE
OUTPATIENT
Start: 2024-02-23 | End: 2024-02-23

## 2024-02-23 RX ORDER — LIDOCAINE HYDROCHLORIDE 40 MG/ML
SOLUTION TOPICAL ONCE
OUTPATIENT
Start: 2024-02-23 | End: 2024-02-23

## 2024-02-23 RX ORDER — CLOBETASOL PROPIONATE 0.5 MG/G
OINTMENT TOPICAL ONCE
OUTPATIENT
Start: 2024-02-23 | End: 2024-02-23

## 2024-02-23 RX ORDER — BACITRACIN ZINC AND POLYMYXIN B SULFATE 500; 1000 [USP'U]/G; [USP'U]/G
OINTMENT TOPICAL ONCE
OUTPATIENT
Start: 2024-02-23 | End: 2024-02-23

## 2024-02-23 RX ORDER — BETAMETHASONE DIPROPIONATE 0.05 %
OINTMENT (GRAM) TOPICAL ONCE
OUTPATIENT
Start: 2024-02-23 | End: 2024-02-23

## 2024-02-23 RX ORDER — LIDOCAINE HYDROCHLORIDE 20 MG/ML
JELLY TOPICAL ONCE
OUTPATIENT
Start: 2024-02-23 | End: 2024-02-23

## 2024-02-23 RX ORDER — GENTAMICIN SULFATE 1 MG/G
OINTMENT TOPICAL ONCE
OUTPATIENT
Start: 2024-02-23 | End: 2024-02-23

## 2024-02-23 RX ORDER — LIDOCAINE HYDROCHLORIDE 40 MG/ML
SOLUTION TOPICAL ONCE
Status: COMPLETED | OUTPATIENT
Start: 2024-02-23 | End: 2024-02-23

## 2024-02-23 RX ORDER — TRIAMCINOLONE ACETONIDE 1 MG/G
OINTMENT TOPICAL ONCE
OUTPATIENT
Start: 2024-02-23 | End: 2024-02-23

## 2024-02-23 RX ORDER — LIDOCAINE 50 MG/G
OINTMENT TOPICAL ONCE
OUTPATIENT
Start: 2024-02-23 | End: 2024-02-23

## 2024-02-23 RX ORDER — GINSENG 100 MG
CAPSULE ORAL ONCE
OUTPATIENT
Start: 2024-02-23 | End: 2024-02-23

## 2024-02-23 RX ORDER — IBUPROFEN 200 MG
TABLET ORAL ONCE
OUTPATIENT
Start: 2024-02-23 | End: 2024-02-23

## 2024-02-23 RX ORDER — SULFAMETHOXAZOLE AND TRIMETHOPRIM 800; 160 MG/1; MG/1
1 TABLET ORAL 2 TIMES DAILY
Qty: 14 TABLET | Refills: 0 | Status: SHIPPED | OUTPATIENT
Start: 2024-02-23 | End: 2024-03-01

## 2024-02-23 RX ADMIN — LIDOCAINE HYDROCHLORIDE: 40 SOLUTION TOPICAL at 13:41

## 2024-02-23 NOTE — PLAN OF CARE
Problem: Wound:  Goal: Will show signs of wound healing; wound closure and no evidence of infection  Description: Will show signs of wound healing; wound closure and no evidence of infection  Outcome: Progressing     Problem: Compression therapy:  Goal: Will be free from complications associated with compression therapy  Description: Will be free from complications associated with compression therapy  Outcome: Progressing

## 2024-02-23 NOTE — PROGRESS NOTES
smaller  Wound debrided   Patient to continue clindamycin as prescribed   Compression wrap rolled down causing proximal edema   Hold compression wrap this week   Promogram and spandagrip    10/13/23  Wound appearance continues to improve, measuring smaller  Patient reports left heel pain - firm nodule palpable   Wound debrided   Continue Promogram and spandagrip  Left foot x-ray ordered     10/27/23  Wound appearance continues to improve, measuring smaller  Left foot x-ray result: Calcaneal spurring  Patient reports improvement in pain at today's visit   Wound debrided   Continue Promogram and spandagrip  Prevent pressure/friction to left heel   Discussed possible fitting for shoes for heel spur once wound is heeled     11/10/23  Wound appearance continues to improve, overall measuring smaller  Wound debrided   Continue Promogram and spandagrip    11/17/23  Wound appearance continues to improve, overall measuring smaller  Wound debrided   Continue Promogram and spandagrip    12/1/23  Wound measuring larger   Leg edema increased from 27cm to 29cm   Elevated blood pressure at today's visit (176/78)  Patient states she was doing more walking/standing due to the holiday and was not consistent with taking her diuretics or ensuring proper diet and hydration over the last week   Wound debrided   Aquacel, dry dressing and profore lite   Discussed importance of taking prescribed medication, rest and hydration with patient     12/15/23  Wound measuring smaller   Leg edema decreased from 29cm to 27.5cm   Wound debrided   Continue Aquacel, dry dressing and profore lite     1/5/23  Wound measuring smaller, appearance improving   Leg edema increased from 27.5cm to 28cm   Wound debrided   Culture obtained   Continue Aquacel, dry dressing and profore lite     1/26/24  Wound measuring smaller, appearance improving   Leg edema stable   Wound debrided   Culture obtained   Continue Aquacel, dry dressing and profore lite

## 2024-03-07 NOTE — DISCHARGE INSTRUCTIONS
Visit Discharge/Physician Orders     Discharge condition: Stable     Assessment of pain at discharge:MINIMAL     Anesthetic used: 4% lidocaine solution     Discharge to: Home     Left via:Private automobile     Accompanied by:  caregiver     ECF/HHA: [facility]- PLEASE NOTE NEW ORDERS     Dressing Orders: MEDIAL LEFT LEG ULCER-Cleanse with normal saline, apply GENTAMICIN OINTMENT, apply  plain COLLAGEN, secure with dry dressing. Change dressing daily and as needed. Apply bilateral spandagrips daily, on in the AM and off in the PM. CALL IF INCREASED LOWER EXTREMITY SWELLING BEFORE NEXT APPOINTMENT     Elevate legs as much as possible above level of the heart.      Treatment Orders:Eat a diet high in protein and vitamin C. Take a multiple vitamin daily unless contraindicated.      To decide on if she wants ablation in future w/PK-not at this time call PK if you decide      2/9 culture taken- Oral Bactrim ordered, apply gentamicin cream daily with dressing changes      [facility] followup visit :  _______2 weeks Trice(Friday)__________________________  (Please note your next appointment above and if you are unable to keep, kindly give a 24 hour notice. Thank you.)     Physician signature:__________________________      If you experience any of the following, please call the Wound Care Center during business hours:     Increase in Pain  * Temperature over 101  * Increase in drainage from your wound  * Drainage with a foul odor  * Bleeding  * Increase in swelling  * Need for compression bandage changes due to slippage, breakthrough drainage.     If you need medical attention outside of the business hours of the Wound Care Centers please contact your PCP or go to the nearest emergency room.

## 2024-03-08 ENCOUNTER — HOSPITAL ENCOUNTER (OUTPATIENT)
Dept: WOUND CARE | Age: 84
Discharge: HOME | End: 2024-03-08
Payer: MEDICARE

## 2024-03-08 VITALS
HEART RATE: 68 BPM | SYSTOLIC BLOOD PRESSURE: 140 MMHG | TEMPERATURE: 97.3 F | WEIGHT: 160 LBS | RESPIRATION RATE: 18 BRPM | DIASTOLIC BLOOD PRESSURE: 80 MMHG | HEIGHT: 63 IN | BODY MASS INDEX: 28.35 KG/M2

## 2024-03-08 DIAGNOSIS — Z79.01 CHRONIC ANTICOAGULATION: ICD-10-CM

## 2024-03-08 DIAGNOSIS — I73.9 PVD (PERIPHERAL VASCULAR DISEASE) (HCC): ICD-10-CM

## 2024-03-08 DIAGNOSIS — L97.322 VENOUS STASIS ULCER OF LEFT ANKLE WITH FAT LAYER EXPOSED WITHOUT VARICOSE VEINS (HCC): Primary | ICD-10-CM

## 2024-03-08 DIAGNOSIS — Z86.79 HISTORY OF VASCULAR DISEASE: ICD-10-CM

## 2024-03-08 DIAGNOSIS — I87.2 VENOUS STASIS ULCER OF LEFT ANKLE WITH FAT LAYER EXPOSED WITHOUT VARICOSE VEINS (HCC): Primary | ICD-10-CM

## 2024-03-08 DIAGNOSIS — Z86.718 HISTORY OF DVT (DEEP VEIN THROMBOSIS): ICD-10-CM

## 2024-03-08 PROCEDURE — 11042 DBRDMT SUBQ TIS 1ST 20SQCM/<: CPT

## 2024-03-08 RX ORDER — CLOBETASOL PROPIONATE 0.5 MG/G
OINTMENT TOPICAL ONCE
OUTPATIENT
Start: 2024-03-08 | End: 2024-03-08

## 2024-03-08 RX ORDER — LIDOCAINE 40 MG/G
CREAM TOPICAL ONCE
OUTPATIENT
Start: 2024-03-08 | End: 2024-03-08

## 2024-03-08 RX ORDER — LIDOCAINE HYDROCHLORIDE 40 MG/ML
SOLUTION TOPICAL ONCE
OUTPATIENT
Start: 2024-03-08 | End: 2024-03-08

## 2024-03-08 RX ORDER — LIDOCAINE HYDROCHLORIDE 20 MG/ML
JELLY TOPICAL ONCE
OUTPATIENT
Start: 2024-03-08 | End: 2024-03-08

## 2024-03-08 RX ORDER — LIDOCAINE HYDROCHLORIDE 40 MG/ML
SOLUTION TOPICAL ONCE
Status: COMPLETED | OUTPATIENT
Start: 2024-03-08 | End: 2024-03-08

## 2024-03-08 RX ORDER — TRIAMCINOLONE ACETONIDE 1 MG/G
OINTMENT TOPICAL ONCE
OUTPATIENT
Start: 2024-03-08 | End: 2024-03-08

## 2024-03-08 RX ORDER — GENTAMICIN SULFATE 1 MG/G
OINTMENT TOPICAL ONCE
OUTPATIENT
Start: 2024-03-08 | End: 2024-03-08

## 2024-03-08 RX ORDER — BACITRACIN ZINC AND POLYMYXIN B SULFATE 500; 1000 [USP'U]/G; [USP'U]/G
OINTMENT TOPICAL ONCE
OUTPATIENT
Start: 2024-03-08 | End: 2024-03-08

## 2024-03-08 RX ORDER — GINSENG 100 MG
CAPSULE ORAL ONCE
OUTPATIENT
Start: 2024-03-08 | End: 2024-03-08

## 2024-03-08 RX ORDER — IBUPROFEN 200 MG
TABLET ORAL ONCE
OUTPATIENT
Start: 2024-03-08 | End: 2024-03-08

## 2024-03-08 RX ORDER — LIDOCAINE 50 MG/G
OINTMENT TOPICAL ONCE
OUTPATIENT
Start: 2024-03-08 | End: 2024-03-08

## 2024-03-08 RX ORDER — BETAMETHASONE DIPROPIONATE 0.05 %
OINTMENT (GRAM) TOPICAL ONCE
OUTPATIENT
Start: 2024-03-08 | End: 2024-03-08

## 2024-03-08 RX ADMIN — LIDOCAINE HYDROCHLORIDE: 40 SOLUTION TOPICAL at 13:37

## 2024-03-08 NOTE — PROGRESS NOTES
the removal of subcutaneous tissue.        Devitalized Tissue Debrided:  fibrin, biofilm, and slough to stimulate bleeding to promote healing, post debridement good bleeding base and wound edges noted    Wound/Ulcer #: 1    Percent of Wound/Ulcer Debrided: 100%    Total Surface Area Debrided:  16.82 sq cm     Estimated Blood Loss:  Minimal  Hemostasis Achieved:  by pressure    Procedural Pain:  3  / 10   Post Procedural Pain:  3 / 10     Response to treatment:  Well tolerated by patient.     Plan:     Pt has never been a smoker   - Discussed relationship of smoking and negative affects on wound healing   - Emphasized importance of tobacco avoidace/cessation     In my professional opinion and based off the information that is available at this time this patient has appropriate indication for HBO Therapy: No    Treatment Note please see attached Discharge Instructions    Written patient dismissal instructions given to patient and signed by patient or POA.         Discharge Instructions         Visit Discharge/Physician Orders     Discharge condition: Stable     Assessment of pain at discharge:MINIMAL     Anesthetic used: 4% lidocaine solution     Discharge to: Home     Left via:Private automobile     Accompanied by:  caregiver     ECF/[facility]HA: [facility]- PLEASE NOTE NEW ORDERS     Dressing Orders: MEDIAL LEFT LEG ULCER-Cleanse with normal saline, apply GENTAMICIN OINTMENT, apply  plain COLLAGEN, secure with dry dressing. Change dressing daily and as needed. Apply bilateral spandagrips daily, on in the AM and off in the PM. CALL IF INCREASED LOWER EXTREMITY SWELLING BEFORE NEXT APPOINTMENT     Elevate legs as much as possible above level of the heart.      Treatment Orders:Eat a diet high in protein and vitamin C. Take a multiple vitamin daily unless contraindicated.      To decide on if she wants ablation in future w/PK-not at this time call PK if you decide      2/9 culture taken- Oral Bactrim ordered, apply

## 2024-03-22 ENCOUNTER — HOSPITAL ENCOUNTER (OUTPATIENT)
Dept: WOUND CARE | Age: 84
Discharge: HOME | End: 2024-03-22
Payer: MEDICARE

## 2024-03-22 VITALS
HEART RATE: 82 BPM | TEMPERATURE: 96.8 F | SYSTOLIC BLOOD PRESSURE: 144 MMHG | RESPIRATION RATE: 16 BRPM | DIASTOLIC BLOOD PRESSURE: 74 MMHG

## 2024-03-22 DIAGNOSIS — I87.2 VENOUS STASIS ULCER OF LEFT ANKLE WITH FAT LAYER EXPOSED WITHOUT VARICOSE VEINS (HCC): Primary | ICD-10-CM

## 2024-03-22 DIAGNOSIS — L97.322 VENOUS STASIS ULCER OF LEFT ANKLE WITH FAT LAYER EXPOSED WITHOUT VARICOSE VEINS (HCC): Primary | ICD-10-CM

## 2024-03-22 DIAGNOSIS — I73.9 PVD (PERIPHERAL VASCULAR DISEASE) (HCC): ICD-10-CM

## 2024-03-22 DIAGNOSIS — Z86.79 HISTORY OF VASCULAR DISEASE: ICD-10-CM

## 2024-03-22 DIAGNOSIS — Z79.01 CHRONIC ANTICOAGULATION: ICD-10-CM

## 2024-03-22 DIAGNOSIS — Z86.718 HISTORY OF DVT (DEEP VEIN THROMBOSIS): ICD-10-CM

## 2024-03-22 DIAGNOSIS — R60.0 BILATERAL LOWER EXTREMITY EDEMA: ICD-10-CM

## 2024-03-22 PROCEDURE — 11042 DBRDMT SUBQ TIS 1ST 20SQCM/<: CPT

## 2024-03-22 RX ORDER — GINSENG 100 MG
CAPSULE ORAL ONCE
OUTPATIENT
Start: 2024-03-22 | End: 2024-03-22

## 2024-03-22 RX ORDER — LIDOCAINE HYDROCHLORIDE 20 MG/ML
JELLY TOPICAL ONCE
OUTPATIENT
Start: 2024-03-22 | End: 2024-03-22

## 2024-03-22 RX ORDER — GENTAMICIN SULFATE 1 MG/G
OINTMENT TOPICAL ONCE
OUTPATIENT
Start: 2024-03-22 | End: 2024-03-22

## 2024-03-22 RX ORDER — LIDOCAINE 40 MG/G
CREAM TOPICAL ONCE
OUTPATIENT
Start: 2024-03-22 | End: 2024-03-22

## 2024-03-22 RX ORDER — LIDOCAINE HYDROCHLORIDE 40 MG/ML
SOLUTION TOPICAL ONCE
Status: COMPLETED | OUTPATIENT
Start: 2024-03-22 | End: 2024-03-22

## 2024-03-22 RX ORDER — TRIAMCINOLONE ACETONIDE 1 MG/G
OINTMENT TOPICAL ONCE
OUTPATIENT
Start: 2024-03-22 | End: 2024-03-22

## 2024-03-22 RX ORDER — BACITRACIN ZINC AND POLYMYXIN B SULFATE 500; 1000 [USP'U]/G; [USP'U]/G
OINTMENT TOPICAL ONCE
OUTPATIENT
Start: 2024-03-22 | End: 2024-03-22

## 2024-03-22 RX ORDER — LIDOCAINE HYDROCHLORIDE 40 MG/ML
SOLUTION TOPICAL ONCE
OUTPATIENT
Start: 2024-03-22 | End: 2024-03-22

## 2024-03-22 RX ORDER — CLOBETASOL PROPIONATE 0.5 MG/G
OINTMENT TOPICAL ONCE
OUTPATIENT
Start: 2024-03-22 | End: 2024-03-22

## 2024-03-22 RX ORDER — LIDOCAINE 50 MG/G
OINTMENT TOPICAL ONCE
OUTPATIENT
Start: 2024-03-22 | End: 2024-03-22

## 2024-03-22 RX ORDER — BETAMETHASONE DIPROPIONATE 0.05 %
OINTMENT (GRAM) TOPICAL ONCE
OUTPATIENT
Start: 2024-03-22 | End: 2024-03-22

## 2024-03-22 RX ORDER — IBUPROFEN 200 MG
TABLET ORAL ONCE
OUTPATIENT
Start: 2024-03-22 | End: 2024-03-22

## 2024-03-22 RX ADMIN — LIDOCAINE HYDROCHLORIDE 6 ML: 40 SOLUTION TOPICAL at 14:24

## 2024-03-22 ASSESSMENT — PAIN SCALES - GENERAL: PAINLEVEL_OUTOF10: 2

## 2024-03-22 ASSESSMENT — PAIN DESCRIPTION - LOCATION: LOCATION: LEG

## 2024-03-22 ASSESSMENT — PAIN DESCRIPTION - DESCRIPTORS: DESCRIPTORS: TINGLING

## 2024-03-22 ASSESSMENT — PAIN - FUNCTIONAL ASSESSMENT: PAIN_FUNCTIONAL_ASSESSMENT: ACTIVITIES ARE NOT PREVENTED

## 2024-03-22 ASSESSMENT — PAIN DESCRIPTION - ORIENTATION: ORIENTATION: LEFT

## 2024-03-22 NOTE — DISCHARGE INSTRUCTIONS
Visit Discharge/Physician Orders     Discharge condition: Stable     Assessment of pain at discharge:MINIMAL     Anesthetic used: 4% lidocaine solution     Discharge to: Home     Left via:Private automobile     Accompanied by:  caregiver     ECF/HHA: [facility]- PLEASE NOTE NEW ORDERS     Dressing Orders: MEDIAL LEFT LEG ULCER-Cleanse with normal saline, apply GENTAMICIN OINTMENT, apply  plain COLLAGEN, secure with dry dressing. Change dressing daily and as needed. Apply bilateral spandagrips daily, on in the AM and off in the PM. CALL IF INCREASED LOWER EXTREMITY SWELLING BEFORE NEXT APPOINTMENT     Elevate legs as much as possible above level of the heart.      Treatment Orders:Eat a diet high in protein and vitamin C. Take a multiple vitamin daily unless contraindicated.      To decide on if she wants ablation in future w/PK-not at this time call PK if you decide      2/9 culture taken- Oral Bactrim ordered, apply gentamicin cream daily with dressing changes      [facility] followup visit :  _______2 weeks Trice(Friday)__________________________  (Please note your next appointment above and if you are unable to keep, kindly give a 24 hour notice. Thank you.)     Physician signature:__________________________      If you experience any of the following, please call the Wound Care Center during business hours:     Increase in Pain  * Temperature over 101  * Increase in drainage from your wound  * Drainage with a foul odor  * Bleeding  * Increase in swelling  * Need for compression bandage changes due to slippage, breakthrough drainage.     If you need medical attention outside of the business hours of the Wound Care Centers please contact your PCP or go to the nearest emergency room.

## 2024-04-06 ENCOUNTER — APPOINTMENT (OUTPATIENT)
Dept: GENERAL RADIOLOGY | Age: 84
DRG: 556 | End: 2024-04-06
Payer: MEDICARE

## 2024-04-06 ENCOUNTER — APPOINTMENT (OUTPATIENT)
Dept: CT IMAGING | Age: 84
DRG: 556 | End: 2024-04-06
Payer: MEDICARE

## 2024-04-06 ENCOUNTER — APPOINTMENT (OUTPATIENT)
Dept: ULTRASOUND IMAGING | Age: 84
DRG: 556 | End: 2024-04-06
Payer: MEDICARE

## 2024-04-06 ENCOUNTER — HOSPITAL ENCOUNTER (EMERGENCY)
Age: 84
Discharge: HOME | DRG: 556 | End: 2024-04-06
Attending: EMERGENCY MEDICINE
Payer: MEDICARE

## 2024-04-06 VITALS
TEMPERATURE: 97.5 F | BODY MASS INDEX: 30 KG/M2 | RESPIRATION RATE: 14 BRPM | SYSTOLIC BLOOD PRESSURE: 171 MMHG | DIASTOLIC BLOOD PRESSURE: 100 MMHG | HEART RATE: 72 BPM | HEIGHT: 62 IN | WEIGHT: 163 LBS | OXYGEN SATURATION: 99 %

## 2024-04-06 DIAGNOSIS — M25.562 LEFT KNEE PAIN, UNSPECIFIED CHRONICITY: ICD-10-CM
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
## 2024-04-06 PROCEDURE — 73562 X-RAY EXAM OF KNEE 3: CPT

## 2024-04-06 PROCEDURE — 93971 EXTREMITY STUDY: CPT

## 2024-04-06 PROCEDURE — 99284 EMERGENCY DEPT VISIT MOD MDM: CPT

## 2024-04-06 PROCEDURE — 72131 CT LUMBAR SPINE W/O DYE: CPT

## 2024-04-06 NOTE — ED PROVIDER NOTES
[facility] EMERGENCY DEPARTMENT  EMERGENCY DEPARTMENT ENCOUNTER      Pt Name: Gabi Madrigal  MRN: [id number]  Birthdate 1940  Date of evaluation: 4/6/2024  Provider: Rayshawn Craig DO  PCP: Klarissa Davidson DO  Note Started: 1:05 PM EDT 4/6/24    CHIEF COMPLAINT       Chief Complaint   Patient presents with    Fall     Mechanical fall this am Knees gave out Pt landed on buttocks c/o coccyx pain       HISTORY OF PRESENT ILLNESS: 1 or more Elements   History From: Patient  Limitations to history : None    Gabi Madrigal is a 83 y.o. female who presents to the ED for evaluation of a fall.  Patient states that she has chronic left knee pain and states that she was walking with her walker and felt her left knee give out and landed on her buttocks and low back.  Patient states she never did hit her head nor did she pass out.  Patient has chronic swelling to her bilateral lower legs however does note that her swelling seems to be worse on her left leg for the past several days.  Patient has pain to both her left and right knee. Patient has no other reported migiating or worsenign factors.    Nursing Notes were all reviewed and agreed with or any disagreements were addressed in the HPI.    REVIEW OF SYSTEMS :    Positives and Pertinent negatives as per HPI.     SURGICAL HISTORY     Past Surgical History:   Procedure Laterality Date    COLONOSCOPY      ENDOSCOPY, COLON, DIAGNOSTIC      EYE SURGERY      cateract and lazor surgery 2015    FRACTURE SURGERY  2010    RT HIP    HIP FRACTURE SURGERY Left 08/08/2014    ORIF left hip    TONSILLECTOMY      as child    TOTAL HIP ARTHROPLASTY Left 10/17/2014    revision with removal of hardware       CURRENTMEDICATIONS       Discharge Medication List as of 4/6/2024  3:38 PM        CONTINUE these medications which have NOT CHANGED    Details   diclofenac sodium (VOLTAREN) 1 % GEL APPLY 2 TO 4 GRAMS EXTERNALLY TO THE AFFECTED AREA 3 TO 4  effusion.      Moderate advanced osteoarthritic changes in the medial compartment of the   right knee joint.         XR KNEE LEFT (3 VIEWS)   Final Result   No radiographic evidence for fracture.      RECOMMENDATION:   Recommend short-term follow-up radiographs in 7-10 days or cross-sectional   imaging (CT/MRI) if there is persistent concern for fracture or the symptoms   persist.           No results found.    No results found.    PROCEDURES   Unless otherwise noted below, none     CRITICAL CARE TIME (.cct)       PAST MEDICAL HISTORY/Chronic Conditions Affecting Care    has a past medical history of Acquired hypothyroidism (8/14/2017), Arthritis, Blood transfusion reaction, Chronic kidney disease, History of blood transfusion, Hypertension, Lymphedema of both lower extremities (9/6/2017), Open wound of left great toe (10/18/2017), Other disorders of kidney and ureter in diseases classified elsewhere, Pelvic fracture (HCC), Positive culture findings in wound (5/2/2023), Skin ulcer of left calf with fat layer exposed (HCC) (9/6/2017), Skin ulcer of left calf, limited to breakdown of skin (HCC) (9/6/2017), Ulcer of left lower leg (HCC) (3/24/2014), and Venous stasis ulcer of left calf limited to breakdown of skin (HCC) (3/24/2014).     EMERGENCY DEPARTMENT COURSE    Vitals:    Vitals:    04/06/24 1128   BP: (!) 171/100   Pulse: 72   Resp: 14   Temp: 97.5 °F (36.4 °C)   TempSrc: Oral   SpO2: 99%   Weight: 73.9 kg (163 lb)   Height: 1.575 m (5' 2\")       Patient was given the following medications:  Medications - No data to display          Medical Decision Making/Differential Diagnosis:    CC/HPI Summary, Social Determinants of health, Records Reviewed, DDx, testing done/not done, ED Course, Reassessment, disposition considerations/shared decision making with patient, consults, disposition:            Chronic Conditions:   Past Medical History:   Diagnosis Date    Acquired hypothyroidism 8/14/2017    Arthritis

## 2024-04-06 NOTE — ED NOTES
Department of Emergency Medicine  FIRST PROVIDER TRIAGE NOTE             Independent MLP           4/6/24  11:31 AM EDT    Date of Encounter: 4/6/24   MRN: [id number]      HPI: Gabi Madrigal is a 83 y.o. female who presents to the ED for No chief complaint on file.     Was walking with her wheeled walker when her and she has chronic knee problems and they gave out and she landed on her backside. Complaining of tail bone pain.  Denies any head strike, lightheadedness, dizziness or loss of conscious    ROS: Negative for cp or sob.    PE: Gen Appearance/Constitutional: alert  Musculoskeletal: moves all extremities x 4     Initial Plan of Care: All treatment areas with department are currently occupied. Plan to order/Initiate the following while awaiting opening in ED: imaging studies.  Initiate Treatment-Testing, Proceed toTreatment Area When Bed Available for ED Attending/MLP to Continue Care    Electronically signed by TRAVIS Chopra CNP   DD: 4/6/24       Nils Rivera APRN - CNP  04/06/24 1132

## 2024-04-07 ENCOUNTER — HOSPITAL ENCOUNTER (INPATIENT)
Age: 84
LOS: 2 days | Discharge: NURSING, REHAB OR LONG-TERM CARE | DRG: 556 | End: 2024-04-09
Attending: EMERGENCY MEDICINE | Admitting: INTERNAL MEDICINE
Payer: MEDICARE

## 2024-04-07 ENCOUNTER — APPOINTMENT (OUTPATIENT)
Dept: GENERAL RADIOLOGY | Age: 84
DRG: 556 | End: 2024-04-07
Payer: MEDICARE

## 2024-04-07 DIAGNOSIS — R53.1 GENERAL WEAKNESS: Primary | ICD-10-CM

## 2024-04-07 DIAGNOSIS — R29.6 FREQUENT FALLS: ICD-10-CM

## 2024-04-07 PROBLEM — R26.2 AMBULATORY DYSFUNCTION: Status: ACTIVE | Noted: 2024-04-07

## 2024-04-07 LAB
ALBUMIN SERPL-MCNC: 4 G/DL (ref 3.5–5.2)
ALP SERPL-CCNC: 113 U/L (ref 35–104)
ALT SERPL-CCNC: 21 U/L (ref 0–32)
ANION GAP SERPL CALCULATED.3IONS-SCNC: 12 MMOL/L (ref 7–16)
AST SERPL-CCNC: 25 U/L (ref 0–31)
BACTERIA URNS QL MICRO: ABNORMAL
BASOPHILS # BLD: 0.02 K/UL (ref 0–0.2)
BASOPHILS NFR BLD: 0 % (ref 0–2)
BILIRUB SERPL-MCNC: 0.5 MG/DL (ref 0–1.2)
BILIRUB UR QL STRIP: NEGATIVE
BUN SERPL-MCNC: 37 MG/DL (ref 6–23)
CALCIUM SERPL-MCNC: 9.3 MG/DL (ref 8.6–10.2)
CHLORIDE SERPL-SCNC: 106 MMOL/L (ref 98–107)
CLARITY UR: CLEAR
CO2 SERPL-SCNC: 28 MMOL/L (ref 22–29)
COLOR UR: YELLOW
CREAT SERPL-MCNC: 0.9 MG/DL (ref 0.5–1)
EOSINOPHIL # BLD: 0.11 K/UL (ref 0.05–0.5)
EOSINOPHILS RELATIVE PERCENT: 2 % (ref 0–6)
ERYTHROCYTE [DISTWIDTH] IN BLOOD BY AUTOMATED COUNT: 15.4 % (ref 11.5–15)
GFR SERPL CREATININE-BSD FRML MDRD: 61 ML/MIN/1.73M2
GLUCOSE SERPL-MCNC: 102 MG/DL (ref 74–99)
GLUCOSE UR STRIP-MCNC: NEGATIVE MG/DL
HCT VFR BLD AUTO: 37.5 % (ref 34–48)
HGB BLD-MCNC: 11.3 G/DL (ref 11.5–15.5)
HGB UR QL STRIP.AUTO: ABNORMAL
IMM GRANULOCYTES # BLD AUTO: <0.03 K/UL (ref 0–0.58)
IMM GRANULOCYTES NFR BLD: 0 % (ref 0–5)
KETONES UR STRIP-MCNC: NEGATIVE MG/DL
LEUKOCYTE ESTERASE UR QL STRIP: ABNORMAL
LYMPHOCYTES NFR BLD: 0.91 K/UL (ref 1.5–4)
LYMPHOCYTES RELATIVE PERCENT: 12 % (ref 20–42)
MCH RBC QN AUTO: 27 PG (ref 26–35)
MCHC RBC AUTO-ENTMCNC: 30.1 G/DL (ref 32–34.5)
MCV RBC AUTO: 89.7 FL (ref 80–99.9)
MONOCYTES NFR BLD: 0.6 K/UL (ref 0.1–0.95)
MONOCYTES NFR BLD: 8 % (ref 2–12)
NEUTROPHILS NFR BLD: 78 % (ref 43–80)
NEUTS SEG NFR BLD: 5.77 K/UL (ref 1.8–7.3)
NITRITE UR QL STRIP: NEGATIVE
PH UR STRIP: 6 [PH] (ref 5–9)
PLATELET # BLD AUTO: 203 K/UL (ref 130–450)
PMV BLD AUTO: 10.3 FL (ref 7–12)
POTASSIUM SERPL-SCNC: 4.4 MMOL/L (ref 3.5–5)
PROT SERPL-MCNC: 7.4 G/DL (ref 6.4–8.3)
PROT UR STRIP-MCNC: 30 MG/DL
RBC # BLD AUTO: 4.18 M/UL (ref 3.5–5.5)
RBC #/AREA URNS HPF: ABNORMAL /HPF
SODIUM SERPL-SCNC: 146 MMOL/L (ref 132–146)
SP GR UR STRIP: 1.01 (ref 1–1.03)
UROBILINOGEN UR STRIP-ACNC: 0.2 EU/DL (ref 0–1)
WBC #/AREA URNS HPF: ABNORMAL /HPF
WBC OTHER # BLD: 7.4 K/UL (ref 4.5–11.5)

## 2024-04-07 PROCEDURE — 1200000000 HC SEMI PRIVATE

## 2024-04-07 PROCEDURE — 6370000000 HC RX 637 (ALT 250 FOR IP): Performed by: FAMILY MEDICINE

## 2024-04-07 PROCEDURE — 87088 URINE BACTERIA CULTURE: CPT

## 2024-04-07 PROCEDURE — 81001 URINALYSIS AUTO W/SCOPE: CPT

## 2024-04-07 PROCEDURE — 73630 X-RAY EXAM OF FOOT: CPT

## 2024-04-07 PROCEDURE — 85025 COMPLETE CBC W/AUTO DIFF WBC: CPT

## 2024-04-07 PROCEDURE — 99285 EMERGENCY DEPT VISIT HI MDM: CPT

## 2024-04-07 PROCEDURE — 2580000003 HC RX 258: Performed by: FAMILY MEDICINE

## 2024-04-07 PROCEDURE — 87086 URINE CULTURE/COLONY COUNT: CPT

## 2024-04-07 PROCEDURE — 80053 COMPREHEN METABOLIC PANEL: CPT

## 2024-04-07 RX ORDER — POTASSIUM CHLORIDE 7.45 MG/ML
10 INJECTION INTRAVENOUS PRN
Status: DISCONTINUED | OUTPATIENT
Start: 2024-04-07 | End: 2024-04-09 | Stop reason: HOSPADM

## 2024-04-07 RX ORDER — LEVOTHYROXINE SODIUM 0.07 MG/1
75 TABLET ORAL DAILY
Status: DISCONTINUED | OUTPATIENT
Start: 2024-04-07 | End: 2024-04-09 | Stop reason: HOSPADM

## 2024-04-07 RX ORDER — POTASSIUM CHLORIDE 20 MEQ/1
40 TABLET, EXTENDED RELEASE ORAL PRN
Status: DISCONTINUED | OUTPATIENT
Start: 2024-04-07 | End: 2024-04-09 | Stop reason: HOSPADM

## 2024-04-07 RX ORDER — M-VIT,TX,IRON,MINS/CALC/FOLIC 27MG-0.4MG
1 TABLET ORAL DAILY
Status: DISCONTINUED | OUTPATIENT
Start: 2024-04-07 | End: 2024-04-09 | Stop reason: HOSPADM

## 2024-04-07 RX ORDER — BUMETANIDE 1 MG/1
1 TABLET ORAL DAILY
Status: DISCONTINUED | OUTPATIENT
Start: 2024-04-07 | End: 2024-04-09 | Stop reason: HOSPADM

## 2024-04-07 RX ORDER — LISINOPRIL 10 MG/1
10 TABLET ORAL DAILY
Status: DISCONTINUED | OUTPATIENT
Start: 2024-04-07 | End: 2024-04-09 | Stop reason: HOSPADM

## 2024-04-07 RX ORDER — MAGNESIUM SULFATE IN WATER 40 MG/ML
2000 INJECTION, SOLUTION INTRAVENOUS PRN
Status: DISCONTINUED | OUTPATIENT
Start: 2024-04-07 | End: 2024-04-09 | Stop reason: HOSPADM

## 2024-04-07 RX ORDER — ACETAMINOPHEN 325 MG/1
650 TABLET ORAL EVERY 4 HOURS PRN
Status: DISCONTINUED | OUTPATIENT
Start: 2024-04-07 | End: 2024-04-07 | Stop reason: ALTCHOICE

## 2024-04-07 RX ORDER — SODIUM CHLORIDE 0.9 % (FLUSH) 0.9 %
5-40 SYRINGE (ML) INJECTION EVERY 12 HOURS SCHEDULED
Status: DISCONTINUED | OUTPATIENT
Start: 2024-04-07 | End: 2024-04-09 | Stop reason: HOSPADM

## 2024-04-07 RX ORDER — ACETAMINOPHEN 325 MG/1
650 TABLET ORAL EVERY 6 HOURS PRN
Status: DISCONTINUED | OUTPATIENT
Start: 2024-04-07 | End: 2024-04-09 | Stop reason: HOSPADM

## 2024-04-07 RX ORDER — ONDANSETRON 4 MG/1
4 TABLET, ORALLY DISINTEGRATING ORAL EVERY 8 HOURS PRN
Status: DISCONTINUED | OUTPATIENT
Start: 2024-04-07 | End: 2024-04-09 | Stop reason: HOSPADM

## 2024-04-07 RX ORDER — ONDANSETRON 2 MG/ML
4 INJECTION INTRAMUSCULAR; INTRAVENOUS EVERY 6 HOURS PRN
Status: DISCONTINUED | OUTPATIENT
Start: 2024-04-07 | End: 2024-04-09 | Stop reason: HOSPADM

## 2024-04-07 RX ORDER — CLOBETASOL PROPIONATE 0.5 MG/G
CREAM TOPICAL 2 TIMES DAILY
Status: DISCONTINUED | OUTPATIENT
Start: 2024-04-07 | End: 2024-04-09 | Stop reason: HOSPADM

## 2024-04-07 RX ORDER — FERROUS SULFATE 325(65) MG
325 TABLET ORAL
Status: DISCONTINUED | OUTPATIENT
Start: 2024-04-08 | End: 2024-04-09 | Stop reason: HOSPADM

## 2024-04-07 RX ORDER — ASCORBIC ACID 500 MG
500 TABLET ORAL DAILY
Status: DISCONTINUED | OUTPATIENT
Start: 2024-04-07 | End: 2024-04-09 | Stop reason: HOSPADM

## 2024-04-07 RX ORDER — SODIUM CHLORIDE 0.9 % (FLUSH) 0.9 %
10 SYRINGE (ML) INJECTION PRN
Status: DISCONTINUED | OUTPATIENT
Start: 2024-04-07 | End: 2024-04-09 | Stop reason: HOSPADM

## 2024-04-07 RX ORDER — SODIUM CHLORIDE 9 MG/ML
INJECTION, SOLUTION INTRAVENOUS PRN
Status: DISCONTINUED | OUTPATIENT
Start: 2024-04-07 | End: 2024-04-09 | Stop reason: HOSPADM

## 2024-04-07 RX ORDER — POLYETHYLENE GLYCOL 3350 17 G/17G
17 POWDER, FOR SOLUTION ORAL DAILY PRN
Status: DISCONTINUED | OUTPATIENT
Start: 2024-04-07 | End: 2024-04-09 | Stop reason: HOSPADM

## 2024-04-07 RX ORDER — ACETAMINOPHEN 650 MG/1
650 SUPPOSITORY RECTAL EVERY 6 HOURS PRN
Status: DISCONTINUED | OUTPATIENT
Start: 2024-04-07 | End: 2024-04-09 | Stop reason: HOSPADM

## 2024-04-07 RX ADMIN — SODIUM CHLORIDE, PRESERVATIVE FREE 10 ML: 5 INJECTION INTRAVENOUS at 21:40

## 2024-04-07 RX ADMIN — METOPROLOL TARTRATE 25 MG: 25 TABLET, FILM COATED ORAL at 21:40

## 2024-04-07 RX ADMIN — CLOBETASOL PROPIONATE CREAM USP, 0.05%: 0.5 CREAM TOPICAL at 22:00

## 2024-04-07 RX ADMIN — BUMETANIDE 1 MG: 1 TABLET ORAL at 17:31

## 2024-04-07 RX ADMIN — APIXABAN 5 MG: 5 TABLET, FILM COATED ORAL at 21:40

## 2024-04-07 RX ADMIN — LISINOPRIL 10 MG: 10 TABLET ORAL at 17:31

## 2024-04-07 ASSESSMENT — PAIN - FUNCTIONAL ASSESSMENT: PAIN_FUNCTIONAL_ASSESSMENT: NONE - DENIES PAIN

## 2024-04-07 NOTE — PROGRESS NOTES
4 Eyes Skin Assessment     NAME:  Gabi Madrigal  YOB: 1940  MEDICAL RECORD NUMBER:  [id number]    The patient is being assessed for  Admission    I agree that at least one RN has performed a thorough Head to Toe Skin Assessment on the patient. ALL assessment sites listed below have been assessed.      Areas assessed by both nurses:    Head, Face, Ears, Shoulders, Back, Chest, Arms, Elbows, Hands, Sacrum. Buttock, Coccyx, Ischium, and Legs. Feet and Heels        Does the Patient have a Wound? Yes wound(s) were present on assessment. LDA wound assessment was Initiated and completed by RN       Sathya Prevention initiated by RN: No  Wound Care Orders initiated by RN: Yes    Pressure Injury (Stage 3,4, Unstageable, DTI, NWPT, and Complex wounds) if present, place Wound referral order by RN under : No    New Ostomies, if present place, Ostomy referral order under : No     Nurse 1 eSignature: Electronically signed by Adri Johnson RN on 4/7/24 at 5:34 PM EDT    **SHARE this note so that the co-signing nurse can place an eSignature**    Nurse 2 eSignature: Electronically signed by Deanna Dillon RN on 4/7/24 at 5:35 PM EDT

## 2024-04-07 NOTE — PROGRESS NOTES
Wound care consult placed via perfect serve.         Electronically signed by Adri Johnson RN on 4/7/2024 at 5:36 PM

## 2024-04-07 NOTE — ED PROVIDER NOTES
Department of Emergency Medicine   ED Provider Note  Admit Date/RoomTime: 4/7/2024  1:27 PM  ED Room: 0505/0505-A          History of Present Illness:  4/7/24, Time: 1:52 PM EDT       Gabi Madrigal is a 83 y.o. female presenting to the ED for frequent falls.  She notes she has been having frequent falls lately.  Lives at home alone.  Did have a fall yesterday.  Did come to ED afterwards and was evaluated with unremarkable workup.  States she went home and was unable to make transitions on her own.  Did need to call her son for help.  Reports she is unable to care for herself at home and wishes to go to a rehab facility.  She also does note chronic wound on her left foot.  Notes she does follow with wound care for this.    Review of Systems:     Pertinent positives and negatives are stated within HPI.      --------------------------------------------- PAST HISTORY ---------------------------------------------  Past Medical History:  has a past medical history of Acquired hypothyroidism, Arthritis, Blood transfusion reaction, Chronic kidney disease, History of blood transfusion, Hypertension, Lymphedema of both lower extremities, Open wound of left great toe, Other disorders of kidney and ureter in diseases classified elsewhere, Pelvic fracture (HCC), Positive culture findings in wound, Skin ulcer of left calf with fat layer exposed (HCC), Skin ulcer of left calf, limited to breakdown of skin (HCC), Ulcer of left lower leg (HCC), and Venous stasis ulcer of left calf limited to breakdown of skin (HCC).    Past Surgical History:  has a past surgical history that includes fracture surgery (2010); Tonsillectomy; Hip fracture surgery (Left, 08/08/2014); Colonoscopy; Endoscopy, colon, diagnostic; Total hip arthroplasty (Left, 10/17/2014); and eye surgery.    Social History:  reports that she has never smoked. She has never used smokeless tobacco. She reports that she does not currently use alcohol. She reports that she  does not use drugs.    Family History: family history includes Mult Sclerosis in her father.     The patient’s home medications have been reviewed.    Allergies: Aspirin, Other, and Tape [adhesive tape]      ---------------------------------------------------PHYSICAL EXAM--------------------------------------    Physical Exam  Vitals and nursing note reviewed.   Constitutional:       General: She is not in acute distress.     Appearance: Normal appearance.   HENT:      Head: Normocephalic and atraumatic.      Mouth/Throat:      Mouth: Mucous membranes are moist.      Pharynx: Oropharynx is clear.   Eyes:      Extraocular Movements: Extraocular movements intact.      Conjunctiva/sclera: Conjunctivae normal.      Pupils: Pupils are equal, round, and reactive to light.   Cardiovascular:      Rate and Rhythm: Normal rate and regular rhythm.      Pulses:           Radial pulses are 2+ on the right side and 2+ on the left side.        Dorsalis pedis pulses are 2+ on the right side and 2+ on the left side.        Posterior tibial pulses are 2+ on the right side and 2+ on the left side.      Heart sounds: Normal heart sounds.   Pulmonary:      Effort: Pulmonary effort is normal.      Breath sounds: Normal breath sounds.   Abdominal:      General: Abdomen is flat.      Palpations: Abdomen is soft.      Tenderness: There is no abdominal tenderness.   Musculoskeletal:      Cervical back: Normal range of motion and neck supple.      Right lower leg: Edema present.      Left lower leg: Edema present.      Comments: LLE erythematous and edematous; left foot with chronic ulcer   Skin:     General: Skin is warm and dry.   Neurological:      General: No focal deficit present.      Mental Status: She is alert and oriented to person, place, and time.   Psychiatric:         Mood and Affect: Mood normal.         Behavior: Behavior normal.       -------------------------------------------------- RESULTS  (A) NEGATIVE    pH, UA 6.0 5.0 - 9.0    Protein, UA 30 (A) NEGATIVE mg/dL    Urobilinogen, Urine 0.2 0.0 - 1.0 EU/dL    Nitrite, Urine NEGATIVE NEGATIVE    Leukocyte Esterase, Urine MODERATE (A) NEGATIVE    WBC, UA 10 TO 20 (A) 0 TO 5 /HPF    RBC, UA 3 to 5 (A) 0 TO 2 /HPF    Bacteria, UA TRACE (A) None       RADIOLOGY:  Imaging Interpreted by Radiologist, initial wet read of imaging interpreted by myself  XR FOOT LEFT (MIN 3 VIEWS)   Final Result   1. No signs of fracture or dislocation.   2. Soft tissue swelling overlying the dorsal aspect of the foot.               EKG:    See ED Course for EKG documentation    Is this patient to be included in the SEP-1 Core Measure due to severe sepsis or septic shock?   No   Exclusion criteria - the patient is NOT to be included for SEP-1 Core Measure due to:  Infection is not suspected      ------------------------- NURSING NOTES AND VITALS REVIEWED ---------------------------   The nursing notes within the ED encounter and vital signs as below have been reviewed by myself.  BP (!) 182/79   Pulse 75   Temp 98.2 °F (36.8 °C) (Oral)   Resp 18   Ht 1.575 m (5' 2\")   Wt 81 kg (178 lb 9.2 oz)   SpO2 97%   BMI 32.66 kg/m²   Oxygen Saturation Interpretation: Normal    The patient’s available past medical records and past encounters were reviewed, see MDM for details.        ------------------------------ ED COURSE/MEDICAL DECISION MAKING----------------------  Medications   apixaban (ELIQUIS) tablet 5 mg (has no administration in time range)   bumetanide (BUMEX) tablet 1 mg (1 mg Oral Given 4/7/24 9328)   clobetasol (TEMOVATE) 0.05 % cream (has no administration in time range)   ferrous sulfate (IRON 325) tablet 325 mg (has no administration in time range)   levothyroxine (SYNTHROID) tablet 75 mcg (75 mcg Oral Not Given 4/7/24 1726)   lisinopril (PRINIVIL;ZESTRIL) tablet 10 mg (10 mg Oral Given 4/7/24 1731)   metoprolol tartrate (LOPRESSOR) tablet 25 mg (has no administration

## 2024-04-07 NOTE — ED NOTES
ED to Inpatient Handoff Report    Notified 5W that electronic handoff available and patient ready for transport to room 505.    Safety Risks: Risk of falls    Patient in Restraints: no    Constant Observer or Patient : no    Telemetry Monitoring Ordered :No      Cardiac Rhythm: Sinus rhythm    Order to transfer to unit without monitor:YES    Last MEWS: 1  Time completed: 1637    Deterioration Index Score:   Predictive Model Details          29 (Normal)  Factor Value    Calculated 4/7/2024 16:39 44% Age 83 years old    Deterioration Index Model 18% Sodium 146 mmol/L     16% Systolic 173     8% Potassium 4.4 mmol/L     8% Respiratory rate 18     3% BUN abnormal (37 mg/dL)     2% Pulse oximetry 99 %     0% Pulse 68     0% Temperature 97.8 °F (36.6 °C)     0% WBC count 7.4 k/uL     0% Hematocrit 37.5 %        Vitals:    04/07/24 1344 04/07/24 1550 04/07/24 1637   BP: (!) 153/99 (!) 158/97 (!) 173/84   Pulse: 62 74 68   Resp: 16 23 18   Temp: 98.1 °F (36.7 °C)  97.8 °F (36.6 °C)   TempSrc:   Oral   SpO2: 98%  99%   Weight: 73.9 kg (163 lb)     Height: 1.575 m (5' 2\")           Opportunity for questions and clarification was provided.

## 2024-04-08 LAB
ALBUMIN SERPL-MCNC: 3.7 G/DL (ref 3.5–5.2)
ALP SERPL-CCNC: 99 U/L (ref 35–104)
ALT SERPL-CCNC: 18 U/L (ref 0–32)
ANION GAP SERPL CALCULATED.3IONS-SCNC: 10 MMOL/L (ref 7–16)
AST SERPL-CCNC: 25 U/L (ref 0–31)
BASOPHILS # BLD: 0.02 K/UL (ref 0–0.2)
BASOPHILS NFR BLD: 0 % (ref 0–2)
BILIRUB SERPL-MCNC: 0.5 MG/DL (ref 0–1.2)
BUN SERPL-MCNC: 33 MG/DL (ref 6–23)
CALCIUM SERPL-MCNC: 9.1 MG/DL (ref 8.6–10.2)
CHLORIDE SERPL-SCNC: 103 MMOL/L (ref 98–107)
CO2 SERPL-SCNC: 27 MMOL/L (ref 22–29)
CREAT SERPL-MCNC: 1 MG/DL (ref 0.5–1)
EOSINOPHIL # BLD: 0.14 K/UL (ref 0.05–0.5)
EOSINOPHILS RELATIVE PERCENT: 2 % (ref 0–6)
ERYTHROCYTE [DISTWIDTH] IN BLOOD BY AUTOMATED COUNT: 15.4 % (ref 11.5–15)
GFR SERPL CREATININE-BSD FRML MDRD: 59 ML/MIN/1.73M2
GLUCOSE SERPL-MCNC: 91 MG/DL (ref 74–99)
HCT VFR BLD AUTO: 36.1 % (ref 34–48)
HGB BLD-MCNC: 11 G/DL (ref 11.5–15.5)
IMM GRANULOCYTES # BLD AUTO: <0.03 K/UL (ref 0–0.58)
IMM GRANULOCYTES NFR BLD: 0 % (ref 0–5)
LYMPHOCYTES NFR BLD: 0.88 K/UL (ref 1.5–4)
LYMPHOCYTES RELATIVE PERCENT: 14 % (ref 20–42)
MCH RBC QN AUTO: 27.2 PG (ref 26–35)
MCHC RBC AUTO-ENTMCNC: 30.5 G/DL (ref 32–34.5)
MCV RBC AUTO: 89.4 FL (ref 80–99.9)
MONOCYTES NFR BLD: 0.69 K/UL (ref 0.1–0.95)
MONOCYTES NFR BLD: 11 % (ref 2–12)
NEUTROPHILS NFR BLD: 73 % (ref 43–80)
NEUTS SEG NFR BLD: 4.77 K/UL (ref 1.8–7.3)
PLATELET # BLD AUTO: 194 K/UL (ref 130–450)
PMV BLD AUTO: 10.8 FL (ref 7–12)
POTASSIUM SERPL-SCNC: 4.5 MMOL/L (ref 3.5–5)
PROT SERPL-MCNC: 7 G/DL (ref 6.4–8.3)
RBC # BLD AUTO: 4.04 M/UL (ref 3.5–5.5)
SODIUM SERPL-SCNC: 140 MMOL/L (ref 132–146)
WBC OTHER # BLD: 6.5 K/UL (ref 4.5–11.5)

## 2024-04-08 PROCEDURE — 6370000000 HC RX 637 (ALT 250 FOR IP): Performed by: FAMILY MEDICINE

## 2024-04-08 PROCEDURE — 97161 PT EVAL LOW COMPLEX 20 MIN: CPT

## 2024-04-08 PROCEDURE — 80053 COMPREHEN METABOLIC PANEL: CPT

## 2024-04-08 PROCEDURE — 1200000000 HC SEMI PRIVATE

## 2024-04-08 PROCEDURE — 2580000003 HC RX 258: Performed by: FAMILY MEDICINE

## 2024-04-08 PROCEDURE — 85025 COMPLETE CBC W/AUTO DIFF WBC: CPT

## 2024-04-08 RX ADMIN — APIXABAN 5 MG: 5 TABLET, FILM COATED ORAL at 20:45

## 2024-04-08 RX ADMIN — OXYCODONE HYDROCHLORIDE AND ACETAMINOPHEN 500 MG: 500 TABLET ORAL at 09:45

## 2024-04-08 RX ADMIN — METOPROLOL TARTRATE 25 MG: 25 TABLET, FILM COATED ORAL at 20:45

## 2024-04-08 RX ADMIN — Medication 1 TABLET: at 09:45

## 2024-04-08 RX ADMIN — METOPROLOL TARTRATE 25 MG: 25 TABLET, FILM COATED ORAL at 09:45

## 2024-04-08 RX ADMIN — FERROUS SULFATE TAB 325 MG (65 MG ELEMENTAL FE) 325 MG: 325 (65 FE) TAB at 09:45

## 2024-04-08 RX ADMIN — ACETAMINOPHEN 650 MG: 325 TABLET ORAL at 03:20

## 2024-04-08 RX ADMIN — LEVOTHYROXINE SODIUM 75 MCG: 75 TABLET ORAL at 06:09

## 2024-04-08 RX ADMIN — SODIUM CHLORIDE, PRESERVATIVE FREE 10 ML: 5 INJECTION INTRAVENOUS at 11:16

## 2024-04-08 RX ADMIN — SODIUM CHLORIDE, PRESERVATIVE FREE 10 ML: 5 INJECTION INTRAVENOUS at 20:45

## 2024-04-08 RX ADMIN — BUMETANIDE 1 MG: 1 TABLET ORAL at 06:09

## 2024-04-08 RX ADMIN — APIXABAN 5 MG: 5 TABLET, FILM COATED ORAL at 09:45

## 2024-04-08 RX ADMIN — CLOBETASOL PROPIONATE CREAM USP, 0.05%: 0.5 CREAM TOPICAL at 09:48

## 2024-04-08 RX ADMIN — ACETAMINOPHEN 650 MG: 325 TABLET ORAL at 09:45

## 2024-04-08 RX ADMIN — CLOBETASOL PROPIONATE CREAM USP, 0.05%: 0.5 CREAM TOPICAL at 20:48

## 2024-04-08 RX ADMIN — LISINOPRIL 10 MG: 10 TABLET ORAL at 09:45

## 2024-04-08 RX ADMIN — ACETAMINOPHEN 650 MG: 325 TABLET ORAL at 17:49

## 2024-04-08 ASSESSMENT — PAIN DESCRIPTION - ORIENTATION: ORIENTATION: RIGHT

## 2024-04-08 ASSESSMENT — PAIN SCALES - GENERAL: PAINLEVEL_OUTOF10: 3

## 2024-04-08 ASSESSMENT — PAIN DESCRIPTION - LOCATION: LOCATION: KNEE

## 2024-04-08 ASSESSMENT — PAIN DESCRIPTION - DESCRIPTORS: DESCRIPTORS: ACHING

## 2024-04-08 ASSESSMENT — PAIN - FUNCTIONAL ASSESSMENT: PAIN_FUNCTIONAL_ASSESSMENT: PREVENTS OR INTERFERES WITH MANY ACTIVE NOT PASSIVE ACTIVITIES

## 2024-04-08 NOTE — CARE COORDINATION
Introduced my self and provided explanation of CM role to patient. Patient is awake, alert, and aware of current diagnosis and discharge planning. Patient is from home alone. Patient states she would like to go to rehab. Has a history with [facility] but would like to try [facility]. Referral made to Anita with . Await input.   Electronically signed by Nat Adair RN on 4/8/2024 at 11:28 AM

## 2024-04-08 NOTE — PROGRESS NOTES
SPIRITUAL HEALTH SERVICES - [facility] Encounter    Name: Gabi Madrigal                  Referral: Routine Visit    Sacraments  Anointed (Last Rites): Yes  Apostolic [place]: No  Confession: No  Communion: NPO     Assessment:  Patient receptive to  visit.      Intervention:   provided spiritual support and sacramental ministry for patient.     Outcome:  Patient expressed gratitude for visit.    Plan:  Chaplains will remain available to offer spiritual and emotional support as needed.      Electronically signed by Chaplain Tung, on 4/8/2024 at 1:30 PM.  Spiritual Care Department  [facility]  [phone]

## 2024-04-08 NOTE — ACP (ADVANCE CARE PLANNING)
Advance Care Planning   Healthcare Decision Maker:    Primary Decision Maker: Silverio Madrigal - Child - [phone]    Primary Decision Maker: Amarjit Booele - Child - [phone]    Primary Decision Maker: Paolo Madrigal - Child - [phone]    Supplemental (Other) Decision Maker: Mendy Armando - Brother/Sister - [phone]    Click here to complete Healthcare Decision Makers including selection of the Healthcare Decision Maker Relationship (ie \"Primary\").  Today we documented Decision Maker(s) consistent with Legal Next of Kin hierarchy.

## 2024-04-08 NOTE — H&P
[facility]  History and Physical      CHIEF COMPLAINT:    Chief Complaint   Patient presents with    Fatigue    Leg Swelling        Patient of Klarissa Davidson DO presents with:  Ambulatory dysfunction    History of Present Illness:   Patient is an 83-year-old female with a past medical history of hypothyroidism, CKD, hypertension, atrial fibrillation on Eliquis who presents to the emergency room for fatigue with left leg swelling.  On arrival patient had a x-ray left foot revealing no signs of fracture or dislocation however soft tissue swelling over the dorsal aspect of the foot was noted.  Labs are relatively unremarkable and appear to be at baseline on arrival to emergency.  Patient is admitted to [facility] for further evaluation from PT and OT to determine discharge needs.  She is comfortable on evaluation and denies any acute complaints.  Not able to give much of a history    REVIEW OF SYSTEMS:  Pertinent negatives are above in HPI.  10 point ROS otherwise negative.      Past Medical History:   Diagnosis Date    Acquired hypothyroidism 8/14/2017    Arthritis     Blood transfusion reaction     Chronic kidney disease     History of blood transfusion     Hypertension     Lymphedema of both lower extremities 9/6/2017    Open wound of left great toe 10/18/2017    Other disorders of kidney and ureter in diseases classified elsewhere     Pelvic fracture (HCC)     Positive culture findings in wound 5/2/2023    Skin ulcer of left calf with fat layer exposed (HCC) 9/6/2017    Skin ulcer of left calf, limited to breakdown of skin (HCC) 9/6/2017    Ulcer of left lower leg (HCC) 3/24/2014    Venous stasis ulcer of left calf limited to breakdown of skin (HCC) 3/24/2014         Past Surgical History:   Procedure Laterality Date    COLONOSCOPY      ENDOSCOPY, COLON, DIAGNOSTIC      EYE SURGERY      cateract and lazor surgery 2015    FRACTURE SURGERY  2010    RT HIP    HIP FRACTURE SURGERY Left 08/08/2014     ORIF left hip    TONSILLECTOMY      as child    TOTAL HIP ARTHROPLASTY Left 10/17/2014    revision with removal of hardware       Medications Prior to Admission:    Medications Prior to Admission: diclofenac sodium (VOLTAREN) 1 % GEL, APPLY 2 TO 4 GRAMS EXTERNALLY TO THE AFFECTED AREA 3 TO 4 TIMES DAILY  ferrous sulfate (IRON 325) 325 (65 Fe) MG tablet, Take 1 tablet by mouth daily (with breakfast)  lisinopril (PRINIVIL;ZESTRIL) 10 MG tablet, Take 1 tablet by mouth daily  clobetasol (TEMOVATE) 0.05 % cream, Apply topically 2 times daily.  Multiple Vitamins-Minerals (THERAPEUTIC MULTIVITAMIN-MINERALS) tablet, Take 1 tablet by mouth daily  bumetanide (BUMEX) 1 MG tablet, TAKE 1 TABLET BY MOUTH EVERY DAY  vitamin C (ASCORBIC ACID) 500 MG tablet, Take 1 tablet by mouth daily  acetaminophen (TYLENOL) 325 MG tablet, Take 2 tablets by mouth every 4 hours as needed for Pain  calcium-vitamin D (OSCAL-500) 500-200 MG-UNIT per tablet, Take 1 tablet by mouth 2 times daily (Patient taking differently: Take 1 tablet by mouth daily)  apixaban (ELIQUIS) 5 MG TABS tablet, Take 1 tablet by mouth 2 times daily  metoprolol tartrate (LOPRESSOR) 25 MG tablet, Take 1 tablet by mouth 2 times daily  levothyroxine (SYNTHROID) 75 MCG tablet, Take 1 tablet by mouth Daily    Note that the patient's home medications were reviewed and the above list is accurate to the best of my knowledge at the time of the exam.    Allergies:    Aspirin, Other, and Tape [adhesive tape]    Social History:    reports that she has never smoked. She has never used smokeless tobacco. She reports that she does not currently use alcohol. She reports that she does not use drugs.    Family History:   family history includes Mult Sclerosis in her father.      PHYSICAL EXAM:    Vitals:  BP (!) 147/78   Pulse 72   Temp 98.4 °F (36.9 °C) (Oral)   Resp 18   Ht 1.575 m (5' 2\")   Wt 81 kg (178 lb 9.2 oz)   SpO2 96%   BMI 32.66 kg/m²       General appearance: NAD,  conversant  Eyes: Sclerae anicteric, PERRLA  HEENT: AT/NC, MMM  Neck: FROM, supple, no thyromegaly  Lymph: No cervical / supraclavicular lymphadenopathy  Lungs: Clear to auscultation, WOB normal  CV: RRR, no MRGs, no lower extremity edema  Abdomen: Soft, non-tender; no masses or HSM, +BS  Extremities: FROM without synovitis.  No clubbing or cyanosis of the hands.  Skin: no rash, induration, lesions, or ulcers  Psych: Calm and cooperative.  Normal judgement and insight.  Normal mood and affect.  Neuro: Alert and interactive, face symmetric, speech fluent.    LABS:  All labs reviewed.  Of note:  CBC:   Lab Results   Component Value Date/Time    WBC 6.5 04/08/2024 01:55 AM    RBC 4.04 04/08/2024 01:55 AM    RBC  10/21/2014 02:20 PM      RBC           [id number]    released     10/25/14  00:54  SCC    RBC  10/21/2014 02:20 PM      RBC           [id number]    transfused   10/21/14  19:59  SCC    HGB 11.0 04/08/2024 01:55 AM    HCT 36.1 04/08/2024 01:55 AM    MCV 89.4 04/08/2024 01:55 AM    MCH 27.2 04/08/2024 01:55 AM    MCHC 30.5 04/08/2024 01:55 AM    RDW 15.4 04/08/2024 01:55 AM     04/08/2024 01:55 AM    MPV 10.8 04/08/2024 01:55 AM     CMP:    Lab Results   Component Value Date/Time     04/08/2024 01:55 AM    K 4.5 04/08/2024 01:55 AM    K 4.1 09/21/2022 04:55 AM     04/08/2024 01:55 AM    CO2 27 04/08/2024 01:55 AM    BUN 33 04/08/2024 01:55 AM    CREATININE 1.0 04/08/2024 01:55 AM    GFRAA >60 10/04/2022 04:55 AM    LABGLOM 59 04/08/2024 01:55 AM    GLUCOSE 91 04/08/2024 01:55 AM    GLUCOSE 127 07/12/2011 05:45 AM    PROT 7.0 04/08/2024 01:55 AM    LABALBU 3.7 04/08/2024 01:55 AM    CALCIUM 9.1 04/08/2024 01:55 AM    BILITOT 0.5 04/08/2024 01:55 AM    ALKPHOS 99 04/08/2024 01:55 AM    AST 25 04/08/2024 01:55 AM    ALT 18 04/08/2024 01:55 AM       Imaging:  X-ray left foot: No signs of fracture or dislocation, soft tissue swelling over the dorsal aspect of the

## 2024-04-08 NOTE — PROGRESS NOTES
Physical Therapy  Facility/Department: [address] MED SURG  Physical Therapy Initial Assessment    Name: Gabi Madrigal  : 1940  MRN: [id number]  Date of Service: 2024    Attending Provider:  Ashley Young MD    Evaluating PT:  Ba Carlson Jr., P.T.    Room #:  0505/0505-A  Diagnosis:  General weakness [R53.1]  Frequent falls [R29.6]  Ambulatory dysfunction [R26.2]  Pertinent PMH: Chronic L foot wound and B knee OA  Precautions:  Falls, bed/chair alarm    SUBJECTIVE:    Pt lives alone in a 1 story home with 1+2 stairs and 2 rails to enter. Pt ambulated with a ww PTA.    OBJECTIVE:   Initial Evaluation  Date: 24 Treatment Short Term/ Long Term   Goals   Was pt agreeable to Eval/treatment? yes     Does pt have pain? C/o pain R thigh and knee that worsened with attempt to stand     Bed Mobility  Rolling: MIN A  Supine to sit: MIN A  Sit to supine: MOD A  Scooting: MIN A to EOB  Independent    Transfers Sit to stand: NA, pt unable due to R knee and thigh pain  Stand to sit: NA  Stand pivot: NA  supervision   Ambulation   NA  50 feet with ww SBA   Stair negotiation: ascended and descended NA  3 steps with 2 rails SBA   AM-PAC 6 Clicks        BLE ROM is WFL, but R knee flexion painful.   LLE strength is grossly 4-/5 to 4/5 and RLE 2/5 to 3-/5.   Sensation:  Pt denies numbness and tingling to extremities  Balance: sitting is SBA   Endurance: fair-    Patient education  Pt educated on transfers    Patient response to education:   Pt verbalized understanding Pt demonstrated skill Pt requires further education in this area   yes no yes     ASSESSMENT:    Conditions Requiring Skilled Therapeutic Intervention:    [x]Decreased strength     []Decreased ROM  [x]Decreased functional mobility  [x]Decreased balance   [x]Decreased endurance   []Decreased posture  []Decreased sensation  []Decreased coordination   []Decreased vision  []Decreased safety awareness   [x]Increased pain       Comments:  Pt was found  supine in bed and c/o R thigh pain with movement.  She slowly sat up to EOB and was unable to WB through RLE due to pain and could not stand up at this time.  Pt was able to scoot along EOB and then asked to lie back in bed.  Pt's RLE pain limiting her ability to stand at this time.  Pt is a risk for falls.   Pt was left supine in bed with call light left by patient.    Chair/bed alarm: bed alarm was re-activated.     Pt's/ family goals   1. To go home      Patient and or family understand(s) diagnosis, prognosis, and plan of care.    PHYSICAL THERAPY PLAN OF CARE:    PT POC is established based on physician order and patient diagnosis     Referring provider/PT Order:  PT eval and treat  Diagnosis:  General weakness [R53.1]  Frequent falls [R29.6]  Ambulatory dysfunction [R26.2]  Specific instructions for next treatment:  to progress functional mobility as pt is able.    Current Treatment Recommendations:     [x] Strengthening to improve independence with functional mobility   [x] ROM to improve flexibility and decrease spasm and pain which will help promote independence with functional mobility   [x] Balance Training to improve static/dynamic balance and to reduce fall risk  [x] Endurance Training to improve activity tolerance during functional mobility   [x] Transfer Training to improve safety and independence with all functional transfers   [x] Gait Training to improve gait mechanics, endurance and assess need for appropriate assistive device  [x] Stair Training in preparation for safe discharge home and/or into the community   [] Positioning to prevent skin breakdown and contractures  [] Safety and Education Training   [x] Patient/Caregiver Education   [] HEP  [] Other     PT long term treatment goals are located in above grid    Frequency of treatments: 2-5x/week x 1-2 weeks.    Time in  08:00  Time out  08:20     Evaluation Time includes thorough review of current medical information, gathering information on

## 2024-04-08 NOTE — PATIENT CARE CONFERENCE
P Quality Flow/Interdisciplinary Rounds Progress Note        Quality Flow Rounds held on April 8, 2024    Disciplines Attending:  Bedside Nurse, , , and Nursing Unit Leadership    Gabi Madrigal was admitted on 4/7/2024  1:27 PM    Anticipated Discharge Date:       Disposition:    Sathya Score:  Sathya Scale Score: 19    Readmission Risk              Risk of Unplanned Readmission:  13           Discussed patient goal for the day, patient clinical progression, and barriers to discharge.  The following Goal(s) of the Day/Commitment(s) have been identified:  PT/OT      Deanna Dillon RN  April 8, 2024

## 2024-04-09 ENCOUNTER — APPOINTMENT (OUTPATIENT)
Dept: GENERAL RADIOLOGY | Age: 84
DRG: 556 | End: 2024-04-09
Payer: MEDICARE

## 2024-04-09 VITALS
OXYGEN SATURATION: 94 % | WEIGHT: 184.08 LBS | RESPIRATION RATE: 16 BRPM | HEIGHT: 62 IN | SYSTOLIC BLOOD PRESSURE: 141 MMHG | HEART RATE: 82 BPM | DIASTOLIC BLOOD PRESSURE: 64 MMHG | BODY MASS INDEX: 33.88 KG/M2 | TEMPERATURE: 99.7 F

## 2024-04-09 LAB
ALBUMIN SERPL-MCNC: 3.2 G/DL (ref 3.5–5.2)
ALP SERPL-CCNC: 90 U/L (ref 35–104)
ALT SERPL-CCNC: 19 U/L (ref 0–32)
ANION GAP SERPL CALCULATED.3IONS-SCNC: 8 MMOL/L (ref 7–16)
AST SERPL-CCNC: 31 U/L (ref 0–31)
BASOPHILS # BLD: 0.02 K/UL (ref 0–0.2)
BASOPHILS NFR BLD: 0 % (ref 0–2)
BILIRUB SERPL-MCNC: 0.6 MG/DL (ref 0–1.2)
BUN SERPL-MCNC: 33 MG/DL (ref 6–23)
CALCIUM SERPL-MCNC: 8.5 MG/DL (ref 8.6–10.2)
CHLORIDE SERPL-SCNC: 102 MMOL/L (ref 98–107)
CO2 SERPL-SCNC: 27 MMOL/L (ref 22–29)
CREAT SERPL-MCNC: 1.1 MG/DL (ref 0.5–1)
EOSINOPHIL # BLD: 0.04 K/UL (ref 0.05–0.5)
EOSINOPHILS RELATIVE PERCENT: 1 % (ref 0–6)
ERYTHROCYTE [DISTWIDTH] IN BLOOD BY AUTOMATED COUNT: 15.2 % (ref 11.5–15)
GFR SERPL CREATININE-BSD FRML MDRD: 48 ML/MIN/1.73M2
GLUCOSE SERPL-MCNC: 114 MG/DL (ref 74–99)
HCT VFR BLD AUTO: 33.2 % (ref 34–48)
HGB BLD-MCNC: 10.1 G/DL (ref 11.5–15.5)
IMM GRANULOCYTES # BLD AUTO: 0.03 K/UL (ref 0–0.58)
IMM GRANULOCYTES NFR BLD: 0 % (ref 0–5)
LYMPHOCYTES NFR BLD: 0.88 K/UL (ref 1.5–4)
LYMPHOCYTES RELATIVE PERCENT: 12 % (ref 20–42)
MCH RBC QN AUTO: 27.5 PG (ref 26–35)
MCHC RBC AUTO-ENTMCNC: 30.4 G/DL (ref 32–34.5)
MCV RBC AUTO: 90.5 FL (ref 80–99.9)
MONOCYTES NFR BLD: 0.93 K/UL (ref 0.1–0.95)
MONOCYTES NFR BLD: 12 % (ref 2–12)
NEUTROPHILS NFR BLD: 75 % (ref 43–80)
NEUTS SEG NFR BLD: 5.71 K/UL (ref 1.8–7.3)
PLATELET # BLD AUTO: 150 K/UL (ref 130–450)
PMV BLD AUTO: 10.7 FL (ref 7–12)
POTASSIUM SERPL-SCNC: 4.3 MMOL/L (ref 3.5–5)
PROT SERPL-MCNC: 6.2 G/DL (ref 6.4–8.3)
RBC # BLD AUTO: 3.67 M/UL (ref 3.5–5.5)
SODIUM SERPL-SCNC: 137 MMOL/L (ref 132–146)
WBC OTHER # BLD: 7.6 K/UL (ref 4.5–11.5)

## 2024-04-09 PROCEDURE — 80053 COMPREHEN METABOLIC PANEL: CPT

## 2024-04-09 PROCEDURE — 97165 OT EVAL LOW COMPLEX 30 MIN: CPT

## 2024-04-09 PROCEDURE — 2580000003 HC RX 258: Performed by: FAMILY MEDICINE

## 2024-04-09 PROCEDURE — 6370000000 HC RX 637 (ALT 250 FOR IP): Performed by: FAMILY MEDICINE

## 2024-04-09 PROCEDURE — 85025 COMPLETE CBC W/AUTO DIFF WBC: CPT

## 2024-04-09 PROCEDURE — 73620 X-RAY EXAM OF FOOT: CPT

## 2024-04-09 PROCEDURE — 2580000003 HC RX 258: Performed by: NURSE PRACTITIONER

## 2024-04-09 PROCEDURE — 6360000002 HC RX W HCPCS: Performed by: NURSE PRACTITIONER

## 2024-04-09 RX ORDER — CEFDINIR 300 MG/1
300 CAPSULE ORAL 2 TIMES DAILY
Qty: 10 CAPSULE | Refills: 0 | DISCHARGE
Start: 2024-04-09 | End: 2024-04-14

## 2024-04-09 RX ADMIN — SODIUM CHLORIDE, PRESERVATIVE FREE 10 ML: 5 INJECTION INTRAVENOUS at 10:43

## 2024-04-09 RX ADMIN — METOPROLOL TARTRATE 25 MG: 25 TABLET, FILM COATED ORAL at 08:03

## 2024-04-09 RX ADMIN — FERROUS SULFATE TAB 325 MG (65 MG ELEMENTAL FE) 325 MG: 325 (65 FE) TAB at 08:03

## 2024-04-09 RX ADMIN — CEFTRIAXONE 1000 MG: 1 INJECTION, POWDER, FOR SOLUTION INTRAMUSCULAR; INTRAVENOUS at 10:43

## 2024-04-09 RX ADMIN — BUMETANIDE 1 MG: 1 TABLET ORAL at 05:38

## 2024-04-09 RX ADMIN — Medication 1 TABLET: at 08:03

## 2024-04-09 RX ADMIN — LEVOTHYROXINE SODIUM 75 MCG: 75 TABLET ORAL at 05:38

## 2024-04-09 RX ADMIN — SODIUM CHLORIDE, PRESERVATIVE FREE 10 ML: 5 INJECTION INTRAVENOUS at 08:29

## 2024-04-09 RX ADMIN — CLOBETASOL PROPIONATE CREAM USP, 0.05%: 0.5 CREAM TOPICAL at 08:29

## 2024-04-09 RX ADMIN — OXYCODONE HYDROCHLORIDE AND ACETAMINOPHEN 500 MG: 500 TABLET ORAL at 08:03

## 2024-04-09 RX ADMIN — APIXABAN 5 MG: 5 TABLET, FILM COATED ORAL at 08:03

## 2024-04-09 RX ADMIN — LISINOPRIL 10 MG: 10 TABLET ORAL at 08:03

## 2024-04-09 RX ADMIN — ACETAMINOPHEN 650 MG: 325 TABLET ORAL at 08:02

## 2024-04-09 NOTE — PLAN OF CARE
Problem: Safety - Adult  Goal: Free from fall injury  4/9/2024 0100 by Mel Ghosh, RN  Outcome: Progressing     Problem: Pain  Goal: Verbalizes/displays adequate comfort level or baseline comfort level  4/9/2024 0100 by Mel Ghosh, RN  Outcome: Progressing     Problem: Skin/Tissue Integrity  Goal: Absence of new skin breakdown  Description: 1.  Monitor for areas of redness and/or skin breakdown  2.  Assess vascular access sites hourly  3.  Every 4-6 hours minimum:  Change oxygen saturation probe site  4.  Every 4-6 hours:  If on nasal continuous positive airway pressure, respiratory therapy assess nares and determine need for appliance change or resting period.  4/9/2024 0100 by Mel Ghosh, RN  Outcome: Progressing

## 2024-04-09 NOTE — PROGRESS NOTES
Initial Inpatient Wound Care    Admit Date: 4/7/2024  1:27 PM    Reason for consult:  consult wound left inner leg  Left buttocks  Significant history:  adm fatigue, leg swelling  Ambulatory dysfunction  Wound history:  leg POA, follows with wound center. Plans for return to facility today    Findings:  awake and verbally appropriate  Buttocks area as below    Bilateral sacral area red abraded areas. Was on bedpan      Interventions in place:  SOS    Plan:plan for SNF today      Bertha Up RN 4/9/2024 4:38 PM

## 2024-04-09 NOTE — DISCHARGE SUMMARY
[facility]   Discharge summary   Patient ID:  Gabi Madrigal  [id number]  83 y.o.  1940    Admit date: 4/7/2024    Discharge date and time: 4/9/2024    Admission Diagnoses:   Patient Active Problem List   Diagnosis    History of DVT (deep vein thrombosis)    Essential hypertension    Acquired hypothyroidism    Closed fracture of right iliac wing (HCC)    Chronic anticoagulation    NSTEMI (non-ST elevated myocardial infarction) (HCC)    Bilateral lower extremity edema    Venous stasis ulcer of left ankle with fat layer exposed without varicose veins (HCC)    History of vascular disease    Positive culture findings in wound    PVD (peripheral vascular disease) ([facility])    Calcaneal spur of foot, left    Elevated blood pressure reading    Ambulatory dysfunction       Discharge Diagnoses: ambulatory dysfunction     Consults: none    Procedures: none    Hospital Course: The patient is a 83 y.o. female of Klarissa Davidson DO    Patient is an 83-year-old female admitted to [facility] for  Ambulatory dysfunction  -Monitor labs  -Check UA  -PT OT-await score  -Social work consult for placement needs     Hypertension  -Continue home medications with parameters  -Continue to monitor Bps     Atrial fibrillation  -Continue home Lopressor 25 mg twice daily for rate control  -Continue Eliquis 5 mg twice daily        4/9/24:  -Urine culture + >100K E. Coli, started on IV Rocephin with transition to p.o. Omnicef on discharge  -Auth obtained for discharge to rehab  -Patient complaining of right foot pain x-ray ordered to rule out fractures,  which is negative, ok for d/c         Recent Labs     04/07/24  1427 04/08/24  0155 04/09/24  0355   WBC 7.4 6.5 7.6   HGB 11.3* 11.0* 10.1*   HCT 37.5 36.1 33.2*    194 150       Recent Labs     04/07/24  1427 04/08/24  0155 04/09/24  0355    140 137   K 4.4 4.5 4.3    103 102   CO2 28 27 27   BUN 37* 33* 33*   CREATININE 0.9 1.0 1.1*   CALCIUM 9.3 9.1 8.5*        XR FOOT LEFT (MIN 3 VIEWS)    Result Date: 4/7/2024  EXAMINATION: THREE XRAY VIEWS OF THE LEFT FOOT 4/7/2024 2:25 pm COMPARISON: 10/13/2023 HISTORY: ORDERING SYSTEM PROVIDED HISTORY: ulcer left foot; concern for osteo TECHNOLOGIST PROVIDED HISTORY: Reason for exam:->ulcer left foot; concern for osteo FINDINGS: Images of the left foot reveals no signs of acute fracture or dislocation. There is soft tissue swelling overlying the dorsal aspect of the foot.  There are no definite signs of bone erosion.  A triple phase bone scan or MRI may be of increased sensitivity if clinically indicated. There signs of pes planus.  There is a prominent plantar calcaneal spur. Arterial vascular calcifications are noted.     1. No signs of fracture or dislocation. 2. Soft tissue swelling overlying the dorsal aspect of the foot.       Discharge Exam:    HEENT: NCAT,  PERRLA, No JVD  Heart:  RRR, no murmurs, gallops, or rubs.  Lungs:  CTA bilaterally, no wheeze, rales or rhonchi  Abd: bowel sounds present, nontender, nondistended, no masses  Extrem:  No clubbing, cyanosis, or edema    Disposition: [facility]     Patient Condition at Discharge: stable     Patient Instructions:      Medication List        START taking these medications      cefdinir 300 MG capsule  Commonly known as: OMNICEF  Take 1 capsule by mouth 2 times daily for 5 days            CONTINUE taking these medications      acetaminophen 325 MG tablet  Commonly known as: TYLENOL     apixaban 5 MG Tabs tablet  Commonly known as: ELIQUIS  Take 1 tablet by mouth 2 times daily     bumetanide 1 MG tablet  Commonly known as: BUMEX  TAKE 1 TABLET BY MOUTH EVERY DAY     calcium-vitamin D 500-200 MG-UNIT per tablet  Commonly known as: OSCAL-500  Take 1 tablet by mouth 2 times daily     clobetasol 0.05 % cream  Commonly known as: TEMOVATE  Apply topically 2 times daily.     diclofenac sodium 1 % Gel  Commonly known as: VOLTAREN     ferrous sulfate 325 (65 Fe) MG tablet  Commonly known  activity:92900}  Diet: {diet:30493}    Pt has been advised to:    Follow-up with Klarissa Davidson DO in 1 week.  Follow-up with consultants as recommended by them    Note that over 30 minutes was spent in preparing discharge papers, discussing discharge with patient, medication review, etc.    Signed:  TRAVIS Salazar CNP  4/9/2024  10:33 AM

## 2024-04-09 NOTE — PATIENT CARE CONFERENCE
[facility] Quality Flow/Interdisciplinary Rounds Progress Note        Quality Flow Rounds held on April 9, 2024    Disciplines Attending:  Bedside Nurse, , , and Nursing Unit Leadership    Gabi Madrigal was admitted on 4/7/2024  1:27 PM    Anticipated Discharge Date:       Disposition:    Sathya Score:  Sathya Scale Score: 17    Readmission Risk              Risk of Unplanned Readmission:  16           Discussed patient goal for the day, patient clinical progression, and barriers to discharge.  The following Goal(s) of the Day/Commitment(s) have been identified:  Discharge - Obtain Order await precert      Fallon Scott RN  April 9, 2024

## 2024-04-09 NOTE — PROGRESS NOTES
OCCUPATIONAL THERAPY INITIAL EVALUATION    [facility]   [address] [facility]        Date:2024                                                  Patient Name: Gabi Madrigal    MRN: [id number]    : 1940    Room: [address] [place] [address]    Evaluating OT: Paulette Medeiros OTR/L #[id number]    Referring Provider:  Jaleesa Snow APRN - CNP     Specific Provider Orders/Date:  OT Eval and Treat , 2024     Diagnosis:   1. General weakness    2. Frequent falls         Surgery: None       Pertinent Medical History: Arthritis, CKD, HTN, B LE lymphedema, L hip ORIF, L KENNY, Chronic L foot wound and B knee OA      Precautions:  Fall Risk, falls and alarm     Assessment of current deficits    [x] Functional mobility  [x]ADLs  [x] Strength               []Cognition    [x] Functional transfers   [x] IADLs         [x] Safety Awareness   [x]Endurance    [] Fine Coordination              [x] Balance      [] Vision/perception   []Sensation     []Gross Motor Coordination  [] ROM  [] Delirium                   [] Motor Control     OT PLAN OF CARE   OT POC based on physician orders, patient diagnosis and results of clinical assessment    Frequency/Duration 2-5 days/wk for 2-4 weeks PRN     Specific OT Treatment Interventions to include:   * Instruction/training on adapted ADL techniques and AE recommendations to increase functional independence within precautions       * Training on energy conservation strategies, correct breathing pattern and techniques to improve independence/tolerance for self-care routine  * Functional transfer/mobility training/DME recommendations for increased independence, safety, and fall prevention  * Patient/Family education to increase follow through with safety techniques and functional independence  * Recommendation of environmental modifications for increased safety with functional transfers/mobility and ADLs  * Therapeutic exercise  pain   Increase standing tolerance >3  minutes for improved engagement with functional transfers and indep in ADLs     Visual/  Perceptual Glasses: Yes, readers     Reports changes in vision since admission: No      NA    UE ROM/Strength      Right UE:   AROM: WFL   Strength: WFL 4-/5  -good  and wfl FMC/dexterity noted during ADL tasks    Left UE:   AROM : WFL   Strength: WFL 4-/5  -good  and wfl FMC/dexterity noted during ADL tasks    Improve overall B UE strength for participation in functional tasks      Hand Dominance:     Hearing: WFL   Sensation:   No c/o numbness or tingling  Tone:  WFL   Edema: None     Comments: RN cleared patient for OT.   Upon arrival patient in supine. Therapist facilitated and instructed pt on adapted  techniques & compensatory strategies to improve safety and independence with basic ADLs, bed mobility, functional transfers and mobility to allow pt to achieve highest level of independence and safely.  Pt demonstrated fair understanding of education & follow through. Attempted STS transfer from EOB however patient unable to tolerate putting weight through R foot d/t pain therefore was unable to clear surface of bed.  At end of session, patient was supine, alarm on, with call light and phone within reach, all lines and tubes intact.  Overall, patient demonstrated  decreased independence and safety during completion of ADL tasks.  Pt would benefit from continued skilled OT to increase safety and independence with completion of ADL tasks and functional mobility for improved quality of life.       Rehab Potential: Good for established goals.      Patient / Family Goal: N/A      Patient and/or family were instructed on functional diagnosis, prognosis/goals and OT plan of care. Demonstrated fair understanding.     Eval Complexity: Low    Time In: 10:48 AM   Time Out: 11:11 AM    Total Treatment Time: 0       Min Units   OT Eval Low 97165  X  1    OT Eval Medium 60570      OT Eval High

## 2024-04-09 NOTE — PROGRESS NOTES
[facility]                                Progress note    Subjective:    The patient is awake and alert.    Resting comfortably in bed  Complaining of right foot pain with dorsiflexion    Objective:    BP (!) 141/64   Pulse 82   Temp 99.7 °F (37.6 °C) (Oral)   Resp 16   Ht 1.575 m (5' 2\")   Wt 83.5 kg (184 lb 1.4 oz)   SpO2 94%   BMI 33.67 kg/m²     In: 480 [P.O.:480]  Out: -   In: 480   Out: -     General appearance: NAD, conversant  HEENT: AT/NC, MMM  Neck: FROM, supple  Lungs: Clear to auscultation  CV: RRR, no MRGs  Vasc: Radial pulses 2+  Abdomen: Soft, non-tender; no masses or HSM  Extremities: No peripheral edema or digital cyanosis  Skin: no rash, lesions or ulcers  Psych: Alert and oriented to person, place and time  Neuro: Alert and interactive     Recent Labs     04/07/24  1427 04/08/24  0155 04/09/24  0355   WBC 7.4 6.5 7.6   HGB 11.3* 11.0* 10.1*   HCT 37.5 36.1 33.2*    194 150       Recent Labs     04/07/24  1427 04/08/24  0155 04/09/24  0355    140 137   K 4.4 4.5 4.3    103 102   CO2 28 27 27   BUN 37* 33* 33*   CREATININE 0.9 1.0 1.1*   CALCIUM 9.3 9.1 8.5*       Assessment:    Principal Problem:    Ambulatory dysfunction  Resolved Problems:    * No resolved hospital problems. *      Plan:    Patient is an 83-year-old female admitted to [facility] for  Ambulatory dysfunction  -Monitor labs  -Check UA  -PT OT-await score  -Social work consult for placement needs     Hypertension  -Continue home medications with parameters  -Continue to monitor Bps     Atrial fibrillation  -Continue home Lopressor 25 mg twice daily for rate control  -Continue Eliquis 5 mg twice daily       4/9/24:  -Urine culture + >100K E. Coli, started on IV Rocephin with transition to p.o. Omnicef on discharge  -Auth obtained for discharge to rehab  -Patient complaining of right foot pain x-ray ordered to rule out fractures,  patient experiences pain with dorsiflexion, if negative  patient can d/c       Code status: full  Consultants: none      DVT Prophylaxis PCD's  PT/OT  Discharge planning       I have spent a total time of 30 minutes of this patient encounter reviewing chart, labs, coordinating care with interdisciplinary teams, face to face encounter with patient, providing counseling/education to patient/family.      Chana Valentino, APRN - CNP  1:56 PM  4/9/2024

## 2024-04-09 NOTE — DISCHARGE INSTR - COC
Continuity of Care Form    Patient Name: Gabi Madrigal   :  1940  MRN:  [id number]    Admit date:  2024  Discharge date:  24    Code Status Order: Full Code   Advance Directives:     Admitting Physician:  Ashley Young MD  PCP: Klarissa Davidson DO    Discharging Nurse: Kacie  Discharging Hospital Unit/Room#: 0505/0505-A  Discharging Unit Phone Number: [phone]    Emergency Contact:   Extended Emergency Contact Information  Primary Emergency Contact: Mendy Armando  Home Phone: [phone]  Relation: Brother/Sister  Secondary Emergency Contact: Silverio Madrigal  Home Phone: [phone]  Relation: Child    Past Surgical History:  Past Surgical History:   Procedure Laterality Date    COLONOSCOPY      ENDOSCOPY, COLON, DIAGNOSTIC      EYE SURGERY      cateract and lazor surgery 2015    FRACTURE SURGERY      RT HIP    HIP FRACTURE SURGERY Left 2014    ORIF left hip    TONSILLECTOMY      as child    TOTAL HIP ARTHROPLASTY Left 10/17/2014    revision with removal of hardware       Immunization History:   Immunization History   Administered Date(s) Administered    COVID-19, PFIZER PURPLE top, DILUTE for use, (age 12 y+), 30mcg/0.3mL 2021, 2021, 2021       Active Problems:  Patient Active Problem List   Diagnosis Code    History of DVT (deep vein thrombosis) Z86.718    Essential hypertension I10    Acquired hypothyroidism E03.9    Closed fracture of right iliac wing ([facility]) S32.301A    Chronic anticoagulation Z79.01    NSTEMI (non-ST elevated myocardial infarction) ([facility]) I21.4    Bilateral lower extremity edema R60.0    Venous stasis ulcer of left ankle with fat layer exposed without varicose veins ([facility]) I87.2, L97.322    History of vascular disease Z86.79    Positive culture findings in wound R89.5    PVD (peripheral vascular disease) ([facility]) I73.9    Calcaneal spur of foot, left M77.32    Elevated blood pressure reading R03.0    Ambulatory dysfunction R26.2       Isolation/Infection:    Colostomy/Ileostomy/Ileal Conduit: No       Date of Last BM: 4/9/24    Intake/Output Summary (Last 24 hours) at 4/9/2024 0918  Last data filed at 4/8/2024 1355  Gross per 24 hour   Intake 480 ml   Output --   Net 480 ml     I/O last 3 completed shifts:  In: 480 [P.O.:480]  Out: 900 [Urine:900]    Safety Concerns:     At Risk for Falls    Impairments/Disabilities:      Bilat hearing aids & dentrures    Nutrition Therapy:  Current Nutrition Therapy:   - Oral Diet:  General    Routes of Feeding: Oral  Liquids: Thin Liquids  Daily Fluid Restriction: no  Last Modified Barium Swallow with Video (Video Swallowing Test): not done    Treatments at the Time of Hospital Discharge:   Respiratory Treatments: ***  Oxygen Therapy:  is not on home oxygen therapy.  Ventilator:    - No ventilator support    Rehab Therapies: ***  Weight Bearing Status/Restrictions: No weight bearing restrictions  Other Medical Equipment (for information only, NOT a DME order):  walker  Other Treatments: ***    Patient's personal belongings (please select all that are sent with patient):  Hearing Aides bilateral and dentures    RN SIGNATURE:  Electronically signed by Jennifer Ovalles RN on 4/9/24 at 3:12 PM EDT    CASE MANAGEMENT/SOCIAL WORK SECTION    Inpatient Status Date: ***    Readmission Risk Assessment Score:  Readmission Risk              Risk of Unplanned Readmission:  16           Discharging to Facility/ Agency   Name: [facility]   Address: [address] [place] [address]  Phone: [phone]  Fax: [phone]    Dialysis Facility (if applicable)   Name:  Address:  Dialysis Schedule:  Phone:  Fax:    / signature: Electronically signed by Nat Adair RN on 4/9/24 at 9:19 AM EDT    PHYSICIAN SECTION    Prognosis: {Prognosis:7045221811}    Condition at Discharge: { Patient Condition:959657411}    Rehab Potential (if transferring to Rehab): {Prognosis:2134232073}    Recommended Labs or Other Treatments After

## 2024-04-09 NOTE — CARE COORDINATION
Updated plan of care. Patient is accepted at [facility]. Precert required and initiated. Await auth. AIDEN, demos, transport forms, 66860 completed and in soft chart.   Electronically signed by Nat Adair RN on 4/9/2024 at 9:24 AM    UPDATE: Auth approved. Discharge order placed. [facility] to provide transport at 1600. Nursing and liaison aware. Nursing to call family to notify.   Electronically signed by Nat Adair RN on 4/9/2024 at 11:32 AM

## 2024-04-10 LAB
MICROORGANISM SPEC CULT: ABNORMAL
SPECIMEN DESCRIPTION: ABNORMAL

## 2024-04-12 ENCOUNTER — HOSPITAL ENCOUNTER (OUTPATIENT)
Dept: WOUND CARE | Age: 84
Discharge: HOME | End: 2024-04-12

## 2024-04-12 NOTE — DISCHARGE INSTRUCTIONS
Visit Discharge/Physician Orders     Discharge condition: Stable     Assessment of pain at discharge:MINIMAL     Anesthetic used: 4% lidocaine solution     Discharge to: Home     Left via:Private automobile     Accompanied by:  caregiver     ECF/HHA: [facility]   Dressing Orders: MEDIAL LEFT LEG ULCER-Cleanse with normal saline, apply GENTAMICIN OINTMENT, apply  plain COLLAGEN, secure with dry dressing. Change dressing daily and as needed. Apply bilateral spandagrips daily, on in the AM and off in the PM. CALL IF INCREASED LOWER EXTREMITY SWELLING BEFORE NEXT APPOINTMENT     Elevate legs as much as possible above level of the heart.      Treatment Orders:Eat a diet high in protein and vitamin C. Take a multiple vitamin daily unless contraindicated.      To decide on if she wants ablation in future w/PK-not at this time call PK if you decide      2/9 culture taken- Oral Bactrim ordered, apply gentamicin cream daily with dressing changes      [facility] followup visit :  _______2 weeks Trice(Friday)__________________________  (Please note your next appointment above and if you are unable to keep, kindly give a 24 hour notice. Thank you.)     Physician signature:__________________________      If you experience any of the following, please call the Wound Care Center during business hours:     Increase in Pain  * Temperature over 101  * Increase in drainage from your wound  * Drainage with a foul odor  * Bleeding  * Increase in swelling  * Need for compression bandage changes due to slippage, breakthrough drainage.     If you need medical attention outside of the business hours of the Wound Care Centers please contact your PCP or go to the nearest emergency room.

## 2024-04-12 NOTE — PROGRESS NOTES
Physician Progress Note      PATIENT:               IMAN BABB  CSN #:                  [id number]  :                       1940  ADMIT DATE:       2024 1:27 PM  DISCH DATE:        2024 3:50 PM  RESPONDING  PROVIDER #:        Ashley Young MD          QUERY TEXT:    Pt admitted with falls, ambulatory dysfunction. Pt noted to have >100,000 E   Coli on urine culture. If possible, please document in the progress notes and   discharge summary if you are evaluating and/or treating any of the following:    The medical record reflects the following:  Risk Factors: elderly female  Clinical Indicators: UA with moderate leukocyte esterase, 10-20 WBC. Urine   culture positive for >100,000 E Coli  Treatment: Rocephin IV    Thank you,  Dorene Phelps RN, CCDS  Clinical Documentation   [email or web address]  [phone]  (M-F 6am-2:30 pm)  Options provided:  -- Urinary Tract Infection (UTI)  -- Bacteriuria  -- Other - I will add my own diagnosis  -- Disagree - Not applicable / Not valid  -- Disagree - Clinically unable to determine / Unknown  -- Refer to Clinical Documentation Reviewer    PROVIDER RESPONSE TEXT:    This patient has a UTI.    Query created by: Dorene Phelps on 2024 1:57 PM      Electronically signed by:  Ashley Young MD 2024 1:50 PM

## 2024-07-18 ENCOUNTER — APPOINTMENT (OUTPATIENT)
Dept: GENERAL RADIOLOGY | Age: 84
End: 2024-07-18
Payer: MEDICARE

## 2024-07-18 ENCOUNTER — APPOINTMENT (OUTPATIENT)
Dept: CT IMAGING | Age: 84
End: 2024-07-18
Payer: MEDICARE

## 2024-07-18 ENCOUNTER — HOSPITAL ENCOUNTER (EMERGENCY)
Age: 84
Discharge: ANOTHER HOSPITAL | End: 2024-07-18
Attending: EMERGENCY MEDICINE
Payer: MEDICARE

## 2024-07-18 ENCOUNTER — HOSPITAL ENCOUNTER (INPATIENT)
Age: 84
LOS: 7 days | Discharge: NURSING, REHAB OR LONG-TERM CARE | End: 2024-07-25
Attending: EMERGENCY MEDICINE | Admitting: INTERNAL MEDICINE
Payer: MEDICARE

## 2024-07-18 VITALS
HEIGHT: 63 IN | OXYGEN SATURATION: 94 % | BODY MASS INDEX: 35.97 KG/M2 | WEIGHT: 203 LBS | HEART RATE: 59 BPM | SYSTOLIC BLOOD PRESSURE: 130 MMHG | DIASTOLIC BLOOD PRESSURE: 91 MMHG | TEMPERATURE: 98.6 F | RESPIRATION RATE: 16 BRPM

## 2024-07-18 DIAGNOSIS — S81.002A OPEN WOUND OF LEFT KNEE, LEG, AND ANKLE, INITIAL ENCOUNTER: ICD-10-CM

## 2024-07-18 DIAGNOSIS — S92.301A CLOSED FRACTURE OF NECK OF METATARSAL BONE OF RIGHT FOOT, INITIAL ENCOUNTER: ICD-10-CM

## 2024-07-18 DIAGNOSIS — S91.002A OPEN WOUND OF LEFT KNEE, LEG, AND ANKLE, INITIAL ENCOUNTER: ICD-10-CM

## 2024-07-18 DIAGNOSIS — S72.491A OTHER CLOSED FRACTURE OF DISTAL END OF RIGHT FEMUR, INITIAL ENCOUNTER (HCC): Primary | ICD-10-CM

## 2024-07-18 DIAGNOSIS — R09.02 HYPOXEMIA: ICD-10-CM

## 2024-07-18 DIAGNOSIS — S72.91XA CLOSED FRACTURE OF RIGHT FEMUR, UNSPECIFIED FRACTURE MORPHOLOGY, UNSPECIFIED PORTION OF FEMUR, INITIAL ENCOUNTER (HCC): Primary | ICD-10-CM

## 2024-07-18 DIAGNOSIS — S81.802A OPEN WOUND OF LEFT KNEE, LEG, AND ANKLE, INITIAL ENCOUNTER: ICD-10-CM

## 2024-07-18 DIAGNOSIS — S72.451A CLOSED COMMINUTED SUPRACONDYLAR FRACTURE OF FEMUR, RIGHT, INITIAL ENCOUNTER (HCC): ICD-10-CM

## 2024-07-18 PROBLEM — S72.8X1A OTHER FRACTURE OF RIGHT FEMUR, INITIAL ENCOUNTER FOR CLOSED FRACTURE (HCC): Status: ACTIVE | Noted: 2024-07-18

## 2024-07-18 LAB
ALBUMIN SERPL-MCNC: 3.4 G/DL (ref 3.5–5.2)
ALP SERPL-CCNC: 96 U/L (ref 35–104)
ALT SERPL-CCNC: 11 U/L (ref 0–32)
ANION GAP SERPL CALCULATED.3IONS-SCNC: 8 MMOL/L (ref 7–16)
AST SERPL-CCNC: 21 U/L (ref 0–31)
B.E.: 2.2 MMOL/L (ref -3–3)
BASOPHILS # BLD: 0.04 K/UL (ref 0–0.2)
BASOPHILS NFR BLD: 1 % (ref 0–2)
BILIRUB SERPL-MCNC: 0.6 MG/DL (ref 0–1.2)
BNP SERPL-MCNC: 4067 PG/ML (ref 0–450)
BUN SERPL-MCNC: 33 MG/DL (ref 6–23)
CALCIUM SERPL-MCNC: 8.4 MG/DL (ref 8.6–10.2)
CHLORIDE SERPL-SCNC: 105 MMOL/L (ref 98–107)
CO2 SERPL-SCNC: 29 MMOL/L (ref 22–29)
COHB: 1.1 % (ref 0–1.5)
CREAT SERPL-MCNC: 1.1 MG/DL (ref 0.5–1)
CRITICAL: ABNORMAL
DATE ANALYZED: ABNORMAL
DATE OF COLLECTION: ABNORMAL
EKG ATRIAL RATE: 37 BPM
EKG Q-T INTERVAL: 400 MS
EKG QRS DURATION: 68 MS
EKG QTC CALCULATION (BAZETT): 402 MS
EKG R AXIS: 74 DEGREES
EKG T AXIS: 30 DEGREES
EKG VENTRICULAR RATE: 61 BPM
EOSINOPHIL # BLD: 0.17 K/UL (ref 0.05–0.5)
EOSINOPHILS RELATIVE PERCENT: 2 % (ref 0–6)
ERYTHROCYTE [DISTWIDTH] IN BLOOD BY AUTOMATED COUNT: 15.4 % (ref 11.5–15)
GFR, ESTIMATED: 50 ML/MIN/1.73M2
GLUCOSE SERPL-MCNC: 108 MG/DL (ref 74–99)
HCO3: 27.7 MMOL/L (ref 22–26)
HCT VFR BLD AUTO: 36 % (ref 34–48)
HGB BLD-MCNC: 10.4 G/DL (ref 11.5–15.5)
HHB: 3.5 % (ref 0–5)
IMM GRANULOCYTES # BLD AUTO: <0.03 K/UL (ref 0–0.58)
IMM GRANULOCYTES NFR BLD: 0 % (ref 0–5)
LAB: ABNORMAL
LYMPHOCYTES NFR BLD: 0.8 K/UL (ref 1.5–4)
LYMPHOCYTES RELATIVE PERCENT: 9 % (ref 20–42)
Lab: 1305
MCH RBC QN AUTO: 26.6 PG (ref 26–35)
MCHC RBC AUTO-ENTMCNC: 28.9 G/DL (ref 32–34.5)
MCV RBC AUTO: 92.1 FL (ref 80–99.9)
METHB: 0.3 % (ref 0–1.5)
MODE: ABNORMAL
MONOCYTES NFR BLD: 0.71 K/UL (ref 0.1–0.95)
MONOCYTES NFR BLD: 8 % (ref 2–12)
NEUTROPHILS NFR BLD: 80 % (ref 43–80)
NEUTS SEG NFR BLD: 6.76 K/UL (ref 1.8–7.3)
O2 CONTENT: 15 ML/DL
O2 SATURATION: 96.5 % (ref 92–98.5)
O2HB: 95.1 % (ref 94–97)
OPERATOR ID: ABNORMAL
PATIENT TEMP: 37 C
PCO2: 46.9 MMHG (ref 35–45)
PH BLOOD GAS: 7.39 (ref 7.35–7.45)
PLATELET # BLD AUTO: 215 K/UL (ref 130–450)
PMV BLD AUTO: 10.1 FL (ref 7–12)
PO2: 91.6 MMHG (ref 75–100)
POTASSIUM SERPL-SCNC: 4.6 MMOL/L (ref 3.5–5)
PROT SERPL-MCNC: 6.7 G/DL (ref 6.4–8.3)
RBC # BLD AUTO: 3.91 M/UL (ref 3.5–5.5)
RBC # BLD: ABNORMAL 10*6/UL
SODIUM SERPL-SCNC: 142 MMOL/L (ref 132–146)
SOURCE, BLOOD GAS: ABNORMAL
THB: 11.1 G/DL (ref 11.5–16.5)
TIME ANALYZED: 1312
TROPONIN I SERPL HS-MCNC: 35 NG/L (ref 0–9)
WBC OTHER # BLD: 8.5 K/UL (ref 4.5–11.5)

## 2024-07-18 PROCEDURE — 73630 X-RAY EXAM OF FOOT: CPT

## 2024-07-18 PROCEDURE — 72125 CT NECK SPINE W/O DYE: CPT

## 2024-07-18 PROCEDURE — 6360000002 HC RX W HCPCS: Performed by: EMERGENCY MEDICINE

## 2024-07-18 PROCEDURE — 99285 EMERGENCY DEPT VISIT HI MDM: CPT

## 2024-07-18 PROCEDURE — 6360000002 HC RX W HCPCS

## 2024-07-18 PROCEDURE — 6360000002 HC RX W HCPCS: Performed by: NURSE PRACTITIONER

## 2024-07-18 PROCEDURE — 80053 COMPREHEN METABOLIC PANEL: CPT

## 2024-07-18 PROCEDURE — 73700 CT LOWER EXTREMITY W/O DYE: CPT

## 2024-07-18 PROCEDURE — 93010 ELECTROCARDIOGRAM REPORT: CPT | Performed by: INTERNAL MEDICINE

## 2024-07-18 PROCEDURE — 72170 X-RAY EXAM OF PELVIS: CPT

## 2024-07-18 PROCEDURE — 96376 TX/PRO/DX INJ SAME DRUG ADON: CPT

## 2024-07-18 PROCEDURE — 73590 X-RAY EXAM OF LOWER LEG: CPT

## 2024-07-18 PROCEDURE — 83880 ASSAY OF NATRIURETIC PEPTIDE: CPT

## 2024-07-18 PROCEDURE — 73552 X-RAY EXAM OF FEMUR 2/>: CPT

## 2024-07-18 PROCEDURE — 96374 THER/PROPH/DIAG INJ IV PUSH: CPT

## 2024-07-18 PROCEDURE — 85025 COMPLETE CBC W/AUTO DIFF WBC: CPT

## 2024-07-18 PROCEDURE — 93005 ELECTROCARDIOGRAM TRACING: CPT | Performed by: EMERGENCY MEDICINE

## 2024-07-18 PROCEDURE — 1200000000 HC SEMI PRIVATE

## 2024-07-18 PROCEDURE — 70450 CT HEAD/BRAIN W/O DYE: CPT

## 2024-07-18 PROCEDURE — 73610 X-RAY EXAM OF ANKLE: CPT

## 2024-07-18 PROCEDURE — 73562 X-RAY EXAM OF KNEE 3: CPT

## 2024-07-18 PROCEDURE — 96375 TX/PRO/DX INJ NEW DRUG ADDON: CPT

## 2024-07-18 PROCEDURE — 82805 BLOOD GASES W/O2 SATURATION: CPT

## 2024-07-18 PROCEDURE — 71045 X-RAY EXAM CHEST 1 VIEW: CPT

## 2024-07-18 PROCEDURE — 84484 ASSAY OF TROPONIN QUANT: CPT

## 2024-07-18 PROCEDURE — 72100 X-RAY EXAM L-S SPINE 2/3 VWS: CPT

## 2024-07-18 RX ORDER — BUMETANIDE 0.25 MG/ML
2 INJECTION INTRAMUSCULAR; INTRAVENOUS ONCE
Status: COMPLETED | OUTPATIENT
Start: 2024-07-18 | End: 2024-07-18

## 2024-07-18 RX ORDER — MORPHINE SULFATE 4 MG/ML
4 INJECTION, SOLUTION INTRAMUSCULAR; INTRAVENOUS ONCE
Status: COMPLETED | OUTPATIENT
Start: 2024-07-18 | End: 2024-07-18

## 2024-07-18 RX ORDER — MORPHINE SULFATE 4 MG/ML
INJECTION, SOLUTION INTRAMUSCULAR; INTRAVENOUS
Status: COMPLETED
Start: 2024-07-18 | End: 2024-07-18

## 2024-07-18 RX ORDER — FENTANYL CITRATE 50 UG/ML
50 INJECTION, SOLUTION INTRAMUSCULAR; INTRAVENOUS ONCE
Status: COMPLETED | OUTPATIENT
Start: 2024-07-18 | End: 2024-07-18

## 2024-07-18 RX ORDER — TRANEXAMIC ACID 10 MG/ML
1000 INJECTION, SOLUTION INTRAVENOUS
Status: DISCONTINUED | OUTPATIENT
Start: 2024-07-19 | End: 2024-07-19 | Stop reason: SDUPTHER

## 2024-07-18 RX ORDER — MORPHINE SULFATE 2 MG/ML
1 INJECTION, SOLUTION INTRAMUSCULAR; INTRAVENOUS EVERY 6 HOURS PRN
Status: DISCONTINUED | OUTPATIENT
Start: 2024-07-18 | End: 2024-07-25 | Stop reason: HOSPADM

## 2024-07-18 RX ORDER — MORPHINE SULFATE 2 MG/ML
2 INJECTION, SOLUTION INTRAMUSCULAR; INTRAVENOUS EVERY 6 HOURS PRN
Status: DISCONTINUED | OUTPATIENT
Start: 2024-07-18 | End: 2024-07-25 | Stop reason: HOSPADM

## 2024-07-18 RX ADMIN — MORPHINE SULFATE 4 MG: 4 INJECTION, SOLUTION INTRAMUSCULAR; INTRAVENOUS at 18:03

## 2024-07-18 RX ADMIN — MORPHINE SULFATE 2 MG: 2 INJECTION, SOLUTION INTRAMUSCULAR; INTRAVENOUS at 22:39

## 2024-07-18 RX ADMIN — FENTANYL CITRATE 50 MCG: 50 INJECTION INTRAMUSCULAR; INTRAVENOUS at 21:03

## 2024-07-18 RX ADMIN — MORPHINE SULFATE 4 MG: 4 INJECTION, SOLUTION INTRAMUSCULAR; INTRAVENOUS at 14:20

## 2024-07-18 RX ADMIN — BUMETANIDE 2 MG: 0.25 INJECTION INTRAMUSCULAR; INTRAVENOUS at 14:21

## 2024-07-18 ASSESSMENT — LIFESTYLE VARIABLES
HOW OFTEN DO YOU HAVE A DRINK CONTAINING ALCOHOL: NEVER
HOW MANY STANDARD DRINKS CONTAINING ALCOHOL DO YOU HAVE ON A TYPICAL DAY: PATIENT DOES NOT DRINK

## 2024-07-18 ASSESSMENT — PAIN DESCRIPTION - DESCRIPTORS
DESCRIPTORS: STABBING
DESCRIPTORS: ACHING;THROBBING

## 2024-07-18 ASSESSMENT — PAIN DESCRIPTION - ORIENTATION
ORIENTATION: RIGHT
ORIENTATION: RIGHT

## 2024-07-18 ASSESSMENT — PAIN DESCRIPTION - LOCATION: LOCATION: LEG;KNEE

## 2024-07-18 ASSESSMENT — PAIN SCALES - GENERAL
PAINLEVEL_OUTOF10: 10
PAINLEVEL_OUTOF10: 10

## 2024-07-18 ASSESSMENT — PAIN - FUNCTIONAL ASSESSMENT: PAIN_FUNCTIONAL_ASSESSMENT: PREVENTS OR INTERFERES WITH ALL ACTIVE AND SOME PASSIVE ACTIVITIES

## 2024-07-18 NOTE — PROGRESS NOTES
@1504 access center notified of transfer to [facility]/ intermediate  Right femur fx/ right foot metatarsal fx/ left knee wound  Dr Foreman spoke with Dr Pollard/ ortho requesting transfer  @1518 Dr Foreman spoke with Dr Babcock/ ortho/ requesting pt ER to ER  @1525 Dr Foreman spoke with Dr White/ accepting ER physician  @1530 PA ETA 2 hours @1730

## 2024-07-18 NOTE — PROGRESS NOTES
Database initiated. Patient is A&O comes in from [facility]. She uses a wheelchair and is RA at baseline. She was found on the floor this morning. Full Code status verified using facility paperwork. She has wounds to BLE that will need some attention.

## 2024-07-18 NOTE — ED PROVIDER NOTES
HPI:  7/18/24, Time: 11:58 AM EDT         Gabi Madrigal is a 84 y.o. female presenting to the ED for fall.  Patient presents from [facility].  Daughter is at bedside helping to provide history.  She states that the patient is nonambulatory at baseline and does require full assistance however does attempt to get up on her own at times.  Attempted to get up today with a walker and fell.  No head trauma or LOC.  She states that she she has been having knee pain and has an appointment with Dr. Torres today.  Patient states that she hurt her right knee again when she fell today.  She also reports pain to her lower back.  She is on Eliquis for history of A-fib.  Patient also reports a wound to her right lower extremity and pain to her right foot.  States that the wheelchair ran over her foot last week.  She has not had an x-ray of her foot or leg.  Daughter also reports a chronic wound to her left lower extremity which had markedly improved after treatment with wound care but appears worse today.  Patient denies fevers, chest pain, shortness of breath, cough, abdominal pain, nausea, vomiting, and diarrhea.  Patient noted to be hypoxic on arrival to the ED with no baseline oxygen requirement.  She states she has been compliant with her medications including her Eliquis.  She is on Bumex.  She denies history of COPD, and she has never smoked.  Patient received 50 of fentanyl by EMS prior to arrival.    --------------------------------------------- PAST HISTORY ---------------------------------------------  Past Medical History:  has a past medical history of Acquired hypothyroidism, Arthritis, Blood transfusion reaction, Chronic kidney disease, History of blood transfusion, Hypertension, Lymphedema of both lower extremities, Open wound of left great toe, Other disorders of kidney and ureter in diseases classified elsewhere, Pelvic fracture (HCC), Positive culture findings in wound, Skin ulcer of

## 2024-07-19 ENCOUNTER — ANESTHESIA (OUTPATIENT)
Dept: OPERATING ROOM | Age: 84
End: 2024-07-19
Payer: MEDICARE

## 2024-07-19 ENCOUNTER — APPOINTMENT (OUTPATIENT)
Dept: GENERAL RADIOLOGY | Age: 84
End: 2024-07-19
Payer: MEDICARE

## 2024-07-19 ENCOUNTER — ANESTHESIA EVENT (OUTPATIENT)
Dept: OPERATING ROOM | Age: 84
End: 2024-07-19
Payer: MEDICARE

## 2024-07-19 PROBLEM — S72.451A CLOSED COMMINUTED SUPRACONDYLAR FRACTURE OF FEMUR, RIGHT, INITIAL ENCOUNTER (HCC): Status: ACTIVE | Noted: 2024-07-19

## 2024-07-19 PROBLEM — I48.0 PAF (PAROXYSMAL ATRIAL FIBRILLATION) (HCC): Status: ACTIVE | Noted: 2024-07-19

## 2024-07-19 LAB
ANION GAP SERPL CALCULATED.3IONS-SCNC: 12 MMOL/L (ref 7–16)
BACTERIA URNS QL MICRO: ABNORMAL
BASOPHILS # BLD: 0.04 K/UL (ref 0–0.2)
BASOPHILS NFR BLD: 1 % (ref 0–2)
BILIRUB UR QL STRIP: NEGATIVE
BUN SERPL-MCNC: 34 MG/DL (ref 6–23)
CALCIUM SERPL-MCNC: 8 MG/DL (ref 8.6–10.2)
CHLORIDE SERPL-SCNC: 102 MMOL/L (ref 98–107)
CLARITY UR: CLEAR
CO2 SERPL-SCNC: 24 MMOL/L (ref 22–29)
COLOR UR: YELLOW
CREAT SERPL-MCNC: 1.3 MG/DL (ref 0.5–1)
EOSINOPHIL # BLD: 0.09 K/UL (ref 0.05–0.5)
EOSINOPHILS RELATIVE PERCENT: 1 % (ref 0–6)
ERYTHROCYTE [DISTWIDTH] IN BLOOD BY AUTOMATED COUNT: 15.4 % (ref 11.5–15)
GFR, ESTIMATED: 41 ML/MIN/1.73M2
GLUCOSE SERPL-MCNC: 107 MG/DL (ref 74–99)
GLUCOSE UR STRIP-MCNC: NEGATIVE MG/DL
HCT VFR BLD AUTO: 31.7 % (ref 34–48)
HGB BLD-MCNC: 9.2 G/DL (ref 11.5–15.5)
HGB UR QL STRIP.AUTO: ABNORMAL
IMM GRANULOCYTES # BLD AUTO: 0.04 K/UL (ref 0–0.58)
IMM GRANULOCYTES NFR BLD: 1 % (ref 0–5)
KETONES UR STRIP-MCNC: NEGATIVE MG/DL
LEUKOCYTE ESTERASE UR QL STRIP: ABNORMAL
LYMPHOCYTES NFR BLD: 0.91 K/UL (ref 1.5–4)
LYMPHOCYTES RELATIVE PERCENT: 12 % (ref 20–42)
MAGNESIUM SERPL-MCNC: 1.9 MG/DL (ref 1.6–2.6)
MCH RBC QN AUTO: 27.4 PG (ref 26–35)
MCHC RBC AUTO-ENTMCNC: 29 G/DL (ref 32–34.5)
MCV RBC AUTO: 94.3 FL (ref 80–99.9)
MONOCYTES NFR BLD: 0.91 K/UL (ref 0.1–0.95)
MONOCYTES NFR BLD: 12 % (ref 2–12)
NEUTROPHILS NFR BLD: 75 % (ref 43–80)
NEUTS SEG NFR BLD: 5.93 K/UL (ref 1.8–7.3)
NITRITE UR QL STRIP: POSITIVE
PH UR STRIP: 5.5 [PH] (ref 5–9)
PLATELET # BLD AUTO: 189 K/UL (ref 130–450)
PMV BLD AUTO: 10.9 FL (ref 7–12)
POTASSIUM SERPL-SCNC: 4.7 MMOL/L (ref 3.5–5)
PROT UR STRIP-MCNC: 100 MG/DL
RBC # BLD AUTO: 3.36 M/UL (ref 3.5–5.5)
RBC #/AREA URNS HPF: ABNORMAL /HPF
SODIUM SERPL-SCNC: 138 MMOL/L (ref 132–146)
SP GR UR STRIP: >1.03 (ref 1–1.03)
TROPONIN I SERPL HS-MCNC: 35 NG/L (ref 0–9)
TROPONIN I SERPL HS-MCNC: 49 NG/L (ref 0–9)
UROBILINOGEN UR STRIP-ACNC: 0.2 EU/DL (ref 0–1)
WBC #/AREA URNS HPF: ABNORMAL /HPF
WBC OTHER # BLD: 7.9 K/UL (ref 4.5–11.5)

## 2024-07-19 PROCEDURE — 7100000000 HC PACU RECOVERY - FIRST 15 MIN: Performed by: STUDENT IN AN ORGANIZED HEALTH CARE EDUCATION/TRAINING PROGRAM

## 2024-07-19 PROCEDURE — 2500000003 HC RX 250 WO HCPCS

## 2024-07-19 PROCEDURE — 36415 COLL VENOUS BLD VENIPUNCTURE: CPT

## 2024-07-19 PROCEDURE — 6370000000 HC RX 637 (ALT 250 FOR IP): Performed by: NURSE PRACTITIONER

## 2024-07-19 PROCEDURE — 2500000003 HC RX 250 WO HCPCS: Performed by: STUDENT IN AN ORGANIZED HEALTH CARE EDUCATION/TRAINING PROGRAM

## 2024-07-19 PROCEDURE — 27511 TREATMENT OF THIGH FRACTURE: CPT | Performed by: STUDENT IN AN ORGANIZED HEALTH CARE EDUCATION/TRAINING PROGRAM

## 2024-07-19 PROCEDURE — 6360000002 HC RX W HCPCS

## 2024-07-19 PROCEDURE — 1200000000 HC SEMI PRIVATE

## 2024-07-19 PROCEDURE — 83735 ASSAY OF MAGNESIUM: CPT

## 2024-07-19 PROCEDURE — 6360000002 HC RX W HCPCS: Performed by: STUDENT IN AN ORGANIZED HEALTH CARE EDUCATION/TRAINING PROGRAM

## 2024-07-19 PROCEDURE — 3700000000 HC ANESTHESIA ATTENDED CARE: Performed by: STUDENT IN AN ORGANIZED HEALTH CARE EDUCATION/TRAINING PROGRAM

## 2024-07-19 PROCEDURE — C1769 GUIDE WIRE: HCPCS | Performed by: STUDENT IN AN ORGANIZED HEALTH CARE EDUCATION/TRAINING PROGRAM

## 2024-07-19 PROCEDURE — 2709999900 HC NON-CHARGEABLE SUPPLY: Performed by: STUDENT IN AN ORGANIZED HEALTH CARE EDUCATION/TRAINING PROGRAM

## 2024-07-19 PROCEDURE — 2720000010 HC SURG SUPPLY STERILE: Performed by: STUDENT IN AN ORGANIZED HEALTH CARE EDUCATION/TRAINING PROGRAM

## 2024-07-19 PROCEDURE — 6360000002 HC RX W HCPCS: Performed by: NURSE PRACTITIONER

## 2024-07-19 PROCEDURE — 3600000005 HC SURGERY LEVEL 5 BASE: Performed by: STUDENT IN AN ORGANIZED HEALTH CARE EDUCATION/TRAINING PROGRAM

## 2024-07-19 PROCEDURE — 81001 URINALYSIS AUTO W/SCOPE: CPT

## 2024-07-19 PROCEDURE — C1713 ANCHOR/SCREW BN/BN,TIS/BN: HCPCS | Performed by: STUDENT IN AN ORGANIZED HEALTH CARE EDUCATION/TRAINING PROGRAM

## 2024-07-19 PROCEDURE — 99222 1ST HOSP IP/OBS MODERATE 55: CPT | Performed by: INTERNAL MEDICINE

## 2024-07-19 PROCEDURE — 85025 COMPLETE CBC W/AUTO DIFF WBC: CPT

## 2024-07-19 PROCEDURE — 0QSB04Z REPOSITION RIGHT LOWER FEMUR WITH INTERNAL FIXATION DEVICE, OPEN APPROACH: ICD-10-PCS | Performed by: STUDENT IN AN ORGANIZED HEALTH CARE EDUCATION/TRAINING PROGRAM

## 2024-07-19 PROCEDURE — APPSS180 APP SPLIT SHARED TIME > 60 MINUTES: Performed by: NURSE PRACTITIONER

## 2024-07-19 PROCEDURE — C1776 JOINT DEVICE (IMPLANTABLE): HCPCS | Performed by: STUDENT IN AN ORGANIZED HEALTH CARE EDUCATION/TRAINING PROGRAM

## 2024-07-19 PROCEDURE — 80048 BASIC METABOLIC PNL TOTAL CA: CPT

## 2024-07-19 PROCEDURE — 84484 ASSAY OF TROPONIN QUANT: CPT

## 2024-07-19 PROCEDURE — 84439 ASSAY OF FREE THYROXINE: CPT

## 2024-07-19 PROCEDURE — 3700000001 HC ADD 15 MINUTES (ANESTHESIA): Performed by: STUDENT IN AN ORGANIZED HEALTH CARE EDUCATION/TRAINING PROGRAM

## 2024-07-19 PROCEDURE — 3600000015 HC SURGERY LEVEL 5 ADDTL 15MIN: Performed by: STUDENT IN AN ORGANIZED HEALTH CARE EDUCATION/TRAINING PROGRAM

## 2024-07-19 PROCEDURE — L3650 SO 8 ABD RESTRAINT PRE OTS: HCPCS | Performed by: STUDENT IN AN ORGANIZED HEALTH CARE EDUCATION/TRAINING PROGRAM

## 2024-07-19 PROCEDURE — 2580000003 HC RX 258: Performed by: NURSE PRACTITIONER

## 2024-07-19 PROCEDURE — 7100000001 HC PACU RECOVERY - ADDTL 15 MIN: Performed by: STUDENT IN AN ORGANIZED HEALTH CARE EDUCATION/TRAINING PROGRAM

## 2024-07-19 PROCEDURE — 84443 ASSAY THYROID STIM HORMONE: CPT

## 2024-07-19 PROCEDURE — 2580000003 HC RX 258

## 2024-07-19 DEVICE — SCREW BNE L40MM DIA4.5MM PROX CORT TIB S STL ST LOK FULL: Type: IMPLANTABLE DEVICE | Site: FEMUR | Status: FUNCTIONAL

## 2024-07-19 DEVICE — PIN FIX L4.5MM S STL CERCLAGE THRD POS: Type: IMPLANTABLE DEVICE | Site: FEMUR | Status: FUNCTIONAL

## 2024-07-19 DEVICE — SYSTEM FIXATION SCREOPTILINK 5.0 VAL SLF TPNG SD 80: Type: IMPLANTABLE DEVICE | Site: FEMUR | Status: FUNCTIONAL

## 2024-07-19 DEVICE — CABLE SURG DIA1.7MM S STL HA CERCLAGE W/ CRMP 29880101S] DEPUY SYNTHES USA]: Type: IMPLANTABLE DEVICE | Site: FEMUR | Status: FUNCTIONAL

## 2024-07-19 DEVICE — SCREW BNE L34MM DIA4.5MM PROX CORT TIB S STL ST LOK FULL: Type: IMPLANTABLE DEVICE | Site: FEMUR | Status: FUNCTIONAL

## 2024-07-19 DEVICE — SCREW VA LCK OPTILINK SLF TP 5.0X40MM: Type: IMPLANTABLE DEVICE | Site: FEMUR | Status: FUNCTIONAL

## 2024-07-19 DEVICE — PLATE BNE L195MM 8 H ST R CNDYL S STL CRV LOK COMPR VAR ANG: Type: IMPLANTABLE DEVICE | Site: FEMUR | Status: FUNCTIONAL

## 2024-07-19 DEVICE — SCREW OPTILINK VA LCKING SLF -TP T25 SD 5.0X70MM: Type: IMPLANTABLE DEVICE | Site: FEMUR | Status: FUNCTIONAL

## 2024-07-19 DEVICE — SCREW BNE L36MM DIA4.5MM PROX CORT TIB S STL ST LOK FULL: Type: IMPLANTABLE DEVICE | Site: FEMUR | Status: FUNCTIONAL

## 2024-07-19 RX ORDER — ASCORBIC ACID 500 MG
500 TABLET ORAL DAILY
Status: DISCONTINUED | OUTPATIENT
Start: 2024-07-19 | End: 2024-07-25 | Stop reason: HOSPADM

## 2024-07-19 RX ORDER — ONDANSETRON 2 MG/ML
INJECTION INTRAMUSCULAR; INTRAVENOUS PRN
Status: DISCONTINUED | OUTPATIENT
Start: 2024-07-19 | End: 2024-07-19 | Stop reason: SDUPTHER

## 2024-07-19 RX ORDER — EPHEDRINE SULFATE/0.9% NACL/PF 25 MG/5 ML
SYRINGE (ML) INTRAVENOUS PRN
Status: DISCONTINUED | OUTPATIENT
Start: 2024-07-19 | End: 2024-07-19 | Stop reason: SDUPTHER

## 2024-07-19 RX ORDER — ONDANSETRON 2 MG/ML
4 INJECTION INTRAMUSCULAR; INTRAVENOUS
Status: DISCONTINUED | OUTPATIENT
Start: 2024-07-19 | End: 2024-07-19 | Stop reason: HOSPADM

## 2024-07-19 RX ORDER — ACETAMINOPHEN 325 MG/1
650 TABLET ORAL EVERY 6 HOURS PRN
Status: DISCONTINUED | OUTPATIENT
Start: 2024-07-19 | End: 2024-07-25 | Stop reason: HOSPADM

## 2024-07-19 RX ORDER — DEXAMETHASONE SODIUM PHOSPHATE 10 MG/ML
INJECTION, EMULSION INTRAMUSCULAR; INTRAVENOUS PRN
Status: DISCONTINUED | OUTPATIENT
Start: 2024-07-19 | End: 2024-07-19 | Stop reason: SDUPTHER

## 2024-07-19 RX ORDER — ONDANSETRON 4 MG/1
4 TABLET, ORALLY DISINTEGRATING ORAL EVERY 8 HOURS PRN
Status: DISCONTINUED | OUTPATIENT
Start: 2024-07-19 | End: 2024-07-25 | Stop reason: HOSPADM

## 2024-07-19 RX ORDER — SODIUM CHLORIDE 9 MG/ML
INJECTION, SOLUTION INTRAVENOUS PRN
Status: DISCONTINUED | OUTPATIENT
Start: 2024-07-19 | End: 2024-07-25 | Stop reason: HOSPADM

## 2024-07-19 RX ORDER — LIDOCAINE HYDROCHLORIDE 20 MG/ML
INJECTION, SOLUTION INFILTRATION; PERINEURAL PRN
Status: DISCONTINUED | OUTPATIENT
Start: 2024-07-19 | End: 2024-07-19 | Stop reason: SDUPTHER

## 2024-07-19 RX ORDER — SODIUM CHLORIDE 0.9 % (FLUSH) 0.9 %
5-40 SYRINGE (ML) INJECTION PRN
Status: DISCONTINUED | OUTPATIENT
Start: 2024-07-19 | End: 2024-07-25 | Stop reason: HOSPADM

## 2024-07-19 RX ORDER — ACETAMINOPHEN 650 MG/1
650 SUPPOSITORY RECTAL EVERY 6 HOURS PRN
Status: DISCONTINUED | OUTPATIENT
Start: 2024-07-19 | End: 2024-07-25 | Stop reason: HOSPADM

## 2024-07-19 RX ORDER — HYDROMORPHONE HYDROCHLORIDE 1 MG/ML
0.25 INJECTION, SOLUTION INTRAMUSCULAR; INTRAVENOUS; SUBCUTANEOUS EVERY 5 MIN PRN
Status: DISCONTINUED | OUTPATIENT
Start: 2024-07-19 | End: 2024-07-19 | Stop reason: HOSPADM

## 2024-07-19 RX ORDER — BUMETANIDE 1 MG/1
1 TABLET ORAL DAILY
Status: DISCONTINUED | OUTPATIENT
Start: 2024-07-19 | End: 2024-07-25 | Stop reason: HOSPADM

## 2024-07-19 RX ORDER — SODIUM CHLORIDE 0.9 % (FLUSH) 0.9 %
5-40 SYRINGE (ML) INJECTION EVERY 12 HOURS SCHEDULED
Status: DISCONTINUED | OUTPATIENT
Start: 2024-07-19 | End: 2024-07-25 | Stop reason: HOSPADM

## 2024-07-19 RX ORDER — SODIUM CHLORIDE 0.9 % (FLUSH) 0.9 %
5-40 SYRINGE (ML) INJECTION PRN
Status: DISCONTINUED | OUTPATIENT
Start: 2024-07-19 | End: 2024-07-19 | Stop reason: HOSPADM

## 2024-07-19 RX ORDER — SODIUM CHLORIDE 0.9 % (FLUSH) 0.9 %
5-40 SYRINGE (ML) INJECTION EVERY 12 HOURS SCHEDULED
Status: DISCONTINUED | OUTPATIENT
Start: 2024-07-19 | End: 2024-07-19 | Stop reason: HOSPADM

## 2024-07-19 RX ORDER — SODIUM CHLORIDE 9 MG/ML
INJECTION, SOLUTION INTRAVENOUS PRN
Status: DISCONTINUED | OUTPATIENT
Start: 2024-07-19 | End: 2024-07-19 | Stop reason: HOSPADM

## 2024-07-19 RX ORDER — FIBRINOGEN HUMAN AND THROMBIN HUMAN 1 ML
KIT TOPICAL PRN
Status: DISCONTINUED | OUTPATIENT
Start: 2024-07-19 | End: 2024-07-19 | Stop reason: ALTCHOICE

## 2024-07-19 RX ORDER — HYDROMORPHONE HYDROCHLORIDE 1 MG/ML
0.5 INJECTION, SOLUTION INTRAMUSCULAR; INTRAVENOUS; SUBCUTANEOUS EVERY 5 MIN PRN
Status: DISCONTINUED | OUTPATIENT
Start: 2024-07-19 | End: 2024-07-19 | Stop reason: HOSPADM

## 2024-07-19 RX ORDER — OXYCODONE HYDROCHLORIDE AND ACETAMINOPHEN 5; 325 MG/1; MG/1
1 TABLET ORAL EVERY 6 HOURS PRN
Qty: 28 TABLET | Refills: 0 | Status: SHIPPED | OUTPATIENT
Start: 2024-07-19 | End: 2024-07-26

## 2024-07-19 RX ORDER — LISINOPRIL 10 MG/1
10 TABLET ORAL DAILY
Status: DISCONTINUED | OUTPATIENT
Start: 2024-07-19 | End: 2024-07-25 | Stop reason: HOSPADM

## 2024-07-19 RX ORDER — ACETAMINOPHEN 325 MG/1
650 TABLET ORAL EVERY 6 HOURS
Status: DISCONTINUED | OUTPATIENT
Start: 2024-07-19 | End: 2024-07-25 | Stop reason: HOSPADM

## 2024-07-19 RX ORDER — PROPOFOL 10 MG/ML
INJECTION, EMULSION INTRAVENOUS PRN
Status: DISCONTINUED | OUTPATIENT
Start: 2024-07-19 | End: 2024-07-19 | Stop reason: SDUPTHER

## 2024-07-19 RX ORDER — FERROUS SULFATE 325(65) MG
325 TABLET ORAL
Status: DISCONTINUED | OUTPATIENT
Start: 2024-07-19 | End: 2024-07-25 | Stop reason: HOSPADM

## 2024-07-19 RX ORDER — FENTANYL CITRATE 50 UG/ML
INJECTION, SOLUTION INTRAMUSCULAR; INTRAVENOUS PRN
Status: DISCONTINUED | OUTPATIENT
Start: 2024-07-19 | End: 2024-07-19 | Stop reason: SDUPTHER

## 2024-07-19 RX ORDER — M-VIT,TX,IRON,MINS/CALC/FOLIC 27MG-0.4MG
1 TABLET ORAL DAILY
Status: DISCONTINUED | OUTPATIENT
Start: 2024-07-19 | End: 2024-07-25 | Stop reason: HOSPADM

## 2024-07-19 RX ORDER — SODIUM CHLORIDE 9 MG/ML
INJECTION, SOLUTION INTRAVENOUS CONTINUOUS PRN
Status: DISCONTINUED | OUTPATIENT
Start: 2024-07-19 | End: 2024-07-19 | Stop reason: SDUPTHER

## 2024-07-19 RX ORDER — ONDANSETRON 2 MG/ML
4 INJECTION INTRAMUSCULAR; INTRAVENOUS EVERY 6 HOURS PRN
Status: DISCONTINUED | OUTPATIENT
Start: 2024-07-19 | End: 2024-07-25 | Stop reason: HOSPADM

## 2024-07-19 RX ORDER — NALOXONE HYDROCHLORIDE 0.4 MG/ML
INJECTION, SOLUTION INTRAMUSCULAR; INTRAVENOUS; SUBCUTANEOUS PRN
Status: DISCONTINUED | OUTPATIENT
Start: 2024-07-19 | End: 2024-07-19 | Stop reason: HOSPADM

## 2024-07-19 RX ORDER — TRANEXAMIC ACID 10 MG/ML
1000 INJECTION, SOLUTION INTRAVENOUS SEE ADMIN INSTRUCTIONS
Status: COMPLETED | OUTPATIENT
Start: 2024-07-19 | End: 2024-07-19

## 2024-07-19 RX ORDER — CEFAZOLIN SODIUM 1 G/3ML
INJECTION, POWDER, FOR SOLUTION INTRAMUSCULAR; INTRAVENOUS PRN
Status: DISCONTINUED | OUTPATIENT
Start: 2024-07-19 | End: 2024-07-19 | Stop reason: SDUPTHER

## 2024-07-19 RX ORDER — TOBRAMYCIN 1.2 G/30ML
INJECTION, POWDER, LYOPHILIZED, FOR SOLUTION INTRAVENOUS PRN
Status: DISCONTINUED | OUTPATIENT
Start: 2024-07-19 | End: 2024-07-19 | Stop reason: ALTCHOICE

## 2024-07-19 RX ORDER — BISACODYL 5 MG/1
5 TABLET, DELAYED RELEASE ORAL DAILY PRN
Status: DISCONTINUED | OUTPATIENT
Start: 2024-07-19 | End: 2024-07-25 | Stop reason: HOSPADM

## 2024-07-19 RX ORDER — ROCURONIUM BROMIDE 10 MG/ML
INJECTION, SOLUTION INTRAVENOUS PRN
Status: DISCONTINUED | OUTPATIENT
Start: 2024-07-19 | End: 2024-07-19 | Stop reason: SDUPTHER

## 2024-07-19 RX ADMIN — PHENYLEPHRINE HYDROCHLORIDE 100 MCG: 10 INJECTION INTRAVENOUS at 19:45

## 2024-07-19 RX ADMIN — METOPROLOL TARTRATE 25 MG: 25 TABLET, FILM COATED ORAL at 09:28

## 2024-07-19 RX ADMIN — SUGAMMADEX 200 MG: 100 INJECTION, SOLUTION INTRAVENOUS at 19:43

## 2024-07-19 RX ADMIN — BUMETANIDE 1 MG: 1 TABLET ORAL at 09:28

## 2024-07-19 RX ADMIN — FERROUS SULFATE TAB 325 MG (65 MG ELEMENTAL FE) 325 MG: 325 (65 FE) TAB at 09:28

## 2024-07-19 RX ADMIN — SUGAMMADEX 100 MG: 100 INJECTION, SOLUTION INTRAVENOUS at 19:57

## 2024-07-19 RX ADMIN — PHENYLEPHRINE HYDROCHLORIDE 100 MCG: 10 INJECTION INTRAVENOUS at 17:56

## 2024-07-19 RX ADMIN — EPHEDRINE SULFATE 5 MG: 5 INJECTION INTRAVENOUS at 18:24

## 2024-07-19 RX ADMIN — SUGAMMADEX 100 MG: 100 INJECTION, SOLUTION INTRAVENOUS at 19:52

## 2024-07-19 RX ADMIN — DEXAMETHASONE SODIUM PHOSPHATE 10 MG: 10 INJECTION, EMULSION INTRAMUSCULAR; INTRAVENOUS at 17:49

## 2024-07-19 RX ADMIN — SODIUM CHLORIDE: 9 INJECTION, SOLUTION INTRAVENOUS at 17:39

## 2024-07-19 RX ADMIN — LISINOPRIL 10 MG: 10 TABLET ORAL at 09:28

## 2024-07-19 RX ADMIN — FENTANYL CITRATE 25 MCG: 50 INJECTION, SOLUTION INTRAMUSCULAR; INTRAVENOUS at 19:42

## 2024-07-19 RX ADMIN — CEFAZOLIN 2 G: 1 INJECTION, POWDER, FOR SOLUTION INTRAMUSCULAR; INTRAVENOUS at 18:01

## 2024-07-19 RX ADMIN — TRANEXAMIC ACID 1000 MG: 10 INJECTION, SOLUTION INTRAVENOUS at 18:07

## 2024-07-19 RX ADMIN — PROPOFOL 20 MG: 10 INJECTION, EMULSION INTRAVENOUS at 19:39

## 2024-07-19 RX ADMIN — PHENYLEPHRINE HYDROCHLORIDE 100 MCG: 10 INJECTION INTRAVENOUS at 18:15

## 2024-07-19 RX ADMIN — ROCURONIUM BROMIDE 40 MG: 10 INJECTION, SOLUTION INTRAVENOUS at 17:49

## 2024-07-19 RX ADMIN — PROPOFOL 120 MG: 10 INJECTION, EMULSION INTRAVENOUS at 17:49

## 2024-07-19 RX ADMIN — Medication 500 MG: at 09:28

## 2024-07-19 RX ADMIN — METOPROLOL TARTRATE 25 MG: 25 TABLET, FILM COATED ORAL at 00:58

## 2024-07-19 RX ADMIN — LIDOCAINE HYDROCHLORIDE 100 MG: 20 INJECTION, SOLUTION INFILTRATION; PERINEURAL at 17:49

## 2024-07-19 RX ADMIN — MORPHINE SULFATE 2 MG: 2 INJECTION, SOLUTION INTRAMUSCULAR; INTRAVENOUS at 04:44

## 2024-07-19 RX ADMIN — MORPHINE SULFATE 2 MG: 2 INJECTION, SOLUTION INTRAMUSCULAR; INTRAVENOUS at 11:39

## 2024-07-19 RX ADMIN — PHENYLEPHRINE HYDROCHLORIDE 100 MCG: 10 INJECTION INTRAVENOUS at 17:59

## 2024-07-19 RX ADMIN — Medication 1 TABLET: at 09:28

## 2024-07-19 RX ADMIN — ACETAMINOPHEN 650 MG: 325 TABLET ORAL at 01:01

## 2024-07-19 RX ADMIN — SODIUM CHLORIDE, PRESERVATIVE FREE 10 ML: 5 INJECTION INTRAVENOUS at 09:28

## 2024-07-19 RX ADMIN — EPHEDRINE SULFATE 5 MG: 5 INJECTION INTRAVENOUS at 18:34

## 2024-07-19 RX ADMIN — ROCURONIUM BROMIDE 10 MG: 10 INJECTION, SOLUTION INTRAVENOUS at 18:12

## 2024-07-19 RX ADMIN — CEFAZOLIN 2000 MG: 2 INJECTION, POWDER, FOR SOLUTION INTRAMUSCULAR; INTRAVENOUS at 17:56

## 2024-07-19 RX ADMIN — PHENYLEPHRINE HYDROCHLORIDE 100 MCG: 10 INJECTION INTRAVENOUS at 19:19

## 2024-07-19 RX ADMIN — PHENYLEPHRINE HYDROCHLORIDE 100 MCG: 10 INJECTION INTRAVENOUS at 19:54

## 2024-07-19 RX ADMIN — PHENYLEPHRINE HYDROCHLORIDE 200 MCG: 10 INJECTION INTRAVENOUS at 18:54

## 2024-07-19 RX ADMIN — ONDANSETRON 4 MG: 2 INJECTION INTRAMUSCULAR; INTRAVENOUS at 19:19

## 2024-07-19 RX ADMIN — SODIUM CHLORIDE: 9 INJECTION, SOLUTION INTRAVENOUS at 19:56

## 2024-07-19 RX ADMIN — FENTANYL CITRATE 100 MCG: 50 INJECTION, SOLUTION INTRAMUSCULAR; INTRAVENOUS at 17:49

## 2024-07-19 ASSESSMENT — PAIN DESCRIPTION - LOCATION
LOCATION: LEG
LOCATION: KNEE
LOCATION: LEG
LOCATION: LEG
LOCATION: KNEE
LOCATION: KNEE

## 2024-07-19 ASSESSMENT — PAIN DESCRIPTION - ORIENTATION
ORIENTATION: RIGHT
ORIENTATION: RIGHT
ORIENTATION: RIGHT;LEFT
ORIENTATION: RIGHT

## 2024-07-19 ASSESSMENT — PAIN DESCRIPTION - DESCRIPTORS
DESCRIPTORS: ACHING;DISCOMFORT
DESCRIPTORS: ACHING;DISCOMFORT;GNAWING
DESCRIPTORS: ACHING
DESCRIPTORS: ACHING;DISCOMFORT
DESCRIPTORS: ACHING;THROBBING
DESCRIPTORS: ACHING
DESCRIPTORS: ACHING;DISCOMFORT;DULL
DESCRIPTORS: ACHING

## 2024-07-19 ASSESSMENT — PAIN SCALES - GENERAL
PAINLEVEL_OUTOF10: 7
PAINLEVEL_OUTOF10: 10
PAINLEVEL_OUTOF10: 8
PAINLEVEL_OUTOF10: 7
PAINLEVEL_OUTOF10: 8

## 2024-07-19 ASSESSMENT — PAIN - FUNCTIONAL ASSESSMENT
PAIN_FUNCTIONAL_ASSESSMENT: PREVENTS OR INTERFERES WITH ALL ACTIVE AND SOME PASSIVE ACTIVITIES
PAIN_FUNCTIONAL_ASSESSMENT: PREVENTS OR INTERFERES SOME ACTIVE ACTIVITIES AND ADLS
PAIN_FUNCTIONAL_ASSESSMENT: PREVENTS OR INTERFERES WITH ALL ACTIVE AND SOME PASSIVE ACTIVITIES

## 2024-07-19 NOTE — CARE COORDINATION
Social Work/Case Management Transition of Care Planning ([facility] in [place] [phone]):  Patient presented to the hospital after a fall.  She resides at [facility].  Patient reported low back pain and right knee pain after the fall.  Patient was found to have a right femur fracture and closed fracture of neck of metatarsal bone in the right foot. She also was found to have a chronic wound to LLE.  Ortho surgery was consulted.  Plan is for right femur ORIF today.  Post op PT/OT evals will be needed.   Imaging of the spine was negative for anything acute.  Cardiology was consulted for surgical clearance.  Patient was cleared for surgery.  Unable to meet with patient as she was off the unit.  hone call to  primary contact, Mendy but only able to leave a message.  CM/SW will follow.  ALBINO Lackey  7/19/2024    Update:  Return call from daughter, Mendy.  She indicated patient was initially using a FWW for ambulation but then was using a wheelchair due to issues with her knees.  She is dependent on facility staff for assistance with all aspects of care.  PCP is Dr. Davidson.  Pharmacy is [facility].  ERIC history at [facility] and [facility].  Daughter stated she knows patient will need rehab and eventually long term care.  She choiced 1. [facility]  2. [facility].  CM/SW will follow.  ALBINO Lackey  7/19/2024       Case Management Assessment  Initial Evaluation    Date/Time of Evaluation: 7/19/2024 3:31 PM  Assessment Completed by: ALBINO Lackey    If patient is discharged prior to next notation, then this note serves as note for discharge by case management.    Patient Name: Gabi Madrigal                   YOB: 1940  Diagnosis: Other fracture of right femur, initial encounter for closed fracture ([facility]) [S72.8X1A]  Closed fracture of right femur, unspecified fracture morphology, unspecified portion of femur, initial encounter ([facility]) [S72.91XA]             Identified Issues/Barriers to RETURNING to current housing:   Potential Assistance needed at discharge:              Potential DME:    Patient expects to discharge to:    Plan for transportation at discharge:      Financial    Payor: [employer] MEDICARE / Plan: [employer]ET[employer] MEDICARE-ADVANTAGE PPO / Product Type: Medicare /     Does insurance require precert for SNF: Yes    Potential assistance Purchasing Medications:    Meds-to-Beds request: Yes      [id number] [place], [place] - [address] - P [phone] - F [phone]  [address] [facility] [address]  Phone: [phone] Fax: [phone]    [id number] - [place], [place] - [address] - P [phone] - F [phone]  [address] [address]  Phone: [phone] Fax: [phone]      Notes:    Factors facilitating achievement of predicted outcomes: Caregiver support    Barriers to discharge: Long standing deficits    Additional Case Management Notes:     The Plan for Transition of Care is related to the following treatment goals of Other fracture of right femur, initial encounter for closed fracture ([facility]) [S72.8X1A]  Closed fracture of right femur, unspecified fracture morphology, unspecified portion of femur, initial encounter ([facility]) [S72.91XA]    IF APPLICABLE: The Patient and/or patient representative Gabi and her family were provided with a choice of provider and agrees with the discharge plan. Freedom of choice list with basic dialogue that supports the patient's individualized plan of care/goals and shares the quality data associated with the providers was provided to:     Patient Representative Name:       The Patient and/or Patient Representative Agree with the Discharge Plan      ALBINO Lackey  Case Management Department  Ph: [phone]

## 2024-07-19 NOTE — PROGRESS NOTES
OT SESSION ATTEMPT     Date:2024  Patient Name: Gabi Madrigal  MRN: [id number]  : 1940  Room: [address] [place] [address]-A     Occupational therapy orders received/chart review completed and OT session attempted this date:   Plan for ORIF of the right femur with Dr. Newton on 2024.       Will reattempt OT at a later time/date.  Thank you,   Hunter Phillips OTR/L [id number]

## 2024-07-19 NOTE — PROGRESS NOTES
Patient is at acceptable risk for perioperative cardiovascular event for the planned procedure ( ORIF of right femur), patient may proceed without any further cardiac testing.  Please feel free to call for any further information

## 2024-07-19 NOTE — CONSULTS
Inpatient Cardiology Consultation      Reason for Consult:  Preoperative risk stratification/ cardiac clearance    Consulting Physician: Dr. Sweeney    Requesting Physician:  Dr. Tsai    Date of Consultation: 7/19/2024    HISTORY OF PRESENT ILLNESS:   Gabi Madrigal  is a 84 y.o.  female known to [facility] cardiology. Previously followed with Dr Mariscal last office visit was on 6/5/2019 and then with ROSALIA Cavazos CNP on 11/11/2022 as hospital follow up S/P mechanical fall and UTI with NSTEMI and for chronic HFpEF, PAF, HTN, and OAC.   Arrived to ED ( [place]) via EMS on 7/18/2024 at 21:45 after fall at assisted living facility. Patient reported that she was trying to wash up and reached for her walker with inability to turn her foot and lost her balance then fell. Denies hitting head.  She was found to have right femur fracture. She also has fracture to her toes. Transferred ER to ER for orthopedic evaluation.     Eliquis on hold per Primary service    Orthopedic consult completed for Right distal femur fracture. Plan for ORIF of the right femur with Dr. Newton on 7/19/2024. Patient is currently NPO.        PMH:chronic HFpEF, chronic lymphedema, PAF, HTN, CKD, chronic anemia, hypothyroidism, prior DVT, obesity.  See details below    Arrival vitals:t 98.7, R 14, P 86, /55  Significant Labs:   Lab Results   Component Value Date     07/19/2024    K 4.7 07/19/2024     07/19/2024    CO2 24 07/19/2024    BUN 34 (H) 07/19/2024    CREATININE 1.3 (H) 07/19/2024    GLUCOSE 107 (H) 07/19/2024    CALCIUM 8.0 (L) 07/19/2024    BILITOT 0.6 07/18/2024    ALKPHOS 96 07/18/2024    AST 21 07/18/2024    ALT 11 07/18/2024    LABGLOM 41 (L) 07/19/2024    GFRAA >60 10/04/2022       Lab Results   Component Value Date    WBC 7.9 07/19/2024    HGB 9.2 (L) 07/19/2024    HCT 31.7 (L) 07/19/2024    MCV 94.3 07/19/2024     07/19/2024     Lab Results   Component Value Date    CKTOTAL 242 (H) 09/22/2022    CKMB 3.6

## 2024-07-19 NOTE — PROGRESS NOTES
4 Eyes Skin Assessment     NAME:  Gabi Madrigal  YOB: 1940  MEDICAL RECORD NUMBER:  [id number]    The patient is being assessed for  Admission    I agree that at least one RN has performed a thorough Head to Toe Skin Assessment on the patient. ALL assessment sites listed below have been assessed.      Areas assessed by both nurses:    Head, Face, Ears, Shoulders, Back, Chest, Arms, Elbows, Hands, Sacrum. Buttock, Coccyx, Ischium, Legs. Feet and Heels, Under Medical Devices , and Other      Bruises on BUE and Right buttock  Blanchable buttocks and boggy heels  BLE dryness, redness and swelling and vascular discoloration.  Left tibial open wound, charted on LDA  Right 3rd toe nail discoloration.       Does the Patient have a Wound? Yes wound(s) were present on assessment. LDA wound assessment was Initiated and completed by RN       Sathya Prevention initiated by RN: Yes  Wound Care Orders initiated by RN: no    Pressure Injury (Stage 3,4, Unstageable, DTI, NWPT, and Complex wounds) if present, place Wound referral order by RN under : No    New Ostomies, if present place, Ostomy referral order under : No     Nurse 1 eSignature: Electronically signed by Diana Junior RN on 7/19/24 at 11:34 AM EDT    **SHARE this note so that the co-signing nurse can place an eSignature**    Nurse 2 eSignature: Electronically signed by Radha Singh RN on 7/19/24 at 11:47 AM EDT

## 2024-07-19 NOTE — PROGRESS NOTES
PATIENT'S POST OP SHOE & KNEE IMMOBILIZER (BOTH IN A CLEAR PLASTIC BAG LABELED WITH PATIENT LABELS) ALONG WITH HER TELEMETRY PACK SENT TO PACU WITH PATIENT POST PROCEDURE.

## 2024-07-19 NOTE — ACP (ADVANCE CARE PLANNING)
Advance Care Planning   The patient has the following advanced directives on file:  Advance Directives       Power of  Living Will ACP-Advance Directive ACP-Power of     Not on File Filed on 07/16/13 Filed Not on File            The patient has appointed the following active healthcare agents:    Primary Decision Maker: Silverio Madrigal - Child - [phone]    Primary Decision Maker: Kimani Boo - Child - [phone]    Primary Decision Maker: Paolo Madrigal - Child - [phone]    Supplemental (Other) Decision Maker: Mendy Armando - Legal Guardian - [phone]      ALBINO Lackey  7/19/2024

## 2024-07-19 NOTE — PLAN OF CARE
Problem: Skin/Tissue Integrity  Goal: Absence of new skin breakdown  Description: 1.  Monitor for areas of redness and/or skin breakdown  2.  Assess vascular access sites hourly  3.  Every 4-6 hours minimum:  Change oxygen saturation probe site  4.  Every 4-6 hours:  If on nasal continuous positive airway pressure, respiratory therapy assess nares and determine need for appliance change or resting period.  Outcome: Progressing     Problem: Safety - Adult  Goal: Free from fall injury  Outcome: Progressing     Problem: Pain  Goal: Verbalizes/displays adequate comfort level or baseline comfort level  Outcome: Progressing     Problem: ABCDS Injury Assessment  Goal: Absence of physical injury  Outcome: Progressing

## 2024-07-19 NOTE — PROGRESS NOTES
Date: 2024       Patient Name: Gabi Madrigal  : 1940      MRN: [id number]    PT order received. Chart has been reviewed. PT evaluation will be on hold due to OR this date 24. Will continue to follow and complete evaluation at later time.     Jamshid Mireles, PT

## 2024-07-19 NOTE — ED PROVIDER NOTES
[facility] EMERGENCY DEPARTMENT  EMERGENCY DEPARTMENT ENCOUNTER        Pt Name: Gabi Madrigal  MRN: [id number]  Birthdate 1940  Date of evaluation: 7/18/2024  Provider: Gladys White DO  PCP: Klarissa Davidson DO  Note Started: 10:03 PM EDT 7/18/24    CHIEF COMPLAINT       Chief Complaint   Patient presents with    Consultation     (Ortho consult, tx from [facility], fell at assisted living has R femur fracture, R 3rd, 4th toe fractures) Denies hitting head.       HISTORY OF PRESENT ILLNESS: 1 or more Elements        Limitations to history : None    Gabi Madrigal is a 84 y.o. female brought in by EMS as a [facility] transfer after a fall in assisted living facility.  She was seen at [facility] ER and found to have a distal right femur fracture.  Denies head injury or LOC.  She also has fractures to her toes.  Case was discussed with Dr. Babcock at [facility] and he recommended ER to ER for orthopedic evaluation      Nursing Notes were all reviewed and agreed with or any disagreements were addressed in the HPI.      REVIEW OF EXTERNAL NOTE :       Reviewed previous ER visit from today      Chart Review/External Note Review    Last Echo reviewed by Me:  Lab Results   Component Value Date    LVEF 67 09/25/2022             Controlled Substance Monitoring:    Acute and Chronic Pain Monitoring:        No data to display                    REVIEW OF SYSTEMS :      Positives and Pertinent negatives as per HPI.     SURGICAL HISTORY     Past Surgical History:   Procedure Laterality Date    COLONOSCOPY      ENDOSCOPY, COLON, DIAGNOSTIC      EYE SURGERY      cateract and lazor surgery 2015    FRACTURE SURGERY  2010    RT HIP    HIP FRACTURE SURGERY Left 08/08/2014    ORIF left hip    TONSILLECTOMY      as child    TOTAL HIP ARTHROPLASTY Left 10/17/2014    revision with removal of hardware       CURRENTMEDICATIONS       Previous Medications    ACETAMINOPHEN (TYLENOL) 325 MG TABLET

## 2024-07-19 NOTE — ANESTHESIA PRE PROCEDURE
Department of Anesthesiology  Preprocedure Note       Name:  Gabi Madrigal   Age:  84 y.o.  :  1940                                          MRN:  [id number]         Date:  2024      Surgeon: Surgeon(s):  Dima Newton DO    Procedure: Procedure(s):  RIGHT DISTAL FEMUR FRACTURE OPEN REDUCTION INTERNAL FIXATION    Medications prior to admission:   Prior to Admission medications    Medication Sig Start Date End Date Taking? Authorizing Provider   diclofenac sodium (VOLTAREN) 1 % GEL APPLY 2 TO 4 GRAMS EXTERNALLY TO THE AFFECTED AREA 3 TO 4 TIMES DAILY 24   Yaw Gao MD   ferrous sulfate (IRON 325) 325 (65 Fe) MG tablet Take 1 tablet by mouth daily (with breakfast)    Yaw Gao MD   lisinopril (PRINIVIL;ZESTRIL) 10 MG tablet Take 1 tablet by mouth daily    Yaw Gao MD   clobetasol (TEMOVATE) 0.05 % cream Apply topically 2 times daily. 23   Nguyen Teixeira, APRN - CNP   Multiple Vitamins-Minerals (THERAPEUTIC MULTIVITAMIN-MINERALS) tablet Take 1 tablet by mouth daily    Yaw Gao MD   bumetanide (BUMEX) 1 MG tablet TAKE 1 TABLET BY MOUTH EVERY DAY 22   Lazaro Mariscal MD   vitamin C (ASCORBIC ACID) 500 MG tablet Take 1 tablet by mouth daily    Yaw Gao MD   acetaminophen (TYLENOL) 325 MG tablet Take 2 tablets by mouth every 4 hours as needed for Pain    Yaw Gao MD   calcium-vitamin D (OSCAL-500) 500-200 MG-UNIT per tablet Take 1 tablet by mouth 2 times daily  Patient taking differently: Take 1 tablet by mouth daily 17   Abiola Herrera MD   apixaban (ELIQUIS) 5 MG TABS tablet Take 1 tablet by mouth 2 times daily 17   Abraham Goodman MD   metoprolol tartrate (LOPRESSOR) 25 MG tablet Take 1 tablet by mouth 2 times daily 17   Abraham Goodman MD   levothyroxine (SYNTHROID) 75 MCG tablet Take 1 tablet by mouth Daily    Yaw Gao MD       Current medications:    Current

## 2024-07-19 NOTE — H&P
Hospital Medicine History & Physical      PCP: Klarissa Davidson DO    Date of Admission: 7/18/2024    Date of Service: .JULY 19, 2024    Chief Complaint:   FALL      History Of Present Illness:     84 y.o. female presented with FALL.  STATES SHE WAS TRYING TO WASH UP AND SHE WENT TO REACH FOR HER WALKER AND SHE COULD NOT TURN HER FOOT AND LOST HER BALANCE.    SUSTAINED A RIGHT FEMUR FRACTURE. NO LOC    Past Medical History:          Diagnosis Date    Acquired hypothyroidism 8/14/2017    Arthritis     Blood transfusion reaction     Chronic kidney disease     History of blood transfusion     Hypertension     Lymphedema of both lower extremities 9/6/2017    Open wound of left great toe 10/18/2017    Other disorders of kidney and ureter in diseases classified elsewhere     Pelvic fracture (HCC)     Positive culture findings in wound 5/2/2023    Skin ulcer of left calf with fat layer exposed (HCC) 9/6/2017    Skin ulcer of left calf, limited to breakdown of skin (HCC) 9/6/2017    Ulcer of left lower leg (HCC) 3/24/2014    Venous stasis ulcer of left calf limited to breakdown of skin (HCC) 3/24/2014       Past Surgical History:          Procedure Laterality Date    COLONOSCOPY      ENDOSCOPY, COLON, DIAGNOSTIC      EYE SURGERY      cateract and lazor surgery 2015    FRACTURE SURGERY  2010    RT HIP    HIP FRACTURE SURGERY Left 08/08/2014    ORIF left hip    TONSILLECTOMY      as child    TOTAL HIP ARTHROPLASTY Left 10/17/2014    revision with removal of hardware       Medications Prior to Admission:      Prior to Admission medications    Medication Sig Start Date End Date Taking? Authorizing Provider   diclofenac sodium (VOLTAREN) 1 % GEL APPLY 2 TO 4 GRAMS EXTERNALLY TO THE AFFECTED AREA 3 TO 4 TIMES DAILY 2/28/24   Provider, MD Yaw   ferrous sulfate (IRON 325) 325 (65 Fe) MG tablet Take 1

## 2024-07-19 NOTE — CONSULTS
Department of Orthopedic Surgery  Resident Consult Note          Reason for Consult: Right distal femur fracture    HISTORY OF PRESENT ILLNESS:       Patient is a 84 y.o. female who presents with right distal femur fracture.  Patient originally presented to [facility] where they were subsequently transferred downtown to [facility] for orthopedic management.  Patient stated that she was using her walker when she turned around and fell directly onto her right knee.  At that time she had immediate pain and inability to ambulate.  For which she presented to the hospital.  Patient has a history of right hip ORIF and left hip total arthroplasty with revision stem.  Upon initial presentation patient is resting comfortably in bed complaining of pain only to the right knee.  Patient denies any pain anywhere else in the body.  Patient denies any increasing numbness tingling paresthesias traveling down either lower extremity.  Is of note patient does have some numbness and tingling to their lower extremities at baseline.  Patient also does endorse that she has been dealing with wounds to her lower legs for some time and goes to wound care.  No other orthopedic complaints.    Past Medical History:        Diagnosis Date    Acquired hypothyroidism 8/14/2017    Arthritis     Blood transfusion reaction     Chronic kidney disease     History of blood transfusion     Hypertension     Lymphedema of both lower extremities 9/6/2017    Open wound of left great toe 10/18/2017    Other disorders of kidney and ureter in diseases classified elsewhere     Pelvic fracture (HCC)     Positive culture findings in wound 5/2/2023    Skin ulcer of left calf with fat layer exposed ([facility]) 9/6/2017    Skin ulcer of left calf, limited to breakdown of skin (HCC) 9/6/2017    Ulcer of left lower leg (HCC) 3/24/2014    Venous stasis ulcer of left calf limited to breakdown of skin ([facility]) 3/24/2014     Past Surgical History:        Procedure Laterality Date

## 2024-07-19 NOTE — ED NOTES
ED to Inpatient Handoff Report    Notified Meghna that electronic handoff available and patient ready for transport to room 8421A.    Safety Risks: Memory Problems, Home safety issues, Difficulty with daily activities, and Risk of falls    Patient in Restraints: no    Constant Observer or Patient : no    Telemetry Monitoring Ordered :Yes           Order to transfer to unit without monitor:NO        Deterioration Index Score:   Predictive Model Details          28 (Normal)  Factor Value    Calculated 7/19/2024 07:32 48% Age 84 years old    Deterioration Index Model 29% Respiratory rate 22     12% Potassium 4.6 mmol/L     5% Sodium 142 mmol/L     3% BUN abnormal (33 mg/dL)     1% WBC count 8.5 k/uL     1% Systolic 115     0% Blood pH 7.389     0% Pulse 78     0% Temperature 98 °F (36.7 °C)     0% Pulse oximetry 95 %     0% Hematocrit 36.0 %        Vitals:    07/19/24 0630 07/19/24 0645 07/19/24 0700 07/19/24 0715   BP:  (!) 106/53  115/63   Pulse: 71 75 65 78   Resp: 18 28 13 22   Temp:  98 °F (36.7 °C)  98 °F (36.7 °C)   TempSrc:  Axillary  Axillary   SpO2: 98% 99% 97% 95%   Weight:       Height:             Opportunity for questions and clarification was provided during telephone report.

## 2024-07-20 PROBLEM — S72.91XA CLOSED FRACTURE OF RIGHT FEMUR (HCC): Status: ACTIVE | Noted: 2024-07-18

## 2024-07-20 LAB
T4 FREE SERPL-MCNC: 1.3 NG/DL (ref 0.9–1.7)
TSH SERPL DL<=0.05 MIU/L-ACNC: 2.94 UIU/ML (ref 0.27–4.2)

## 2024-07-20 PROCEDURE — 6360000002 HC RX W HCPCS

## 2024-07-20 PROCEDURE — 97161 PT EVAL LOW COMPLEX 20 MIN: CPT

## 2024-07-20 PROCEDURE — 6360000002 HC RX W HCPCS: Performed by: NURSE PRACTITIONER

## 2024-07-20 PROCEDURE — 97530 THERAPEUTIC ACTIVITIES: CPT

## 2024-07-20 PROCEDURE — 6370000000 HC RX 637 (ALT 250 FOR IP): Performed by: NURSE PRACTITIONER

## 2024-07-20 PROCEDURE — 87086 URINE CULTURE/COLONY COUNT: CPT

## 2024-07-20 PROCEDURE — 6360000002 HC RX W HCPCS: Performed by: FAMILY MEDICINE

## 2024-07-20 PROCEDURE — 2580000003 HC RX 258: Performed by: FAMILY MEDICINE

## 2024-07-20 PROCEDURE — 6370000000 HC RX 637 (ALT 250 FOR IP)

## 2024-07-20 PROCEDURE — 2580000003 HC RX 258

## 2024-07-20 PROCEDURE — 97165 OT EVAL LOW COMPLEX 30 MIN: CPT

## 2024-07-20 PROCEDURE — 1200000000 HC SEMI PRIVATE

## 2024-07-20 RX ORDER — SODIUM CHLORIDE 9 MG/ML
INJECTION, SOLUTION INTRAVENOUS CONTINUOUS
Status: DISCONTINUED | OUTPATIENT
Start: 2024-07-20 | End: 2024-07-21

## 2024-07-20 RX ADMIN — SODIUM CHLORIDE, PRESERVATIVE FREE 10 ML: 5 INJECTION INTRAVENOUS at 22:17

## 2024-07-20 RX ADMIN — MORPHINE SULFATE 2 MG: 2 INJECTION, SOLUTION INTRAMUSCULAR; INTRAVENOUS at 16:06

## 2024-07-20 RX ADMIN — CALCIUM CARBONATE-VITAMIN D TAB 500 MG-200 UNIT 1 TABLET: 500-200 TAB at 08:47

## 2024-07-20 RX ADMIN — FERROUS SULFATE TAB 325 MG (65 MG ELEMENTAL FE) 325 MG: 325 (65 FE) TAB at 08:42

## 2024-07-20 RX ADMIN — WATER 2000 MG: 1 INJECTION INTRAMUSCULAR; INTRAVENOUS; SUBCUTANEOUS at 09:21

## 2024-07-20 RX ADMIN — ACETAMINOPHEN 650 MG: 325 TABLET ORAL at 05:44

## 2024-07-20 RX ADMIN — ACETAMINOPHEN 650 MG: 325 TABLET ORAL at 10:21

## 2024-07-20 RX ADMIN — BUMETANIDE 1 MG: 1 TABLET ORAL at 08:42

## 2024-07-20 RX ADMIN — METOPROLOL TARTRATE 25 MG: 25 TABLET, FILM COATED ORAL at 22:16

## 2024-07-20 RX ADMIN — WATER 2000 MG: 1 INJECTION INTRAMUSCULAR; INTRAVENOUS; SUBCUTANEOUS at 02:39

## 2024-07-20 RX ADMIN — WATER 1000 MG: 1 INJECTION INTRAMUSCULAR; INTRAVENOUS; SUBCUTANEOUS at 10:18

## 2024-07-20 RX ADMIN — SODIUM CHLORIDE: 9 INJECTION, SOLUTION INTRAVENOUS at 10:17

## 2024-07-20 RX ADMIN — Medication 1 TABLET: at 08:42

## 2024-07-20 RX ADMIN — LEVOTHYROXINE SODIUM 75 MCG: 0.05 TABLET ORAL at 05:43

## 2024-07-20 RX ADMIN — MORPHINE SULFATE 1 MG: 2 INJECTION, SOLUTION INTRAMUSCULAR; INTRAVENOUS at 05:44

## 2024-07-20 RX ADMIN — SODIUM CHLORIDE, PRESERVATIVE FREE 10 ML: 5 INJECTION INTRAVENOUS at 08:42

## 2024-07-20 RX ADMIN — ACETAMINOPHEN 650 MG: 325 TABLET ORAL at 22:16

## 2024-07-20 RX ADMIN — Medication 500 MG: at 08:41

## 2024-07-20 ASSESSMENT — PAIN SCALES - GENERAL
PAINLEVEL_OUTOF10: 8
PAINLEVEL_OUTOF10: 7
PAINLEVEL_OUTOF10: 8

## 2024-07-20 ASSESSMENT — PAIN DESCRIPTION - ORIENTATION
ORIENTATION: RIGHT

## 2024-07-20 ASSESSMENT — PAIN SCALES - WONG BAKER: WONGBAKER_NUMERICALRESPONSE: NO HURT

## 2024-07-20 ASSESSMENT — PAIN DESCRIPTION - LOCATION
LOCATION: LEG
LOCATION: LEG
LOCATION: LEG;HIP

## 2024-07-20 ASSESSMENT — PAIN DESCRIPTION - DESCRIPTORS
DESCRIPTORS: ACHING
DESCRIPTORS: ACHING;DULL;DISCOMFORT
DESCRIPTORS: ACHING;DISCOMFORT;DULL

## 2024-07-20 NOTE — PLAN OF CARE
Problem: Skin/Tissue Integrity  Goal: Absence of new skin breakdown  Description: 1.  Monitor for areas of redness and/or skin breakdown  2.  Assess vascular access sites hourly  3.  Every 4-6 hours minimum:  Change oxygen saturation probe site  4.  Every 4-6 hours:  If on nasal continuous positive airway pressure, respiratory therapy assess nares and determine need for appliance change or resting period.  Outcome: Progressing     Problem: Safety - Adult  Goal: Free from fall injury  Outcome: Progressing     Problem: Pain  Goal: Verbalizes/displays adequate comfort level or baseline comfort level  Outcome: Progressing  Flowsheets (Taken 7/20/2024 9575)  Verbalizes/displays adequate comfort level or baseline comfort level: Encourage patient to monitor pain and request assistance     Problem: ABCDS Injury Assessment  Goal: Absence of physical injury  Outcome: Progressing     Problem: Discharge Planning  Goal: Discharge to home or other facility with appropriate resources  Outcome: Progressing

## 2024-07-20 NOTE — OP NOTE
Operative Note      Patient: Gabi Madrigal  YOB: 1940  MRN: [id number]    Date of Procedure: 7/19/2024    Pre-Op Diagnosis Codes:    Right supracondylar distal femur fracture with comminution adjacent to a cephalomedullary nail     Post-Op Diagnosis: Same       Procedure(s):  RIGHT DISTAL FEMUR FRACTURE OPEN REDUCTION INTERNAL FIXATION    Surgeon(s):  Dima Newton DO    Assistant:   Resident: Abdias Stoner DO    Anesthesia: General    Estimated Blood Loss (mL): 200     Complications: None    Specimens:   * No specimens in log *    Implants:  Implant Name Type Inv. Item Serial No.  Lot No. LRB No. Used Action   SCREW BNE L34MM DIA4.5MM PROX WALLACE TIB S STL ST LUCILA FULL - [id number]  SCREW BNE L34MM DIA4.5MM PROX WALLACE TIB S STL ST LUCILA FULL  DEPU[employer]-WD  Right 1 Implanted   SCREW BNE L40MM DIA4.5MM PROX WALLACE TIB S STL ST LUCILA FULL - [id number]  SCREW BNE L40MM DIA4.5MM PROX WALLACE TIB S STL ST LUCILA FULL  DEPUY [employer]-WD  Right 2 Implanted   SCREW OPTILINK VA LCKING SLF -TP T25 SD 5.0X70MM - [id number]  SCREW OPTILINK VA LCKING SLF -TP T25 SD 5.0X70MM  [facility] [employer]-WD  Right 2 Implanted   SYSTEM FIXATION SCREOPTILINK 5.0 GEO SLF TPNG SD 80 - [id number]  SYSTEM FIXATION SCREOPTILINK 5.0 GEO SLF TPNG SD 80  DEPUY [employer]-WD  Right 3 Implanted   PIN FIX L4.5MM S STL CERCLAGE THRD POS - [id number]  PIN FIX L4.5MM S STL CERCLAGE THRD POS  DEPUY [employer]-WD [id number] Right 1 Implanted   PIN FIX L4.5MM S STL CERCLAGE THRD POS - [id number]  PIN FIX L4.5MM S STL CERCLAGE THRD POS  DEPUY [employer]-WD [id number] Right 1 Implanted   CABLE SURG DIA1.7MM S STL HA CERCLAGE W/ CRMP [id number]] DEPUY [employer] [facility]] - [id number]  CABLE SURG DIA1.7MM S STL HA CERCLAGE W/ CRMP [id number]] DEPU[employer]]  DEPUY [employer]WD [id number] Right 1 Implanted   SCREW BNE L36MM DIA4.5MM PROX WALLACE TIB S STL ST LUCILA FULL - [id number]  SCREW BNE L36MM DIA4.5MM PROX WALLACE TIB S STL ST LUCILA FULL

## 2024-07-20 NOTE — PLAN OF CARE
Problem: Skin/Tissue Integrity  Goal: Absence of new skin breakdown  Description: 1.  Monitor for areas of redness and/or skin breakdown  2.  Assess vascular access sites hourly  3.  Every 4-6 hours minimum:  Change oxygen saturation probe site  4.  Every 4-6 hours:  If on nasal continuous positive airway pressure, respiratory therapy assess nares and determine need for appliance change or resting period.  7/20/2024 0219 by Rossy Lozano RN  Outcome: Progressing  7/19/2024 1630 by Diana Langston RN  Outcome: Progressing     Problem: Safety - Adult  Goal: Free from fall injury  7/19/2024 1630 by Diana Langston RN  Outcome: Progressing     Problem: Pain  Goal: Verbalizes/displays adequate comfort level or baseline comfort level  7/19/2024 1630 by Diana Langston RN  Outcome: Progressing     Problem: ABCDS Injury Assessment  Goal: Absence of physical injury  7/19/2024 1630 by Diana Langston RN  Outcome: Progressing

## 2024-07-20 NOTE — BRIEF OP NOTE
Brief Postoperative Note      Patient: Gabi Madrigal  YOB: 1940  MRN: [id number]    Date of Procedure: 7/19/2024    Pre-Op Diagnosis Codes:  Right supracondylar distal femur fracture with comminution adjacent to a cephalomedullary nail    Post-Op Diagnosis: Same       Procedure(s):  RIGHT DISTAL FEMUR FRACTURE OPEN REDUCTION INTERNAL FIXATION    Surgeon(s):  Dima Newton DO    Assistant:  Resident: Abdias Stoner DO    Anesthesia: General    Estimated Blood Loss (mL): 200     Complications: None    Specimens:   * No specimens in log *    Implants:  Implant Name Type Inv. Item Serial No.  Lot No. LRB No. Used Action   SCREW BNE L34MM DIA4.5MM PROX WALLACE TIB S STL ST LUCILA FULL - [id number]  SCREW BNE L34MM DIA4.5MM PROX WALLACE TIB S STL ST LUCILA FULL  DEPU[employer]-WD  Right 1 Implanted   SCREW BNE L40MM DIA4.5MM PROX WALLACE TIB S STL ST LUCILA FULL - [id number]  SCREW BNE L40MM DIA4.5MM PROX WALLACE TIB S STL ST LUCILA FULL  DEPU[employer]-WD  Right 2 Implanted   SCREW OPTILINK VA LCKING SLF -TP T25 SD 5.0X70MM - [id number]  SCREW OPTILINK VA LCKING SLF -TP T25 SD 5.0X70MM  [employer]-WD  Right 2 Implanted   SYSTEM FIXATION SCREOPTILINK 5.0 GEO SLF TPNG SD 80 - [id number]  SYSTEM FIXATION SCREOPTILINK 5.0 GEO SLF TPNG SD 80  DEPUY [employer]-WD  Right 3 Implanted   PIN FIX L4.5MM S STL CERCLAGE THRD POS - [id number]  PIN FIX L4.5MM S STL CERCLAGE THRD POS  DEPU[employer]-WD [id number] Right 1 Implanted   PIN FIX L4.5MM S STL CERCLAGE THRD POS - [id number]  PIN FIX L4.5MM S STL CERCLAGE THRD POS  DEPU[employer]-WD [id number] Right 1 Implanted   CABLE SURG DIA1.7MM S STL HA CERCLAGE W/ CRMP [id number]] DEPUY [employer] [facility]] - [id number]  CABLE SURG DIA1.7MM S STL HA CERCLAGE W/ CRMP [id number]] DEPU[employer]]  DEPUY [employer]WD [id number] Right 1 Implanted   SCREW BNE L36MM DIA4.5MM PROX WALLACE TIB S STL ST LUCILA FULL - [id number]  SCREW BNE L36MM DIA4.5MM PROX WALLACE TIB S STL ST

## 2024-07-20 NOTE — ANESTHESIA POSTPROCEDURE EVALUATION
Department of Anesthesiology  Postprocedure Note    Patient: Gabi Madrigal  MRN: [id number]  YOB: 1940  Date of evaluation: 7/19/2024    Procedure Summary       Date: 07/19/24 Room / Location: [address]    Anesthesia Start: 1739 Anesthesia Stop: 2014    Procedure: RIGHT DISTAL FEMUR FRACTURE OPEN REDUCTION INTERNAL FIXATION (Right: Leg Upper) Diagnosis:       Closed fracture of right femur, unspecified fracture morphology, unspecified portion of femur, initial encounter ([facility])      (Closed fracture of right femur, unspecified fracture morphology, unspecified portion of femur, initial encounter ([facility]) [S72.91XA])    Surgeons: Dima Newton DO Responsible Provider: Peace Cary MD    Anesthesia Type: general ASA Status: 4            Anesthesia Type: No value filed.    Nando Phase I: Nando Score: 8    Nando Phase II:      Anesthesia Post Evaluation    Patient location during evaluation: PACU  Patient participation: complete - patient participated  Level of consciousness: awake and alert  Airway patency: patent  Nausea & Vomiting: no nausea and no vomiting  Cardiovascular status: blood pressure returned to baseline and hemodynamically stable  Respiratory status: acceptable and spontaneous ventilation  Hydration status: euvolemic  Multimodal analgesia pain management approach  Pain management: adequate    No notable events documented.

## 2024-07-20 NOTE — PROGRESS NOTES
[facility]                                Progress note    Subjective:    The patient is awake and alert, working with physical therapy  No acute events overnight.    Denies chest pain, angina, SOB     Objective:    BP (!) 91/58   Pulse 82   Temp 97.3 °F (36.3 °C) (Temporal)   Resp 18   Ht 1.6 m (5' 3\")   Wt 92.1 kg (203 lb)   SpO2 92%   BMI 35.96 kg/m²     In: 1310 [P.O.:210; I.V.:1100]  Out: 500   In: 1310   Out: 500 [Urine:400]    General appearance: NAD, conversant, pleasant  HEENT: AT/NC, MMM  Neck: FROM, supple  Lungs: Clear to auscultation  CV: RRR, no MRGs  Vasc: Radial pulses 2+  Abdomen: Soft, non-tender; no masses or HSM  Extremities: No peripheral edema or digital cyanosis, limited ROM right lower extremity.  Skin: no rash, lesions or ulcers  Psych: Alert and oriented to person, place and time  Neuro: Alert and interactive     Recent Labs     07/18/24  1255 07/19/24  0807   WBC 8.5 7.9   HGB 10.4* 9.2*   HCT 36.0 31.7*    189       Recent Labs     07/18/24  1255 07/19/24  0807    138   K 4.6 4.7    102   CO2 29 24   BUN 33* 34*   CREATININE 1.1* 1.3*   CALCIUM 8.4* 8.0*       Assessment:    Principal Problem:    Other fracture of right femur, initial encounter for closed fracture ([facility])  Active Problems:    Fall    Essential hypertension    Acquired hypothyroidism    PAF (paroxysmal atrial fibrillation) ([facility])    Closed comminuted supracondylar fracture of femur, right, initial encounter ([facility])  Resolved Problems:    * No resolved hospital problems. *      Plan:  84 year old female admitted to med surg unit with right femur fracture    7/20/2024  Vital signs stable  S/P - 7/19/2024 - ORIF R femur  BUN/Creat: 34/1.3  Initiate IV hydration NS @ 75  Continue to monitor  Monitor H&H -  9.2/31/7  Encourage ISP   Pain is well controlled  Bowel regimen  PT OT working with patient      Code status:   Consultants:  Orthopedic surgery    DVT Prophylaxis   PT/OT  Discharge

## 2024-07-20 NOTE — DISCHARGE INSTRUCTIONS
[facility] Department of Orthopedic Surgery  [address]   [phone]   [place] [address]    Dr. Dima Newton, DO    Orthopaedics Discharge Instructions   Weight bearing Status - Non-weight bearing - on right lower Extremity  Pain medication Per Prescriptions  Contact Office for Medication Refill- [phone]  Office can refill pain med every 7 days  If patient discharging to facility then pain control will be continued per facility physician  Ice to operative/injured site for 15-30 minutes of each hour for next 5 days    Recommend that you continue to ice the area 2-3 times per day after this   Elevate operative/injured limb on 2 pillows at home  Goal is to have limb above the heart if able  Continue DVT Prophylaxis (blood clot prevention) as Prescribed: Resume home eliquis   Wound care - Can take off the dressing at the surgical site seven days after the date of surgery. Can just peel off. After, do daily dressing changes as needed until the drainage from the surgical site ceases    Follow Up in Office in 2 weeks. Your first post op appointment is often the PAs.     Call the office at [phone] directions or with any questions.  Watch for these significant complications.  Call physician if they or any other problems occur:  Fever over 101°, redness, swelling or warmth at the operative site  Unrelieved nausea    Foul smelling or cloudy drainage at the operative site   Unrelieved pain    Blood soaked dressing. (Some oozing may be normal)     Numb, pale, blue, cold or tingling extremity    No future appointments.      It is the Department of Orthopaedic Trauma's standard of practice that providers will de-escalate(wean) all patients from narcotic(opioid) medications during the post-operative period.   We provide multimodal pain control but opioid medications are tapered in all of our patients.  If patient requires referral to pain management for prolonged taper off of opioid pain medication we

## 2024-07-20 NOTE — PROGRESS NOTES
OCCUPATIONAL THERAPY INITIAL EVALUATION    [facility]  [address], [place]          Date:2024                                                   Patient Name: Gabi Madrigal     MRN: [id number]     : 1940     Room: [address] [place] [address]       Evaluating OT: Arianna Alanis OTD, OTR/L, [id number]      Referring Provider:     Abdias Stoner DO       Specific Provider Orders/Date: OT eval and treat (24)    Diagnosis: Other fracture of right femur, initial encounter for closed fracture ([facility]) [S72.8X1A]  Closed fracture of right femur, unspecified fracture morphology, unspecified portion of femur, initial encounter ([facility]) [S72.91XA]    Surgeries/Procedures:  : RIGHT DISTAL FEMUR FRACTURE OPEN REDUCTION INTERNAL FIXATION       Pt admitted following fall resulting in R femur fracture, closed fracture of R metatarsal, LLE wound.      Pertinent Medical History:       has a past medical history of Acquired hypothyroidism, Arthritis, Blood transfusion reaction, Chronic kidney disease, History of blood transfusion, Hypertension, Lymphedema of both lower extremities, Open wound of left great toe, Other disorders of kidney and ureter in diseases classified elsewhere, Pelvic fracture ([facility]), Positive culture findings in wound, Skin ulcer of left calf with fat layer exposed (HCC), Skin ulcer of left calf, limited to breakdown of skin (HCC), Ulcer of left lower leg (HCC), and Venous stasis ulcer of left calf limited to breakdown of skin (HCC).         Precautions:  Fall Risk, contact isolation (MRSA), NWB RLE, RLE knee immobilizer locked at 0-30 degrees, ulnar drift deformities L 2-5 digits, LLE wound, alarms+     Assessment of current deficits    [x] Functional mobility  [x]ADLs  [x] Strength               [x]Cognition    [x] Functional transfers   [x] IADLs         [x] Safety Awareness   [x]Endurance    [x] Fine Coordination              [x] Balance   Positioning/Alignment: for optimal healing, skin integrity, to prevent breakdown, decrease edema, and reduce risk of contracture.  Skilled Monitoring of Vitals: to include BP, spO2, and HR throughout session to maximize safety.  Sitting/Standing Balance/Tolerance: Pt seated at EOB for ~10-15 min to increase balance and activity tolerance during ADLs and facilitate proper posture and positioning.      Rehab Potential: Good for established goals     Patient / Family Goal: to return to PLOF      Patient and/or family were instructed on functional diagnosis, prognosis/goals and OT plan of care. Demonstrated fair+ understanding.     Eval Complexity: Low    Time In: 1025  Time Out: 1051  Total Treatment Time: 11 min    Min Units   OT Eval Low 57906 X      OT Eval Medium 27260      OT Eval High 97237      OT Re-Eval 11497       Therapeutic Ex 30336       Therapeutic Activities 21480 11  1    ADL/Self Care 64105       Orthotic Management 36801       Manual 10626     Neuro Re-Ed 72744       Non-Billable Time          Evaluation Time additionally includes thorough review of current medical information, gathering information on past medical history/social history and prior level of function, interpretation of standardized testing/informal observation of tasks, assessment of data and development of plan of care and goals.            Arianna Alanis, OTD,  OTR/L; [id number]

## 2024-07-21 LAB
ANION GAP SERPL CALCULATED.3IONS-SCNC: 13 MMOL/L (ref 7–16)
BASOPHILS # BLD: 0.01 K/UL (ref 0–0.2)
BASOPHILS NFR BLD: 0 % (ref 0–2)
BUN SERPL-MCNC: 49 MG/DL (ref 6–23)
CALCIUM SERPL-MCNC: 7.9 MG/DL (ref 8.6–10.2)
CHLORIDE SERPL-SCNC: 100 MMOL/L (ref 98–107)
CO2 SERPL-SCNC: 25 MMOL/L (ref 22–29)
CREAT SERPL-MCNC: 1.8 MG/DL (ref 0.5–1)
EOSINOPHIL # BLD: 0 K/UL (ref 0.05–0.5)
EOSINOPHILS RELATIVE PERCENT: 0 % (ref 0–6)
ERYTHROCYTE [DISTWIDTH] IN BLOOD BY AUTOMATED COUNT: 15.2 % (ref 11.5–15)
GFR, ESTIMATED: 27 ML/MIN/1.73M2
GLUCOSE SERPL-MCNC: 145 MG/DL (ref 74–99)
HCT VFR BLD AUTO: 26 % (ref 34–48)
HGB BLD-MCNC: 7.6 G/DL (ref 11.5–15.5)
IMM GRANULOCYTES # BLD AUTO: 0.07 K/UL (ref 0–0.58)
IMM GRANULOCYTES NFR BLD: 1 % (ref 0–5)
LYMPHOCYTES NFR BLD: 0.57 K/UL (ref 1.5–4)
LYMPHOCYTES RELATIVE PERCENT: 5 % (ref 20–42)
MCH RBC QN AUTO: 27.4 PG (ref 26–35)
MCHC RBC AUTO-ENTMCNC: 29.2 G/DL (ref 32–34.5)
MCV RBC AUTO: 93.9 FL (ref 80–99.9)
MICROORGANISM SPEC CULT: NO GROWTH
MONOCYTES NFR BLD: 1.08 K/UL (ref 0.1–0.95)
MONOCYTES NFR BLD: 10 % (ref 2–12)
NEUTROPHILS NFR BLD: 85 % (ref 43–80)
NEUTS SEG NFR BLD: 9.55 K/UL (ref 1.8–7.3)
PLATELET # BLD AUTO: 222 K/UL (ref 130–450)
PMV BLD AUTO: 10.8 FL (ref 7–12)
POTASSIUM SERPL-SCNC: 5.1 MMOL/L (ref 3.5–5)
RBC # BLD AUTO: 2.77 M/UL (ref 3.5–5.5)
RBC # BLD: ABNORMAL 10*6/UL
SODIUM SERPL-SCNC: 138 MMOL/L (ref 132–146)
SPECIMEN DESCRIPTION: NORMAL
WBC OTHER # BLD: 11.3 K/UL (ref 4.5–11.5)

## 2024-07-21 PROCEDURE — 6370000000 HC RX 637 (ALT 250 FOR IP): Performed by: NURSE PRACTITIONER

## 2024-07-21 PROCEDURE — 1200000000 HC SEMI PRIVATE

## 2024-07-21 PROCEDURE — 6360000002 HC RX W HCPCS: Performed by: FAMILY MEDICINE

## 2024-07-21 PROCEDURE — 2580000003 HC RX 258

## 2024-07-21 PROCEDURE — 85025 COMPLETE CBC W/AUTO DIFF WBC: CPT

## 2024-07-21 PROCEDURE — 2580000003 HC RX 258: Performed by: FAMILY MEDICINE

## 2024-07-21 PROCEDURE — 36415 COLL VENOUS BLD VENIPUNCTURE: CPT

## 2024-07-21 PROCEDURE — 80048 BASIC METABOLIC PNL TOTAL CA: CPT

## 2024-07-21 PROCEDURE — 6370000000 HC RX 637 (ALT 250 FOR IP)

## 2024-07-21 PROCEDURE — 6370000000 HC RX 637 (ALT 250 FOR IP): Performed by: FAMILY MEDICINE

## 2024-07-21 RX ORDER — SODIUM CHLORIDE 9 MG/ML
INJECTION, SOLUTION INTRAVENOUS CONTINUOUS
Status: DISCONTINUED | OUTPATIENT
Start: 2024-07-21 | End: 2024-07-23

## 2024-07-21 RX ADMIN — SODIUM CHLORIDE, PRESERVATIVE FREE 10 ML: 5 INJECTION INTRAVENOUS at 10:13

## 2024-07-21 RX ADMIN — ACETAMINOPHEN 650 MG: 325 TABLET ORAL at 15:34

## 2024-07-21 RX ADMIN — FERROUS SULFATE TAB 325 MG (65 MG ELEMENTAL FE) 325 MG: 325 (65 FE) TAB at 10:17

## 2024-07-21 RX ADMIN — Medication 500 MG: at 10:12

## 2024-07-21 RX ADMIN — SODIUM CHLORIDE: 9 INJECTION, SOLUTION INTRAVENOUS at 06:46

## 2024-07-21 RX ADMIN — ACETAMINOPHEN 650 MG: 325 TABLET ORAL at 22:33

## 2024-07-21 RX ADMIN — Medication 1 TABLET: at 10:12

## 2024-07-21 RX ADMIN — CALCIUM CARBONATE-VITAMIN D TAB 500 MG-200 UNIT 1 TABLET: 500-200 TAB at 10:12

## 2024-07-21 RX ADMIN — METOPROLOL TARTRATE 25 MG: 25 TABLET, FILM COATED ORAL at 20:31

## 2024-07-21 RX ADMIN — ACETAMINOPHEN 650 MG: 325 TABLET ORAL at 05:18

## 2024-07-21 RX ADMIN — WATER 1000 MG: 1 INJECTION INTRAMUSCULAR; INTRAVENOUS; SUBCUTANEOUS at 10:13

## 2024-07-21 RX ADMIN — SODIUM ZIRCONIUM CYCLOSILICATE 10 G: 10 POWDER, FOR SUSPENSION ORAL at 10:17

## 2024-07-21 RX ADMIN — ACETAMINOPHEN 650 MG: 325 TABLET ORAL at 10:12

## 2024-07-21 RX ADMIN — METOPROLOL TARTRATE 25 MG: 25 TABLET, FILM COATED ORAL at 10:12

## 2024-07-21 RX ADMIN — SODIUM CHLORIDE, PRESERVATIVE FREE 10 ML: 5 INJECTION INTRAVENOUS at 20:34

## 2024-07-21 RX ADMIN — LEVOTHYROXINE SODIUM 75 MCG: 0.05 TABLET ORAL at 05:19

## 2024-07-21 ASSESSMENT — PAIN SCALES - GENERAL: PAINLEVEL_OUTOF10: 2

## 2024-07-21 NOTE — PROGRESS NOTES
Department of Orthopedic Surgery  Resident Progress Note    Patient seen and examined. Pain controlled. No new complaints.  Resting in bed comfortably. We discussed her surgery and post operative plan.     VITALS:  BP (!) 102/53   Pulse 70   Temp 97 °F (36.1 °C) (Temporal)   Resp 18   Ht 1.6 m (5' 3\")   Wt 93.9 kg (207 lb 0.2 oz)   SpO2 98%   BMI 36.67 kg/m²     General: awake, alert, cooperative in no acute distress.     MUSCULOSKELETAL:   right lower extremity:  Dressing C/D/I  Compartments soft and compressible  +PF/DF/EHL  +2/4 DP & PT pulses, Brisk Cap refill, Toes warm and perfused  Distal sensation grossly intact to Peroneals, Sural, Saphenous, and tibial nrs    CBC:   Lab Results   Component Value Date/Time    WBC 11.3 07/21/2024 04:15 AM    HGB 7.6 07/21/2024 04:15 AM    HCT 26.0 07/21/2024 04:15 AM     07/21/2024 04:15 AM     PT/INR:    Lab Results   Component Value Date/Time    PROTIME 11.1 08/15/2017 08:40 PM    PROTIME 11.6 07/12/2011 05:45 AM    INR 1.0 08/15/2017 08:40 PM           ASSESSMENT  S/P R peterson implant supracondylar distal femur ORIF - 7/19    PLAN      Continue physical therapy and protocol: NWB - RLE  24 hour abx coverage completed  Deep venous thrombosis prophylaxis - home eliquis ok to resume today from an orthopedic standpoint, early mobilization  PT/OT  Pain Control: IV and PO wean narcotics as able  Monitor H&H 7.6 transfuse below 7  D/C Plan:  per PT/OT/SW recs. Appreciate input

## 2024-07-21 NOTE — PROGRESS NOTES
[facility]                                Progress note    Subjective:    The patient is awake and alert, working with physical therapy  No acute events overnight.    Denies chest pain, angina, SOB     Objective:    BP (!) 109/53   Pulse 70   Temp 97.3 °F (36.3 °C) (Temporal)   Resp 18   Ht 1.6 m (5' 3\")   Wt 93.9 kg (207 lb 0.2 oz)   SpO2 96%   BMI 36.67 kg/m²     In: 200 [P.O.:200]  Out: -   In: 200   Out: -     General appearance: NAD, conversant, pleasant  HEENT: AT/NC, MMM  Neck: FROM, supple  Lungs: Clear to auscultation  CV: RRR, no MRGs  Vasc: Radial pulses 2+  Abdomen: Soft, non-tender; no masses or HSM  Extremities: No peripheral edema or digital cyanosis, limited ROM right lower extremity.  Skin: no rash, lesions or ulcers  Psych: Alert and oriented to person, place and time  Neuro: Alert and interactive     Recent Labs     07/19/24  0807 07/21/24  0415   WBC 7.9 11.3   HGB 9.2* 7.6*   HCT 31.7* 26.0*    222       Recent Labs     07/19/24  0807 07/21/24  0415    138   K 4.7 5.1*    100   CO2 24 25   BUN 34* 49*   CREATININE 1.3* 1.8*   CALCIUM 8.0* 7.9*       Assessment:    Principal Problem:    Closed fracture of right femur ([facility])  Active Problems:    Fall    Essential hypertension    Acquired hypothyroidism    PAF (paroxysmal atrial fibrillation) ([facility])    Closed comminuted supracondylar fracture of femur, right, initial encounter ([facility])  Resolved Problems:    * No resolved hospital problems. *      Plan:  84 year old female admitted to med surg unit with right femur fracture    7/20/2024  Vital signs stable  S/P - 7/19/2024 - ORIF R femur  BUN/Creat: 34/1.3  Initiate IV hydration NS @ 75  Continue to monitor  Monitor H&H -  9.2/31/7  Encourage ISP   Pain is well controlled  Bowel regimen  PT OT working with patient    7/21/2024  Vital signs stable  Pain is controlled she states she is not having any pain.  Low urinary output overnight  IV hydration  Hgb 7.6

## 2024-07-21 NOTE — PLAN OF CARE
Problem: Skin/Tissue Integrity  Goal: Absence of new skin breakdown  7/21/2024 0100 by Socorro Gagnon RN  Outcome: Progressing  7/20/2024 1707 by Diana Langston RN  Outcome: Progressing     Problem: Safety - Adult  Goal: Free from fall injury  7/21/2024 0100 by Socorro Gagnon RN  Outcome: Progressing  7/20/2024 1707 by Diana Langston RN  Outcome: Progressing     Problem: Pain  Goal: Verbalizes/displays adequate comfort level or baseline comfort level  7/21/2024 0100 by Socorro Gagnon RN  Outcome: Progressing  7/20/2024 1707 by Diana Langston RN  Outcome: Progressing  Flowsheets (Taken 7/20/2024 0745)  Verbalizes/displays adequate comfort level or baseline comfort level: Encourage patient to monitor pain and request assistance     Problem: ABCDS Injury Assessment  Goal: Absence of physical injury  7/21/2024 0100 by Socorro Gagnon RN  Outcome: Progressing  7/20/2024 1707 by Diana Langston RN  Outcome: Progressing     Problem: Discharge Planning  Goal: Discharge to home or other facility with appropriate resources  7/21/2024 0100 by Socorro Gagnon RN  Outcome: Progressing  7/20/2024 1707 by Diana Langston RN  Outcome: Progressing

## 2024-07-21 NOTE — PLAN OF CARE
Problem: Skin/Tissue Integrity  Goal: Absence of new skin breakdown  Description: 1.  Monitor for areas of redness and/or skin breakdown  2.  Assess vascular access sites hourly  3.  Every 4-6 hours minimum:  Change oxygen saturation probe site  4.  Every 4-6 hours:  If on nasal continuous positive airway pressure, respiratory therapy assess nares and determine need for appliance change or resting period.  Outcome: Progressing     Problem: Safety - Adult  Goal: Free from fall injury  Outcome: Progressing     Problem: Pain  Goal: Verbalizes/displays adequate comfort level or baseline comfort level  Outcome: Progressing     Problem: ABCDS Injury Assessment  Goal: Absence of physical injury  Outcome: Progressing     Problem: Discharge Planning  Goal: Discharge to home or other facility with appropriate resources  Outcome: Progressing

## 2024-07-21 NOTE — PROGRESS NOTES
Encouraged pt to drink her water through the night, she is taking in some now.  Pt was changed for small amount of incontinent urine on her pad and approx 100 cc in purwick canister.  Bladder scam showed zero urine.

## 2024-07-22 LAB
ANION GAP SERPL CALCULATED.3IONS-SCNC: 11 MMOL/L (ref 7–16)
BNP SERPL-MCNC: 7625 PG/ML (ref 0–450)
BUN SERPL-MCNC: 56 MG/DL (ref 6–23)
CALCIUM SERPL-MCNC: 8.2 MG/DL (ref 8.6–10.2)
CHLORIDE SERPL-SCNC: 101 MMOL/L (ref 98–107)
CO2 SERPL-SCNC: 24 MMOL/L (ref 22–29)
CREAT SERPL-MCNC: 1.8 MG/DL (ref 0.5–1)
ERYTHROCYTE [DISTWIDTH] IN BLOOD BY AUTOMATED COUNT: 15.3 % (ref 11.5–15)
GFR, ESTIMATED: 27 ML/MIN/1.73M2
GLUCOSE SERPL-MCNC: 98 MG/DL (ref 74–99)
HCT VFR BLD AUTO: 26.6 % (ref 34–48)
HGB BLD-MCNC: 7.6 G/DL (ref 11.5–15.5)
MCH RBC QN AUTO: 26.6 PG (ref 26–35)
MCHC RBC AUTO-ENTMCNC: 28.6 G/DL (ref 32–34.5)
MCV RBC AUTO: 93 FL (ref 80–99.9)
PLATELET # BLD AUTO: 230 K/UL (ref 130–450)
PMV BLD AUTO: 10.5 FL (ref 7–12)
POTASSIUM SERPL-SCNC: 4.8 MMOL/L (ref 3.5–5)
RBC # BLD AUTO: 2.86 M/UL (ref 3.5–5.5)
SODIUM SERPL-SCNC: 136 MMOL/L (ref 132–146)
WBC OTHER # BLD: 6.8 K/UL (ref 4.5–11.5)

## 2024-07-22 PROCEDURE — 2580000003 HC RX 258

## 2024-07-22 PROCEDURE — 6370000000 HC RX 637 (ALT 250 FOR IP): Performed by: FAMILY MEDICINE

## 2024-07-22 PROCEDURE — 85027 COMPLETE CBC AUTOMATED: CPT

## 2024-07-22 PROCEDURE — 6370000000 HC RX 637 (ALT 250 FOR IP)

## 2024-07-22 PROCEDURE — 97535 SELF CARE MNGMENT TRAINING: CPT

## 2024-07-22 PROCEDURE — 80048 BASIC METABOLIC PNL TOTAL CA: CPT

## 2024-07-22 PROCEDURE — 1200000000 HC SEMI PRIVATE

## 2024-07-22 PROCEDURE — 97530 THERAPEUTIC ACTIVITIES: CPT

## 2024-07-22 PROCEDURE — 6360000002 HC RX W HCPCS: Performed by: FAMILY MEDICINE

## 2024-07-22 PROCEDURE — 6370000000 HC RX 637 (ALT 250 FOR IP): Performed by: NURSE PRACTITIONER

## 2024-07-22 PROCEDURE — 2700000000 HC OXYGEN THERAPY PER DAY

## 2024-07-22 PROCEDURE — 36415 COLL VENOUS BLD VENIPUNCTURE: CPT

## 2024-07-22 PROCEDURE — 83880 ASSAY OF NATRIURETIC PEPTIDE: CPT

## 2024-07-22 PROCEDURE — 2580000003 HC RX 258: Performed by: FAMILY MEDICINE

## 2024-07-22 RX ORDER — POLYETHYLENE GLYCOL 3350 17 G/17G
17 POWDER, FOR SOLUTION ORAL DAILY PRN
Status: DISCONTINUED | OUTPATIENT
Start: 2024-07-22 | End: 2024-07-25 | Stop reason: HOSPADM

## 2024-07-22 RX ADMIN — ACETAMINOPHEN 650 MG: 325 TABLET ORAL at 16:04

## 2024-07-22 RX ADMIN — LEVOTHYROXINE SODIUM 75 MCG: 0.05 TABLET ORAL at 06:06

## 2024-07-22 RX ADMIN — BISACODYL 5 MG: 5 TABLET, COATED ORAL at 10:34

## 2024-07-22 RX ADMIN — FERROUS SULFATE TAB 325 MG (65 MG ELEMENTAL FE) 325 MG: 325 (65 FE) TAB at 10:06

## 2024-07-22 RX ADMIN — Medication 500 MG: at 10:06

## 2024-07-22 RX ADMIN — METOPROLOL TARTRATE 25 MG: 25 TABLET, FILM COATED ORAL at 10:06

## 2024-07-22 RX ADMIN — WATER 1000 MG: 1 INJECTION INTRAMUSCULAR; INTRAVENOUS; SUBCUTANEOUS at 10:07

## 2024-07-22 RX ADMIN — SODIUM CHLORIDE, PRESERVATIVE FREE 10 ML: 5 INJECTION INTRAVENOUS at 20:54

## 2024-07-22 RX ADMIN — CALCIUM CARBONATE-VITAMIN D TAB 500 MG-200 UNIT 1 TABLET: 500-200 TAB at 10:06

## 2024-07-22 RX ADMIN — ACETAMINOPHEN 650 MG: 325 TABLET ORAL at 20:53

## 2024-07-22 RX ADMIN — POLYETHYLENE GLYCOL 3350 17 G: 17 POWDER, FOR SOLUTION ORAL at 16:04

## 2024-07-22 RX ADMIN — ACETAMINOPHEN 650 MG: 325 TABLET ORAL at 06:06

## 2024-07-22 RX ADMIN — SODIUM CHLORIDE: 9 INJECTION, SOLUTION INTRAVENOUS at 03:00

## 2024-07-22 RX ADMIN — METOPROLOL TARTRATE 25 MG: 25 TABLET, FILM COATED ORAL at 20:53

## 2024-07-22 RX ADMIN — APIXABAN 5 MG: 5 TABLET, FILM COATED ORAL at 20:53

## 2024-07-22 RX ADMIN — Medication 1 TABLET: at 10:07

## 2024-07-22 RX ADMIN — ACETAMINOPHEN 650 MG: 325 TABLET ORAL at 10:06

## 2024-07-22 ASSESSMENT — PAIN DESCRIPTION - LOCATION: LOCATION: KNEE

## 2024-07-22 ASSESSMENT — PAIN SCALES - GENERAL
PAINLEVEL_OUTOF10: 5
PAINLEVEL_OUTOF10: 5

## 2024-07-22 ASSESSMENT — PAIN DESCRIPTION - DESCRIPTORS: DESCRIPTORS: DISCOMFORT;DULL;SORE;TENDER

## 2024-07-22 ASSESSMENT — PAIN DESCRIPTION - ORIENTATION: ORIENTATION: RIGHT

## 2024-07-22 NOTE — PROGRESS NOTES
Physical Therapy  Physical Therapy Treatment Note    Name: Gabi Madrigal  : 1940  MRN: [id number]      Date of Service: 2024    Evaluating PT:  David Bauer, PT, DPT  [id number]     Room #:  8421/8421-A  Diagnosis:  Other fracture of right femur, initial encounter for closed fracture ([facility]) [S72.8X1A]  Closed fracture of right femur, unspecified fracture morphology, unspecified portion of femur, initial encounter ([facility]) [S72.91XA]  PMHx/PSHx:   has a past medical history of Acquired hypothyroidism, Arthritis, Blood transfusion reaction, Chronic kidney disease, History of blood transfusion, Hypertension, Lymphedema of both lower extremities, Open wound of left great toe, Other disorders of kidney and ureter in diseases classified elsewhere, Pelvic fracture ([facility]), Positive culture findings in wound, Skin ulcer of left calf with fat layer exposed ([facility]), Skin ulcer of left calf, limited to breakdown of skin ([facility]), Ulcer of left lower leg ([facility]), and Venous stasis ulcer of left calf limited to breakdown of skin ([facility]).   Procedure/Surgery:  R distal femur fracture ORIF    Precautions:  Falls, RLE strict NWB, R knee immobilizer vs Hinge ROM 0-30, R post-op shoe, LLE wound, TSM, (+) alarms  Equipment Needs:  TBD     SUBJECTIVE:    Pt admitted from [facility].  Reports using FWW for stand pivots but primarily mobilizes in manual wheelchair with BUE and BLE propulsion.     OBJECTIVE:   Initial Evaluation  Date: 24 Treatment:  24 Short Term/ Long Term   Goals   AM-PAC 6 Clicks     Was pt agreeable to Eval/treatment? Yes  Yes    Does pt have pain? Reports no pain at rest but increased pain with activity  7/10 RLE    Bed Mobility  Rolling: Max A  Supine to sit: Max A x2  Sit to supine: Max A x2  Scooting: Max A to EOB  Rolling: Max A  Supine to sit: Max A x2  Sit to supine: Max A x2  Scooting: Max A to EOB  Rolling: Min A  Supine to sit: Min A  Sit to supine: Min A  Scooting: Min A  transfer training as tolerated     Current Treatment Recommendations:     [x] Strengthening to improve independence with functional mobility   [x] ROM to improve independence with functional mobility   [x] Balance Training to improve static/dynamic balance and to reduce fall risk  [x] Endurance Training to improve activity tolerance during functional mobility   [x] Transfer Training to improve safety and independence with all functional transfers   [x] Gait Training to improve gait mechanics, endurance and asses need for appropriate assistive device  [] Stair Training in preparation for safe discharge home and/or into the community   [x] Positioning to prevent skin breakdown and contractures  [x] Safety and Education Training   [x] Patient/Caregiver Education   [x] HEP  [] Other     PT long term treatment goals are located in above grid    Frequency of treatments: 2-5x/week x 1-2 weeks.    Time in  1025  Time out  1050    Total Treatment Time  25 minutes     Evaluation Time includes thorough review of current medical information, gathering information on past medical history/social history and prior level of function, completion of standardized testing/informal observation of tasks, assessment of data and education on plan of care and goals.    CPT codes:  [] Low Complexity PT evaluation 50409  [] Moderate Complexity PT evaluation 65823  [] High Complexity PT evaluation 37611  [] PT Re-evaluation 65989  [] Gait training 27075 -- minutes  [] Manual therapy 02045 -- minutes  [x] Therapeutic activities 32957 25 minutes  [] Therapeutic exercises 40756 - minutes  [] Neuromuscular reeducation 92832 -- minutes     Jamshid Mireles, PT [id number]

## 2024-07-22 NOTE — CARE COORDINATION
Social Work/Case Management Transition of Care Planning (Nettie Multani -[phone]):  Per report and chart review, patient had a right distal femur ORIF on 7/19.  She is NWB RLE.  Ortho surgery is following. Post op PT/OT scores noted to be  7/12.  No ambulation was attempted.  Referral made to [facility] and has been accepted.  Pre-cert will be started today after PT/OT updates.  Therapy notified of need for updates.  AIDEN/destination completed.  7000 completed.  Discharge envelope with ambulance form is in the soft chart.  Met with patient at bedside to update.  CM/SW will follow.  Nettie Multani, ALBINO  7/22/2024

## 2024-07-22 NOTE — PROGRESS NOTES
righting/equilibrium reactions, midline orientation, scapular stability/mobility, normalization of muscle tone, and facilitation of volitional active controled movement  * Positioning to improve skin integrity, interaction with environment and functional independence  * Delirium prevention/treatment  * Manual techniques for edema management     Recommended Adaptive Equipment/DME: TBD      Home Living: Pt admitted from [facility]  Bathroom setup: handicap accessible   Equipment owned: shower chair, WC, FWW     Prior Level of Function: Assist with ADLs , Assist with IADLs; Used WC for functional mobility, FWW for SPTs  Driving: no  Occupation: none stated     Pain Level: Pt c/o 7/10 pain in RLE- positioned for comfort, nsg present and aware    Cognition: A&O: 4/4; Follows 2 step directions, pleasant & cooperative              Memory: G-              Sequencing: G-              Problem solving: F+              Judgement/safety: F+                Functional Assessment:  AM-PAC Daily Activity Raw Score: 12/24    Initial Eval Status  Date: 7/20/24 Treatment Status  Date:  7/22/24 STGs = LTGs  Time frame: 10-14 days   Feeding SBA      Set up  Eating meals upright in chair  Modified [place]    Grooming Minimal Assist   To brush hair at EOB- limited by BUE ROM.    Min-Mod A  Seated at EOB to brush hair, limited decreased B UE ROM   SBA with simple task, washing face  Modified [place]    UB Dressing Moderate Assist   Would benefit from AE      Min A  With gown seated at EOB, limited decreased B UE ROM  Stand by Assist    LB Dressing Dependent   Pt educated on use of reacher/sock aid- would benefit from further education/trails     Dep  Bed level with socks  Educated pt not to wear R walking shoe when in bed to prevent skin breakdown, wear when OOB, limited with use of sock aide due to wound L foot and knee immobilizer on R LE Moderate Assist    Bathing Moderate Assist     Mod/Max A  Simulated  Minimal Assist    Toileting  30 2       Sangeeta Alcantara, LISSETH/FRACISCO [id number]

## 2024-07-22 NOTE — PROGRESS NOTES
[facility]                                Progress note    Subjective:    The patient is awake and alert, working with physical therapy, and sitting up at side of bed.  No acute events overnight.    Denies chest pain, angina, SOB     Objective:    BP (!) 107/44   Pulse 73   Temp 98.4 °F (36.9 °C) (Temporal)   Resp 18   Ht 1.6 m (5' 3\")   Wt 96 kg (211 lb 10.3 oz)   SpO2 95%   BMI 37.49 kg/m²     In: 1411 [P.O.:240; I.V.:1171]  Out: 225   In: 1411   Out: 225 [Urine:225]    General appearance: NAD, conversant, pleasant  HEENT: AT/NC, MMM  Neck: FROM, supple  Lungs: Clear to auscultation  CV: RRR, no MRGs  Vasc: Radial pulses 2+  Abdomen: Soft, non-tender; no masses or HSM  Extremities: No peripheral edema or digital cyanosis, limited ROM right lower extremity.  Skin: no rash, lesions or ulcers  Psych: Alert and oriented to person, place and time  Neuro: Alert and interactive     Recent Labs     07/21/24  0415 07/22/24  0848   WBC 11.3 6.8   HGB 7.6* 7.6*   HCT 26.0* 26.6*    230       Recent Labs     07/21/24  0415 07/22/24  0848    136   K 5.1* 4.8    101   CO2 25 24   BUN 49* 56*   CREATININE 1.8* 1.8*   CALCIUM 7.9* 8.2*       Assessment:    Principal Problem:    Closed fracture of right femur ([facility])  Active Problems:    Fall    Essential hypertension    Acquired hypothyroidism    PAF (paroxysmal atrial fibrillation) ([facility])    Closed comminuted supracondylar fracture of femur, right, initial encounter ([facility])  Resolved Problems:    * No resolved hospital problems. *      Plan:  84 year old female admitted to med surg unit with right femur fracture    7/20/2024  Vital signs stable  S/P - 7/19/2024 - ORIF R femur  BUN/Creat: 34/1.3  Initiate IV hydration NS @ 75  Continue to monitor  Monitor H&H -  9.2/31/7  Encourage ISP   Pain is well controlled  Bowel regimen  PT OT working with patient    7/21/2024  Vital signs stable  Pain is controlled she states she is not having any  pain.  Low urinary output overnight  IV hydration  Hgb 7.6 continue to monitor closely  Await discharge planning     7/22/2024  Vital signs stable  BUN/Creat:  56/1.8  Discontinue IV hydration as patient is quite edematous  BNP - pending  May give diuretic once bnp is reported  H&H stable - 7.6/26.6  Pain is well controlled    Code status: Full  Consultants:  Orthopedic surgery    DVT Prophylaxis   PT/OT  Discharge planning       I have spent a total time of 30 minutes of this patient encounter reviewing chart, labs, coordinating care with interdisciplinary teams, face to face encounter with patient, providing counseling/education to patient/family.      Jaleesa Snow, APRN - CNP,  2:46 PM  7/22/2024

## 2024-07-22 NOTE — DISCHARGE INSTR - COC
Continuity of Care Form    Patient Name: Gabi Madrigal   :  1940  MRN:  [id number]    Admit date:  2024  Discharge date:  ***    Code Status Order: Full Code   Advance Directives:     Admitting Physician:  Ashley Young MD  PCP: Klarissa Davidson DO    Discharging Nurse: ***  Discharging Hospital Unit/Room#: 8421/8421-A  Discharging Unit Phone Number: ***    Emergency Contact:   Extended Emergency Contact Information  Primary Emergency Contact: Mendy Armando  Home Phone: [phone]  Relation: Legal Guardian  Secondary Emergency Contact: Silverio Madrigal  Home Phone: [phone]  Relation: Child    Past Surgical History:  Past Surgical History:   Procedure Laterality Date    COLONOSCOPY      ENDOSCOPY, COLON, DIAGNOSTIC      EYE SURGERY      cateract and lazor surgery 2015    FEMUR SURGERY Right 2024    RIGHT DISTAL FEMUR FRACTURE OPEN REDUCTION INTERNAL FIXATION performed by Dima Newton DO at [facility] OR    FRACTURE SURGERY      RT HIP    HIP FRACTURE SURGERY Left 2014    ORIF left hip    TONSILLECTOMY      as child    TOTAL HIP ARTHROPLASTY Left 10/17/2014    revision with removal of hardware       Immunization History:   Immunization History   Administered Date(s) Administered    COVID-19, PFIZER PURPLE top, DILUTE for use, (age 12 y+), 30mcg/0.3mL 2021, 2021, 2021       Active Problems:  Patient Active Problem List   Diagnosis Code    History of DVT (deep vein thrombosis) Z86.718    Essential hypertension I10    Acquired hypothyroidism E03.9    Closed fracture of right iliac wing ([facility]) S32.301A    Chronic anticoagulation Z79.01    NSTEMI (non-ST elevated myocardial infarction) ([facility]) I21.4    Fall W19.XXXA    Bilateral lower extremity edema R60.0    Venous stasis ulcer of left ankle with fat layer exposed without varicose veins ([facility]) I87.2, L97.322    History of vascular disease Z86.79    Positive culture findings in wound R89.5    PVD (peripheral vascular disease)

## 2024-07-22 NOTE — PROGRESS NOTES
Pt LLE dressing dry and intact  placed boot on instead of purple heel protector   pt need dressing change orders

## 2024-07-23 LAB
ANION GAP SERPL CALCULATED.3IONS-SCNC: 10 MMOL/L (ref 7–16)
BUN SERPL-MCNC: 59 MG/DL (ref 6–23)
CALCIUM SERPL-MCNC: 8.5 MG/DL (ref 8.6–10.2)
CHLORIDE SERPL-SCNC: 102 MMOL/L (ref 98–107)
CO2 SERPL-SCNC: 26 MMOL/L (ref 22–29)
CREAT SERPL-MCNC: 1.7 MG/DL (ref 0.5–1)
ERYTHROCYTE [DISTWIDTH] IN BLOOD BY AUTOMATED COUNT: 15.4 % (ref 11.5–15)
GFR, ESTIMATED: 30 ML/MIN/1.73M2
GLUCOSE SERPL-MCNC: 94 MG/DL (ref 74–99)
HCT VFR BLD AUTO: 25.9 % (ref 34–48)
HGB BLD-MCNC: 7.5 G/DL (ref 11.5–15.5)
MCH RBC QN AUTO: 26.7 PG (ref 26–35)
MCHC RBC AUTO-ENTMCNC: 29 G/DL (ref 32–34.5)
MCV RBC AUTO: 92.2 FL (ref 80–99.9)
PLATELET # BLD AUTO: 228 K/UL (ref 130–450)
PMV BLD AUTO: 10.5 FL (ref 7–12)
POTASSIUM SERPL-SCNC: 5.3 MMOL/L (ref 3.5–5)
RBC # BLD AUTO: 2.81 M/UL (ref 3.5–5.5)
SODIUM SERPL-SCNC: 138 MMOL/L (ref 132–146)
WBC OTHER # BLD: 5.8 K/UL (ref 4.5–11.5)

## 2024-07-23 PROCEDURE — 6370000000 HC RX 637 (ALT 250 FOR IP)

## 2024-07-23 PROCEDURE — 1200000000 HC SEMI PRIVATE

## 2024-07-23 PROCEDURE — 2580000003 HC RX 258: Performed by: FAMILY MEDICINE

## 2024-07-23 PROCEDURE — 2580000003 HC RX 258

## 2024-07-23 PROCEDURE — 6370000000 HC RX 637 (ALT 250 FOR IP): Performed by: NURSE PRACTITIONER

## 2024-07-23 PROCEDURE — L1832 KO ADJ JNT POS R SUP PRE CST: HCPCS

## 2024-07-23 PROCEDURE — 80048 BASIC METABOLIC PNL TOTAL CA: CPT

## 2024-07-23 PROCEDURE — 2700000000 HC OXYGEN THERAPY PER DAY

## 2024-07-23 PROCEDURE — 6360000002 HC RX W HCPCS: Performed by: FAMILY MEDICINE

## 2024-07-23 PROCEDURE — 2580000003 HC RX 258: Performed by: NURSE PRACTITIONER

## 2024-07-23 PROCEDURE — 85027 COMPLETE CBC AUTOMATED: CPT

## 2024-07-23 PROCEDURE — 36415 COLL VENOUS BLD VENIPUNCTURE: CPT

## 2024-07-23 RX ORDER — FUROSEMIDE 10 MG/ML
40 INJECTION INTRAMUSCULAR; INTRAVENOUS ONCE
Status: COMPLETED | OUTPATIENT
Start: 2024-07-23 | End: 2024-07-23

## 2024-07-23 RX ADMIN — SODIUM CHLORIDE, PRESERVATIVE FREE 10 ML: 5 INJECTION INTRAVENOUS at 22:24

## 2024-07-23 RX ADMIN — SODIUM CHLORIDE, PRESERVATIVE FREE 10 ML: 5 INJECTION INTRAVENOUS at 22:23

## 2024-07-23 RX ADMIN — ACETAMINOPHEN 650 MG: 325 TABLET ORAL at 10:42

## 2024-07-23 RX ADMIN — METOPROLOL TARTRATE 25 MG: 25 TABLET, FILM COATED ORAL at 22:21

## 2024-07-23 RX ADMIN — APIXABAN 5 MG: 5 TABLET, FILM COATED ORAL at 08:41

## 2024-07-23 RX ADMIN — CALCIUM CARBONATE-VITAMIN D TAB 500 MG-200 UNIT 1 TABLET: 500-200 TAB at 08:42

## 2024-07-23 RX ADMIN — METOPROLOL TARTRATE 25 MG: 25 TABLET, FILM COATED ORAL at 08:42

## 2024-07-23 RX ADMIN — ACETAMINOPHEN 650 MG: 325 TABLET ORAL at 05:03

## 2024-07-23 RX ADMIN — SODIUM CHLORIDE, PRESERVATIVE FREE 10 ML: 5 INJECTION INTRAVENOUS at 08:44

## 2024-07-23 RX ADMIN — BISACODYL 5 MG: 5 TABLET, COATED ORAL at 08:42

## 2024-07-23 RX ADMIN — LEVOTHYROXINE SODIUM 75 MCG: 0.05 TABLET ORAL at 05:03

## 2024-07-23 RX ADMIN — ACETAMINOPHEN 650 MG: 325 TABLET ORAL at 22:21

## 2024-07-23 RX ADMIN — Medication 500 MG: at 08:41

## 2024-07-23 RX ADMIN — APIXABAN 5 MG: 5 TABLET, FILM COATED ORAL at 22:21

## 2024-07-23 RX ADMIN — WATER 1000 MG: 1 INJECTION INTRAMUSCULAR; INTRAVENOUS; SUBCUTANEOUS at 10:42

## 2024-07-23 RX ADMIN — FERROUS SULFATE TAB 325 MG (65 MG ELEMENTAL FE) 325 MG: 325 (65 FE) TAB at 08:41

## 2024-07-23 RX ADMIN — FUROSEMIDE 40 MG: 10 INJECTION, SOLUTION INTRAMUSCULAR; INTRAVENOUS at 11:48

## 2024-07-23 RX ADMIN — Medication 1 TABLET: at 08:41

## 2024-07-23 RX ADMIN — ACETAMINOPHEN 650 MG: 325 TABLET ORAL at 16:13

## 2024-07-23 ASSESSMENT — PAIN DESCRIPTION - DESCRIPTORS: DESCRIPTORS: ACHING;DISCOMFORT;DULL

## 2024-07-23 ASSESSMENT — PAIN SCALES - GENERAL
PAINLEVEL_OUTOF10: 3
PAINLEVEL_OUTOF10: 0

## 2024-07-23 ASSESSMENT — PAIN DESCRIPTION - LOCATION: LOCATION: LEG

## 2024-07-23 ASSESSMENT — PAIN DESCRIPTION - ORIENTATION: ORIENTATION: RIGHT

## 2024-07-23 NOTE — PROGRESS NOTES
[facility]                                Progress note    Subjective:    The patient is awake and alert, lying in bed without complaints   No acute events overnight.    Denies chest pain, angina, SOB     Objective:    BP (!) 112/53 Comment: notify the nurse  Pulse 74   Temp 97.8 °F (36.6 °C) (Temporal)   Resp 17   Ht 1.6 m (5' 3\")   Wt 96 kg (211 lb 10.3 oz)   SpO2 92%   BMI 37.49 kg/m²     In: 240 [P.O.:240]  Out: 1200   In: 240   Out: 1200 [Urine:1200]    General appearance: NAD, conversant, pleasant  HEENT: AT/NC, MMM  Neck: FROM, supple  Lungs: Clear to auscultation  CV: RRR, no MRGs  Vasc: Radial pulses 2+  Abdomen: Soft, non-tender; no masses or HSM  Extremities: No peripheral edema or digital cyanosis, limited ROM right lower extremity, bilateral upper and lower extremity edema  Skin: no rash, lesions or ulcers  Psych: Alert and oriented to person, place and time  Neuro: Alert and interactive     Recent Labs     07/21/24  0415 07/22/24  0848 07/23/24  1214   WBC 11.3 6.8 5.8   HGB 7.6* 7.6* 7.5*   HCT 26.0* 26.6* 25.9*    230 228       Recent Labs     07/21/24  0415 07/22/24  0848 07/23/24  1214    136 138   K 5.1* 4.8 5.3*    101 102   CO2 25 24 26   BUN 49* 56* 59*   CREATININE 1.8* 1.8* 1.7*   CALCIUM 7.9* 8.2* 8.5*       Assessment:    Principal Problem:    Closed fracture of right femur ([facility])  Active Problems:    Fall    Essential hypertension    Acquired hypothyroidism    PAF (paroxysmal atrial fibrillation) ([facility])    Closed comminuted supracondylar fracture of femur, right, initial encounter ([facility])  Resolved Problems:    * No resolved hospital problems. *      Plan:  84 year old female admitted to med surg unit with right femur fracture    7/20/2024  Vital signs stable  S/P - 7/19/2024 - ORIF R femur  BUN/Creat: 34/1.3  Initiate IV hydration NS @ 75  Continue to monitor  Monitor H&H -  9.2/31/7  Encourage ISP   Pain is well controlled  Bowel regimen  PT OT

## 2024-07-23 NOTE — PROGRESS NOTES
Department of Orthopedic Surgery  Resident Progress Note    Patient seen and examined. Pain controlled. No new complaints.  Resting in bed comfortably. We discussed her surgery and post operative plan.     No new concerns. Worked with PT, 8/24.     VITALS:  BP (!) 114/50   Pulse 80   Temp 97.2 °F (36.2 °C) (Temporal)   Resp 13   Ht 1.6 m (5' 3\")   Wt 96 kg (211 lb 10.3 oz)   SpO2 95%   BMI 37.49 kg/m²     General: awake, alert, cooperative in no acute distress.     MUSCULOSKELETAL:   right lower extremity:  Dressing C/D/I  Compartments soft and compressible  +PF/DF/EHL  +2/4 DP & PT pulses, Brisk Cap refill, Toes warm and perfused  Distal sensation grossly intact to Peroneals, Sural, Saphenous, and tibial nrs    CBC:   Lab Results   Component Value Date/Time    WBC 6.8 07/22/2024 08:48 AM    HGB 7.6 07/22/2024 08:48 AM    HCT 26.6 07/22/2024 08:48 AM     07/22/2024 08:48 AM     PT/INR:    Lab Results   Component Value Date/Time    PROTIME 11.1 08/15/2017 08:40 PM    PROTIME 11.6 07/12/2011 05:45 AM    INR 1.0 08/15/2017 08:40 PM           ASSESSMENT  S/P R peterson implant supracondylar distal femur ORIF - 7/19    PLAN      Continue physical therapy and protocol: NWB - RLE  24 hour abx coverage completed  Deep venous thrombosis prophylaxis - home eliquis ok to resume today from an orthopedic standpoint, early mobilization  PT/OT  Order hinged knee brace  Pain Control: IV and PO wean narcotics as able  Monitor H&H 7.6 yesterday, transfuse below 7  Okay for discharge from orthopedic standpoint once appropriate discharge plan is in place.  Please reach out any questions or concerns.  D/C Plan:  per PT/OT/SW recs. Appreciate input

## 2024-07-23 NOTE — CARE COORDINATION
SOCIAL WORK/CASEMANAGEMENT TRANSITION OF CARE PLANNING( ALONDRA QUISPE, -[phone]): precert pending for [facility]. All discharge paper work with pasrr in place from the prior /. Pt is on iv fluids and rocephin. TAM in the room. Pt is in isolation for mrsa of the wound. Ortho signed off and pt is nwb RLE. PT and OT saw yesterday. ALBINO Saba  7/23/2024    Precert obtained for [facility] it is good thru  8/3 . The NP wants to hold pt to Diuresis pt. ALBINO Saba  7/23/2024

## 2024-07-23 NOTE — PLAN OF CARE
Problem: Skin/Tissue Integrity  Goal: Absence of new skin breakdown  Description: 1.  Monitor for areas of redness and/or skin breakdown  2.  Assess vascular access sites hourly  3.  Every 4-6 hours minimum:  Change oxygen saturation probe site  4.  Every 4-6 hours:  If on nasal continuous positive airway pressure, respiratory therapy assess nares and determine need for appliance change or resting period.  7/23/2024 1202 by Ctahy Jackson, RN  Outcome: Progressing     Problem: Safety - Adult  Goal: Free from fall injury  7/23/2024 1202 by Cathy Jackson RN  Outcome: Progressing     Problem: Pain  Goal: Verbalizes/displays adequate comfort level or baseline comfort level  7/23/2024 1202 by Cathy Jackson RN  Outcome: Progressing     Problem: ABCDS Injury Assessment  Goal: Absence of physical injury  7/23/2024 1202 by Cathy Jackson, RN  Outcome: Progressing     Problem: Discharge Planning  Goal: Discharge to home or other facility with appropriate resources  7/23/2024 1202 by Cathy Jackson, RN  Outcome: Progressing

## 2024-07-23 NOTE — PROGRESS NOTES
Order placed for knee brace. Paper work faxed to [facility] regarding order for right hinged knee brace.

## 2024-07-23 NOTE — PLAN OF CARE
Problem: Skin/Tissue Integrity  Goal: Absence of new skin breakdown  Description: 1.  Monitor for areas of redness and/or skin breakdown  2.  Assess vascular access sites hourly  3.  Every 4-6 hours minimum:  Change oxygen saturation probe site  4.  Every 4-6 hours:  If on nasal continuous positive airway pressure, respiratory therapy assess nares and determine need for appliance change or resting period.  7/22/2024 2243 by Anya Napier RN  Outcome: Progressing  7/22/2024 1319 by Cathy Jackson RN  Outcome: Progressing     Problem: Safety - Adult  Goal: Free from fall injury  7/22/2024 2243 by Anya Napier RN  Outcome: Progressing  7/22/2024 1319 by Cathy Jackson RN  Outcome: Progressing     Problem: Pain  Goal: Verbalizes/displays adequate comfort level or baseline comfort level  7/22/2024 2243 by Anya Napier RN  Outcome: Progressing  7/22/2024 1319 by Cathy Jackson RN  Outcome: Progressing     Problem: ABCDS Injury Assessment  Goal: Absence of physical injury  7/22/2024 2243 by Anya Napier RN  Outcome: Progressing  7/22/2024 1319 by Cathy Jackson RN  Outcome: Progressing     Problem: Discharge Planning  Goal: Discharge to home or other facility with appropriate resources  7/22/2024 2243 by Anya Napier RN  Outcome: Progressing  7/22/2024 1319 by Cathy Jackson RN  Outcome: Progressing

## 2024-07-24 LAB
ALBUMIN SERPL-MCNC: 2.6 G/DL (ref 3.5–5.2)
ALP SERPL-CCNC: 75 U/L (ref 35–104)
ALT SERPL-CCNC: <5 U/L (ref 0–32)
ANION GAP SERPL CALCULATED.3IONS-SCNC: 12 MMOL/L (ref 7–16)
AST SERPL-CCNC: 15 U/L (ref 0–31)
BILIRUB DIRECT SERPL-MCNC: <0.2 MG/DL (ref 0–0.3)
BILIRUB INDIRECT SERPL-MCNC: ABNORMAL MG/DL (ref 0–1)
BILIRUB SERPL-MCNC: 0.3 MG/DL (ref 0–1.2)
BNP SERPL-MCNC: 8309 PG/ML (ref 0–450)
BUN SERPL-MCNC: 60 MG/DL (ref 6–23)
CALCIUM SERPL-MCNC: 8.8 MG/DL (ref 8.6–10.2)
CHLORIDE SERPL-SCNC: 104 MMOL/L (ref 98–107)
CO2 SERPL-SCNC: 25 MMOL/L (ref 22–29)
CREAT SERPL-MCNC: 1.5 MG/DL (ref 0.5–1)
ERYTHROCYTE [DISTWIDTH] IN BLOOD BY AUTOMATED COUNT: 15.1 % (ref 11.5–15)
GFR, ESTIMATED: 36 ML/MIN/1.73M2
GLUCOSE SERPL-MCNC: 92 MG/DL (ref 74–99)
HCT VFR BLD AUTO: 25.9 % (ref 34–48)
HGB BLD-MCNC: 7.5 G/DL (ref 11.5–15.5)
MCH RBC QN AUTO: 26.9 PG (ref 26–35)
MCHC RBC AUTO-ENTMCNC: 29 G/DL (ref 32–34.5)
MCV RBC AUTO: 92.8 FL (ref 80–99.9)
PLATELET # BLD AUTO: 215 K/UL (ref 130–450)
PMV BLD AUTO: 10.7 FL (ref 7–12)
POTASSIUM SERPL-SCNC: 4.9 MMOL/L (ref 3.5–5)
PROT SERPL-MCNC: 5.5 G/DL (ref 6.4–8.3)
RBC # BLD AUTO: 2.79 M/UL (ref 3.5–5.5)
SODIUM SERPL-SCNC: 141 MMOL/L (ref 132–146)
WBC OTHER # BLD: 5.6 K/UL (ref 4.5–11.5)

## 2024-07-24 PROCEDURE — 85027 COMPLETE CBC AUTOMATED: CPT

## 2024-07-24 PROCEDURE — 6370000000 HC RX 637 (ALT 250 FOR IP): Performed by: NURSE PRACTITIONER

## 2024-07-24 PROCEDURE — 83880 ASSAY OF NATRIURETIC PEPTIDE: CPT

## 2024-07-24 PROCEDURE — 6370000000 HC RX 637 (ALT 250 FOR IP)

## 2024-07-24 PROCEDURE — 1200000000 HC SEMI PRIVATE

## 2024-07-24 PROCEDURE — 2580000003 HC RX 258: Performed by: FAMILY MEDICINE

## 2024-07-24 PROCEDURE — 2580000003 HC RX 258: Performed by: NURSE PRACTITIONER

## 2024-07-24 PROCEDURE — 2700000000 HC OXYGEN THERAPY PER DAY

## 2024-07-24 PROCEDURE — 80053 COMPREHEN METABOLIC PANEL: CPT

## 2024-07-24 PROCEDURE — 2500000003 HC RX 250 WO HCPCS: Performed by: INTERNAL MEDICINE

## 2024-07-24 PROCEDURE — 6360000002 HC RX W HCPCS: Performed by: FAMILY MEDICINE

## 2024-07-24 PROCEDURE — 2580000003 HC RX 258

## 2024-07-24 PROCEDURE — 82248 BILIRUBIN DIRECT: CPT

## 2024-07-24 PROCEDURE — 36415 COLL VENOUS BLD VENIPUNCTURE: CPT

## 2024-07-24 RX ORDER — FUROSEMIDE 10 MG/ML
40 INJECTION INTRAMUSCULAR; INTRAVENOUS 2 TIMES DAILY
Status: DISCONTINUED | OUTPATIENT
Start: 2024-07-24 | End: 2024-07-25 | Stop reason: HOSPADM

## 2024-07-24 RX ADMIN — METOPROLOL TARTRATE 25 MG: 25 TABLET, FILM COATED ORAL at 20:56

## 2024-07-24 RX ADMIN — CALCIUM CARBONATE-VITAMIN D TAB 500 MG-200 UNIT 1 TABLET: 500-200 TAB at 08:15

## 2024-07-24 RX ADMIN — ACETAMINOPHEN 650 MG: 325 TABLET ORAL at 05:15

## 2024-07-24 RX ADMIN — SODIUM CHLORIDE, PRESERVATIVE FREE 10 ML: 5 INJECTION INTRAVENOUS at 08:15

## 2024-07-24 RX ADMIN — APIXABAN 5 MG: 5 TABLET, FILM COATED ORAL at 20:56

## 2024-07-24 RX ADMIN — Medication 500 MG: at 08:16

## 2024-07-24 RX ADMIN — APIXABAN 5 MG: 5 TABLET, FILM COATED ORAL at 08:15

## 2024-07-24 RX ADMIN — METOPROLOL TARTRATE 25 MG: 25 TABLET, FILM COATED ORAL at 08:15

## 2024-07-24 RX ADMIN — FUROSEMIDE 40 MG: 10 INJECTION, SOLUTION INTRAMUSCULAR; INTRAVENOUS at 08:16

## 2024-07-24 RX ADMIN — WATER 1000 MG: 1 INJECTION INTRAMUSCULAR; INTRAVENOUS; SUBCUTANEOUS at 11:06

## 2024-07-24 RX ADMIN — MICONAZOLE NITRATE: 20.6 POWDER TOPICAL at 20:56

## 2024-07-24 RX ADMIN — SODIUM CHLORIDE, PRESERVATIVE FREE 10 ML: 5 INJECTION INTRAVENOUS at 20:57

## 2024-07-24 RX ADMIN — MICONAZOLE NITRATE: 20.6 POWDER TOPICAL at 11:11

## 2024-07-24 RX ADMIN — Medication 1 TABLET: at 08:15

## 2024-07-24 RX ADMIN — LEVOTHYROXINE SODIUM 75 MCG: 0.05 TABLET ORAL at 05:16

## 2024-07-24 RX ADMIN — ACETAMINOPHEN 650 MG: 325 TABLET ORAL at 17:03

## 2024-07-24 RX ADMIN — FUROSEMIDE 40 MG: 10 INJECTION, SOLUTION INTRAMUSCULAR; INTRAVENOUS at 17:04

## 2024-07-24 RX ADMIN — FERROUS SULFATE TAB 325 MG (65 MG ELEMENTAL FE) 325 MG: 325 (65 FE) TAB at 08:17

## 2024-07-24 RX ADMIN — ACETAMINOPHEN 650 MG: 325 TABLET ORAL at 11:05

## 2024-07-24 ASSESSMENT — PAIN SCALES - GENERAL
PAINLEVEL_OUTOF10: 6
PAINLEVEL_OUTOF10: 0
PAINLEVEL_OUTOF10: 8

## 2024-07-24 NOTE — PROGRESS NOTES
Dressing changed to left leg, patient tolerated well. Right leg dressing is intact, hinge brace in place.

## 2024-07-24 NOTE — FLOWSHEET NOTE
Inpatient Wound Care(initial evaluation)  8421a    Admit Date: 7/18/2024  9:49 PM    Reason for consult:  left lower leg    Significant history:  per H & P  presented with FALL.  STATES SHE WAS TRYING TO WASH UP AND SHE WENT TO REACH FOR HER WALKER AND SHE COULD NOT TURN HER FOOT AND LOST HER BALANCE.    SUSTAINED A RIGHT FEMUR FRACTURE.   Pre-Op Diagnosis Codes:    Right supracondylar distal femur fracture with comminution adjacent to a cephalomedullary nail   Procedure(s): 7/19  RIGHT DISTAL FEMUR FRACTURE OPEN REDUCTION INTERNAL FIXATION    Wound history:  active in wound care dneter  Face sheet faxed to [phone]    Findings:       07/24/24 0645   Skin Integumentary    Skin Integrity Ecchymosis  (dry flaky)   Location BUE   Skin Fold Management Yes   Dressing Site Abdominal pannus   Treatment Pharmaceutical   Date Applied   (nurse to apply)   Assessed This Shift Red   Skin Integrity Site 2   Skin Integrity Location 2 Ecchymosis   Location 2 right posterior thigh   Skin Integrity Site 3   Skin Integrity Location 3 Excoriation;Redness    Location 3 right abdomen   Wound 04/09/24 Buttocks Left;Mid   Date First Assessed/Time First Assessed: 04/09/24 0925   Primary Wound Type: (c) Pressure Injury  Location: Buttocks  Wound Location Orientation: Left;Mid   Wound Image    Wound Etiology Deep tissue/Injury   Dressing/Treatment Protective barrier   Wound Length (cm) 0.6 cm   Wound Width (cm) 1.2 cm   Wound Depth (cm) 0.1 cm   Wound Surface Area (cm^2) 0.72 cm^2   Wound Volume (cm^3) 0.072 cm^3   Wound Assessment Purple/maroon   Drainage Amount None (dry)   Patrizia-wound Assessment Intact   Wound 04/07/24 Leg Left;Medial;Distal;Lower   Date First Assessed/Time First Assessed: 04/07/24 6635   Present on Original Admission: Yes  Location: Leg  Wound Location Orientation: Left;Medial;Distal;Lower   Wound Image    Wound Etiology Venous   Dressing Status New dressing applied   Wound Cleansed Cleansed with saline   Dressing/Treatment

## 2024-07-24 NOTE — PROGRESS NOTES
Department of Orthopedic Surgery  Resident Progress Note    Patient seen and examined at bedside.  Patient awake alert oriented x 3 upon entering the room.  Patient states that pain to the right lower extremity is well under control at this time.  Denies any increasing numbness ting paresthesias traveling down right lower extremity.  No new complaints today.      VITALS:  /72   Pulse 70   Temp 98 °F (36.7 °C) (Oral)   Resp 18   Ht 1.6 m (5' 3\")   Wt 95.7 kg (211 lb)   SpO2 98%   BMI 37.38 kg/m²     General: awake, alert, cooperative in no acute distress.     MUSCULOSKELETAL:   right lower extremity:  Dressing C/D/I hinged knee brace in place  Compartments soft and compressible  +PF/DF/EHL  +2/4 DP & PT pulses, Brisk Cap refill, Toes warm and perfused  Distal sensation grossly intact to Peroneals, Sural, Saphenous, and tibial nrs    CBC:   Lab Results   Component Value Date/Time    WBC 5.6 07/24/2024 04:36 AM    HGB 7.5 07/24/2024 04:36 AM    HCT 25.9 07/24/2024 04:36 AM     07/24/2024 04:36 AM     PT/INR:    Lab Results   Component Value Date/Time    PROTIME 11.1 08/15/2017 08:40 PM    PROTIME 11.6 07/12/2011 05:45 AM    INR 1.0 08/15/2017 08:40 PM           ASSESSMENT  S/P R peterson implant supracondylar distal femur ORIF - 7/19    PLAN      Continue physical therapy and protocol: NWB - RLE  24 hour abx coverage completed  Deep venous thrombosis prophylaxis - home eliquis ok to resume today from an orthopedic standpoint, early mobilization  PT/OT  Pain Control: IV and PO wean narcotics as able  Monitor H&H 7.5 today transfuse below 7  Okay for discharge from orthopedic standpoint once appropriate discharge plan is in place.  Please reach out any questions or concerns.  D/C Plan:  per PT/OT/SW recs. Appreciate input

## 2024-07-24 NOTE — PROGRESS NOTES
[facility]                                Progress note    Subjective:    Patient resting comfortably in bed  Family at bedside  Remains on supplemental O2    Objective:    BP (!) 116/53   Pulse 66   Temp (!) 96.7 °F (35.9 °C) (Temporal)   Resp 18   Ht 1.6 m (5' 2.99\")   Wt 95.7 kg (211 lb)   SpO2 93%   BMI 37.39 kg/m²     In: 150 [P.O.:150]  Out: 2950   In: 150   Out: 2950 [Urine:2950]    General appearance: NAD, conversant, pleasant  HEENT: AT/NC, MMM  Neck: FROM, supple  Lungs: Diminished, 4L NC  CV: RRR, no MRGs  Vasc: Radial pulses 2+  Abdomen: Soft, non-tender; no masses or HSM  Extremities: No peripheral edema or digital cyanosis, limited ROM right lower extremity, bilateral upper and lower extremity edema  Skin: no rash, lesions or ulcers  Psych: Alert and oriented to person, place and time  Neuro: Alert and interactive     Recent Labs     07/22/24  0848 07/23/24  1214 07/24/24  0436   WBC 6.8 5.8 5.6   HGB 7.6* 7.5* 7.5*   HCT 26.6* 25.9* 25.9*    228 215         Recent Labs     07/22/24  0848 07/23/24  1214 07/24/24  0436    138 141   K 4.8 5.3* 4.9    102 104   CO2 24 26 25   BUN 56* 59* 60*   CREATININE 1.8* 1.7* 1.5*   CALCIUM 8.2* 8.5* 8.8         Assessment:    Principal Problem:    Closed fracture of right femur ([facility])  Active Problems:    Fall    Essential hypertension    Acquired hypothyroidism    PAF (paroxysmal atrial fibrillation) ([facility])    Closed comminuted supracondylar fracture of femur, right, initial encounter ([facility])  Resolved Problems:    * No resolved hospital problems. *      Plan:  84 year old female admitted to med surg unit with right femur fracture    7/20/2024  Vital signs stable  S/P - 7/19/2024 - ORIF R femur  BUN/Creat: 34/1.3  Initiate IV hydration NS @ 75  Continue to monitor  Monitor H&H -  9.2/31/7  Encourage ISP   Pain is well controlled  Bowel regimen  PT OT working with patient    7/21/2024  Vital signs stable  Pain is controlled

## 2024-07-24 NOTE — CARE COORDINATION
Chart reviewed. BNP was 7625 yesterday and IV Lasix x1 ordered. Today BNP remains elevated 8309  hgb 7.5  Now is On IV Lasix 40 mg 2 x day.  Discharge plan is [place] and precert obtained and is good through 8/3. AIDEN/destination completed. 7000 completed. Discharge envelope with ambulance form is in the soft chart.  CM/SW will continue to follow.

## 2024-07-24 NOTE — PROGRESS NOTES
Comprehensive Nutrition Assessment    Type and Reason for Visit:  Initial, Consult, Wound    Nutrition Recommendations/Plan:   Continue current diet. Recommend and start ONS: Ensure HP once daily and Manfred BID to promote nutrient intake/wound healing.        Nutrition Assessment:    Pt. admitted with R femur fracture s/p fall. Now s/p R peterson implant supracondylar distal femur ORIF on 7/19. PMHx of CHF,CKD,lymphedema. PO intake stable-pt. eating % at most meals. Will start ONS to promote post op/wound healing and monitor.    Nutrition Related Findings:    A&Ox4, -I/O, abd/BS WDL, +2 edema, elevated BUN/Cr, Wound Type: Multiple, Open Wounds, Surgical Incision, Deep Tissue Injury, Venous Stasis (DTI left buttocks, Venous wound left lower leg , incision s/p ORIF)       Current Nutrition Intake & Therapies:    Average Meal Intake: %  Average Supplements Intake: None Ordered  ADULT DIET; Regular    Anthropometric Measures:  Height: 160 cm (5' 2.99\")  Ideal Body Weight (IBW): 115 lbs (52 kg)    Admission Body Weight: 92.1 kg (203 lb 0.7 oz) (7/19 BS first measured)  Current Body Weight: 92.1 kg (203 lb 0.7 oz) (7/19 BS adm wt as CBW remains elevated), 176.6 % IBW. Weight Source: Bed Scale  Current BMI (kg/m2): 36  Usual Body Weight:  (UTO d/t hx of CHF and possible fluid shifts/limited wt hx on file)     Weight Adjustment For: No Adjustment                 BMI Categories: Obese Class 2 (BMI 35.0 -39.9)      Nutrition Diagnosis:   Increased nutrient needs related to increase demand for energy/nutrients as evidenced by wounds    Nutrition Interventions:   Food and/or Nutrient Delivery: Continue Current Diet, Start Oral Nutrition Supplement (start ONS: Amnfred BID, Ensure HP once daily)  Nutrition Education/Counseling: No recommendation at this time  Coordination of Nutrition Care: Continue to monitor while inpatient       Goals:     Goals: PO intake 75% or greater, by next RD assessment (to continue)

## 2024-07-25 VITALS
TEMPERATURE: 99.5 F | RESPIRATION RATE: 18 BRPM | WEIGHT: 207.45 LBS | OXYGEN SATURATION: 97 % | SYSTOLIC BLOOD PRESSURE: 126 MMHG | HEART RATE: 85 BPM | BODY MASS INDEX: 36.76 KG/M2 | HEIGHT: 63 IN | DIASTOLIC BLOOD PRESSURE: 45 MMHG

## 2024-07-25 LAB
ANION GAP SERPL CALCULATED.3IONS-SCNC: 7 MMOL/L (ref 7–16)
BUN SERPL-MCNC: 58 MG/DL (ref 6–23)
CALCIUM SERPL-MCNC: 8.8 MG/DL (ref 8.6–10.2)
CHLORIDE SERPL-SCNC: 103 MMOL/L (ref 98–107)
CO2 SERPL-SCNC: 32 MMOL/L (ref 22–29)
CREAT SERPL-MCNC: 1.3 MG/DL (ref 0.5–1)
ERYTHROCYTE [DISTWIDTH] IN BLOOD BY AUTOMATED COUNT: 15.1 % (ref 11.5–15)
GFR, ESTIMATED: 39 ML/MIN/1.73M2
GLUCOSE SERPL-MCNC: 100 MG/DL (ref 74–99)
HCT VFR BLD AUTO: 26.4 % (ref 34–48)
HGB BLD-MCNC: 7.6 G/DL (ref 11.5–15.5)
MCH RBC QN AUTO: 27 PG (ref 26–35)
MCHC RBC AUTO-ENTMCNC: 28.8 G/DL (ref 32–34.5)
MCV RBC AUTO: 94 FL (ref 80–99.9)
PLATELET # BLD AUTO: 206 K/UL (ref 130–450)
PMV BLD AUTO: 10.4 FL (ref 7–12)
POTASSIUM SERPL-SCNC: 4.5 MMOL/L (ref 3.5–5)
RBC # BLD AUTO: 2.81 M/UL (ref 3.5–5.5)
SODIUM SERPL-SCNC: 142 MMOL/L (ref 132–146)
WBC OTHER # BLD: 5.6 K/UL (ref 4.5–11.5)

## 2024-07-25 PROCEDURE — 2580000003 HC RX 258: Performed by: NURSE PRACTITIONER

## 2024-07-25 PROCEDURE — 97535 SELF CARE MNGMENT TRAINING: CPT

## 2024-07-25 PROCEDURE — 97530 THERAPEUTIC ACTIVITIES: CPT

## 2024-07-25 PROCEDURE — 6370000000 HC RX 637 (ALT 250 FOR IP)

## 2024-07-25 PROCEDURE — 2700000000 HC OXYGEN THERAPY PER DAY

## 2024-07-25 PROCEDURE — 80048 BASIC METABOLIC PNL TOTAL CA: CPT

## 2024-07-25 PROCEDURE — 85027 COMPLETE CBC AUTOMATED: CPT

## 2024-07-25 PROCEDURE — 36415 COLL VENOUS BLD VENIPUNCTURE: CPT

## 2024-07-25 PROCEDURE — 6370000000 HC RX 637 (ALT 250 FOR IP): Performed by: NURSE PRACTITIONER

## 2024-07-25 PROCEDURE — 6360000002 HC RX W HCPCS: Performed by: FAMILY MEDICINE

## 2024-07-25 RX ADMIN — ACETAMINOPHEN 650 MG: 325 TABLET ORAL at 10:26

## 2024-07-25 RX ADMIN — ACETAMINOPHEN 650 MG: 325 TABLET ORAL at 05:08

## 2024-07-25 RX ADMIN — SODIUM CHLORIDE, PRESERVATIVE FREE 10 ML: 5 INJECTION INTRAVENOUS at 10:26

## 2024-07-25 RX ADMIN — METOPROLOL TARTRATE 25 MG: 25 TABLET, FILM COATED ORAL at 10:26

## 2024-07-25 RX ADMIN — APIXABAN 5 MG: 5 TABLET, FILM COATED ORAL at 10:28

## 2024-07-25 RX ADMIN — FERROUS SULFATE TAB 325 MG (65 MG ELEMENTAL FE) 325 MG: 325 (65 FE) TAB at 10:26

## 2024-07-25 RX ADMIN — MICONAZOLE NITRATE: 20.6 POWDER TOPICAL at 10:30

## 2024-07-25 RX ADMIN — CALCIUM CARBONATE-VITAMIN D TAB 500 MG-200 UNIT 1 TABLET: 500-200 TAB at 10:26

## 2024-07-25 RX ADMIN — LEVOTHYROXINE SODIUM 75 MCG: 0.05 TABLET ORAL at 05:08

## 2024-07-25 RX ADMIN — Medication 1 TABLET: at 10:25

## 2024-07-25 RX ADMIN — Medication 500 MG: at 10:25

## 2024-07-25 ASSESSMENT — PAIN DESCRIPTION - LOCATION: LOCATION: KNEE

## 2024-07-25 ASSESSMENT — PAIN DESCRIPTION - DESCRIPTORS: DESCRIPTORS: SORE

## 2024-07-25 ASSESSMENT — PAIN SCALES - GENERAL: PAINLEVEL_OUTOF10: 5

## 2024-07-25 ASSESSMENT — PAIN DESCRIPTION - ORIENTATION: ORIENTATION: RIGHT;LEFT

## 2024-07-25 NOTE — PROGRESS NOTES
Physical Therapy  Tx    Name: Gabi Madrigal  : 1940  MRN: [id number]      Date of Service: 2024    Evaluating PT:  David Bauer, PT, DPT  [id number]     Room #:  8421/8421-A  Diagnosis:  Other fracture of right femur, initial encounter for closed fracture ([facility]) [S72.8X1A]  Closed fracture of right femur, unspecified fracture morphology, unspecified portion of femur, initial encounter ([facility]) [S72.91XA]  PMHx/PSHx:   has a past medical history of Acquired hypothyroidism, Arthritis, Blood transfusion reaction, Chronic kidney disease, History of blood transfusion, Hypertension, Lymphedema of both lower extremities, Open wound of left great toe, Other disorders of kidney and ureter in diseases classified elsewhere, Pelvic fracture ([facility]), Positive culture findings in wound, Skin ulcer of left calf with fat layer exposed ([facility]), Skin ulcer of left calf, limited to breakdown of skin ([facility]), Ulcer of left lower leg ([facility]), and Venous stasis ulcer of left calf limited to breakdown of skin ([facility]).   Procedure/Surgery:  R distal femur fracture ORIF    Precautions:  Falls, RLE strict NWB, R knee immobilizer vs Hinge ROM 0-30, R post-op shoe, LLE wound, TSM, (+) alarms  Equipment Needs:  TBD     SUBJECTIVE:    Pt admitted from [facility].  Reports using FWW for stand pivots but primarily mobilizes in manual wheelchair with BUE and BLE propulsion.     OBJECTIVE:   Initial Evaluation  Date: 24 Treatment:  24 Short Term/ Long Term   Goals   AM-PAC 6 Clicks     Was pt agreeable to Eval/treatment? Yes  Yes    Does pt have pain? Reports no pain at rest but increased pain with activity  7/10 RLE    Bed Mobility  Rolling: Max A  Supine to sit: Max A x2  Sit to supine: Max A x2  Scooting: Max A to EOB  Rolling: Max A  Supine to sit: Max A x2  Sit to supine: Max A x2  Scooting: Max A to EOB  Rolling: Min A  Supine to sit: Min A  Sit to supine: Min A  Scooting: Min A   Transfers Sit to stand: Max A  x2  Stand to sit: Max A x2  Stand pivot: NT Sit to stand: NT  Stand to sit: NT  Stand pivot: NT Sit to stand: Mod A  Stand to sit: Mod A  Stand pivot: Mod A with FWW    Ambulation    NT NT >5 feet with FWW Mod A   Stair negotiation: ascended and descended  NT NT NA   ROM BUE:  Per OT eval  LLE: WFL  RLE:  limited by knee immobilizer      Strength BUE:  Per OT eval   LLE : Grossly 3+/5  RLE:  R hip/knee NT, R ankle 3/5      Balance Sitting EOB:  SBA  Static Standing:  Max A x 2 with FWW  Sitting EOB:  Mod. Poor tolerance to RLE being lowered.   Static Standing: Unable  Sitting EOB:  Independent   Dynamic Standing:  Mod A with FWW     Pt is A & O x 4  Sensation:  Pt denies numbness and tingling to extremities  Edema:  BLE    Vitals:  SpO2: Maintained 97% on 1L.     Therapeutic Exercises:    Functional activities completed as noted in grid.    Patient education  Pt educated on PT role, safety during functional mobility, WB status     Patient response to education:   Pt verbalized understanding Pt demonstrated skill Pt requires further education in this area   Yes  Yes  Reinforce      ASSESSMENT:    Conditions Requiring Skilled Therapeutic Intervention:    [x]Decreased strength     [x]Decreased ROM  [x]Decreased functional mobility  [x]Decreased balance   [x]Decreased endurance   [x]Decreased posture  []Decreased sensation  []Decreased coordination   []Decreased vision  [x]Decreased safety awareness   [x]Increased pain       Comments:  RN cleared patient for participation in PT intervention with OT collaboration. Upon entering room, patient was found supine in bed and agreeable to intervention. Functional assessment findings and assist levels are as noted in grid. The patient required significant assistance to transfer to EOB for trunk control and BLE mobility (RLE>LLE). Sat EOB x 15 minutes in order to improve sitting balance and tolerance to upright activities. At the end of the session, the patient was supine in bed

## 2024-07-25 NOTE — PROGRESS NOTES
[x]Sensation      []Gross Motor Coordination  [] ROM           [] Delirium                   [] Motor Control      OT PLAN OF CARE   OT POC based on physician orders, patient diagnosis and results of clinical assessment     Frequency/Duration 1-3 days/wk for 2 weeks PRN   Specific OT Treatment Interventions to include:   * Instruction/training on adapted ADL techniques and AE recommendations to increase functional independence within precautions       * Training on energy conservation strategies, correct breathing pattern and techniques to improve independence/tolerance for self-care routine  * Functional transfer/mobility training/DME recommendations for increased independence, safety, and fall prevention  * Patient/Family education to increase follow through with safety techniques and functional independence  * Recommendation of environmental modifications for increased safety with functional transfers/mobility and ADLs  * Cognitive retraining/development of therapeutic activities to improve problem solving, judgement, memory, and attention for increased safety/participation in ADL/IADL tasks  * Sensory re-education to improve body/limb awareness, maintain/improve skin integrity, and improve hand/UE motor function  * Visual-perceptual training to improve environmental scanning, visual attention/focus, and oculomotor skills for increased safety/independence with functional transfers/mobility and ADLs  * Splinting/positioning for increased function, prevention of contractures, and improve skin integrity  * Therapeutic exercise to improve motor endurance, ROM, and functional strength for ADLs/functional transfers  * Therapeutic activities to facilitate/challenge dynamic balance, stand tolerance for increased safety and independence with ADLs  * Therapeutic activities to facilitate gross/fine motor skills for increased independence with ADLs  * Neuro-muscular re-education: facilitation of righting/equilibrium  reactions, midline orientation, scapular stability/mobility, normalization of muscle tone, and facilitation of volitional active controled movement  * Positioning to improve skin integrity, interaction with environment and functional independence  * Delirium prevention/treatment  * Manual techniques for edema management     Recommended Adaptive Equipment/DME: TBD      Home Living: Pt admitted from halfway  Bathroom setup: handicap accessible   Equipment owned: shower chair, WC, FWW     Prior Level of Function: Assist with ADLs , Assist with IADLs; Used WC for functional mobility, FWW for SPTs  Driving: no  Occupation: none stated     Pain Level: no c/o pain     Cognition: A&O: 4/4; Follows 2 step directions, pleasant & cooperative              Memory: G-              Sequencing: G-              Problem solving: F+              Judgement/safety: F+                Functional Assessment:  AM-PAC Daily Activity Raw Score: 12/24    Initial Eval Status  Date: 7/20/24 Treatment Status  Date:  7/25/24 STGs = LTGs  Time frame: 10-14 days   Feeding SBA      Set up  In bed for tray table   Modified [place]    Grooming Minimal Assist   To brush hair at EOB- limited by BUE ROM.    Mod A  Last session -Seated at EOB to brush hair, limited decreased B UE ROM   This session SBA with simple task, washing face  Modified [place]    UB Dressing Moderate Assist   Would benefit from AE      Min A  With gown seated at EOB Stand by Assist    LB Dressing Dependent   Pt educated on use of reacher/sock aid- would benefit from further education/trails     Dep  Bed level with socks   Moderate Assist    Bathing Moderate Assist    Max A  Eob, assist to wash distal BLEs and back, assist for UB as well   Minimal Assist    Toileting Dependent     Dep  Incontinent  Required pad change  Moderate Assist    Bed Mobility  Supine to sit: Maximal Assist x2  Sit to supine: Maximal Assist x2     Max A x 2  Supine < > Sit    Supine to sit: Moderate

## 2024-07-25 NOTE — CARE COORDINATION
Social Work/Case Management Transition of Care Planning ([facility] [phone]):  Per report and chart review, patient remains on IV Lasix BID.  She is on 3L Nc at 97%.  Baseline is room air.  Oxygen to be weaned as tolerated.  Met with patient at bedside.  Discharge plan is to [facility]. Pre-cert has been obtained and is good through 8/3. AIDEN/destination completed. 7000 completed. Discharge envelope with ambulance form is in the soft chart.  CM/SW will follow. ALBINO Lackey  7/25/2024    Update:  Discharge order noted.  Transport via [facility] with a 1:00 pm  time.  Notified patient and family at bedside, Amy of [facility] and floor nurse.  Discharge envelope with ambulance form is in the soft chart. ALBINO Lackey  7/25/2024

## 2024-07-25 NOTE — PLAN OF CARE
Problem: Skin/Tissue Integrity  Goal: Absence of new skin breakdown  Description: 1.  Monitor for areas of redness and/or skin breakdown  2.  Assess vascular access sites hourly  3.  Every 4-6 hours minimum:  Change oxygen saturation probe site  4.  Every 4-6 hours:  If on nasal continuous positive airway pressure, respiratory therapy assess nares and determine need for appliance change or resting period.  7/24/2024 2229 by Anya Napier RN  Outcome: Progressing  7/24/2024 1800 by Susy Jackson RN  Outcome: Progressing     Problem: Safety - Adult  Goal: Free from fall injury  7/24/2024 2229 by Anya Napier RN  Outcome: Progressing  7/24/2024 1800 by Susy Jackson RN  Outcome: Progressing     Problem: Pain  Goal: Verbalizes/displays adequate comfort level or baseline comfort level  7/24/2024 2229 by Anya aNpier RN  Outcome: Progressing  7/24/2024 1800 by Susy Jackson RN  Outcome: Progressing     Problem: ABCDS Injury Assessment  Goal: Absence of physical injury  7/24/2024 2229 by Anya Napier RN  Outcome: Progressing  7/24/2024 1800 by Susy Jackson RN  Outcome: Progressing     Problem: Discharge Planning  Goal: Discharge to home or other facility with appropriate resources  Outcome: Progressing     Problem: Nutrition Deficit:  Goal: Optimize nutritional status  Outcome: Progressing

## 2024-07-25 NOTE — PLAN OF CARE
Problem: Skin/Tissue Integrity  Goal: Absence of new skin breakdown  Description: 1.  Monitor for areas of redness and/or skin breakdown  2.  Assess vascular access sites hourly  3.  Every 4-6 hours minimum:  Change oxygen saturation probe site  4.  Every 4-6 hours:  If on nasal continuous positive airway pressure, respiratory therapy assess nares and determine need for appliance change or resting period.  Outcome: Progressing     Problem: Safety - Adult  Goal: Free from fall injury  Outcome: Progressing     Problem: Pain  Goal: Verbalizes/displays adequate comfort level or baseline comfort level  Outcome: Progressing     Problem: ABCDS Injury Assessment  Goal: Absence of physical injury  Outcome: Progressing     Problem: Discharge Planning  Goal: Discharge to home or other facility with appropriate resources  Outcome: Progressing     Problem: Nutrition Deficit:  Goal: Optimize nutritional status  Outcome: Progressing

## 2024-07-25 NOTE — DISCHARGE SUMMARY
vascular calcifications seen within the thoracic aorta.     1. Multilevel degenerative changes seen throughout the lumbar spine with mild anterolisthesis of L3 on L4 and L4 on L5. No acute fracture or dislocation. 2. Comminuted fracture seen of the distal femur with intramedullary kath seen in the femur..  Moderate-sized joint effusion present. 3. Cardiomegaly with underlying chronic changes seen throughout the lung fields bilaterally. No focal parenchymal opacification present.     XR CHEST PORTABLE    Result Date: 7/18/2024  EXAMINATION: 3 XRAY VIEWS OF THE LUMBAR SPINE; ONE XRAY VIEW OF THE CHEST; THREE XRAY VIEWS OF THE RIGHT KNEE 7/18/2024 12:12 pm COMPARISON: None. HISTORY: ORDERING SYSTEM PROVIDED HISTORY: fall, back pain TECHNOLOGIST PROVIDED HISTORY: Reason for exam:->fall, back pain; ORDERING SYSTEM PROVIDED HISTORY: Shortness of Breath TECHNOLOGIST PROVIDED HISTORY: Reason for exam:->Shortness of Breath; ORDERING SYSTEM PROVIDED HISTORY: fall, knee pain TECHNOLOGIST PROVIDED HISTORY: Reason for exam:->fall, knee pain FINDINGS: AP, lateral, and spot views of the lumbar spine reveal multilevel degenerative changes seen throughout the lumbar spine.  There is anterior spurring of the thoracolumbar junction.  There is mild anterolisthesis of L3 on L4 and L4 on L5 with degenerative changes seen within the posterior facets.  There is degenerative changes seen in the sacroiliac joints bilaterally.  Pedicles are intact.  Facets are well aligned.  Hardware identified within the hips bilaterally. Three views of the right knee reveal hardware identified within the distal femur.  There is comminuted fracture seen of the distal femur which is new when compared to the prior examination.  Degenerative changes seen within the patellofemoral joint space with spurring of the patella.  Moderate-sized joint effusion present.  Narrowing of the medial compartment with peaking of the intercondylar tibial spine.  Spurring of the  SOFT TISSUES/SKULL:  No acute abnormality of the visualized skull or soft tissues.     Generalized atrophy and chronic changes seen within the brain with no acute intracranial abnormality.       Discharge Exam:    HEENT: NCAT,  PERRLA, No JVD  Heart:  RRR, no murmurs, gallops, or rubs.  Lungs:  CTA bilaterally, no wheeze, rales or rhonchi  Abd: bowel sounds present, nontender, nondistended, no masses  Extrem:  No clubbing, cyanosis, or edema    Disposition: [facility]     Patient Condition at Discharge: stable    Patient Instructions:      Medication List        START taking these medications      oxyCODONE-acetaminophen 5-325 MG per tablet  Commonly known as: Percocet  Take 1 tablet by mouth every 6 hours as needed for Pain for up to 7 days. Intended supply: 7 days. Take lowest dose possible to manage pain Max Daily Amount: 4 tablets            CONTINUE taking these medications      acetaminophen 325 MG tablet  Commonly known as: TYLENOL     apixaban 5 MG Tabs tablet  Commonly known as: ELIQUIS  Take 1 tablet by mouth 2 times daily     bumetanide 1 MG tablet  Commonly known as: BUMEX  TAKE 1 TABLET BY MOUTH EVERY DAY     calcium-vitamin D 500-200 MG-UNIT per tablet  Commonly known as: OSCAL-500  Take 1 tablet by mouth 2 times daily     clobetasol 0.05 % cream  Commonly known as: TEMOVATE  Apply topically 2 times daily.     diclofenac sodium 1 % Gel  Commonly known as: VOLTAREN     ferrous sulfate 325 (65 Fe) MG tablet  Commonly known as: IRON 325     levothyroxine 75 MCG tablet  Commonly known as: SYNTHROID     lisinopril 10 MG tablet  Commonly known as: PRINIVIL;ZESTRIL     metoprolol tartrate 25 MG tablet  Commonly known as: LOPRESSOR  Take 1 tablet by mouth 2 times daily     therapeutic multivitamin-minerals tablet     vitamin C 500 MG tablet  Commonly known as: ASCORBIC ACID               Where to Get Your Medications        You can get these medications from any pharmacy    Bring a paper prescription for each of

## 2024-07-26 ENCOUNTER — OUTSIDE SERVICES (OUTPATIENT)
Dept: PRIMARY CARE CLINIC | Age: 84
End: 2024-07-26

## 2024-07-26 DIAGNOSIS — Z87.81 S/P ORIF (OPEN REDUCTION INTERNAL FIXATION) FRACTURE: ICD-10-CM

## 2024-07-26 DIAGNOSIS — N17.9 ACUTE KIDNEY INJURY SUPERIMPOSED ON CKD (HCC): ICD-10-CM

## 2024-07-26 DIAGNOSIS — Z91.81 AT MAXIMUM RISK FOR FALL: ICD-10-CM

## 2024-07-26 DIAGNOSIS — R53.81 PHYSICAL DECONDITIONING: ICD-10-CM
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
## 2024-07-26 DIAGNOSIS — E03.9 ACQUIRED HYPOTHYROIDISM: Chronic | ICD-10-CM

## 2024-07-26 DIAGNOSIS — G89.18 POST-OP PAIN: ICD-10-CM

## 2024-07-26 DIAGNOSIS — I10 ESSENTIAL HYPERTENSION: Chronic | ICD-10-CM

## 2024-07-26 DIAGNOSIS — Z98.890 S/P ORIF (OPEN REDUCTION INTERNAL FIXATION) FRACTURE: ICD-10-CM

## 2024-07-26 DIAGNOSIS — N18.9 ACUTE KIDNEY INJURY SUPERIMPOSED ON CKD (HCC): ICD-10-CM

## 2024-07-26 DIAGNOSIS — E87.70 HYPERVOLEMIA, UNSPECIFIED HYPERVOLEMIA TYPE: ICD-10-CM

## 2024-07-26 DIAGNOSIS — S72.91XS CLOSED FRACTURE OF RIGHT FEMUR, UNSPECIFIED FRACTURE MORPHOLOGY, UNSPECIFIED PORTION OF FEMUR, SEQUELA: ICD-10-CM

## 2024-07-26 DIAGNOSIS — R53.1 GENERALIZED WEAKNESS: ICD-10-CM

## 2024-07-26 DIAGNOSIS — R26.2 AMBULATORY DYSFUNCTION: ICD-10-CM

## 2024-07-26 DIAGNOSIS — K59.00 CONSTIPATION, UNSPECIFIED CONSTIPATION TYPE: ICD-10-CM

## 2024-07-26 DIAGNOSIS — I48.0 PAF (PAROXYSMAL ATRIAL FIBRILLATION) (HCC): ICD-10-CM

## 2024-07-26 LAB
ALBUMIN SERPL-MCNC: 2.8 G/DL (ref 3.5–5.2)
ALP SERPL-CCNC: 78 U/L (ref 35–104)
ALT SERPL-CCNC: <5 U/L (ref 0–32)
ANION GAP SERPL CALCULATED.3IONS-SCNC: 9 MMOL/L (ref 7–16)
AST SERPL-CCNC: 14 U/L (ref 0–31)
BILIRUB SERPL-MCNC: 0.3 MG/DL (ref 0–1.2)
BUN SERPL-MCNC: 51 MG/DL (ref 6–23)
CALCIUM SERPL-MCNC: 8.9 MG/DL (ref 8.6–10.2)
CHLORIDE SERPL-SCNC: 103 MMOL/L (ref 98–107)
CO2 SERPL-SCNC: 31 MMOL/L (ref 22–29)
CREAT SERPL-MCNC: 1 MG/DL (ref 0.5–1)
ERYTHROCYTE [DISTWIDTH] IN BLOOD BY AUTOMATED COUNT: 15.5 % (ref 11.5–15)
GFR, ESTIMATED: 54 ML/MIN/1.73M2
GLUCOSE SERPL-MCNC: 123 MG/DL (ref 74–99)
HCT VFR BLD AUTO: 26.9 % (ref 34–48)
HGB BLD-MCNC: 7.8 G/DL (ref 11.5–15.5)
MCH RBC QN AUTO: 27.1 PG (ref 26–35)
MCHC RBC AUTO-ENTMCNC: 29 G/DL (ref 32–34.5)
MCV RBC AUTO: 93.4 FL (ref 80–99.9)
PLATELET # BLD AUTO: 248 K/UL (ref 130–450)
PMV BLD AUTO: 10.6 FL (ref 7–12)
POTASSIUM SERPL-SCNC: 4.7 MMOL/L (ref 3.5–5)
PROT SERPL-MCNC: 5.7 G/DL (ref 6.4–8.3)
RBC # BLD AUTO: 2.88 M/UL (ref 3.5–5.5)
SODIUM SERPL-SCNC: 143 MMOL/L (ref 132–146)
TSH SERPL DL<=0.05 MIU/L-ACNC: 4.42 UIU/ML (ref 0.27–4.2)
WBC OTHER # BLD: 6.6 K/UL (ref 4.5–11.5)

## 2024-07-26 NOTE — PROGRESS NOTES
Gabi Madrigal (:  1940) is a 84 y.o. female.    Subjective   SUBJECTIVE/OBJECTIVE:  Past Medical History:   Diagnosis Date    Acquired hypothyroidism 2017    Arthritis     Blood transfusion reaction     Chronic kidney disease     History of blood transfusion     Hypertension     Lymphedema of both lower extremities 2017    Open wound of left great toe 10/18/2017    Other disorders of kidney and ureter in diseases classified elsewhere     Pelvic fracture (HCC)     Positive culture findings in wound 2023    Skin ulcer of left calf with fat layer exposed (HCC) 2017    Skin ulcer of left calf, limited to breakdown of skin (HCC) 2017    Ulcer of left lower leg (HCC) 3/24/2014    Venous stasis ulcer of left calf limited to breakdown of skin (HCC) 3/24/2014      Past Surgical History:   Procedure Laterality Date    COLONOSCOPY      ENDOSCOPY, COLON, DIAGNOSTIC      EYE SURGERY      cateract and lazor surgery 2015    FEMUR SURGERY Right 2024    RIGHT DISTAL FEMUR FRACTURE OPEN REDUCTION INTERNAL FIXATION performed by Dima Newton DO at [facility] OR    FRACTURE SURGERY      RT HIP    HIP FRACTURE SURGERY Left 2014    ORIF left hip    TONSILLECTOMY      as child    TOTAL HIP ARTHROPLASTY Left 10/17/2014    revision with removal of hardware      Family History   Problem Relation Age of Onset    Mult Sclerosis Father       Social History     Socioeconomic History    Marital status:    Tobacco Use    Smoking status: Never    Smokeless tobacco: Never   Vaping Use    Vaping Use: Never used   Substance and Sexual Activity    Alcohol use: Not Currently    Drug use: Never    Sexual activity: Not Currently   Social History Narrative    Drinks 2 cups of coffee daily     Social Determinants of Health     Food Insecurity: No Food Insecurity (2024)    Hunger Vital Sign     Worried About Running Out of Food in the Last Year: Never true     Ran Out of Food in the Last Year: Never

## 2024-07-29 LAB
ERYTHROCYTE [DISTWIDTH] IN BLOOD BY AUTOMATED COUNT: 16.1 % (ref 11.5–15)
HCT VFR BLD AUTO: 27.8 % (ref 34–48)
HGB BLD-MCNC: 8 G/DL (ref 11.5–15.5)
MCH RBC QN AUTO: 27 PG (ref 26–35)
MCHC RBC AUTO-ENTMCNC: 28.8 G/DL (ref 32–34.5)
MCV RBC AUTO: 93.9 FL (ref 80–99.9)
PLATELET # BLD AUTO: 284 K/UL (ref 130–450)
PMV BLD AUTO: 10.5 FL (ref 7–12)
RBC # BLD AUTO: 2.96 M/UL (ref 3.5–5.5)
WBC OTHER # BLD: 6.9 K/UL (ref 4.5–11.5)

## 2024-07-29 NOTE — PROGRESS NOTES
Physician Progress Note      PATIENT:               IMAN MADRIGAL  CSN #:                  [id number]  :                       1940  ADMIT DATE:       2024 9:49 PM  DISCH DATE:        2024 5:35 PM  RESPONDING  PROVIDER #:        Ashley Montana MD          QUERY TEXT:    Pt admitted with Right supracondylar distal femur fracture. Pt noted to have   osteopenia in X-ray right tib-fib/ankle/foot. If possible, please document in   progress notes and discharge summary if you are evaluating and/or treating any   of the following:    The medical record reflects the following:  Risk Factors: Fall,Age83    Clinical Indicators:ED Provider Notes  Iman Madrigal is a 84 y.o.   female brought in by EMS as a [facility] transfer after a fall in assisted   living facility.  She was seen at [facility] and found to have a distal right   femur fracture.  X-ray right tib-fib/ankle/foot:Demonstrate severe osteopenia and Charcot foot   changes to the feet.    Treatment:  calcium-vitamin D,cephalomedullary nail ORIF  Options provided:  -- Pathological right femur fracture due to osteopenia following fall which   would not usually break a normal, healthy bone  -- Traumatic right femur fracture  -- Other - I will add my own diagnosis  -- Disagree - Not applicable / Not valid  -- Disagree - Clinically unable to determine / Unknown  -- Refer to Clinical Documentation Reviewer    PROVIDER RESPONSE TEXT:    This patient has a pathological right femur fracture due to osteopenia   following fall which would not usually break a normal, healthy bone.    Query created by: Rodney French on 2024 7:38 AM      Electronically signed by:  Ashley Montana MD 2024 6:26 AM

## 2024-08-01 ENCOUNTER — OUTSIDE SERVICES (OUTPATIENT)
Dept: PRIMARY CARE CLINIC | Age: 84
End: 2024-08-01

## 2024-08-01 DIAGNOSIS — E87.70 HYPERVOLEMIA, UNSPECIFIED HYPERVOLEMIA TYPE: ICD-10-CM

## 2024-08-01 DIAGNOSIS — I10 ESSENTIAL HYPERTENSION: ICD-10-CM

## 2024-08-01 DIAGNOSIS — S72.91XS CLOSED FRACTURE OF RIGHT FEMUR, UNSPECIFIED FRACTURE MORPHOLOGY, UNSPECIFIED PORTION OF FEMUR, SEQUELA: Primary | ICD-10-CM

## 2024-08-01 DIAGNOSIS — I73.9 PVD (PERIPHERAL VASCULAR DISEASE) (HCC): ICD-10-CM

## 2024-08-01 DIAGNOSIS — N17.9 ACUTE KIDNEY INJURY SUPERIMPOSED ON CKD (HCC): ICD-10-CM

## 2024-08-01 DIAGNOSIS — Z98.890 S/P ORIF (OPEN REDUCTION INTERNAL FIXATION) FRACTURE: ICD-10-CM

## 2024-08-01 DIAGNOSIS — E03.9 ACQUIRED HYPOTHYROIDISM: ICD-10-CM

## 2024-08-01 DIAGNOSIS — Z87.81 S/P ORIF (OPEN REDUCTION INTERNAL FIXATION) FRACTURE: ICD-10-CM

## 2024-08-01 DIAGNOSIS — G89.18 POST-OP PAIN: ICD-10-CM
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
## 2024-08-01 DIAGNOSIS — N18.9 ACUTE KIDNEY INJURY SUPERIMPOSED ON CKD (HCC): ICD-10-CM

## 2024-08-01 DIAGNOSIS — I48.0 PAF (PAROXYSMAL ATRIAL FIBRILLATION) (HCC): ICD-10-CM

## 2024-08-01 DIAGNOSIS — R53.81 PHYSICAL DECONDITIONING: ICD-10-CM

## 2024-08-01 DIAGNOSIS — R53.1 GENERALIZED WEAKNESS: ICD-10-CM

## 2024-08-02 LAB
ALBUMIN SERPL-MCNC: 2.8 G/DL (ref 3.5–5.2)
ALP SERPL-CCNC: 78 U/L (ref 35–104)
ALT SERPL-CCNC: <5 U/L (ref 0–32)
ANION GAP SERPL CALCULATED.3IONS-SCNC: 7 MMOL/L (ref 7–16)
AST SERPL-CCNC: 16 U/L (ref 0–31)
BILIRUB SERPL-MCNC: 0.4 MG/DL (ref 0–1.2)
BUN SERPL-MCNC: 60 MG/DL (ref 6–23)
CALCIUM SERPL-MCNC: 8.4 MG/DL (ref 8.6–10.2)
CHLORIDE SERPL-SCNC: 100 MMOL/L (ref 98–107)
CO2 SERPL-SCNC: 33 MMOL/L (ref 22–29)
CREAT SERPL-MCNC: 1.4 MG/DL (ref 0.5–1)
ERYTHROCYTE [DISTWIDTH] IN BLOOD BY AUTOMATED COUNT: 17.8 % (ref 11.5–15)
GFR, ESTIMATED: 36 ML/MIN/1.73M2
GLUCOSE SERPL-MCNC: 94 MG/DL (ref 74–99)
HCT VFR BLD AUTO: 25.8 % (ref 34–48)
HGB BLD-MCNC: 7.3 G/DL (ref 11.5–15.5)
MCH RBC QN AUTO: 27.4 PG (ref 26–35)
MCHC RBC AUTO-ENTMCNC: 28.3 G/DL (ref 32–34.5)
MCV RBC AUTO: 97 FL (ref 80–99.9)
PLATELET # BLD AUTO: 253 K/UL (ref 130–450)
PMV BLD AUTO: 10.4 FL (ref 7–12)
POTASSIUM SERPL-SCNC: 5.3 MMOL/L (ref 3.5–5)
PROT SERPL-MCNC: 5.7 G/DL (ref 6.4–8.3)
RBC # BLD AUTO: 2.66 M/UL (ref 3.5–5.5)
SODIUM SERPL-SCNC: 140 MMOL/L (ref 132–146)
WBC OTHER # BLD: 7.2 K/UL (ref 4.5–11.5)

## 2024-08-03 LAB
ALBUMIN SERPL-MCNC: 2.7 G/DL (ref 3.5–5.2)
ALP SERPL-CCNC: 81 U/L (ref 35–104)
ALT SERPL-CCNC: 5 U/L (ref 0–32)
ANION GAP SERPL CALCULATED.3IONS-SCNC: 12 MMOL/L (ref 7–16)
AST SERPL-CCNC: 19 U/L (ref 0–31)
BILIRUB SERPL-MCNC: 0.5 MG/DL (ref 0–1.2)
BUN SERPL-MCNC: 59 MG/DL (ref 6–23)
CALCIUM SERPL-MCNC: 8.2 MG/DL (ref 8.6–10.2)
CHLORIDE SERPL-SCNC: 100 MMOL/L (ref 98–107)
CO2 SERPL-SCNC: 30 MMOL/L (ref 22–29)
CREAT SERPL-MCNC: 1.3 MG/DL (ref 0.5–1)
ERYTHROCYTE [DISTWIDTH] IN BLOOD BY AUTOMATED COUNT: 17.4 % (ref 11.5–15)
GFR, ESTIMATED: 42 ML/MIN/1.73M2
GLUCOSE SERPL-MCNC: 76 MG/DL (ref 74–99)
HCT VFR BLD AUTO: 26.3 % (ref 34–48)
HGB BLD-MCNC: 7.7 G/DL (ref 11.5–15.5)
MCH RBC QN AUTO: 27.7 PG (ref 26–35)
MCHC RBC AUTO-ENTMCNC: 29.3 G/DL (ref 32–34.5)
MCV RBC AUTO: 94.6 FL (ref 80–99.9)
PLATELET # BLD AUTO: 267 K/UL (ref 130–450)
PMV BLD AUTO: 10.3 FL (ref 7–12)
POTASSIUM SERPL-SCNC: 5 MMOL/L (ref 3.5–5)
PROT SERPL-MCNC: 5.9 G/DL (ref 6.4–8.3)
RBC # BLD AUTO: 2.78 M/UL (ref 3.5–5.5)
SODIUM SERPL-SCNC: 142 MMOL/L (ref 132–146)
WBC OTHER # BLD: 7.1 K/UL (ref 4.5–11.5)

## 2024-08-05 ENCOUNTER — OUTSIDE SERVICES (OUTPATIENT)
Dept: PRIMARY CARE CLINIC | Age: 84
End: 2024-08-05
Payer: MEDICARE

## 2024-08-05 DIAGNOSIS — I48.0 PAF (PAROXYSMAL ATRIAL FIBRILLATION) (HCC): ICD-10-CM

## 2024-08-05 DIAGNOSIS — S72.91XS CLOSED FRACTURE OF RIGHT FEMUR, UNSPECIFIED FRACTURE MORPHOLOGY, UNSPECIFIED PORTION OF FEMUR, SEQUELA: Primary | ICD-10-CM

## 2024-08-05 DIAGNOSIS — G89.18 POST-OP PAIN: ICD-10-CM

## 2024-08-05 DIAGNOSIS — I10 ESSENTIAL HYPERTENSION: ICD-10-CM

## 2024-08-05 PROCEDURE — 99309 SBSQ NF CARE MODERATE MDM 30: CPT | Performed by: STUDENT IN AN ORGANIZED HEALTH CARE EDUCATION/TRAINING PROGRAM

## 2024-08-05 NOTE — PROGRESS NOTES
Gabi Madrigal (:  1940) is a 84 y.o. female.    Subjective     Patient states that she continues to improve.  Pain is tolerable.  She denies any chest pain or shortness of breath.    Location: [facility]  Progress notes reviewed.    Past Medical History:   Diagnosis Date    Acquired hypothyroidism 2017    Arthritis     Blood transfusion reaction     Chronic kidney disease     History of blood transfusion     Hypertension     Lymphedema of both lower extremities 2017    Open wound of left great toe 10/18/2017    Other disorders of kidney and ureter in diseases classified elsewhere     Pelvic fracture (HCC)     Positive culture findings in wound 2023    Skin ulcer of left calf with fat layer exposed ([facility]) 2017    Skin ulcer of left calf, limited to breakdown of skin ([facility]) 2017    Ulcer of left lower leg ([facility]) 3/24/2014    Venous stasis ulcer of left calf limited to breakdown of skin ([facility]) 3/24/2014       Allergies   Allergen Reactions    Aspirin Itching    Other Nausea And Vomiting     FLU SHOT    Tape [Adhesive Tape] Swelling             Review of Systems - as above       Objective   Physical Exam  Constitutional:       Appearance: She is well-developed.   HENT:      Head: Normocephalic.   Cardiovascular:      Rate and Rhythm: Normal rate and regular rhythm.      Heart sounds: Normal heart sounds. No murmur heard.  Pulmonary:      Effort: Pulmonary effort is normal. No respiratory distress.      Breath sounds: No wheezing.      Comments: Diminished in left lung field  On oxygen via NC  Abdominal:      General: Bowel sounds are normal.      Palpations: Abdomen is soft.   Musculoskeletal:         General: No tenderness. Normal range of motion.   Skin:     General: Skin is warm and dry.   Neurological:      Mental Status: She is alert and oriented to person, place, and time.   Psychiatric:         Behavior: Behavior normal.         Recent Labs     24  [id number]

## 2024-08-06 ENCOUNTER — OUTSIDE SERVICES (OUTPATIENT)
Dept: PRIMARY CARE CLINIC | Age: 84
End: 2024-08-06

## 2024-08-06 ENCOUNTER — APPOINTMENT (OUTPATIENT)
Dept: GENERAL RADIOLOGY | Age: 84
DRG: 812 | End: 2024-08-06
Payer: MEDICARE

## 2024-08-06 ENCOUNTER — HOSPITAL ENCOUNTER (INPATIENT)
Age: 84
LOS: 2 days | Discharge: NURSING, REHAB OR LONG-TERM CARE | DRG: 812 | End: 2024-08-09
Attending: STUDENT IN AN ORGANIZED HEALTH CARE EDUCATION/TRAINING PROGRAM | Admitting: INTERNAL MEDICINE
Payer: MEDICARE

## 2024-08-06 DIAGNOSIS — D62 ACUTE BLOOD LOSS ANEMIA: Primary | ICD-10-CM

## 2024-08-06 DIAGNOSIS — K92.2 GASTROINTESTINAL HEMORRHAGE, UNSPECIFIED GASTROINTESTINAL HEMORRHAGE TYPE: ICD-10-CM

## 2024-08-06 DIAGNOSIS — I10 ESSENTIAL HYPERTENSION: ICD-10-CM

## 2024-08-06 DIAGNOSIS — Z98.890 S/P ORIF (OPEN REDUCTION INTERNAL FIXATION) FRACTURE: ICD-10-CM

## 2024-08-06 DIAGNOSIS — N18.9 ACUTE KIDNEY INJURY SUPERIMPOSED ON CKD (HCC): ICD-10-CM
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
## 2024-08-06 DIAGNOSIS — Z87.81 S/P ORIF (OPEN REDUCTION INTERNAL FIXATION) FRACTURE: ICD-10-CM

## 2024-08-06 DIAGNOSIS — I73.9 PVD (PERIPHERAL VASCULAR DISEASE) (HCC): ICD-10-CM

## 2024-08-06 DIAGNOSIS — E03.9 ACQUIRED HYPOTHYROIDISM: ICD-10-CM

## 2024-08-06 DIAGNOSIS — G89.18 POST-OP PAIN: ICD-10-CM

## 2024-08-06 DIAGNOSIS — S72.91XS CLOSED FRACTURE OF RIGHT FEMUR, UNSPECIFIED FRACTURE MORPHOLOGY, UNSPECIFIED PORTION OF FEMUR, SEQUELA: Primary | ICD-10-CM

## 2024-08-06 DIAGNOSIS — E87.70 HYPERVOLEMIA, UNSPECIFIED HYPERVOLEMIA TYPE: ICD-10-CM

## 2024-08-06 DIAGNOSIS — I48.0 PAF (PAROXYSMAL ATRIAL FIBRILLATION) (HCC): ICD-10-CM

## 2024-08-06 DIAGNOSIS — N17.9 ACUTE KIDNEY INJURY SUPERIMPOSED ON CKD (HCC): ICD-10-CM

## 2024-08-06 DIAGNOSIS — R53.1 GENERALIZED WEAKNESS: ICD-10-CM

## 2024-08-06 DIAGNOSIS — R53.81 PHYSICAL DECONDITIONING: ICD-10-CM

## 2024-08-06 LAB
ALBUMIN SERPL-MCNC: 3.1 G/DL (ref 3.5–5.2)
ALP SERPL-CCNC: 93 U/L (ref 35–104)
ALT SERPL-CCNC: 7 U/L (ref 0–32)
ANION GAP SERPL CALCULATED.3IONS-SCNC: 9 MMOL/L (ref 7–16)
AST SERPL-CCNC: 20 U/L (ref 0–31)
BASOPHILS # BLD: 0.04 K/UL (ref 0–0.2)
BASOPHILS NFR BLD: 1 % (ref 0–2)
BILIRUB SERPL-MCNC: 0.7 MG/DL (ref 0–1.2)
BUN SERPL-MCNC: 49 MG/DL (ref 6–23)
CALCIUM SERPL-MCNC: 8.4 MG/DL (ref 8.6–10.2)
CHLORIDE SERPL-SCNC: 97 MMOL/L (ref 98–107)
CO2 SERPL-SCNC: 32 MMOL/L (ref 22–29)
CREAT SERPL-MCNC: 1.4 MG/DL (ref 0.5–1)
EOSINOPHIL # BLD: 0.3 K/UL (ref 0.05–0.5)
EOSINOPHILS RELATIVE PERCENT: 4 % (ref 0–6)
ERYTHROCYTE [DISTWIDTH] IN BLOOD BY AUTOMATED COUNT: 17.4 % (ref 11.5–15)
ERYTHROCYTE [DISTWIDTH] IN BLOOD BY AUTOMATED COUNT: 17.9 % (ref 11.5–15)
GFR, ESTIMATED: 37 ML/MIN/1.73M2
GLUCOSE SERPL-MCNC: 130 MG/DL (ref 74–99)
HCT VFR BLD AUTO: 21.4 % (ref 34–48)
HCT VFR BLD AUTO: 23.7 % (ref 34–48)
HCT VFR BLD AUTO: 23.8 % (ref 34–48)
HGB BLD-MCNC: 5.7 G/DL (ref 11.5–15.5)
HGB BLD-MCNC: 6.5 G/DL (ref 11.5–15.5)
HGB BLD-MCNC: 6.8 G/DL (ref 11.5–15.5)
IMM GRANULOCYTES # BLD AUTO: 0.03 K/UL (ref 0–0.58)
IMM GRANULOCYTES NFR BLD: 0 % (ref 0–5)
LYMPHOCYTES NFR BLD: 0.91 K/UL (ref 1.5–4)
LYMPHOCYTES RELATIVE PERCENT: 11 % (ref 20–42)
MCH RBC QN AUTO: 26.3 PG (ref 26–35)
MCH RBC QN AUTO: 27.1 PG (ref 26–35)
MCHC RBC AUTO-ENTMCNC: 26.6 G/DL (ref 32–34.5)
MCHC RBC AUTO-ENTMCNC: 28.6 G/DL (ref 32–34.5)
MCV RBC AUTO: 94.8 FL (ref 80–99.9)
MCV RBC AUTO: 98.6 FL (ref 80–99.9)
MONOCYTES NFR BLD: 0.7 K/UL (ref 0.1–0.95)
MONOCYTES NFR BLD: 8 % (ref 2–12)
NEUTROPHILS NFR BLD: 76 % (ref 43–80)
NEUTS SEG NFR BLD: 6.31 K/UL (ref 1.8–7.3)
PLATELET # BLD AUTO: 200 K/UL (ref 130–450)
PLATELET # BLD AUTO: 251 K/UL (ref 130–450)
PMV BLD AUTO: 10.2 FL (ref 7–12)
PMV BLD AUTO: 10.3 FL (ref 7–12)
POTASSIUM SERPL-SCNC: 4.2 MMOL/L (ref 3.5–5)
PROT SERPL-MCNC: 6.6 G/DL (ref 6.4–8.3)
RBC # BLD AUTO: 2.17 M/UL (ref 3.5–5.5)
RBC # BLD AUTO: 2.51 M/UL (ref 3.5–5.5)
RBC # BLD: ABNORMAL 10*6/UL
SODIUM SERPL-SCNC: 138 MMOL/L (ref 132–146)
TROPONIN I SERPL HS-MCNC: 65 NG/L (ref 0–9)
WBC OTHER # BLD: 6.1 K/UL (ref 4.5–11.5)
WBC OTHER # BLD: 8.3 K/UL (ref 4.5–11.5)

## 2024-08-06 PROCEDURE — 80053 COMPREHEN METABOLIC PANEL: CPT

## 2024-08-06 PROCEDURE — 85025 COMPLETE CBC W/AUTO DIFF WBC: CPT

## 2024-08-06 PROCEDURE — 84484 ASSAY OF TROPONIN QUANT: CPT

## 2024-08-06 PROCEDURE — 71045 X-RAY EXAM CHEST 1 VIEW: CPT

## 2024-08-06 PROCEDURE — 99285 EMERGENCY DEPT VISIT HI MDM: CPT

## 2024-08-06 PROCEDURE — 86850 RBC ANTIBODY SCREEN: CPT

## 2024-08-06 PROCEDURE — 86922 COMPATIBILITY TEST ANTIGLOB: CPT

## 2024-08-06 PROCEDURE — 93005 ELECTROCARDIOGRAM TRACING: CPT | Performed by: STUDENT IN AN ORGANIZED HEALTH CARE EDUCATION/TRAINING PROGRAM

## 2024-08-06 PROCEDURE — 86901 BLOOD TYPING SEROLOGIC RH(D): CPT

## 2024-08-06 PROCEDURE — 86870 RBC ANTIBODY IDENTIFICATION: CPT

## 2024-08-06 PROCEDURE — 86920 COMPATIBILITY TEST SPIN: CPT

## 2024-08-06 PROCEDURE — 86900 BLOOD TYPING SEROLOGIC ABO: CPT

## 2024-08-06 PROCEDURE — 86880 COOMBS TEST DIRECT: CPT

## 2024-08-06 RX ORDER — SODIUM CHLORIDE 9 MG/ML
INJECTION, SOLUTION INTRAVENOUS PRN
Status: DISCONTINUED | OUTPATIENT
Start: 2024-08-06 | End: 2024-08-09 | Stop reason: HOSPADM

## 2024-08-06 ASSESSMENT — PAIN - FUNCTIONAL ASSESSMENT: PAIN_FUNCTIONAL_ASSESSMENT: NONE - DENIES PAIN

## 2024-08-07 ENCOUNTER — TELEPHONE (OUTPATIENT)
Dept: ORTHOPEDIC SURGERY | Age: 84
End: 2024-08-07

## 2024-08-07 ENCOUNTER — APPOINTMENT (OUTPATIENT)
Dept: GENERAL RADIOLOGY | Age: 84
DRG: 812 | End: 2024-08-07
Payer: MEDICARE

## 2024-08-07 PROBLEM — D62 ACUTE BLOOD LOSS ANEMIA: Status: ACTIVE | Noted: 2024-08-07

## 2024-08-07 PROBLEM — K92.2 GI BLEED: Status: ACTIVE | Noted: 2024-08-07

## 2024-08-07 LAB
ANION GAP SERPL CALCULATED.3IONS-SCNC: 4 MMOL/L (ref 7–16)
BASOPHILS # BLD: 0 K/UL (ref 0–0.2)
BASOPHILS NFR BLD: 0 % (ref 0–2)
BILIRUB UR QL STRIP: NEGATIVE
BUN SERPL-MCNC: 47 MG/DL (ref 6–23)
CALCIUM SERPL-MCNC: 7.8 MG/DL (ref 8.6–10.2)
CHLORIDE SERPL-SCNC: 103 MMOL/L (ref 98–107)
CLARITY UR: CLEAR
CO2 SERPL-SCNC: 35 MMOL/L (ref 22–29)
COLOR UR: YELLOW
CREAT SERPL-MCNC: 1.4 MG/DL (ref 0.5–1)
EOSINOPHIL # BLD: 0.42 K/UL (ref 0.05–0.5)
EOSINOPHILS RELATIVE PERCENT: 8 % (ref 0–6)
ERYTHROCYTE [DISTWIDTH] IN BLOOD BY AUTOMATED COUNT: 16.9 % (ref 11.5–15)
GFR, ESTIMATED: 38 ML/MIN/1.73M2
GLUCOSE SERPL-MCNC: 129 MG/DL (ref 74–99)
GLUCOSE UR STRIP-MCNC: NEGATIVE MG/DL
HCT VFR BLD AUTO: 24.8 % (ref 34–48)
HCT VFR BLD AUTO: 25.4 % (ref 34–48)
HCT VFR BLD AUTO: 25.4 % (ref 34–48)
HGB BLD-MCNC: 7.1 G/DL (ref 11.5–15.5)
HGB BLD-MCNC: 7.4 G/DL (ref 11.5–15.5)
HGB BLD-MCNC: 7.5 G/DL (ref 11.5–15.5)
HGB UR QL STRIP.AUTO: NEGATIVE
KETONES UR STRIP-MCNC: NEGATIVE MG/DL
LEUKOCYTE ESTERASE UR QL STRIP: NEGATIVE
LYMPHOCYTES NFR BLD: 0.51 K/UL (ref 1.5–4)
LYMPHOCYTES RELATIVE PERCENT: 10 % (ref 20–42)
MCH RBC QN AUTO: 26.9 PG (ref 26–35)
MCHC RBC AUTO-ENTMCNC: 28.6 G/DL (ref 32–34.5)
MCV RBC AUTO: 93.9 FL (ref 80–99.9)
MONOCYTES NFR BLD: 0.19 K/UL (ref 0.1–0.95)
MONOCYTES NFR BLD: 4 % (ref 2–12)
MYELOCYTES ABSOLUTE COUNT: 0.05 K/UL
MYELOCYTES: 1 %
NEUTROPHILS NFR BLD: 78 % (ref 43–80)
NEUTS SEG NFR BLD: 4.14 K/UL (ref 1.8–7.3)
NITRITE UR QL STRIP: NEGATIVE
PH UR STRIP: 7 [PH] (ref 5–9)
PLATELET # BLD AUTO: 188 K/UL (ref 130–450)
PMV BLD AUTO: 10.3 FL (ref 7–12)
POTASSIUM SERPL-SCNC: 4.3 MMOL/L (ref 3.5–5)
PROT UR STRIP-MCNC: NEGATIVE MG/DL
RBC # BLD AUTO: 2.64 M/UL (ref 3.5–5.5)
RBC # BLD: ABNORMAL 10*6/UL
RBC #/AREA URNS HPF: ABNORMAL /HPF
SODIUM SERPL-SCNC: 142 MMOL/L (ref 132–146)
SP GR UR STRIP: 1.01 (ref 1–1.03)
TROPONIN I SERPL HS-MCNC: 56 NG/L (ref 0–9)
TROPONIN I SERPL HS-MCNC: 61 NG/L (ref 0–9)
UROBILINOGEN UR STRIP-ACNC: 2 EU/DL (ref 0–1)
WBC #/AREA URNS HPF: ABNORMAL /HPF
WBC OTHER # BLD: 5.3 K/UL (ref 4.5–11.5)

## 2024-08-07 PROCEDURE — 85014 HEMATOCRIT: CPT

## 2024-08-07 PROCEDURE — 30233N1 TRANSFUSION OF NONAUTOLOGOUS RED BLOOD CELLS INTO PERIPHERAL VEIN, PERCUTANEOUS APPROACH: ICD-10-PCS | Performed by: INTERNAL MEDICINE

## 2024-08-07 PROCEDURE — P9016 RBC LEUKOCYTES REDUCED: HCPCS

## 2024-08-07 PROCEDURE — 73552 X-RAY EXAM OF FEMUR 2/>: CPT

## 2024-08-07 PROCEDURE — 85025 COMPLETE CBC W/AUTO DIFF WBC: CPT

## 2024-08-07 PROCEDURE — 2580000003 HC RX 258: Performed by: NURSE PRACTITIONER

## 2024-08-07 PROCEDURE — 80048 BASIC METABOLIC PNL TOTAL CA: CPT

## 2024-08-07 PROCEDURE — 36430 TRANSFUSION BLD/BLD COMPNT: CPT

## 2024-08-07 PROCEDURE — 81001 URINALYSIS AUTO W/SCOPE: CPT

## 2024-08-07 PROCEDURE — 84484 ASSAY OF TROPONIN QUANT: CPT

## 2024-08-07 PROCEDURE — 6360000002 HC RX W HCPCS: Performed by: STUDENT IN AN ORGANIZED HEALTH CARE EDUCATION/TRAINING PROGRAM

## 2024-08-07 PROCEDURE — 87086 URINE CULTURE/COLONY COUNT: CPT

## 2024-08-07 PROCEDURE — 6370000000 HC RX 637 (ALT 250 FOR IP): Performed by: NURSE PRACTITIONER

## 2024-08-07 PROCEDURE — 93005 ELECTROCARDIOGRAM TRACING: CPT | Performed by: NURSE PRACTITIONER

## 2024-08-07 PROCEDURE — 6360000002 HC RX W HCPCS: Performed by: NURSE PRACTITIONER

## 2024-08-07 PROCEDURE — 1200000000 HC SEMI PRIVATE

## 2024-08-07 PROCEDURE — 2580000003 HC RX 258: Performed by: STUDENT IN AN ORGANIZED HEALTH CARE EDUCATION/TRAINING PROGRAM

## 2024-08-07 PROCEDURE — 85018 HEMOGLOBIN: CPT

## 2024-08-07 RX ORDER — SODIUM CHLORIDE 9 MG/ML
INJECTION, SOLUTION INTRAVENOUS PRN
Status: DISCONTINUED | OUTPATIENT
Start: 2024-08-07 | End: 2024-08-09 | Stop reason: HOSPADM

## 2024-08-07 RX ORDER — SODIUM CHLORIDE 0.9 % (FLUSH) 0.9 %
5-40 SYRINGE (ML) INJECTION PRN
Status: DISCONTINUED | OUTPATIENT
Start: 2024-08-07 | End: 2024-08-09 | Stop reason: HOSPADM

## 2024-08-07 RX ORDER — ASCORBIC ACID 500 MG
500 TABLET ORAL DAILY
Status: DISCONTINUED | OUTPATIENT
Start: 2024-08-07 | End: 2024-08-09 | Stop reason: HOSPADM

## 2024-08-07 RX ORDER — LEVOTHYROXINE SODIUM 0.07 MG/1
75 TABLET ORAL DAILY
Status: DISCONTINUED | OUTPATIENT
Start: 2024-08-07 | End: 2024-08-09 | Stop reason: HOSPADM

## 2024-08-07 RX ORDER — ACETAMINOPHEN 325 MG/1
650 TABLET ORAL EVERY 6 HOURS PRN
Status: DISCONTINUED | OUTPATIENT
Start: 2024-08-07 | End: 2024-08-09 | Stop reason: HOSPADM

## 2024-08-07 RX ORDER — LISINOPRIL 10 MG/1
10 TABLET ORAL DAILY
Status: DISCONTINUED | OUTPATIENT
Start: 2024-08-07 | End: 2024-08-09 | Stop reason: HOSPADM

## 2024-08-07 RX ORDER — ACETAMINOPHEN 650 MG/1
650 SUPPOSITORY RECTAL EVERY 6 HOURS PRN
Status: DISCONTINUED | OUTPATIENT
Start: 2024-08-07 | End: 2024-08-09 | Stop reason: HOSPADM

## 2024-08-07 RX ORDER — ONDANSETRON 2 MG/ML
4 INJECTION INTRAMUSCULAR; INTRAVENOUS EVERY 6 HOURS PRN
Status: DISCONTINUED | OUTPATIENT
Start: 2024-08-07 | End: 2024-08-09 | Stop reason: HOSPADM

## 2024-08-07 RX ORDER — BUMETANIDE 1 MG/1
1 TABLET ORAL DAILY
Status: DISCONTINUED | OUTPATIENT
Start: 2024-08-07 | End: 2024-08-09 | Stop reason: HOSPADM

## 2024-08-07 RX ORDER — SODIUM CHLORIDE 0.9 % (FLUSH) 0.9 %
5-40 SYRINGE (ML) INJECTION EVERY 12 HOURS SCHEDULED
Status: DISCONTINUED | OUTPATIENT
Start: 2024-08-07 | End: 2024-08-09 | Stop reason: HOSPADM

## 2024-08-07 RX ORDER — ONDANSETRON 4 MG/1
4 TABLET, ORALLY DISINTEGRATING ORAL EVERY 8 HOURS PRN
Status: DISCONTINUED | OUTPATIENT
Start: 2024-08-07 | End: 2024-08-09 | Stop reason: HOSPADM

## 2024-08-07 RX ORDER — FERROUS SULFATE 325(65) MG
325 TABLET ORAL
Status: DISCONTINUED | OUTPATIENT
Start: 2024-08-07 | End: 2024-08-09 | Stop reason: HOSPADM

## 2024-08-07 RX ORDER — BISACODYL 10 MG
10 SUPPOSITORY, RECTAL RECTAL DAILY PRN
Status: DISCONTINUED | OUTPATIENT
Start: 2024-08-07 | End: 2024-08-09 | Stop reason: HOSPADM

## 2024-08-07 RX ORDER — M-VIT,TX,IRON,MINS/CALC/FOLIC 27MG-0.4MG
1 TABLET ORAL DAILY
Status: DISCONTINUED | OUTPATIENT
Start: 2024-08-07 | End: 2024-08-09 | Stop reason: HOSPADM

## 2024-08-07 RX ADMIN — METOPROLOL TARTRATE 25 MG: 25 TABLET, FILM COATED ORAL at 12:37

## 2024-08-07 RX ADMIN — ACETAMINOPHEN 650 MG: 325 TABLET ORAL at 20:17

## 2024-08-07 RX ADMIN — LEVOTHYROXINE SODIUM 75 MCG: 75 TABLET ORAL at 12:37

## 2024-08-07 RX ADMIN — SODIUM CHLORIDE, PRESERVATIVE FREE 10 ML: 5 INJECTION INTRAVENOUS at 20:19

## 2024-08-07 RX ADMIN — PANTOPRAZOLE SODIUM 40 MG: 40 INJECTION, POWDER, FOR SOLUTION INTRAVENOUS at 20:19

## 2024-08-07 RX ADMIN — OXYCODONE HYDROCHLORIDE AND ACETAMINOPHEN 500 MG: 500 TABLET ORAL at 12:37

## 2024-08-07 RX ADMIN — METOPROLOL TARTRATE 25 MG: 25 TABLET, FILM COATED ORAL at 20:18

## 2024-08-07 RX ADMIN — PANTOPRAZOLE SODIUM 80 MG: 40 INJECTION, POWDER, FOR SOLUTION INTRAVENOUS at 03:29

## 2024-08-07 RX ADMIN — FERROUS SULFATE TAB 325 MG (65 MG ELEMENTAL FE) 325 MG: 325 (65 FE) TAB at 12:37

## 2024-08-07 ASSESSMENT — PAIN DESCRIPTION - ORIENTATION
ORIENTATION: RIGHT;LEFT
ORIENTATION: RIGHT;LEFT

## 2024-08-07 ASSESSMENT — PAIN - FUNCTIONAL ASSESSMENT
PAIN_FUNCTIONAL_ASSESSMENT: ACTIVITIES ARE NOT PREVENTED
PAIN_FUNCTIONAL_ASSESSMENT: ACTIVITIES ARE NOT PREVENTED
PAIN_FUNCTIONAL_ASSESSMENT: NONE - DENIES PAIN

## 2024-08-07 ASSESSMENT — PAIN DESCRIPTION - PAIN TYPE
TYPE: CHRONIC PAIN
TYPE: CHRONIC PAIN

## 2024-08-07 ASSESSMENT — PAIN DESCRIPTION - DESCRIPTORS
DESCRIPTORS: DISCOMFORT
DESCRIPTORS: DISCOMFORT

## 2024-08-07 ASSESSMENT — PAIN SCALES - GENERAL
PAINLEVEL_OUTOF10: 6
PAINLEVEL_OUTOF10: 7

## 2024-08-07 ASSESSMENT — PAIN DESCRIPTION - LOCATION
LOCATION: KNEE
LOCATION: KNEE

## 2024-08-07 NOTE — PROGRESS NOTES
Consult completed to jayro Babcock on call    Electronically signed by Suzette Flowers RN on 8/7/2024 at 6:12 PM

## 2024-08-07 NOTE — ED NOTES
ED to Inpatient Handoff Report    Notified Liudmila that electronic handoff available and patient ready for transport to room 545.    Safety Risks: Risk of falls    Patient in Restraints: no    Constant Observer or Patient : no    Telemetry Monitoring Ordered :No      Cardiac Rhythm: Atrial fib    Order to transfer to unit without monitor:N/A    Last MEWS: 1 Time completed: 1535    Deterioration Index Score:   Predictive Model Details          33 (Caution)  Factor Value    Calculated 8/7/2024 16:05 39% Age 84 years old    Deterioration Index Model 28% Respiratory rate 23     12% Cardiac rhythm Atrial fib     5% Potassium 4.3 mmol/L     5% Hematocrit abnormal (24.8 %)     4% Sodium 142 mmol/L     3% BUN abnormal (47 mg/dL)     3% Systolic 122     0% Temperature 98.9 °F (37.2 °C)     0% Pulse oximetry 98 %     0% WBC count 5.3 k/uL     0% Pulse 68        Vitals:    08/07/24 1339 08/07/24 1435 08/07/24 1445 08/07/24 1535   BP: 126/64 125/69  (!) 122/58   Pulse: 71 72 67 68   Resp: 15 17 16 23   Temp:    98.9 °F (37.2 °C)   TempSrc:    Oral   SpO2: 94% 97% 98% 98%   Weight:       Height:             Opportunity for questions and clarification was provided.

## 2024-08-07 NOTE — TELEPHONE ENCOUNTER
RIGHT DISTAL FEMUR FRACTURE OPEN REDUCTION INTERNAL FIXATION   Date:  7/19/2024    Patient's son called, Gabi will not make appt here today, she was rushed to ER [facility] last evening for apparent GI Bleed / Anemia.     Asking if she can be seen while in hospital for post-op FU,  suture removal

## 2024-08-07 NOTE — H&P
Internal Medicine History & Physical     Covering for Dr. Young    Name: Gabi Madrigal  : 1940  Chief Complaint: Anemia (Sent from [facility] for low hgb of 6.5)  Primary Care Physician: Klarissa Davidson DO  Admission date: 2024  Date of service: 2024     History of Present Illness  Gabi is a 84 y.o. year old female.  Patient was at the nursing home and had blood work checked.  Hemoglobin dropped to 6.5 at the facility.  She is on Eliquis for atrial fibrillation and was recently had operation on her right thigh for femur fracture.  Patient has been in rehab and nonweightbearing on that right leg since then.  Patient states that she has had no dizziness, shortness of breath, chest pain, shortness of breath.  She was on oxygen when I saw her, however, it was only because when she was sleeping her oxygen level dropped to 90%.  Patient states that she does have some mild pain in the right hip but it is minimal compared to what it had been.  Patient has no other troubles eating or drinking, constipation, diarrhea.  Patient is hungry and wanting to eat.  Surgery saw the patient and they have no plans for intervention.  There was concern that she was GI bleeding.  She states that she does have some black stools that she is on iron supplements but has not noticed any other blood in her stool.  It is felt that the bleeding came from the surgical site.  Patient denies any bleeding from the wound site and denies any increase in swelling.  The leg is wrapped and has a brace in place.  Patient states she shattered the leg.      ED course:   Initial blood work and imaging studies performed. Admission recommended by ED physician. Case was discussed with ED provider. Meds in ED consisted of the following:  Medications   0.9 % sodium chloride infusion (has no administration in time range)   sodium chloride flush 0.9 % injection 5-40 mL (has no administration in time range)   sodium chloride flush 0.9 %  (H) 09/22/2022    CKMB 3.6 08/15/2017    TROPONINI 0.03 08/15/2017       Echocardiogram:  Not applicable    Recent Radiological Studies:  XR CHEST PORTABLE   Final Result   Atherosclerotic disease and cardiomegaly.  Mild central pulmonary vascular   congestion.  No pleural effusion or pneumothorax.         XR FEMUR RIGHT (MIN 2 VIEWS)    (Results Pending)       Assessment  Active Hospital Problems    Diagnosis     Acute blood loss anemia [D62]     GI bleed [K92.2]     PAF (paroxysmal atrial fibrillation) ([facility]) [I48.0]     Essential hypertension [I10]     Acquired hypothyroidism [E03.9]        Plan  Right hip fracture status post immobilization and nonweightbearing  Patient has been recovering at skilled nursing facility  Patient with worsening anemia and sent over for further treatment and evaluation  Acute blood loss anemia  Patient started on transfusion for hemoglobin below 7  Transfuse again if dropping  General Surgery consulted, no plans for intervention as they do not feel it is GI related and more likely related to the recent fracture  Consult orthopedic surgery to evaluate patient  Hemoglobin did not come up as high as expected following transfusion  Repeat hemoglobin in the morning  Paroxysmal atrial fibrillation  Patient is rate controlled  Hold blood thinner for now due to bleeding and anemia  Hypertension  Blood pressure recovering with transfusion  Continue to monitor  Hypothyroidism   Continue meds    PT/OT  Follow labs  DVT prophylaxis.  Please see orders for further management and care.   for discharge planning  Discharge plan: TBD pending clinical improvement     Electronically signed by Jeff Mcelroy MD on 8/7/2024 at 12:05 PM      I can be reached through [place].    NOTE:  This report was transcribed using voice recognition software.  Every effort was made to ensure accuracy; however, inadvertent computerized transcription errors may be present.

## 2024-08-07 NOTE — CONSULTS
GENERAL SURGERY  CONSULT NOTE  8/7/2024    Physician Consulted: Dr. Koch  Reason for Consult: anemia      HPI  Gabi Madrigal is a 84 y.o. female who presents to general surgery service for evaluation of acute drop in Hb. Pt was recently in the hospital after she had a R femur fx and underwent an ORIF 7/19/24 and was discharged 7/25 to facility. Patient overall a poor historian but states she is not having any abdominal pain currently and is unsure if she has had any GIB symptoms. Per report, patient having dark stools although she takes Iron. Hx of chronic iron deficiency anemia.     Pt has history of Afib and has been on eliquis.    Upon presentation to the ED, her Hb was 5.7, she received 1 unit of blood and her Hb responded to 6.8 and on most recent labs was 7.4. Prior to her last admission, the highest her Hb was was 10.4.       Past Medical History:   Diagnosis Date    Acquired hypothyroidism 8/14/2017    Arthritis     Blood transfusion reaction     Chronic kidney disease     History of blood transfusion     Hypertension     Lymphedema of both lower extremities 9/6/2017    Open wound of left great toe 10/18/2017    Other disorders of kidney and ureter in diseases classified elsewhere     Pelvic fracture (HCC)     Positive culture findings in wound 5/2/2023    Skin ulcer of left calf with fat layer exposed (HCC) 9/6/2017    Skin ulcer of left calf, limited to breakdown of skin (HCC) 9/6/2017    Ulcer of left lower leg (HCC) 3/24/2014    Venous stasis ulcer of left calf limited to breakdown of skin (HCC) 3/24/2014       Past Surgical History:   Procedure Laterality Date    COLONOSCOPY      ENDOSCOPY, COLON, DIAGNOSTIC      EYE SURGERY      cateract and lazor surgery 2015    FEMUR SURGERY Right 7/19/2024    RIGHT DISTAL FEMUR FRACTURE OPEN REDUCTION INTERNAL FIXATION performed [facility] Dima Newton [facility] [place] [facility] [place]    FRACTURE SURGERY  2010    RT HIP    HIP FRACTURE SURGERY Left 08/08/2014    ORIF left hip     Never    Smokeless tobacco: Never   Vaping Use    Vaping Use: Never used   Substance Use Topics    Alcohol use: Not Currently    Drug use: Never         Review of Systems - pertinent positives as stated above      PHYSICAL EXAM:    Vitals:    08/07/24 0406   BP: (!) 106/58   Pulse: 83   Resp: 12   Temp:    SpO2: 94%       General Appearance:  lying in bed  Skin:  warm and dry, no cyanosis  Lungs:  normal respiratory effort   Heart:  regular rate   Abdomen:  soft, nontender, nondistended     LABS:    CBC  Recent Labs     08/06/24 2121 08/07/24  0328   WBC 8.3  --    HGB 6.8* 7.4*   HCT 23.8* 25.4*     --      BMP  Recent Labs     08/06/24 2121      K 4.2   CL 97*   CO2 32*   BUN 49*   CREATININE 1.4*   CALCIUM 8.4*     Liver Function  Recent Labs     08/06/24 2121   BILITOT 0.7   AST 20   ALT 7   ALKPHOS 93     No results for input(s): \"LACTATE\" in the last 72 hours.  No results for input(s): \"INR\" in the last 72 hours.    Invalid input(s): \"PT\", \"PTT\"    RADIOLOGY    XR CHEST PORTABLE    Result Date: 8/6/2024  EXAMINATION: ONE XRAY VIEW OF THE CHEST 8/6/2024 9:08 pm COMPARISON: Chest series from July 18, 2024 HISTORY: ORDERING SYSTEM PROVIDED HISTORY: short of breath TECHNOLOGIST PROVIDED HISTORY: Reason for exam:->short of breath FINDINGS: Slight elevation of the right hemidiaphragm.  Coarsened interstitial markings.  No formed consolidations, pleural effusions, or pneumothorax. Mild central interstitial prominence.  Atherosclerotic disease and cardiomegaly.  Osseous and thoracic soft tissue structures demonstrate no acute findings.     Atherosclerotic disease and cardiomegaly.  Mild central pulmonary vascular congestion.  No pleural effusion or pneumothorax.         ASSESSMENT:  84 y.o. female with anemia s/p R femur ORIF 7/23    PLAN:  - okay for regular diet as tolerated  - transfuse as needed to keep hgb > 7  - likely secondary to recent surgery on top of her chronic anemia  - no current GIB

## 2024-08-07 NOTE — ED PROVIDER NOTES
[facility] EMERGENCY DEPARTMENT  EMERGENCY DEPARTMENT ENCOUNTER        Pt Name: Gabi Madrigal  MRN: [id number]  Birthdate 1940  Date of evaluation: 8/6/2024  Provider: Magnolia Barba DO  PCP: Klarissa Davidson DO  Note Started: 8:42 PM EDT 8/6/24    CHIEF COMPLAINT       No chief complaint on file.      HISTORY OF PRESENT ILLNESS: 1 or more Elements   History From: ***    {Limitations to history (Optional):69074}    Gabi Madrigal is a 84 y.o. female who presents ***    Nursing Notes were all reviewed and agreed with or any disagreements were addressed in the HPI.    REVIEW OF SYSTEMS :      Review of Systems    Positives and Pertinent negatives as per HPI.     SURGICAL HISTORY     Past Surgical History:   Procedure Laterality Date   • COLONOSCOPY     • ENDOSCOPY, COLON, DIAGNOSTIC     • EYE SURGERY      cateract and lazor surgery 2015   • FEMUR SURGERY Right 7/19/2024    RIGHT DISTAL FEMUR FRACTURE OPEN REDUCTION INTERNAL FIXATION performed [facility] Dima [facility] DO [facility]   • FRACTURE SURGERY  2010    RT HIP   • HIP FRACTURE SURGERY Left 08/08/2014    ORIF left hip   • TONSILLECTOMY      as child   • TOTAL HIP ARTHROPLASTY Left 10/17/2014    revision with removal of hardware       CURRENTMEDICATIONS       Previous Medications    ACETAMINOPHEN (TYLENOL) 325 MG TABLET    Take 2 tablets by mouth every 4 hours as needed for Pain    APIXABAN (ELIQUIS) 5 MG TABS TABLET    Take 1 tablet by mouth 2 times daily    BUMETANIDE (BUMEX) 1 MG TABLET    TAKE 1 TABLET BY MOUTH EVERY DAY    CALCIUM-VITAMIN D (OSCAL-500) 500-200 MG-UNIT PER TABLET    Take 1 tablet by mouth 2 times daily    CLOBETASOL (TEMOVATE) 0.05 % CREAM    Apply topically 2 times daily.    DICLOFENAC SODIUM (VOLTAREN) 1 % GEL    APPLY 2 TO 4 GRAMS EXTERNALLY TO THE AFFECTED AREA 3 TO 4 TIMES DAILY    FERROUS SULFATE (IRON 325) 325 (65 FE) MG TABLET    Take 1 tablet by mouth daily (with breakfast)     following components:    Urobilinogen, Urine 2.0 (*)     All other components within normal limits   TROPONIN - Abnormal; Notable for the following components:    Troponin, High Sensitivity 56 (*)     All other components within normal limits   BASIC METABOLIC PANEL W/ REFLEX TO MG FOR LOW K - Abnormal; Notable for the following components:    CO2 35 (*)     Anion Gap 4 (*)     Glucose 129 (*)     BUN 47 (*)     Creatinine 1.4 (*)     Est, Glom Filt Rate 38 (*)     Calcium 7.8 (*)     All other components within normal limits   CBC WITH AUTO DIFFERENTIAL - Abnormal; Notable for the following components:    RBC 2.64 (*)     Hemoglobin 7.1 (*)     Hematocrit 24.8 (*)     MCHC 28.6 (*)     RDW 16.9 (*)     Lymphocytes % 10 (*)     Eosinophils % 8 (*)     Myelocytes 1 (*)     Lymphocytes Absolute 0.51 (*)     Myelocytes Absolute 0.05 (*)     All other components within normal limits   TROPONIN - Abnormal; Notable for the following components:    Troponin, High Sensitivity 61 (*)     All other components within normal limits   HEMOGLOBIN AND HEMATOCRIT - Abnormal; Notable for the following components:    Hemoglobin 7.4 (*)     Hematocrit 25.4 (*)     All other components within normal limits   HEMOGLOBIN AND HEMATOCRIT - Abnormal; Notable for the following components:    Hemoglobin 7.0 (*)     Hematocrit 24.5 (*)     All other components within normal limits   BASIC METABOLIC PANEL W/ REFLEX TO MG FOR LOW K - Abnormal; Notable for the following components:    BUN 44 (*)     Creatinine 1.2 (*)     Est, Glom Filt Rate 46 (*)     Calcium 8.4 (*)     All other components within normal limits   CBC WITH AUTO DIFFERENTIAL - Abnormal; Notable for the following components:    RBC 2.82 (*)     Hemoglobin 7.7 (*)     Hematocrit 26.2 (*)     MCHC 29.4 (*)     RDW 16.8 (*)     Lymphocytes % 15 (*)     Lymphocytes Absolute 0.90 (*)     All other components within normal limits   HEMOGLOBIN AND HEMATOCRIT - Abnormal; Notable for the

## 2024-08-07 NOTE — ED NOTES
Spoke with answering service for Dr. Koch consult, spoke with Shanel, she reports they will reach out to Dr. Koch for consult.

## 2024-08-07 NOTE — CONSULTS
Department of Orthopedic Trauma Surgery  Resident Progress note      CHIEF COMPLAINT:   Chief Complaint   Patient presents with    Anemia     Sent from [facility] for low hgb of 6.5       HISTORY OF PRESENT ILLNESS:                Patient is a 84 y.o. female who is 2 weeks status post Right distal femur open reduction internal fixation. She has been residing at assisted living facility. She has been on eliquis for DVT prophylaxis and takes tylenol for pain control. At this point she has minimal pain at the knee and has been following nonweightbearing protocol and wearing the Hinged ROM brace. Unfortunately she represented to the hospital today for anemia. Denies any other orthopedic complaints at this time.         Past Medical History:        Diagnosis Date    Acquired hypothyroidism 8/14/2017    Arthritis     Blood transfusion reaction     Chronic kidney disease     History of blood transfusion     Hypertension     Lymphedema of both lower extremities 9/6/2017    Open wound of left great toe 10/18/2017    Other disorders of kidney and ureter in diseases classified elsewhere     Pelvic fracture (HCC)     Positive culture findings in wound 5/2/2023    Skin ulcer of left calf with fat layer exposed (HCC) 9/6/2017    Skin ulcer of left calf, limited to breakdown of skin (HCC) 9/6/2017    Ulcer of left lower leg (HCC) 3/24/2014    Venous stasis ulcer of left calf limited to breakdown of skin (HCC) 3/24/2014     Past Surgical History:        Procedure Laterality Date    COLONOSCOPY      ENDOSCOPY, COLON, DIAGNOSTIC      EYE SURGERY      cateract and lazor surgery 2015    FEMUR SURGERY Right 7/19/2024    RIGHT DISTAL FEMUR FRACTURE OPEN REDUCTION INTERNAL FIXATION performed by Dima Newton DO at [facility] [place]    FRACTURE SURGERY  2010    RT HIP    HIP FRACTURE SURGERY Left 08/08/2014    ORIF left hip    TONSILLECTOMY      as child    TOTAL HIP ARTHROPLASTY Left 10/17/2014    revision with removal of hardware     Current  Medications:   Current Facility-Administered Medications: sodium chloride flush 0.9 % injection 5-40 mL, 5-40 mL, IntraVENous, 2 times per day  sodium chloride flush 0.9 % injection 5-40 mL, 5-40 mL, IntraVENous, PRN  0.9 % sodium chloride infusion, , IntraVENous, PRN  ondansetron (ZOFRAN-ODT) disintegrating tablet 4 mg, 4 mg, Oral, Q8H PRN **OR** ondansetron (ZOFRAN) injection 4 mg, 4 mg, IntraVENous, Q6H PRN  acetaminophen (TYLENOL) tablet 650 mg, 650 mg, Oral, Q6H PRN **OR** acetaminophen (TYLENOL) suppository 650 mg, 650 mg, Rectal, Q6H PRN  bisacodyl (DULCOLAX) suppository 10 mg, 10 mg, Rectal, Daily PRN  pantoprazole (PROTONIX) 40 mg in sodium chloride (PF) 0.9 % 10 mL injection, 40 mg, IntraVENous, Q12H  [Held by provider] apixaban (ELIQUIS) tablet 5 mg, 5 mg, Oral, BID  [Held by provider] bumetanide (BUMEX) tablet 1 mg, 1 mg, Oral, Daily  oyster shell calcium w/D 500-5 MG-MCG tablet 1 tablet, 1 tablet, Oral, Daily  ferrous sulfate (IRON 325) tablet 325 mg, 325 mg, Oral, Daily with breakfast  levothyroxine (SYNTHROID) tablet 75 mcg, 75 mcg, Oral, Daily  [Held by provider] lisinopril (PRINIVIL;ZESTRIL) tablet 10 mg, 10 mg, Oral, Daily  metoprolol tartrate (LOPRESSOR) tablet 25 mg, 25 mg, Oral, BID  therapeutic multivitamin-minerals 1 tablet, 1 tablet, Oral, Daily  ascorbic acid (VITAMIN C) tablet 500 mg, 500 mg, Oral, Daily  0.9 % sodium chloride infusion, , IntraVENous, PRN  Allergies:  Aspirin, Other, and Tape [adhesive tape]    Social History:   TOBACCO:   reports that she has never smoked. She has never used smokeless tobacco.  ETOH:   reports that she does not currently use alcohol.  DRUGS:   reports no history of drug use.  ACTIVITIES OF DAILY LIVING:    OCCUPATION:    Family History:       Problem Relation Age of Onset    Mult Sclerosis Father        REVIEW OF SYSTEMS:   Skin: no acute changes  Eyes: no acute changes  Ears/Nose/Throat: no acute changes  Respiratory: No increased work of breathing, no

## 2024-08-08 LAB
ABO/RH: NORMAL
ANION GAP SERPL CALCULATED.3IONS-SCNC: 10 MMOL/L (ref 7–16)
ANTIBODY IDENTIFICATION: NORMAL
ANTIBODY SCREEN: POSITIVE
ARM BAND NUMBER: NORMAL
BASOPHILS # BLD: 0.03 K/UL (ref 0–0.2)
BASOPHILS NFR BLD: 1 % (ref 0–2)
BLOOD BANK BLOOD PRODUCT EXPIRATION DATE: NORMAL
BLOOD BANK COMMENT: NORMAL
BLOOD BANK COMMENT: NORMAL
BLOOD BANK DISPENSE STATUS: NORMAL
BLOOD BANK ISBT PRODUCT BLOOD TYPE: 5100
BLOOD BANK PRODUCT CODE: NORMAL
BLOOD BANK SAMPLE EXPIRATION: NORMAL
BLOOD BANK UNIT TYPE AND RH: NORMAL
BPU ID: NORMAL
BUN SERPL-MCNC: 44 MG/DL (ref 6–23)
CALCIUM SERPL-MCNC: 8.4 MG/DL (ref 8.6–10.2)
CHLORIDE SERPL-SCNC: 102 MMOL/L (ref 98–107)
CO2 SERPL-SCNC: 28 MMOL/L (ref 22–29)
COMPONENT: NORMAL
CREAT SERPL-MCNC: 1.2 MG/DL (ref 0.5–1)
CROSSMATCH RESULT: NORMAL
DAT, POLYSPECIFIC: NEGATIVE
EKG ATRIAL RATE: 340 BPM
EKG ATRIAL RATE: 93 BPM
EKG Q-T INTERVAL: 326 MS
EKG Q-T INTERVAL: 352 MS
EKG QRS DURATION: 78 MS
EKG QRS DURATION: 80 MS
EKG QTC CALCULATION (BAZETT): 411 MS
EKG QTC CALCULATION (BAZETT): 415 MS
EKG R AXIS: 61 DEGREES
EKG R AXIS: 85 DEGREES
EKG T AXIS: 0 DEGREES
EKG T AXIS: 30 DEGREES
EKG VENTRICULAR RATE: 84 BPM
EKG VENTRICULAR RATE: 96 BPM
EOSINOPHIL # BLD: 0.15 K/UL (ref 0.05–0.5)
EOSINOPHILS RELATIVE PERCENT: 3 % (ref 0–6)
ERYTHROCYTE [DISTWIDTH] IN BLOOD BY AUTOMATED COUNT: 16.8 % (ref 11.5–15)
GFR, ESTIMATED: 46 ML/MIN/1.73M2
GLUCOSE SERPL-MCNC: 84 MG/DL (ref 74–99)
HCT VFR BLD AUTO: 24.5 % (ref 34–48)
HCT VFR BLD AUTO: 26.2 % (ref 34–48)
HGB BLD-MCNC: 7 G/DL (ref 11.5–15.5)
HGB BLD-MCNC: 7.7 G/DL (ref 11.5–15.5)
IMM GRANULOCYTES # BLD AUTO: <0.03 K/UL (ref 0–0.58)
IMM GRANULOCYTES NFR BLD: 0 % (ref 0–5)
LYMPHOCYTES NFR BLD: 0.9 K/UL (ref 1.5–4)
LYMPHOCYTES RELATIVE PERCENT: 15 % (ref 20–42)
MCH RBC QN AUTO: 27.3 PG (ref 26–35)
MCHC RBC AUTO-ENTMCNC: 29.4 G/DL (ref 32–34.5)
MCV RBC AUTO: 92.9 FL (ref 80–99.9)
MICROORGANISM SPEC CULT: NO GROWTH
MONOCYTES NFR BLD: 0.5 K/UL (ref 0.1–0.95)
MONOCYTES NFR BLD: 8 % (ref 2–12)
NEUTROPHILS NFR BLD: 73 % (ref 43–80)
NEUTS SEG NFR BLD: 4.33 K/UL (ref 1.8–7.3)
PLATELET # BLD AUTO: 210 K/UL (ref 130–450)
PMV BLD AUTO: 10.6 FL (ref 7–12)
POTASSIUM SERPL-SCNC: 4.3 MMOL/L (ref 3.5–5)
RBC # BLD AUTO: 2.82 M/UL (ref 3.5–5.5)
SERVICE CMNT-IMP: NORMAL
SODIUM SERPL-SCNC: 140 MMOL/L (ref 132–146)
SPECIMEN DESCRIPTION: NORMAL
TRANSFUSION STATUS: NORMAL
UNIT DIVISION: 0
UNIT ISSUE DATE/TIME: NORMAL
WBC OTHER # BLD: 5.9 K/UL (ref 4.5–11.5)

## 2024-08-08 PROCEDURE — 36415 COLL VENOUS BLD VENIPUNCTURE: CPT

## 2024-08-08 PROCEDURE — 97161 PT EVAL LOW COMPLEX 20 MIN: CPT

## 2024-08-08 PROCEDURE — 2700000000 HC OXYGEN THERAPY PER DAY

## 2024-08-08 PROCEDURE — 6370000000 HC RX 637 (ALT 250 FOR IP): Performed by: NURSE PRACTITIONER

## 2024-08-08 PROCEDURE — 2580000003 HC RX 258: Performed by: NURSE PRACTITIONER

## 2024-08-08 PROCEDURE — 85014 HEMATOCRIT: CPT

## 2024-08-08 PROCEDURE — 97535 SELF CARE MNGMENT TRAINING: CPT

## 2024-08-08 PROCEDURE — 93010 ELECTROCARDIOGRAM REPORT: CPT | Performed by: INTERNAL MEDICINE

## 2024-08-08 PROCEDURE — 85018 HEMOGLOBIN: CPT

## 2024-08-08 PROCEDURE — 97165 OT EVAL LOW COMPLEX 30 MIN: CPT

## 2024-08-08 PROCEDURE — 6360000002 HC RX W HCPCS: Performed by: NURSE PRACTITIONER

## 2024-08-08 PROCEDURE — 1200000000 HC SEMI PRIVATE

## 2024-08-08 PROCEDURE — 85025 COMPLETE CBC W/AUTO DIFF WBC: CPT

## 2024-08-08 PROCEDURE — 80048 BASIC METABOLIC PNL TOTAL CA: CPT

## 2024-08-08 RX ORDER — LOPERAMIDE HYDROCHLORIDE 2 MG/1
2 CAPSULE ORAL ONCE
Status: COMPLETED | OUTPATIENT
Start: 2024-08-08 | End: 2024-08-08

## 2024-08-08 RX ADMIN — SODIUM CHLORIDE, PRESERVATIVE FREE 10 ML: 5 INJECTION INTRAVENOUS at 20:16

## 2024-08-08 RX ADMIN — LEVOTHYROXINE SODIUM 75 MCG: 75 TABLET ORAL at 05:10

## 2024-08-08 RX ADMIN — OXYCODONE HYDROCHLORIDE AND ACETAMINOPHEN 500 MG: 500 TABLET ORAL at 08:49

## 2024-08-08 RX ADMIN — ACETAMINOPHEN 650 MG: 325 TABLET ORAL at 20:14

## 2024-08-08 RX ADMIN — FERROUS SULFATE TAB 325 MG (65 MG ELEMENTAL FE) 325 MG: 325 (65 FE) TAB at 08:48

## 2024-08-08 RX ADMIN — PANTOPRAZOLE SODIUM 40 MG: 40 INJECTION, POWDER, FOR SOLUTION INTRAVENOUS at 20:14

## 2024-08-08 RX ADMIN — Medication 1 TABLET: at 08:52

## 2024-08-08 RX ADMIN — SODIUM CHLORIDE, PRESERVATIVE FREE 10 ML: 5 INJECTION INTRAVENOUS at 09:00

## 2024-08-08 RX ADMIN — METOPROLOL TARTRATE 25 MG: 25 TABLET, FILM COATED ORAL at 20:14

## 2024-08-08 RX ADMIN — METOPROLOL TARTRATE 25 MG: 25 TABLET, FILM COATED ORAL at 08:49

## 2024-08-08 RX ADMIN — ACETAMINOPHEN 650 MG: 325 TABLET ORAL at 09:00

## 2024-08-08 RX ADMIN — LOPERAMIDE HYDROCHLORIDE 2 MG: 2 CAPSULE ORAL at 18:05

## 2024-08-08 RX ADMIN — PANTOPRAZOLE SODIUM 40 MG: 40 INJECTION, POWDER, FOR SOLUTION INTRAVENOUS at 08:49

## 2024-08-08 RX ADMIN — CALCIUM CARBONATE-VITAMIN D TAB 500 MG-200 UNIT 1 TABLET: 500-200 TAB at 08:51

## 2024-08-08 ASSESSMENT — PAIN SCALES - GENERAL
PAINLEVEL_OUTOF10: 6
PAINLEVEL_OUTOF10: 5

## 2024-08-08 ASSESSMENT — PAIN DESCRIPTION - ORIENTATION
ORIENTATION: RIGHT;LEFT
ORIENTATION: RIGHT;LEFT

## 2024-08-08 ASSESSMENT — PAIN DESCRIPTION - LOCATION
LOCATION: KNEE
LOCATION: KNEE

## 2024-08-08 ASSESSMENT — PAIN DESCRIPTION - DESCRIPTORS
DESCRIPTORS: DISCOMFORT
DESCRIPTORS: DISCOMFORT

## 2024-08-08 ASSESSMENT — PAIN DESCRIPTION - PAIN TYPE
TYPE: CHRONIC PAIN
TYPE: CHRONIC PAIN

## 2024-08-08 NOTE — PROGRESS NOTES
GENERAL SURGERY  DAILY PROGRESS NOTE    Patient's Name/Date of Birth: Gabi Madrigal / 1940    Date: 2024     Chief Complaint   Patient presents with    Anemia     Sent from [facility] for low hgb of 6.5        Subjective:  No signs of bleeding overnight. Confirmed with nursing      Objective:  Last 24Hrs  Temp  Av.4 °F (37.4 °C)  Min: 98 °F (36.7 °C)  Max: 100.6 °F (38.1 °C)  Resp  Av.8  Min: 15  Max: 23  Pulse  Av.6  Min: 67  Max: 87  Systolic (24hrs), Av , Min:116 , Max:130     Diastolic (24hrs), Av, Min:53, Max:69    SpO2  Av.7 %  Min: 92 %  Max: 100 %    I/O last 3 completed shifts:  In: -   Out: 800 [Urine:800]      General: In no acute distress, resting in bed  Cardiovascular: Warm throughout, no edema  Respiratory: no respiratory distress, equal chest rise  Abdomen: soft,  nontender, nondistended  Skin: no obvious rashes or lesions appreciated, no jaundice  Extremities: atraumatic      CBC  Recent Labs     24  0647 24  0328 24  0638 24  2220   WBC 6.1 8.3  --  5.3  --    RBC 2.17* 2.51*  --  2.64*  --    HGB 5.7* 6.8* 7.4* 7.1* 7.5*   HCT 21.4* 23.8* 25.4* 24.8* 25.4*   MCV 98.6 94.8  --  93.9  --    MCH 26.3 27.1  --  26.9  --    MCHC 26.6* 28.6*  --  28.6*  --    RDW 17.9* 17.4*  --  16.9*  --     251  --  188  --    MPV 10.2 10.3  --  10.3  --        CMP  Recent Labs     24  0638    142   K 4.2 4.3   CL 97* 103   CO2 32* 35*   BUN 49* 47*   CREATININE 1.4* 1.4*   GLUCOSE 130* 129*   CALCIUM 8.4* 7.8*   BILITOT 0.7  --    ALKPHOS 93  --    AST 20  --    ALT 7  --          Assessment/Plan:    Patient Active Problem List   Diagnosis    History of DVT (deep vein thrombosis)    Essential hypertension    Acquired hypothyroidism    Closed fracture of right iliac wing (HCC)    Chronic anticoagulation    NSTEMI (non-ST elevated myocardial infarction) (HCC)    Fall    Bilateral lower extremity

## 2024-08-08 NOTE — PROGRESS NOTES
Attempted to measure patients wounds but patient refused. I educated her on the importance of us measuring them but she still refused.  Patient stated that they were just changed by the wound care nurse and it causes her to much pain. Patient requested we come back at a later time.

## 2024-08-08 NOTE — PROGRESS NOTES
While rounding the patient is receptive to the visit  and appears calm. The patient shared prayers of the Pentecostal michael and received Pentecostal communion.

## 2024-08-08 NOTE — PROGRESS NOTES
4 Eyes Skin Assessment     NAME:  Gabi Madrigal  YOB: 1940  MEDICAL RECORD NUMBER:  [id number]    The patient is being assessed for  Admission    I agree that at least one RN has performed a thorough Head to Toe Skin Assessment on the patient. ALL assessment sites listed below have been assessed.      Areas assessed by both nurses:    Head, Face, Ears, Shoulders, Back, Chest, Arms, Elbows, Hands, Sacrum. Buttock, Coccyx, Ischium, and Legs. Feet and Heels        Does the Patient have a Wound? yes       Sathya Prevention initiated by RN: Yes  Wound Care Orders initiated by RN: Yes    Pressure Injury (Stage 3,4, Unstageable, DTI, NWPT, and Complex wounds) if present, place Wound referral order by RN under : No    New Ostomies, if present place, Ostomy referral order under : No     Nurse 1 eSignature: Electronically signed by Anna Torres RN on 8/8/24 at 4:14 PM EDT    **SHARE this note so that the co-signing nurse can place an eSignature**    Nurse 2 eSignature: Electronically signed by Suzette Flowers RN

## 2024-08-08 NOTE — PLAN OF CARE
Problem: Safety - Adult  Goal: Free from fall injury  8/7/2024 2058 by Christine Coon RN  Outcome: Progressing  8/7/2024 2024 by Christine Coon RN  Outcome: Progressing  8/7/2024 2023 by Christine Coon RN  Outcome: Progressing     Problem: Discharge Planning  Goal: Discharge to home or other facility with appropriate resources  8/7/2024 2058 by Christine Coon RN  Outcome: Progressing  8/7/2024 2024 by Christine Coon RN  Outcome: Progressing  8/7/2024 2023 by Christine Coon RN  Outcome: Progressing     Problem: Skin/Tissue Integrity  Goal: Absence of new skin breakdown  Description: 1.  Monitor for areas of redness and/or skin breakdown  2.  Assess vascular access sites hourly  3.  Every 4-6 hours minimum:  Change oxygen saturation probe site  4.  Every 4-6 hours:  If on nasal continuous positive airway pressure, respiratory therapy assess nares and determine need for appliance change or resting period.  8/7/2024 2058 by Christine Coon RN  Outcome: Progressing  8/7/2024 2024 by Christine Coon RN  Outcome: Progressing  8/7/2024 2023 by Christine Coon RN  Outcome: Progressing     Problem: ABCDS Injury Assessment  Goal: Absence of physical injury  Outcome: Progressing

## 2024-08-08 NOTE — ACP (ADVANCE CARE PLANNING)
Advance Care Planning   Healthcare Decision Maker:    Primary Decision Maker: Silverio Madrigal - Child - [phone]    Primary Decision Maker: Kimani Medina - Child - [phone]    Primary Decision Maker: Paolo Madrigal - Child - [phone]    Supplemental (Other) Decision Maker: Mendy Armando - Legal Guardian - [phone]    Click here to complete Healthcare Decision Makers including selection of the Healthcare Decision Maker Relationship (ie \"Primary\").

## 2024-08-08 NOTE — PROGRESS NOTES
Occupational Therapy    OCCUPATIONAL THERAPY INITIAL EVALUATION    [facility]   [address] [place], [place]         Date:2024                                                  Patient Name: Gabi Madrigal    MRN: [id number]    : 1940    Room: [address] [place] [address]      Evaluating OT: Julia Alarcon OTR/L   [id number]      Referring Provider:Ashley Young MD     Specific Provider Orders/Date:OT eval and treat 2024      Diagnosis:  Acute blood loss anemia [D62]  Gastrointestinal hemorrhage, unspecified gastrointestinal hemorrhage type [K92.2]     Pertinent Medical History: S/P R peterson-implant distal femur ORIF 24 , lymphedema LEs,     Precautions:  Fall Risk, NWB R LE, may begin gentle ROM exercises at this time. Hinged ROM was adjusted to 0-70 degrees of motion.   O2     Assessment of current deficits    [x] Functional mobility  [x]ADLs  [x] Strength               [x]Cognition    [x] Functional transfers   [x] IADLs         [x] Safety Awareness   [x]Endurance    [x] Fine Coordination              [x] Balance      [] Vision/perception   [x]Sensation     []Gross Motor Coordination  [] ROM  [] Delirium                   [] Motor Control     OT PLAN OF CARE   OT POC based on physician orders, patient diagnosis and results of clinical assessment    Frequency/Duration  2-4 days/wk for 2 - 4weeks PRN   Specific OT Treatment Interventions to include:   ADL retraining/adapted techniques and AE recommendations to increase functional independence within precautions                    Energy conservation techniques to improve tolerance for selfcare routine   Functional transfer/mobility training/DME recommendations for increased independence, safety and fall prevention         Patient/family education to increase safety and functional independence             Environmental modifications for safe mobility and completion of ADLs                             Therapeutic  shoulder ROM limited , less than 90   Distally WFLs  Tolerate UE therapeutic activity/exercises to increase strength/endurance for ADL/xfer activity       Hand Dominance right    Hearing: WFL   Sensation:  No c/o numbness or tingling   Tone: WFL   Edema: LE     Comments: Upon arrival patient lying in bed .  At end of session, patient returned to bed  with call light and phone within reach, all lines and tubes intact.  *ALARM ON     Overall patient demonstrated  decreased independence and safety during completion of ADL/functional transfer/mobility tasks.  Pt would benefit from continued skilled OT to increase safety and independence with completion of ADL/IADL tasks for functional independence and quality of life.      Comments/Treatment:      Patient practiced and was instructed on following during evaluation and OT session:          -Bed mobility - technique and safety         -Tranfers -sat EOB - addressing trunk control strengthening/endurance        -Mobility - NT         -ADLs - tech, AE if appropriate/needed  - continue to encourage patient to participate and complete          -Education: , importance of OOB activity and participating in ADLs, safety awareness             Rehab Potential: fair for established goals     Patient / Family Goal: none stated       Patient and/or family were instructed on functional diagnosis, prognosis/goals and OT plan of care. Demonstrated fair +  understanding.     Eval Complexity: Low    Time In: 0940  Time Out: 1004  Total Treatment Time: 12    Min Units   OT Eval Low 97165 x  1   OT Eval Medium 50947      OT Eval High 52829      OT Re-Eval 56645       Therapeutic Ex 19163      Therapeutic Activities 84141       ADL/Self Care 93442  12  1   Orthotic Management 49679       Manual 71246     Neuro Re-Ed 78003       Non-Billable Time      OT Re eval 09253        Evaluation Time additionally includes thorough review of current medical information, gathering information on past

## 2024-08-08 NOTE — PROGRESS NOTES
Page sent to Dr. Young regarding patients c/o loose stool and requesting something to stop it - per nursing not watery. Voicemail left    Electronically signed by Suzette Flowers RN on 8/8/2024 at 5:11 PM

## 2024-08-08 NOTE — CARE COORDINATION
Initial CM Assessment-Met patient and her daughter Jaja at the bedside. Patient admitted from [facility]. She was last discharged 7/25, returned d/t anemia. Plan is to return to [facility]-IF bed available. Patient will need a pre-cert to return, therapy saw-pre-cert submitted today. Gen Surg following-no surgical plans. AIDEN and jericho form completed. Discharge plan is [facility].     Marline AHN, RN  [facility]

## 2024-08-08 NOTE — FLOWSHEET NOTE
Initial Inpatient Wound Care    Admit Date: 8/6/2024  8:39 PM    Reason for consult:  wounds to BLE and r thigh    Significant history:  adm anemia  In rehab for fx right femur    Wound history:  POA, chronic  Followed at wound center. Now followed at [facility]  Findings:  alert and verbally approrpriate   08/08/24 1557   Wound 04/07/24 Leg Left;Medial;Distal;Lower   Date First Assessed/Time First Assessed: 04/07/24 1753   Present on Original Admission: Yes  Location: Leg  Wound Location Orientation: Left;Medial;Distal;Lower   Wound Image     Wound Etiology Venous   Dressing Status New dressing applied   Wound Cleansed Cleansed with saline   Dressing/Treatment   (opticel, abds, kerlex)   Dressing Change Due 08/09/24   Wound Assessment   (pale, yellow)   Drainage Amount Large (50-75% saturated)   Drainage Description Yellow   Odor   (significant odor. unknown last dressing change)   Patrizia-wound Assessment   (pink)       Brace to right leg.  Heel with small purple  Achilles with purple area                  Plan:alginate dressings. Heel protectors in place      Bertha Up RN 8/8/2024 4:00 PM

## 2024-08-08 NOTE — PROGRESS NOTES
[facility]                                Progress note    Subjective:    The patient is awake and alert.  Sitting up in bed anxious to get back to facility for rehab.   No acute events overnight.    Denies chest pain, angina, SOB     Objective:    BP (!) 117/58   Pulse 79   Temp 98 °F (36.7 °C) (Axillary)   Resp 16   Ht 1.6 m (5' 3\")   Wt 87.5 kg (192 lb 14.4 oz)   SpO2 94%   BMI 34.17 kg/m²     In: -   Out: 800   In: -   Out: 800 [Urine:800]    General appearance: NAD, conversant, pleasant  HEENT: AT/NC, MMM, exophthalmus   Neck: FROM, supple  Lungs: Clear to auscultation  CV: RRR, no MRGs  Vasc: Radial pulses 2+  Abdomen: Soft, non-tender; no masses or HSM  Extremities: No peripheral edema or digital cyanosis  Skin: no rash, lesions or ulcers  Psych: Alert and oriented to person, place and time  Neuro: Alert and interactive     Recent Labs     08/06/24 2121 08/07/24  0328 08/07/24  0638 08/07/24  2220 08/08/24  0832   WBC 8.3  --  5.3  --  5.9   HGB 6.8*   < > 7.1* 7.5* 7.7*   HCT 23.8*   < > 24.8* 25.4* 26.2*     --  188  --  210    < > = values in this interval not displayed.       Recent Labs     08/06/24 2121 08/07/24  0638 08/08/24  0832    142 140   K 4.2 4.3 4.3   CL 97* 103 102   CO2 32* 35* 28   BUN 49* 47* 44*   CREATININE 1.4* 1.4* 1.2*   CALCIUM 8.4* 7.8* 8.4*       Assessment:    Principal Problem:    Acute blood loss anemia  Active Problems:    Essential hypertension    Acquired hypothyroidism    PAF (paroxysmal atrial fibrillation) ([facility])    GI bleed  Resolved Problems:    * No resolved hospital problems. *      Plan:  Right hip fracture status post immobilization and nonweightbearing  Patient has been recovering at skilled nursing facility  Patient with worsening anemia and sent over for further treatment and evaluation  Acute blood loss anemia  Patient started on transfusion for hemoglobin below 7  Transfuse again if dropping  General Surgery consulted, no

## 2024-08-08 NOTE — DISCHARGE INSTR - COC
(cm^2) 54 cm^2 07/24/24 0645   Change in Wound Size % (l*w) -170 07/24/24 0645   Wound Volume (cm^3) 5.4 cm^3 07/24/24 0645   Wound Assessment Pink/red 07/24/24 0645   Drainage Amount Scant (moist but unmeasurable) 07/24/24 0645   Drainage Description Serosanguinous 07/24/24 0645   Odor None 07/24/24 0645   Wound Thickness Description not for Pressure Injury Partial thickness 07/24/24 0645   Number of days: 122       Wound 04/09/24 Buttocks Left;Mid (Active)   Wound Image   07/24/24 0645   Wound Etiology Deep tissue/Injury 07/24/24 0645   Dressing/Treatment Protective barrier 07/24/24 0645   Wound Length (cm) 0.6 cm 07/24/24 0645   Wound Width (cm) 1.2 cm 07/24/24 0645   Wound Depth (cm) 0.1 cm 07/24/24 0645   Wound Surface Area (cm^2) 0.72 cm^2 07/24/24 0645   Wound Volume (cm^3) 0.072 cm^3 07/24/24 0645   Wound Assessment Purple/maroon 07/24/24 0645   Drainage Amount None (dry) 07/24/24 0645   Patrizia-wound Assessment Intact 07/24/24 0645   Number of days: 121       Wound 07/24/24 Buttocks Left;Inner (Active)   Wound Image   07/24/24 0645   Wound Etiology Deep tissue/Injury 07/24/24 0645   Dressing/Treatment Protective barrier 07/24/24 0645   Wound Length (cm) 1 cm 07/24/24 0645   Wound Width (cm) 0.8 cm 07/24/24 0645   Wound Depth (cm) 0.1 cm 07/24/24 0645   Wound Surface Area (cm^2) 0.8 cm^2 07/24/24 0645   Wound Volume (cm^3) 0.08 cm^3 07/24/24 0645   Wound Assessment Pink/red;Purple/maroon 07/24/24 0645   Drainage Amount None (dry) 07/24/24 0645   Patrizia-wound Assessment Intact 07/24/24 0645   Number of days: 15       Incision 07/19/24 Leg Right;Lateral (Active)   Dressing Status Clean;Dry;Intact 08/08/24 0403   Dressing/Treatment Dry dressing 08/08/24 0403   Closure Sutures;Staples 07/20/24 2202   Incision Assessment Dry 08/08/24 0403   Drainage Amount None (dry) 08/08/24 0403   Odor None 08/08/24 0403   Patrizia-incision Assessment Intact 08/08/24 0403   Number of days: 20        Elimination:  Continence:   Bowel:  the above information and transfer of Gabi Madrigal  is necessary for the continuing treatment of the diagnosis listed and that she requires Skilled Nursing Facility for greater 30 days.     Update Admission H&P: No change in H&P    PHYSICIAN SIGNATURE:  Electronically signed by TRAVIS Abdul CNP on 8/9/24 at 10:19 AM EDT

## 2024-08-08 NOTE — PROGRESS NOTES
Physical Therapy  Facility/Department: [address] MED SURG/TELE  Physical Therapy Initial Assessment    Name: Gabi Madrigal  : 1940  MRN: [id number]  Date of Service: 2024    Patient Diagnosis(es): The primary encounter diagnosis was Acute blood loss anemia. A diagnosis of Gastrointestinal hemorrhage, unspecified gastrointestinal hemorrhage type was also pertinent to this visit.  Past Medical History:  has a past medical history of Acquired hypothyroidism, Arthritis, Blood transfusion reaction, Chronic kidney disease, History of blood transfusion, Hypertension, Lymphedema of both lower extremities, Open wound of left great toe, Other disorders of kidney and ureter in diseases classified elsewhere, Pelvic fracture (HCC), Positive culture findings in wound, Skin ulcer of left calf with fat layer exposed (HCC), Skin ulcer of left calf, limited to breakdown of skin (HCC), Ulcer of left lower leg (HCC), and Venous stasis ulcer of left calf limited to breakdown of skin (HCC).  Past Surgical History:  has a past surgical history that includes fracture surgery (); Tonsillectomy; Hip fracture surgery (Left, 2014); Colonoscopy; Endoscopy, colon, diagnostic; Total hip arthroplasty (Left, 10/17/2014); eye surgery; and Femur Surgery (Right, 2024).          Referring provider:  Ashley Young MD    PT Order:  PT eval and treat     Evaluating PT:  Viry Aldridge PT, DPT PT [id number]    Room #:  0545/0545-A  Diagnosis:  Acute blood loss anemia [D62]  Gastrointestinal hemorrhage, unspecified gastrointestinal hemorrhage type [K92.2]  Surgery: R distal femur fracture ORIF     Precautions:  fall risk, NWB Right LE, hinged knee brace 0-70 degrees, right post op shoe   Equipment Needs:  none    SUBJECTIVE:    Pt admitted from nursing facility.  Pt reported needed assist with all mobility and uses a slide board for transfers.     OBJECTIVE:   Initial Evaluation  Date:  Treatment Short Term/ Long Term   Goals    Was pt agreeable to Eval/treatment? yes     Does pt have pain? Right ankle pain     Bed Mobility  Rolling: Max A  Supine to sit: Max A x 2  Sit to supine: Max a x 2  Scooting: Max A to sitting EOB  Mod A   Transfers Sit to stand: NT  Stand to sit: NT  Stand pivot: NT  Mod  A slide board transfer   Ambulation    NT     Stair negotiation: ascended and descended  NT      ROM BLE:  WFL except right knee brace locked in 0-70 degrees     Strength Right LE:  grossly 2/5  Left LE:  grossly 3+/5  Increase LE strength by 1/2 mm grade   Balance Sitting EOB:  SBA  Dynamic Standing:  NT  Sitting EOB:  independent     AM-PAC 6 Clicks 8/24       Pt is A & O x 3  Sensation:  Pt denies numbness and tingling to extremities    Patient education  Pt educated on PT objectives during eval and while in the hospital, posture while sitting EOB.    Patient response to education:   Pt verbalized understanding Pt demonstrated skill Pt requires further education in this area       yes     ASSESSMENT:    Conditions Requiring Skilled Therapeutic Intervention:    [x]Decreased strength     [x]Decreased ROM  [x]Decreased functional mobility  [x]Decreased balance   [x]Decreased endurance   []Decreased posture  []Decreased sensation  []Decreased coordination   []Decreased vision  []Decreased safety awareness   [x]Increased pain       Comments:  Pt found in bed.  No report of dizziness during mobility.  Pt EO 3-4 minutes with SBA.  Pt reported she needed to have a BM so pt assisted back into bed.  Pt incontinent of BM while in bed and assisted with personal hygiene while in  bed.   At end of eval, pt left in bed with call light in reach and bed alarm on and nursing present.      Pt's/ family goals   1. None stated    Prognosis is good for reaching above PT goals.    Patient and or family understand(s) diagnosis, prognosis, and plan of care.  yes    PHYSICAL THERAPY PLAN OF CARE:    PT POC is established based on physician order and patient diagnosis

## 2024-08-09 VITALS
DIASTOLIC BLOOD PRESSURE: 60 MMHG | OXYGEN SATURATION: 99 % | BODY MASS INDEX: 33.63 KG/M2 | HEART RATE: 73 BPM | WEIGHT: 189.82 LBS | TEMPERATURE: 98.4 F | RESPIRATION RATE: 16 BRPM | SYSTOLIC BLOOD PRESSURE: 132 MMHG | HEIGHT: 63 IN

## 2024-08-09 LAB
ANION GAP SERPL CALCULATED.3IONS-SCNC: 9 MMOL/L (ref 7–16)
BASOPHILS # BLD: 0.02 K/UL (ref 0–0.2)
BASOPHILS NFR BLD: 0 % (ref 0–2)
BUN SERPL-MCNC: 43 MG/DL (ref 6–23)
CALCIUM SERPL-MCNC: 8.1 MG/DL (ref 8.6–10.2)
CHLORIDE SERPL-SCNC: 103 MMOL/L (ref 98–107)
CO2 SERPL-SCNC: 30 MMOL/L (ref 22–29)
CREAT SERPL-MCNC: 1.4 MG/DL (ref 0.5–1)
EOSINOPHIL # BLD: 0.32 K/UL (ref 0.05–0.5)
EOSINOPHILS RELATIVE PERCENT: 7 % (ref 0–6)
ERYTHROCYTE [DISTWIDTH] IN BLOOD BY AUTOMATED COUNT: 16.6 % (ref 11.5–15)
GFR, ESTIMATED: 39 ML/MIN/1.73M2
GLUCOSE SERPL-MCNC: 91 MG/DL (ref 74–99)
HCT VFR BLD AUTO: 24.5 % (ref 34–48)
HGB BLD-MCNC: 7.2 G/DL (ref 11.5–15.5)
IMM GRANULOCYTES # BLD AUTO: <0.03 K/UL (ref 0–0.58)
IMM GRANULOCYTES NFR BLD: 0 % (ref 0–5)
LYMPHOCYTES NFR BLD: 0.69 K/UL (ref 1.5–4)
LYMPHOCYTES RELATIVE PERCENT: 15 % (ref 20–42)
MCH RBC QN AUTO: 27.4 PG (ref 26–35)
MCHC RBC AUTO-ENTMCNC: 29.4 G/DL (ref 32–34.5)
MCV RBC AUTO: 93.2 FL (ref 80–99.9)
MONOCYTES NFR BLD: 0.47 K/UL (ref 0.1–0.95)
MONOCYTES NFR BLD: 10 % (ref 2–12)
NEUTROPHILS NFR BLD: 68 % (ref 43–80)
NEUTS SEG NFR BLD: 3.24 K/UL (ref 1.8–7.3)
PLATELET # BLD AUTO: 203 K/UL (ref 130–450)
PMV BLD AUTO: 10.4 FL (ref 7–12)
POTASSIUM SERPL-SCNC: 4 MMOL/L (ref 3.5–5)
RBC # BLD AUTO: 2.63 M/UL (ref 3.5–5.5)
SODIUM SERPL-SCNC: 142 MMOL/L (ref 132–146)
WBC OTHER # BLD: 4.8 K/UL (ref 4.5–11.5)

## 2024-08-09 PROCEDURE — 80048 BASIC METABOLIC PNL TOTAL CA: CPT

## 2024-08-09 PROCEDURE — 85025 COMPLETE CBC W/AUTO DIFF WBC: CPT

## 2024-08-09 PROCEDURE — 2580000003 HC RX 258: Performed by: NURSE PRACTITIONER

## 2024-08-09 PROCEDURE — 6370000000 HC RX 637 (ALT 250 FOR IP): Performed by: NURSE PRACTITIONER

## 2024-08-09 PROCEDURE — 2700000000 HC OXYGEN THERAPY PER DAY

## 2024-08-09 RX ORDER — PANTOPRAZOLE SODIUM 40 MG/1
40 TABLET, DELAYED RELEASE ORAL
Qty: 30 TABLET | Refills: 3 | Status: SHIPPED | OUTPATIENT
Start: 2024-08-09

## 2024-08-09 RX ORDER — PANTOPRAZOLE SODIUM 40 MG/1
40 TABLET, DELAYED RELEASE ORAL
Status: DISCONTINUED | OUTPATIENT
Start: 2024-08-09 | End: 2024-08-09 | Stop reason: HOSPADM

## 2024-08-09 RX ADMIN — CALCIUM CARBONATE-VITAMIN D TAB 500 MG-200 UNIT 1 TABLET: 500-200 TAB at 09:18

## 2024-08-09 RX ADMIN — METOPROLOL TARTRATE 25 MG: 25 TABLET, FILM COATED ORAL at 09:18

## 2024-08-09 RX ADMIN — PANTOPRAZOLE SODIUM 40 MG: 40 TABLET, DELAYED RELEASE ORAL at 06:49

## 2024-08-09 RX ADMIN — LEVOTHYROXINE SODIUM 75 MCG: 75 TABLET ORAL at 06:23

## 2024-08-09 RX ADMIN — OXYCODONE HYDROCHLORIDE AND ACETAMINOPHEN 500 MG: 500 TABLET ORAL at 09:18

## 2024-08-09 RX ADMIN — ACETAMINOPHEN 650 MG: 325 TABLET ORAL at 09:18

## 2024-08-09 RX ADMIN — Medication 1 TABLET: at 09:18

## 2024-08-09 RX ADMIN — FERROUS SULFATE TAB 325 MG (65 MG ELEMENTAL FE) 325 MG: 325 (65 FE) TAB at 09:18

## 2024-08-09 RX ADMIN — SODIUM CHLORIDE, PRESERVATIVE FREE 10 ML: 5 INJECTION INTRAVENOUS at 09:18

## 2024-08-09 ASSESSMENT — PAIN SCALES - GENERAL: PAINLEVEL_OUTOF10: 6

## 2024-08-09 ASSESSMENT — PAIN DESCRIPTION - DESCRIPTORS: DESCRIPTORS: ACHING;SORE;DISCOMFORT

## 2024-08-09 ASSESSMENT — PAIN DESCRIPTION - LOCATION: LOCATION: KNEE

## 2024-08-09 ASSESSMENT — PAIN - FUNCTIONAL ASSESSMENT: PAIN_FUNCTIONAL_ASSESSMENT: ACTIVITIES ARE NOT PREVENTED

## 2024-08-09 ASSESSMENT — PAIN DESCRIPTION - ORIENTATION: ORIENTATION: RIGHT;LEFT

## 2024-08-09 ASSESSMENT — PAIN DESCRIPTION - PAIN TYPE: TYPE: CHRONIC PAIN

## 2024-08-09 NOTE — PROGRESS NOTES
SPIRITUAL HEALTH SERVICES - BIJAL [facility] Encounter    Name: Gabi Madrigal                  Referral: Routine Visit    Sacraments  Anointed (Last Rites): No  Apostolic [place]: No  Confession: No  Communion: No     Assessment:  Patient had already been discharged.      Intervention:  None.     Outcome:  None.    Plan:  None.      Electronically signed by Chaplain Tung, on 8/9/2024 at 4:37 PM.  Spiritual Care Department  [facility]  [phone]

## 2024-08-09 NOTE — PROGRESS NOTES
IV to PO Conversion Policy     Notification of IV to PO conversion:    This patient's order for pantoprazole IV has been changed to pantoprazole PO as approved by the [facility] INTRAVENOUS TO ORAL Policy.    If the patient should become strict NPO while on this therapy, contact the prescriber for further orders.    Jennifer España RPH  8/9/2024  6:43 AM

## 2024-08-09 NOTE — CARE COORDINATION
Transition of Care-Patient will return to [facility] today. [facility] to  at 12. Call to POA to update. Facility liaison and RN notified as well. Nurse to Nurse to call report to [facility] is [phone].    Marline BELLEN, RN  [facility]

## 2024-08-09 NOTE — PLAN OF CARE
Problem: Safety - Adult  Goal: Free from fall injury  8/9/2024 1103 by Jamaica Oneil, RN  Outcome: Progressing  8/9/2024 0036 by Taylor Giron, RN  Outcome: Progressing     Problem: Discharge Planning  Goal: Discharge to home or other facility with appropriate resources  Outcome: Progressing     Problem: Skin/Tissue Integrity  Goal: Absence of new skin breakdown  Description: 1.  Monitor for areas of redness and/or skin breakdown  2.  Assess vascular access sites hourly  3.  Every 4-6 hours minimum:  Change oxygen saturation probe site  4.  Every 4-6 hours:  If on nasal continuous positive airway pressure, respiratory therapy assess nares and determine need for appliance change or resting period.  Outcome: Progressing     Problem: ABCDS Injury Assessment  Goal: Absence of physical injury  Outcome: Progressing     Problem: Pain  Goal: Verbalizes/displays adequate comfort level or baseline comfort level  Outcome: Progressing

## 2024-08-12 ENCOUNTER — OUTSIDE SERVICES (OUTPATIENT)
Dept: FAMILY MEDICINE CLINIC | Age: 84
End: 2024-08-12
Payer: MEDICARE

## 2024-08-12 DIAGNOSIS — I10 ESSENTIAL HYPERTENSION: Chronic | ICD-10-CM

## 2024-08-12 DIAGNOSIS — R53.81 PHYSICAL DECONDITIONING: ICD-10-CM

## 2024-08-12 DIAGNOSIS — D62 ACUTE BLOOD LOSS ANEMIA: Primary | ICD-10-CM

## 2024-08-12 DIAGNOSIS — I48.0 PAF (PAROXYSMAL ATRIAL FIBRILLATION) (HCC): ICD-10-CM

## 2024-08-12 DIAGNOSIS — S72.91XS CLOSED FRACTURE OF RIGHT FEMUR, UNSPECIFIED FRACTURE MORPHOLOGY, UNSPECIFIED PORTION OF FEMUR, SEQUELA: ICD-10-CM

## 2024-08-12 DIAGNOSIS — R53.1 GENERALIZED WEAKNESS: ICD-10-CM

## 2024-08-12 DIAGNOSIS — Z91.81 AT MAXIMUM RISK FOR FALL: ICD-10-CM

## 2024-08-12 DIAGNOSIS — E03.9 ACQUIRED HYPOTHYROIDISM: Chronic | ICD-10-CM

## 2024-08-12 LAB
ALBUMIN SERPL-MCNC: 2.9 G/DL (ref 3.5–5.2)
ALP SERPL-CCNC: 83 U/L (ref 35–104)
ALT SERPL-CCNC: 6 U/L (ref 0–32)
ANION GAP SERPL CALCULATED.3IONS-SCNC: 7 MMOL/L (ref 7–16)
AST SERPL-CCNC: 14 U/L (ref 0–31)
BILIRUB SERPL-MCNC: 0.3 MG/DL (ref 0–1.2)
BUN SERPL-MCNC: 46 MG/DL (ref 6–23)
CALCIUM SERPL-MCNC: 8.5 MG/DL (ref 8.6–10.2)
CHLORIDE SERPL-SCNC: 106 MMOL/L (ref 98–107)
CO2 SERPL-SCNC: 29 MMOL/L (ref 22–29)
CREAT SERPL-MCNC: 1.1 MG/DL (ref 0.5–1)
ERYTHROCYTE [DISTWIDTH] IN BLOOD BY AUTOMATED COUNT: 16 % (ref 11.5–15)
GFR, ESTIMATED: 52 ML/MIN/1.73M2
GLUCOSE SERPL-MCNC: 88 MG/DL (ref 74–99)
HCT VFR BLD AUTO: 25.5 % (ref 34–48)
HGB BLD-MCNC: 7.1 G/DL (ref 11.5–15.5)
MCH RBC QN AUTO: 26.8 PG (ref 26–35)
MCHC RBC AUTO-ENTMCNC: 27.8 G/DL (ref 32–34.5)
MCV RBC AUTO: 96.2 FL (ref 80–99.9)
PLATELET # BLD AUTO: 229 K/UL (ref 130–450)
PMV BLD AUTO: 10.5 FL (ref 7–12)
POTASSIUM SERPL-SCNC: 4.4 MMOL/L (ref 3.5–5)
PROT SERPL-MCNC: 5.7 G/DL (ref 6.4–8.3)
RBC # BLD AUTO: 2.65 M/UL (ref 3.5–5.5)
SODIUM SERPL-SCNC: 142 MMOL/L (ref 132–146)
TSH SERPL DL<=0.05 MIU/L-ACNC: 5.37 UIU/ML (ref 0.27–4.2)
WBC OTHER # BLD: 3.6 K/UL (ref 4.5–11.5)

## 2024-08-12 PROCEDURE — 99305 1ST NF CARE MODERATE MDM 35: CPT | Performed by: STUDENT IN AN ORGANIZED HEALTH CARE EDUCATION/TRAINING PROGRAM

## 2024-08-12 NOTE — PROGRESS NOTES
Gabi Madrigal (:  1940) is a 84 y.o. female.    Subjective   SUBJECTIVE/OBJECTIVE:  Past Medical History:   Diagnosis Date    Acquired hypothyroidism 2017    Arthritis     Blood transfusion reaction     Chronic kidney disease     History of blood transfusion     Hypertension     Lymphedema of both lower extremities 2017    Open wound of left great toe 10/18/2017    Other disorders of kidney and ureter in diseases classified elsewhere     Pelvic fracture (HCC)     Positive culture findings in wound 2023    Skin ulcer of left calf with fat layer exposed (HCC) 2017    Skin ulcer of left calf, limited to breakdown of skin (HCC) 2017    Ulcer of left lower leg (HCC) 3/24/2014    Venous stasis ulcer of left calf limited to breakdown of skin (HCC) 3/24/2014      Past Surgical History:   Procedure Laterality Date    COLONOSCOPY      ENDOSCOPY, COLON, DIAGNOSTIC      EYE SURGERY      cateract and lazor surgery 2015    FEMUR SURGERY Right 2024    RIGHT DISTAL FEMUR FRACTURE OPEN REDUCTION INTERNAL FIXATION [place] Dima [place] [facility] [place]    FRACTURE SURGERY      RT HIP    HIP FRACTURE SURGERY Left 2014    ORIF left hip    TONSILLECTOMY      as child    TOTAL HIP ARTHROPLASTY Left 10/17/2014    revision with removal of hardware      Family History   Problem Relation Age of Onset    Mult Sclerosis Father       Social History     Socioeconomic History    Marital status:    Tobacco Use    Smoking status: Never    Smokeless tobacco: Never   Vaping Use    Vaping status: Never Used   Substance and Sexual Activity    Alcohol use: Not Currently    Drug use: Never    Sexual activity: Not Currently   Social History Narrative    Drinks 2 cups of coffee daily     Social Determinants of Health     Food Insecurity: No Food Insecurity (2024)    Hunger Vital Sign     Worried About Running Out of Food in the Last Year: Never true     Ran Out of Food in the Last Year: Never

## 2024-08-13 ENCOUNTER — APPOINTMENT (OUTPATIENT)
Dept: GENERAL RADIOLOGY | Age: 84
End: 2024-08-13
Payer: MEDICARE

## 2024-08-13 ENCOUNTER — TELEPHONE (OUTPATIENT)
Dept: ORTHOPEDIC SURGERY | Age: 84
End: 2024-08-13

## 2024-08-13 ENCOUNTER — HOSPITAL ENCOUNTER (EMERGENCY)
Age: 84
Discharge: HOME | End: 2024-08-14
Attending: EMERGENCY MEDICINE
Payer: MEDICARE

## 2024-08-13 ENCOUNTER — OUTSIDE SERVICES (OUTPATIENT)
Dept: PRIMARY CARE CLINIC | Age: 84
End: 2024-08-13

## 2024-08-13 DIAGNOSIS — Z98.890 S/P ORIF (OPEN REDUCTION INTERNAL FIXATION) FRACTURE: ICD-10-CM

## 2024-08-13 DIAGNOSIS — G89.18 POST-OP PAIN: ICD-10-CM

## 2024-08-13 DIAGNOSIS — Z51.89 VISIT FOR WOUND CHECK: Primary | ICD-10-CM

## 2024-08-13 DIAGNOSIS — R53.1 GENERALIZED WEAKNESS: ICD-10-CM

## 2024-08-13 DIAGNOSIS — S72.91XS CLOSED FRACTURE OF RIGHT FEMUR, UNSPECIFIED FRACTURE MORPHOLOGY, UNSPECIFIED PORTION OF FEMUR, SEQUELA: Primary | ICD-10-CM

## 2024-08-13 DIAGNOSIS — I73.9 PVD (PERIPHERAL VASCULAR DISEASE) (HCC): ICD-10-CM

## 2024-08-13 DIAGNOSIS — I48.0 PAF (PAROXYSMAL ATRIAL FIBRILLATION) (HCC): ICD-10-CM
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
## 2024-08-13 DIAGNOSIS — Z87.81 S/P ORIF (OPEN REDUCTION INTERNAL FIXATION) FRACTURE: ICD-10-CM

## 2024-08-13 DIAGNOSIS — I10 ESSENTIAL HYPERTENSION: ICD-10-CM

## 2024-08-13 DIAGNOSIS — E03.9 ACQUIRED HYPOTHYROIDISM: ICD-10-CM

## 2024-08-13 DIAGNOSIS — N17.9 ACUTE KIDNEY INJURY SUPERIMPOSED ON CKD (HCC): ICD-10-CM

## 2024-08-13 DIAGNOSIS — R53.81 PHYSICAL DECONDITIONING: ICD-10-CM

## 2024-08-13 DIAGNOSIS — N18.9 ACUTE KIDNEY INJURY SUPERIMPOSED ON CKD (HCC): ICD-10-CM

## 2024-08-13 DIAGNOSIS — E87.70 HYPERVOLEMIA, UNSPECIFIED HYPERVOLEMIA TYPE: ICD-10-CM

## 2024-08-13 LAB
ALBUMIN SERPL-MCNC: 3.2 G/DL (ref 3.5–5.2)
ALP SERPL-CCNC: 103 U/L (ref 35–104)
ALT SERPL-CCNC: 8 U/L (ref 0–32)
ANION GAP SERPL CALCULATED.3IONS-SCNC: 8 MMOL/L (ref 7–16)
AST SERPL-CCNC: 24 U/L (ref 0–31)
BASOPHILS # BLD: 0.03 K/UL (ref 0–0.2)
BASOPHILS NFR BLD: 1 % (ref 0–2)
BILIRUB SERPL-MCNC: 0.4 MG/DL (ref 0–1.2)
BUN SERPL-MCNC: 54 MG/DL (ref 6–23)
CALCIUM SERPL-MCNC: 8.8 MG/DL (ref 8.6–10.2)
CHLORIDE SERPL-SCNC: 106 MMOL/L (ref 98–107)
CO2 SERPL-SCNC: 29 MMOL/L (ref 22–29)
CREAT SERPL-MCNC: 1.1 MG/DL (ref 0.5–1)
CRP SERPL HS-MCNC: 58 MG/L (ref 0–5)
EOSINOPHIL # BLD: 0.37 K/UL (ref 0.05–0.5)
EOSINOPHILS RELATIVE PERCENT: 10 % (ref 0–6)
ERYTHROCYTE [DISTWIDTH] IN BLOOD BY AUTOMATED COUNT: 15.8 % (ref 11.5–15)
ERYTHROCYTE [SEDIMENTATION RATE] IN BLOOD BY WESTERGREN METHOD: 68 MM/HR (ref 0–20)
GFR, ESTIMATED: 50 ML/MIN/1.73M2
GLUCOSE SERPL-MCNC: 97 MG/DL (ref 74–99)
HCT VFR BLD AUTO: 30.3 % (ref 34–48)
HGB BLD-MCNC: 8.4 G/DL (ref 11.5–15.5)
IMM GRANULOCYTES # BLD AUTO: <0.03 K/UL (ref 0–0.58)
IMM GRANULOCYTES NFR BLD: 1 % (ref 0–5)
LYMPHOCYTES NFR BLD: 0.65 K/UL (ref 1.5–4)
LYMPHOCYTES RELATIVE PERCENT: 17 % (ref 20–42)
MCH RBC QN AUTO: 26.6 PG (ref 26–35)
MCHC RBC AUTO-ENTMCNC: 27.7 G/DL (ref 32–34.5)
MCV RBC AUTO: 95.9 FL (ref 80–99.9)
MONOCYTES NFR BLD: 0.5 K/UL (ref 0.1–0.95)
MONOCYTES NFR BLD: 13 % (ref 2–12)
NEUTROPHILS NFR BLD: 59 % (ref 43–80)
NEUTS SEG NFR BLD: 2.29 K/UL (ref 1.8–7.3)
PLATELET # BLD AUTO: 288 K/UL (ref 130–450)
PMV BLD AUTO: 10.1 FL (ref 7–12)
POTASSIUM SERPL-SCNC: 5.1 MMOL/L (ref 3.5–5)
PROT SERPL-MCNC: 6.7 G/DL (ref 6.4–8.3)
RBC # BLD AUTO: 3.16 M/UL (ref 3.5–5.5)
RBC # BLD: ABNORMAL 10*6/UL
SODIUM SERPL-SCNC: 143 MMOL/L (ref 132–146)
WBC OTHER # BLD: 3.9 K/UL (ref 4.5–11.5)

## 2024-08-13 PROCEDURE — 99284 EMERGENCY DEPT VISIT MOD MDM: CPT

## 2024-08-13 PROCEDURE — 6370000000 HC RX 637 (ALT 250 FOR IP): Performed by: EMERGENCY MEDICINE

## 2024-08-13 PROCEDURE — 73552 X-RAY EXAM OF FEMUR 2/>: CPT

## 2024-08-13 PROCEDURE — 73502 X-RAY EXAM HIP UNI 2-3 VIEWS: CPT

## 2024-08-13 PROCEDURE — 85652 RBC SED RATE AUTOMATED: CPT

## 2024-08-13 PROCEDURE — 80053 COMPREHEN METABOLIC PANEL: CPT

## 2024-08-13 PROCEDURE — 86140 C-REACTIVE PROTEIN: CPT

## 2024-08-13 PROCEDURE — 85025 COMPLETE CBC W/AUTO DIFF WBC: CPT

## 2024-08-13 RX ORDER — CEPHALEXIN 500 MG/1
500 CAPSULE ORAL ONCE
Status: COMPLETED | OUTPATIENT
Start: 2024-08-13 | End: 2024-08-13

## 2024-08-13 RX ORDER — CEPHALEXIN 500 MG/1
500 CAPSULE ORAL 4 TIMES DAILY
Qty: 28 CAPSULE | Refills: 0 | Status: SHIPPED | OUTPATIENT
Start: 2024-08-13 | End: 2024-08-20

## 2024-08-13 RX ADMIN — CEPHALEXIN 500 MG: 500 CAPSULE ORAL at 19:39

## 2024-08-13 ASSESSMENT — PAIN - FUNCTIONAL ASSESSMENT: PAIN_FUNCTIONAL_ASSESSMENT: NONE - DENIES PAIN

## 2024-08-13 NOTE — ED PROVIDER NOTES
HPI:  8/13/24, Time: 7:41 PM EDT         Gabi Madrigal is a 84 y.o. female presenting to the ED for wound check.  Patient presents from the nursing facility.  She is status post femur fracture.  There was concern that there was drainage from her incision.  She has no symptoms or complaints at this time.  No fevers or chills.  No nausea or vomiting.  No change in bowel or bladder.  No paresthesias.  No other symptoms or complaints.    Review of Systems:   A complete review of systems was performed and pertinent positives and negatives are stated within HPI, all other systems reviewed and are negative.          --------------------------------------------- PAST HISTORY ---------------------------------------------  Past Medical History:  has a past medical history of Acquired hypothyroidism, Arthritis, Blood transfusion reaction, Chronic kidney disease, History of blood transfusion, Hypertension, Lymphedema of both lower extremities, Open wound of left great toe, Other disorders of kidney and ureter in diseases classified elsewhere, Pelvic fracture (HCC), Positive culture findings in wound, Skin ulcer of left calf with fat layer exposed (HCC), Skin ulcer of left calf, limited to breakdown of skin (HCC), Ulcer of left lower leg (HCC), and Venous stasis ulcer of left calf limited to breakdown of skin (HCC).    Past Surgical History:  has a past surgical history that includes fracture surgery (2010); Tonsillectomy; Hip fracture surgery (Left, 08/08/2014); Colonoscopy; Endoscopy, colon, diagnostic; Total hip arthroplasty (Left, 10/17/2014); eye surgery; and Femur Surgery (Right, 7/19/2024).    Social History:  reports that she has never smoked. She has never used smokeless tobacco. She reports that she does not currently use alcohol. She reports that she does not use drugs.    Family History: family history includes Mult Sclerosis in her father.     The patient’s home medications have been reviewed.    Allergies:

## 2024-08-13 NOTE — TELEPHONE ENCOUNTER
Jesús @ [facility] [phone]  Called and stated that the patients wound has purulent drainage and is dehiscing and they put steri strips on it but wanted a call about further instructions.

## 2024-08-13 NOTE — CONSULTS
ROM without pain,+2/4 DP & PT pulses, cap refill <3sec, +5/5 PF/DF/EHL, distal sensation grossly intact to L4-S1 dermatomes, compartments soft and compressible.    Pelvis: -TTP, -Log roll    DATA:    CBC:   Lab Results   Component Value Date/Time    WBC 3.9 08/13/2024 05:34 PM    RBC 3.16 08/13/2024 05:34 PM    RBC  10/21/2014 02:20 PM      RBC           [id number]    released     10/25/14  00:54  S[facility]    RBC  10/21/2014 02:20 PM      RBC           [id number]    transfused   10/21/14  19:59  S[facility]    HGB 8.4 08/13/2024 05:34 PM    HCT 30.3 08/13/2024 05:34 PM    MCV 95.9 08/13/2024 05:34 PM    MCH 26.6 08/13/2024 05:34 PM    MCHC 27.7 08/13/2024 05:34 PM    RDW 15.8 08/13/2024 05:34 PM     08/13/2024 05:34 PM    MPV 10.1 08/13/2024 05:34 PM     PT/INR:    Lab Results   Component Value Date/Time    PROTIME 11.1 08/15/2017 08:40 PM    PROTIME 11.6 07/12/2011 05:45 AM    INR 1.0 08/15/2017 08:40 PM       Radiology Review:  X-ray AP pelvis/right hip/femur demonstrates status post right hip cephalomedullary nailing with cable fixation, hardware intact no evidence of loosening or subsidence good callus formation around the subtrochanteric region.  Patient is also status post right distal femur ORIF for peterson-implant fracture.  Distal hardware intact no evidence of loosening or hardware failure.  Fracture reduction maintained compared to previous imaging with minimal interval changes.  Patient is also status post left total hip arthroplasty.  Implant appears stable no evidence of loosening or subsidence.    IMPRESSION:  Status post right distal femur ORIF for peterson-implant fracture on 7/19    PLAN:  Physical exam and imaging finding discussed with patient at bedside today.  Patient is status post right distal femur ORIF for peterson-implant fracture on 7/19 done by Dr. Newton.  Patient is currently at a nursing facility where she is being followed by wound care.  Wound care saw the patient today and was

## 2024-08-14 ENCOUNTER — OUTSIDE SERVICES (OUTPATIENT)
Dept: PRIMARY CARE CLINIC | Age: 84
End: 2024-08-14
Payer: MEDICARE

## 2024-08-14 VITALS
OXYGEN SATURATION: 100 % | SYSTOLIC BLOOD PRESSURE: 120 MMHG | TEMPERATURE: 97.8 F | DIASTOLIC BLOOD PRESSURE: 78 MMHG | HEART RATE: 67 BPM | RESPIRATION RATE: 16 BRPM
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
## 2024-08-14 DIAGNOSIS — I10 ESSENTIAL HYPERTENSION: ICD-10-CM

## 2024-08-14 DIAGNOSIS — G89.18 POST-OP PAIN: ICD-10-CM

## 2024-08-14 DIAGNOSIS — Z51.89 ENCOUNTER FOR WOUND RE-CHECK: ICD-10-CM

## 2024-08-14 DIAGNOSIS — Z87.81 S/P ORIF (OPEN REDUCTION INTERNAL FIXATION) FRACTURE: ICD-10-CM

## 2024-08-14 DIAGNOSIS — S72.91XS CLOSED FRACTURE OF RIGHT FEMUR, UNSPECIFIED FRACTURE MORPHOLOGY, UNSPECIFIED PORTION OF FEMUR, SEQUELA: Primary | ICD-10-CM

## 2024-08-14 DIAGNOSIS — Z98.890 S/P ORIF (OPEN REDUCTION INTERNAL FIXATION) FRACTURE: ICD-10-CM

## 2024-08-14 DIAGNOSIS — I48.0 PAF (PAROXYSMAL ATRIAL FIBRILLATION) (HCC): ICD-10-CM

## 2024-08-14 PROCEDURE — 99309 SBSQ NF CARE MODERATE MDM 30: CPT | Performed by: STUDENT IN AN ORGANIZED HEALTH CARE EDUCATION/TRAINING PROGRAM

## 2024-08-14 NOTE — PROGRESS NOTES
Gabi Madrigal (:  1940) is a 84 y.o. female.    Subjective     Patient states she is doing okay and has no specific concerns today.  She denies any fever or chills.  She denies any chest pain or shortness of breath.    Location: [facility] room 526  Progress notes reviewed.    Past Medical History:   Diagnosis Date    Acquired hypothyroidism 2017    Arthritis     Blood transfusion reaction     Chronic kidney disease     History of blood transfusion     Hypertension     Lymphedema of both lower extremities 2017    Open wound of left great toe 10/18/2017    Other disorders of kidney and ureter in diseases classified elsewhere     Pelvic fracture (HCC)     Positive culture findings in wound 2023    Skin ulcer of left calf with fat layer exposed ([facility]) 2017    Skin ulcer of left calf, limited to breakdown of skin ([facility]) 2017    Ulcer of left lower leg ([facility]) 3/24/2014    Venous stasis ulcer of left calf limited to breakdown of skin ([facility]) 3/24/2014       Allergies   Allergen Reactions    Aspirin Itching    Other Nausea And Vomiting     FLU SHOT    Tape [Adhesive Tape] Swelling             Review of Systems - as above       Objective   Physical Exam  Constitutional:       Appearance: She is well-developed.   HENT:      Head: Normocephalic.   Cardiovascular:      Rate and Rhythm: Normal rate and regular rhythm.      Heart sounds: Normal heart sounds. No murmur heard.  Pulmonary:      Effort: Pulmonary effort is normal. No respiratory distress.      Breath sounds: No wheezing.      Comments: Diminished in left lung field  On oxygen via NC  Abdominal:      General: Bowel sounds are normal.      Palpations: Abdomen is soft.   Musculoskeletal:         General: No tenderness. Normal range of motion.   Skin:     General: Skin is warm and dry.   Neurological:      Mental Status: She is alert and oriented to person, place, and time.   Psychiatric:         Behavior: Behavior normal.         Recent

## 2024-08-14 NOTE — DISCHARGE SUMMARY
[facility]   Discharge summary   Patient ID:  Gabi Madrigal  [id number]  84 y.o.  1940    Admit date: 8/6/2024    Discharge date and time: 8/9/2024    Admission Diagnoses:   Patient Active Problem List   Diagnosis    History of DVT (deep vein thrombosis)    Essential hypertension    Acquired hypothyroidism    Closed fracture of right iliac wing ([facility])    Chronic anticoagulation    NSTEMI (non-ST elevated myocardial infarction) ([facility])    Fall    Bilateral lower extremity edema    Venous stasis ulcer of left ankle with fat layer exposed without varicose veins ([facility])    History of vascular disease    Positive culture findings in wound    PVD (peripheral vascular disease) ([facility])    Calcaneal spur of foot, left    Elevated blood pressure reading    Ambulatory dysfunction    Closed fracture of right femur ([facility])    PAF (paroxysmal atrial fibrillation) ([facility])    Closed comminuted supracondylar fracture of femur, right, initial encounter ([facility])    Acute blood loss anemia    GI bleed       Discharge Diagnoses: GI Bleed    Consults: general surgery and orthopedic surgery    Procedures:     Hospital Course: The patient is a 84 y.o. female of Klarissa Davidson DO     Right hip fracture status post immobilization and nonweightbearing  Patient has been recovering at skilled nursing facility  Patient with worsening anemia and sent over for further treatment and evaluation  Acute blood loss anemia  Patient started on transfusion for hemoglobin below 7  Transfuse again if dropping  General Surgery consulted, no plans for intervention as they do not feel it is GI related and more likely related to the recent fracture  Consult orthopedic surgery to evaluate patient  Hemoglobin did not come up as high as expected following transfusion  Repeat hemoglobin in the morning  Paroxysmal atrial fibrillation  Patient is rate controlled  Hold blood thinner for now due to bleeding and anemia  Hypertension  Blood pressure

## 2024-08-15 ENCOUNTER — OUTSIDE SERVICES (OUTPATIENT)
Dept: PRIMARY CARE CLINIC | Age: 84
End: 2024-08-15

## 2024-08-15 DIAGNOSIS — I73.9 PVD (PERIPHERAL VASCULAR DISEASE) (HCC): ICD-10-CM

## 2024-08-15 DIAGNOSIS — S72.91XS CLOSED FRACTURE OF RIGHT FEMUR, UNSPECIFIED FRACTURE MORPHOLOGY, UNSPECIFIED PORTION OF FEMUR, SEQUELA: Primary | ICD-10-CM

## 2024-08-15 DIAGNOSIS — E87.70 HYPERVOLEMIA, UNSPECIFIED HYPERVOLEMIA TYPE: ICD-10-CM

## 2024-08-15 DIAGNOSIS — E03.9 ACQUIRED HYPOTHYROIDISM: ICD-10-CM

## 2024-08-15 DIAGNOSIS — Z87.81 S/P ORIF (OPEN REDUCTION INTERNAL FIXATION) FRACTURE: ICD-10-CM

## 2024-08-15 DIAGNOSIS — R60.0 BILATERAL LOWER EXTREMITY EDEMA: ICD-10-CM

## 2024-08-15 DIAGNOSIS — I10 ESSENTIAL HYPERTENSION: ICD-10-CM
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
## 2024-08-15 DIAGNOSIS — N18.9 ACUTE KIDNEY INJURY SUPERIMPOSED ON CKD (HCC): ICD-10-CM

## 2024-08-15 DIAGNOSIS — R53.1 GENERALIZED WEAKNESS: ICD-10-CM

## 2024-08-15 DIAGNOSIS — Z98.890 S/P ORIF (OPEN REDUCTION INTERNAL FIXATION) FRACTURE: ICD-10-CM

## 2024-08-15 DIAGNOSIS — N17.9 ACUTE KIDNEY INJURY SUPERIMPOSED ON CKD (HCC): ICD-10-CM

## 2024-08-15 DIAGNOSIS — I48.0 PAF (PAROXYSMAL ATRIAL FIBRILLATION) (HCC): ICD-10-CM

## 2024-08-16 LAB
ALBUMIN SERPL-MCNC: 3 G/DL (ref 3.5–5.2)
ALP SERPL-CCNC: 103 U/L (ref 35–104)
ALT SERPL-CCNC: 8 U/L (ref 0–32)
ANION GAP SERPL CALCULATED.3IONS-SCNC: 8 MMOL/L (ref 7–16)
AST SERPL-CCNC: 20 U/L (ref 0–31)
BILIRUB SERPL-MCNC: 0.3 MG/DL (ref 0–1.2)
BUN SERPL-MCNC: 48 MG/DL (ref 6–23)
CALCIUM SERPL-MCNC: 8.6 MG/DL (ref 8.6–10.2)
CHLORIDE SERPL-SCNC: 105 MMOL/L (ref 98–107)
CO2 SERPL-SCNC: 28 MMOL/L (ref 22–29)
CREAT SERPL-MCNC: 0.9 MG/DL (ref 0.5–1)
ERYTHROCYTE [DISTWIDTH] IN BLOOD BY AUTOMATED COUNT: 15.5 % (ref 11.5–15)
GFR, ESTIMATED: 60 ML/MIN/1.73M2
GLUCOSE SERPL-MCNC: 91 MG/DL (ref 74–99)
HCT VFR BLD AUTO: 27.2 % (ref 34–48)
HGB BLD-MCNC: 7.8 G/DL (ref 11.5–15.5)
MCH RBC QN AUTO: 28.1 PG (ref 26–35)
MCHC RBC AUTO-ENTMCNC: 28.7 G/DL (ref 32–34.5)
MCV RBC AUTO: 97.8 FL (ref 80–99.9)
PLATELET # BLD AUTO: 224 K/UL (ref 130–450)
PMV BLD AUTO: 10.3 FL (ref 7–12)
POTASSIUM SERPL-SCNC: 5.3 MMOL/L (ref 3.5–5)
PROT SERPL-MCNC: 6.1 G/DL (ref 6.4–8.3)
RBC # BLD AUTO: 2.78 M/UL (ref 3.5–5.5)
SODIUM SERPL-SCNC: 141 MMOL/L (ref 132–146)
WBC OTHER # BLD: 3.4 K/UL (ref 4.5–11.5)
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
## 2024-08-17 LAB
ALBUMIN SERPL-MCNC: 2.9 G/DL (ref 3.5–5.2)
ALP SERPL-CCNC: 108 U/L (ref 35–104)
ALT SERPL-CCNC: 7 U/L (ref 0–32)
ANION GAP SERPL CALCULATED.3IONS-SCNC: 6 MMOL/L (ref 7–16)
AST SERPL-CCNC: 19 U/L (ref 0–31)
BILIRUB SERPL-MCNC: 0.3 MG/DL (ref 0–1.2)
BUN SERPL-MCNC: 48 MG/DL (ref 6–23)
CALCIUM SERPL-MCNC: 8.7 MG/DL (ref 8.6–10.2)
CHLORIDE SERPL-SCNC: 105 MMOL/L (ref 98–107)
CO2 SERPL-SCNC: 30 MMOL/L (ref 22–29)
CREAT SERPL-MCNC: 1 MG/DL (ref 0.5–1)
ERYTHROCYTE [DISTWIDTH] IN BLOOD BY AUTOMATED COUNT: 15.6 % (ref 11.5–15)
GFR, ESTIMATED: 55 ML/MIN/1.73M2
GLUCOSE SERPL-MCNC: 91 MG/DL (ref 74–99)
HCT VFR BLD AUTO: 27.9 % (ref 34–48)
HGB BLD-MCNC: 7.9 G/DL (ref 11.5–15.5)
MCH RBC QN AUTO: 27.2 PG (ref 26–35)
MCHC RBC AUTO-ENTMCNC: 28.3 G/DL (ref 32–34.5)
MCV RBC AUTO: 96.2 FL (ref 80–99.9)
PLATELET # BLD AUTO: 220 K/UL (ref 130–450)
PMV BLD AUTO: 10.1 FL (ref 7–12)
POTASSIUM SERPL-SCNC: 5.3 MMOL/L (ref 3.5–5)
PROT SERPL-MCNC: 5.9 G/DL (ref 6.4–8.3)
RBC # BLD AUTO: 2.9 M/UL (ref 3.5–5.5)
SODIUM SERPL-SCNC: 141 MMOL/L (ref 132–146)
WBC OTHER # BLD: 3.4 K/UL (ref 4.5–11.5)

## 2024-08-19 LAB
ALBUMIN SERPL-MCNC: 2.9 G/DL (ref 3.5–5.2)
ALP SERPL-CCNC: 108 U/L (ref 35–104)
ALT SERPL-CCNC: 8 U/L (ref 0–32)
ANION GAP SERPL CALCULATED.3IONS-SCNC: 8 MMOL/L (ref 7–16)
AST SERPL-CCNC: 19 U/L (ref 0–31)
BILIRUB SERPL-MCNC: 0.3 MG/DL (ref 0–1.2)
BUN SERPL-MCNC: 42 MG/DL (ref 6–23)
CALCIUM SERPL-MCNC: 8.7 MG/DL (ref 8.6–10.2)
CHLORIDE SERPL-SCNC: 106 MMOL/L (ref 98–107)
CO2 SERPL-SCNC: 30 MMOL/L (ref 22–29)
CREAT SERPL-MCNC: 0.9 MG/DL (ref 0.5–1)
GFR, ESTIMATED: 62 ML/MIN/1.73M2
GLUCOSE SERPL-MCNC: 85 MG/DL (ref 74–99)
POTASSIUM SERPL-SCNC: 4.8 MMOL/L (ref 3.5–5)
PROT SERPL-MCNC: 5.9 G/DL (ref 6.4–8.3)
SODIUM SERPL-SCNC: 144 MMOL/L (ref 132–146)

## 2024-08-20 ENCOUNTER — OUTSIDE SERVICES (OUTPATIENT)
Dept: PRIMARY CARE CLINIC | Age: 84
End: 2024-08-20

## 2024-08-20 DIAGNOSIS — R60.0 BILATERAL LOWER EXTREMITY EDEMA: ICD-10-CM

## 2024-08-20 DIAGNOSIS — S72.91XS CLOSED FRACTURE OF RIGHT FEMUR, UNSPECIFIED FRACTURE MORPHOLOGY, UNSPECIFIED PORTION OF FEMUR, SEQUELA: Primary | ICD-10-CM

## 2024-08-20 DIAGNOSIS — Z87.81 S/P ORIF (OPEN REDUCTION INTERNAL FIXATION) FRACTURE: ICD-10-CM

## 2024-08-20 DIAGNOSIS — N17.9 ACUTE KIDNEY INJURY SUPERIMPOSED ON CKD (HCC): ICD-10-CM

## 2024-08-20 DIAGNOSIS — R53.1 GENERALIZED WEAKNESS: ICD-10-CM

## 2024-08-20 DIAGNOSIS — N18.9 ACUTE KIDNEY INJURY SUPERIMPOSED ON CKD (HCC): ICD-10-CM

## 2024-08-20 DIAGNOSIS — I48.0 PAF (PAROXYSMAL ATRIAL FIBRILLATION) (HCC): ICD-10-CM

## 2024-08-20 DIAGNOSIS — E03.9 ACQUIRED HYPOTHYROIDISM: ICD-10-CM

## 2024-08-20 DIAGNOSIS — R53.81 PHYSICAL DECONDITIONING: ICD-10-CM

## 2024-08-20 DIAGNOSIS — I73.9 PVD (PERIPHERAL VASCULAR DISEASE) (HCC): ICD-10-CM

## 2024-08-20 DIAGNOSIS — Z98.890 S/P ORIF (OPEN REDUCTION INTERNAL FIXATION) FRACTURE: ICD-10-CM

## 2024-08-20 DIAGNOSIS — E87.70 HYPERVOLEMIA, UNSPECIFIED HYPERVOLEMIA TYPE: ICD-10-CM

## 2024-08-20 DIAGNOSIS — I10 ESSENTIAL HYPERTENSION: ICD-10-CM
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
## 2024-08-22 ENCOUNTER — OUTSIDE SERVICES (OUTPATIENT)
Dept: PRIMARY CARE CLINIC | Age: 84
End: 2024-08-22
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
## 2024-08-22 DIAGNOSIS — N17.9 ACUTE KIDNEY INJURY SUPERIMPOSED ON CKD (HCC): ICD-10-CM

## 2024-08-22 DIAGNOSIS — E03.9 ACQUIRED HYPOTHYROIDISM: ICD-10-CM

## 2024-08-22 DIAGNOSIS — N18.9 ACUTE KIDNEY INJURY SUPERIMPOSED ON CKD (HCC): ICD-10-CM

## 2024-08-22 DIAGNOSIS — S72.91XS CLOSED FRACTURE OF RIGHT FEMUR, UNSPECIFIED FRACTURE MORPHOLOGY, UNSPECIFIED PORTION OF FEMUR, SEQUELA: Primary | ICD-10-CM

## 2024-08-22 DIAGNOSIS — Z98.890 S/P ORIF (OPEN REDUCTION INTERNAL FIXATION) FRACTURE: ICD-10-CM

## 2024-08-22 DIAGNOSIS — Z87.81 S/P ORIF (OPEN REDUCTION INTERNAL FIXATION) FRACTURE: ICD-10-CM

## 2024-08-22 DIAGNOSIS — R53.1 GENERALIZED WEAKNESS: ICD-10-CM

## 2024-08-22 DIAGNOSIS — I73.9 PVD (PERIPHERAL VASCULAR DISEASE) (HCC): ICD-10-CM

## 2024-08-22 DIAGNOSIS — I48.0 PAF (PAROXYSMAL ATRIAL FIBRILLATION) (HCC): ICD-10-CM

## 2024-08-22 DIAGNOSIS — R53.81 PHYSICAL DECONDITIONING: ICD-10-CM

## 2024-08-22 DIAGNOSIS — R60.0 BILATERAL LOWER EXTREMITY EDEMA: ICD-10-CM

## 2024-08-22 DIAGNOSIS — E87.70 HYPERVOLEMIA, UNSPECIFIED HYPERVOLEMIA TYPE: ICD-10-CM

## 2024-08-22 DIAGNOSIS — I10 ESSENTIAL HYPERTENSION: ICD-10-CM

## 2024-08-23 ASSESSMENT — ENCOUNTER SYMPTOMS
SHORTNESS OF BREATH: 0
CONSTIPATION: 0
VOMITING: 0
RHINORRHEA: 0
EYE PAIN: 0
EYE PAIN: 0
COUGH: 0
ABDOMINAL DISTENTION: 0
CONSTIPATION: 0
EYE DISCHARGE: 0
COUGH: 0
RHINORRHEA: 0
EYE REDNESS: 0
ABDOMINAL PAIN: 0
CHEST TIGHTNESS: 0
EYE ITCHING: 0
EYE PAIN: 0
SORE THROAT: 0
DIARRHEA: 0
SINUS PRESSURE: 0
EYE REDNESS: 0
RHINORRHEA: 0
NAUSEA: 0
CONSTIPATION: 0
DIARRHEA: 0
ABDOMINAL PAIN: 0
CHEST TIGHTNESS: 0
VOMITING: 0
VOMITING: 0
SINUS PRESSURE: 0
COUGH: 0
CONSTIPATION: 0
SORE THROAT: 0
SINUS PRESSURE: 0
RHINORRHEA: 0
ABDOMINAL DISTENTION: 0
EYE PAIN: 0
EYE ITCHING: 0
WHEEZING: 0
ABDOMINAL PAIN: 0
SHORTNESS OF BREATH: 0
WHEEZING: 0
DIARRHEA: 0
ABDOMINAL PAIN: 0
RHINORRHEA: 0
SHORTNESS OF BREATH: 0
SORE THROAT: 0
VOMITING: 0
EYE REDNESS: 0
EYE REDNESS: 0
SHORTNESS OF BREATH: 0
EYE ITCHING: 0
ABDOMINAL DISTENTION: 0
VOMITING: 0
EYE PAIN: 0
DIARRHEA: 0
WHEEZING: 0
EYE ITCHING: 0
WHEEZING: 0
NAUSEA: 0
EYE REDNESS: 0
SINUS PRESSURE: 0
DIARRHEA: 0
CONSTIPATION: 0
COUGH: 0
EYE DISCHARGE: 0
EYE PAIN: 0
SINUS PRESSURE: 0
ABDOMINAL PAIN: 0
EYE ITCHING: 0
COUGH: 0
SORE THROAT: 0
SINUS PRESSURE: 0
CHEST TIGHTNESS: 0
ABDOMINAL DISTENTION: 0
SHORTNESS OF BREATH: 0
EYE DISCHARGE: 0
ABDOMINAL DISTENTION: 0
CHEST TIGHTNESS: 0
CHEST TIGHTNESS: 0
VOMITING: 0
NAUSEA: 0
WHEEZING: 0
CHEST TIGHTNESS: 0
NAUSEA: 0
COUGH: 0
SHORTNESS OF BREATH: 0
EYE DISCHARGE: 0
SORE THROAT: 0
EYE REDNESS: 0
ABDOMINAL PAIN: 0
DIARRHEA: 0
NAUSEA: 0
CONSTIPATION: 0
ABDOMINAL DISTENTION: 0
EYE DISCHARGE: 0
WHEEZING: 0
EYE DISCHARGE: 0
SORE THROAT: 0
NAUSEA: 0
EYE ITCHING: 0
RHINORRHEA: 0

## 2024-08-23 NOTE — PROGRESS NOTES
Gabi Madrigal (:  1940) is a 84 y.o. female seen today for skilled assessment    Subjective     [facility]  All nursing and progress notes reviewed.    Gabi is awake alert and in no obvious distress.  She is sitting up in wheelchair with daughter at her side.  She does have some discomfort to her right lower extremity and has swelling and discomfort to her right first knuckle, will order an x-ray.  She continues to work in therapies as tolerated.  She will follow-up with Ortho and wound NP.  Nursing has no concerns and will let Dr. DASILVA or PA know of any changes.  Addendum: After received labs patient had a low hemoglobin repeated H&H stat that continue to show low hemoglobin sent patient to the ER for possible blood transfusion.        Past Medical History:   Diagnosis Date    Acquired hypothyroidism 2017    Arthritis     Blood transfusion reaction     Chronic kidney disease     History of blood transfusion     Hypertension     Lymphedema of both lower extremities 2017    Open wound of left great toe 10/18/2017    Other disorders of kidney and ureter in diseases classified elsewhere     Pelvic fracture (HCC)     Positive culture findings in wound 2023    Skin ulcer of left calf with fat layer exposed ([facility]) 2017    Skin ulcer of left calf, limited to breakdown of skin ([facility]) 2017    Ulcer of left lower leg ([facility]) 3/24/2014    Venous stasis ulcer of left calf limited to breakdown of skin ([facility]) 3/24/2014       Allergies   Allergen Reactions    Aspirin Itching    Other Nausea And Vomiting     FLU SHOT    Tape [Adhesive Tape] Swelling      Current Outpatient Medications   Medication Sig Dispense Refill    calcium-vitamin D (OSCAL-500) 500-200 MG-UNIT per tablet Take 1 tablet by mouth daily 30 tablet 0    pantoprazole (PROTONIX) 40 MG tablet Take 1 tablet by mouth 2 times daily (before meals) 30 tablet 3    diclofenac sodium (VOLTAREN) 1 % GEL

## 2024-08-23 NOTE — PROGRESS NOTES
Gabi Madrigal (:  1940) is a 84 y.o. female seen today for skilled assessment    Subjective     [facility]  All nursing and progress notes reviewed.    Gabi is awake alert and in no obvious distress.  She is sitting up in wheelchair in room with the TV on.  She has no complaints of pain or discomfort when asked. She continues to work in therapies as tolerated.  She will follow-up with Ortho and wound NP.  Nursing has no concerns and will let Dr. DASILVA or PA know of any changes.        Past Medical History:   Diagnosis Date    Acquired hypothyroidism 2017    Arthritis     Blood transfusion reaction     Chronic kidney disease     History of blood transfusion     Hypertension     Lymphedema of both lower extremities 2017    Open wound of left great toe 10/18/2017    Other disorders of kidney and ureter in diseases classified elsewhere     Pelvic fracture (HCC)     Positive culture findings in wound 2023    Skin ulcer of left calf with fat layer exposed ([facility]) 2017    Skin ulcer of left calf, limited to breakdown of skin ([facility]) 2017    Ulcer of left lower leg ([facility]) 3/24/2014    Venous stasis ulcer of left calf limited to breakdown of skin ([facility]) 3/24/2014       Allergies   Allergen Reactions    Aspirin Itching    Other Nausea And Vomiting     FLU SHOT    Tape [Adhesive Tape] Swelling      Current Outpatient Medications   Medication Sig Dispense Refill    calcium-vitamin D (OSCAL-500) 500-200 MG-UNIT per tablet Take 1 tablet by mouth daily 30 tablet 0    pantoprazole (PROTONIX) 40 MG tablet Take 1 tablet by mouth 2 times daily (before meals) 30 tablet 3    diclofenac sodium (VOLTAREN) 1 % GEL APPLY 2 TO 4 GRAMS EXTERNALLY TO THE AFFECTED AREA 3 TO 4 TIMES DAILY      ferrous sulfate (IRON 325) 325 (65 Fe) MG tablet Take 1 tablet by mouth daily (with breakfast)      clobetasol (TEMOVATE) 0.05 % cream Apply topically 2 times daily. 60 g 1    Multiple

## 2024-08-23 NOTE — PROGRESS NOTES
Vitamins-Minerals (THERAPEUTIC MULTIVITAMIN-MINERALS) tablet Take 1 tablet by mouth daily      bumetanide (BUMEX) 1 MG tablet TAKE 1 TABLET BY MOUTH EVERY DAY 90 tablet 1    vitamin C (ASCORBIC ACID) 500 MG tablet Take 1 tablet by mouth daily      acetaminophen (TYLENOL) 325 MG tablet Take 2 tablets by mouth every 4 hours as needed for Pain      apixaban (ELIQUIS) 5 MG TABS tablet Take 1 tablet by mouth 2 times daily 60 tablet 0    metoprolol tartrate (LOPRESSOR) 25 MG tablet Take 1 tablet by mouth 2 times daily 60 tablet 0    levothyroxine (SYNTHROID) 75 MCG tablet Take 1 tablet by mouth Daily       No current facility-administered medications for this visit.        Review of Systems   Constitutional:  Negative for appetite change, chills, diaphoresis, fatigue and fever.   HENT:  Negative for congestion, ear pain, postnasal drip, rhinorrhea, sinus pressure, sneezing and sore throat.    Eyes:  Negative for pain, discharge, redness and itching.   Respiratory:  Negative for cough, chest tightness, shortness of breath and wheezing.    Cardiovascular:  Positive for leg swelling. Negative for chest pain and palpitations.   Gastrointestinal:  Negative for abdominal distention, abdominal pain, constipation, diarrhea, nausea and vomiting.   Neurological:  Positive for weakness. Negative for dizziness, syncope, light-headedness and headaches.          Objective   Physical Exam  Vitals and nursing note reviewed.   Constitutional:       General: She is not in acute distress.     Appearance: Normal appearance. She is not ill-appearing.   HENT:      Head: Normocephalic and atraumatic.      Right Ear: External ear normal.      Left Ear: External ear normal.      Nose: Nose normal.      Mouth/Throat:      Mouth: Mucous membranes are moist.      Pharynx: Oropharynx is clear.   Eyes:      General:         Right eye: No discharge.         Left eye: No discharge.      Conjunctiva/sclera: Conjunctivae normal.      Pupils: Pupils are

## 2024-08-23 NOTE — PROGRESS NOTES
Hemoglobin A1C   Date Value Ref Range Status   09/21/2022 5.3 4.0 - 5.6 % Final     No results found for: \"CHOL\"  No results found for: \"TRIG\"  Lab Results   Component Value Date    HDL 44 09/21/2022     No components found for: \"LDLCHOLESTEROL\", \"LDLCALC\"  Lab Results   Component Value Date    VLDL 15 09/21/2022     No results found for: \"CHOLHDLRATIO\"  Lab Results   Component Value Date    TSH 5.37 (H) 08/12/2024            Assessment & Plan     1. Closed fracture of right femur, unspecified fracture morphology, unspecified portion of femur, sequela  2. S/P ORIF (open reduction internal fixation) fracture  3. Acute kidney injury superimposed on CKD ([facility])  4. PVD (peripheral vascular disease) ([facility])  5. PAF (paroxysmal atrial fibrillation) ([facility])  6. Post-op pain  7. Essential hypertension  8. Acquired hypothyroidism  9. Hypervolemia, unspecified hypervolemia type  10. Fall, sequela  11. Generalized weakness  12. Physical deconditioning           Seen today for weekly skilled assessment  Chart reviewed: Continue with orders per chart in point click care  Labs: Collect weekly CBC and CMP x3 weeks from admission then monthly.  Continue with therapies as tolerated  Continue with weekly skilled assessment  Continue with discharge planning  Acute medical concerns provider on-call     Review all medications and diagnosis    Review and continue calcium with vitamin D and K 500 - 200 - 90 mg- unit- mcg daily for supplement    Review and continue Eliquis 5 mg twice a day for anticoagulant    Follow-up with Ortho    Follow-up with wound NP         Over 30 min was spent between patient care, chart review, discussing patient management with nursing staff and interpreting diagnostics      An electronic signature was used to authenticate this note.    --Marce Harris, TRAVIS - CNP   This office note has been dictated.

## 2024-08-23 NOTE — PROGRESS NOTES
Gabi Madrigal (:  1940) is a 84 y.o. female seen today for skilled assessment    Subjective     [facility]  All nursing and progress notes reviewed.    Gabi is awake alert and in no obvious distress.  She is sitting up in wheelchair working with her therapist.  She has no complaints of pain or discomfort when asked.  She continues to work in therapies as tolerated.  She we will follow-up with Ortho and wound NP.  Nursing has no concerns and will let Dr. DASILVA or PA know of any changes.        Past Medical History:   Diagnosis Date    Acquired hypothyroidism 2017    Arthritis     Blood transfusion reaction     Chronic kidney disease     History of blood transfusion     Hypertension     Lymphedema of both lower extremities 2017    Open wound of left great toe 10/18/2017    Other disorders of kidney and ureter in diseases classified elsewhere     Pelvic fracture (HCC)     Positive culture findings in wound 2023    Skin ulcer of left calf with fat layer exposed ([facility]) 2017    Skin ulcer of left calf, limited to breakdown of skin ([facility]) 2017    Ulcer of left lower leg ([facility]) 3/24/2014    Venous stasis ulcer of left calf limited to breakdown of skin ([facility]) 3/24/2014       Allergies   Allergen Reactions    Aspirin Itching    Other Nausea And Vomiting     FLU SHOT    Tape [Adhesive Tape] Swelling      Current Outpatient Medications   Medication Sig Dispense Refill    calcium-vitamin D (OSCAL-500) 500-200 MG-UNIT per tablet Take 1 tablet by mouth daily 30 tablet 0    pantoprazole (PROTONIX) 40 MG tablet Take 1 tablet by mouth 2 times daily (before meals) 30 tablet 3    diclofenac sodium (VOLTAREN) 1 % GEL APPLY 2 TO 4 GRAMS EXTERNALLY TO THE AFFECTED AREA 3 TO 4 TIMES DAILY      ferrous sulfate (IRON 325) 325 (65 Fe) MG tablet Take 1 tablet by mouth daily (with breakfast)      clobetasol (TEMOVATE) 0.05 % cream Apply topically 2 times daily. 60 g 1

## 2024-08-24 LAB — 25(OH)D3 SERPL-MCNC: 32.9 NG/ML (ref 30–100)

## 2024-08-26 ENCOUNTER — OUTSIDE SERVICES (OUTPATIENT)
Dept: PRIMARY CARE CLINIC | Age: 84
End: 2024-08-26
Payer: MEDICARE

## 2024-08-26 DIAGNOSIS — E03.9 ACQUIRED HYPOTHYROIDISM: ICD-10-CM

## 2024-08-26 DIAGNOSIS — I10 ESSENTIAL HYPERTENSION: ICD-10-CM

## 2024-08-26 DIAGNOSIS — S72.91XS CLOSED FRACTURE OF RIGHT FEMUR, UNSPECIFIED FRACTURE MORPHOLOGY, UNSPECIFIED PORTION OF FEMUR, SEQUELA: Primary | ICD-10-CM

## 2024-08-26 DIAGNOSIS — I48.0 PAF (PAROXYSMAL ATRIAL FIBRILLATION) (HCC): ICD-10-CM

## 2024-08-26 PROCEDURE — 99309 SBSQ NF CARE MODERATE MDM 30: CPT | Performed by: STUDENT IN AN ORGANIZED HEALTH CARE EDUCATION/TRAINING PROGRAM

## 2024-08-26 NOTE — PROGRESS NOTES
Gabi Madrigal (:  1940) is a 84 y.o. female.    Subjective     Gabi Madrigal has no specific complaints today.  She denies any fever or chills.  She denies any chest pain or shortness of breath.    Location: [facility] room 526  Progress notes reviewed.    Past Medical History:   Diagnosis Date    Acquired hypothyroidism 2017    Arthritis     Blood transfusion reaction     Chronic kidney disease     History of blood transfusion     Hypertension     Lymphedema of both lower extremities 2017    Open wound of left great toe 10/18/2017    Other disorders of kidney and ureter in diseases classified elsewhere     Pelvic fracture (HCC)     Positive culture findings in wound 2023    Skin ulcer of left calf with fat layer exposed ([facility]) 2017    Skin ulcer of left calf, limited to breakdown of skin ([facility]) 2017    Ulcer of left lower leg ([facility]) 3/24/2014    Venous stasis ulcer of left calf limited to breakdown of skin ([facility]) 3/24/2014       Allergies   Allergen Reactions    Aspirin Itching    Other Nausea And Vomiting     FLU SHOT    Tape [Adhesive Tape] Swelling             Review of Systems - as above       Objective   Physical Exam  Constitutional:       Appearance: She is well-developed.   HENT:      Head: Normocephalic.   Cardiovascular:      Rate and Rhythm: Normal rate and regular rhythm.      Heart sounds: Normal heart sounds. No murmur heard.  Pulmonary:      Effort: Pulmonary effort is normal. No respiratory distress.      Breath sounds: No wheezing.      Comments: Diminished in left lung field  On oxygen via NC  Abdominal:      General: Bowel sounds are normal.      Palpations: Abdomen is soft.   Musculoskeletal:         General: No tenderness. Normal range of motion.   Skin:     General: Skin is warm and dry.   Neurological:      Mental Status: She is alert and oriented to person, place, and time.   Psychiatric:         Behavior: Behavior normal.         Recent Labs

## 2024-08-28 ENCOUNTER — OUTSIDE SERVICES (OUTPATIENT)
Dept: PRIMARY CARE CLINIC | Age: 84
End: 2024-08-28
Payer: MEDICARE

## 2024-08-28 DIAGNOSIS — S72.91XS CLOSED FRACTURE OF RIGHT FEMUR, UNSPECIFIED FRACTURE MORPHOLOGY, UNSPECIFIED PORTION OF FEMUR, SEQUELA: Primary | ICD-10-CM

## 2024-08-28 DIAGNOSIS — E87.70 HYPERVOLEMIA, UNSPECIFIED HYPERVOLEMIA TYPE: ICD-10-CM

## 2024-08-28 DIAGNOSIS — I48.0 PAF (PAROXYSMAL ATRIAL FIBRILLATION) (HCC): ICD-10-CM

## 2024-08-28 DIAGNOSIS — E03.9 ACQUIRED HYPOTHYROIDISM: ICD-10-CM

## 2024-08-28 DIAGNOSIS — I10 ESSENTIAL HYPERTENSION: ICD-10-CM

## 2024-08-28 PROCEDURE — 99309 SBSQ NF CARE MODERATE MDM 30: CPT | Performed by: STUDENT IN AN ORGANIZED HEALTH CARE EDUCATION/TRAINING PROGRAM

## 2024-08-28 NOTE — PROGRESS NOTES
Gabi Madrigal (:  1940) is a 84 y.o. female.    Subjective     Gabi Madrigal is doing well with no concerns today.  She denies any chest pain or shortness of breath.  States therapy is going well.    Location: [facility] room 52  Progress notes reviewed.    Past Medical History:   Diagnosis Date    Acquired hypothyroidism 2017    Arthritis     Blood transfusion reaction     Chronic kidney disease     History of blood transfusion     Hypertension     Lymphedema of both lower extremities 2017    Open wound of left great toe 10/18/2017    Other disorders of kidney and ureter in diseases classified elsewhere     Pelvic fracture (HCC)     Positive culture findings in wound 2023    Skin ulcer of left calf with fat layer exposed ([facility]) 2017    Skin ulcer of left calf, limited to breakdown of skin ([facility]) 2017    Ulcer of left lower leg ([facility]) 3/24/2014    Venous stasis ulcer of left calf limited to breakdown of skin ([facility]) 3/24/2014       Allergies   Allergen Reactions    Aspirin Itching    Other Nausea And Vomiting     FLU SHOT    Tape [Adhesive Tape] Swelling             Review of Systems - as above       Objective   Physical Exam  Constitutional:       Appearance: She is well-developed.   HENT:      Head: Normocephalic.   Cardiovascular:      Rate and Rhythm: Normal rate and regular rhythm.      Heart sounds: Normal heart sounds. No murmur heard.  Pulmonary:      Effort: Pulmonary effort is normal. No respiratory distress.      Breath sounds: No wheezing.   Abdominal:      General: Bowel sounds are normal.      Palpations: Abdomen is soft.   Musculoskeletal:         General: No tenderness. Normal range of motion.   Skin:     General: Skin is warm and dry.   Neurological:      Mental Status: She is alert and oriented to person, place, and time.   Psychiatric:         Behavior: Behavior normal.         Recent Labs     24  0736 24  0741 24  1734   WBC 3.4* 3.4* 3.9*    HGB 7.9* 7.8* 8.4*   HCT 27.9* 27.2* 30.3*   MCV 96.2 97.8 95.9    224 288      Lab Results   Component Value Date     08/19/2024    K 4.8 08/19/2024     08/19/2024    CO2 30 (H) 08/19/2024    BUN 42 (H) 08/19/2024    CREATININE 0.9 08/19/2024    GLUCOSE 85 08/19/2024    CALCIUM 8.7 08/19/2024    BILITOT 0.3 08/19/2024    ALKPHOS 108 (H) 08/19/2024    AST 19 08/19/2024    ALT 8 08/19/2024    LABGLOM 62 08/19/2024    GFRAA >60 10/04/2022        Hemoglobin A1C   Date Value Ref Range Status   09/21/2022 5.3 4.0 - 5.6 % Final     No results found for: \"CHOL\"  No results found for: \"TRIG\"  Lab Results   Component Value Date    HDL 44 09/21/2022     No components found for: \"LDLCHOLESTEROL\", \"LDLCALC\"  Lab Results   Component Value Date    VLDL 15 09/21/2022     No results found for: \"CHOLHDLRATIO\"  Lab Results   Component Value Date    TSH 5.37 (H) 08/12/2024            Assessment & Plan     1. Closed fracture of right femur, unspecified fracture morphology, unspecified portion of femur, sequela  2. PAF (paroxysmal atrial fibrillation) (HCC)  3. Essential hypertension  4. Acquired hypothyroidism  5. Hypervolemia, unspecified hypervolemia type                 Does not seem fluid overloaded and blood pressure is well-controlled thus we will continue her current dosage of Bumex and metoprolol.  Continue current dosage of Synthroid as TSH is within a good limit given her age.  Continue Eliquis for her A-fib.  Pain seems controlled at this time however we will continue to watch this and see how she responds to therapy.      An electronic signature was used to authenticate this note.    --Rayshawn Jacobs, DO   This office note has been dictated.

## 2024-08-31 ENCOUNTER — OUTSIDE SERVICES (OUTPATIENT)
Dept: PRIMARY CARE CLINIC | Age: 84
End: 2024-08-31

## 2024-08-31 DIAGNOSIS — E87.70 HYPERVOLEMIA, UNSPECIFIED HYPERVOLEMIA TYPE: ICD-10-CM

## 2024-08-31 DIAGNOSIS — I10 ESSENTIAL HYPERTENSION: ICD-10-CM

## 2024-08-31 DIAGNOSIS — E03.9 ACQUIRED HYPOTHYROIDISM: ICD-10-CM

## 2024-08-31 DIAGNOSIS — I48.0 PAF (PAROXYSMAL ATRIAL FIBRILLATION) (HCC): ICD-10-CM

## 2024-08-31 DIAGNOSIS — S72.91XS CLOSED FRACTURE OF RIGHT FEMUR, UNSPECIFIED FRACTURE MORPHOLOGY, UNSPECIFIED PORTION OF FEMUR, SEQUELA: Primary | ICD-10-CM

## 2024-08-31 NOTE — PROGRESS NOTES
Gabi Madrigal (:  1940) is a 84 y.o. female.    Subjective     Gabi Madrigal has no complaints today.  She denies any fever or chills.  She states pain is controlled.    Location: [facility] room 526  Progress notes reviewed.    Past Medical History:   Diagnosis Date    Acquired hypothyroidism 2017    Arthritis     Blood transfusion reaction     Chronic kidney disease     History of blood transfusion     Hypertension     Lymphedema of both lower extremities 2017    Open wound of left great toe 10/18/2017    Other disorders of kidney and ureter in diseases classified elsewhere     Pelvic fracture (HCC)     Positive culture findings in wound 2023    Skin ulcer of left calf with fat layer exposed ([facility]) 2017    Skin ulcer of left calf, limited to breakdown of skin ([facility]) 2017    Ulcer of left lower leg ([facility]) 3/24/2014    Venous stasis ulcer of left calf limited to breakdown of skin ([facility]) 3/24/2014       Allergies   Allergen Reactions    Aspirin Itching    Other Nausea And Vomiting     FLU SHOT    Tape [Adhesive Tape] Swelling             Review of Systems - as above       Objective   Physical Exam  Constitutional:       Appearance: She is well-developed.   HENT:      Head: Normocephalic.   Cardiovascular:      Rate and Rhythm: Normal rate and regular rhythm.      Heart sounds: Normal heart sounds. No murmur heard.  Pulmonary:      Effort: Pulmonary effort is normal. No respiratory distress.      Breath sounds: No wheezing.   Abdominal:      General: Bowel sounds are normal.      Palpations: Abdomen is soft.   Musculoskeletal:         General: No tenderness. Normal range of motion.   Skin:     General: Skin is warm and dry.   Neurological:      Mental Status: She is alert and oriented to person, place, and time.   Psychiatric:         Behavior: Behavior normal.         Recent Labs     24  0736 24  0741 24  1734   WBC 3.4* 3.4* 3.9*   HGB 7.9* 7.8* 8.4*   HCT

## 2024-09-03 ENCOUNTER — OUTSIDE SERVICES (OUTPATIENT)
Dept: PRIMARY CARE CLINIC | Age: 84
End: 2024-09-03
Payer: MEDICARE

## 2024-09-03 DIAGNOSIS — I10 ESSENTIAL HYPERTENSION: ICD-10-CM

## 2024-09-03 DIAGNOSIS — I48.0 PAF (PAROXYSMAL ATRIAL FIBRILLATION) (HCC): ICD-10-CM

## 2024-09-03 DIAGNOSIS — E03.9 ACQUIRED HYPOTHYROIDISM: ICD-10-CM

## 2024-09-03 DIAGNOSIS — G89.18 POST-OP PAIN: ICD-10-CM

## 2024-09-03 DIAGNOSIS — S72.91XS CLOSED FRACTURE OF RIGHT FEMUR, UNSPECIFIED FRACTURE MORPHOLOGY, UNSPECIFIED PORTION OF FEMUR, SEQUELA: Primary | ICD-10-CM

## 2024-09-03 DIAGNOSIS — Z98.890 S/P ORIF (OPEN REDUCTION INTERNAL FIXATION) FRACTURE: ICD-10-CM

## 2024-09-03 DIAGNOSIS — Z87.81 S/P ORIF (OPEN REDUCTION INTERNAL FIXATION) FRACTURE: ICD-10-CM

## 2024-09-03 PROCEDURE — 99309 SBSQ NF CARE MODERATE MDM 30: CPT | Performed by: STUDENT IN AN ORGANIZED HEALTH CARE EDUCATION/TRAINING PROGRAM

## 2024-09-04 ENCOUNTER — OFFICE VISIT (OUTPATIENT)
Dept: ORTHOPEDIC SURGERY | Age: 84
End: 2024-09-04
Payer: MEDICARE

## 2024-09-04 DIAGNOSIS — S72.451A CLOSED COMMINUTED SUPRACONDYLAR FRACTURE OF FEMUR, RIGHT, INITIAL ENCOUNTER (HCC): Primary | ICD-10-CM

## 2024-09-04 DIAGNOSIS — M17.12 PRIMARY OSTEOARTHRITIS OF LEFT KNEE: ICD-10-CM

## 2024-09-04 PROCEDURE — 20610 DRAIN/INJ JOINT/BURSA W/O US: CPT | Performed by: STUDENT IN AN ORGANIZED HEALTH CARE EDUCATION/TRAINING PROGRAM

## 2024-09-04 PROCEDURE — 99024 POSTOP FOLLOW-UP VISIT: CPT | Performed by: STUDENT IN AN ORGANIZED HEALTH CARE EDUCATION/TRAINING PROGRAM

## 2024-09-04 RX ORDER — TRIAMCINOLONE ACETONIDE 40 MG/ML
80 INJECTION, SUSPENSION INTRA-ARTICULAR; INTRAMUSCULAR ONCE
Status: COMPLETED | OUTPATIENT
Start: 2024-09-04 | End: 2024-09-04

## 2024-09-04 RX ORDER — BUPIVACAINE HYDROCHLORIDE 2.5 MG/ML
3 INJECTION, SOLUTION INFILTRATION; PERINEURAL ONCE
Status: COMPLETED | OUTPATIENT
Start: 2024-09-04 | End: 2024-09-04

## 2024-09-04 RX ADMIN — TRIAMCINOLONE ACETONIDE 80 MG: 40 INJECTION, SUSPENSION INTRA-ARTICULAR; INTRAMUSCULAR at 14:22

## 2024-09-04 RX ADMIN — BUPIVACAINE HYDROCHLORIDE 7.5 MG: 2.5 INJECTION, SOLUTION INFILTRATION; PERINEURAL at 14:22

## 2024-09-04 NOTE — PROGRESS NOTES
Chief Complaint   Patient presents with    Post-Op Check     Right distal femur ORIF 7/19/24     DATE OF PROCEDURE: 7/17/2024  PROCEDURE: Right distal femur fracture open reduction internal fixation    POD: 7 weeks    Subjective:  Gabi Madrigal is following up from the above surgery. She is NWB on right lower extremity in the brace.  She is currently not ambulating due to nonweightbearing status however she is standing and pivoting with a slide board.  Pain to extremity is mild and is taking Tylenol for pain control.  They denies numbness, tingling to the right lower extremity. Denies calf pain, chest pain, or shortness of breath.  Patient continues to use,  Eliquis 5mg twice daily at baseline . Patient is  participating in therapy, in facility.     At this time she is also having complaints of bilateral knee pain.  She does have history of severe bilaterally.  She was asking for injections today.     Review of Systems -  All pertinent positives/negative in HPI     Objective:    General: Alert and oriented X 3, normocephalic atraumatic, external ears and eye normal, sclera clear, no acute distress, respirations easy and unlabored with no audible wheezes, skin warm and dry, speech and dress appropriate for noted age, affect euthymic.    Extremity:  Right Lower Extremity  Skin clean dry and intact, without signs of infection.   Incision healing well, no significant drainage, no dehiscence, no significant erythema. mild edema noted. Compartments supple throughout thigh and leg  Calf supple and not tender. negative Homans  Demonstrates active range of motion at the hip and knee. Knee flexion and extension 0-100 degrees. States sensation intact to touch in sural, deep peroneal, superficial peroneal, saphenous, posterior tibial  nerve distributions to foot/ankle.  Palpable dorsalis pedis and posterior tibialis pulses, cap refill brisk in toes, foot warm/perfused.      There were no vitals taken for this visit.    XR:

## 2024-09-04 NOTE — PATIENT INSTRUCTIONS
INSTRUCTIONS FOR FACILITY      Weight Bearing: Non-weight bearing right lower extremity for 3 more weeks.  At this time progressive weightbearing 25 %/week in her brace. Use assistive devices when needed.    Therapy: Continue PT/OT    Pain control: per facility physician    DVT Prophylaxis: Continue with Eliquis 5 mg twice daily as prescribed by other provider.    Follow up: 2 months    No future appointments.    Call office with any questions or concerns.

## 2024-09-04 NOTE — PROGRESS NOTES
Gabi Madrigal (:  1940) is a 84 y.o. female.    Subjective     Gabi Madrigal states she continues to improve.  She states therapy is going well.  She denies any fever or chills.    Location: [facility] room 526  Progress notes reviewed.    Past Medical History:   Diagnosis Date    Acquired hypothyroidism 2017    Arthritis     Blood transfusion reaction     Chronic kidney disease     History of blood transfusion     Hypertension     Lymphedema of both lower extremities 2017    Open wound of left great toe 10/18/2017    Other disorders of kidney and ureter in diseases classified elsewhere     Pelvic fracture (HCC)     Positive culture findings in wound 2023    Skin ulcer of left calf with fat layer exposed ([facility]) 2017    Skin ulcer of left calf, limited to breakdown of skin ([facility]) 2017    Ulcer of left lower leg ([facility]) 3/24/2014    Venous stasis ulcer of left calf limited to breakdown of skin ([facility]) 3/24/2014       Allergies   Allergen Reactions    Aspirin Itching    Other Nausea And Vomiting     FLU SHOT    Tape [Adhesive Tape] Swelling             Review of Systems - as above       Objective   Physical Exam  Constitutional:       Appearance: She is well-developed.   HENT:      Head: Normocephalic.   Cardiovascular:      Rate and Rhythm: Normal rate and regular rhythm.      Heart sounds: Normal heart sounds. No murmur heard.  Pulmonary:      Effort: Pulmonary effort is normal. No respiratory distress.      Breath sounds: No wheezing.   Abdominal:      General: Bowel sounds are normal.      Palpations: Abdomen is soft.   Musculoskeletal:         General: No tenderness. Normal range of motion.   Skin:     General: Skin is warm and dry.   Neurological:      Mental Status: She is alert and oriented to person, place, and time.   Psychiatric:         Behavior: Behavior normal.         Recent Labs     24  0736 24  0741 24  1734   WBC 3.4* 3.4* 3.9*   HGB 7.9* 7.8* 8.4*

## 2024-09-06 ENCOUNTER — OUTSIDE SERVICES (OUTPATIENT)
Dept: PRIMARY CARE CLINIC | Age: 84
End: 2024-09-06

## 2024-09-06 DIAGNOSIS — Z87.81 S/P ORIF (OPEN REDUCTION INTERNAL FIXATION) FRACTURE: ICD-10-CM

## 2024-09-06 DIAGNOSIS — S72.91XS CLOSED FRACTURE OF RIGHT FEMUR, UNSPECIFIED FRACTURE MORPHOLOGY, UNSPECIFIED PORTION OF FEMUR, SEQUELA: Primary | ICD-10-CM

## 2024-09-06 DIAGNOSIS — E03.9 ACQUIRED HYPOTHYROIDISM: ICD-10-CM

## 2024-09-06 DIAGNOSIS — R53.1 GENERALIZED WEAKNESS: ICD-10-CM

## 2024-09-06 DIAGNOSIS — Z98.890 S/P ORIF (OPEN REDUCTION INTERNAL FIXATION) FRACTURE: ICD-10-CM

## 2024-09-06 DIAGNOSIS — R53.81 PHYSICAL DECONDITIONING: ICD-10-CM

## 2024-09-06 DIAGNOSIS — I73.9 PVD (PERIPHERAL VASCULAR DISEASE) (HCC): ICD-10-CM

## 2024-09-06 DIAGNOSIS — Z91.81 AT MAXIMUM RISK FOR FALL: ICD-10-CM

## 2024-09-06 DIAGNOSIS — G89.18 POST-OP PAIN: ICD-10-CM

## 2024-09-06 DIAGNOSIS — I10 ESSENTIAL HYPERTENSION: ICD-10-CM

## 2024-09-06 DIAGNOSIS — R60.0 BILATERAL LOWER EXTREMITY EDEMA: ICD-10-CM

## 2024-09-06 DIAGNOSIS — I48.0 PAF (PAROXYSMAL ATRIAL FIBRILLATION) (HCC): ICD-10-CM
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
## 2024-09-06 LAB
ALBUMIN SERPL-MCNC: 3.6 G/DL (ref 3.5–5.2)
ALP SERPL-CCNC: 102 U/L (ref 35–104)
ALT SERPL-CCNC: 7 U/L (ref 0–32)
ANION GAP SERPL CALCULATED.3IONS-SCNC: 16 MMOL/L (ref 7–16)
AST SERPL-CCNC: 15 U/L (ref 0–31)
BILIRUB SERPL-MCNC: 0.3 MG/DL (ref 0–1.2)
BUN SERPL-MCNC: 34 MG/DL (ref 6–23)
CALCIUM SERPL-MCNC: 8.9 MG/DL (ref 8.6–10.2)
CHLORIDE SERPL-SCNC: 108 MMOL/L (ref 98–107)
CO2 SERPL-SCNC: 24 MMOL/L (ref 22–29)
CREAT SERPL-MCNC: 1 MG/DL (ref 0.5–1)
ERYTHROCYTE [DISTWIDTH] IN BLOOD BY AUTOMATED COUNT: 20.3 % (ref 11.5–15)
GFR, ESTIMATED: 57 ML/MIN/1.73M2
GLUCOSE SERPL-MCNC: 129 MG/DL (ref 74–99)
HCT VFR BLD AUTO: 36.1 % (ref 34–48)
HGB BLD-MCNC: 9.9 G/DL (ref 11.5–15.5)
MCH RBC QN AUTO: 27.7 PG (ref 26–35)
MCHC RBC AUTO-ENTMCNC: 27.4 G/DL (ref 32–34.5)
MCV RBC AUTO: 100.8 FL (ref 80–99.9)
PLATELET # BLD AUTO: 216 K/UL (ref 130–450)
PMV BLD AUTO: 10.8 FL (ref 7–12)
POTASSIUM SERPL-SCNC: 4.6 MMOL/L (ref 3.5–5)
PROT SERPL-MCNC: 6.4 G/DL (ref 6.4–8.3)
RBC # BLD AUTO: 3.58 M/UL (ref 3.5–5.5)
SODIUM SERPL-SCNC: 148 MMOL/L (ref 132–146)
WBC OTHER # BLD: 4.8 K/UL (ref 4.5–11.5)

## 2024-09-06 NOTE — PROGRESS NOTES
[facility]  Discharge Summary      Patient ID:  Gabi Madrigal  [id number]  84 y.o.  1940    Admit date: 8/9/2024    Discharge date and time: 9/6/24     Location of discharge: [facility]    Admitting Physician: Rayshawn Jacobs DO     Discharge Physician: Rayshawn Jacobs DO     Consults: PT/OT    Admission Diagnoses:   1. Fall, sequela  2. Closed fracture of right femur, unspecified fracture morphology, unspecified portion of femur, sequela  3. S/P ORIF (open reduction internal fixation) fracture  4. Post-op pain  5. PAF (paroxysmal atrial fibrillation) ([facility])  6. Essential hypertension  7. Acquired hypothyroidism  8. Ambulatory dysfunction  9. Physical deconditioning  10. Generalized weakness  11. At maximum risk for fall    Discharge Diagnoses:  1. Fall, sequela  2. Closed fracture of right femur, unspecified fracture morphology, unspecified portion of femur, sequela  3. S/P ORIF (open reduction internal fixation) fracture  4. Post-op pain  5. PAF (paroxysmal atrial fibrillation) (HCC)  6. Essential hypertension  7. Acquired hypothyroidism  8. Ambulatory dysfunction  9. Physical deconditioning  10. Generalized weakness  11. At maximum risk for fall    SNF Course: Patient was admitted to our facility after she was hospitalized for close fracture of the right femur which required open reduction and internal fixation.  She improved with therapy however was still not allowed to bear weight on her right leg given Ortho's recommendations and thus was discharged to a skilled nursing facility.  On last admission, patient had no specific complaints and stated she was doing okay.  She denies any fever or chills.      Physical Exam  Constitutional:       Appearance: She is well-developed.   HENT:      Head: Normocephalic.   Cardiovascular:      Rate and Rhythm: Normal rate and regular rhythm.      Heart sounds: Normal heart sounds. No murmur heard.  Pulmonary:

## 2024-09-07 ENCOUNTER — OUTSIDE SERVICES (OUTPATIENT)
Dept: PRIMARY CARE CLINIC | Age: 84
End: 2024-09-07

## 2024-09-07 DIAGNOSIS — E87.0 HYPERNATREMIA: ICD-10-CM

## 2024-09-07 DIAGNOSIS — Z98.890 S/P ORIF (OPEN REDUCTION INTERNAL FIXATION) FRACTURE: ICD-10-CM

## 2024-09-07 DIAGNOSIS — S72.91XS CLOSED FRACTURE OF RIGHT FEMUR, UNSPECIFIED FRACTURE MORPHOLOGY, UNSPECIFIED PORTION OF FEMUR, SEQUELA: Primary | ICD-10-CM

## 2024-09-07 DIAGNOSIS — G89.18 POST-OP PAIN: ICD-10-CM

## 2024-09-07 DIAGNOSIS — I10 ESSENTIAL HYPERTENSION: ICD-10-CM
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
## 2024-09-07 DIAGNOSIS — R53.81 PHYSICAL DECONDITIONING: ICD-10-CM

## 2024-09-07 DIAGNOSIS — Z87.81 S/P ORIF (OPEN REDUCTION INTERNAL FIXATION) FRACTURE: ICD-10-CM

## 2024-09-07 DIAGNOSIS — I48.0 PAF (PAROXYSMAL ATRIAL FIBRILLATION) (HCC): ICD-10-CM

## 2024-09-07 DIAGNOSIS — R53.1 GENERALIZED WEAKNESS: ICD-10-CM

## 2024-09-07 DIAGNOSIS — Z91.81 AT MAXIMUM RISK FOR FALL: ICD-10-CM

## 2024-09-07 DIAGNOSIS — I73.9 PVD (PERIPHERAL VASCULAR DISEASE) (HCC): ICD-10-CM

## 2024-09-07 DIAGNOSIS — E03.9 ACQUIRED HYPOTHYROIDISM: ICD-10-CM

## 2024-09-07 NOTE — PROGRESS NOTES
Gabi Madrigal (:  1940) is a 84 y.o. female.    Subjective   SUBJECTIVE/OBJECTIVE:  Past Medical History:   Diagnosis Date    Acquired hypothyroidism 2017    Arthritis     Blood transfusion reaction     Chronic kidney disease     History of blood transfusion     Hypertension     Lymphedema of both lower extremities 2017    Open wound of left great toe 10/18/2017    Other disorders of kidney and ureter in diseases classified elsewhere     Pelvic fracture (HCC)     Positive culture findings in wound 2023    Skin ulcer of left calf with fat layer exposed (HCC) 2017    Skin ulcer of left calf, limited to breakdown of skin (HCC) 2017    Ulcer of left lower leg (HCC) 3/24/2014    Venous stasis ulcer of left calf limited to breakdown of skin (HCC) 3/24/2014      Past Surgical History:   Procedure Laterality Date    COLONOSCOPY      ENDOSCOPY, COLON, DIAGNOSTIC      EYE SURGERY      cateract and lazor surgery 2015    FEMUR SURGERY Right 2024    RIGHT DISTAL FEMUR FRACTURE OPEN REDUCTION INTERNAL FIXATION performed by Dima Newton DO at [facility] OR    FRACTURE SURGERY      RT HIP    HIP FRACTURE SURGERY Left 2014    ORIF left hip    TONSILLECTOMY      as child    TOTAL HIP ARTHROPLASTY Left 10/17/2014    revision with removal of hardware      Family History   Problem Relation Age of Onset    Mult Sclerosis Father       Social History     Socioeconomic History    Marital status:    Tobacco Use    Smoking status: Never    Smokeless tobacco: Never   Vaping Use    Vaping status: Never Used   Substance and Sexual Activity    Alcohol use: Not Currently    Drug use: Never    Sexual activity: Not Currently   Social History Narrative    Drinks 2 cups of coffee daily     Social Determinants of Health     Food Insecurity: No Food Insecurity (2024)    Hunger Vital Sign     Worried About Running Out of Food in the Last Year: Never true     Ran Out of Food in the Last Year: Never

## 2024-09-10 LAB
ALBUMIN SERPL-MCNC: 3.1 G/DL (ref 3.5–5.2)
ALP SERPL-CCNC: 90 U/L (ref 35–104)
ALT SERPL-CCNC: 9 U/L (ref 0–32)
ANION GAP SERPL CALCULATED.3IONS-SCNC: 11 MMOL/L (ref 7–16)
AST SERPL-CCNC: 15 U/L (ref 0–31)
BILIRUB SERPL-MCNC: 0.7 MG/DL (ref 0–1.2)
BUN SERPL-MCNC: 41 MG/DL (ref 6–23)
CALCIUM SERPL-MCNC: 8.5 MG/DL (ref 8.6–10.2)
CHLORIDE SERPL-SCNC: 102 MMOL/L (ref 98–107)
CHOLEST SERPL-MCNC: 100 MG/DL
CO2 SERPL-SCNC: 30 MMOL/L (ref 22–29)
CREAT SERPL-MCNC: 0.9 MG/DL (ref 0.5–1)
ERYTHROCYTE [DISTWIDTH] IN BLOOD BY AUTOMATED COUNT: 19.9 % (ref 11.5–15)
GFR, ESTIMATED: 68 ML/MIN/1.73M2
GLUCOSE SERPL-MCNC: 94 MG/DL (ref 74–99)
HBA1C MFR BLD: 4.8 % (ref 4–5.6)
HCT VFR BLD AUTO: 36.2 % (ref 34–48)
HDLC SERPL-MCNC: 47 MG/DL
HGB BLD-MCNC: 10.5 G/DL (ref 11.5–15.5)
LDLC SERPL CALC-MCNC: 41 MG/DL
MCH RBC QN AUTO: 28.3 PG (ref 26–35)
MCHC RBC AUTO-ENTMCNC: 29 G/DL (ref 32–34.5)
MCV RBC AUTO: 97.6 FL (ref 80–99.9)
PLATELET # BLD AUTO: 170 K/UL (ref 130–450)
PMV BLD AUTO: 11.5 FL (ref 7–12)
POTASSIUM SERPL-SCNC: 4 MMOL/L (ref 3.5–5)
PROT SERPL-MCNC: 5.7 G/DL (ref 6.4–8.3)
RBC # BLD AUTO: 3.71 M/UL (ref 3.5–5.5)
SODIUM SERPL-SCNC: 143 MMOL/L (ref 132–146)
TRIGL SERPL-MCNC: 60 MG/DL
TSH SERPL DL<=0.05 MIU/L-ACNC: 1.72 UIU/ML (ref 0.27–4.2)
VLDLC SERPL CALC-MCNC: 12 MG/DL
WBC OTHER # BLD: 16.2 K/UL (ref 4.5–11.5)

## 2024-09-11 ENCOUNTER — APPOINTMENT (OUTPATIENT)
Dept: GENERAL RADIOLOGY | Age: 84
DRG: 862 | End: 2024-09-11
Payer: MEDICARE

## 2024-09-11 ENCOUNTER — HOSPITAL ENCOUNTER (INPATIENT)
Age: 84
LOS: 4 days | Discharge: HOME | DRG: 862 | End: 2024-09-16
Attending: EMERGENCY MEDICINE | Admitting: INTERNAL MEDICINE
Payer: MEDICARE

## 2024-09-11 ENCOUNTER — APPOINTMENT (OUTPATIENT)
Dept: ULTRASOUND IMAGING | Age: 84
DRG: 862 | End: 2024-09-11
Payer: MEDICARE

## 2024-09-11 DIAGNOSIS — R60.9 EDEMA, UNSPECIFIED TYPE: Primary | ICD-10-CM

## 2024-09-11 LAB
ALBUMIN SERPL-MCNC: 2.8 G/DL (ref 3.5–5.2)
ALP SERPL-CCNC: 116 U/L (ref 35–104)
ALT SERPL-CCNC: 6 U/L (ref 0–32)
ANION GAP SERPL CALCULATED.3IONS-SCNC: 10 MMOL/L (ref 7–16)
AST SERPL-CCNC: 13 U/L (ref 0–31)
ATYPICAL LYMPHOCYTE ABSOLUTE COUNT: 0.26 K/UL (ref 0–0.46)
ATYPICAL LYMPHOCYTES: 2 % (ref 0–4)
BASOPHILS # BLD: 0 K/UL (ref 0–0.2)
BASOPHILS NFR BLD: 0 % (ref 0–2)
BILIRUB SERPL-MCNC: 0.5 MG/DL (ref 0–1.2)
BUN SERPL-MCNC: 43 MG/DL (ref 6–23)
CALCIUM SERPL-MCNC: 8.8 MG/DL (ref 8.6–10.2)
CHLORIDE SERPL-SCNC: 97 MMOL/L (ref 98–107)
CO2 SERPL-SCNC: 29 MMOL/L (ref 22–29)
CREAT SERPL-MCNC: 1 MG/DL (ref 0.5–1)
CRP SERPL HS-MCNC: 367 MG/L (ref 0–5)
EOSINOPHIL # BLD: 0.13 K/UL (ref 0.05–0.5)
EOSINOPHILS RELATIVE PERCENT: 1 % (ref 0–6)
ERYTHROCYTE [DISTWIDTH] IN BLOOD BY AUTOMATED COUNT: 19.6 % (ref 11.5–15)
GFR, ESTIMATED: 55 ML/MIN/1.73M2
GLUCOSE SERPL-MCNC: 155 MG/DL (ref 74–99)
HCT VFR BLD AUTO: 34.1 % (ref 34–48)
HGB BLD-MCNC: 10 G/DL (ref 11.5–15.5)
LYMPHOCYTES NFR BLD: 0.26 K/UL (ref 1.5–4)
LYMPHOCYTES RELATIVE PERCENT: 2 % (ref 20–42)
MCH RBC QN AUTO: 27.9 PG (ref 26–35)
MCHC RBC AUTO-ENTMCNC: 29.3 G/DL (ref 32–34.5)
MCV RBC AUTO: 95 FL (ref 80–99.9)
MONOCYTES NFR BLD: 0.78 K/UL (ref 0.1–0.95)
MONOCYTES NFR BLD: 5 % (ref 2–12)
NEUTROPHILS NFR BLD: 90 % (ref 43–80)
NEUTS SEG NFR BLD: 13.37 K/UL (ref 1.8–7.3)
PLATELET # BLD AUTO: 161 K/UL (ref 130–450)
PMV BLD AUTO: 12.1 FL (ref 7–12)
POTASSIUM SERPL-SCNC: 3.8 MMOL/L (ref 3.5–5)
PROT SERPL-MCNC: 5.8 G/DL (ref 6.4–8.3)
RBC # BLD AUTO: 3.59 M/UL (ref 3.5–5.5)
RBC # BLD: ABNORMAL 10*6/UL
SODIUM SERPL-SCNC: 136 MMOL/L (ref 132–146)
WBC OTHER # BLD: 14.8 K/UL (ref 4.5–11.5)

## 2024-09-11 PROCEDURE — 96374 THER/PROPH/DIAG INJ IV PUSH: CPT

## 2024-09-11 PROCEDURE — 0S9C3ZZ DRAINAGE OF RIGHT KNEE JOINT, PERCUTANEOUS APPROACH: ICD-10-PCS

## 2024-09-11 PROCEDURE — 6370000000 HC RX 637 (ALT 250 FOR IP): Performed by: EMERGENCY MEDICINE

## 2024-09-11 PROCEDURE — 93971 EXTREMITY STUDY: CPT

## 2024-09-11 PROCEDURE — 73564 X-RAY EXAM KNEE 4 OR MORE: CPT

## 2024-09-11 PROCEDURE — 73552 X-RAY EXAM OF FEMUR 2/>: CPT

## 2024-09-11 PROCEDURE — 99285 EMERGENCY DEPT VISIT HI MDM: CPT

## 2024-09-11 PROCEDURE — 80053 COMPREHEN METABOLIC PANEL: CPT

## 2024-09-11 PROCEDURE — 85025 COMPLETE CBC W/AUTO DIFF WBC: CPT

## 2024-09-11 PROCEDURE — 86140 C-REACTIVE PROTEIN: CPT

## 2024-09-11 PROCEDURE — 85652 RBC SED RATE AUTOMATED: CPT

## 2024-09-11 PROCEDURE — 73502 X-RAY EXAM HIP UNI 2-3 VIEWS: CPT

## 2024-09-11 PROCEDURE — 6360000002 HC RX W HCPCS: Performed by: EMERGENCY MEDICINE

## 2024-09-11 RX ORDER — FENTANYL CITRATE 50 UG/ML
50 INJECTION, SOLUTION INTRAMUSCULAR; INTRAVENOUS ONCE
Status: COMPLETED | OUTPATIENT
Start: 2024-09-11 | End: 2024-09-11

## 2024-09-11 RX ORDER — OXYCODONE AND ACETAMINOPHEN 5; 325 MG/1; MG/1
1 TABLET ORAL ONCE
Status: COMPLETED | OUTPATIENT
Start: 2024-09-11 | End: 2024-09-11

## 2024-09-11 RX ADMIN — FENTANYL CITRATE 50 MCG: 50 INJECTION INTRAMUSCULAR; INTRAVENOUS at 20:11

## 2024-09-11 RX ADMIN — OXYCODONE HYDROCHLORIDE AND ACETAMINOPHEN 1 TABLET: 5; 325 TABLET ORAL at 14:19

## 2024-09-11 ASSESSMENT — PAIN DESCRIPTION - LOCATION
LOCATION: KNEE
LOCATION: FOOT;LEG
LOCATION: FOOT;KNEE;ANKLE

## 2024-09-11 ASSESSMENT — PAIN DESCRIPTION - ORIENTATION
ORIENTATION: RIGHT
ORIENTATION: RIGHT
ORIENTATION: OUTER;UPPER

## 2024-09-11 ASSESSMENT — PAIN DESCRIPTION - DESCRIPTORS
DESCRIPTORS: SORE

## 2024-09-11 ASSESSMENT — PAIN SCALES - GENERAL
PAINLEVEL_OUTOF10: 10
PAINLEVEL_OUTOF10: 9
PAINLEVEL_OUTOF10: 9

## 2024-09-11 ASSESSMENT — PAIN - FUNCTIONAL ASSESSMENT: PAIN_FUNCTIONAL_ASSESSMENT: NONE - DENIES PAIN

## 2024-09-12 PROBLEM — L03.115 CELLULITIS OF RIGHT KNEE: Status: ACTIVE | Noted: 2024-09-12

## 2024-09-12 LAB — ERYTHROCYTE [SEDIMENTATION RATE] IN BLOOD BY WESTERGREN METHOD: 53 MM/HR (ref 0–20)

## 2024-09-12 PROCEDURE — 6360000002 HC RX W HCPCS: Performed by: NURSE PRACTITIONER

## 2024-09-12 PROCEDURE — 2060000000 HC ICU INTERMEDIATE R&B

## 2024-09-12 PROCEDURE — 87040 BLOOD CULTURE FOR BACTERIA: CPT

## 2024-09-12 PROCEDURE — 2500000003 HC RX 250 WO HCPCS

## 2024-09-12 PROCEDURE — 2580000003 HC RX 258: Performed by: NURSE PRACTITIONER

## 2024-09-12 PROCEDURE — 2580000003 HC RX 258: Performed by: EMERGENCY MEDICINE

## 2024-09-12 PROCEDURE — 6370000000 HC RX 637 (ALT 250 FOR IP): Performed by: NURSE PRACTITIONER

## 2024-09-12 PROCEDURE — 6360000002 HC RX W HCPCS: Performed by: EMERGENCY MEDICINE

## 2024-09-12 RX ORDER — SODIUM CHLORIDE 0.9 % (FLUSH) 0.9 %
5-40 SYRINGE (ML) INJECTION EVERY 12 HOURS SCHEDULED
Status: DISCONTINUED | OUTPATIENT
Start: 2024-09-12 | End: 2024-09-16 | Stop reason: HOSPADM

## 2024-09-12 RX ORDER — CLOBETASOL PROPIONATE 0.5 MG/G
CREAM TOPICAL 2 TIMES DAILY
COMMUNITY

## 2024-09-12 RX ORDER — LIDOCAINE 4 G/G
1 PATCH TOPICAL DAILY
COMMUNITY

## 2024-09-12 RX ORDER — METOPROLOL TARTRATE 25 MG/1
25 TABLET, FILM COATED ORAL 2 TIMES DAILY
Status: DISCONTINUED | OUTPATIENT
Start: 2024-09-12 | End: 2024-09-16 | Stop reason: HOSPADM

## 2024-09-12 RX ORDER — BUMETANIDE 1 MG/1
1 TABLET ORAL DAILY
Status: DISCONTINUED | OUTPATIENT
Start: 2024-09-12 | End: 2024-09-16 | Stop reason: HOSPADM

## 2024-09-12 RX ORDER — SODIUM CHLORIDE 9 MG/ML
INJECTION, SOLUTION INTRAVENOUS PRN
Status: DISCONTINUED | OUTPATIENT
Start: 2024-09-12 | End: 2024-09-13

## 2024-09-12 RX ORDER — CLINDAMYCIN PHOSPHATE 600 MG/50ML
600 INJECTION, SOLUTION INTRAVENOUS ONCE
Status: COMPLETED | OUTPATIENT
Start: 2024-09-12 | End: 2024-09-12

## 2024-09-12 RX ORDER — LIDOCAINE HYDROCHLORIDE 10 MG/ML
5 INJECTION, SOLUTION INFILTRATION; PERINEURAL ONCE
Status: COMPLETED | OUTPATIENT
Start: 2024-09-12 | End: 2024-09-12

## 2024-09-12 RX ORDER — POLYETHYLENE GLYCOL 3350 17 G/17G
17 POWDER, FOR SOLUTION ORAL DAILY PRN
Status: DISCONTINUED | OUTPATIENT
Start: 2024-09-12 | End: 2024-09-16 | Stop reason: HOSPADM

## 2024-09-12 RX ORDER — ASCORBIC ACID 500 MG
500 TABLET ORAL DAILY
Status: DISCONTINUED | OUTPATIENT
Start: 2024-09-12 | End: 2024-09-16 | Stop reason: HOSPADM

## 2024-09-12 RX ORDER — CHOLECALCIFEROL (VITAMIN D3) 50 MCG
2000 TABLET ORAL DAILY
COMMUNITY

## 2024-09-12 RX ORDER — ACETAMINOPHEN 650 MG/1
650 SUPPOSITORY RECTAL EVERY 6 HOURS PRN
Status: DISCONTINUED | OUTPATIENT
Start: 2024-09-12 | End: 2024-09-16 | Stop reason: HOSPADM

## 2024-09-12 RX ORDER — ACETAMINOPHEN 325 MG/1
650 TABLET ORAL EVERY 6 HOURS PRN
Status: DISCONTINUED | OUTPATIENT
Start: 2024-09-12 | End: 2024-09-16 | Stop reason: HOSPADM

## 2024-09-12 RX ORDER — M-VIT,TX,IRON,MINS/CALC/FOLIC 27MG-0.4MG
1 TABLET ORAL DAILY
Status: DISCONTINUED | OUTPATIENT
Start: 2024-09-12 | End: 2024-09-16 | Stop reason: HOSPADM

## 2024-09-12 RX ORDER — SODIUM CHLORIDE 0.9 % (FLUSH) 0.9 %
5-40 SYRINGE (ML) INJECTION PRN
Status: DISCONTINUED | OUTPATIENT
Start: 2024-09-12 | End: 2024-09-16 | Stop reason: HOSPADM

## 2024-09-12 RX ORDER — POLYETHYLENE GLYCOL 3350 17 G/17G
17 POWDER, FOR SOLUTION ORAL DAILY PRN
COMMUNITY

## 2024-09-12 RX ORDER — BUMETANIDE 1 MG/1
1 TABLET ORAL DAILY
COMMUNITY

## 2024-09-12 RX ORDER — FERROUS SULFATE 325(65) MG
325 TABLET ORAL
Status: DISCONTINUED | OUTPATIENT
Start: 2024-09-12 | End: 2024-09-16 | Stop reason: HOSPADM

## 2024-09-12 RX ORDER — SODIUM CHLORIDE 9 MG/ML
INJECTION, SOLUTION INTRAVENOUS CONTINUOUS
Status: DISCONTINUED | OUTPATIENT
Start: 2024-09-12 | End: 2024-09-14

## 2024-09-12 RX ORDER — PROCHLORPERAZINE EDISYLATE 5 MG/ML
5 INJECTION INTRAMUSCULAR; INTRAVENOUS EVERY 6 HOURS PRN
Status: DISCONTINUED | OUTPATIENT
Start: 2024-09-12 | End: 2024-09-16 | Stop reason: HOSPADM

## 2024-09-12 RX ORDER — LEVOTHYROXINE SODIUM 88 UG/1
88 TABLET ORAL DAILY
COMMUNITY

## 2024-09-12 RX ADMIN — WATER 1000 MG: 1 INJECTION INTRAMUSCULAR; INTRAVENOUS; SUBCUTANEOUS at 18:51

## 2024-09-12 RX ADMIN — SODIUM CHLORIDE: 9 INJECTION, SOLUTION INTRAVENOUS at 22:43

## 2024-09-12 RX ADMIN — APIXABAN 5 MG: 5 TABLET, FILM COATED ORAL at 21:46

## 2024-09-12 RX ADMIN — APIXABAN 5 MG: 5 TABLET, FILM COATED ORAL at 09:07

## 2024-09-12 RX ADMIN — SODIUM CHLORIDE: 9 INJECTION, SOLUTION INTRAVENOUS at 09:15

## 2024-09-12 RX ADMIN — ACETAMINOPHEN 650 MG: 325 TABLET ORAL at 14:43

## 2024-09-12 RX ADMIN — LEVOTHYROXINE SODIUM 75 MCG: 0.05 TABLET ORAL at 09:06

## 2024-09-12 RX ADMIN — Medication 500 MG: at 09:07

## 2024-09-12 RX ADMIN — METOPROLOL TARTRATE 25 MG: 25 TABLET, FILM COATED ORAL at 21:46

## 2024-09-12 RX ADMIN — FERROUS SULFATE TAB 325 MG (65 MG ELEMENTAL FE) 325 MG: 325 (65 FE) TAB at 09:06

## 2024-09-12 RX ADMIN — CLINDAMYCIN PHOSPHATE 600 MG: 600 INJECTION, SOLUTION INTRAVENOUS at 03:32

## 2024-09-12 RX ADMIN — LIDOCAINE HYDROCHLORIDE 5 ML: 10 INJECTION, SOLUTION INFILTRATION; PERINEURAL at 01:00

## 2024-09-12 RX ADMIN — WATER 1000 MG: 1 INJECTION INTRAMUSCULAR; INTRAVENOUS; SUBCUTANEOUS at 04:17

## 2024-09-12 RX ADMIN — METOPROLOL TARTRATE 25 MG: 25 TABLET, FILM COATED ORAL at 10:37

## 2024-09-12 RX ADMIN — WATER 1000 MG: 1 INJECTION INTRAMUSCULAR; INTRAVENOUS; SUBCUTANEOUS at 10:35

## 2024-09-12 RX ADMIN — Medication 1 TABLET: at 09:06

## 2024-09-12 RX ADMIN — BUMETANIDE 1 MG: 1 TABLET ORAL at 09:07

## 2024-09-12 ASSESSMENT — PAIN DESCRIPTION - ORIENTATION: ORIENTATION: RIGHT

## 2024-09-12 ASSESSMENT — PAIN DESCRIPTION - LOCATION: LOCATION: LEG

## 2024-09-12 ASSESSMENT — PAIN SCALES - GENERAL: PAINLEVEL_OUTOF10: 8

## 2024-09-12 ASSESSMENT — PAIN - FUNCTIONAL ASSESSMENT: PAIN_FUNCTIONAL_ASSESSMENT: PREVENTS OR INTERFERES SOME ACTIVE ACTIVITIES AND ADLS

## 2024-09-13 LAB
ANION GAP SERPL CALCULATED.3IONS-SCNC: 11 MMOL/L (ref 7–16)
BASOPHILS # BLD: 0 K/UL (ref 0–0.2)
BASOPHILS NFR BLD: 0 % (ref 0–2)
BUN SERPL-MCNC: 39 MG/DL (ref 6–23)
CALCIUM SERPL-MCNC: 8.4 MG/DL (ref 8.6–10.2)
CHLORIDE SERPL-SCNC: 101 MMOL/L (ref 98–107)
CO2 SERPL-SCNC: 27 MMOL/L (ref 22–29)
CREAT SERPL-MCNC: 0.9 MG/DL (ref 0.5–1)
EOSINOPHIL # BLD: 0.14 K/UL (ref 0.05–0.5)
EOSINOPHILS RELATIVE PERCENT: 2 % (ref 0–6)
ERYTHROCYTE [DISTWIDTH] IN BLOOD BY AUTOMATED COUNT: 18.6 % (ref 11.5–15)
GFR, ESTIMATED: 64 ML/MIN/1.73M2
GLUCOSE SERPL-MCNC: 96 MG/DL (ref 74–99)
HCT VFR BLD AUTO: 34.4 % (ref 34–48)
HGB BLD-MCNC: 9.6 G/DL (ref 11.5–15.5)
LYMPHOCYTES NFR BLD: 0.28 K/UL (ref 1.5–4)
LYMPHOCYTES RELATIVE PERCENT: 4 % (ref 20–42)
MCH RBC QN AUTO: 27.4 PG (ref 26–35)
MCHC RBC AUTO-ENTMCNC: 27.9 G/DL (ref 32–34.5)
MCV RBC AUTO: 98 FL (ref 80–99.9)
MONOCYTES NFR BLD: 0.07 K/UL (ref 0.1–0.95)
MONOCYTES NFR BLD: 1 % (ref 2–12)
NEUTROPHILS NFR BLD: 94 % (ref 43–80)
NEUTS SEG NFR BLD: 7.6 K/UL (ref 1.8–7.3)
PLATELET # BLD AUTO: 146 K/UL (ref 130–450)
PMV BLD AUTO: 11.6 FL (ref 7–12)
POTASSIUM SERPL-SCNC: 4.1 MMOL/L (ref 3.5–5)
RBC # BLD AUTO: 3.51 M/UL (ref 3.5–5.5)
RBC # BLD: ABNORMAL 10*6/UL
SODIUM SERPL-SCNC: 139 MMOL/L (ref 132–146)
WBC OTHER # BLD: 8.1 K/UL (ref 4.5–11.5)

## 2024-09-13 PROCEDURE — 36415 COLL VENOUS BLD VENIPUNCTURE: CPT

## 2024-09-13 PROCEDURE — 6370000000 HC RX 637 (ALT 250 FOR IP): Performed by: NURSE PRACTITIONER

## 2024-09-13 PROCEDURE — 2060000000 HC ICU INTERMEDIATE R&B

## 2024-09-13 PROCEDURE — 80048 BASIC METABOLIC PNL TOTAL CA: CPT

## 2024-09-13 PROCEDURE — 6360000002 HC RX W HCPCS: Performed by: NURSE PRACTITIONER

## 2024-09-13 PROCEDURE — 2580000003 HC RX 258: Performed by: NURSE PRACTITIONER

## 2024-09-13 PROCEDURE — 85025 COMPLETE CBC W/AUTO DIFF WBC: CPT

## 2024-09-13 RX ORDER — HYDROCODONE BITARTRATE AND ACETAMINOPHEN 5; 325 MG/1; MG/1
1 TABLET ORAL EVERY 6 HOURS PRN
Status: DISCONTINUED | OUTPATIENT
Start: 2024-09-13 | End: 2024-09-16 | Stop reason: HOSPADM

## 2024-09-13 RX ADMIN — SODIUM CHLORIDE, PRESERVATIVE FREE 10 ML: 5 INJECTION INTRAVENOUS at 21:08

## 2024-09-13 RX ADMIN — Medication 500 MG: at 10:07

## 2024-09-13 RX ADMIN — WATER 1000 MG: 1 INJECTION INTRAMUSCULAR; INTRAVENOUS; SUBCUTANEOUS at 03:57

## 2024-09-13 RX ADMIN — WATER 1000 MG: 1 INJECTION INTRAMUSCULAR; INTRAVENOUS; SUBCUTANEOUS at 18:34

## 2024-09-13 RX ADMIN — ACETAMINOPHEN 650 MG: 325 TABLET ORAL at 13:07

## 2024-09-13 RX ADMIN — SODIUM CHLORIDE, PRESERVATIVE FREE 10 ML: 5 INJECTION INTRAVENOUS at 10:07

## 2024-09-13 RX ADMIN — ACETAMINOPHEN 650 MG: 325 TABLET ORAL at 01:54

## 2024-09-13 RX ADMIN — Medication 1 TABLET: at 10:07

## 2024-09-13 RX ADMIN — METOPROLOL TARTRATE 25 MG: 25 TABLET, FILM COATED ORAL at 21:08

## 2024-09-13 RX ADMIN — BUMETANIDE 1 MG: 1 TABLET ORAL at 10:07

## 2024-09-13 RX ADMIN — APIXABAN 5 MG: 5 TABLET, FILM COATED ORAL at 10:07

## 2024-09-13 RX ADMIN — WATER 1000 MG: 1 INJECTION INTRAMUSCULAR; INTRAVENOUS; SUBCUTANEOUS at 13:05

## 2024-09-13 RX ADMIN — SODIUM CHLORIDE, PRESERVATIVE FREE 10 ML: 5 INJECTION INTRAVENOUS at 03:59

## 2024-09-13 RX ADMIN — LEVOTHYROXINE SODIUM 75 MCG: 0.05 TABLET ORAL at 10:07

## 2024-09-13 RX ADMIN — HYDROCODONE BITARTRATE AND ACETAMINOPHEN 1 TABLET: 5; 325 TABLET ORAL at 21:08

## 2024-09-13 RX ADMIN — METOPROLOL TARTRATE 25 MG: 25 TABLET, FILM COATED ORAL at 10:07

## 2024-09-13 RX ADMIN — APIXABAN 5 MG: 5 TABLET, FILM COATED ORAL at 21:08

## 2024-09-13 RX ADMIN — CALCIUM CARBONATE-VITAMIN D TAB 500 MG-200 UNIT 1 TABLET: 500-200 TAB at 13:05

## 2024-09-13 RX ADMIN — FERROUS SULFATE TAB 325 MG (65 MG ELEMENTAL FE) 325 MG: 325 (65 FE) TAB at 10:07

## 2024-09-13 ASSESSMENT — PAIN DESCRIPTION - DESCRIPTORS
DESCRIPTORS: ACHING;DULL

## 2024-09-13 ASSESSMENT — PAIN - FUNCTIONAL ASSESSMENT
PAIN_FUNCTIONAL_ASSESSMENT: PREVENTS OR INTERFERES SOME ACTIVE ACTIVITIES AND ADLS
PAIN_FUNCTIONAL_ASSESSMENT: ACTIVITIES ARE NOT PREVENTED
PAIN_FUNCTIONAL_ASSESSMENT: PREVENTS OR INTERFERES SOME ACTIVE ACTIVITIES AND ADLS

## 2024-09-13 ASSESSMENT — PAIN SCALES - GENERAL
PAINLEVEL_OUTOF10: 10
PAINLEVEL_OUTOF10: 0
PAINLEVEL_OUTOF10: 3
PAINLEVEL_OUTOF10: 10

## 2024-09-13 ASSESSMENT — PAIN DESCRIPTION - ORIENTATION
ORIENTATION: RIGHT

## 2024-09-13 ASSESSMENT — PAIN DESCRIPTION - LOCATION
LOCATION: LEG
LOCATION: LEG
LOCATION: KNEE
LOCATION: LEG

## 2024-09-13 ASSESSMENT — LIFESTYLE VARIABLES: HOW OFTEN DO YOU HAVE A DRINK CONTAINING ALCOHOL: MONTHLY OR LESS

## 2024-09-14 LAB
ANION GAP SERPL CALCULATED.3IONS-SCNC: 7 MMOL/L (ref 7–16)
BASOPHILS # BLD: 0 K/UL (ref 0–0.2)
BASOPHILS NFR BLD: 0 % (ref 0–2)
BUN SERPL-MCNC: 40 MG/DL (ref 6–23)
CALCIUM SERPL-MCNC: 8.6 MG/DL (ref 8.6–10.2)
CHLORIDE SERPL-SCNC: 101 MMOL/L (ref 98–107)
CO2 SERPL-SCNC: 31 MMOL/L (ref 22–29)
CREAT SERPL-MCNC: 0.9 MG/DL (ref 0.5–1)
EOSINOPHIL # BLD: 0.06 K/UL (ref 0.05–0.5)
EOSINOPHILS RELATIVE PERCENT: 1 % (ref 0–6)
ERYTHROCYTE [DISTWIDTH] IN BLOOD BY AUTOMATED COUNT: 18.1 % (ref 11.5–15)
GFR, ESTIMATED: 66 ML/MIN/1.73M2
GLUCOSE SERPL-MCNC: 120 MG/DL (ref 74–99)
HCT VFR BLD AUTO: 33.2 % (ref 34–48)
HGB BLD-MCNC: 9.3 G/DL (ref 11.5–15.5)
LYMPHOCYTES NFR BLD: 0.24 K/UL (ref 1.5–4)
LYMPHOCYTES RELATIVE PERCENT: 4 % (ref 20–42)
MCH RBC QN AUTO: 27.4 PG (ref 26–35)
MCHC RBC AUTO-ENTMCNC: 28 G/DL (ref 32–34.5)
MCV RBC AUTO: 97.9 FL (ref 80–99.9)
MONOCYTES NFR BLD: 0.54 K/UL (ref 0.1–0.95)
MONOCYTES NFR BLD: 8 % (ref 2–12)
NEUTROPHILS NFR BLD: 88 % (ref 43–80)
NEUTS SEG NFR BLD: 6.06 K/UL (ref 1.8–7.3)
PLATELET # BLD AUTO: 157 K/UL (ref 130–450)
PMV BLD AUTO: 11.2 FL (ref 7–12)
POTASSIUM SERPL-SCNC: 4.4 MMOL/L (ref 3.5–5)
RBC # BLD AUTO: 3.39 M/UL (ref 3.5–5.5)
RBC # BLD: ABNORMAL 10*6/UL
SODIUM SERPL-SCNC: 139 MMOL/L (ref 132–146)
WBC OTHER # BLD: 6.9 K/UL (ref 4.5–11.5)

## 2024-09-14 PROCEDURE — 6360000002 HC RX W HCPCS: Performed by: NURSE PRACTITIONER

## 2024-09-14 PROCEDURE — 2700000000 HC OXYGEN THERAPY PER DAY

## 2024-09-14 PROCEDURE — 36415 COLL VENOUS BLD VENIPUNCTURE: CPT

## 2024-09-14 PROCEDURE — 2060000000 HC ICU INTERMEDIATE R&B

## 2024-09-14 PROCEDURE — 6370000000 HC RX 637 (ALT 250 FOR IP): Performed by: NURSE PRACTITIONER

## 2024-09-14 PROCEDURE — 2580000003 HC RX 258: Performed by: NURSE PRACTITIONER

## 2024-09-14 PROCEDURE — 85025 COMPLETE CBC W/AUTO DIFF WBC: CPT

## 2024-09-14 PROCEDURE — 80048 BASIC METABOLIC PNL TOTAL CA: CPT

## 2024-09-14 RX ADMIN — Medication 500 MG: at 08:50

## 2024-09-14 RX ADMIN — BUMETANIDE 1 MG: 1 TABLET ORAL at 08:50

## 2024-09-14 RX ADMIN — WATER 1000 MG: 1 INJECTION INTRAMUSCULAR; INTRAVENOUS; SUBCUTANEOUS at 14:57

## 2024-09-14 RX ADMIN — METOPROLOL TARTRATE 25 MG: 25 TABLET, FILM COATED ORAL at 08:50

## 2024-09-14 RX ADMIN — APIXABAN 5 MG: 5 TABLET, FILM COATED ORAL at 21:55

## 2024-09-14 RX ADMIN — METOPROLOL TARTRATE 25 MG: 25 TABLET, FILM COATED ORAL at 21:56

## 2024-09-14 RX ADMIN — SODIUM CHLORIDE, PRESERVATIVE FREE 10 ML: 5 INJECTION INTRAVENOUS at 08:51

## 2024-09-14 RX ADMIN — APIXABAN 5 MG: 5 TABLET, FILM COATED ORAL at 08:50

## 2024-09-14 RX ADMIN — SODIUM CHLORIDE, PRESERVATIVE FREE 10 ML: 5 INJECTION INTRAVENOUS at 21:56

## 2024-09-14 RX ADMIN — WATER 1000 MG: 1 INJECTION INTRAMUSCULAR; INTRAVENOUS; SUBCUTANEOUS at 21:54

## 2024-09-14 RX ADMIN — FERROUS SULFATE TAB 325 MG (65 MG ELEMENTAL FE) 325 MG: 325 (65 FE) TAB at 08:50

## 2024-09-14 RX ADMIN — HYDROCODONE BITARTRATE AND ACETAMINOPHEN 1 TABLET: 5; 325 TABLET ORAL at 03:57

## 2024-09-14 RX ADMIN — WATER 1000 MG: 1 INJECTION INTRAMUSCULAR; INTRAVENOUS; SUBCUTANEOUS at 06:28

## 2024-09-14 RX ADMIN — Medication 1 TABLET: at 08:50

## 2024-09-14 RX ADMIN — CALCIUM CARBONATE-VITAMIN D TAB 500 MG-200 UNIT 1 TABLET: 500-200 TAB at 08:50

## 2024-09-14 RX ADMIN — LEVOTHYROXINE SODIUM 75 MCG: 0.05 TABLET ORAL at 06:28

## 2024-09-14 ASSESSMENT — PAIN DESCRIPTION - ORIENTATION
ORIENTATION: RIGHT
ORIENTATION: RIGHT

## 2024-09-14 ASSESSMENT — PAIN SCALES - GENERAL
PAINLEVEL_OUTOF10: 10
PAINLEVEL_OUTOF10: 0
PAINLEVEL_OUTOF10: 7

## 2024-09-14 ASSESSMENT — PAIN DESCRIPTION - DESCRIPTORS
DESCRIPTORS: ACHING;DULL
DESCRIPTORS: ACHING;DULL

## 2024-09-14 ASSESSMENT — PAIN - FUNCTIONAL ASSESSMENT
PAIN_FUNCTIONAL_ASSESSMENT: PREVENTS OR INTERFERES SOME ACTIVE ACTIVITIES AND ADLS
PAIN_FUNCTIONAL_ASSESSMENT: PREVENTS OR INTERFERES SOME ACTIVE ACTIVITIES AND ADLS

## 2024-09-14 ASSESSMENT — PAIN DESCRIPTION - LOCATION
LOCATION: LEG
LOCATION: LEG

## 2024-09-15 LAB
ANION GAP SERPL CALCULATED.3IONS-SCNC: 9 MMOL/L (ref 7–16)
BASOPHILS # BLD: 0.06 K/UL (ref 0–0.2)
BASOPHILS NFR BLD: 1 % (ref 0–2)
BUN SERPL-MCNC: 40 MG/DL (ref 6–23)
CALCIUM SERPL-MCNC: 8.8 MG/DL (ref 8.6–10.2)
CHLORIDE SERPL-SCNC: 98 MMOL/L (ref 98–107)
CO2 SERPL-SCNC: 29 MMOL/L (ref 22–29)
CREAT SERPL-MCNC: 0.9 MG/DL (ref 0.5–1)
EOSINOPHIL # BLD: 0 K/UL (ref 0.05–0.5)
EOSINOPHILS RELATIVE PERCENT: 0 % (ref 0–6)
ERYTHROCYTE [DISTWIDTH] IN BLOOD BY AUTOMATED COUNT: 17.9 % (ref 11.5–15)
GFR, ESTIMATED: 65 ML/MIN/1.73M2
GLUCOSE SERPL-MCNC: 90 MG/DL (ref 74–99)
HCT VFR BLD AUTO: 29.7 % (ref 34–48)
HGB BLD-MCNC: 8.5 G/DL (ref 11.5–15.5)
LYMPHOCYTES NFR BLD: 0.44 K/UL (ref 1.5–4)
LYMPHOCYTES RELATIVE PERCENT: 6 % (ref 20–42)
MCH RBC QN AUTO: 27.5 PG (ref 26–35)
MCHC RBC AUTO-ENTMCNC: 28.6 G/DL (ref 32–34.5)
MCV RBC AUTO: 96.1 FL (ref 80–99.9)
METAMYELOCYTES ABSOLUTE COUNT: 0.06 K/UL (ref 0–0.12)
METAMYELOCYTES: 1 % (ref 0–1)
MONOCYTES NFR BLD: 0.31 K/UL (ref 0.1–0.95)
MONOCYTES NFR BLD: 5 % (ref 2–12)
NEUTROPHILS NFR BLD: 88 % (ref 43–80)
NEUTS SEG NFR BLD: 6.13 K/UL (ref 1.8–7.3)
PLATELET # BLD AUTO: 161 K/UL (ref 130–450)
PMV BLD AUTO: 11.3 FL (ref 7–12)
POTASSIUM SERPL-SCNC: 4.5 MMOL/L (ref 3.5–5)
RBC # BLD AUTO: 3.09 M/UL (ref 3.5–5.5)
RBC # BLD: ABNORMAL 10*6/UL
SODIUM SERPL-SCNC: 136 MMOL/L (ref 132–146)
WBC OTHER # BLD: 7 K/UL (ref 4.5–11.5)

## 2024-09-15 PROCEDURE — 6360000002 HC RX W HCPCS: Performed by: NURSE PRACTITIONER

## 2024-09-15 PROCEDURE — 36415 COLL VENOUS BLD VENIPUNCTURE: CPT

## 2024-09-15 PROCEDURE — 6370000000 HC RX 637 (ALT 250 FOR IP): Performed by: NURSE PRACTITIONER

## 2024-09-15 PROCEDURE — 97535 SELF CARE MNGMENT TRAINING: CPT

## 2024-09-15 PROCEDURE — 97165 OT EVAL LOW COMPLEX 30 MIN: CPT

## 2024-09-15 PROCEDURE — 2060000000 HC ICU INTERMEDIATE R&B

## 2024-09-15 PROCEDURE — 2580000003 HC RX 258: Performed by: NURSE PRACTITIONER

## 2024-09-15 PROCEDURE — 85025 COMPLETE CBC W/AUTO DIFF WBC: CPT

## 2024-09-15 PROCEDURE — 80048 BASIC METABOLIC PNL TOTAL CA: CPT

## 2024-09-15 PROCEDURE — 2700000000 HC OXYGEN THERAPY PER DAY

## 2024-09-15 RX ADMIN — Medication 500 MG: at 08:52

## 2024-09-15 RX ADMIN — HYDROCODONE BITARTRATE AND ACETAMINOPHEN 1 TABLET: 5; 325 TABLET ORAL at 09:32

## 2024-09-15 RX ADMIN — LEVOTHYROXINE SODIUM 75 MCG: 0.05 TABLET ORAL at 06:51

## 2024-09-15 RX ADMIN — APIXABAN 5 MG: 5 TABLET, FILM COATED ORAL at 08:52

## 2024-09-15 RX ADMIN — CALCIUM CARBONATE-VITAMIN D TAB 500 MG-200 UNIT 1 TABLET: 500-200 TAB at 08:52

## 2024-09-15 RX ADMIN — WATER 1000 MG: 1 INJECTION INTRAMUSCULAR; INTRAVENOUS; SUBCUTANEOUS at 22:57

## 2024-09-15 RX ADMIN — METOPROLOL TARTRATE 25 MG: 25 TABLET, FILM COATED ORAL at 08:52

## 2024-09-15 RX ADMIN — APIXABAN 5 MG: 5 TABLET, FILM COATED ORAL at 20:12

## 2024-09-15 RX ADMIN — WATER 1000 MG: 1 INJECTION INTRAMUSCULAR; INTRAVENOUS; SUBCUTANEOUS at 06:51

## 2024-09-15 RX ADMIN — HYDROCODONE BITARTRATE AND ACETAMINOPHEN 1 TABLET: 5; 325 TABLET ORAL at 16:50

## 2024-09-15 RX ADMIN — FERROUS SULFATE TAB 325 MG (65 MG ELEMENTAL FE) 325 MG: 325 (65 FE) TAB at 08:52

## 2024-09-15 RX ADMIN — SODIUM CHLORIDE, PRESERVATIVE FREE 10 ML: 5 INJECTION INTRAVENOUS at 20:13

## 2024-09-15 RX ADMIN — Medication 1 TABLET: at 08:52

## 2024-09-15 RX ADMIN — ACETAMINOPHEN 650 MG: 325 TABLET ORAL at 14:08

## 2024-09-15 RX ADMIN — METOPROLOL TARTRATE 25 MG: 25 TABLET, FILM COATED ORAL at 20:12

## 2024-09-15 RX ADMIN — SODIUM CHLORIDE, PRESERVATIVE FREE 10 ML: 5 INJECTION INTRAVENOUS at 08:54

## 2024-09-15 RX ADMIN — HYDROCODONE BITARTRATE AND ACETAMINOPHEN 1 TABLET: 5; 325 TABLET ORAL at 03:21

## 2024-09-15 RX ADMIN — BUMETANIDE 1 MG: 1 TABLET ORAL at 08:52

## 2024-09-15 RX ADMIN — WATER 1000 MG: 1 INJECTION INTRAMUSCULAR; INTRAVENOUS; SUBCUTANEOUS at 14:32

## 2024-09-15 ASSESSMENT — PAIN SCALES - GENERAL
PAINLEVEL_OUTOF10: 0
PAINLEVEL_OUTOF10: 0
PAINLEVEL_OUTOF10: 6
PAINLEVEL_OUTOF10: 4
PAINLEVEL_OUTOF10: 0
PAINLEVEL_OUTOF10: 4
PAINLEVEL_OUTOF10: 3
PAINLEVEL_OUTOF10: 7
PAINLEVEL_OUTOF10: 7
PAINLEVEL_OUTOF10: 3
PAINLEVEL_OUTOF10: 5
PAINLEVEL_OUTOF10: 0
PAINLEVEL_OUTOF10: 3

## 2024-09-15 ASSESSMENT — PAIN DESCRIPTION - LOCATION
LOCATION: HIP
LOCATION: LEG
LOCATION: HIP

## 2024-09-15 ASSESSMENT — PAIN SCALES - WONG BAKER
WONGBAKER_NUMERICALRESPONSE: NO HURT

## 2024-09-15 ASSESSMENT — PAIN DESCRIPTION - DESCRIPTORS
DESCRIPTORS: ACHING;DISCOMFORT;DULL
DESCRIPTORS: ACHING;DISCOMFORT;PRESSURE
DESCRIPTORS: ACHING;DULL;NAGGING

## 2024-09-15 ASSESSMENT — PAIN DESCRIPTION - ORIENTATION
ORIENTATION: RIGHT
ORIENTATION: RIGHT

## 2024-09-15 ASSESSMENT — PAIN - FUNCTIONAL ASSESSMENT: PAIN_FUNCTIONAL_ASSESSMENT: ACTIVITIES ARE NOT PREVENTED

## 2024-09-16 VITALS
DIASTOLIC BLOOD PRESSURE: 70 MMHG | WEIGHT: 204.9 LBS | SYSTOLIC BLOOD PRESSURE: 111 MMHG | RESPIRATION RATE: 22 BRPM | BODY MASS INDEX: 36.3 KG/M2 | HEART RATE: 97 BPM | OXYGEN SATURATION: 92 % | TEMPERATURE: 98.4 F

## 2024-09-16 LAB — GLUCOSE BLD-MCNC: 184 MG/DL (ref 74–99)

## 2024-09-16 PROCEDURE — 82962 GLUCOSE BLOOD TEST: CPT

## 2024-09-16 PROCEDURE — 6370000000 HC RX 637 (ALT 250 FOR IP): Performed by: NURSE PRACTITIONER

## 2024-09-16 PROCEDURE — 97530 THERAPEUTIC ACTIVITIES: CPT

## 2024-09-16 PROCEDURE — 2700000000 HC OXYGEN THERAPY PER DAY

## 2024-09-16 PROCEDURE — 97161 PT EVAL LOW COMPLEX 20 MIN: CPT

## 2024-09-16 PROCEDURE — 6360000002 HC RX W HCPCS: Performed by: NURSE PRACTITIONER

## 2024-09-16 PROCEDURE — 2580000003 HC RX 258: Performed by: NURSE PRACTITIONER

## 2024-09-16 RX ORDER — CEPHALEXIN 500 MG/1
500 CAPSULE ORAL 3 TIMES DAILY
DISCHARGE
Start: 2024-09-16 | End: 2024-09-18

## 2024-09-16 RX ADMIN — FERROUS SULFATE TAB 325 MG (65 MG ELEMENTAL FE) 325 MG: 325 (65 FE) TAB at 11:32

## 2024-09-16 RX ADMIN — Medication 500 MG: at 10:13

## 2024-09-16 RX ADMIN — WATER 1000 MG: 1 INJECTION INTRAMUSCULAR; INTRAVENOUS; SUBCUTANEOUS at 14:50

## 2024-09-16 RX ADMIN — WATER 1000 MG: 1 INJECTION INTRAMUSCULAR; INTRAVENOUS; SUBCUTANEOUS at 06:24

## 2024-09-16 RX ADMIN — CALCIUM CARBONATE-VITAMIN D TAB 500 MG-200 UNIT 1 TABLET: 500-200 TAB at 10:09

## 2024-09-16 RX ADMIN — SODIUM CHLORIDE, PRESERVATIVE FREE 10 ML: 5 INJECTION INTRAVENOUS at 10:12

## 2024-09-16 RX ADMIN — METOPROLOL TARTRATE 25 MG: 25 TABLET, FILM COATED ORAL at 10:11

## 2024-09-16 RX ADMIN — HYDROCODONE BITARTRATE AND ACETAMINOPHEN 1 TABLET: 5; 325 TABLET ORAL at 11:32

## 2024-09-16 RX ADMIN — BUMETANIDE 1 MG: 1 TABLET ORAL at 10:09

## 2024-09-16 RX ADMIN — Medication 1 TABLET: at 10:10

## 2024-09-16 RX ADMIN — LEVOTHYROXINE SODIUM 75 MCG: 0.05 TABLET ORAL at 06:24

## 2024-09-16 RX ADMIN — APIXABAN 5 MG: 5 TABLET, FILM COATED ORAL at 10:09

## 2024-09-16 ASSESSMENT — PAIN SCALES - GENERAL
PAINLEVEL_OUTOF10: 0
PAINLEVEL_OUTOF10: 0
PAINLEVEL_OUTOF10: 10
PAINLEVEL_OUTOF10: 10
PAINLEVEL_OUTOF10: 9

## 2024-09-16 ASSESSMENT — PAIN DESCRIPTION - DESCRIPTORS
DESCRIPTORS: ACHING;GNAWING;SORE
DESCRIPTORS: ACHING;DISCOMFORT;GNAWING;SORE

## 2024-09-16 ASSESSMENT — PAIN DESCRIPTION - LOCATION
LOCATION: LEG;KNEE
LOCATION: LEG;KNEE

## 2024-09-16 ASSESSMENT — PAIN SCALES - WONG BAKER
WONGBAKER_NUMERICALRESPONSE: NO HURT

## 2024-09-16 ASSESSMENT — PAIN DESCRIPTION - ORIENTATION
ORIENTATION: RIGHT
ORIENTATION: RIGHT

## 2024-09-17 ENCOUNTER — OUTSIDE SERVICES (OUTPATIENT)
Dept: PRIMARY CARE CLINIC | Age: 84
End: 2024-09-17

## 2024-09-17 DIAGNOSIS — E03.9 ACQUIRED HYPOTHYROIDISM: ICD-10-CM

## 2024-09-17 DIAGNOSIS — I10 ESSENTIAL HYPERTENSION: ICD-10-CM

## 2024-09-17 DIAGNOSIS — M25.561 RIGHT KNEE PAIN, UNSPECIFIED CHRONICITY: ICD-10-CM

## 2024-09-17 DIAGNOSIS — Z91.81 AT MAXIMUM RISK FOR FALL: ICD-10-CM

## 2024-09-17 DIAGNOSIS — R53.81 PHYSICAL DECONDITIONING: ICD-10-CM

## 2024-09-17 DIAGNOSIS — L03.115 CELLULITIS OF RIGHT KNEE: Primary | ICD-10-CM

## 2024-09-17 DIAGNOSIS — R53.1 GENERALIZED WEAKNESS: ICD-10-CM

## 2024-09-17 DIAGNOSIS — I48.0 PAF (PAROXYSMAL ATRIAL FIBRILLATION) (HCC): ICD-10-CM

## 2024-09-17 LAB
25(OH)D3 SERPL-MCNC: 31.1 NG/ML (ref 30–100)
ALBUMIN SERPL-MCNC: 2.4 G/DL (ref 3.5–5.2)
ALP SERPL-CCNC: 94 U/L (ref 35–104)
ALT SERPL-CCNC: <5 U/L (ref 0–32)
ANION GAP SERPL CALCULATED.3IONS-SCNC: 16 MMOL/L (ref 7–16)
AST SERPL-CCNC: 20 U/L (ref 0–31)
BILIRUB SERPL-MCNC: 0.3 MG/DL (ref 0–1.2)
BUN SERPL-MCNC: 45 MG/DL (ref 6–23)
CALCIUM SERPL-MCNC: 9.4 MG/DL (ref 8.6–10.2)
CHLORIDE SERPL-SCNC: 96 MMOL/L (ref 98–107)
CO2 SERPL-SCNC: 27 MMOL/L (ref 22–29)
CREAT SERPL-MCNC: 1 MG/DL (ref 0.5–1)
CRP SERPL HS-MCNC: 208 MG/L (ref 0–5)
ERYTHROCYTE [DISTWIDTH] IN BLOOD BY AUTOMATED COUNT: 17.5 % (ref 11.5–15)
ERYTHROCYTE [SEDIMENTATION RATE] IN BLOOD BY WESTERGREN METHOD: 81 MM/HR (ref 0–20)
GFR, ESTIMATED: 57 ML/MIN/1.73M2
GLUCOSE SERPL-MCNC: 90 MG/DL (ref 74–99)
HCT VFR BLD AUTO: 30.3 % (ref 34–48)
HGB BLD-MCNC: 8.9 G/DL (ref 11.5–15.5)
MCH RBC QN AUTO: 27.5 PG (ref 26–35)
MCHC RBC AUTO-ENTMCNC: 29.4 G/DL (ref 32–34.5)
MCV RBC AUTO: 93.5 FL (ref 80–99.9)
MICROORGANISM SPEC CULT: NORMAL
MICROORGANISM SPEC CULT: NORMAL
PLATELET # BLD AUTO: 220 K/UL (ref 130–450)
PMV BLD AUTO: 10.8 FL (ref 7–12)
POTASSIUM SERPL-SCNC: 4.6 MMOL/L (ref 3.5–5)
PROT SERPL-MCNC: 5.5 G/DL (ref 6.4–8.3)
RBC # BLD AUTO: 3.24 M/UL (ref 3.5–5.5)
SERVICE CMNT-IMP: NORMAL
SERVICE CMNT-IMP: NORMAL
SODIUM SERPL-SCNC: 139 MMOL/L (ref 132–146)
SPECIMEN DESCRIPTION: NORMAL
SPECIMEN DESCRIPTION: NORMAL
TSH SERPL DL<=0.05 MIU/L-ACNC: 2.46 UIU/ML (ref 0.27–4.2)
WBC OTHER # BLD: 8 K/UL (ref 4.5–11.5)

## 2024-10-15 ENCOUNTER — TELEPHONE (OUTPATIENT)
Dept: ADMINISTRATIVE | Age: 84
End: 2024-10-15

## 2024-10-16 ENCOUNTER — OFFICE VISIT (OUTPATIENT)
Dept: ORTHOPEDIC SURGERY | Age: 84
End: 2024-10-16

## 2024-10-16 DIAGNOSIS — S72.451A CLOSED COMMINUTED SUPRACONDYLAR FRACTURE OF FEMUR, RIGHT, INITIAL ENCOUNTER (HCC): Primary | ICD-10-CM

## 2024-10-16 PROCEDURE — 99024 POSTOP FOLLOW-UP VISIT: CPT | Performed by: STUDENT IN AN ORGANIZED HEALTH CARE EDUCATION/TRAINING PROGRAM

## 2024-10-16 NOTE — PATIENT INSTRUCTIONS
INSTRUCTIONS FOR FACILITY      Weight Bearing: Weight bearing as tolerated right lower extremity. Use assistive devices when needed.    Therapy: Continue PT/OT      Pain control: per facility physician        Follow up:     CT scan of the right femur, follow-up with me in 2 months pending CT results    Future Appointments   Date Time Provider Department Center   10/18/2024  2:00 PM Nguyen Teixeira APRN - CNP [facility] WOUND [place] [facility]   12/12/2024  9:45 AM Neville Sweeney MD [facility]       Call office with any questions or concerns.

## 2024-10-16 NOTE — PROGRESS NOTES
Chief Complaint   Patient presents with    Post-Op Check     Postop right distal ORIF 09/04/24, she is having pain to any type of movement     DATE OF PROCEDURE: 7/17/2024  PROCEDURE: Right distal femur fracture open reduction internal fixation    POD: 3 months    Subjective:  Gabi Madrigal is following up from the above surgery.  She is not doing well.  Has been out of the hospital cellulitis peripheral edema and weeping wounds including a sacral decubitus.  She has significant pain now with any attempted range of motion and swelling in both of her legs and arms.  She has been treated for cellulitis.  She is nonambulatory.    At this time she is also having complaints of bilateral knee pain.  She does have history of severe bilaterally.  She was asking for injections today.     Review of Systems -  All pertinent positives/negative in HPI     Objective:    General: Alert and oriented X 3, normocephalic atraumatic, external ears and eye normal, sclera clear, no acute distress, respirations easy and unlabored with no audible wheezes, skin warm and dry, speech and dress appropriate for noted age, affect euthymic.    Extremity:  Right Lower Extremity  Significant edema pitting with chronic venous stasis changes and dermatitis and some cellulitis starting at her knee and extending down her leg.  Draining clear fluid from right ankle and lower leg.  Compartments supple throughout thigh and leg  Calf supple and not tender. negative Homans  Demonstrates active range of motion at the hip and knee. Knee flexion and extension 0-100 degrees. States sensation intact to touch in sural, deep peroneal, superficial peroneal, saphenous, posterior tibial  nerve distributions to foot/ankle.  Palpable dorsalis pedis and posterior tibialis pulses, cap refill brisk in toes, foot warm/perfused.      There were no vitals taken for this visit.    XR: Multiple views of the right femur independently interpreted by myself and discussed

## 2024-10-18 ENCOUNTER — HOSPITAL ENCOUNTER (INPATIENT)
Age: 84
LOS: 13 days | DRG: 463 | End: 2024-10-31
Attending: EMERGENCY MEDICINE | Admitting: INTERNAL MEDICINE
Payer: MEDICARE

## 2024-10-18 ENCOUNTER — HOSPITAL ENCOUNTER (OUTPATIENT)
Dept: CT IMAGING | Age: 84
Discharge: HOME | End: 2024-10-20
Attending: STUDENT IN AN ORGANIZED HEALTH CARE EDUCATION/TRAINING PROGRAM
Payer: MEDICARE

## 2024-10-18 DIAGNOSIS — I50.9 CONGESTIVE HEART FAILURE, UNSPECIFIED HF CHRONICITY, UNSPECIFIED HEART FAILURE TYPE (HCC): ICD-10-CM

## 2024-10-18 DIAGNOSIS — T84.7XXA INFECTED HARDWARE IN RIGHT LEG, INITIAL ENCOUNTER (HCC): Primary | ICD-10-CM

## 2024-10-18 DIAGNOSIS — S72.451A CLOSED COMMINUTED SUPRACONDYLAR FRACTURE OF FEMUR, RIGHT, INITIAL ENCOUNTER (HCC): ICD-10-CM

## 2024-10-18 DIAGNOSIS — M25.461 EFFUSION OF RIGHT KNEE: ICD-10-CM

## 2024-10-18 LAB
ALBUMIN SERPL-MCNC: 2.3 G/DL (ref 3.5–5.2)
ALP SERPL-CCNC: 145 U/L (ref 35–104)
ALT SERPL-CCNC: 10 U/L (ref 0–32)
ANION GAP SERPL CALCULATED.3IONS-SCNC: 9 MMOL/L (ref 7–16)
AST SERPL-CCNC: 24 U/L (ref 0–31)
BASOPHILS # BLD: 0.02 K/UL (ref 0–0.2)
BASOPHILS NFR BLD: 0 % (ref 0–2)
BILIRUB SERPL-MCNC: 0.3 MG/DL (ref 0–1.2)
BUN SERPL-MCNC: 44 MG/DL (ref 6–23)
CALCIUM SERPL-MCNC: 8.4 MG/DL (ref 8.6–10.2)
CHLORIDE SERPL-SCNC: 97 MMOL/L (ref 98–107)
CO2 SERPL-SCNC: 31 MMOL/L (ref 22–29)
CREAT SERPL-MCNC: 1.1 MG/DL (ref 0.5–1)
CRP SERPL HS-MCNC: 250 MG/L (ref 0–5)
EOSINOPHIL # BLD: 0.1 K/UL (ref 0.05–0.5)
EOSINOPHILS RELATIVE PERCENT: 1 % (ref 0–6)
ERYTHROCYTE [DISTWIDTH] IN BLOOD BY AUTOMATED COUNT: 17.5 % (ref 11.5–15)
ERYTHROCYTE [SEDIMENTATION RATE] IN BLOOD BY WESTERGREN METHOD: 100 MM/HR (ref 0–20)
GFR, ESTIMATED: 52 ML/MIN/1.73M2
GLUCOSE SERPL-MCNC: 136 MG/DL (ref 74–99)
HCT VFR BLD AUTO: 28.7 % (ref 34–48)
HGB BLD-MCNC: 8.4 G/DL (ref 11.5–15.5)
IMM GRANULOCYTES # BLD AUTO: 0.07 K/UL (ref 0–0.58)
IMM GRANULOCYTES NFR BLD: 1 % (ref 0–5)
INR PPP: 2.6
LYMPHOCYTES NFR BLD: 1.03 K/UL (ref 1.5–4)
LYMPHOCYTES RELATIVE PERCENT: 10 % (ref 20–42)
MAGNESIUM SERPL-MCNC: 2.1 MG/DL (ref 1.6–2.6)
MCH RBC QN AUTO: 25.7 PG (ref 26–35)
MCHC RBC AUTO-ENTMCNC: 29.3 G/DL (ref 32–34.5)
MCV RBC AUTO: 87.8 FL (ref 80–99.9)
MONOCYTES NFR BLD: 0.69 K/UL (ref 0.1–0.95)
MONOCYTES NFR BLD: 7 % (ref 2–12)
NEUTROPHILS NFR BLD: 81 % (ref 43–80)
NEUTS SEG NFR BLD: 8.01 K/UL (ref 1.8–7.3)
PLATELET # BLD AUTO: 346 K/UL (ref 130–450)
PMV BLD AUTO: 10.2 FL (ref 7–12)
POTASSIUM SERPL-SCNC: 4.6 MMOL/L (ref 3.5–5)
PROT SERPL-MCNC: 6.1 G/DL (ref 6.4–8.3)
PROTHROMBIN TIME: 29.2 SEC (ref 9.3–12.4)
RBC # BLD AUTO: 3.27 M/UL (ref 3.5–5.5)
SODIUM SERPL-SCNC: 137 MMOL/L (ref 132–146)
WBC OTHER # BLD: 9.9 K/UL (ref 4.5–11.5)

## 2024-10-18 PROCEDURE — 96375 TX/PRO/DX INJ NEW DRUG ADDON: CPT

## 2024-10-18 PROCEDURE — 96374 THER/PROPH/DIAG INJ IV PUSH: CPT

## 2024-10-18 PROCEDURE — 83735 ASSAY OF MAGNESIUM: CPT

## 2024-10-18 PROCEDURE — 87040 BLOOD CULTURE FOR BACTERIA: CPT

## 2024-10-18 PROCEDURE — 6370000000 HC RX 637 (ALT 250 FOR IP): Performed by: NURSE PRACTITIONER

## 2024-10-18 PROCEDURE — 86850 RBC ANTIBODY SCREEN: CPT

## 2024-10-18 PROCEDURE — 85025 COMPLETE CBC W/AUTO DIFF WBC: CPT

## 2024-10-18 PROCEDURE — 86902 BLOOD TYPE ANTIGEN DONOR EA: CPT

## 2024-10-18 PROCEDURE — 2060000000 HC ICU INTERMEDIATE R&B

## 2024-10-18 PROCEDURE — 86920 COMPATIBILITY TEST SPIN: CPT

## 2024-10-18 PROCEDURE — 87154 CUL TYP ID BLD PTHGN 6+ TRGT: CPT

## 2024-10-18 PROCEDURE — 99285 EMERGENCY DEPT VISIT HI MDM: CPT

## 2024-10-18 PROCEDURE — 86880 COOMBS TEST DIRECT: CPT

## 2024-10-18 PROCEDURE — 6360000002 HC RX W HCPCS: Performed by: EMERGENCY MEDICINE

## 2024-10-18 PROCEDURE — 80053 COMPREHEN METABOLIC PANEL: CPT

## 2024-10-18 PROCEDURE — 86140 C-REACTIVE PROTEIN: CPT

## 2024-10-18 PROCEDURE — 85652 RBC SED RATE AUTOMATED: CPT

## 2024-10-18 PROCEDURE — 86870 RBC ANTIBODY IDENTIFICATION: CPT

## 2024-10-18 PROCEDURE — 86901 BLOOD TYPING SEROLOGIC RH(D): CPT

## 2024-10-18 PROCEDURE — 86922 COMPATIBILITY TEST ANTIGLOB: CPT

## 2024-10-18 PROCEDURE — 73700 CT LOWER EXTREMITY W/O DYE: CPT

## 2024-10-18 PROCEDURE — 2580000003 HC RX 258: Performed by: NURSE PRACTITIONER

## 2024-10-18 PROCEDURE — 85610 PROTHROMBIN TIME: CPT

## 2024-10-18 PROCEDURE — 86900 BLOOD TYPING SEROLOGIC ABO: CPT

## 2024-10-18 RX ORDER — POLYETHYLENE GLYCOL 3350 17 G/17G
17 POWDER, FOR SOLUTION ORAL DAILY PRN
Status: DISCONTINUED | OUTPATIENT
Start: 2024-10-18 | End: 2024-10-31 | Stop reason: HOSPADM

## 2024-10-18 RX ORDER — ACETAMINOPHEN 325 MG/1
650 TABLET ORAL EVERY 6 HOURS PRN
Status: DISCONTINUED | OUTPATIENT
Start: 2024-10-18 | End: 2024-10-31 | Stop reason: HOSPADM

## 2024-10-18 RX ORDER — ACETAMINOPHEN 650 MG/1
650 SUPPOSITORY RECTAL EVERY 6 HOURS PRN
Status: DISCONTINUED | OUTPATIENT
Start: 2024-10-18 | End: 2024-10-31 | Stop reason: HOSPADM

## 2024-10-18 RX ORDER — ASCORBIC ACID 500 MG
500 TABLET ORAL DAILY
Status: DISCONTINUED | OUTPATIENT
Start: 2024-10-18 | End: 2024-10-31 | Stop reason: HOSPADM

## 2024-10-18 RX ORDER — CHOLECALCIFEROL (VITAMIN D3) 50 MCG
2000 TABLET ORAL DAILY
Status: DISCONTINUED | OUTPATIENT
Start: 2024-10-18 | End: 2024-10-31 | Stop reason: HOSPADM

## 2024-10-18 RX ORDER — SODIUM CHLORIDE 9 MG/ML
INJECTION, SOLUTION INTRAVENOUS PRN
Status: DISCONTINUED | OUTPATIENT
Start: 2024-10-18 | End: 2024-10-31 | Stop reason: HOSPADM

## 2024-10-18 RX ORDER — FENTANYL CITRATE 50 UG/ML
50 INJECTION, SOLUTION INTRAMUSCULAR; INTRAVENOUS ONCE
Status: COMPLETED | OUTPATIENT
Start: 2024-10-18 | End: 2024-10-18

## 2024-10-18 RX ORDER — M-VIT,TX,IRON,MINS/CALC/FOLIC 27MG-0.4MG
1 TABLET ORAL DAILY
Status: DISCONTINUED | OUTPATIENT
Start: 2024-10-18 | End: 2024-10-31 | Stop reason: HOSPADM

## 2024-10-18 RX ORDER — METOPROLOL TARTRATE 25 MG/1
25 TABLET, FILM COATED ORAL 2 TIMES DAILY
Status: DISCONTINUED | OUTPATIENT
Start: 2024-10-18 | End: 2024-10-31 | Stop reason: HOSPADM

## 2024-10-18 RX ORDER — SODIUM CHLORIDE 9 MG/ML
INJECTION, SOLUTION INTRAVENOUS CONTINUOUS
Status: DISCONTINUED | OUTPATIENT
Start: 2024-10-18 | End: 2024-10-19

## 2024-10-18 RX ORDER — ONDANSETRON 2 MG/ML
4 INJECTION INTRAMUSCULAR; INTRAVENOUS EVERY 6 HOURS PRN
Status: DISCONTINUED | OUTPATIENT
Start: 2024-10-18 | End: 2024-10-31 | Stop reason: HOSPADM

## 2024-10-18 RX ORDER — SODIUM CHLORIDE 0.9 % (FLUSH) 0.9 %
10 SYRINGE (ML) INJECTION PRN
Status: DISCONTINUED | OUTPATIENT
Start: 2024-10-18 | End: 2024-10-31 | Stop reason: HOSPADM

## 2024-10-18 RX ORDER — POTASSIUM CHLORIDE 1500 MG/1
40 TABLET, EXTENDED RELEASE ORAL PRN
Status: DISCONTINUED | OUTPATIENT
Start: 2024-10-18 | End: 2024-10-31 | Stop reason: HOSPADM

## 2024-10-18 RX ORDER — ONDANSETRON 2 MG/ML
4 INJECTION INTRAMUSCULAR; INTRAVENOUS ONCE
Status: COMPLETED | OUTPATIENT
Start: 2024-10-18 | End: 2024-10-18

## 2024-10-18 RX ORDER — MAGNESIUM SULFATE IN WATER 40 MG/ML
2000 INJECTION, SOLUTION INTRAVENOUS PRN
Status: DISCONTINUED | OUTPATIENT
Start: 2024-10-18 | End: 2024-10-31 | Stop reason: HOSPADM

## 2024-10-18 RX ORDER — BUMETANIDE 1 MG/1
1 TABLET ORAL DAILY
Status: DISCONTINUED | OUTPATIENT
Start: 2024-10-18 | End: 2024-10-31 | Stop reason: HOSPADM

## 2024-10-18 RX ORDER — FERROUS SULFATE 325(65) MG
325 TABLET ORAL
Status: DISCONTINUED | OUTPATIENT
Start: 2024-10-19 | End: 2024-10-31 | Stop reason: HOSPADM

## 2024-10-18 RX ORDER — ONDANSETRON 4 MG/1
4 TABLET, ORALLY DISINTEGRATING ORAL EVERY 8 HOURS PRN
Status: DISCONTINUED | OUTPATIENT
Start: 2024-10-18 | End: 2024-10-31 | Stop reason: HOSPADM

## 2024-10-18 RX ORDER — CLOBETASOL PROPIONATE 0.5 MG/G
CREAM TOPICAL 2 TIMES DAILY
Status: DISCONTINUED | OUTPATIENT
Start: 2024-10-18 | End: 2024-10-31 | Stop reason: HOSPADM

## 2024-10-18 RX ORDER — SODIUM CHLORIDE 0.9 % (FLUSH) 0.9 %
5-40 SYRINGE (ML) INJECTION EVERY 12 HOURS SCHEDULED
Status: DISCONTINUED | OUTPATIENT
Start: 2024-10-18 | End: 2024-10-31 | Stop reason: HOSPADM

## 2024-10-18 RX ORDER — LEVOTHYROXINE SODIUM 88 UG/1
88 TABLET ORAL DAILY
Status: DISCONTINUED | OUTPATIENT
Start: 2024-10-19 | End: 2024-10-31 | Stop reason: HOSPADM

## 2024-10-18 RX ORDER — LIDOCAINE 4 G/G
1 PATCH TOPICAL DAILY
Status: DISCONTINUED | OUTPATIENT
Start: 2024-10-18 | End: 2024-10-31 | Stop reason: HOSPADM

## 2024-10-18 RX ORDER — ENOXAPARIN SODIUM 100 MG/ML
40 INJECTION SUBCUTANEOUS DAILY
Status: DISCONTINUED | OUTPATIENT
Start: 2024-10-18 | End: 2024-10-19 | Stop reason: ALTCHOICE

## 2024-10-18 RX ADMIN — SODIUM CHLORIDE, PRESERVATIVE FREE 10 ML: 5 INJECTION INTRAVENOUS at 21:42

## 2024-10-18 RX ADMIN — Medication 500 MG: at 21:40

## 2024-10-18 RX ADMIN — ONDANSETRON 4 MG: 2 INJECTION INTRAMUSCULAR; INTRAVENOUS at 17:57

## 2024-10-18 RX ADMIN — FENTANYL CITRATE 50 MCG: 50 INJECTION INTRAMUSCULAR; INTRAVENOUS at 17:57

## 2024-10-18 RX ADMIN — METOPROLOL TARTRATE 25 MG: 25 TABLET, FILM COATED ORAL at 21:40

## 2024-10-18 RX ADMIN — BUMETANIDE 1 MG: 1 TABLET ORAL at 21:40

## 2024-10-18 RX ADMIN — SODIUM CHLORIDE: 9 INJECTION, SOLUTION INTRAVENOUS at 21:33

## 2024-10-18 RX ADMIN — Medication 2000 UNITS: at 21:40

## 2024-10-18 RX ADMIN — CALCIUM CARBONATE-VITAMIN D TAB 500 MG-200 UNIT 1 TABLET: 500-200 TAB at 21:40

## 2024-10-18 ASSESSMENT — PAIN DESCRIPTION - LOCATION: LOCATION: KNEE

## 2024-10-18 ASSESSMENT — PAIN DESCRIPTION - ORIENTATION: ORIENTATION: RIGHT;ANTERIOR

## 2024-10-18 ASSESSMENT — PAIN DESCRIPTION - DESCRIPTORS: DESCRIPTORS: ACHING

## 2024-10-18 ASSESSMENT — PAIN - FUNCTIONAL ASSESSMENT
PAIN_FUNCTIONAL_ASSESSMENT: NONE - DENIES PAIN
PAIN_FUNCTIONAL_ASSESSMENT: PREVENTS OR INTERFERES SOME ACTIVE ACTIVITIES AND ADLS

## 2024-10-18 ASSESSMENT — PAIN SCALES - GENERAL: PAINLEVEL_OUTOF10: 8

## 2024-10-18 ASSESSMENT — PAIN DESCRIPTION - PAIN TYPE: TYPE: ACUTE PAIN;SURGICAL PAIN

## 2024-10-18 NOTE — ED NOTES
The following labs were labeled with appropriate pt sticker and tubed to lab:     [x] Blue     [x] Lavender   [] on ice  [x] Green/yellow  [] Green/black [] on ice  [] Grey  [] on ice  [] Yellow  [] Red  [] Pink  [] Type/ Screen  [] ABG  [] VBG    [] COVID-19 swab    [] Rapid  [] PCR  [] Flu swab  [] Peds Viral Panel     [] Urine Sample  [] Fecal Sample  [] Pelvic Cultures  [x] Blood Cultures  [x] X 2  [] STREP Cultures  [] Wound Cultures

## 2024-10-18 NOTE — CONSULTS
Department of Orthopedic Surgery  Resident Consult Note      Reason for Consult: Right distal femur hardware infection    HISTORY OF PRESENT ILLNESS:       Patient is a 84 y.o. female who is s/p ORIF right distal femur done on 7/17/2024.  States that she has always had pain since her operation, but it states that it has gotten acutely worse in the last 3 to 4 weeks.  She is nonambulatory and has been living at a nursing facility.  She currently is being followed by wound care for numerous pressure ulcers including on her bilateral ankles and feet as well as over her sacrum.  States the area is red and swollen but denies much drainage from the area.  She is on Eliquis. Denies numbness/tingling/paresthesias.  Denies fevers or chills. denies any other orthopedic complaints at this time.      Past Medical History:        Diagnosis Date    Acquired hypothyroidism 8/14/2017    Arthritis     Blood transfusion reaction     Chronic kidney disease     History of blood transfusion     Hypertension     Lymphedema of both lower extremities 9/6/2017    Open wound of left great toe 10/18/2017    Other disorders of kidney and ureter in diseases classified elsewhere     Pelvic fracture (HCC)     Positive culture findings in wound 5/2/2023    Skin ulcer of left calf with fat layer exposed (HCC) 9/6/2017    Skin ulcer of left calf, limited to breakdown of skin (HCC) 9/6/2017    Ulcer of left lower leg (HCC) 3/24/2014    Venous stasis ulcer of left calf limited to breakdown of skin (HCC) 3/24/2014     Past Surgical History:        Procedure Laterality Date    COLONOSCOPY      ENDOSCOPY, COLON, DIAGNOSTIC      EYE SURGERY      cateract and lazor surgery 2015    FEMUR SURGERY Right 7/19/2024    RIGHT DISTAL FEMUR FRACTURE OPEN REDUCTION INTERNAL FIXATION performed by Dima Newton DO at [facility] OR    FRACTURE SURGERY  2010    RT HIP    HIP FRACTURE SURGERY Left 08/08/2014    ORIF left hip    TONSILLECTOMY      as child    TOTAL HIP   RBC           [id number]    transfused   10/21/14  19:59  SCC    HGB 8.4 10/18/2024 03:16 PM    HCT 28.7 10/18/2024 03:16 PM    MCV 87.8 10/18/2024 03:16 PM    MCH 25.7 10/18/2024 03:16 PM    MCHC 29.3 10/18/2024 03:16 PM    RDW 17.5 10/18/2024 03:16 PM     10/18/2024 03:16 PM    MPV 10.2 10/18/2024 03:16 PM     PT/INR:    Lab Results   Component Value Date/Time    PROTIME 29.2 10/18/2024 03:16 PM    PROTIME 11.6 07/12/2011 05:45 AM    INR 2.6 10/18/2024 03:16 PM       Radiology Review:  Radiographs from 10/16 of the right femur demonstrate s/p long cephalomedullary nail with a lateral distal femoral locking plate.  Evidence of multiple femoral fractures with questionable healing. No evidence of hardware failure.  CT of right knee demonstrates the same above mentioned fractures and orthopedic hardware.  There is a large fluid collection seen near distal lateral femoral plate.  No evidence of fracture union on CT.    IMPRESSION:  Infected orthopedic hardware, lateral distal femur, right knee    PLAN:  Radiographic as well as physical exam findings were discussed with the patient  , , WBC 9.9  Patient has infected orthopedic hardware.  This will need to be taken to the OR for irrigation and debridement of the infected orthopedic hardware.  Tentative plan for irrigation and debridement of right distal femur hardware 10/19  N.p.o. at midnight  Hold anticoagulants  Medical management appreciated  Nonweightbearing to right lower extremity  Will add wound care consult to manage wounds  Discussed with attending      Electronically signed by Aquilino Dyer DO on 10/18/2024 at 5:07 PM  Note: This report was completed using [employer] voiced recognition software.  Every effort has been made to ensure accuracy; however, inadvertent computerized transcription errors may be present.

## 2024-10-18 NOTE — ED PROVIDER NOTES
HPI:  10/18/24,   Time: 3:18 PM EDT       Gabi Madrigal is a 84 y.o. female presenting to the ED for abnml ct/poss infection, beginning unk ago.  The complaint has been persistent, moderate in severity, and worsened by nothing.  History of ORIF right femur fracture in September.  Recurrent cellulitis since.  Had outpatient CT ordered by Ortho that showed questionable infection and effusion around fracture and joint.  Sent for further care.    Review of Systems:   Pertinent positives and negatives are stated within HPI, all other systems reviewed and are negative.          --------------------------------------------- PAST HISTORY ---------------------------------------------  Past Medical History:  has a past medical history of Acquired hypothyroidism, Arthritis, Blood transfusion reaction, Chronic kidney disease, History of blood transfusion, Hypertension, Lymphedema of both lower extremities, Open wound of left great toe, Other disorders of kidney and ureter in diseases classified elsewhere, Pelvic fracture (HCC), Positive culture findings in wound, Skin ulcer of left calf with fat layer exposed (HCC), Skin ulcer of left calf, limited to breakdown of skin (HCC), Ulcer of left lower leg (HCC), and Venous stasis ulcer of left calf limited to breakdown of skin (HCC).    Past Surgical History:  has a past surgical history that includes fracture surgery (2010); Tonsillectomy; Hip fracture surgery (Left, 08/08/2014); Colonoscopy; Endoscopy, colon, diagnostic; Total hip arthroplasty (Left, 10/17/2014); eye surgery; and Femur Surgery (Right, 7/19/2024).    Social History:  reports that she has never smoked. She has never used smokeless tobacco. She reports that she does not currently use alcohol. She reports that she does not use drugs.    Family History: family history includes Mult Sclerosis in her father.     The patient’s home medications have been reviewed.    Allergies: Aspirin, Other, and Tape [adhesive  25.7 (L) 26.0 - 35.0 pg    MCHC 29.3 (L) 32.0 - 34.5 g/dL    RDW 17.5 (H) 11.5 - 15.0 %    Platelets 346 130 - 450 k/uL    MPV 10.2 7.0 - 12.0 fL    Neutrophils % 81 (H) 43.0 - 80.0 %    Lymphocytes % 10 (L) 20.0 - 42.0 %    Monocytes % 7 2.0 - 12.0 %    Eosinophils % 1 0 - 6 %    Basophils % 0 0.0 - 2.0 %    Immature Granulocytes % 1 0.0 - 5.0 %    Neutrophils Absolute 8.01 (H) 1.80 - 7.30 k/uL    Lymphocytes Absolute 1.03 (L) 1.50 - 4.00 k/uL    Monocytes Absolute 0.69 0.10 - 0.95 k/uL    Eosinophils Absolute 0.10 0.05 - 0.50 k/uL    Basophils Absolute 0.02 0.00 - 0.20 k/uL    Immature Granulocytes Absolute 0.07 0.00 - 0.58 k/uL   CMP   Result Value Ref Range    Sodium 137 132 - 146 mmol/L    Potassium 4.6 3.5 - 5.0 mmol/L    Chloride 97 (L) 98 - 107 mmol/L    CO2 31 (H) 22 - 29 mmol/L    Anion Gap 9 7 - 16 mmol/L    Glucose 136 (H) 74 - 99 mg/dL    BUN 44 (H) 6 - 23 mg/dL    Creatinine 1.1 (H) 0.50 - 1.00 mg/dL    Est, Glom Filt Rate 52 (L) >60 mL/min/1.73m2    Calcium 8.4 (L) 8.6 - 10.2 mg/dL    Total Protein 6.1 (L) 6.4 - 8.3 g/dL    Albumin 2.3 (L) 3.5 - 5.2 g/dL    Total Bilirubin 0.3 0.0 - 1.2 mg/dL    Alkaline Phosphatase 145 (H) 35 - 104 U/L    ALT 10 0 - 32 U/L    AST 24 0 - 31 U/L   Magnesium   Result Value Ref Range    Magnesium 2.1 1.6 - 2.6 mg/dL   Protime-INR   Result Value Ref Range    Protime 29.2 (H) 9.3 - 12.4 sec    INR 2.6    Sedimentation Rate   Result Value Ref Range    Sed Rate, Automated 100 (H) 0 - 20 mm/Hr   C-Reactive Protein   Result Value Ref Range    .0 (H) 0 - 5 mg/L       RADIOLOGY:  Interpreted by Radiologist.  No orders to display       EKG:  This EKG is signed and interpreted by the EP.    Time:   Rate:   Rhythm:   Interpretation:   Comparison:       ------------------------- NURSING NOTES AND VITALS REVIEWED ---------------------------   The nursing notes within the ED encounter and vital signs as below have been reviewed by myself.  /61   Pulse 84   Temp 98.4 °F

## 2024-10-18 NOTE — ED NOTES
[age] y/o male/female to the ed with a c/c of xxxxxxx. Upon arrival, patient ambulatory without assistance, noted PW&D and presenting in NAD. Patient denies chest pain, sob or difficulty breathing. Patient denies ABD pain, n/v/d or fever. Patient noted with + pms x 4, pupils PERRLA @ 3 and lung sounds that are clear and equil bilaterally. Patient placed on telemetry, BP and pulse ox. 12-lead EKG performed. Vitals noted as recorded. PIV placed with labs drawn and sent. Call light placed within patient's reach, and bed in lowest position with side rails up x 2 for safety. Provider at the bedside for assessment.

## 2024-10-18 NOTE — RESULT ENCOUNTER NOTE
Spoke with patient daughter Mendy and Kacie Ferrell at [facility]. Patient will be taken to [facility]

## 2024-10-18 NOTE — ED NOTES
Patient resting in position of comfort on bed presenting in no acute/ apparent distress. Respirations are noted even and unlabored with good rise and fall of the chest observed. Patient updated with current plan of care (POC) and all questions/ concerns addressed. Patient voices no needs at this time. Bed noted in lowest position, locked, with side rails X 2 up for patient safety. Will continue to monitor patient for acute changes.      [x] Side rails up    [x] Cart in lowest position    [] Family at bedside    [x] Call light within reach

## 2024-10-19 ENCOUNTER — ANESTHESIA EVENT (OUTPATIENT)
Dept: OPERATING ROOM | Age: 84
End: 2024-10-19
Payer: MEDICARE

## 2024-10-19 ENCOUNTER — ANESTHESIA (OUTPATIENT)
Dept: OPERATING ROOM | Age: 84
End: 2024-10-19
Payer: MEDICARE

## 2024-10-19 PROBLEM — I50.32 CHRONIC HEART FAILURE WITH PRESERVED EJECTION FRACTION (HCC): Status: ACTIVE | Noted: 2024-10-19

## 2024-10-19 LAB
ALBUMIN SERPL-MCNC: 2.2 G/DL (ref 3.5–5.2)
ALP SERPL-CCNC: 99 U/L (ref 35–104)
ALT SERPL-CCNC: 8 U/L (ref 0–32)
ANION GAP SERPL CALCULATED.3IONS-SCNC: 7 MMOL/L (ref 7–16)
AST SERPL-CCNC: 14 U/L (ref 0–31)
BASOPHILS # BLD: 0.02 K/UL (ref 0–0.2)
BASOPHILS NFR BLD: 0 % (ref 0–2)
BILIRUB SERPL-MCNC: 0.3 MG/DL (ref 0–1.2)
BUN SERPL-MCNC: 42 MG/DL (ref 6–23)
CALCIUM SERPL-MCNC: 7.9 MG/DL (ref 8.6–10.2)
CHLORIDE SERPL-SCNC: 99 MMOL/L (ref 98–107)
CO2 SERPL-SCNC: 31 MMOL/L (ref 22–29)
CREAT SERPL-MCNC: 1 MG/DL (ref 0.5–1)
EOSINOPHIL # BLD: 0.19 K/UL (ref 0.05–0.5)
EOSINOPHILS RELATIVE PERCENT: 2 % (ref 0–6)
ERYTHROCYTE [DISTWIDTH] IN BLOOD BY AUTOMATED COUNT: 17.4 % (ref 11.5–15)
GFR, ESTIMATED: 55 ML/MIN/1.73M2
GLUCOSE SERPL-MCNC: 88 MG/DL (ref 74–99)
HCT VFR BLD AUTO: 25.2 % (ref 34–48)
HGB BLD-MCNC: 7.2 G/DL (ref 11.5–15.5)
IMM GRANULOCYTES # BLD AUTO: 0.05 K/UL (ref 0–0.58)
IMM GRANULOCYTES NFR BLD: 1 % (ref 0–5)
LYMPHOCYTES NFR BLD: 1.13 K/UL (ref 1.5–4)
LYMPHOCYTES RELATIVE PERCENT: 13 % (ref 20–42)
MCH RBC QN AUTO: 25.3 PG (ref 26–35)
MCHC RBC AUTO-ENTMCNC: 28.6 G/DL (ref 32–34.5)
MCV RBC AUTO: 88.4 FL (ref 80–99.9)
MONOCYTES NFR BLD: 0.74 K/UL (ref 0.1–0.95)
MONOCYTES NFR BLD: 9 % (ref 2–12)
NEUTROPHILS NFR BLD: 76 % (ref 43–80)
NEUTS SEG NFR BLD: 6.53 K/UL (ref 1.8–7.3)
PLATELET # BLD AUTO: 296 K/UL (ref 130–450)
PMV BLD AUTO: 9.8 FL (ref 7–12)
POTASSIUM SERPL-SCNC: 4.6 MMOL/L (ref 3.5–5)
PROT SERPL-MCNC: 5.2 G/DL (ref 6.4–8.3)
RBC # BLD AUTO: 2.85 M/UL (ref 3.5–5.5)
RBC # BLD: ABNORMAL 10*6/UL
SODIUM SERPL-SCNC: 137 MMOL/L (ref 132–146)
WBC OTHER # BLD: 8.7 K/UL (ref 4.5–11.5)

## 2024-10-19 PROCEDURE — 3700000000 HC ANESTHESIA ATTENDED CARE: Performed by: STUDENT IN AN ORGANIZED HEALTH CARE EDUCATION/TRAINING PROGRAM

## 2024-10-19 PROCEDURE — 87205 SMEAR GRAM STAIN: CPT

## 2024-10-19 PROCEDURE — 2580000003 HC RX 258: Performed by: INTERNAL MEDICINE

## 2024-10-19 PROCEDURE — 11044 DBRDMT BONE 1ST 20 SQ CM/<: CPT | Performed by: STUDENT IN AN ORGANIZED HEALTH CARE EDUCATION/TRAINING PROGRAM

## 2024-10-19 PROCEDURE — 7100000001 HC PACU RECOVERY - ADDTL 15 MIN: Performed by: STUDENT IN AN ORGANIZED HEALTH CARE EDUCATION/TRAINING PROGRAM

## 2024-10-19 PROCEDURE — 2500000003 HC RX 250 WO HCPCS

## 2024-10-19 PROCEDURE — 2700000000 HC OXYGEN THERAPY PER DAY

## 2024-10-19 PROCEDURE — 6360000002 HC RX W HCPCS: Performed by: ANESTHESIOLOGY

## 2024-10-19 PROCEDURE — 0QB80ZZ EXCISION OF RIGHT FEMORAL SHAFT, OPEN APPROACH: ICD-10-PCS | Performed by: STUDENT IN AN ORGANIZED HEALTH CARE EDUCATION/TRAINING PROGRAM

## 2024-10-19 PROCEDURE — 3600000007 HC SURGERY HYBRID BASE: Performed by: STUDENT IN AN ORGANIZED HEALTH CARE EDUCATION/TRAINING PROGRAM

## 2024-10-19 PROCEDURE — P9016 RBC LEUKOCYTES REDUCED: HCPCS

## 2024-10-19 PROCEDURE — 80053 COMPREHEN METABOLIC PANEL: CPT

## 2024-10-19 PROCEDURE — 87206 SMEAR FLUORESCENT/ACID STAI: CPT

## 2024-10-19 PROCEDURE — 2580000003 HC RX 258

## 2024-10-19 PROCEDURE — 0SBC0ZZ EXCISION OF RIGHT KNEE JOINT, OPEN APPROACH: ICD-10-PCS | Performed by: STUDENT IN AN ORGANIZED HEALTH CARE EDUCATION/TRAINING PROGRAM

## 2024-10-19 PROCEDURE — 6360000002 HC RX W HCPCS

## 2024-10-19 PROCEDURE — 87075 CULTR BACTERIA EXCEPT BLOOD: CPT

## 2024-10-19 PROCEDURE — 6360000002 HC RX W HCPCS: Performed by: INTERNAL MEDICINE

## 2024-10-19 PROCEDURE — 6360000002 HC RX W HCPCS: Performed by: STUDENT IN AN ORGANIZED HEALTH CARE EDUCATION/TRAINING PROGRAM

## 2024-10-19 PROCEDURE — 6370000000 HC RX 637 (ALT 250 FOR IP)

## 2024-10-19 PROCEDURE — 36430 TRANSFUSION BLD/BLD COMPNT: CPT

## 2024-10-19 PROCEDURE — APPSS60 APP SPLIT SHARED TIME 46-60 MINUTES: Performed by: CLINICAL NURSE SPECIALIST

## 2024-10-19 PROCEDURE — 87102 FUNGUS ISOLATION CULTURE: CPT

## 2024-10-19 PROCEDURE — 87077 CULTURE AEROBIC IDENTIFY: CPT

## 2024-10-19 PROCEDURE — 86403 PARTICLE AGGLUT ANTBDY SCRN: CPT

## 2024-10-19 PROCEDURE — 11047 DBRDMT BONE EACH ADDL: CPT | Performed by: STUDENT IN AN ORGANIZED HEALTH CARE EDUCATION/TRAINING PROGRAM

## 2024-10-19 PROCEDURE — 3600000017 HC SURGERY HYBRID ADDL 15MIN: Performed by: STUDENT IN AN ORGANIZED HEALTH CARE EDUCATION/TRAINING PROGRAM

## 2024-10-19 PROCEDURE — 3700000001 HC ADD 15 MINUTES (ANESTHESIA): Performed by: STUDENT IN AN ORGANIZED HEALTH CARE EDUCATION/TRAINING PROGRAM

## 2024-10-19 PROCEDURE — 87176 TISSUE HOMOGENIZATION CULTR: CPT

## 2024-10-19 PROCEDURE — L3650 SO 8 ABD RESTRAINT PRE OTS: HCPCS | Performed by: STUDENT IN AN ORGANIZED HEALTH CARE EDUCATION/TRAINING PROGRAM

## 2024-10-19 PROCEDURE — 87116 MYCOBACTERIA CULTURE: CPT

## 2024-10-19 PROCEDURE — 2060000000 HC ICU INTERMEDIATE R&B

## 2024-10-19 PROCEDURE — 6370000000 HC RX 637 (ALT 250 FOR IP): Performed by: NURSE PRACTITIONER

## 2024-10-19 PROCEDURE — 27310 EXPLORATION OF KNEE JOINT: CPT | Performed by: STUDENT IN AN ORGANIZED HEALTH CARE EDUCATION/TRAINING PROGRAM

## 2024-10-19 PROCEDURE — 2709999900 HC NON-CHARGEABLE SUPPLY: Performed by: STUDENT IN AN ORGANIZED HEALTH CARE EDUCATION/TRAINING PROGRAM

## 2024-10-19 PROCEDURE — 87070 CULTURE OTHR SPECIMN AEROBIC: CPT

## 2024-10-19 PROCEDURE — 85025 COMPLETE CBC W/AUTO DIFF WBC: CPT

## 2024-10-19 PROCEDURE — 7100000000 HC PACU RECOVERY - FIRST 15 MIN: Performed by: STUDENT IN AN ORGANIZED HEALTH CARE EDUCATION/TRAINING PROGRAM

## 2024-10-19 PROCEDURE — 93005 ELECTROCARDIOGRAM TRACING: CPT | Performed by: CLINICAL NURSE SPECIALIST

## 2024-10-19 PROCEDURE — 87015 SPECIMEN INFECT AGNT CONCNTJ: CPT

## 2024-10-19 PROCEDURE — 99222 1ST HOSP IP/OBS MODERATE 55: CPT | Performed by: INTERNAL MEDICINE

## 2024-10-19 RX ORDER — IPRATROPIUM BROMIDE AND ALBUTEROL SULFATE 2.5; .5 MG/3ML; MG/3ML
1 SOLUTION RESPIRATORY (INHALATION)
Status: DISCONTINUED | OUTPATIENT
Start: 2024-10-19 | End: 2024-10-19 | Stop reason: HOSPADM

## 2024-10-19 RX ORDER — SODIUM CHLORIDE 0.9 % (FLUSH) 0.9 %
5-40 SYRINGE (ML) INJECTION PRN
Status: DISCONTINUED | OUTPATIENT
Start: 2024-10-19 | End: 2024-10-19 | Stop reason: HOSPADM

## 2024-10-19 RX ORDER — DEXAMETHASONE SODIUM PHOSPHATE 10 MG/ML
INJECTION INTRAMUSCULAR; INTRAVENOUS
Status: DISCONTINUED | OUTPATIENT
Start: 2024-10-19 | End: 2024-10-19 | Stop reason: SDUPTHER

## 2024-10-19 RX ORDER — MORPHINE SULFATE 2 MG/ML
2 INJECTION, SOLUTION INTRAMUSCULAR; INTRAVENOUS
Status: DISCONTINUED | OUTPATIENT
Start: 2024-10-19 | End: 2024-10-31 | Stop reason: HOSPADM

## 2024-10-19 RX ORDER — SODIUM CHLORIDE 9 MG/ML
INJECTION, SOLUTION INTRAVENOUS PRN
Status: DISCONTINUED | OUTPATIENT
Start: 2024-10-19 | End: 2024-10-31 | Stop reason: HOSPADM

## 2024-10-19 RX ORDER — MORPHINE SULFATE 2 MG/ML
2 INJECTION, SOLUTION INTRAMUSCULAR; INTRAVENOUS ONCE
Status: COMPLETED | OUTPATIENT
Start: 2024-10-19 | End: 2024-10-19

## 2024-10-19 RX ORDER — SODIUM CHLORIDE 0.9 % (FLUSH) 0.9 %
5-40 SYRINGE (ML) INJECTION EVERY 12 HOURS SCHEDULED
Status: DISCONTINUED | OUTPATIENT
Start: 2024-10-19 | End: 2024-10-19 | Stop reason: HOSPADM

## 2024-10-19 RX ORDER — NALOXONE HYDROCHLORIDE 0.4 MG/ML
INJECTION, SOLUTION INTRAMUSCULAR; INTRAVENOUS; SUBCUTANEOUS PRN
Status: DISCONTINUED | OUTPATIENT
Start: 2024-10-19 | End: 2024-10-19 | Stop reason: HOSPADM

## 2024-10-19 RX ORDER — ONDANSETRON 2 MG/ML
4 INJECTION INTRAMUSCULAR; INTRAVENOUS
Status: DISCONTINUED | OUTPATIENT
Start: 2024-10-19 | End: 2024-10-19 | Stop reason: HOSPADM

## 2024-10-19 RX ORDER — OXYCODONE AND ACETAMINOPHEN 5; 325 MG/1; MG/1
1 TABLET ORAL EVERY 6 HOURS PRN
Qty: 28 TABLET | Refills: 0 | Status: SHIPPED | OUTPATIENT
Start: 2024-10-19 | End: 2024-10-26

## 2024-10-19 RX ORDER — VANCOMYCIN HYDROCHLORIDE 1 G/20ML
INJECTION, POWDER, LYOPHILIZED, FOR SOLUTION INTRAVENOUS PRN
Status: DISCONTINUED | OUTPATIENT
Start: 2024-10-19 | End: 2024-10-19 | Stop reason: HOSPADM

## 2024-10-19 RX ORDER — PROCHLORPERAZINE EDISYLATE 5 MG/ML
5 INJECTION INTRAMUSCULAR; INTRAVENOUS
Status: DISCONTINUED | OUTPATIENT
Start: 2024-10-19 | End: 2024-10-19 | Stop reason: HOSPADM

## 2024-10-19 RX ORDER — ONDANSETRON 2 MG/ML
INJECTION INTRAMUSCULAR; INTRAVENOUS
Status: DISCONTINUED | OUTPATIENT
Start: 2024-10-19 | End: 2024-10-19 | Stop reason: SDUPTHER

## 2024-10-19 RX ORDER — ROCURONIUM BROMIDE 10 MG/ML
INJECTION, SOLUTION INTRAVENOUS
Status: DISCONTINUED | OUTPATIENT
Start: 2024-10-19 | End: 2024-10-19 | Stop reason: SDUPTHER

## 2024-10-19 RX ORDER — MORPHINE SULFATE 4 MG/ML
4 INJECTION, SOLUTION INTRAMUSCULAR; INTRAVENOUS
Status: DISCONTINUED | OUTPATIENT
Start: 2024-10-19 | End: 2024-10-31 | Stop reason: HOSPADM

## 2024-10-19 RX ORDER — FENTANYL CITRATE 50 UG/ML
25 INJECTION, SOLUTION INTRAMUSCULAR; INTRAVENOUS EVERY 5 MIN PRN
Status: DISCONTINUED | OUTPATIENT
Start: 2024-10-19 | End: 2024-10-19 | Stop reason: HOSPADM

## 2024-10-19 RX ORDER — PHENYLEPHRINE HCL IN 0.9% NACL 1 MG/10 ML
SYRINGE (ML) INTRAVENOUS
Status: DISCONTINUED | OUTPATIENT
Start: 2024-10-19 | End: 2024-10-19 | Stop reason: SDUPTHER

## 2024-10-19 RX ORDER — FENTANYL CITRATE 50 UG/ML
INJECTION, SOLUTION INTRAMUSCULAR; INTRAVENOUS
Status: DISCONTINUED | OUTPATIENT
Start: 2024-10-19 | End: 2024-10-19 | Stop reason: SDUPTHER

## 2024-10-19 RX ORDER — DIPHENHYDRAMINE HYDROCHLORIDE 50 MG/ML
12.5 INJECTION INTRAMUSCULAR; INTRAVENOUS
Status: DISCONTINUED | OUTPATIENT
Start: 2024-10-19 | End: 2024-10-19 | Stop reason: HOSPADM

## 2024-10-19 RX ORDER — SODIUM CHLORIDE 9 MG/ML
INJECTION, SOLUTION INTRAVENOUS PRN
Status: DISCONTINUED | OUTPATIENT
Start: 2024-10-19 | End: 2024-10-19 | Stop reason: HOSPADM

## 2024-10-19 RX ORDER — OXYCODONE AND ACETAMINOPHEN 5; 325 MG/1; MG/1
1 TABLET ORAL EVERY 6 HOURS PRN
Status: DISCONTINUED | OUTPATIENT
Start: 2024-10-19 | End: 2024-10-19

## 2024-10-19 RX ORDER — PROPOFOL 10 MG/ML
INJECTION, EMULSION INTRAVENOUS
Status: DISCONTINUED | OUTPATIENT
Start: 2024-10-19 | End: 2024-10-19 | Stop reason: SDUPTHER

## 2024-10-19 RX ORDER — HYDROMORPHONE HYDROCHLORIDE 1 MG/ML
0.5 INJECTION, SOLUTION INTRAMUSCULAR; INTRAVENOUS; SUBCUTANEOUS EVERY 5 MIN PRN
Status: COMPLETED | OUTPATIENT
Start: 2024-10-19 | End: 2024-10-19

## 2024-10-19 RX ORDER — LIDOCAINE HYDROCHLORIDE 20 MG/ML
INJECTION, SOLUTION INTRAVENOUS
Status: DISCONTINUED | OUTPATIENT
Start: 2024-10-19 | End: 2024-10-19 | Stop reason: SDUPTHER

## 2024-10-19 RX ORDER — OXYCODONE AND ACETAMINOPHEN 5; 325 MG/1; MG/1
1 TABLET ORAL EVERY 6 HOURS PRN
Status: DISCONTINUED | OUTPATIENT
Start: 2024-10-19 | End: 2024-10-31 | Stop reason: HOSPADM

## 2024-10-19 RX ORDER — CEFAZOLIN SODIUM 1 G/3ML
INJECTION, POWDER, FOR SOLUTION INTRAMUSCULAR; INTRAVENOUS
Status: DISCONTINUED | OUTPATIENT
Start: 2024-10-19 | End: 2024-10-19 | Stop reason: SDUPTHER

## 2024-10-19 RX ORDER — DEXTROSE MONOHYDRATE 100 MG/ML
INJECTION, SOLUTION INTRAVENOUS CONTINUOUS PRN
Status: DISCONTINUED | OUTPATIENT
Start: 2024-10-19 | End: 2024-10-19 | Stop reason: HOSPADM

## 2024-10-19 RX ORDER — TOBRAMYCIN 1.2 G/30ML
INJECTION, POWDER, LYOPHILIZED, FOR SOLUTION INTRAVENOUS PRN
Status: DISCONTINUED | OUTPATIENT
Start: 2024-10-19 | End: 2024-10-19 | Stop reason: HOSPADM

## 2024-10-19 RX ADMIN — DEXAMETHASONE SODIUM PHOSPHATE 5 MG: 10 INJECTION INTRAMUSCULAR; INTRAVENOUS at 12:26

## 2024-10-19 RX ADMIN — Medication 100 MCG: at 12:38

## 2024-10-19 RX ADMIN — WATER 2000 MG: 1 INJECTION INTRAMUSCULAR; INTRAVENOUS; SUBCUTANEOUS at 21:10

## 2024-10-19 RX ADMIN — FENTANYL CITRATE 100 MCG: 50 INJECTION, SOLUTION INTRAMUSCULAR; INTRAVENOUS at 12:26

## 2024-10-19 RX ADMIN — MORPHINE SULFATE 2 MG: 2 INJECTION, SOLUTION INTRAMUSCULAR; INTRAVENOUS at 03:13

## 2024-10-19 RX ADMIN — MORPHINE SULFATE 4 MG: 4 INJECTION, SOLUTION INTRAMUSCULAR; INTRAVENOUS at 15:37

## 2024-10-19 RX ADMIN — ACETAMINOPHEN 650 MG: 325 TABLET ORAL at 00:39

## 2024-10-19 RX ADMIN — ONDANSETRON 4 MG: 2 INJECTION INTRAMUSCULAR; INTRAVENOUS at 13:38

## 2024-10-19 RX ADMIN — METOPROLOL TARTRATE 25 MG: 25 TABLET, FILM COATED ORAL at 21:10

## 2024-10-19 RX ADMIN — Medication 100 MCG: at 12:50

## 2024-10-19 RX ADMIN — PROPOFOL 70 MG: 10 INJECTION, EMULSION INTRAVENOUS at 12:26

## 2024-10-19 RX ADMIN — HYDROMORPHONE HYDROCHLORIDE 0.5 MG: 1 INJECTION, SOLUTION INTRAMUSCULAR; INTRAVENOUS; SUBCUTANEOUS at 14:22

## 2024-10-19 RX ADMIN — Medication 50 MCG: at 12:57

## 2024-10-19 RX ADMIN — OXYCODONE HYDROCHLORIDE AND ACETAMINOPHEN 1 TABLET: 5; 325 TABLET ORAL at 17:24

## 2024-10-19 RX ADMIN — HYDROMORPHONE HYDROCHLORIDE 0.5 MG: 1 INJECTION, SOLUTION INTRAMUSCULAR; INTRAVENOUS; SUBCUTANEOUS at 14:11

## 2024-10-19 RX ADMIN — CEFAZOLIN 2 G: 1 INJECTION, POWDER, FOR SOLUTION INTRAMUSCULAR; INTRAVENOUS at 12:34

## 2024-10-19 RX ADMIN — SODIUM CHLORIDE, PRESERVATIVE FREE 10 ML: 5 INJECTION INTRAVENOUS at 21:10

## 2024-10-19 RX ADMIN — VANCOMYCIN HYDROCHLORIDE 1750 MG: 10 INJECTION, POWDER, LYOPHILIZED, FOR SOLUTION INTRAVENOUS at 17:34

## 2024-10-19 RX ADMIN — ROCURONIUM BROMIDE 50 MG: 10 INJECTION, SOLUTION INTRAVENOUS at 12:26

## 2024-10-19 RX ADMIN — Medication 100 MCG: at 13:04

## 2024-10-19 RX ADMIN — PIPERACILLIN AND TAZOBACTAM 4500 MG: 4; .5 INJECTION, POWDER, FOR SOLUTION INTRAVENOUS at 17:09

## 2024-10-19 RX ADMIN — LIDOCAINE HYDROCHLORIDE 100 MG: 20 INJECTION, SOLUTION INTRAVENOUS at 12:26

## 2024-10-19 RX ADMIN — SUGAMMADEX 200 MG: 100 INJECTION, SOLUTION INTRAVENOUS at 13:38

## 2024-10-19 ASSESSMENT — PAIN DESCRIPTION - LOCATION
LOCATION: KNEE
LOCATION: LEG;KNEE
LOCATION: KNEE
LOCATION: KNEE;LEG
LOCATION: LEG;KNEE
LOCATION: KNEE

## 2024-10-19 ASSESSMENT — PAIN SCALES - GENERAL
PAINLEVEL_OUTOF10: 4
PAINLEVEL_OUTOF10: 2
PAINLEVEL_OUTOF10: 5
PAINLEVEL_OUTOF10: 8
PAINLEVEL_OUTOF10: 0
PAINLEVEL_OUTOF10: 3
PAINLEVEL_OUTOF10: 7
PAINLEVEL_OUTOF10: 9
PAINLEVEL_OUTOF10: 9
PAINLEVEL_OUTOF10: 10
PAINLEVEL_OUTOF10: 0
PAINLEVEL_OUTOF10: 5
PAINLEVEL_OUTOF10: 9
PAINLEVEL_OUTOF10: 0

## 2024-10-19 ASSESSMENT — PAIN DESCRIPTION - DESCRIPTORS
DESCRIPTORS: ACHING
DESCRIPTORS: DISCOMFORT
DESCRIPTORS: ACHING
DESCRIPTORS: PATIENT UNABLE TO DESCRIBE
DESCRIPTORS: ACHING;DULL;SORE
DESCRIPTORS: ACHING;THROBBING
DESCRIPTORS: ACHING
DESCRIPTORS: ACHING

## 2024-10-19 ASSESSMENT — PAIN - FUNCTIONAL ASSESSMENT
PAIN_FUNCTIONAL_ASSESSMENT: PREVENTS OR INTERFERES SOME ACTIVE ACTIVITIES AND ADLS
PAIN_FUNCTIONAL_ASSESSMENT: NONE - DENIES PAIN
PAIN_FUNCTIONAL_ASSESSMENT: NONE - DENIES PAIN
PAIN_FUNCTIONAL_ASSESSMENT: PREVENTS OR INTERFERES WITH ALL ACTIVE AND SOME PASSIVE ACTIVITIES

## 2024-10-19 ASSESSMENT — PAIN DESCRIPTION - ORIENTATION
ORIENTATION: RIGHT

## 2024-10-19 ASSESSMENT — PAIN DESCRIPTION - FREQUENCY
FREQUENCY: CONTINUOUS
FREQUENCY: INTERMITTENT
FREQUENCY: INTERMITTENT
FREQUENCY: CONTINUOUS

## 2024-10-19 ASSESSMENT — LIFESTYLE VARIABLES
HOW MANY STANDARD DRINKS CONTAINING ALCOHOL DO YOU HAVE ON A TYPICAL DAY: PATIENT DOES NOT DRINK
HOW MANY STANDARD DRINKS CONTAINING ALCOHOL DO YOU HAVE ON A TYPICAL DAY: PATIENT DOES NOT DRINK
HOW OFTEN DO YOU HAVE A DRINK CONTAINING ALCOHOL: NEVER
HOW OFTEN DO YOU HAVE A DRINK CONTAINING ALCOHOL: NEVER

## 2024-10-19 ASSESSMENT — PAIN DESCRIPTION - ONSET: ONSET: ON-GOING

## 2024-10-19 ASSESSMENT — PAIN DESCRIPTION - PAIN TYPE
TYPE: SURGICAL PAIN
TYPE: ACUTE PAIN;SURGICAL PAIN

## 2024-10-19 NOTE — PROGRESS NOTES
Palliative consult called to Nat Walekr NP   Cardiology consult called to Aicha Bishop NP   ID consult called to answering service-Dr Daniels covering at this time     Electronically signed by Kim Rojas RN on 10/19/2024 at 2:51 PM

## 2024-10-19 NOTE — ANESTHESIA POSTPROCEDURE EVALUATION
Department of Anesthesiology  Postprocedure Note    Patient: Gabi Madrigal  MRN: [id number]  YOB: 1940  Date of evaluation: 10/19/2024    Procedure Summary       Date: 10/19/24 Room / Location: [address]    Anesthesia Start: 1219 Anesthesia Stop: 1356    Procedure: IRRIGATION AND DEBRIDEMENT RIGHT DISTAL FEMUR WITH POSSIBLE HARDWARE REMOVAL (Right) Diagnosis:       Infected hardware in right leg, initial encounter ([facility])      (Infected hardware in right leg, initial encounter ([facility]) [T84.7XXA])    Surgeons: Dima Newton DO Responsible Provider: Avinash Jackson DO    Anesthesia Type: General ASA Status: 4            Anesthesia Type: General    Nando Phase I: Nando Score: 9    Nando Phase II:      Anesthesia Post Evaluation    Patient location during evaluation: PACU  Patient participation: complete - patient participated  Level of consciousness: awake  Airway patency: patent  Nausea & Vomiting: no nausea and no vomiting  Cardiovascular status: hemodynamically stable  Respiratory status: acceptable  Hydration status: stable  Pain management: adequate    No notable events documented.

## 2024-10-19 NOTE — ANESTHESIA PRE PROCEDURE
BP Readings from Last 3 Encounters:   10/19/24 (!) 103/53   09/16/24 111/70   08/14/24 120/78       NPO Status:                                                                                 BMI:   Wt Readings from Last 3 Encounters:   10/18/24 90.7 kg (200 lb)   09/16/24 92.9 kg (204 lb 14.4 oz)   08/09/24 86.1 kg (189 lb 13.1 oz)     Body mass index is 35.43 kg/m².    CBC:   Lab Results   Component Value Date/Time    WBC 8.7 10/19/2024 03:13 AM    RBC 2.85 10/19/2024 03:13 AM    RBC  10/21/2014 02:20 PM      RBC           [id number]    released     10/25/14  00:54  SCC    RBC  10/21/2014 02:20 PM      RBC           [id number]    transfused   10/21/14  19:59  SCC    HGB 7.2 10/19/2024 03:13 AM    HCT 25.2 10/19/2024 03:13 AM    MCV 88.4 10/19/2024 03:13 AM    RDW 17.4 10/19/2024 03:13 AM     10/19/2024 03:13 AM       CMP:   Lab Results   Component Value Date/Time     10/19/2024 03:13 AM    K 4.6 10/19/2024 03:13 AM    K 4.1 09/21/2022 04:55 AM    CL 99 10/19/2024 03:13 AM    CO2 31 10/19/2024 03:13 AM    BUN 42 10/19/2024 03:13 AM    CREATININE 1.0 10/19/2024 03:13 AM    GFRAA >60 10/04/2022 04:55 AM    LABGLOM 55 10/19/2024 03:13 AM    LABGLOM 48 04/09/2024 03:55 AM    GLUCOSE 88 10/19/2024 03:13 AM    GLUCOSE 127 07/12/2011 05:45 AM    CALCIUM 7.9 10/19/2024 03:13 AM    BILITOT 0.3 10/19/2024 03:13 AM    ALKPHOS 99 10/19/2024 03:13 AM    AST 14 10/19/2024 03:13 AM    ALT 8 10/19/2024 03:13 AM       POC Tests: No results for input(s): \"POCGLU\", \"POCNA\", \"POCK\", \"POCCL\", \"POCBUN\", \"POCHEMO\", \"POCHCT\" in the last 72 hours.    Coags:   Lab Results   Component Value Date/Time    PROTIME 29.2 10/18/2024 03:16 PM    PROTIME 11.6 07/12/2011 05:45 AM    INR 2.6 10/18/2024 03:16 PM    APTT 31.6 08/24/2017 05:25 AM       HCG (If Applicable): No results found for: \"PREGTESTUR\", \"PREGSERUM\", \"HCG\", \"HCGQUANT\"     ABGs: No results found for: \"PHART\", \"PO2ART\", \"POT9EEB\", \"GXC9NMQ\",

## 2024-10-19 NOTE — PROGRESS NOTES
Patient presented with right distal femur nonunion infection, status post excisional debridement and knee arthrotomy.  Hardware-retained.  Start empirical Zosyn and vancomycin.  Follow surgical cultures.  Full consult will follow.

## 2024-10-19 NOTE — PROGRESS NOTES
Call placed to Bev Duran NP with Dr. Young for [facility]. Message left on secure voice mail.      Eveline Forrest RN, BSN

## 2024-10-19 NOTE — DISCHARGE INSTRUCTIONS
[facility] Department of Orthopedic Surgery  [address]   [phone]   [place] [address]    Dr. Dima Newton,     Orthopaedics Discharge Instructions   Weight bearing Status - Non-weight bearing - on right lower Extremity  Pain medication Per Prescriptions  Contact Office for Medication Refill- [phone]  Office can refill pain med every 7 days  If patient discharging to facility then pain control will be continued per facility physician  Ice to operative/injured site for 15-30 minutes of each hour for next 5 days    Recommend that you continue to ice the area 2-3 times per day after this   Elevate operative/injured limb on 2 pillows at home  Goal is to have limb above the heart if able  Continue DVT Prophylaxis (blood clot prevention) as Prescribed: Home Eliquis as previously perscribed   Wound care - Will be followed by wound care  Fracture Care -  will remain in right knee immobilizer  Follow Up in Office in 2 weeks. Your first post op appointment is often the PAs.     Call the office at [phone] directions or with any questions.  Watch for these significant complications.  Call physician if they or any other problems occur:  Fever over 101°, redness, swelling or warmth at the operative site  Unrelieved nausea    Foul smelling or cloudy drainage at the operative site   Unrelieved pain    Blood soaked dressing. (Some oozing may be normal)     Numb, pale, blue, cold or tingling extremity    Future Appointments   Date Time Provider Department Center   10/28/2024 10:00 AM Sandip Mejia MD [facility] WOUND [facility]   12/12/2024  9:45 AM [facility]   12/18/2024  2:20 PM Dima Newton DO  [facility] ORTHO [facility]         It is the Department of Orthopaedic Trauma's standard of practice that providers will de-escalate(wean) all patients from narcotic(opioid) medications during the post-operative period.   We provide multimodal pain control but opioid medications are

## 2024-10-19 NOTE — PROGRESS NOTES
PRBC and patient identifiers verified with another RN at the bedside. Patient educated on transfusion reactions and instructed on notifying nursing staff of any symptoms/side effect that could possibly be a reaction.   Patient observed for 15 minutes by nursing staff at the bedside. No reaction observed, Patient tolerated transfusion well.     Vital signs obtained and charted in flowsheets   Eveline Forrest RN, BSN

## 2024-10-19 NOTE — PROGRESS NOTES
Pt has gone down to OR. Dr. Dunlap note reviewed.  Will see patient post op or tomorrow   Touched base with Dr. Aman Young MD

## 2024-10-19 NOTE — PROGRESS NOTES
Pharmacy Consultation Note  (Antibiotic Dosing and Monitoring)    Initial consult date: 10/19/2024  Consulting physician/provider: Dr. Avilez  Drug: Vancomycin  Indication: Osteomyelitis    Age/  Gender Height Weight IBW  Allergy Information   84 y.o./female 160 cm (5' 3\") 90.7 kg (200 lb)     Ideal body weight: 52.4 kg (115 lb 8.3 oz)  Adjusted ideal body weight: 67.7 kg (149 lb 5 oz)   Aspirin, Other, and Tape [adhesive tape]      Renal Function:  Recent Labs     10/18/24  1516 10/19/24  0313   BUN 44* 42*   CREATININE 1.1* 1.0       Intake/Output Summary (Last 24 hours) at 10/19/2024 1639  Last data filed at 10/19/2024 1355  Gross per 24 hour   Intake 1665.57 ml   Output 520 ml   Net 1145.57 ml       Vancomycin Monitoring:  Trough:  No results for input(s): \"VANCOTROUGH\" in the last 72 hours.  Random:  No results for input(s): \"VANCORANDOM\" in the last 72 hours.    No results for input(s): \"BLOODCULT2\" in the last 72 hours.     Historical Cultures:  Organism   Date Value Ref Range Status   06/30/2023 Staphylococcus aureus (A)  Final   06/30/2023 Anaerobic gram negative kath (A)  Final     No results for input(s): \"BC\" in the last 72 hours.    Vancomycin Administration Times:  Vancomycin Administration Times:  Recent vancomycin administrations                     vancomycin (VANCOCIN) injection (mg) 1,000 mg Given 10/19/24 1321               Assessment:  Patient is a 84 y.o. female who has been initiated on vancomycin for Osteomyelitis  Estimated Creatinine Clearance: 45 mL/min (based on SCr of 1 mg/dL).  To dose vancomycin, pharmacy will be utilizing [employer] calculation software for goal AUC/DAVID 400-600 mg/L-hr    Plan:  Vancomycin 1000 mg q24h (Predicted AUC/DAVID = 506, Tr = 15.6)  Will check vancomycin levels when appropriate  Will continue to monitor renal function   Pharmacy to follow    Thank you for this consult,    Wally Marrufo, [facility] 10/19/2024 4:39 PM  Pharmacy Ext [phone]

## 2024-10-19 NOTE — BRIEF OP NOTE
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       Procedure(s):  Excisional debridement right leg area measuring 30 x 8 cm including skin,  subcutaneous tissue, fat, fascia, muscle, and bone  Right knee arthrotomy with irrigation and debridement and synovectomy  Hardware retention right distal femur    Surgeon(s):  Dima Newton DO    Assistant:  Resident: Luis E Martinez DO; Pio Mcmillan DO    Anesthesia: General    Estimated Blood Loss (mL): less than 100     Complications: None    Specimens:   ID Type Source Tests Collected by Time Destination   1 : Right Femur Abcess Specimen Leg CULTURE, ANAEROBIC, CULTURE, SURGICAL Dima Newton DO 10/19/2024 1246    2 :  Joint/Joint Fluid Joint, Knee CULTURE, ANAEROBIC, CULTURE, FUNGUS, GRAM STAIN, CULTURE WITH SMEAR, ACID FAST BACILLIUS Dima Newton DO 10/19/2024 1258    3 : Right Femur Abcess Joint/Joint Fluid Leg CULTURE, ANAEROBIC, CULTURE, FUNGUS, CULTURE, SURGICAL, CULTURE WITH SMEAR, ACID FAST BACILLIUS Dima Newton DO 10/19/2024 1303        Implants:  * No implants in log *      Drains:   External Urinary Catheter (Active)   Site Assessment Clean,dry & intact 10/19/24 0800   Placement Repositioned 10/19/24 0035   Securement Method Tape 10/19/24 0035   Catheter Care Catheter/Wick replaced;Suction Canister/Tubing changed 10/19/24 0035   Perineal Care Yes 10/19/24 0035   Suction 40 mmgHg continuous 10/19/24 0035   Urine Color Yellow 10/19/24 0528   Urine Appearance Clear 10/19/24 0528   Output (mL) 200 mL 10/19/24 0528       Findings:  Infection Present At Time Of Surgery (PATOS) (choose all levels that have infection present):  - Deep Infection (muscle/fascia) present as evidenced by

## 2024-10-19 NOTE — OP NOTE
antibiotics were held for culture.  The right lower extremity was prepped and draped in standard sterile orthopedic fashion.  Appropriate timeout was performed confirming side and site of surgery.    Her previous incision over her right distal femur extending upper thigh was reopened and the old scar was excised full-thickness on both sides of the old incision.  This was carried down through the subcutaneous tissue to the level of the fascia.  We immediately countered purulent material.  This was sent for culture.  The IT band had a tear distally that communicated peroneal material directly with the hardware.  The IT band was incised in line with our incision.  Hardware was fully exposed and found to be involved in the infection.  At this point a sharp excisional debridement was performed.  This area measured 30 x 8 cm and included skin subcutaneous tissue fat fascia muscle and fracture.  We identified the fracture and debrided the bone at the nonunion site.  It was grossly unstable and demonstrated no signs of healing.  A knife was used to remove all nonviable soft tissue back to healthy bleeding margins.  The bone was debrided using curettes and rongeurs.  The hardware had not overtly failed and given the instability noted with incomplete healing we could not remove the hardware and leave her with a unstable extremity.  This wound was then carefully irrigated with 3 L of sterile saline.  Our incision was extended across the knee and a lateral parapatellar arthrotomy was performed.  There was some fluid in the knee but no gross purulence.  Culture's were sent from knee fluid.  The knee joint was entered and exposed and a synovectomy was performed using a rongeur.  This again was irrigated with sterile saline.  This completed our procedure.  Again the antibiotics were infused after cultures were taken.  We were back to healthy bleeding margins and clean appearing tissue with a nonunion.  Vancomycin and tobramycin  powder were placed within the incision.  The IT band was then closed using running #1 strata fix.  Subcutaneous fat was closed using 0 PDS suture and the remainder of the wound was closed using 2-0 Monocryl and 2-0 nylon.  An incisional VAC was applied with good seal and suction.  Patient was awakened from anesthesia trans PACU in stable condition.    She will return to the floor nonweightbearing right lower extremity in a knee immobilizer that we will transition to a hinged knee bracelocked at 30 degrees of flexion.  We will consult infectious disease, palliative care, cardiology, and wound care for her other comorbidities.  Her prognosis remains guarded.  She has an infected nonunion and is very frail.  I am not sure she could tolerate another procedure.  The hope would be to suppress her with antibiotics clear the infection.  She does not ambulate at baseline and she can continue nonweightbearing as long as heart is not overtly fail.  We will continue to follow along closely            Dima Newton DO   Orthopaedic Surgery   10/19/2024  1:38 PM    Note: This report was completed using [employer] voiced recognition software.  Every effort has been made to ensure accuracy; however, inadvertent computerized transcription errors may be present.

## 2024-10-19 NOTE — PLAN OF CARE

## 2024-10-19 NOTE — PROGRESS NOTES
Daughter Mendy and Son Silverio notified of tentative procedure at 0730.    PROCEDURE CONSENT SIGNED AND IN CHART

## 2024-10-19 NOTE — CONSULTS
Inpatient Cardiology Consultation      Reason for Consult:  \"cardiac problems\"     Consulting Physician: Dr. Sweeney     Requesting Physician:  Dr. Dyer     Date of Consultation: 10/19/2024    HISTORY OF PRESENT ILLNESS:   Gabi Madrigal  is a 84 y.o.  female known remotely to Dr. Mariscal has not been seen in office since 2019.  Seen last by Dr Sweeney inpatient 7/19/2024 for  Preoperative risk stratification/ cardiac clearance prior to right femoral ORIF    PMH: see below    ED 10/18/2024 via EMS for \"fluid in knee\".  Significant Labs: BUN 42 CR 1.0 K4.6 mag 2.1 albumin 2.2 LFT WNL  sed rate 100 WBC 8.7 Hgb 8.4-7.2  INR 2.6  Blood cultures pending  RAD: No imaging  Status post orthopedic procedure today 10/19/2024 12:20 PM:  Excisional debridement right leg area measuring 30 x 8 cm including skin, subcutaneous tissue, fat, fascia, muscle, bone  Right knee arthrotomy with irrigation debridement and synovectomy  Right distal femur hardware retention  Patient seen and examined.  Recent vitals: /89 HR 90 last evening SpO2 decreased to 80% on room air placed on 2 L nasal cannula >>SpO2 99% on 3 L nasal cannula oxygen afebrile    Patient and daughter at bedside notes that since patient had COVID in September, she has had a chronic cough mostly dry.  Nursing home was concerned that the cough could be a sign of heart failure, this is the reason family is requesting cardiac consultation.    Patient's baseline functional capacity is very limited due to several wounds and multiple issues with her hardware.  Denies chest pain pressure discomfort, palpitations, dizziness.  Mild shortness of breath with her minimal activity.  Dry cough.  Mild swelling in legs.  Denies abdominal bloating or swelling.  Denies nausea vomiting indigestion or heartburn.  Denies black bloody or tarry stools bloody urine or nosebleeds.  Patient has just returned from surgery.  She is in no acute distress at rest on nasal  personally reviewed the laboratory, cardiac diagnostic and radiographic testing as outlined above:    IMPRESSION:  Chronic diastolic congestive heart failure: Compensated, continue current treatment  Persistent atrial fibrillation: Controlled ventricular response, will resume Eliquis when okay with orthopedic surgery  Mitral valve regurgitation: Mild  Tricuspid valve regurgitation: Mild  Hypertension: Controlled  Lymphedema  S/p right femur I&D    RECOMMENDATIONS:   Continue current treatment  Intake and output  Patient metabolic panel in a.m.  No active general cardiology problems, will see as needed, thank you    I have reviewed my findings and recommendations with patient    Thank you for the consult  Electronically signed by Neville Sweeney MD on 10/20/2024 at 10:10 PM    All of the above was discussed with the patient  reviewing the above stated recommendations. I directly participated in the medical-decision making, ordering tests, and medication adjustments as documented today. I contributed >50% to the overall encounter time including face-to-face time providing counseling and or coordination of care with the other providers, reviewing records/tests, counseling/education of the patient, ordering medications/tests/procedures, coordinating care, and documenting clinical information in the EHR. I also discussed the differential diagnosis and all of the proposed management plans with the patient.    NOTE: This report was transcribed using voice recognition software. Every effort was made to ensure accuracy; however, inadvertent computerized transcription errors may be present

## 2024-10-19 NOTE — H&P
This note is going to function as a provisional H&P while we await for medicine H&P.  This case cannot be delayed due to the lack of paperwork.  Her clinical picture could deteriorate quickly and she has an infection in her knee and around her hardware.  She needs to go to the operating room right now        Attestation signed by Dima Newton DO at 10/19/2024  9:56 AM     I have seen and evaluated the patient and agree with the above assessment on today's visit. I have performed the key components of the history and physical examination and concur completely with the findings and plans as documented.     Agree with ROS, examination, FMH, PMH, PSH, SocHx, and allergies as above.           This is an 84-year-old patient of mine who presents with pain swelling and drainage in her right leg approximately 3 months after distal femur fixation.  I got a CT scan this week from the office which demonstrated fluid collection around the lateral thigh and fluid in her knee.  Given her elevated inflammatory markers symptoms and clinical appearance this appears to be infected nonunion with possible septic joint.  Plan is to go to the operating room today for irrigation debridement obtainment of cultures antibiotic suppression.  She is medically frail.  She has been nonambulatory for quite some time.  She has multiple decubitus wounds as well.     Risks, benefits, and alternatives were discussed with the patient and their family. Risks include but are not limited to infection, blood loss, damage to neurovascular and musculoskeletal structures, malunion, nonunion, symptomatic hardware, need for further surgery, possible arthritis despite surgical stabilization, stiffness, and catastrophic anesthesia complications. They understand and wish to proceed.      I spoke at length with the patient and her daughter about the severity of this problem.  Unlikely that she will be ambulatory again.  Hopefully we can suppress this infection  fracture (HCC)      Positive culture findings in wound 5/2/2023    Skin ulcer of left calf with fat layer exposed (HCC) 9/6/2017    Skin ulcer of left calf, limited to breakdown of skin (HCC) 9/6/2017    Ulcer of left lower leg (HCC) 3/24/2014    Venous stasis ulcer of left calf limited to breakdown of skin (HCC) 3/24/2014         Past Surgical History:    Past Surgical History             Procedure Laterality Date    COLONOSCOPY        ENDOSCOPY, COLON, DIAGNOSTIC        EYE SURGERY         cateract and lazor surgery 2015    FEMUR SURGERY Right 7/19/2024     RIGHT DISTAL FEMUR FRACTURE OPEN REDUCTION INTERNAL FIXATION performed by Dima Newton DO at [facility] OR    FRACTURE SURGERY   2010     RT HIP    HIP FRACTURE SURGERY Left 08/08/2014     ORIF left hip    TONSILLECTOMY         as child    TOTAL HIP ARTHROPLASTY Left 10/17/2014     revision with removal of hardware         Current Medications:   Current Hospital Medications   No current facility-administered medications for this encounter.     Allergies:  Aspirin, Other, and Tape [adhesive tape]     Social History:   TOBACCO:   reports that she has never smoked. She has never used smokeless tobacco.  ETOH:   reports that she does not currently use alcohol.  DRUGS:   reports no history of drug use.     Family History:   Family History             Problem Relation Age of Onset    Mult Sclerosis Father              REVIEW OF SYSTEMS:  CONSTITUTIONAL:  negative for  fevers, chills  EYES:  negative for blurred vision, visual disturbance  HEENT:  negative for  hearing loss, voice change  RESPIRATORY:  negative for  dyspnea, wheezing  CARDIOVASCULAR:  negative for  chest pain, positive for palpitations  GASTROINTESTINAL:  negative for nausea, vomiting  GENITOURINARY:  negative for frequency, positive for urinary incontinence  HEMATOLOGIC/LYMPHATIC:  negative for bleeding and petechiae  MUSCULOSKELETAL:  positive for  joint swelling  NEUROLOGICAL:  negative for

## 2024-10-19 NOTE — H&P
[facility]  History and Physical      CHIEF COMPLAINT:    Chief Complaint   Patient presents with    Joint Swelling     Patient sent from  with fluid around knee , sent to have knee drained         Patient of Klarissa Davidson DO presents with:  Effusion, right knee    History of Present Illness:   Patient is an 84-year-old female with past medical history of hypothyroidism, hypertension, lymphedema, pelvic fracture, venous stasis ulcer of left, cellulitis of right knee-infected hardware.  Patient presented to the ED with complaints of abnormal CT/possible infection.  Patient does have a history of a right femur ORIF in September 28th.  She has had recurrent cellulitis of this right lower extremity.  Patient had outpatient CT ordered by orthopedic surgery that showed questionable infection and effusion around fracture and joint.  Patient was directed to come to the ED.  Orthopedic surgery has been consulted and patient will likely have surgical procedure today.  Patient was seen and examined after she came back up to the floor from the OR holding area due to another emergent procedure she denies any chest pain shortness of breath or any other complaints.  She has ongoing pain in her right hip all the way down to and inclusive of her right knee.  Denies any fevers or chills.  Family is not at bedside on my evaluation.    REVIEW OF SYSTEMS:  Pertinent negatives are above in HPI.  10 point ROS otherwise negative.      Past Medical History:   Diagnosis Date    Acquired hypothyroidism 8/14/2017    Arthritis     Blood transfusion reaction     Chronic kidney disease     History of blood transfusion     Hypertension     Lymphedema of both lower extremities 9/6/2017    Open wound of left great toe 10/18/2017    Other disorders of kidney and ureter in diseases classified elsewhere     Pelvic fracture (HCC)     Positive culture findings in wound 5/2/2023    Skin ulcer of left calf with fat layer exposed (HCC)  13 10/19/2024 03:13 AM    MONOPCT 9 10/19/2024 03:13 AM    MYELOPCT 1 08/07/2024 06:38 AM    EOSPCT 2 10/19/2024 03:13 AM    BASOPCT 0 10/19/2024 03:13 AM    MONOSABS 0.74 10/19/2024 03:13 AM    LYMPHSABS 1.13 10/19/2024 03:13 AM    EOSABS 0.19 10/19/2024 03:13 AM    BASOSABS 0.02 10/19/2024 03:13 AM     CMP:    Lab Results   Component Value Date/Time     10/19/2024 03:13 AM    K 4.6 10/19/2024 03:13 AM    K 4.1 09/21/2022 04:55 AM    CL 99 10/19/2024 03:13 AM    CO2 31 10/19/2024 03:13 AM    BUN 42 10/19/2024 03:13 AM    CREATININE 1.0 10/19/2024 03:13 AM    GFRAA >60 10/04/2022 04:55 AM    LABGLOM 55 10/19/2024 03:13 AM    LABGLOM 48 04/09/2024 03:55 AM    GLUCOSE 88 10/19/2024 03:13 AM    GLUCOSE 127 07/12/2011 05:45 AM    CALCIUM 7.9 10/19/2024 03:13 AM    BILITOT 0.3 10/19/2024 03:13 AM    ALKPHOS 99 10/19/2024 03:13 AM    AST 14 10/19/2024 03:13 AM    ALT 8 10/19/2024 03:13 AM       Imaging:  CT femur: Distal femoral fracture status post ORIF with stable alignment.  Small foci of gas interposed between fracture fragments as well as within a moderate left knee joint effusion.  Correlate for signs of infection.  Subcutaneous fluid collection in the lateral thigh may represent postoperative some neuroma or hematoma.  Abscess cannot be excluded.    EKG:  I've personally reviewed the patient's EKG:      Telemetry:  I've personally reviewed the patient's telemetry:      ASSESSMENT/PLAN:  Principal Problem:    Effusion, right knee  Active Problems:    Infected hardware in right leg, initial encounter ([facility])  Resolved Problems:    * No resolved hospital problems. *    84 year old female admitted to med surg unit with     Right knee effusion/infected hardware  Pain control  Consult orthopedic surgery-surgical procedure planned for today  Encourage ISP  Bowel regimen  IV hydration postop  Consult ID-postop for IV antibiotics  From medicine standpoint okay to proceed with orthopedic incision and drainage of infected

## 2024-10-20 LAB
ABO/RH: NORMAL
ALBUMIN SERPL-MCNC: 2.4 G/DL (ref 3.5–5.2)
ALP SERPL-CCNC: 112 U/L (ref 35–104)
ALT SERPL-CCNC: 6 U/L (ref 0–32)
ANION GAP SERPL CALCULATED.3IONS-SCNC: 12 MMOL/L (ref 7–16)
ANTIBODY IDENTIFICATION: NORMAL
ANTIBODY SCREEN: POSITIVE
ARM BAND NUMBER: NORMAL
AST SERPL-CCNC: 16 U/L (ref 0–31)
BASOPHILS # BLD: 0.01 K/UL (ref 0–0.2)
BASOPHILS NFR BLD: 0 % (ref 0–2)
BILIRUB SERPL-MCNC: 0.4 MG/DL (ref 0–1.2)
BLOOD BANK BLOOD PRODUCT EXPIRATION DATE: NORMAL
BLOOD BANK BLOOD PRODUCT EXPIRATION DATE: NORMAL
BLOOD BANK COMMENT: NORMAL
BLOOD BANK COMMENT: NORMAL
BLOOD BANK DISPENSE STATUS: NORMAL
BLOOD BANK DISPENSE STATUS: NORMAL
BLOOD BANK ISBT PRODUCT BLOOD TYPE: 5100
BLOOD BANK ISBT PRODUCT BLOOD TYPE: 5100
BLOOD BANK PRODUCT CODE: NORMAL
BLOOD BANK PRODUCT CODE: NORMAL
BLOOD BANK SAMPLE EXPIRATION: NORMAL
BLOOD BANK UNIT TYPE AND RH: NORMAL
BLOOD BANK UNIT TYPE AND RH: NORMAL
BNP SERPL-MCNC: 7755 PG/ML (ref 0–450)
BPU ID: NORMAL
BPU ID: NORMAL
BUN SERPL-MCNC: 41 MG/DL (ref 6–23)
CALCIUM SERPL-MCNC: 8.2 MG/DL (ref 8.6–10.2)
CHLORIDE SERPL-SCNC: 98 MMOL/L (ref 98–107)
CO2 SERPL-SCNC: 26 MMOL/L (ref 22–29)
COMPONENT: NORMAL
COMPONENT: NORMAL
CREAT SERPL-MCNC: 1 MG/DL (ref 0.5–1)
CROSSMATCH RESULT: NORMAL
CROSSMATCH RESULT: NORMAL
DAT IGG: NEGATIVE
EOSINOPHIL # BLD: 0 K/UL (ref 0.05–0.5)
EOSINOPHILS RELATIVE PERCENT: 0 % (ref 0–6)
ERYTHROCYTE [DISTWIDTH] IN BLOOD BY AUTOMATED COUNT: 17.5 % (ref 11.5–15)
GFR, ESTIMATED: 58 ML/MIN/1.73M2
GLUCOSE SERPL-MCNC: 152 MG/DL (ref 74–99)
HCT VFR BLD AUTO: 33.3 % (ref 34–48)
HGB BLD-MCNC: 9.9 G/DL (ref 11.5–15.5)
IMM GRANULOCYTES # BLD AUTO: 0.11 K/UL (ref 0–0.58)
IMM GRANULOCYTES NFR BLD: 1 % (ref 0–5)
LYMPHOCYTES NFR BLD: 1.09 K/UL (ref 1.5–4)
LYMPHOCYTES RELATIVE PERCENT: 8 % (ref 20–42)
MCH RBC QN AUTO: 25.5 PG (ref 26–35)
MCHC RBC AUTO-ENTMCNC: 29.7 G/DL (ref 32–34.5)
MCV RBC AUTO: 85.8 FL (ref 80–99.9)
MONOCYTES NFR BLD: 0.35 K/UL (ref 0.1–0.95)
MONOCYTES NFR BLD: 2 % (ref 2–12)
NEUTROPHILS NFR BLD: 89 % (ref 43–80)
NEUTS SEG NFR BLD: 12.81 K/UL (ref 1.8–7.3)
PLATELET # BLD AUTO: 315 K/UL (ref 130–450)
PMV BLD AUTO: 9.8 FL (ref 7–12)
POTASSIUM SERPL-SCNC: 4.9 MMOL/L (ref 3.5–5)
PROT SERPL-MCNC: 6 G/DL (ref 6.4–8.3)
RBC # BLD AUTO: 3.88 M/UL (ref 3.5–5.5)
SODIUM SERPL-SCNC: 136 MMOL/L (ref 132–146)
TRANSFUSION STATUS: NORMAL
TRANSFUSION STATUS: NORMAL
UNIT DIVISION: 0
UNIT DIVISION: 0
UNIT ISSUE DATE/TIME: NORMAL
UNIT ISSUE DATE/TIME: NORMAL
WBC OTHER # BLD: 14.4 K/UL (ref 4.5–11.5)

## 2024-10-20 PROCEDURE — 2580000003 HC RX 258: Performed by: INTERNAL MEDICINE

## 2024-10-20 PROCEDURE — 80053 COMPREHEN METABOLIC PANEL: CPT

## 2024-10-20 PROCEDURE — 36415 COLL VENOUS BLD VENIPUNCTURE: CPT

## 2024-10-20 PROCEDURE — 6370000000 HC RX 637 (ALT 250 FOR IP)

## 2024-10-20 PROCEDURE — 2580000003 HC RX 258

## 2024-10-20 PROCEDURE — 99222 1ST HOSP IP/OBS MODERATE 55: CPT | Performed by: NURSE PRACTITIONER

## 2024-10-20 PROCEDURE — 83880 ASSAY OF NATRIURETIC PEPTIDE: CPT

## 2024-10-20 PROCEDURE — 2060000000 HC ICU INTERMEDIATE R&B

## 2024-10-20 PROCEDURE — 85025 COMPLETE CBC W/AUTO DIFF WBC: CPT

## 2024-10-20 PROCEDURE — 6360000002 HC RX W HCPCS

## 2024-10-20 PROCEDURE — 2700000000 HC OXYGEN THERAPY PER DAY

## 2024-10-20 PROCEDURE — 6360000002 HC RX W HCPCS: Performed by: INTERNAL MEDICINE

## 2024-10-20 RX ADMIN — BUMETANIDE 1 MG: 1 TABLET ORAL at 09:04

## 2024-10-20 RX ADMIN — OXYCODONE HYDROCHLORIDE AND ACETAMINOPHEN 1 TABLET: 5; 325 TABLET ORAL at 11:27

## 2024-10-20 RX ADMIN — WATER 2000 MG: 1 INJECTION INTRAMUSCULAR; INTRAVENOUS; SUBCUTANEOUS at 04:24

## 2024-10-20 RX ADMIN — METOPROLOL TARTRATE 25 MG: 25 TABLET, FILM COATED ORAL at 09:04

## 2024-10-20 RX ADMIN — PIPERACILLIN AND TAZOBACTAM 4500 MG: 4; .5 INJECTION, POWDER, FOR SOLUTION INTRAVENOUS at 01:41

## 2024-10-20 RX ADMIN — Medication 1 TABLET: at 09:03

## 2024-10-20 RX ADMIN — APIXABAN 5 MG: 5 TABLET, FILM COATED ORAL at 20:30

## 2024-10-20 RX ADMIN — VANCOMYCIN HYDROCHLORIDE 1000 MG: 1 INJECTION, POWDER, LYOPHILIZED, FOR SOLUTION INTRAVENOUS at 09:16

## 2024-10-20 RX ADMIN — Medication 2000 UNITS: at 09:04

## 2024-10-20 RX ADMIN — METOPROLOL TARTRATE 25 MG: 25 TABLET, FILM COATED ORAL at 20:30

## 2024-10-20 RX ADMIN — ACETAMINOPHEN 650 MG: 325 TABLET ORAL at 23:13

## 2024-10-20 RX ADMIN — LEVOTHYROXINE SODIUM 88 MCG: 0.09 TABLET ORAL at 06:46

## 2024-10-20 RX ADMIN — OXYCODONE HYDROCHLORIDE AND ACETAMINOPHEN 1 TABLET: 5; 325 TABLET ORAL at 04:33

## 2024-10-20 RX ADMIN — APIXABAN 5 MG: 5 TABLET, FILM COATED ORAL at 09:06

## 2024-10-20 RX ADMIN — PIPERACILLIN AND TAZOBACTAM 4500 MG: 4; .5 INJECTION, POWDER, FOR SOLUTION INTRAVENOUS at 16:26

## 2024-10-20 RX ADMIN — Medication 500 MG: at 09:04

## 2024-10-20 RX ADMIN — CALCIUM CARBONATE-VITAMIN D TAB 500 MG-200 UNIT 1 TABLET: 500-200 TAB at 09:04

## 2024-10-20 RX ADMIN — SODIUM CHLORIDE, PRESERVATIVE FREE 10 ML: 5 INJECTION INTRAVENOUS at 09:04

## 2024-10-20 RX ADMIN — FERROUS SULFATE TAB 325 MG (65 MG ELEMENTAL FE) 325 MG: 325 (65 FE) TAB at 09:03

## 2024-10-20 RX ADMIN — WATER 2000 MG: 1 INJECTION INTRAMUSCULAR; INTRAVENOUS; SUBCUTANEOUS at 11:34

## 2024-10-20 RX ADMIN — SODIUM CHLORIDE, PRESERVATIVE FREE 10 ML: 5 INJECTION INTRAVENOUS at 20:30

## 2024-10-20 RX ADMIN — MORPHINE SULFATE 4 MG: 4 INJECTION, SOLUTION INTRAMUSCULAR; INTRAVENOUS at 16:27

## 2024-10-20 RX ADMIN — PIPERACILLIN AND TAZOBACTAM 4500 MG: 4; .5 INJECTION, POWDER, FOR SOLUTION INTRAVENOUS at 09:15

## 2024-10-20 ASSESSMENT — PAIN SCALES - GENERAL
PAINLEVEL_OUTOF10: 2
PAINLEVEL_OUTOF10: 8
PAINLEVEL_OUTOF10: 3
PAINLEVEL_OUTOF10: 5

## 2024-10-20 ASSESSMENT — PAIN DESCRIPTION - LOCATION
LOCATION: LEG
LOCATION: KNEE;LEG
LOCATION: KNEE
LOCATION: FOOT

## 2024-10-20 ASSESSMENT — PAIN DESCRIPTION - ORIENTATION
ORIENTATION: RIGHT

## 2024-10-20 ASSESSMENT — PAIN SCALES - WONG BAKER: WONGBAKER_NUMERICALRESPONSE: NO HURT

## 2024-10-20 ASSESSMENT — PAIN DESCRIPTION - DESCRIPTORS
DESCRIPTORS: DISCOMFORT;TENDER;SORE
DESCRIPTORS: ACHING
DESCRIPTORS: ACHING;BURNING;CRAMPING
DESCRIPTORS: ACHING

## 2024-10-20 NOTE — CONSULTS
Palliative Care Department  [phone]  Palliative Care Initial Consult  Provider TRAVIS Marin - CNP    Gabi Madrigal  [id number]  Hospital Day: 2  Date of Initial Consult: 10/19/2024  Referring Provider: Aquilino Dyer DO  Palliative Medicine was consulted for assistance with: Goals of care, end stage disease     HPI:   Gabi Madrigal is a 84 y.o. with a medical history of hypothyroidism, hypertension, lymphedema, pelvic fracture, venous stasis ulcer of left, cellulitis of right knee-infected hardware  who was admitted on 10/18/2024 from home with a CHIEF COMPLAINT of joint swelling. Patient does have a history of a right femur ORIF in September 28th. She has had recurrent cellulitis of this right lower extremity.  Patient had outpatient CT ordered by orthopedic surgery that showed questionable infection and effusion around fracture and joint.  Patient was directed to come to the ED. Orthopedic surgery has been consulted and patient will likely have surgical procedure today. Underwent excisional debridement right leg, right knee arthrotomy with irrigation debridement and synovectomy and right distal femur hardware retention.  ASSESSMENT/PLAN:     Pertinent Hospital Diagnoses     Right knee effusion/infected hardware     Palliative Care Encounter / Counseling Regarding Goals of Care  Please see detailed goals of care discussion as below  At this time, Gabi Madrigal, Does have capacity for medical decision-making.  Capacity is time limited and situation/question specific  During encounter there was no surrogate medical decision-maker needed  Outcome of goals of care meeting:   Change CODE STATUS to DNR CCA  Code status DNR-CCA  Advanced Directives: no POA or living will in [facility]  Surrogate/Legal NOK:  Mendy Armando (child) [phone]  Silverio Madrigal (child) [phone]  Kimani Medina (child) [phone]  Paolo Madrigal (child) [phone]    Spiritual assessment: no spiritual distress  identified  Bereavement and grief: to be determined  Referrals to: none today  SUBJECTIVE:     Current medical issues leading to Palliative Medicine involvement include   Active Hospital Problems    Diagnosis Date Noted    Chronic heart failure with preserved ejection fraction ([facility]) [I50.32] 10/19/2024    Effusion, right knee [M25.461] 10/18/2024    Infected hardware in right leg, initial encounter ([facility]) [T84.7XXA] 10/18/2024       Details of Conversation:   Chart reviewed.  Update received from nursing.  Patient seen resting in bed, alert and oriented and able to hold meaningful conversation.  Daughter, Mendy, present at bedside.  Role of palliative medicine introduced.  Gabi is  and has 4 adult children.  There is no living will or healthcare power of .  She states if she was unable to make decisions for herself she would like her children to collectively make decisions for her.  In-depth discussion regarding current condition to include s/p distal femur I&D.  Discussed goals of care and CODE STATUS options.  After further discussion Gabi would like to change CODE STATUS to DNR CCA and continue all other medical management.  Her ultimate goal is to be discharged back to rehab facility when medically stable with the ultimate goal of returning home.  Emotional support given and all questions addressed.  Will continue to follow    OBJECTIVE:   Prognosis: unknown    Physical Exam:  BP (!) 128/58   Pulse 99   Temp 98.1 °F (36.7 °C) (Temporal)   Resp 18   Ht 1.6 m (5' 3\")   Wt 90.7 kg (200 lb)   SpO2 98%   BMI 35.43 kg/m²   Constitutional:  Elderly, awake, alert  Lungs: CTA bilaterally, no audible rhonchi or wheezes noted, respirations unlabored, no retractions  Heart: RRR, distant heart tones, no murmur, rub, or gallop noted during exam  Abd:  Soft, non tender, non distended, bowel sounds present  Neuro:  Alert, grossly nonfocal; following commands    Objective data reviewed: labs, images,

## 2024-10-20 NOTE — PLAN OF CARE
Problem: ABCDS Injury Assessment  Goal: Absence of physical injury  10/19/2024 2340 by Linh Guaman RN  Outcome: Progressing  10/19/2024 1826 by Mervat Renee RN  Outcome: Progressing     Problem: Skin/Tissue Integrity  Goal: Absence of new skin breakdown  Description: 1.  Monitor for areas of redness and/or skin breakdown  2.  Assess vascular access sites hourly  3.  Every 4-6 hours minimum:  Change oxygen saturation probe site  4.  Every 4-6 hours:  If on nasal continuous positive airway pressure, respiratory therapy assess nares and determine need for appliance change or resting period.  10/19/2024 2340 by Linh Guaman RN  Outcome: Progressing  10/19/2024 1826 by Mervat Renee RN  Outcome: Progressing     Problem: Safety - Adult  Goal: Free from fall injury  10/19/2024 2340 by Linh Guaman RN  Outcome: Progressing  10/19/2024 1826 by Mervat Renee RN  Outcome: Progressing     Problem: Discharge Planning  Goal: Discharge to home or other facility with appropriate resources  10/19/2024 2340 by Linh Guaman RN  Outcome: Progressing  10/19/2024 1826 by Mervat Renee RN  Outcome: Progressing     Problem: Pain  Goal: Verbalizes/displays adequate comfort level or baseline comfort level  10/19/2024 2340 by Linh Guaman RN  Outcome: Progressing  10/19/2024 1826 by Mervat Renee RN  Outcome: Progressing

## 2024-10-20 NOTE — PROGRESS NOTES
4 Eyes Skin Assessment     NAME:  Gabi Madrigal  YOB: 1940  MEDICAL RECORD NUMBER:  [id number]    The patient is being assessed for  Admission    I agree that at least one RN has performed a thorough Head to Toe Skin Assessment on the patient. ALL assessment sites listed below have been assessed.      Areas assessed by both nurses:    Head, Face, Ears, Shoulders, Back, Chest, Arms, Elbows, Hands, Sacrum. Buttock, Coccyx, Ischium, Legs. Feet and Heels, and Under Medical Devices         Does the Patient have a Wound? Yes wound(s) were present on assessment. LDA wound assessment was Initiated and completed by RN       Sathya Prevention initiated by RN: Yes  Wound Care Orders initiated by RN: Yes    Pressure Injury (Stage 3,4, Unstageable, DTI, NWPT, and Complex wounds) if present, place Wound referral order by RN under : Yes    New Ostomies, if present place, Ostomy referral order under : No     Nurse 1 eSignature: Electronically signed by Mervat Renee RN on 10/20/24 at 2:21 PM EDT    **SHARE this note so that the co-signing nurse can place an eSignature**    Nurse 2 eSignature: {Esignature:[id number]}

## 2024-10-20 NOTE — PROGRESS NOTES
[facility]   Progress note      Subjective:    The patient is awake and alert.    She feels great today she tells me  Tired after her long day yesterday but no acute complaints  Objective:    /73   Pulse 95   Temp 97.3 °F (36.3 °C) (Temporal)   Resp 18   Ht 1.6 m (5' 3\")   Wt 90.7 kg (200 lb)   SpO2 97%   BMI 35.43 kg/m²     In: 1050 [I.V.:800; Blood:250]  Out: 20   In: 1050   Out: 20     General appearance: NAD, conversant  HEENT: AT/NC, MMM  Neck: FROM, supple  Lungs: Clear to auscultation  CV: RRR, no MRGs  Vasc: Radial pulses 2+  Abdomen: Soft, non-tender; no masses or HSM  Extremities: Status post surgical intervention excisional debridement drainage by orthopedics yesterday  Skin: no rash, lesions or ulcers  Psych: Alert and oriented to person, place and time  Neuro: Alert and interactive     Recent Labs     10/18/24  1516 10/19/24  0313 10/20/24  0239   WBC 9.9 8.7 14.4*   HGB 8.4* 7.2* 9.9*   HCT 28.7* 25.2* 33.3*    296 315       Recent Labs     10/18/24  1516 10/19/24  0313 10/20/24  0239    137 136   K 4.6 4.6 4.9   CL 97* 99 98   CO2 31* 31* 26   BUN 44* 42* 41*   CREATININE 1.1* 1.0 1.0   CALCIUM 8.4* 7.9* 8.2*       Assessment:    Principal Problem:    Effusion, right knee  Active Problems:    Infected hardware in right leg, initial encounter ([facility])    Chronic heart failure with preserved ejection fraction ([facility])  Resolved Problems:    * No resolved hospital problems. *      Plan:    84 year old female admitted to med surg unit with      Right knee effusion/infected hardware  Pain control  Consult orthopedic surgery-surgical procedure planned for today  Encourage ISP  Bowel regimen  IV hydration postop  Consult ID-postop for IV antibiotics  From medicine standpoint okay to proceed with orthopedic incision and drainage of infected hardware today  Follow inflammatory markers including sed rate/CRP/lactate and procalcitonin     A-fib  Continue

## 2024-10-20 NOTE — PROGRESS NOTES
Component Value Date/Time    COLORU Yellow 08/07/2024 01:55 AM    PHUR 7.0 08/07/2024 01:55 AM    PHUR 6.0 04/07/2024 04:16 PM    LABCAST MODERATE 08/08/2014 12:00 PM    WBCUA 0 TO 5 08/07/2024 01:55 AM    RBCUA 0 TO 2 08/07/2024 01:55 AM    BACTERIA 3+ 07/19/2024 12:15 AM    CLARITYU Clear 09/20/2022 10:51 PM    LEUKOCYTESUR NEGATIVE 08/07/2024 01:55 AM    UROBILINOGEN 2.0 08/07/2024 01:55 AM    BILIRUBINUR NEGATIVE 08/07/2024 01:55 AM    BLOODU LARGE 09/20/2022 10:51 PM    GLUCOSEU NEGATIVE 08/07/2024 01:55 AM    AMORPHOUS RARE 08/13/2014 01:30 PM       No results found for: \"ZKM9JEC\", \"BEART\", \"N8LVQRFM\", \"PHART\", \"THGBART\", \"IWR0YPR\", \"PO2ART\", \"QSE0HUZ\"  Radiology:  No orders to display       Microbiology:  Pending  No results for input(s): \"BC\" in the last 72 hours.  No results for input(s): \"ORG\" in the last 72 hours.  No results for input(s): \"BLOODCULT2\" in the last 72 hours.  No results for input(s): \"STREPNEUMAGU\" in the last 72 hours.  No results for input(s): \"LP1UAG\" in the last 72 hours.  No results for input(s): \"ASO\" in the last 72 hours.  No results for input(s): \"CULTRESP\" in the last 72 hours.    Assessment:  Right knee PJI  ORIF right distal femur 07/17/2024 was found to have swelling and drainage from her right leg 3 months after distal femur fixation.  CT scan outpatient showed fluid collection around lateral thigh and right knee.  Status post excisional debridement of the right leg area skin subcutaneous tissue fat fascia and bone, right knee arthrotomy with I&D, right distal femur hardware retention 10/19.    Plan:    Continue empirical Zosyn and vancomycin  Surgical cultures from 10/19 growing gram-positive cocci  Anticipate 6 weeks of IV antibiotic therapy  Antibiotic regimen will be decided based on surgical cultures  ID will continue to follow    Thank you for having us see this patient in consultation. I will be discussing this case with the treating physicians.      Electronically  signed by Octavio Avilez MD on 10/20/2024 at 2:12 PM

## 2024-10-20 NOTE — PROGRESS NOTES
[facility]  [facility]  Progress Note      Chief Complaint   Patient presents with    Joint Swelling     Patient sent from  with fluid around knee , sent to have knee drained        SUBJECTIVE:    Patient is tolerating medications. No reported adverse drug reactions.  No nausea, vomiting, diarrhea.    Review of systems:  As stated above in the chief complaint, otherwise negative.    Medications:  Scheduled Meds:   apixaban  5 mg Oral BID    piperacillin-tazobactam  4,500 mg IntraVENous Q8H    vancomycin  1,000 mg IntraVENous Q24H    bumetanide  1 mg Oral Daily    oyster shell calcium w/D  1 tablet Oral Daily    clobetasol   Topical BID    ferrous sulfate  325 mg Oral Daily with breakfast    levothyroxine  88 mcg Oral Daily    lidocaine  1 patch TransDERmal Daily    metoprolol tartrate  25 mg Oral BID    therapeutic multivitamin-minerals  1 tablet Oral Daily    vitamin C  500 mg Oral Daily    vitamin D  2,000 Units Oral Daily    sodium chloride flush  5-40 mL IntraVENous 2 times per day     Continuous Infusions:   sodium chloride      sodium chloride       PRN Meds:sodium chloride, oxyCODONE-acetaminophen, morphine **OR** morphine, sodium chloride flush, sodium chloride, potassium chloride **OR** potassium alternative oral replacement, magnesium sulfate, ondansetron **OR** ondansetron, polyethylene glycol, acetaminophen **OR** acetaminophen    OBJECTIVE:  /65   Pulse 86   Temp 97.5 °F (36.4 °C) (Temporal)   Resp 18   Ht 1.6 m (5' 3\")   Wt 90.7 kg (200 lb)   SpO2 92%   BMI 35.43 kg/m²   Temp  Av.4 °F (36.3 °C)  Min: 96.8 °F (36 °C)  Max: 98.1 °F (36.7 °C)  Constitutional: The patient is awake, alert, and oriented.   Skin: Warm and dry. No rashes were noted. No jaundice.  HEENT: Eyes show round, and reactive pupils. Moist mucous membranes, no ulcerations, no thrush.   Neck: Supple to movements. No lymphadenopathy.   Chest: No use of accessory muscles to breathe.  Clindamycin  may still be effective in some patients.     06/09/2023   Final    Moderate growth  Methicillin resistant Staph aureus isolated. Most Methicillin  resistant Staphylococcus are usually resistant to multiple  antibiotics including other B-Lactams, Aminoglycosides,  Macrolides, Clindamycin and Tetracycline. Contact isolation  is indicated.     06/09/2023 Moderate growth  Final   06/09/2023 Light growth  Final   06/09/2023   Final    Light growth  Susceptibility testing for this isolate is restricted to  normally sterile sources.       No results found for: \"RESPSMEAR\"      Component Value Date/Time    LABFUNG 4 Weeks- no fungus isolated 05/29/2014 1545     No results found for: \"CULTRESP\"  No results found for: \"CXCATHTIP\"  No results found for: \"BFCS\"  Culture Surgical   Date Value Ref Range Status   10/18/2017 Heavy growth  Final   10/17/2014   Final    Growth not present  There are no organisms present as previously reported     03/31/2014 Light growth  Final   03/31/2014 Moderate growth  Final     Urine Culture, Routine   Date Value Ref Range Status   09/20/2022   Final    10 to 100,000 CFU/mL  Mixed candice isolated. Further workup and sensitivity testing  is not routinely indicated and will not be performed.  Mixed candice isolated includes:  Mixed gram negative rods  Mixed gram positive organisms     08/13/2017   Final    ,000 CFU/mL  Mixed candice isolated. Further workup and sensitivity testing  is not routinely indicated and will not be performed.  Mixed candice isolated includes:  Mixed gram negative rods  Lactobacillus species     08/13/2014 Growth not present  Final     MRSA Culture Only   Date Value Ref Range Status   09/28/2017 Methicillin resistant Staph aureus not isolated  Final   08/14/2017 Methicillin resistant Staph aureus not isolated  Final   08/08/2014 Methicillin resistant Staph aureus not isolated  Final       Assessment:  Right knee PJI  ORIF right distal femur 07/17/2024 was found

## 2024-10-20 NOTE — PROGRESS NOTES
Pharmacy Consultation Note  (Antibiotic Dosing and Monitoring)    Initial consult date: 10/20/2024  Consulting physician/provider: Dr. Avilez  Drug: Vancomycin  Indication: Osteomyelitis    Age/  Gender Height Weight IBW  Allergy Information   84 y.o./female 160 cm (5' 3\") 90.7 kg (200 lb)     Ideal body weight: 52.4 kg (115 lb 8.3 oz)  Adjusted ideal body weight: 67.7 kg (149 lb 5 oz)   Aspirin, Other, and Tape [adhesive tape]      Renal Function:  Recent Labs     10/18/24  1516 10/19/24  0313 10/20/24  0239   BUN 44* 42* 41*   CREATININE 1.1* 1.0 1.0       Intake/Output Summary (Last 24 hours) at 10/20/2024 0841  Last data filed at 10/19/2024 1355  Gross per 24 hour   Intake 1050 ml   Output 20 ml   Net 1030 ml       Vancomycin Monitoring:  Trough:  No results for input(s): \"VANCOTROUGH\" in the last 72 hours.  Random:  No results for input(s): \"VANCORANDOM\" in the last 72 hours.    No results for input(s): \"BLOODCULT2\" in the last 72 hours.     Historical Cultures:  Organism   Date Value Ref Range Status   06/30/2023 Staphylococcus aureus (A)  Final   06/30/2023 Anaerobic gram negative kath (A)  Final     No results for input(s): \"BC\" in the last 72 hours.    Vancomycin Administration Times:  Recent vancomycin administrations                     vancomycin (VANCOCIN) 1,750 mg in sodium chloride 0.9 % 500 mL IVPB (mg) 1,750 mg New Bag 10/19/24 1734    vancomycin (VANCOCIN) injection (mg) 1,000 mg Given 10/19/24 1321                  Assessment:  Patient is a 84 y.o. female who has been initiated on vancomycin for Osteomyelitis  Estimated Creatinine Clearance: 45 mL/min (based on SCr of 1 mg/dL).  To dose vancomycin, pharmacy will be utilizing [employer] calculation software for goal AUC/DAVID 400-600 mg/L-hr    Plan:  Vancomycin 1000 mg q24h (Predicted AUC/DAVID = 513, Tr = 15.5)  Will check vancomycin levels when appropriate  Will continue to monitor renal function   Pharmacy to follow    Thank you for this

## 2024-10-20 NOTE — PROGRESS NOTES
Nat NP with Palliative messaged via perfect serve to notify that the patient's family is in the room waiting to speak with someone   Abdominal Pain, N/V/D

## 2024-10-20 NOTE — PLAN OF CARE
Problem: ABCDS Injury Assessment  Goal: Absence of physical injury  10/20/2024 1208 by Mervat Renee RN  Outcome: Progressing  10/19/2024 2340 by Linh Guaman RN  Outcome: Progressing     Problem: Skin/Tissue Integrity  Goal: Absence of new skin breakdown  Description: 1.  Monitor for areas of redness and/or skin breakdown  2.  Assess vascular access sites hourly  3.  Every 4-6 hours minimum:  Change oxygen saturation probe site  4.  Every 4-6 hours:  If on nasal continuous positive airway pressure, respiratory therapy assess nares and determine need for appliance change or resting period.  10/20/2024 1208 by Mervat Renee RN  Outcome: Progressing  10/19/2024 2340 by Linh Guaman RN  Outcome: Progressing     Problem: Safety - Adult  Goal: Free from fall injury  10/20/2024 1208 by Mervat Renee RN  Outcome: Progressing  10/19/2024 2340 by Linh Guaman RN  Outcome: Progressing     Problem: Discharge Planning  Goal: Discharge to home or other facility with appropriate resources  10/20/2024 1208 by Mervat Renee RN  Outcome: Progressing  10/19/2024 2340 by Linh Guaman RN  Outcome: Progressing     Problem: Pain  Goal: Verbalizes/displays adequate comfort level or baseline comfort level  10/20/2024 1208 by Mervat Renee RN  Outcome: Progressing  10/19/2024 2340 by Linh Guaman RN  Outcome: Progressing

## 2024-10-20 NOTE — PROGRESS NOTES
Day 1 status post irrigation debridement infected hardware right distal femur.  Cultures pending.  Gram-positive cocci in preliminary results.  Appreciate infectious disease management.  Nonweightbearing.  Knee brace.  Prognosis remains guarded.  Plan will be to suppress and clear his infection.  Will continue to follow along.  Incisional VAC to be removed 3 to 5 days postoperative            Dima Newton    Orthopaedic Surgery   10/20/2024  8:18 AM    Note: This report was completed using [employer] voiced recognition software.  Every effort has been made to ensure accuracy; however, inadvertent computerized transcription errors may be present.        Department of Orthopedic Surgery  Progress Note    POD #1 status post right distal femur I&D.  Patient seen and examined, resting comfortably in bed.  No acute issues overnight.    VITALS:  BP (!) 121/59   Pulse 86   Temp 96.8 °F (36 °C) (Temporal)   Resp 18   Ht 1.6 m (5' 3\")   Wt 90.7 kg (200 lb)   SpO2 99%   BMI 35.43 kg/m²     General: alert and oriented to person, place and time, well-developed and well-nourished, in no acute distress    MUSCULOSKELETAL:   right lower extremity:  Dressings in place, clean dry and intact, wound VAC in place, functioning appropriately  Thigh/lower leg compartments soft and compressible  Calf soft and nontender  + Plantarflexion, dorsiflexion, great toe dorsiflexion   2+/4 DP and PT pulses.  Foot warm and well-perfused  Distal sensation grossly intact to superficial and deep peroneal, tibial, sural, saphenous nerves     CBC:   Lab Results   Component Value Date/Time    WBC 14.4 10/20/2024 02:39 AM    HGB 9.9 10/20/2024 02:39 AM    HCT 33.3 10/20/2024 02:39 AM     10/20/2024 02:39 AM     PT/INR:    Lab Results   Component Value Date/Time    PROTIME 29.2 10/18/2024 03:16 PM    PROTIME 11.6 07/12/2011 05:45 AM    INR 2.6 10/18/2024 03:16 PM       Intraop Cultures: sent    ASSESSMENT  S/P right femur I&D 10/19    PLAN

## 2024-10-21 ENCOUNTER — APPOINTMENT (OUTPATIENT)
Dept: CT IMAGING | Age: 84
DRG: 463 | End: 2024-10-21
Payer: MEDICARE

## 2024-10-21 ENCOUNTER — TELEPHONE (OUTPATIENT)
Dept: ORTHOPEDIC SURGERY | Age: 84
End: 2024-10-21

## 2024-10-21 LAB
ALBUMIN SERPL-MCNC: 2.1 G/DL (ref 3.5–5.2)
ALP SERPL-CCNC: 91 U/L (ref 35–104)
ALT SERPL-CCNC: <5 U/L (ref 0–32)
ANION GAP SERPL CALCULATED.3IONS-SCNC: 9 MMOL/L (ref 7–16)
AST SERPL-CCNC: 16 U/L (ref 0–31)
BASOPHILS # BLD: 0.02 K/UL (ref 0–0.2)
BASOPHILS NFR BLD: 0 % (ref 0–2)
BILIRUB SERPL-MCNC: 0.2 MG/DL (ref 0–1.2)
BUN SERPL-MCNC: 42 MG/DL (ref 6–23)
CALCIUM SERPL-MCNC: 8 MG/DL (ref 8.6–10.2)
CHLORIDE SERPL-SCNC: 99 MMOL/L (ref 98–107)
CO2 SERPL-SCNC: 27 MMOL/L (ref 22–29)
CREAT SERPL-MCNC: 1.2 MG/DL (ref 0.5–1)
EKG ATRIAL RATE: 122 BPM
EKG Q-T INTERVAL: 334 MS
EKG QRS DURATION: 82 MS
EKG QTC CALCULATION (BAZETT): 428 MS
EKG R AXIS: 141 DEGREES
EKG T AXIS: 130 DEGREES
EKG VENTRICULAR RATE: 99 BPM
EOSINOPHIL # BLD: 0.02 K/UL (ref 0.05–0.5)
EOSINOPHILS RELATIVE PERCENT: 0 % (ref 0–6)
ERYTHROCYTE [DISTWIDTH] IN BLOOD BY AUTOMATED COUNT: 17.3 % (ref 11.5–15)
GFR, ESTIMATED: 43 ML/MIN/1.73M2
GLUCOSE SERPL-MCNC: 104 MG/DL (ref 74–99)
HCT VFR BLD AUTO: 30.1 % (ref 34–48)
HGB BLD-MCNC: 8.8 G/DL (ref 11.5–15.5)
IMM GRANULOCYTES # BLD AUTO: 0.1 K/UL (ref 0–0.58)
IMM GRANULOCYTES NFR BLD: 1 % (ref 0–5)
LYMPHOCYTES NFR BLD: 1.09 K/UL (ref 1.5–4)
LYMPHOCYTES RELATIVE PERCENT: 9 % (ref 20–42)
MCH RBC QN AUTO: 25.7 PG (ref 26–35)
MCHC RBC AUTO-ENTMCNC: 29.2 G/DL (ref 32–34.5)
MCV RBC AUTO: 88 FL (ref 80–99.9)
MICROORGANISM SPEC CULT: ABNORMAL
MICROORGANISM SPEC CULT: NORMAL
MICROORGANISM/AGENT SPEC: ABNORMAL
MONOCYTES NFR BLD: 0.89 K/UL (ref 0.1–0.95)
MONOCYTES NFR BLD: 7 % (ref 2–12)
NEUTROPHILS NFR BLD: 83 % (ref 43–80)
NEUTS SEG NFR BLD: 10.27 K/UL (ref 1.8–7.3)
PLATELET # BLD AUTO: 297 K/UL (ref 130–450)
PMV BLD AUTO: 9.8 FL (ref 7–12)
POTASSIUM SERPL-SCNC: 4.1 MMOL/L (ref 3.5–5)
PROT SERPL-MCNC: 5.4 G/DL (ref 6.4–8.3)
RBC # BLD AUTO: 3.42 M/UL (ref 3.5–5.5)
SERVICE CMNT-IMP: ABNORMAL
SERVICE CMNT-IMP: ABNORMAL
SERVICE CMNT-IMP: NORMAL
SODIUM SERPL-SCNC: 135 MMOL/L (ref 132–146)
SPECIMEN DESCRIPTION: ABNORMAL
SPECIMEN DESCRIPTION: NORMAL
WBC OTHER # BLD: 12.4 K/UL (ref 4.5–11.5)

## 2024-10-21 PROCEDURE — 6370000000 HC RX 637 (ALT 250 FOR IP)

## 2024-10-21 PROCEDURE — 85025 COMPLETE CBC W/AUTO DIFF WBC: CPT

## 2024-10-21 PROCEDURE — 2580000003 HC RX 258

## 2024-10-21 PROCEDURE — 2580000003 HC RX 258: Performed by: INTERNAL MEDICINE

## 2024-10-21 PROCEDURE — 74176 CT ABD & PELVIS W/O CONTRAST: CPT

## 2024-10-21 PROCEDURE — 36415 COLL VENOUS BLD VENIPUNCTURE: CPT

## 2024-10-21 PROCEDURE — 6360000002 HC RX W HCPCS: Performed by: INTERNAL MEDICINE

## 2024-10-21 PROCEDURE — 2060000000 HC ICU INTERMEDIATE R&B

## 2024-10-21 PROCEDURE — 2500000003 HC RX 250 WO HCPCS: Performed by: INTERNAL MEDICINE

## 2024-10-21 PROCEDURE — 80053 COMPREHEN METABOLIC PANEL: CPT

## 2024-10-21 RX ORDER — SODIUM CHLORIDE 9 MG/ML
INJECTION, SOLUTION INTRAVENOUS PRN
Status: DISCONTINUED | OUTPATIENT
Start: 2024-10-21 | End: 2024-10-31 | Stop reason: HOSPADM

## 2024-10-21 RX ORDER — LIDOCAINE HYDROCHLORIDE 10 MG/ML
50 INJECTION, SOLUTION EPIDURAL; INFILTRATION; INTRACAUDAL; PERINEURAL ONCE
Status: DISCONTINUED | OUTPATIENT
Start: 2024-10-21 | End: 2024-10-23

## 2024-10-21 RX ORDER — METRONIDAZOLE 500 MG/100ML
500 INJECTION, SOLUTION INTRAVENOUS EVERY 8 HOURS
Status: DISCONTINUED | OUTPATIENT
Start: 2024-10-21 | End: 2024-10-31 | Stop reason: HOSPADM

## 2024-10-21 RX ORDER — SODIUM CHLORIDE 0.9 % (FLUSH) 0.9 %
5-40 SYRINGE (ML) INJECTION PRN
Status: DISCONTINUED | OUTPATIENT
Start: 2024-10-21 | End: 2024-10-31 | Stop reason: HOSPADM

## 2024-10-21 RX ORDER — SODIUM CHLORIDE 0.9 % (FLUSH) 0.9 %
5-40 SYRINGE (ML) INJECTION EVERY 12 HOURS SCHEDULED
Status: DISCONTINUED | OUTPATIENT
Start: 2024-10-21 | End: 2024-10-31 | Stop reason: HOSPADM

## 2024-10-21 RX ADMIN — LEVOTHYROXINE SODIUM 88 MCG: 0.09 TABLET ORAL at 06:34

## 2024-10-21 RX ADMIN — APIXABAN 5 MG: 5 TABLET, FILM COATED ORAL at 09:37

## 2024-10-21 RX ADMIN — SODIUM CHLORIDE, PRESERVATIVE FREE 10 ML: 5 INJECTION INTRAVENOUS at 10:02

## 2024-10-21 RX ADMIN — SODIUM CHLORIDE, PRESERVATIVE FREE 10 ML: 5 INJECTION INTRAVENOUS at 20:13

## 2024-10-21 RX ADMIN — FERROUS SULFATE TAB 325 MG (65 MG ELEMENTAL FE) 325 MG: 325 (65 FE) TAB at 09:37

## 2024-10-21 RX ADMIN — Medication 1 TABLET: at 09:36

## 2024-10-21 RX ADMIN — Medication 500 MG: at 09:38

## 2024-10-21 RX ADMIN — CALCIUM CARBONATE-VITAMIN D TAB 500 MG-200 UNIT 1 TABLET: 500-200 TAB at 09:37

## 2024-10-21 RX ADMIN — PIPERACILLIN AND TAZOBACTAM 4500 MG: 4; .5 INJECTION, POWDER, FOR SOLUTION INTRAVENOUS at 00:50

## 2024-10-21 RX ADMIN — NAFCILLIN SODIUM 2000 MG: 2 INJECTION, POWDER, LYOPHILIZED, FOR SOLUTION INTRAMUSCULAR; INTRAVENOUS at 23:20

## 2024-10-21 RX ADMIN — OXYCODONE HYDROCHLORIDE AND ACETAMINOPHEN 1 TABLET: 5; 325 TABLET ORAL at 09:38

## 2024-10-21 RX ADMIN — PIPERACILLIN AND TAZOBACTAM 4500 MG: 4; .5 INJECTION, POWDER, FOR SOLUTION INTRAVENOUS at 09:53

## 2024-10-21 RX ADMIN — NAFCILLIN SODIUM 2000 MG: 2 INJECTION, POWDER, LYOPHILIZED, FOR SOLUTION INTRAMUSCULAR; INTRAVENOUS at 17:48

## 2024-10-21 RX ADMIN — VANCOMYCIN HYDROCHLORIDE 1000 MG: 1 INJECTION, POWDER, LYOPHILIZED, FOR SOLUTION INTRAVENOUS at 10:02

## 2024-10-21 RX ADMIN — CLOBETASOL PROPIONATE: 0.5 CREAM TOPICAL at 21:14

## 2024-10-21 RX ADMIN — Medication 2000 UNITS: at 09:36

## 2024-10-21 RX ADMIN — OXYCODONE HYDROCHLORIDE AND ACETAMINOPHEN 1 TABLET: 5; 325 TABLET ORAL at 16:12

## 2024-10-21 RX ADMIN — METOPROLOL TARTRATE 25 MG: 25 TABLET, FILM COATED ORAL at 20:11

## 2024-10-21 RX ADMIN — METRONIDAZOLE 500 MG: 500 INJECTION, SOLUTION INTRAVENOUS at 16:05

## 2024-10-21 RX ADMIN — METOPROLOL TARTRATE 25 MG: 25 TABLET, FILM COATED ORAL at 09:37

## 2024-10-21 RX ADMIN — BUMETANIDE 1 MG: 1 TABLET ORAL at 09:39

## 2024-10-21 RX ADMIN — NAFCILLIN SODIUM 2000 MG: 2 INJECTION, POWDER, LYOPHILIZED, FOR SOLUTION INTRAMUSCULAR; INTRAVENOUS at 20:11

## 2024-10-21 RX ADMIN — METRONIDAZOLE 500 MG: 500 INJECTION, SOLUTION INTRAVENOUS at 23:20

## 2024-10-21 ASSESSMENT — PAIN - FUNCTIONAL ASSESSMENT: PAIN_FUNCTIONAL_ASSESSMENT: PREVENTS OR INTERFERES SOME ACTIVE ACTIVITIES AND ADLS

## 2024-10-21 ASSESSMENT — PAIN SCALES - GENERAL
PAINLEVEL_OUTOF10: 5
PAINLEVEL_OUTOF10: 6
PAINLEVEL_OUTOF10: 9
PAINLEVEL_OUTOF10: 8

## 2024-10-21 ASSESSMENT — PAIN DESCRIPTION - LOCATION
LOCATION: LEG;BUTTOCKS
LOCATION: KNEE

## 2024-10-21 ASSESSMENT — PAIN DESCRIPTION - ORIENTATION
ORIENTATION: RIGHT;LEFT
ORIENTATION: RIGHT

## 2024-10-21 ASSESSMENT — PAIN DESCRIPTION - ONSET: ONSET: ON-GOING

## 2024-10-21 ASSESSMENT — PAIN DESCRIPTION - FREQUENCY: FREQUENCY: INTERMITTENT

## 2024-10-21 ASSESSMENT — PAIN DESCRIPTION - PAIN TYPE: TYPE: CHRONIC PAIN

## 2024-10-21 ASSESSMENT — PAIN DESCRIPTION - DESCRIPTORS
DESCRIPTORS: DISCOMFORT;TENDER;SORE
DESCRIPTORS: ACHING

## 2024-10-21 NOTE — PROGRESS NOTES
Pharmacy Consultation Note  (Antibiotic Dosing and Monitoring)    Initial consult date: 10/21/2024  Consulting physician/provider: Dr. Avilez  Drug: Vancomycin  Indication: Osteomyelitis    Vancomycin has been discontinued   Clinical Pharmacy to sign-off  Physician to re-consult pharmacy if future dosing is needed    Thank you for the consult,    Kateryna Espinoza, Pharm D 10/21/2024 3:00 PM       Age/  Gender Height Weight IBW  Allergy Information   84 y.o./female 160 cm (5' 3\") 90.7 kg (200 lb)     Ideal body weight: 52.4 kg (115 lb 8.3 oz)  Adjusted ideal body weight: 67.7 kg (149 lb 5 oz)   Aspirin, Other, and Tape [adhesive tape]      Renal Function:  Recent Labs     10/19/24  0313 10/20/24  0239 10/21/24  0458   BUN 42* 41* 42*   CREATININE 1.0 1.0 1.2*       Intake/Output Summary (Last 24 hours) at 10/21/2024 1107  Last data filed at 10/20/2024 1600  Gross per 24 hour   Intake 120 ml   Output 400 ml   Net -280 ml       Vancomycin Monitoring:  Trough:  No results for input(s): \"VANCOTROUGH\" in the last 72 hours.  Random:  No results for input(s): \"VANCORANDOM\" in the last 72 hours.    No results for input(s): \"BLOODCULT2\" in the last 72 hours.     Historical Cultures:  Organism   Date Value Ref Range Status   06/30/2023 Staphylococcus aureus (A)  Final   06/30/2023 Anaerobic gram negative kath (A)  Final     No results for input(s): \"BC\" in the last 72 hours.    Vancomycin Administration Times:  Recent vancomycin administrations                     vancomycin (VANCOCIN) 1,750 mg in sodium chloride 0.9 % 500 mL IVPB (mg) 1,750 mg New Bag 10/19/24 1734    vancomycin (VANCOCIN) injection (mg) 1,000 mg Given 10/19/24 1321                  Assessment:  Patient is a 84 y.o. female who has been initiated on vancomycin for Osteomyelitis  Estimated Creatinine Clearance: 37 mL/min (A) (based on SCr of 1.2 mg/dL (H)).  To dose vancomycin, pharmacy will be utilizing [employer] calculation software for goal AUC/DAVID 400-600

## 2024-10-21 NOTE — PROGRESS NOTES
Department of Orthopedic Surgery  Progress Note    POD #2 status post right distal femur I&D.  Patient seen and examined, resting comfortably in bed.  No acute issues overnight.  Denies fevers or chills.  All of her questions were answered.    VITALS:  /66   Pulse 84   Temp 97.3 °F (36.3 °C) (Temporal)   Resp 18   Ht 1.6 m (5' 3\")   Wt 90.7 kg (200 lb)   SpO2 93%   BMI 35.43 kg/m²     General: alert and oriented to person, place and time, well-developed and well-nourished, in no acute distress    MUSCULOSKELETAL:   right lower extremity:  Dressings in place, clean dry and intact, wound VAC in place, functioning appropriately  Thigh/lower leg compartments soft and compressible  Calf soft and nontender  + Plantarflexion, dorsiflexion, great toe dorsiflexion   2+/4 DP and PT pulses.  Foot warm and well-perfused  Distal sensation grossly intact to superficial and deep peroneal, tibial, sural, saphenous nerves     CBC:   Lab Results   Component Value Date/Time    WBC 14.4 10/20/2024 02:39 AM    HGB 9.9 10/20/2024 02:39 AM    HCT 33.3 10/20/2024 02:39 AM     10/20/2024 02:39 AM     PT/INR:    Lab Results   Component Value Date/Time    PROTIME 29.2 10/18/2024 03:16 PM    PROTIME 11.6 07/12/2011 05:45 AM    INR 2.6 10/18/2024 03:16 PM       Intraop Cultures: growing gram positive organisms    ASSESSMENT  S/P right femur I&D 10/19    PLAN      Continue physical therapy and protocol: NWB -right LE  Will continue to follow intraoperative cultures. Antibiotic coverage per infectious disease, appreciate management  Hinged ROM brace ordered, locked at 30 degrees of flexion  Deep venous thrombosis prophylaxis -Eliquis, early mobilization  Plan to remove incisional vac within next few days  PT/OT  Pain Control: IV and PO, wean to p.o.  Monitor H&H 8.8  Ok to discharge from orthopedic standpoint once appropriate discharge plan is in place. Orthopedics will follow the patient peripherally for the remainder of

## 2024-10-21 NOTE — CARE COORDINATION
10/21/24 CM Note.  Pt admitted right knee effusion S/P right distal femur I&D 10/19/24.  ID following, pt will require 6 week antibiotic therapy.  Pt private pay bed hold from  [facility] and has no needs for return.   Jaleesa AHN RN-BC  [phone]    Case Management Assessment  Initial Evaluation    Date/Time of Evaluation: 10/21/2024 3:43 PM  Assessment Completed by: Jaleesa Vaughan RN    If patient is discharged prior to next notation, then this note serves as note for discharge by case management.    Patient Name: Gabi Madrigal                   YOB: 1940  Diagnosis: Effusion of right knee [M25.461]  Infected hardware in right leg, initial encounter ([facility]) [T84.7XXA]  Effusion, right knee [M25.461]                   Date / Time: 10/18/2024  3:03 PM    Patient Admission Status: Inpatient   Readmission Risk (Low < 19, Mod (19-27), High > 27): Readmission Risk Score: 29.6    Current PCP: Klarissa Davidson, DO  PCP verified by CM? Yes    Chart Reviewed: Yes      History Provided by: Patient  Patient Orientation: Alert and Oriented    Patient Cognition: Alert    Hospitalization in the last 30 days (Readmission):  No    If yes, Readmission Assessment in  Navigator will be completed.    Advance Directives:      Code Status: DNR-CCA   Patient's Primary Decision Maker is: Legal Next of Kin    Primary Decision Maker: Mendy Armando - Child - [phone]    Primary Decision Maker: Kimani Medina - Child - [phone]    Primary Decision Maker: Paolo Madrigal - Child - [phone]    Supplemental (Other) Decision Maker: Silverio Madrigal - Child - [phone]    Discharge Planning:    Patient lives with: Children, Family Members Type of Home: Acute Rehab  Primary Care Giver: Self  Patient Support Systems include: Family Members    Current services prior to admission: Skilled Nursing Facility  Type of Home Care services:  None    ADLS  Prior functional level: Assistance with the following:, Bathing,

## 2024-10-21 NOTE — PLAN OF CARE
Problem: ABCDS Injury Assessment  Goal: Absence of physical injury  10/20/2024 2335 by Linh Guaman RN  Outcome: Progressing  10/20/2024 1208 by Mervat Renee RN  Outcome: Progressing     Problem: Skin/Tissue Integrity  Goal: Absence of new skin breakdown  Description: 1.  Monitor for areas of redness and/or skin breakdown  2.  Assess vascular access sites hourly  3.  Every 4-6 hours minimum:  Change oxygen saturation probe site  4.  Every 4-6 hours:  If on nasal continuous positive airway pressure, respiratory therapy assess nares and determine need for appliance change or resting period.  10/20/2024 2335 by Linh Guaman RN  Outcome: Progressing  10/20/2024 1208 by Mervat Renee RN  Outcome: Progressing     Problem: Safety - Adult  Goal: Free from fall injury  10/20/2024 2335 by Linh Guaman RN  Outcome: Progressing  10/20/2024 1208 by Mervat Renee RN  Outcome: Progressing     Problem: Discharge Planning  Goal: Discharge to home or other facility with appropriate resources  10/20/2024 2335 by Linh Guaman RN  Outcome: Progressing  10/20/2024 1208 by Mervat Renee RN  Outcome: Progressing     Problem: Pain  Goal: Verbalizes/displays adequate comfort level or baseline comfort level  10/20/2024 2335 by Linh Guaman RN  Outcome: Progressing  10/20/2024 1208 by Mervat Renee RN  Outcome: Progressing

## 2024-10-21 NOTE — PROGRESS NOTES
[facility]  [facility]  Progress Note      Chief Complaint   Patient presents with    Joint Swelling     Patient sent from  with fluid around knee , sent to have knee drained        SUBJECTIVE:    Patient is tolerating medications. No reported adverse drug reactions.  No nausea, vomiting, diarrhea.    Review of systems:  As stated above in the chief complaint, otherwise negative.    Medications:  Scheduled Meds:   apixaban  5 mg Oral BID    piperacillin-tazobactam  4,500 mg IntraVENous Q8H    vancomycin  1,000 mg IntraVENous Q24H    bumetanide  1 mg Oral Daily    oyster shell calcium w/D  1 tablet Oral Daily    clobetasol   Topical BID    ferrous sulfate  325 mg Oral Daily with breakfast    levothyroxine  88 mcg Oral Daily    lidocaine  1 patch TransDERmal Daily    metoprolol tartrate  25 mg Oral BID    therapeutic multivitamin-minerals  1 tablet Oral Daily    vitamin C  500 mg Oral Daily    vitamin D  2,000 Units Oral Daily    sodium chloride flush  5-40 mL IntraVENous 2 times per day     Continuous Infusions:   sodium chloride      sodium chloride       PRN Meds:sodium chloride, oxyCODONE-acetaminophen, morphine **OR** morphine, sodium chloride flush, sodium chloride, potassium chloride **OR** potassium alternative oral replacement, magnesium sulfate, ondansetron **OR** ondansetron, polyethylene glycol, acetaminophen **OR** acetaminophen    OBJECTIVE:  /60   Pulse 89   Temp 97.5 °F (36.4 °C) (Temporal)   Resp 16   Ht 1.6 m (5' 3\")   Wt 90.7 kg (200 lb)   SpO2 97%   BMI 35.43 kg/m²   Temp  Av.7 °F (36.5 °C)  Min: 97 °F (36.1 °C)  Max: 98.2 °F (36.8 °C)  Constitutional: The patient is awake, alert, and oriented.   Skin: Warm and dry. No rashes were noted. No jaundice.  HEENT: Eyes show round, and reactive pupils. Moist mucous membranes, no ulcerations, no thrush.   Neck: Supple to movements. No lymphadenopathy.   Chest: No use of accessory muscles to breathe.

## 2024-10-21 NOTE — PLAN OF CARE
Problem: ABCDS Injury Assessment  Goal: Absence of physical injury  10/21/2024 1236 by Lucy Philippe RN  Outcome: Progressing  10/20/2024 2335 by Linh Guaman RN  Outcome: Progressing     Problem: Skin/Tissue Integrity  Goal: Absence of new skin breakdown  Description: 1.  Monitor for areas of redness and/or skin breakdown  2.  Assess vascular access sites hourly  3.  Every 4-6 hours minimum:  Change oxygen saturation probe site  4.  Every 4-6 hours:  If on nasal continuous positive airway pressure, respiratory therapy assess nares and determine need for appliance change or resting period.  10/21/2024 1236 by Lucy Philippe RN  Outcome: Progressing  10/20/2024 2335 by Linh Guaman RN  Outcome: Progressing     Problem: Safety - Adult  Goal: Free from fall injury  10/21/2024 1236 by Lucy Philippe RN  Outcome: Progressing  10/20/2024 2335 by Linh Guaman RN  Outcome: Progressing     Problem: Discharge Planning  Goal: Discharge to home or other facility with appropriate resources  10/21/2024 1236 by Lucy Philippe RN  Outcome: Progressing  10/20/2024 2335 by Linh Guaman RN  Outcome: Progressing     Problem: Pain  Goal: Verbalizes/displays adequate comfort level or baseline comfort level  10/21/2024 1236 by Lucy Philippe RN  Outcome: Progressing  10/20/2024 2335 by Linh Guaman RN  Outcome: Progressing

## 2024-10-21 NOTE — TELEPHONE ENCOUNTER
Scheduled CT Scan Right Femur for 10/18/2024 12:00 Noon. Arrive 11:30 p.m. [facility]   Notified Sekou at [facility].  He will arrange transportation.

## 2024-10-21 NOTE — PROGRESS NOTES
[facility]   Progress note      Subjective:    Resting comfortably in bed  Working with the wound nurse on assessment    Objective:    /67   Pulse 87   Temp 97 °F (36.1 °C) (Temporal)   Resp 16   Ht 1.6 m (5' 3\")   Wt 90.7 kg (200 lb)   SpO2 93%   BMI 35.43 kg/m²     In: 300 [P.O.:300]  Out: 1100   In: 300   Out: 1100 [Urine:1100]    General appearance: NAD, conversant  HEENT: AT/NC, MMM  Neck: FROM, supple  Lungs: Clear to auscultation  CV: RRR, no MRGs  Vasc: Radial pulses 2+  Abdomen: Soft, non-tender; no masses or HSM  Extremities: Status post surgical intervention excisional debridement drainage by orthopedics yesterday  Skin:    Psych: Alert and oriented to person, place and time  Neuro: Alert and interactive     Recent Labs     10/19/24  0313 10/20/24  0239 10/21/24  0458   WBC 8.7 14.4* 12.4*   HGB 7.2* 9.9* 8.8*   HCT 25.2* 33.3* 30.1*    315 297       Recent Labs     10/19/24  0313 10/20/24  0239 10/21/24  0458    136 135   K 4.6 4.9 4.1   CL 99 98 99   CO2 31* 26 27   BUN 42* 41* 42*   CREATININE 1.0 1.0 1.2*   CALCIUM 7.9* 8.2* 8.0*       Assessment:    Principal Problem:    Effusion, right knee  Active Problems:    Infected hardware in right leg, initial encounter ([facility])    Chronic heart failure with preserved ejection fraction ([facility])  Resolved Problems:    * No resolved hospital problems. *      Plan:    84 year old female admitted to med surg unit with      Right knee effusion/infected hardware  Pain control  Consult orthopedic surgery-surgical procedure planned for today  Encourage ISP  Bowel regimen  IV hydration postop  Consult ID-postop for IV antibiotics  From medicine standpoint okay to proceed with orthopedic incision and drainage of infected hardware today  Follow inflammatory markers including sed rate/CRP/lactate and procalcitonin     A-fib  Continue metoprolol  Eliquis on hold will resume once cleared by orthopedic surgery  Monitor for A-fib RVR

## 2024-10-21 NOTE — CONSULTS
GENERAL SURGERY  CONSULT NOTE  10/21/2024    Physician Consulted: Dr. Orantes  Reason for Consult: sacral wound    HPI  Gabi Madrigal is a 84 y.o. female who presents to general surgery service for evaluation of sacral wound. She originally presented over the weekend for joint pain and was found to have infected hardware on her right femur and was taken to the OR by ortho for incision and drainage with retension of hardware 10/19. Pt is complaining of pain in her right hip.     Surgery was asked to see patient for a sacral wound. She is currently on eliquis for DVT ppx s/p orthopedic surgery as well as her Afib. ID is following and she is nafcillin and flagyl.    Pt has been afebrile and hemodynamically stable. Labs show WBC 12.4, Hb 8.8, Cr 1.2, Glucose 43    Past Medical History:   Diagnosis Date    Acquired hypothyroidism 8/14/2017    Arthritis     Blood transfusion reaction     Chronic kidney disease     History of blood transfusion     Hypertension     Lymphedema of both lower extremities 9/6/2017    Open wound of left great toe 10/18/2017    Other disorders of kidney and ureter in diseases classified elsewhere     Pelvic fracture (HCC)     Positive culture findings in wound 5/2/2023    Skin ulcer of left calf with fat layer exposed (HCC) 9/6/2017    Skin ulcer of left calf, limited to breakdown of skin (HCC) 9/6/2017    Ulcer of left lower leg (HCC) 3/24/2014    Venous stasis ulcer of left calf limited to breakdown of skin (HCC) 3/24/2014       Past Surgical History:   Procedure Laterality Date    COLONOSCOPY      ENDOSCOPY, COLON, DIAGNOSTIC      EYE SURGERY      cateract and lazor surgery 2015    FEMUR SURGERY Right 7/19/2024    RIGHT DISTAL FEMUR FRACTURE OPEN REDUCTION INTERNAL FIXATION performed by Dima Newton DO at [facility]    FRACTURE SURGERY  2010    RT HIP    HIP FRACTURE SURGERY Left 08/08/2014    ORIF left hip    KNEE SURGERY Right 10/19/2024    IRRIGATION AND DEBRIDEMENT RIGHT DISTAL FEMUR

## 2024-10-21 NOTE — FLOWSHEET NOTE
Inpatient Wound Care (Initial consult) 7408    Admit Date: 10/18/2024  3:03 PM    Reason for consult:  Sacrum, Bilateral lower legs    Significant history: Per H&P    History of Present Illness:   Patient is an 84-year-old female with past medical history of hypothyroidism, hypertension, lymphedema, pelvic fracture, venous stasis ulcer of left, cellulitis of right knee-infected hardware.  Patient presented to the ED with complaints of abnormal CT/possible infection.  Patient does have a history of a right femur ORIF in September 28th.  She has had recurrent cellulitis of this right lower extremity.  Patient had outpatient CT ordered by orthopedic surgery that showed questionable infection and effusion around fracture and joint.  Patient was directed to come to the ED.  Orthopedic surgery has been consulted and patient will likely have surgical procedure today.  Patient was seen and examined after she came back up to the floor from the OR holding area due to another emergent procedure she denies any chest pain shortness of breath or any other complaints.  She has ongoing pain in her right hip all the way down to and inclusive of her right knee.  Denies any fevers or chills.  Family is not at bedside on my evaluation.    Findings:     10/21/24 1439   Skin Integumentary    Skin Integrity Ecchymosis   Location BUE   Skin Integrity Site 2   Skin Integrity Location 2 Vascular discoloration  (dry flaky)   Location 2 BLE   Wound 10/19/24 Ankle Left;Medial   Date First Assessed/Time First Assessed: 10/19/24 1610   Present on Original Admission: Yes  Primary Wound Type: Venous Ulcer  Location: Ankle  Wound Location Orientation: Left;Medial   Wound Image    Wound Etiology Venous   Dressing Status New dressing applied   Wound Cleansed Cleansed with saline   Dressing/Treatment ABD;Alginate;Dry dressing;Roll gauze   Wound Length (cm) 7 cm   Wound Width (cm) 3 cm   Wound Depth (cm) 0.1 cm   Wound Surface Area (cm^2) 21 cm^2

## 2024-10-22 ENCOUNTER — TELEPHONE (OUTPATIENT)
Dept: CARDIOLOGY CLINIC | Age: 84
End: 2024-10-22

## 2024-10-22 ENCOUNTER — TELEPHONE (OUTPATIENT)
Dept: ORTHOPEDIC SURGERY | Age: 84
End: 2024-10-22

## 2024-10-22 PROBLEM — S31.000A SACRAL WOUND: Status: ACTIVE | Noted: 2024-10-22

## 2024-10-22 LAB
ALBUMIN SERPL-MCNC: 2.3 G/DL (ref 3.5–5.2)
ALP SERPL-CCNC: 98 U/L (ref 35–104)
ALT SERPL-CCNC: <5 U/L (ref 0–32)
ANION GAP SERPL CALCULATED.3IONS-SCNC: 10 MMOL/L (ref 7–16)
AST SERPL-CCNC: 14 U/L (ref 0–31)
BASOPHILS # BLD: 0 K/UL (ref 0–0.2)
BASOPHILS NFR BLD: 0 % (ref 0–2)
BILIRUB SERPL-MCNC: 0.4 MG/DL (ref 0–1.2)
BUN SERPL-MCNC: 41 MG/DL (ref 6–23)
CALCIUM SERPL-MCNC: 8.6 MG/DL (ref 8.6–10.2)
CHLORIDE SERPL-SCNC: 103 MMOL/L (ref 98–107)
CO2 SERPL-SCNC: 30 MMOL/L (ref 22–29)
CREAT SERPL-MCNC: 1.1 MG/DL (ref 0.5–1)
EOSINOPHIL # BLD: 0 K/UL (ref 0.05–0.5)
EOSINOPHILS RELATIVE PERCENT: 0 % (ref 0–6)
ERYTHROCYTE [DISTWIDTH] IN BLOOD BY AUTOMATED COUNT: 17.3 % (ref 11.5–15)
GFR, ESTIMATED: 49 ML/MIN/1.73M2
GLUCOSE SERPL-MCNC: 88 MG/DL (ref 74–99)
HCT VFR BLD AUTO: 34.2 % (ref 34–48)
HGB BLD-MCNC: 9.9 G/DL (ref 11.5–15.5)
LYMPHOCYTES NFR BLD: 0.6 K/UL (ref 1.5–4)
LYMPHOCYTES RELATIVE PERCENT: 8 % (ref 20–42)
MCH RBC QN AUTO: 25.8 PG (ref 26–35)
MCHC RBC AUTO-ENTMCNC: 28.9 G/DL (ref 32–34.5)
MCV RBC AUTO: 89.1 FL (ref 80–99.9)
MONOCYTES NFR BLD: 0.33 K/UL (ref 0.1–0.95)
MONOCYTES NFR BLD: 4 % (ref 2–12)
NEUTROPHILS NFR BLD: 88 % (ref 43–80)
NEUTS SEG NFR BLD: 6.76 K/UL (ref 1.8–7.3)
PLATELET # BLD AUTO: 317 K/UL (ref 130–450)
PMV BLD AUTO: 10.1 FL (ref 7–12)
POTASSIUM SERPL-SCNC: 3.6 MMOL/L (ref 3.5–5)
PROT SERPL-MCNC: 5.8 G/DL (ref 6.4–8.3)
RBC # BLD AUTO: 3.84 M/UL (ref 3.5–5.5)
RBC # BLD: ABNORMAL 10*6/UL
SODIUM SERPL-SCNC: 143 MMOL/L (ref 132–146)
WBC OTHER # BLD: 7.7 K/UL (ref 4.5–11.5)

## 2024-10-22 PROCEDURE — 97161 PT EVAL LOW COMPLEX 20 MIN: CPT

## 2024-10-22 PROCEDURE — 97165 OT EVAL LOW COMPLEX 30 MIN: CPT

## 2024-10-22 PROCEDURE — 2580000003 HC RX 258

## 2024-10-22 PROCEDURE — 0JH63XZ INSERTION OF TUNNELED VASCULAR ACCESS DEVICE INTO CHEST SUBCUTANEOUS TISSUE AND FASCIA, PERCUTANEOUS APPROACH: ICD-10-PCS | Performed by: INTERNAL MEDICINE

## 2024-10-22 PROCEDURE — 36415 COLL VENOUS BLD VENIPUNCTURE: CPT

## 2024-10-22 PROCEDURE — 6370000000 HC RX 637 (ALT 250 FOR IP)

## 2024-10-22 PROCEDURE — 80053 COMPREHEN METABOLIC PANEL: CPT

## 2024-10-22 PROCEDURE — 97530 THERAPEUTIC ACTIVITIES: CPT

## 2024-10-22 PROCEDURE — 6370000000 HC RX 637 (ALT 250 FOR IP): Performed by: INTERNAL MEDICINE

## 2024-10-22 PROCEDURE — 97535 SELF CARE MNGMENT TRAINING: CPT

## 2024-10-22 PROCEDURE — C1751 CATH, INF, PER/CENT/MIDLINE: HCPCS

## 2024-10-22 PROCEDURE — 6360000002 HC RX W HCPCS: Performed by: INTERNAL MEDICINE

## 2024-10-22 PROCEDURE — 99222 1ST HOSP IP/OBS MODERATE 55: CPT | Performed by: STUDENT IN AN ORGANIZED HEALTH CARE EDUCATION/TRAINING PROGRAM

## 2024-10-22 PROCEDURE — 2060000000 HC ICU INTERMEDIATE R&B

## 2024-10-22 PROCEDURE — 2500000003 HC RX 250 WO HCPCS: Performed by: INTERNAL MEDICINE

## 2024-10-22 PROCEDURE — 2580000003 HC RX 258: Performed by: INTERNAL MEDICINE

## 2024-10-22 PROCEDURE — 85025 COMPLETE CBC W/AUTO DIFF WBC: CPT

## 2024-10-22 PROCEDURE — 99232 SBSQ HOSP IP/OBS MODERATE 35: CPT | Performed by: EMERGENCY MEDICINE

## 2024-10-22 RX ORDER — SODIUM CHLORIDE 0.9 % (FLUSH) 0.9 %
5-40 SYRINGE (ML) INJECTION EVERY 12 HOURS SCHEDULED
Status: DISCONTINUED | OUTPATIENT
Start: 2024-10-22 | End: 2024-10-31 | Stop reason: HOSPADM

## 2024-10-22 RX ORDER — SODIUM CHLORIDE 9 MG/ML
INJECTION, SOLUTION INTRAVENOUS PRN
Status: DISCONTINUED | OUTPATIENT
Start: 2024-10-22 | End: 2024-10-31 | Stop reason: HOSPADM

## 2024-10-22 RX ORDER — LIDOCAINE HYDROCHLORIDE 10 MG/ML
50 INJECTION, SOLUTION EPIDURAL; INFILTRATION; INTRACAUDAL; PERINEURAL ONCE
Status: DISCONTINUED | OUTPATIENT
Start: 2024-10-22 | End: 2024-10-23

## 2024-10-22 RX ORDER — SODIUM CHLORIDE 0.9 % (FLUSH) 0.9 %
5-40 SYRINGE (ML) INJECTION PRN
Status: DISCONTINUED | OUTPATIENT
Start: 2024-10-22 | End: 2024-10-31 | Stop reason: HOSPADM

## 2024-10-22 RX ADMIN — NAFCILLIN SODIUM 2000 MG: 2 INJECTION, POWDER, LYOPHILIZED, FOR SOLUTION INTRAMUSCULAR; INTRAVENOUS at 18:09

## 2024-10-22 RX ADMIN — LEVOTHYROXINE SODIUM 88 MCG: 0.09 TABLET ORAL at 05:33

## 2024-10-22 RX ADMIN — BUMETANIDE 1 MG: 1 TABLET ORAL at 09:31

## 2024-10-22 RX ADMIN — CLOBETASOL PROPIONATE: 0.5 CREAM TOPICAL at 21:55

## 2024-10-22 RX ADMIN — METRONIDAZOLE 500 MG: 500 INJECTION, SOLUTION INTRAVENOUS at 16:50

## 2024-10-22 RX ADMIN — Medication 2000 UNITS: at 09:30

## 2024-10-22 RX ADMIN — SODIUM CHLORIDE, PRESERVATIVE FREE 10 ML: 5 INJECTION INTRAVENOUS at 09:28

## 2024-10-22 RX ADMIN — NAFCILLIN SODIUM 2000 MG: 2 INJECTION, POWDER, LYOPHILIZED, FOR SOLUTION INTRAMUSCULAR; INTRAVENOUS at 13:41

## 2024-10-22 RX ADMIN — CLOBETASOL PROPIONATE: 0.5 CREAM TOPICAL at 09:36

## 2024-10-22 RX ADMIN — METRONIDAZOLE 500 MG: 500 INJECTION, SOLUTION INTRAVENOUS at 05:39

## 2024-10-22 RX ADMIN — OXYCODONE HYDROCHLORIDE AND ACETAMINOPHEN 1 TABLET: 5; 325 TABLET ORAL at 05:33

## 2024-10-22 RX ADMIN — NAFCILLIN SODIUM 2000 MG: 2 INJECTION, POWDER, LYOPHILIZED, FOR SOLUTION INTRAMUSCULAR; INTRAVENOUS at 05:12

## 2024-10-22 RX ADMIN — METOPROLOL TARTRATE 25 MG: 25 TABLET, FILM COATED ORAL at 21:54

## 2024-10-22 RX ADMIN — Medication 1 TABLET: at 09:31

## 2024-10-22 RX ADMIN — COLLAGENASE SANTYL: 250 OINTMENT TOPICAL at 09:29

## 2024-10-22 RX ADMIN — SODIUM CHLORIDE, PRESERVATIVE FREE 10 ML: 5 INJECTION INTRAVENOUS at 21:54

## 2024-10-22 RX ADMIN — NAFCILLIN SODIUM 2000 MG: 2 INJECTION, POWDER, LYOPHILIZED, FOR SOLUTION INTRAMUSCULAR; INTRAVENOUS at 09:28

## 2024-10-22 RX ADMIN — METRONIDAZOLE 500 MG: 500 INJECTION, SOLUTION INTRAVENOUS at 21:52

## 2024-10-22 RX ADMIN — Medication 500 MG: at 09:31

## 2024-10-22 RX ADMIN — NAFCILLIN SODIUM 2000 MG: 2 INJECTION, POWDER, LYOPHILIZED, FOR SOLUTION INTRAMUSCULAR; INTRAVENOUS at 21:53

## 2024-10-22 RX ADMIN — CALCIUM CARBONATE-VITAMIN D TAB 500 MG-200 UNIT 1 TABLET: 500-200 TAB at 09:31

## 2024-10-22 RX ADMIN — OXYCODONE HYDROCHLORIDE AND ACETAMINOPHEN 1 TABLET: 5; 325 TABLET ORAL at 12:06

## 2024-10-22 RX ADMIN — METOPROLOL TARTRATE 25 MG: 25 TABLET, FILM COATED ORAL at 09:30

## 2024-10-22 RX ADMIN — FERROUS SULFATE TAB 325 MG (65 MG ELEMENTAL FE) 325 MG: 325 (65 FE) TAB at 09:30

## 2024-10-22 RX ADMIN — OXYCODONE HYDROCHLORIDE AND ACETAMINOPHEN 1 TABLET: 5; 325 TABLET ORAL at 18:49

## 2024-10-22 ASSESSMENT — PAIN DESCRIPTION - LOCATION
LOCATION: BACK
LOCATION: COCCYX;HIP;BACK
LOCATION: BUTTOCKS;COCCYX

## 2024-10-22 ASSESSMENT — PAIN SCALES - GENERAL
PAINLEVEL_OUTOF10: 0
PAINLEVEL_OUTOF10: 0
PAINLEVEL_OUTOF10: 3
PAINLEVEL_OUTOF10: 0
PAINLEVEL_OUTOF10: 0
PAINLEVEL_OUTOF10: 8
PAINLEVEL_OUTOF10: 0
PAINLEVEL_OUTOF10: 0
PAINLEVEL_OUTOF10: 10
PAINLEVEL_OUTOF10: 0
PAINLEVEL_OUTOF10: 7
PAINLEVEL_OUTOF10: 0

## 2024-10-22 ASSESSMENT — PAIN - FUNCTIONAL ASSESSMENT
PAIN_FUNCTIONAL_ASSESSMENT: PREVENTS OR INTERFERES WITH ALL ACTIVE AND SOME PASSIVE ACTIVITIES
PAIN_FUNCTIONAL_ASSESSMENT: ACTIVITIES ARE NOT PREVENTED
PAIN_FUNCTIONAL_ASSESSMENT: ACTIVITIES ARE NOT PREVENTED

## 2024-10-22 ASSESSMENT — PAIN DESCRIPTION - ONSET: ONSET: ON-GOING

## 2024-10-22 ASSESSMENT — PAIN DESCRIPTION - FREQUENCY: FREQUENCY: INTERMITTENT

## 2024-10-22 ASSESSMENT — PAIN DESCRIPTION - DESCRIPTORS
DESCRIPTORS: ACHING;CRAMPING;DISCOMFORT
DESCRIPTORS: ACHING;BURNING

## 2024-10-22 ASSESSMENT — PAIN DESCRIPTION - ORIENTATION
ORIENTATION: MID
ORIENTATION: LOWER
ORIENTATION: RIGHT;LEFT;MID

## 2024-10-22 ASSESSMENT — PAIN SCALES - WONG BAKER
WONGBAKER_NUMERICALRESPONSE: NO HURT
WONGBAKER_NUMERICALRESPONSE: NO HURT

## 2024-10-22 NOTE — PROGRESS NOTES
0138 Reviewed chart for ordered PICC line. Procedure unable to be completed today, will tentatively plan to place PICC line tomorrow. Spoke with floor RN, Anita, and patient is oriented and able to sign her consent. Will need new INR prior to procedure.     IR will call floor when patient is able to come down for procedure. Instructed RN to keep patient NPO after midnight and continue holding all blood thinners including NSAIDS for procedure. Patient also planned to have bedside debridement tomorrow, will really information to charge tech to coordinate times.    Interventional Radiology RN ext.[phone]

## 2024-10-22 NOTE — PROGRESS NOTES
Spiritual Health History and Assessment/Progress Note  [facility]    (P) Initial Encounter, Spiritual/Emotional Needs, Palliative Care,  ,  ,      Name: Gabi Madrigal MRN: [id number]    Age: 84 y.o.     Sex: female   Language: English   Scientology: Oriental orthodox   Effusion, right knee     Date: 10/22/2024                           Spiritual Assessment began in [facility]        Referral/Consult From: (P) Rounding, Palliative Care   Encounter Overview/Reason: (P) Initial Encounter, Spiritual/Emotional Needs, Palliative Care  Service Provided For: (P) Patient    Tata, Belief, Meaning:   Patient identifies as spiritual, has beliefs or practices that help with coping during difficult times, and Other: Mrs. Madrigal is of the Oriental orthodox [employer].    Family/Friends No family/friends present      Importance and Influence:  Patient has spiritual/personal beliefs that influence decisions regarding their health  Family/Friends No family/friends present    Community:  Patient is connected with a spiritual community and feels well-supported. Support system includes: Children and Extended family  Family/Friends No family/friends present    Assessment and Plan of Care:     Patient Interventions include: Facilitated expression of thoughts and feelings, Explored spiritual coping/struggle/distress, Engaged in theological reflection, Affirmed coping skills/support systems, and Other: We talked while team was putting in a pick line.  She expressed some stress relating to her health and slow recovery from her injuries. I offered words of encouragement and prayer.   Family/Friends Interventions include: No family/friends present    Patient Plan of Care: Spiritual Care available upon further referral  Family/Friends Plan of Care: No family/friends present    Electronically signed by DAYA Butler on 10/22/2024 at 11:20 AM

## 2024-10-22 NOTE — PLAN OF CARE
Problem: ABCDS Injury Assessment  Goal: Absence of physical injury  Outcome: Progressing     Problem: Skin/Tissue Integrity  Goal: Absence of new skin breakdown  Description: 1.  Monitor for areas of redness and/or skin breakdown  2.  Assess vascular access sites hourly  3.  Every 4-6 hours minimum:  Change oxygen saturation probe site  4.  Every 4-6 hours:  If on nasal continuous positive airway pressure, respiratory therapy assess nares and determine need for appliance change or resting period.  Outcome: Progressing     Problem: Safety - Adult  Goal: Free from fall injury  Outcome: Progressing  Flowsheets (Taken 10/22/2024 1223)  Free From Fall Injury: Instruct family/caregiver on patient safety     Problem: Discharge Planning  Goal: Discharge to home or other facility with appropriate resources  Outcome: Progressing     Problem: Pain  Goal: Verbalizes/displays adequate comfort level or baseline comfort level  Outcome: Progressing     Problem: Nutrition Deficit:  Goal: Optimize nutritional status  Outcome: Progressing

## 2024-10-22 NOTE — PLAN OF CARE
Problem: ABCDS Injury Assessment  Goal: Absence of physical injury  10/21/2024 2124 by Lazaro Del Angel RN  Outcome: Progressing  Flowsheets (Taken 10/21/2024 2124)  Absence of Physical Injury: Implement safety measures based on patient assessment  10/21/2024 1236 by Lucy Philippe RN  Outcome: Progressing     Problem: Skin/Tissue Integrity  Goal: Absence of new skin breakdown  Description: 1.  Monitor for areas of redness and/or skin breakdown  2.  Assess vascular access sites hourly  3.  Every 4-6 hours minimum:  Change oxygen saturation probe site  4.  Every 4-6 hours:  If on nasal continuous positive airway pressure, respiratory therapy assess nares and determine need for appliance change or resting period.  10/21/2024 2124 by Lazaro Del Angel RN  Outcome: Progressing  10/21/2024 1236 by Lucy Philippe RN  Outcome: Progressing     Problem: Safety - Adult  Goal: Free from fall injury  10/21/2024 2124 by Lazaro Del Angel RN  Outcome: Progressing  Flowsheets (Taken 10/21/2024 2124)  Free From Fall Injury: Instruct family/caregiver on patient safety  10/21/2024 1236 by Lucy Philippe RN  Outcome: Progressing     Problem: Discharge Planning  Goal: Discharge to home or other facility with appropriate resources  10/21/2024 2124 by Lazaro Del Angel RN  Outcome: Progressing  Flowsheets (Taken 10/21/2024 2011)  Discharge to home or other facility with appropriate resources:   Identify barriers to discharge with patient and caregiver   Arrange for needed discharge resources and transportation as appropriate  10/21/2024 1236 by Lucy Philippe RN  Outcome: Progressing     Problem: Pain  Goal: Verbalizes/displays adequate comfort level or baseline comfort level  10/21/2024 2124 by Lazaro Del Angel RN  Outcome: Progressing  Flowsheets (Taken 10/21/2024 2124)  Verbalizes/displays adequate comfort level or baseline comfort level:   Encourage patient to monitor pain and request assistance   Assess pain using appropriate pain

## 2024-10-22 NOTE — TELEPHONE ENCOUNTER
Patient's daughter Sharon called stating she was under the impression that her mother was to have a 2D Echo of heart done while in hospital.  She spoke with nursing staff on floor who states they have no record on US being requested.      LM on patient's VM to call Dr Sweeney's office as he was cardiologist who was consulted for her mother.  Gave her phone # to his office  [phone] to inquire.     Message forwarded to Dr Newton

## 2024-10-22 NOTE — PROGRESS NOTES
stability/mobility, normalization of muscle tone, and facilitation of volitional active controled movement  * Positioning to improve skin integrity, interaction with environment and functional independence  * Delirium prevention/treatment  * Manual techniques for edema management    Recommended Adaptive Equipment/DME: Lb AE, TBD     Home Living: Pt lives alone in 2 story home with 2-3 NATASHA and bed and bath on main level.   Bathroom setup: tub/shower   Equipment owned: grab bars, shower chair, elevated toilet seat, power WC, manual WC, FWW, SPC    Prior Level of Function: Ind with ADLs , Assist with IADLs; Used FWW/WC for functional mobility.  Driving: yes  Occupation: none stated    Pain Level: 0/10 pain at rest- no c/o pain throughout session.  Cognition: A&O: 4/4; Follows 2 step directions   Memory: G-   Sequencing: G-   Problem solving: F+   Judgement/safety: F+    Vitals Assessed: WFL  SpO2: 87% on RA at EOB- pt placed on 1 L increasing to 97%. >90% throughout session on 1 L         Functional Assessment:  AM-PAC Daily Activity Raw Score: 11/24   Initial Eval Status  Date: 10/22/24 Treatment Status  Date: STGs = LTGs  Time frame: 10-14 days   Feeding SBA     Modified [place]    Grooming Moderate Assist   Limited by shoulder ROM to reach head/neck area. Pt educated on adaptive equipment.    Stand by Assist    UB Dressing Moderate Assist     Stand by Assist    LB Dressing Dependent     Moderate Assist    Bathing Maximal Assist    Minimal Assist    Toileting Dependent   Pt incontinent of bowel with attempted stand- requiring total assist for posterior hygiene and linen change- RN notified of soiled knee immobilizer- to contact ortho.    Moderate Assist    Bed Mobility  Supine to sit: Moderate Assist x2  Sit to supine: Moderate Assist x2     Supine to sit: Moderate Assist   Sit to supine: Moderate Assist    Functional Transfers Sit to stand: Max A x2  Unable to achieve hip clearance from EOB while maintaining

## 2024-10-22 NOTE — TELEPHONE ENCOUNTER
Patient daughter called stating patient went to the ER on 10/18/2024  send patient.Had a CT scan. Daughter states that patient is full fluid.  Patient and daughter was told she would be getting an Echo done while in the ER and now being told patient isn't getting one. Daughter wants to know why not.     Please advise   Mendy   [phone]

## 2024-10-22 NOTE — PROGRESS NOTES
[facility]  [facility]  Progress Note      Chief Complaint   Patient presents with    Joint Swelling     Patient sent from  with fluid around knee , sent to have knee drained        SUBJECTIVE:    Patient is tolerating medications. No reported adverse drug reactions.  No nausea, vomiting, diarrhea.    Review of systems:  As stated above in the chief complaint, otherwise negative.    Medications:  Scheduled Meds:   nafcillin  2,000 mg IntraVENous Q4H    metroNIDAZOLE  500 mg IntraVENous Q8H    sodium chloride flush  5-40 mL IntraVENous 2 times per day    lidocaine 1 % injection  50 mg IntraDERmal Once    collagenase   Topical Daily    [Held by provider] apixaban  5 mg Oral BID    bumetanide  1 mg Oral Daily    oyster shell calcium w/D  1 tablet Oral Daily    clobetasol   Topical BID    ferrous sulfate  325 mg Oral Daily with breakfast    levothyroxine  88 mcg Oral Daily    lidocaine  1 patch TransDERmal Daily    metoprolol tartrate  25 mg Oral BID    therapeutic multivitamin-minerals  1 tablet Oral Daily    vitamin C  500 mg Oral Daily    vitamin D  2,000 Units Oral Daily    sodium chloride flush  5-40 mL IntraVENous 2 times per day     Continuous Infusions:   sodium chloride      sodium chloride      sodium chloride       PRN Meds:sodium chloride flush, sodium chloride, sodium chloride, oxyCODONE-acetaminophen, morphine **OR** morphine, sodium chloride flush, sodium chloride, potassium chloride **OR** potassium alternative oral replacement, magnesium sulfate, ondansetron **OR** ondansetron, polyethylene glycol, acetaminophen **OR** acetaminophen    OBJECTIVE:  BP (!) 143/80   Pulse 89   Temp 98 °F (36.7 °C) (Temporal)   Resp 21   Ht 1.6 m (5' 3\")   Wt 90.7 kg (200 lb)   SpO2 97%   BMI 35.43 kg/m²   Temp  Av.5 °F (36.4 °C)  Min: 97 °F (36.1 °C)  Max: 98.2 °F (36.8 °C)  Constitutional: The patient is awake, alert, and oriented.   Skin: Warm and dry. No rashes were noted. No  Methicillin resistant Staph aureus not isolated  Final   08/08/2014 Methicillin resistant Staph aureus not isolated  Final       Assessment:  Right knee PJI  ORIF right distal femur 07/17/2024 was found to have swelling and drainage from her right leg 3 months after distal femur fixation.  CT scan outpatient showed fluid collection around lateral thigh and right knee.  Status post excisional debridement of the right leg area skin subcutaneous tissue fat fascia and bone, right knee arthrotomy with I&D, right distal femur hardware retention 10/19.  Bacteroides fragilis bacteremia  MSSA prosthetic joint infection  Decub Ulcer   LLL PNA      Plan:    Continue nafcillin and Flagyl  CT abdomen pelvis didn't showed a clear source of infection to explain bacteroides bacteremia   Anticipate 6 weeks of IV antibiotic therapy  PICC line ordered  ID will continue to follow    Octavio Avilez MD  3:59 PM  10/22/2024

## 2024-10-22 NOTE — PROGRESS NOTES
GENERAL SURGERY  DAILY PROGRESS NOTE    Patient's Name/Date of Birth: Gabi Madrigal / 1940    Date: 2024     Chief Complaint   Patient presents with    Joint Swelling     Patient sent from  with fluid around knee , sent to have knee drained         Subjective:  Resting in bed. GCS 15. States she is having pain in her backside where this wound recently developed since she had orthopedic surgery on her leg.       Objective:  Last 24Hrs  Temp  Av.2 °F (36.2 °C)  Min: 97 °F (36.1 °C)  Max: 97.5 °F (36.4 °C)  Resp  Av  Min: 14  Max: 20  Pulse  Av  Min: 84  Max: 97  Systolic (24hrs), Av , Min:116 , Max:134     Diastolic (24hrs), Av, Min:60, Max:88    SpO2  Av.5 %  Min: 93 %  Max: 97 %    I/O last 3 completed shifts:  In: 549.4 [IV Piggyback:549.4]  Out: 1800 [Urine:1800]      General: In no acute distress, resting in bed  Cardiovascular: Warm throughout, no edema  Respiratory: no respiratory distress, equal chest rise  Abdomen: soft,  nontender, nondistended  Extremities: wound vac in place on RLE      CBC  Recent Labs     10/20/24  0239 10/21/24  0458 10/22/24  0609   WBC 14.4* 12.4* 7.7   RBC 3.88 3.42* 3.84   HGB 9.9* 8.8* 9.9*   HCT 33.3* 30.1* 34.2   MCV 85.8 88.0 89.1   MCH 25.5* 25.7* 25.8*   MCHC 29.7* 29.2* 28.9*   RDW 17.5* 17.3* 17.3*    297 317   MPV 9.8 9.8 10.1       CMP  Recent Labs     10/20/24  0239 10/21/24  0458 10/22/24  0609    135 143   K 4.9 4.1 3.6   CL 98 99 103   CO2 26 27 30*   BUN 41* 42* 41*   CREATININE 1.0 1.2* 1.1*   GLUCOSE 152* 104* 88   CALCIUM 8.2* 8.0* 8.6   BILITOT 0.4 0.2 0.4   ALKPHOS 112* 91 98   AST 16 16 14   ALT 6 <5 <5         Assessment/Plan:    Patient Active Problem List   Diagnosis    History of DVT (deep vein thrombosis)    Essential hypertension    Acquired hypothyroidism    Closed fracture of right iliac wing (HCC)    Chronic anticoagulation    NSTEMI (non-ST elevated myocardial infarction) (HCC)

## 2024-10-22 NOTE — PROGRESS NOTES
Attempted to place picc in right and left arm unable to advance guidewire, vein are branching  Recommend IR for picc.

## 2024-10-22 NOTE — PROGRESS NOTES
[facility]   Progress note      Subjective:    Resting comfortably in bed  States that therapy worked with her and she is in a bit more pain today    Objective:    BP (!) 143/80   Pulse 89   Temp 98 °F (36.7 °C) (Temporal)   Resp 21   Ht 1.6 m (5' 3\")   Wt 90.7 kg (200 lb)   SpO2 97%   BMI 35.43 kg/m²     In: 549.4   Out: 1800   In: 549.4   Out: 1800 [Urine:1800]    General appearance: NAD, conversant  HEENT: AT/NC, MMM  Neck: FROM, supple  Lungs: Clear to auscultation  CV: RRR, no MRGs  Vasc: Radial pulses 2+  Abdomen: Soft, non-tender; no masses or HSM  Extremities: Status post surgical intervention excisional debridement drainage by orthopedics yesterday  Skin:    Psych: Alert and oriented to person, place and time  Neuro: Alert and interactive     Recent Labs     10/20/24  0239 10/21/24  0458 10/22/24  0609   WBC 14.4* 12.4* 7.7   HGB 9.9* 8.8* 9.9*   HCT 33.3* 30.1* 34.2    297 317       Recent Labs     10/20/24  0239 10/21/24  0458 10/22/24  0609    135 143   K 4.9 4.1 3.6   CL 98 99 103   CO2 26 27 30*   BUN 41* 42* 41*   CREATININE 1.0 1.2* 1.1*   CALCIUM 8.2* 8.0* 8.6       Assessment:    Principal Problem:    Effusion, right knee  Active Problems:    Infected hardware in right leg, initial encounter ([facility])    Chronic heart failure with preserved ejection fraction ([facility])    Sacral wound  Resolved Problems:    * No resolved hospital problems. *      Plan:    84 year old female admitted to med surg unit with      Right knee effusion/infected hardware  Pain control  Consult orthopedic surgery-surgical procedure planned for today  Encourage ISP  Bowel regimen  IV hydration postop  Consult ID-postop for IV antibiotics  From medicine standpoint okay to proceed with orthopedic incision and drainage of infected hardware today  Follow inflammatory markers including sed rate/CRP/lactate and procalcitonin     A-fib  Continue metoprolol  Eliquis on hold will resume once cleared by

## 2024-10-22 NOTE — PROGRESS NOTES
Physical Therapy  Physical Therapy Initial Assessment     Name: Gabi Madrigal  : 1940  MRN: [id number]      Date of Service: 10/22/2024    Evaluating PT:  Desiree Mills PT, DPT [id number]    Room #:  7408/7408-A  Diagnosis:  Effusion of right knee [M25.461]  Infected hardware in right leg, initial encounter ([facility]) [T84.7XXA]  Effusion, right knee [M25.461]  PMHx/PSHx:   has a past medical history of Acquired hypothyroidism, Arthritis, Blood transfusion reaction, Chronic kidney disease, History of blood transfusion, Hypertension, Lymphedema of both lower extremities, Open wound of left great toe, Other disorders of kidney and ureter in diseases classified elsewhere, Pelvic fracture ([facility]), Positive culture findings in wound, Skin ulcer of left calf with fat layer exposed ([facility]), Skin ulcer of left calf, limited to breakdown of skin ([facility]), Ulcer of left lower leg ([facility]), and Venous stasis ulcer of left calf limited to breakdown of skin ([facility]).  Procedure/Surgery:  Irrigation and debridement of R distal femur (10/19/24)  Precautions:  Fall risk, alarms, NWB RLE, knee immobilizer locked at 30 degrees of flexion (not present at time of eval - pt in soft KI), R knee wound vac, sacral wound, O2, incontinent  Equipment Needs:  TBD    SUBJECTIVE:    Pt lives alone in a 2 story home with 2 steps to enter and 2 rail(s). Pt has a first-floor setup. Pt ambulated short distances PTA. She does report spending most of her time in her power versus manual WC. Pt owns a FWW, a SPC, a powered WC, and a manual WC.    OBJECTIVE:   Initial Evaluation  Date: 10/22/24 Treatment Date:  Short Term/ Long Term   Goals   AM-PAC 6 Clicks      Was pt agreeable to Eval/treatment? Yes     Does pt have pain? Mild R knee pain, increased with mobility     Bed Mobility  Rolling: ModAx2  Supine to sit: ModAx2  Sit to supine: ModAx2  Scooting: ModA  Rolling: Independent  Supine to sit: Independent  Sit to supine: Independent  Scooting: Independent

## 2024-10-22 NOTE — CARE COORDINATION
10/22/24 Update CM Note. Pt admitted right knee effusion S/P right distal femur I&D 10/19/24. Eliquis on hold for bedside sacral debridement scheduled for 10/23/24 ID following, pt will require 6 week antibiotic therapy.  Pt private pay bed hold from [facility] and has no needs for return.   Jaleesa BELLEN RN-BC  [phone]

## 2024-10-22 NOTE — PROGRESS NOTES
Palliative Care Department  [phone]  Palliative Care Progress Note  Provider Emmy Hermosillo DO    Gabi Madrigal  [id number]  Hospital Day: 5  Date of Initial Consult: 10/19/2024  Referring Provider: Aquilino Dyer DO  Palliative Medicine was consulted for assistance with: Goals of care, end stage disease     HPI:   Gabi Madrigal is a 84 y.o. with a medical history of hypothyroidism, hypertension, lymphedema, pelvic fracture, venous stasis ulcer of left, cellulitis of right knee-infected hardware  who was admitted on 10/18/2024 from home with a CHIEF COMPLAINT of joint swelling. Patient does have a history of a right femur ORIF in September 28th. She has had recurrent cellulitis of this right lower extremity.  Patient had outpatient CT ordered by orthopedic surgery that showed questionable infection and effusion around fracture and joint.  Patient was directed to come to the ED. Orthopedic surgery has been consulted and patient will likely have surgical procedure today. Underwent excisional debridement right leg, right knee arthrotomy with irrigation debridement and synovectomy and right distal femur hardware retention.  ASSESSMENT/PLAN:     Pertinent Hospital Diagnoses     Right knee effusion/infected hardware     Palliative Care Encounter / Counseling Regarding Goals of Care  Please see detailed goals of care discussion as below  At this time, Gabi Madrigal, Does have capacity for medical decision-making.  Capacity is time limited and situation/question specific  During encounter there was no surrogate medical decision-maker needed  Outcome of goals of care meeting:   Continue current management  Plan for bedside sacral wound debridement with general surgery tomorrow.  PICC placement  Goal to go to rehab with ultimate goal to return home  Code status DNR-CCA  Advanced Directives: no POA or living will in [facility]  Surrogate/Legal NOK:  Mendy Armando (child) [phone]  Silverio Kaitlin (child)

## 2024-10-22 NOTE — PLAN OF CARE
Problem: ABCDS Injury Assessment  Goal: Absence of physical injury  10/22/2024 1454 by Jaleesa Moreno RN  Outcome: Progressing     Problem: Skin/Tissue Integrity  Goal: Absence of new skin breakdown  Description: 1.  Monitor for areas of redness and/or skin breakdown  2.  Assess vascular access sites hourly  3.  Every 4-6 hours minimum:  Change oxygen saturation probe site  4.  Every 4-6 hours:  If on nasal continuous positive airway pressure, respiratory therapy assess nares and determine need for appliance change or resting period.  10/22/2024 1454 by Jaleesa Moreno RN  Outcome: Progressing     Problem: Safety - Adult  Goal: Free from fall injury  10/22/2024 1454 by Jaleesa Moreno RN  Outcome: Progressing     Problem: Discharge Planning  Goal: Discharge to home or other facility with appropriate resources  10/22/2024 1454 by Jaleesa Moreno RN  Outcome: Progressing     Problem: Pain  Goal: Verbalizes/displays adequate comfort level or baseline comfort level  10/22/2024 1454 by Jaleesa Moreno RN  Outcome: Progressing     Problem: Nutrition Deficit:  Goal: Optimize nutritional status  10/22/2024 1454 by Jaleesa Moreno RN  Outcome: Progressing

## 2024-10-22 NOTE — CONSULTS
Comprehensive Nutrition Assessment    Type and Reason for Visit:  Initial, Consult    Nutrition Recommendations/Plan:   Continue current diet   Start Manfred BID & Ensure BID     Malnutrition Assessment:  Malnutrition Status:  At risk for malnutrition (Comment) (10/22/24 1121)    Context:  Acute Illness     Findings of the 6 clinical characteristics of malnutrition:  Energy Intake:  Mild decrease in energy intake (Comment) (since admit)  Weight Loss:  Unable to assess (d/t wt fluctuations)     Body Fat Loss:  Unable to assess     Muscle Mass Loss:  Unable to assess    Fluid Accumulation:  No significant fluid accumulation     Strength:  Not Performed    Nutrition Assessment:    Pt admit d/t infected harware/R knee effusion. Pt s/p recent ORIF R femur, now s/p I&D R leg. Multiple pressure injuries & wound vac in place to R leg. PMHx lymphedema, CHF, CKD. Will add ONS and continue to monitor.    Nutrition Related Findings:    pt alert, abd WDL, +2 pitting edema, fluids WDL   Wound Type: Multiple, Pressure Injury, Unstageable, Surgical Incision, Wound Vac       Current Nutrition Intake & Therapies:    Average Meal Intake: 51-75% (average per doc flow)  Average Supplements Intake: None Ordered  ADULT DIET; Regular; Low Sodium (2 gm)    Anthropometric Measures:  Height: 160 cm (5' 3\")  Ideal Body Weight (IBW): 115 lbs (52 kg)    Admission Body Weight: 90.7 kg (200 lb) (10/18 stated wt)  Current Body Weight: 85.7 kg (189 lb) (recent dry measured wt used as CBW appears elevated), 164.3 % IBW.    Current BMI (kg/m2): 33.5  Usual Body Weight: 80.7 kg (178 lb) (4/2024 bed scale per EMR)  % Weight Change (Calculated): 6.2                    BMI Categories: Obese Class 1 (BMI 30.0-34.9)    Estimated Daily Nutrient Needs:  Energy Requirements Based On: Formula  Weight Used for Energy Requirements: Current (recent dry wt)  Energy (kcal/day): MSJ x 1.3 SF = 6398-6557  Weight Used for Protein Requirements: Ideal  Protein (g/day):

## 2024-10-23 LAB
ALBUMIN SERPL-MCNC: 1.7 G/DL (ref 3.5–5.2)
ALP SERPL-CCNC: 78 U/L (ref 35–104)
ALT SERPL-CCNC: <5 U/L (ref 0–32)
ANION GAP SERPL CALCULATED.3IONS-SCNC: 7 MMOL/L (ref 7–16)
ANION GAP SERPL CALCULATED.3IONS-SCNC: 9 MMOL/L (ref 7–16)
AST SERPL-CCNC: 11 U/L (ref 0–31)
BASOPHILS # BLD: 0.02 K/UL (ref 0–0.2)
BASOPHILS NFR BLD: 0 % (ref 0–2)
BILIRUB SERPL-MCNC: 0.6 MG/DL (ref 0–1.2)
BUN SERPL-MCNC: 42 MG/DL (ref 6–23)
BUN SERPL-MCNC: 43 MG/DL (ref 6–23)
CALCIUM SERPL-MCNC: 7.8 MG/DL (ref 8.6–10.2)
CALCIUM SERPL-MCNC: 8.1 MG/DL (ref 8.6–10.2)
CHLORIDE SERPL-SCNC: 101 MMOL/L (ref 98–107)
CHLORIDE SERPL-SCNC: 102 MMOL/L (ref 98–107)
CO2 SERPL-SCNC: 28 MMOL/L (ref 22–29)
CO2 SERPL-SCNC: 29 MMOL/L (ref 22–29)
CREAT SERPL-MCNC: 1.1 MG/DL (ref 0.5–1)
CREAT SERPL-MCNC: 1.3 MG/DL (ref 0.5–1)
EOSINOPHIL # BLD: 0.12 K/UL (ref 0.05–0.5)
EOSINOPHILS RELATIVE PERCENT: 1 % (ref 0–6)
ERYTHROCYTE [DISTWIDTH] IN BLOOD BY AUTOMATED COUNT: 17.4 % (ref 11.5–15)
GFR, ESTIMATED: 41 ML/MIN/1.73M2
GFR, ESTIMATED: 48 ML/MIN/1.73M2
GLUCOSE SERPL-MCNC: 115 MG/DL (ref 74–99)
GLUCOSE SERPL-MCNC: 72 MG/DL (ref 74–99)
HCT VFR BLD AUTO: 30.3 % (ref 34–48)
HGB BLD-MCNC: 8.9 G/DL (ref 11.5–15.5)
IMM GRANULOCYTES # BLD AUTO: 0.08 K/UL (ref 0–0.58)
IMM GRANULOCYTES NFR BLD: 1 % (ref 0–5)
INR PPP: 1.5
LYMPHOCYTES NFR BLD: 1.05 K/UL (ref 1.5–4)
LYMPHOCYTES RELATIVE PERCENT: 12 % (ref 20–42)
MAGNESIUM SERPL-MCNC: 1.9 MG/DL (ref 1.6–2.6)
MCH RBC QN AUTO: 25.9 PG (ref 26–35)
MCHC RBC AUTO-ENTMCNC: 29.4 G/DL (ref 32–34.5)
MCV RBC AUTO: 88.1 FL (ref 80–99.9)
MICROORGANISM SPEC CULT: NORMAL
MONOCYTES NFR BLD: 0.52 K/UL (ref 0.1–0.95)
MONOCYTES NFR BLD: 6 % (ref 2–12)
NEUTROPHILS NFR BLD: 79 % (ref 43–80)
NEUTS SEG NFR BLD: 6.68 K/UL (ref 1.8–7.3)
PLATELET # BLD AUTO: 242 K/UL (ref 130–450)
PMV BLD AUTO: 9.6 FL (ref 7–12)
POTASSIUM SERPL-SCNC: 2.7 MMOL/L (ref 3.5–5)
POTASSIUM SERPL-SCNC: 3.2 MMOL/L (ref 3.5–5)
PROT SERPL-MCNC: 4.6 G/DL (ref 6.4–8.3)
PROTHROMBIN TIME: 16.4 SEC (ref 9.3–12.4)
RBC # BLD AUTO: 3.44 M/UL (ref 3.5–5.5)
SERVICE CMNT-IMP: NORMAL
SODIUM SERPL-SCNC: 138 MMOL/L (ref 132–146)
SODIUM SERPL-SCNC: 138 MMOL/L (ref 132–146)
SPECIMEN DESCRIPTION: NORMAL
WBC OTHER # BLD: 8.5 K/UL (ref 4.5–11.5)

## 2024-10-23 PROCEDURE — L1832 KO ADJ JNT POS R SUP PRE CST: HCPCS

## 2024-10-23 PROCEDURE — 2580000003 HC RX 258: Performed by: INTERNAL MEDICINE

## 2024-10-23 PROCEDURE — 6370000000 HC RX 637 (ALT 250 FOR IP)

## 2024-10-23 PROCEDURE — 97530 THERAPEUTIC ACTIVITIES: CPT

## 2024-10-23 PROCEDURE — 87070 CULTURE OTHR SPECIMN AEROBIC: CPT

## 2024-10-23 PROCEDURE — 0JB70ZZ EXCISION OF BACK SUBCUTANEOUS TISSUE AND FASCIA, OPEN APPROACH: ICD-10-PCS

## 2024-10-23 PROCEDURE — 86403 PARTICLE AGGLUT ANTBDY SCRN: CPT

## 2024-10-23 PROCEDURE — 51702 INSERT TEMP BLADDER CATH: CPT

## 2024-10-23 PROCEDURE — 36415 COLL VENOUS BLD VENIPUNCTURE: CPT

## 2024-10-23 PROCEDURE — 99232 SBSQ HOSP IP/OBS MODERATE 35: CPT | Performed by: STUDENT IN AN ORGANIZED HEALTH CARE EDUCATION/TRAINING PROGRAM

## 2024-10-23 PROCEDURE — 87075 CULTR BACTERIA EXCEPT BLOOD: CPT

## 2024-10-23 PROCEDURE — 6360000002 HC RX W HCPCS: Performed by: INTERNAL MEDICINE

## 2024-10-23 PROCEDURE — 2500000003 HC RX 250 WO HCPCS: Performed by: INTERNAL MEDICINE

## 2024-10-23 PROCEDURE — 85610 PROTHROMBIN TIME: CPT

## 2024-10-23 PROCEDURE — 97535 SELF CARE MNGMENT TRAINING: CPT

## 2024-10-23 PROCEDURE — 80048 BASIC METABOLIC PNL TOTAL CA: CPT

## 2024-10-23 PROCEDURE — 85025 COMPLETE CBC W/AUTO DIFF WBC: CPT

## 2024-10-23 PROCEDURE — 83735 ASSAY OF MAGNESIUM: CPT

## 2024-10-23 PROCEDURE — 6360000002 HC RX W HCPCS

## 2024-10-23 PROCEDURE — 2060000000 HC ICU INTERMEDIATE R&B

## 2024-10-23 PROCEDURE — 2700000000 HC OXYGEN THERAPY PER DAY

## 2024-10-23 PROCEDURE — 80053 COMPREHEN METABOLIC PANEL: CPT

## 2024-10-23 PROCEDURE — 6370000000 HC RX 637 (ALT 250 FOR IP): Performed by: NURSE PRACTITIONER

## 2024-10-23 PROCEDURE — 87077 CULTURE AEROBIC IDENTIFY: CPT

## 2024-10-23 PROCEDURE — 87205 SMEAR GRAM STAIN: CPT

## 2024-10-23 RX ORDER — POTASSIUM CHLORIDE 1500 MG/1
40 TABLET, EXTENDED RELEASE ORAL 2 TIMES DAILY WITH MEALS
Status: COMPLETED | OUTPATIENT
Start: 2024-10-23 | End: 2024-10-23

## 2024-10-23 RX ORDER — MICONAZOLE NITRATE 20 MG/G
CREAM TOPICAL 2 TIMES DAILY
Status: DISCONTINUED | OUTPATIENT
Start: 2024-10-23 | End: 2024-10-31 | Stop reason: HOSPADM

## 2024-10-23 RX ORDER — LIDOCAINE HYDROCHLORIDE AND EPINEPHRINE 10; 10 MG/ML; UG/ML
20 INJECTION, SOLUTION INFILTRATION; PERINEURAL ONCE
Status: DISCONTINUED | OUTPATIENT
Start: 2024-10-23 | End: 2024-10-31 | Stop reason: HOSPADM

## 2024-10-23 RX ADMIN — METOPROLOL TARTRATE 25 MG: 25 TABLET, FILM COATED ORAL at 10:26

## 2024-10-23 RX ADMIN — NAFCILLIN SODIUM 2000 MG: 2 INJECTION, POWDER, LYOPHILIZED, FOR SOLUTION INTRAMUSCULAR; INTRAVENOUS at 04:21

## 2024-10-23 RX ADMIN — METOPROLOL TARTRATE 25 MG: 25 TABLET, FILM COATED ORAL at 20:39

## 2024-10-23 RX ADMIN — NAFCILLIN SODIUM 2000 MG: 2 INJECTION, POWDER, LYOPHILIZED, FOR SOLUTION INTRAMUSCULAR; INTRAVENOUS at 02:24

## 2024-10-23 RX ADMIN — LEVOTHYROXINE SODIUM 88 MCG: 0.09 TABLET ORAL at 05:21

## 2024-10-23 RX ADMIN — MICONAZOLE NITRATE: 20 CREAM TOPICAL at 20:55

## 2024-10-23 RX ADMIN — NAFCILLIN SODIUM 2000 MG: 2 INJECTION, POWDER, LYOPHILIZED, FOR SOLUTION INTRAMUSCULAR; INTRAVENOUS at 17:25

## 2024-10-23 RX ADMIN — OXYCODONE HYDROCHLORIDE AND ACETAMINOPHEN 1 TABLET: 5; 325 TABLET ORAL at 14:30

## 2024-10-23 RX ADMIN — METRONIDAZOLE 500 MG: 500 INJECTION, SOLUTION INTRAVENOUS at 05:19

## 2024-10-23 RX ADMIN — MORPHINE SULFATE 4 MG: 4 INJECTION, SOLUTION INTRAMUSCULAR; INTRAVENOUS at 06:15

## 2024-10-23 RX ADMIN — POTASSIUM CHLORIDE 40 MEQ: 1500 TABLET, EXTENDED RELEASE ORAL at 16:12

## 2024-10-23 RX ADMIN — METRONIDAZOLE 500 MG: 500 INJECTION, SOLUTION INTRAVENOUS at 16:12

## 2024-10-23 RX ADMIN — SODIUM CHLORIDE, PRESERVATIVE FREE 10 ML: 5 INJECTION INTRAVENOUS at 20:42

## 2024-10-23 RX ADMIN — BUMETANIDE 1 MG: 1 TABLET ORAL at 10:26

## 2024-10-23 RX ADMIN — CALCIUM CARBONATE-VITAMIN D TAB 500 MG-200 UNIT 1 TABLET: 500-200 TAB at 10:26

## 2024-10-23 RX ADMIN — Medication 500 MG: at 10:26

## 2024-10-23 RX ADMIN — CLOBETASOL PROPIONATE: 0.5 CREAM TOPICAL at 20:41

## 2024-10-23 RX ADMIN — Medication 1 TABLET: at 10:25

## 2024-10-23 RX ADMIN — OXYCODONE HYDROCHLORIDE AND ACETAMINOPHEN 1 TABLET: 5; 325 TABLET ORAL at 02:27

## 2024-10-23 RX ADMIN — NAFCILLIN SODIUM 2000 MG: 2 INJECTION, POWDER, LYOPHILIZED, FOR SOLUTION INTRAMUSCULAR; INTRAVENOUS at 10:36

## 2024-10-23 RX ADMIN — FERROUS SULFATE TAB 325 MG (65 MG ELEMENTAL FE) 325 MG: 325 (65 FE) TAB at 10:26

## 2024-10-23 RX ADMIN — POTASSIUM CHLORIDE 40 MEQ: 1500 TABLET, EXTENDED RELEASE ORAL at 10:26

## 2024-10-23 RX ADMIN — OXYCODONE HYDROCHLORIDE AND ACETAMINOPHEN 1 TABLET: 5; 325 TABLET ORAL at 20:38

## 2024-10-23 RX ADMIN — NAFCILLIN SODIUM 2000 MG: 2 INJECTION, POWDER, LYOPHILIZED, FOR SOLUTION INTRAMUSCULAR; INTRAVENOUS at 20:36

## 2024-10-23 RX ADMIN — SODIUM CHLORIDE, PRESERVATIVE FREE 10 ML: 5 INJECTION INTRAVENOUS at 10:31

## 2024-10-23 RX ADMIN — Medication 2000 UNITS: at 10:26

## 2024-10-23 ASSESSMENT — PAIN SCALES - GENERAL
PAINLEVEL_OUTOF10: 7
PAINLEVEL_OUTOF10: 0
PAINLEVEL_OUTOF10: 0
PAINLEVEL_OUTOF10: 10
PAINLEVEL_OUTOF10: 0
PAINLEVEL_OUTOF10: 8
PAINLEVEL_OUTOF10: 0
PAINLEVEL_OUTOF10: 0
PAINLEVEL_OUTOF10: 7
PAINLEVEL_OUTOF10: 0
PAINLEVEL_OUTOF10: 8
PAINLEVEL_OUTOF10: 4
PAINLEVEL_OUTOF10: 0

## 2024-10-23 ASSESSMENT — PAIN DESCRIPTION - DESCRIPTORS
DESCRIPTORS: ACHING;BURNING;THROBBING
DESCRIPTORS: ACHING;BURNING
DESCRIPTORS: ACHING;BURNING
DESCRIPTORS: ACHING;THROBBING

## 2024-10-23 ASSESSMENT — PAIN DESCRIPTION - FREQUENCY: FREQUENCY: INTERMITTENT

## 2024-10-23 ASSESSMENT — PAIN DESCRIPTION - ORIENTATION
ORIENTATION: RIGHT
ORIENTATION: LOWER

## 2024-10-23 ASSESSMENT — PAIN DESCRIPTION - ONSET: ONSET: ON-GOING

## 2024-10-23 ASSESSMENT — PAIN - FUNCTIONAL ASSESSMENT
PAIN_FUNCTIONAL_ASSESSMENT: PREVENTS OR INTERFERES WITH MANY ACTIVE NOT PASSIVE ACTIVITIES
PAIN_FUNCTIONAL_ASSESSMENT: PREVENTS OR INTERFERES WITH MANY ACTIVE NOT PASSIVE ACTIVITIES
PAIN_FUNCTIONAL_ASSESSMENT: ACTIVITIES ARE NOT PREVENTED
PAIN_FUNCTIONAL_ASSESSMENT: ACTIVITIES ARE NOT PREVENTED

## 2024-10-23 ASSESSMENT — PAIN DESCRIPTION - LOCATION
LOCATION: COCCYX
LOCATION: KNEE
LOCATION: KNEE;COCCYX
LOCATION: LEG

## 2024-10-23 ASSESSMENT — PAIN DESCRIPTION - PAIN TYPE: TYPE: ACUTE PAIN;SURGICAL PAIN

## 2024-10-23 NOTE — PROGRESS NOTES
OT BEDSIDE TREATMENT NOTE   [facility]  [address], [place]      Date:10/23/2024  Patient Name: Gabi Madrigal  MRN: [id number]  : 1940  Room: [address] [place] [address]     Evaluating OT: Arianna Alanis OTD, OTR/L, [id number]       Referring Provider: Aquilino Dyer DO     Specific Provider Orders/Date: OT eval and treat (10/21/24)    Diagnosis: Effusion of right knee [M25.461]  Infected hardware in right leg, initial encounter (HCC) [T84.7XXA]  Effusion, right knee [M25.461]    Surgeries/Procedures:   10/19:  Excisional debridement right leg area measuring 30 x 8 cm including skin, subcutaneous tissue, fat, fascia, muscle, bone  Right knee arthrotomy with irrigation debridement and synovectomy  Right distal femur hardware retention.       Pertinent Medical History:       has a past medical history of Acquired hypothyroidism, Arthritis, Blood transfusion reaction, Chronic kidney disease, History of blood transfusion, Hypertension, Lymphedema of both lower extremities, Open wound of left great toe, Other disorders of kidney and ureter in diseases classified elsewhere, Pelvic fracture (HCC), Positive culture findings in wound, Skin ulcer of left calf with fat layer exposed (HCC), Skin ulcer of left calf, limited to breakdown of skin (HCC), Ulcer of left lower leg (HCC), and Venous stasis ulcer of left calf limited to breakdown of skin (HCC).           Precautions:  Fall Risk, NWB RLE, hinged ROM brace locked at 30 degrees, , sacral wound, BLE wounds, alarms+      Assessment of current deficits    [x] Functional mobility            [x]ADLs           [x] Strength                   [x]Cognition    [x] Functional transfers          [x] IADLs          [x] Safety Awareness   [x]Endurance    [x] Fine Coordination              [x] Balance      [] Vision/perception    [x]Sensation      []Gross Motor Coordination  [x] ROM           [] Delirium

## 2024-10-23 NOTE — PLAN OF CARE
Problem: ABCDS Injury Assessment  Goal: Absence of physical injury  10/22/2024 2244 by Lazaro Del Angel RN  Outcome: Progressing  Flowsheets (Taken 10/21/2024 2124)  Absence of Physical Injury: Implement safety measures based on patient assessment  10/22/2024 1454 by Jaleesa Moreno RN  Outcome: Progressing  10/22/2024 1224 by Jaleesa Moreno RN  Outcome: Progressing     Problem: Skin/Tissue Integrity  Goal: Absence of new skin breakdown  Description: 1.  Monitor for areas of redness and/or skin breakdown  2.  Assess vascular access sites hourly  3.  Every 4-6 hours minimum:  Change oxygen saturation probe site  4.  Every 4-6 hours:  If on nasal continuous positive airway pressure, respiratory therapy assess nares and determine need for appliance change or resting period.  10/22/2024 2244 by Lazaro Del Angel RN  Outcome: Progressing  10/22/2024 1454 by Jaleesa Moreno RN  Outcome: Progressing  10/22/2024 1224 by Jaleesa Moreno RN  Outcome: Progressing     Problem: Safety - Adult  Goal: Free from fall injury  10/22/2024 2244 by Lazaro Del Angel RN  Outcome: Progressing  Flowsheets (Taken 10/22/2024 1223 by Jaleesa Moreno RN)  Free From Fall Injury: Instruct family/caregiver on patient safety  10/22/2024 1454 by Jaleesa Moreno RN  Outcome: Progressing  10/22/2024 1224 by Jaleesa Moreno RN  Outcome: Progressing  Flowsheets (Taken 10/22/2024 1223)  Free From Fall Injury: Instruct family/caregiver on patient safety     Problem: Discharge Planning  Goal: Discharge to home or other facility with appropriate resources  10/22/2024 2244 by Lazaro Del Angel RN  Outcome: Progressing  Flowsheets (Taken 10/21/2024 2011)  Discharge to home or other facility with appropriate resources:   Identify barriers to discharge with patient and caregiver   Arrange for needed discharge resources and transportation as appropriate  10/22/2024 1454 by Jaleesa Moreno RN  Outcome: Progressing  10/22/2024

## 2024-10-23 NOTE — PROGRESS NOTES
Update:    Incisional vac removed this morning. She has had minimal drainage and incisional VAC per nursing.  Dressing replaced with Xeroform, 4 x 4's, ABDs, and Ace wrap.  Patient tolerated this well.    Orthopedics will continue to follow patient peripherally throughout her stay..  Patient will follow-up in clinic with Dr. Newton.    Electronically signed by Aquilino Dyer DO on 10/23/2024 at 6:34 AM

## 2024-10-23 NOTE — PROCEDURES
Procedure Note:    Sacral wound debridement    Attending: Dr. Tan    Resident: Aarti Proctor, PGY-2    Procedure: The patient was positioned appropriately and the skin over the wound site was prepped with Betadine and draped in the typical sterile fashion. Local anesthesia was obtained by infiltration using 20 cc of 1% Lidocaine with epinephrine. Sharp debridement with a scalpel was used to debride the necrotic tissue.  There was an area on the posterior left aspect that tracts toward the anus which had some purulent fluid present for which cultures were taken. The tissue was debrided back until healthy, viable, bleeding tissue was obtained.  Removed approximately 10 cm x 2cm .  Hemostasis was obtained with 3-0 Vicryl sutures, surgical snow as well as silver nitrate.  Wound was irrigated with sterile saline.  Santyl was placed on the wound and then it was packed with wet-to-dry dressing, and covered with a heavy drainage pad.       The patient tolerated the procedure well.     Complications: None    Dr. Tan was available for the procedure    Please continue to hold eliquis at this time  Surgery will change dressing in AM, please reinforce as needed    Aarti Proctor DO  Resident, PGY-2  10/23/2024  11:36 AM

## 2024-10-23 NOTE — PROGRESS NOTES
Spoke with bedside RN about about tunn cvc.  Unable to complete on 10/23 and will attempt on 10/24.  Patient to be kept NPO after midnight

## 2024-10-23 NOTE — PROGRESS NOTES
Messaged surgical resident hat pt has drainage through heavy drainage pack from bedside debridement.

## 2024-10-23 NOTE — PROGRESS NOTES
[facility]  [facility]  Progress Note      Chief Complaint   Patient presents with    Joint Swelling     Patient sent from  with fluid around knee , sent to have knee drained        SUBJECTIVE:    Patient is tolerating medications. No reported adverse drug reactions.  No nausea, vomiting, diarrhea.    Review of systems:  As stated above in the chief complaint, otherwise negative.    Medications:  Scheduled Meds:   lidocaine-EPINEPHrine  20 mL IntraDERmal Once    potassium chloride  40 mEq Oral BID WC    sodium chloride flush  5-40 mL IntraVENous 2 times per day    nafcillin  2,000 mg IntraVENous Q4H    metroNIDAZOLE  500 mg IntraVENous Q8H    sodium chloride flush  5-40 mL IntraVENous 2 times per day    collagenase   Topical Daily    [Held by provider] apixaban  5 mg Oral BID    bumetanide  1 mg Oral Daily    oyster shell calcium w/D  1 tablet Oral Daily    clobetasol   Topical BID    ferrous sulfate  325 mg Oral Daily with breakfast    levothyroxine  88 mcg Oral Daily    lidocaine  1 patch TransDERmal Daily    metoprolol tartrate  25 mg Oral BID    therapeutic multivitamin-minerals  1 tablet Oral Daily    vitamin C  500 mg Oral Daily    vitamin D  2,000 Units Oral Daily    sodium chloride flush  5-40 mL IntraVENous 2 times per day     Continuous Infusions:   sodium chloride      sodium chloride      sodium chloride      sodium chloride       PRN Meds:sodium chloride flush, sodium chloride, sodium chloride flush, sodium chloride, sodium chloride, oxyCODONE-acetaminophen, morphine **OR** morphine, sodium chloride flush, sodium chloride, potassium chloride **OR** potassium alternative oral replacement, magnesium sulfate, ondansetron **OR** ondansetron, polyethylene glycol, acetaminophen **OR** acetaminophen    OBJECTIVE:  /64   Pulse 100   Temp 97.7 °F (36.5 °C) (Temporal)   Resp 18   Ht 1.6 m (5' 3\")   Wt 90.7 kg (200 lb)   SpO2 97%   BMI 35.43 kg/m²   Temp  Av.3 °F

## 2024-10-23 NOTE — PLAN OF CARE

## 2024-10-23 NOTE — CARE COORDINATION
10/23/24 Update CM Note. Pt admitted right knee effusion S/P right distal femur I&D 10/19/24. Bedside sacral debridement today.  ID following, pt will require 6 week antibiotic therapy, IR to place PICC. Pt private pay bed hold from [facility] and has no needs for return. Destination/[facility] updated.  Ambulance form/envelope on chart  Jaleesa AHN RN-BC  [phone]

## 2024-10-23 NOTE — PROGRESS NOTES
[facility]   Progress note      Subjective:    Resting comfortably in bed  She states that she had her debridement of her wound this a.m.  No acute complaints at this time    Objective:    /64   Pulse 100   Temp 97.3 °F (36.3 °C) (Temporal)   Resp 18   Ht 1.6 m (5' 3\")   Wt 90.7 kg (200 lb)   SpO2 97%   BMI 35.43 kg/m²     In: -   Out: 2000   In: -   Out: 2000 [Urine:2000]    General appearance: NAD, conversant  HEENT: AT/NC, MMM  Neck: FROM, supple  Lungs: Clear to auscultation  CV: RRR, no MRGs  Vasc: Radial pulses 2+  Abdomen: Soft, non-tender; no masses or HSM  Extremities: R Knee with surgical dressing  Skin: Sacral wound that has been debrided  Psych: Alert and oriented to person, place and time  Neuro: Alert and interactive     Recent Labs     10/21/24  0458 10/22/24  0609 10/23/24  0609   WBC 12.4* 7.7 8.5   HGB 8.8* 9.9* 8.9*   HCT 30.1* 34.2 30.3*    317 242       Recent Labs     10/21/24  0458 10/22/24  0609 10/23/24  0609    143 138   K 4.1 3.6 2.7*   CL 99 103 102   CO2 27 30* 29   BUN 42* 41* 42*   CREATININE 1.2* 1.1* 1.1*   CALCIUM 8.0* 8.6 8.1*       Assessment:    Principal Problem:    Effusion, right knee  Active Problems:    Infected hardware in right leg, initial encounter ([facility])    Chronic heart failure with preserved ejection fraction ([facility])    Sacral wound  Resolved Problems:    * No resolved hospital problems. *      Plan:    84 year old female admitted to med surg unit with      Right knee effusion/infected hardware  Pain control  Consult orthopedic surgery-surgical procedure planned for today  Encourage ISP  Bowel regimen  IV hydration postop  Consult ID-postop for IV antibiotics  From medicine standpoint okay to proceed with orthopedic incision and drainage of infected hardware today  Follow inflammatory markers including sed rate/CRP/lactate and procalcitonin     A-fib  Continue metoprolol  Eliquis on hold will resume once cleared by orthopedic

## 2024-10-23 NOTE — PROGRESS NOTES
Physical Therapy  Physical Therapy Treatment     Name: Gabi Madrigal  : 1940  MRN: [id number]      Date of Service: 10/23/2024    Evaluating PT:  Desiree Mills PT, DPT [id number]    Room #:  7408/7408-A  Diagnosis:  Effusion of right knee [M25.461]  Infected hardware in right leg, initial encounter ([facility]) [T84.7XXA]  Effusion, right knee [M25.461]  PMHx/PSHx:   has a past medical history of Acquired hypothyroidism, Arthritis, Blood transfusion reaction, Chronic kidney disease, History of blood transfusion, Hypertension, Lymphedema of both lower extremities, Open wound of left great toe, Other disorders of kidney and ureter in diseases classified elsewhere, Pelvic fracture ([facility]), Positive culture findings in wound, Skin ulcer of left calf with fat layer exposed ([facility]), Skin ulcer of left calf, limited to breakdown of skin ([facility]), Ulcer of left lower leg ([facility]), and Venous stasis ulcer of left calf limited to breakdown of skin ([facility]).  Procedure/Surgery:  Irrigation and debridement of R distal femur (10/19/24)  Precautions:  Fall risk, alarms, NWB RLE, knee immobilizer locked at 30 degrees of flexion (not present at time of eval - pt in soft KI), R knee wound vac, sacral wound, O2, incontinent  Equipment Needs:  TBD    SUBJECTIVE:    Pt lives alone in a 2 story home with 2 steps to enter and 2 rail(s). Pt has a first-floor setup. Pt ambulated short distances PTA. She does report spending most of her time in her power versus manual WC. Pt owns a FWW, a SPC, a powered WC, and a manual WC.    OBJECTIVE:   Initial Evaluation  Date: 10/22/24 Treatment Date: 10/23/24 Short Term/ Long Term   Goals   AM-PAC 6 Clicks     Was pt agreeable to Eval/treatment? Yes yes    Does pt have pain? Mild R knee pain, increased with mobility 10 RLE pain     Bed Mobility  Rolling: ModAx2  Supine to sit: ModAx2  Sit to supine: ModAx2  Scooting: ModA Rolling: toward R MaxA  Toward L ModA  Supine to sit: MaxAx2  Sit to supine:  MaxAx2  Scooting: ModA Rolling: Independent  Supine to sit: Independent  Sit to supine: Independent  Scooting: Independent   Transfers Sit to stand: MaxAx2 (attempt)  Stand to sit: MaxAx2  Stand pivot: NT Sit to stand: MaxAx2 (x2 partial reps)  Stand to sit: MaxAx2  Stand pivot: NT Sit to stand: Moy  Stand to sit: Moy  Stand pivot: ModA with AAD   Ambulation    NT NT >25 feet with AAD and ModA   Stair negotiation: ascended and descended NT NT >2 steps with bilateral rails and ModA   ROM BUE:  Refer to OT  LLE:  WFL  RLE: NT d/t KI     Strength BUE:  Refer to OT  LLE: Grossly 4-/5  RLE: NT     Balance Sitting EOB:  Moy  Dynamic Standing:  NT Sitting EOB:  Moy  Dynamic Standing:  NT Sitting EOB:  Independent  Dynamic Standing:  ModA with AAD     Pt is A & O x 4  Sensation:  Pt denies numbness and tingling to extremities  Edema:  Unremarkable     Vitals:  SpO2: 88-97% on 1L/min  HR: WFL    Patient education  Pt educated on role of PT in acute setting, benefits of upright mobility, use of call light in room, safety, WB status    Patient response to education:   Pt verbalized understanding Pt demonstrated skill Pt requires further education in this area   Yes Partially  Reinforce     ASSESSMENT:    Comments:  Pt was in bed upon PT entry and agreeable to participate Required heavy assist for trunk and BLE to transfer to EOB. Once sitting EOB required intermittent steadying assist. Completed partial sit<>stand transfer (x2 reps) with lift assist, assist to maintain NWB RLE, and cues for hand placement. Seated rest provided between reps. Poor ability to maintain NWB on RLE. Pt was found to be incontinent of BM. Returned to supine and completed multiple log rolls for asha pad change and peterson care. Positioned in chair position in bed with all needs met and call light in reach. RN notified.     Treatment:  Patient practiced and was instructed in the following treatment:    Bed mobility training - pt given verbal and

## 2024-10-23 NOTE — PROGRESS NOTES
GENERAL SURGERY  DAILY PROGRESS NOTE    Patient's Name/Date of Birth: Gabi Madrigal / 1940    Date: 2024     Chief Complaint   Patient presents with    Joint Swelling     Patient sent from  with fluid around knee , sent to have knee drained         Subjective:  Resting in bed. Agrees for bedside debridement of sacral wound today      Objective:  Last 24Hrs  Temp  Av.5 °F (36.4 °C)  Min: 97 °F (36.1 °C)  Max: 98.2 °F (36.8 °C)  Resp  Av  Min: 14  Max: 21  Pulse  Av.3  Min: 74  Max: 102  Systolic (24hrs), Av , Min:104 , Max:154     Diastolic (24hrs), Av, Min:51, Max:87    SpO2  Av %  Min: 93 %  Max: 98 %    I/O last 3 completed shifts:  In: -   Out: 3100 [Urine:3100]      General: In no acute distress, resting in bed  Cardiovascular: Warm throughout, no edema  Respiratory: no respiratory distress, equal chest rise  Abdomen: soft,  nontender, nondistended        CBC  Recent Labs     10/21/24  0458 10/22/24  0609 10/23/24  0609   WBC 12.4* 7.7 8.5   RBC 3.42* 3.84 3.44*   HGB 8.8* 9.9* 8.9*   HCT 30.1* 34.2 30.3*   MCV 88.0 89.1 88.1   MCH 25.7* 25.8* 25.9*   MCHC 29.2* 28.9* 29.4*   RDW 17.3* 17.3* 17.4*    317 242   MPV 9.8 10.1 9.6       CMP  Recent Labs     10/21/24  0458 10/22/24  0609    143   K 4.1 3.6   CL 99 103   CO2 27 30*   BUN 42* 41*   CREATININE 1.2* 1.1*   GLUCOSE 104* 88   CALCIUM 8.0* 8.6   BILITOT 0.2 0.4   ALKPHOS 91 98   AST 16 14   ALT <5 <5         Assessment/Plan:    Patient Active Problem List   Diagnosis    History of DVT (deep vein thrombosis)    Essential hypertension    Acquired hypothyroidism    Closed fracture of right iliac wing (HCC)    Chronic anticoagulation    NSTEMI (non-ST elevated myocardial infarction) (HCC)    Bilateral lower extremity edema    Venous stasis ulcer of left ankle with fat layer exposed without varicose veins (HCC)    History of vascular disease    Positive culture findings in wound    PVD (peripheral

## 2024-10-24 LAB
ALBUMIN SERPL-MCNC: 1.7 G/DL (ref 3.5–5.2)
ALP SERPL-CCNC: 79 U/L (ref 35–104)
ALT SERPL-CCNC: <5 U/L (ref 0–32)
ANION GAP SERPL CALCULATED.3IONS-SCNC: 7 MMOL/L (ref 7–16)
AST SERPL-CCNC: 12 U/L (ref 0–31)
BASOPHILS # BLD: 0.03 K/UL (ref 0–0.2)
BASOPHILS NFR BLD: 0 % (ref 0–2)
BILIRUB SERPL-MCNC: 0.7 MG/DL (ref 0–1.2)
BUN SERPL-MCNC: 44 MG/DL (ref 6–23)
CALCIUM SERPL-MCNC: 8.2 MG/DL (ref 8.6–10.2)
CHLORIDE SERPL-SCNC: 107 MMOL/L (ref 98–107)
CO2 SERPL-SCNC: 29 MMOL/L (ref 22–29)
CREAT SERPL-MCNC: 1.4 MG/DL (ref 0.5–1)
EOSINOPHIL # BLD: 0.08 K/UL (ref 0.05–0.5)
EOSINOPHILS RELATIVE PERCENT: 1 % (ref 0–6)
ERYTHROCYTE [DISTWIDTH] IN BLOOD BY AUTOMATED COUNT: 17.9 % (ref 11.5–15)
GFR, ESTIMATED: 36 ML/MIN/1.73M2
GLUCOSE SERPL-MCNC: 80 MG/DL (ref 74–99)
HCT VFR BLD AUTO: 28.7 % (ref 34–48)
HGB BLD-MCNC: 8.4 G/DL (ref 11.5–15.5)
IMM GRANULOCYTES # BLD AUTO: 0.12 K/UL (ref 0–0.58)
IMM GRANULOCYTES NFR BLD: 1 % (ref 0–5)
LYMPHOCYTES NFR BLD: 1.29 K/UL (ref 1.5–4)
LYMPHOCYTES RELATIVE PERCENT: 14 % (ref 20–42)
MCH RBC QN AUTO: 25.8 PG (ref 26–35)
MCHC RBC AUTO-ENTMCNC: 29.3 G/DL (ref 32–34.5)
MCV RBC AUTO: 88 FL (ref 80–99.9)
MONOCYTES NFR BLD: 0.58 K/UL (ref 0.1–0.95)
MONOCYTES NFR BLD: 6 % (ref 2–12)
NEUTROPHILS NFR BLD: 78 % (ref 43–80)
NEUTS SEG NFR BLD: 7.32 K/UL (ref 1.8–7.3)
PLATELET # BLD AUTO: 271 K/UL (ref 130–450)
PMV BLD AUTO: 9.8 FL (ref 7–12)
POTASSIUM SERPL-SCNC: 4.2 MMOL/L (ref 3.5–5)
PROT SERPL-MCNC: 4.6 G/DL (ref 6.4–8.3)
RBC # BLD AUTO: 3.26 M/UL (ref 3.5–5.5)
SODIUM SERPL-SCNC: 143 MMOL/L (ref 132–146)
WBC OTHER # BLD: 9.4 K/UL (ref 4.5–11.5)

## 2024-10-24 PROCEDURE — 2580000003 HC RX 258: Performed by: NURSE PRACTITIONER

## 2024-10-24 PROCEDURE — 2700000000 HC OXYGEN THERAPY PER DAY

## 2024-10-24 PROCEDURE — 2580000003 HC RX 258: Performed by: INTERNAL MEDICINE

## 2024-10-24 PROCEDURE — 85025 COMPLETE CBC W/AUTO DIFF WBC: CPT

## 2024-10-24 PROCEDURE — 6360000002 HC RX W HCPCS: Performed by: INTERNAL MEDICINE

## 2024-10-24 PROCEDURE — 36415 COLL VENOUS BLD VENIPUNCTURE: CPT

## 2024-10-24 PROCEDURE — 6370000000 HC RX 637 (ALT 250 FOR IP): Performed by: INTERNAL MEDICINE

## 2024-10-24 PROCEDURE — 2500000003 HC RX 250 WO HCPCS: Performed by: INTERNAL MEDICINE

## 2024-10-24 PROCEDURE — 1200000000 HC SEMI PRIVATE

## 2024-10-24 PROCEDURE — 80053 COMPREHEN METABOLIC PANEL: CPT

## 2024-10-24 PROCEDURE — 6370000000 HC RX 637 (ALT 250 FOR IP)

## 2024-10-24 RX ORDER — METRONIDAZOLE 500 MG/1
500 TABLET ORAL 3 TIMES DAILY
Qty: 42 TABLET | Refills: 0 | Status: SHIPPED | OUTPATIENT
Start: 2024-10-24 | End: 2024-11-01

## 2024-10-24 RX ORDER — NAFCILLIN SODIUM 2 G/1
2 INJECTION, POWDER, FOR SOLUTION INTRAVENOUS EVERY 4 HOURS
Qty: 264 EACH | Refills: 0 | Status: SHIPPED | OUTPATIENT
Start: 2024-10-24 | End: 2024-11-01

## 2024-10-24 RX ORDER — 0.9 % SODIUM CHLORIDE 0.9 %
250 INTRAVENOUS SOLUTION INTRAVENOUS ONCE
Status: COMPLETED | OUTPATIENT
Start: 2024-10-24 | End: 2024-10-24

## 2024-10-24 RX ADMIN — COLLAGENASE SANTYL: 250 OINTMENT TOPICAL at 11:21

## 2024-10-24 RX ADMIN — METRONIDAZOLE 500 MG: 500 INJECTION, SOLUTION INTRAVENOUS at 23:26

## 2024-10-24 RX ADMIN — CLOBETASOL PROPIONATE: 0.5 CREAM TOPICAL at 22:22

## 2024-10-24 RX ADMIN — NAFCILLIN SODIUM 2000 MG: 2 INJECTION, POWDER, LYOPHILIZED, FOR SOLUTION INTRAMUSCULAR; INTRAVENOUS at 00:39

## 2024-10-24 RX ADMIN — FERROUS SULFATE TAB 325 MG (65 MG ELEMENTAL FE) 325 MG: 325 (65 FE) TAB at 09:16

## 2024-10-24 RX ADMIN — METRONIDAZOLE 500 MG: 500 INJECTION, SOLUTION INTRAVENOUS at 00:41

## 2024-10-24 RX ADMIN — OXYCODONE HYDROCHLORIDE AND ACETAMINOPHEN 1 TABLET: 5; 325 TABLET ORAL at 17:49

## 2024-10-24 RX ADMIN — OXYCODONE HYDROCHLORIDE AND ACETAMINOPHEN 1 TABLET: 5; 325 TABLET ORAL at 02:42

## 2024-10-24 RX ADMIN — NAFCILLIN SODIUM 2000 MG: 2 INJECTION, POWDER, LYOPHILIZED, FOR SOLUTION INTRAMUSCULAR; INTRAVENOUS at 22:18

## 2024-10-24 RX ADMIN — METRONIDAZOLE 500 MG: 500 INJECTION, SOLUTION INTRAVENOUS at 05:43

## 2024-10-24 RX ADMIN — NAFCILLIN SODIUM 2000 MG: 2 INJECTION, POWDER, LYOPHILIZED, FOR SOLUTION INTRAMUSCULAR; INTRAVENOUS at 16:21

## 2024-10-24 RX ADMIN — SODIUM CHLORIDE 250 ML: 9 INJECTION, SOLUTION INTRAVENOUS at 11:19

## 2024-10-24 RX ADMIN — NAFCILLIN SODIUM 2000 MG: 2 INJECTION, POWDER, LYOPHILIZED, FOR SOLUTION INTRAMUSCULAR; INTRAVENOUS at 05:43

## 2024-10-24 RX ADMIN — SODIUM CHLORIDE, PRESERVATIVE FREE 10 ML: 5 INJECTION INTRAVENOUS at 22:20

## 2024-10-24 RX ADMIN — Medication 1 TABLET: at 09:15

## 2024-10-24 RX ADMIN — CALCIUM CARBONATE-VITAMIN D TAB 500 MG-200 UNIT 1 TABLET: 500-200 TAB at 09:16

## 2024-10-24 RX ADMIN — NAFCILLIN SODIUM 2000 MG: 2 INJECTION, POWDER, LYOPHILIZED, FOR SOLUTION INTRAMUSCULAR; INTRAVENOUS at 11:15

## 2024-10-24 RX ADMIN — POTASSIUM CHLORIDE 40 MEQ: 1500 TABLET, EXTENDED RELEASE ORAL at 00:47

## 2024-10-24 RX ADMIN — LEVOTHYROXINE SODIUM 88 MCG: 0.09 TABLET ORAL at 05:44

## 2024-10-24 RX ADMIN — Medication 500 MG: at 09:15

## 2024-10-24 RX ADMIN — BUMETANIDE 1 MG: 1 TABLET ORAL at 09:15

## 2024-10-24 RX ADMIN — CLOBETASOL PROPIONATE: 0.5 CREAM TOPICAL at 11:23

## 2024-10-24 RX ADMIN — MICONAZOLE NITRATE: 20 CREAM TOPICAL at 11:20

## 2024-10-24 RX ADMIN — MICONAZOLE NITRATE: 20 CREAM TOPICAL at 22:22

## 2024-10-24 RX ADMIN — Medication 2000 UNITS: at 09:18

## 2024-10-24 RX ADMIN — SODIUM CHLORIDE, PRESERVATIVE FREE 10 ML: 5 INJECTION INTRAVENOUS at 09:14

## 2024-10-24 RX ADMIN — MICONAZOLE NITRATE: 20 OINTMENT TOPICAL at 22:20

## 2024-10-24 RX ADMIN — METRONIDAZOLE 500 MG: 500 INJECTION, SOLUTION INTRAVENOUS at 14:25

## 2024-10-24 ASSESSMENT — PAIN - FUNCTIONAL ASSESSMENT
PAIN_FUNCTIONAL_ASSESSMENT: PREVENTS OR INTERFERES SOME ACTIVE ACTIVITIES AND ADLS
PAIN_FUNCTIONAL_ASSESSMENT: PREVENTS OR INTERFERES WITH MANY ACTIVE NOT PASSIVE ACTIVITIES

## 2024-10-24 ASSESSMENT — PAIN DESCRIPTION - ORIENTATION
ORIENTATION: RIGHT
ORIENTATION: RIGHT

## 2024-10-24 ASSESSMENT — PAIN DESCRIPTION - DESCRIPTORS
DESCRIPTORS: ACHING;SQUEEZING;SHARP
DESCRIPTORS: PATIENT UNABLE TO DESCRIBE

## 2024-10-24 ASSESSMENT — PAIN SCALES - GENERAL
PAINLEVEL_OUTOF10: 5
PAINLEVEL_OUTOF10: 8
PAINLEVEL_OUTOF10: 9

## 2024-10-24 ASSESSMENT — PAIN DESCRIPTION - LOCATION
LOCATION: LEG
LOCATION: LEG

## 2024-10-24 NOTE — PROGRESS NOTES
[facility]   Progress note      Subjective:    Sleeping in bed however was easily awoken  Is having some knee pain today    Objective:    BP (!) 99/57   Pulse 98   Temp 97 °F (36.1 °C) (Temporal)   Resp 16   Ht 1.6 m (5' 3\")   Wt 90.7 kg (200 lb)   SpO2 99%   BMI 35.43 kg/m²     In: -   Out: 1050   In: -   Out: 1050 [Urine:1050]    General appearance: NAD, conversant  HEENT: AT/NC, MMM  Neck: FROM, supple  Lungs: Clear to auscultation  CV: RRR, no MRGs  Vasc: Radial pulses 2+  Abdomen: Soft, non-tender; no masses or HSM  Extremities: R Knee with surgical dressing  Skin: Sacral wound that has been debrided  Psych: Alert and oriented to person, place and time  Neuro: Alert and interactive     Recent Labs     10/22/24  0609 10/23/24  0609 10/24/24  0525   WBC 7.7 8.5 9.4   HGB 9.9* 8.9* 8.4*   HCT 34.2 30.3* 28.7*    242 271       Recent Labs     10/23/24  0609 10/23/24  1957 10/24/24  0525    138 143   K 2.7* 3.2* 4.2    101 107   CO2 29 28 29   BUN 42* 43* 44*   CREATININE 1.1* 1.3* 1.4*   CALCIUM 8.1* 7.8* 8.2*       Assessment:    Principal Problem:    Effusion, right knee  Active Problems:    Infected hardware in right leg, initial encounter ([facility])    Chronic heart failure with preserved ejection fraction ([facility])    Sacral wound  Resolved Problems:    * No resolved hospital problems. *      Plan:    84 year old female admitted to med surg unit with      Right knee effusion/infected hardware  Pain control  Consult orthopedic surgery-surgical procedure planned for today  Encourage ISP  Bowel regimen  IV hydration postop  Consult ID-postop for IV antibiotics  From medicine standpoint okay to proceed with orthopedic incision and drainage of infected hardware today  Follow inflammatory markers including sed rate/CRP/lactate and procalcitonin     A-fib  Continue metoprolol  Eliquis on hold will resume once cleared by orthopedic surgery  Monitor for A-fib RVR and stress  saline over 2 hours  -H&H stable  -Plan for PICC line today  -Consult dietitian for protein malnutrition  -Eliquis restarted today  -Remains on Flagyl and nafcillin  -Discharge planning back to nursing facility once PICC line has been placed and final antibiotics have been reconciled by ID    Code Status: Full code  Consultants: Infectious disease, orthopedics, cardiology, GS    DVT Prophylaxis Eliquis  PT/OT  Discharge planning         I have spent a total time of 25 minutes of this patient encounter reviewing chart, labs, radiological reports coordinating care with interdisciplinary teams, face to face encounter with patient, providing counseling/education to patient/family, and formulating plan.       TRAVIS Salazar - CNP  2:01 PM  10/24/2024

## 2024-10-24 NOTE — PROGRESS NOTES
GENERAL SURGERY  DAILY PROGRESS NOTE    Patient's Name/Date of Birth: Gabi Madrigal / 1940    Date: 2024     Chief Complaint   Patient presents with    Joint Swelling     Patient sent from  with fluid around knee , sent to have knee drained         Subjective:  Resting in bed. Wound has been changed by nursing without bleeding      Objective:  Last 24Hrs  Temp  Av.3 °F (36.3 °C)  Min: 97 °F (36.1 °C)  Max: 98 °F (36.7 °C)  Resp  Av.6  Min: 14  Max: 19  Pulse  Av.4  Min: 88  Max: 102  Systolic (24hrs), Av , Min:93 , Max:110     Diastolic (24hrs), Av, Min:47, Max:75    SpO2  Av.4 %  Min: 94 %  Max: 97 %    I/O last 3 completed shifts:  In: -   Out: 2350 [Urine:2350]      General: In no acute distress, resting in bed  Cardiovascular: Warm throughout, no edema  Respiratory: no respiratory distress, equal chest rise  Abdomen: soft,  nontender, nondistended  Skin: wound present on sacrum, there is some dark appearing tissue from hemostasis used to manage skin edge oozing        CBC  Recent Labs     10/22/24  0609 10/23/24  0609 10/24/24  05   WBC 7.7 8.5 9.4   RBC 3.84 3.44* 3.26*   HGB 9.9* 8.9* 8.4*   HCT 34.2 30.3* 28.7*   MCV 89.1 88.1 88.0   MCH 25.8* 25.9* 25.8*   MCHC 28.9* 29.4* 29.3*   RDW 17.3* 17.4* 17.9*    242 271   MPV 10.1 9.6 9.8       CMP  Recent Labs     10/22/24  0609 10/23/24  0609 10/23/24  1957    138 138   K 3.6 2.7* 3.2*    102 101   CO2 30* 29 28   BUN 41* 42* 43*   CREATININE 1.1* 1.1* 1.3*   GLUCOSE 88 72* 115*   CALCIUM 8.6 8.1* 7.8*   BILITOT 0.4 0.6  --    ALKPHOS 98 78  --    AST 14 11  --    ALT <5 <5  --          Assessment/Plan:    Patient Active Problem List   Diagnosis    History of DVT (deep vein thrombosis)    Essential hypertension    Acquired hypothyroidism    Closed fracture of right iliac wing (HCC)    Chronic anticoagulation    NSTEMI (non-ST elevated myocardial infarction) (HCC)    Bilateral lower

## 2024-10-24 NOTE — CARE COORDINATION
10/24/24 Update CM Note. Pt admitted right knee effusion S/P right distal femur I&D 10/19/24. Bedside sacral debridement 10/23/24.    ID following, pt will require 6 week antibiotic therapy, IR to place PICC today. Pt private pay bed hold from [facility] and has no needs for return. Destination/[facility] updated.  Ambulance form/envelope on chart   Jaleesa AHN RN-BC  [phone]

## 2024-10-24 NOTE — FLOWSHEET NOTE
Inpatient Wound Care (Follow up) 7408    Admit Date: 10/18/2024  3:03 PM    Reason for consult:  Sacrum    Findings:     10/24/24 1019   Skin Integumentary    Skin Integrity   (IAD)   Location Rectal area   Wound 10/19/24 Sacrum   Date First Assessed/Time First Assessed: 10/19/24 1614   Present on Original Admission: Yes  Primary Wound Type: Pressure Injury  Location: Sacrum   Wound Image    Wound Etiology Pressure Unstageable   Dressing Status New dressing applied   Wound Cleansed Cleansed with saline   Dressing/Treatment ABD;Dry dressing;Moist to dry;Pharmaceutical agent (see MAR)   Wound Length (cm) 4 cm   Wound Width (cm) 4 cm   Wound Depth (cm)   (Unable to determine)   Wound Surface Area (cm^2) 16 cm^2   Change in Wound Size % (l*w) 20   Wound Assessment [place]/red;[place]   Drainage Amount None (dry)   Odor None   Patrizia-wound Assessment Fragile       **Informed Consent**    The patient has given verbal consent to have photos taken of wound and inserted into their chart as part of their permanent medical record for purposes of documentation, treatment management and/or medical review.   All Images taken on 10/24/24 of patient name: Gabi Madrigal were transmitted and stored on secured Epic  Site located within Media Folder Tab by a registered [employer] Mobile Application Device.        Impression:  Sacrum: Unstageable    Plan: Santyl, saline moistened kerlix, 4x4, abd pad  Aloe vesta ointment  Low air loss module  TAPS  Patien t will need continued preventative care      Sinai Albert RN 10/24/2024 11:41 AM

## 2024-10-24 NOTE — PROGRESS NOTES
04/09/2024 03:55 AM    GLUCOSE 80 10/24/2024 05:25 AM    GLUCOSE 127 07/12/2011 05:45 AM    CALCIUM 8.2 10/24/2024 05:25 AM    BILITOT 0.7 10/24/2024 05:25 AM    ALKPHOS 79 10/24/2024 05:25 AM    AST 12 10/24/2024 05:25 AM    ALT <5 10/24/2024 05:25 AM     Lab Results   Component Value Date    .0 (H) 10/18/2024    .0 (H) 09/17/2024    .0 (H) 09/11/2024     Lab Results   Component Value Date    SEDRATE 100 (H) 10/18/2024    SEDRATE 81 (H) 09/17/2024    SEDRATE 53 (H) 09/11/2024     Radiology:      Microbiology:   Lab Results   Component Value Date/Time    BC 5 Days no growth 09/21/2022 04:55 AM    BC 5 Days- no growth 08/13/2017 09:39 PM    BC 5 Days- no growth 08/13/2014 02:10 PM    ORG Staphylococcus aureus 06/30/2023 01:50 PM    ORG Anaerobic gram negative kath 06/30/2023 01:50 PM    ORG Anaerobic gram negative kath 06/09/2023 02:00 PM    ORG Anaerobic gram negative kath 06/09/2023 02:00 PM    ORG Staphylococcus aureus 06/09/2023 02:00 PM    ORG Enterococcus faecalis 06/09/2023 02:00 PM    ORG Streptococcus mitis/oralis 06/09/2023 02:00 PM    ORG Corynebacterium 06/09/2023 02:00 PM     Lab Results   Component Value Date/Time    BLOODCULT2 5 Days no growth 09/21/2022 05:00 AM    BLOODCULT2 5 Days- no growth 08/13/2017 09:43 PM    BLOODCULT2 5 Days- no growth 08/13/2014 02:37 PM    ORG Staphylococcus aureus 06/30/2023 01:50 PM    ORG Anaerobic gram negative kath 06/30/2023 01:50 PM    ORG Anaerobic gram negative kath 06/09/2023 02:00 PM    ORG Anaerobic gram negative kath 06/09/2023 02:00 PM    ORG Staphylococcus aureus 06/09/2023 02:00 PM    ORG Enterococcus faecalis 06/09/2023 02:00 PM    ORG Streptococcus mitis/oralis 06/09/2023 02:00 PM    ORG Corynebacterium 06/09/2023 02:00 PM     WOUND/ABSCESS   Date Value Ref Range Status   06/30/2023 (A)  Final    Mixed candice isolated. Further workup and sensitivity testing  is not routinely indicated and will not be performed.  Mixed candice isolated  includes:  Alpha hemolytic Strep species  Corynebacteria     06/30/2023   Final    Heavy growth  Methicillin resistant Staph aureus isolated. Most Methicillin  resistant Staphylococcus are usually resistant to multiple  antibiotics including other B-Lactams, Aminoglycosides,  Macrolides, Clindamycin and Tetracycline. Contact isolation  is indicated.  This isolate is presumed to be resistant based on the  detection of inducible Clindamycin resistance.  Clindamycin  may still be effective in some patients.     06/09/2023   Final    Moderate growth  Methicillin resistant Staph aureus isolated. Most Methicillin  resistant Staphylococcus are usually resistant to multiple  antibiotics including other B-Lactams, Aminoglycosides,  Macrolides, Clindamycin and Tetracycline. Contact isolation  is indicated.     06/09/2023 Moderate growth  Final   06/09/2023 Light growth  Final   06/09/2023   Final    Light growth  Susceptibility testing for this isolate is restricted to  normally sterile sources.       No results found for: \"RESPSMEAR\"      Component Value Date/Time    LABFUNG 4 Weeks- no fungus isolated 05/29/2014 1545     No results found for: \"CULTRESP\"  No results found for: \"CXCATHTIP\"  No results found for: \"BFCS\"  Culture Surgical   Date Value Ref Range Status   10/18/2017 Heavy growth  Final   10/17/2014   Final    Growth not present  There are no organisms present as previously reported     03/31/2014 Light growth  Final   03/31/2014 Moderate growth  Final     Urine Culture, Routine   Date Value Ref Range Status   09/20/2022   Final    10 to 100,000 CFU/mL  Mixed candice isolated. Further workup and sensitivity testing  is not routinely indicated and will not be performed.  Mixed candice isolated includes:  Mixed gram negative rods  Mixed gram positive organisms     08/13/2017   Final    ,000 CFU/mL  Mixed candice isolated. Further workup and sensitivity testing  is not routinely indicated and will not be

## 2024-10-24 NOTE — PLAN OF CARE
Problem: ABCDS Injury Assessment  Goal: Absence of physical injury  10/23/2024 2251 by Lazaro Del Angel RN  Outcome: Progressing  Flowsheets (Taken 10/21/2024 2124)  Absence of Physical Injury: Implement safety measures based on patient assessment  10/23/2024 1500 by Jaleesa Moreno RN  Outcome: Progressing     Problem: Skin/Tissue Integrity  Goal: Absence of new skin breakdown  Description: 1.  Monitor for areas of redness and/or skin breakdown  2.  Assess vascular access sites hourly  3.  Every 4-6 hours minimum:  Change oxygen saturation probe site  4.  Every 4-6 hours:  If on nasal continuous positive airway pressure, respiratory therapy assess nares and determine need for appliance change or resting period.  10/23/2024 2251 by Lazaro Del Angel RN  Outcome: Progressing  10/23/2024 1500 by Jaleesa Moreno RN  Outcome: Progressing     Problem: Safety - Adult  Goal: Free from fall injury  10/23/2024 2251 by Lazaro Del Angel RN  Outcome: Progressing  Flowsheets (Taken 10/23/2024 1410 by Jaleesa Moreno RN)  Free From Fall Injury: Instruct family/caregiver on patient safety  10/23/2024 1500 by Jaleesa Moreno RN  Outcome: Progressing  Flowsheets (Taken 10/23/2024 1410)  Free From Fall Injury: Instruct family/caregiver on patient safety     Problem: Discharge Planning  Goal: Discharge to home or other facility with appropriate resources  10/23/2024 2251 by Lazaro Del Angel RN  Outcome: Progressing  Flowsheets (Taken 10/23/2024 1938)  Discharge to home or other facility with appropriate resources:   Identify barriers to discharge with patient and caregiver   Arrange for needed discharge resources and transportation as appropriate  10/23/2024 1500 by Jaleesa Moreno RN  Outcome: Progressing     Problem: Pain  Goal: Verbalizes/displays adequate comfort level or baseline comfort level  10/23/2024 2251 by Lazaro Del Angel RN  Outcome: Progressing  Flowsheets (Taken 10/21/2024 2124)  Verbalizes/displays

## 2024-10-24 NOTE — PROCEDURES
PROCEDURE NOTE  Date: 10/24/2024   Name: Gabi Madrigal  YOB: 1940    Procedures: Tunneled PICC  Discussed patient and IR procedure with bedside RN, all questions answered. Will call when able to send for patient if able to today depending on case load and a STAT case.

## 2024-10-24 NOTE — PLAN OF CARE
Problem: ABCDS Injury Assessment  Goal: Absence of physical injury  10/24/2024 1104 by Lucy Philippe RN  Outcome: Progressing  10/23/2024 2251 by Lazaro Del Angel RN  Outcome: Progressing  Flowsheets (Taken 10/21/2024 2124)  Absence of Physical Injury: Implement safety measures based on patient assessment     Problem: Skin/Tissue Integrity  Goal: Absence of new skin breakdown  Description: 1.  Monitor for areas of redness and/or skin breakdown  2.  Assess vascular access sites hourly  3.  Every 4-6 hours minimum:  Change oxygen saturation probe site  4.  Every 4-6 hours:  If on nasal continuous positive airway pressure, respiratory therapy assess nares and determine need for appliance change or resting period.  10/24/2024 1104 by Lucy Philippe RN  Outcome: Progressing  10/23/2024 2251 by Lazaro Del Angel RN  Outcome: Progressing     Problem: Safety - Adult  Goal: Free from fall injury  10/24/2024 1104 by Lucy Philippe RN  Outcome: Progressing  10/23/2024 2251 by Lazaro Del Angel RN  Outcome: Progressing  Flowsheets (Taken 10/23/2024 1410 by Jaleesa Moreno RN)  Free From Fall Injury: Instruct family/caregiver on patient safety     Problem: Discharge Planning  Goal: Discharge to home or other facility with appropriate resources  10/24/2024 1104 by Lucy Philippe RN  Outcome: Progressing  10/23/2024 2251 by Lazaro Del Angel RN  Outcome: Progressing  Flowsheets (Taken 10/23/2024 1938)  Discharge to home or other facility with appropriate resources:   Identify barriers to discharge with patient and caregiver   Arrange for needed discharge resources and transportation as appropriate     Problem: Pain  Goal: Verbalizes/displays adequate comfort level or baseline comfort level  10/24/2024 1104 by Lucy Philippe RN  Outcome: Progressing  10/23/2024 2251 by Lazaro Del Angel RN  Outcome: Progressing  Flowsheets (Taken 10/21/2024 2124)  Verbalizes/displays adequate comfort level or baseline comfort level:   Encourage

## 2024-10-24 NOTE — PROCEDURES
1425 Spoke to patients RN regarding ordered.  Notified that d/t STAT/Emergent cases in Angio department, patient cannot be done today.   Requested to keep pt NPO after midnight and to verify with ordering provider that it is ok to continue to hold all blood thinning medications.  Angio staff will call in am to proceed with procedure.

## 2024-10-25 ENCOUNTER — APPOINTMENT (OUTPATIENT)
Dept: INTERVENTIONAL RADIOLOGY/VASCULAR | Age: 84
DRG: 463 | End: 2024-10-25
Payer: MEDICARE

## 2024-10-25 ENCOUNTER — APPOINTMENT (OUTPATIENT)
Dept: GENERAL RADIOLOGY | Age: 84
DRG: 463 | End: 2024-10-25
Payer: MEDICARE

## 2024-10-25 LAB
ACB COMPLEX DNA BLD POS QL NAA+NON-PROBE: NOT DETECTED
ALBUMIN SERPL-MCNC: 1.7 G/DL (ref 3.5–5.2)
ALP SERPL-CCNC: 75 U/L (ref 35–104)
ALT SERPL-CCNC: <5 U/L (ref 0–32)
ANION GAP SERPL CALCULATED.3IONS-SCNC: 11 MMOL/L (ref 7–16)
AST SERPL-CCNC: 14 U/L (ref 0–31)
B FRAGILIS DNA BLD POS QL NAA+NON-PROBE: DETECTED
BASOPHILS # BLD: 0.04 K/UL (ref 0–0.2)
BASOPHILS NFR BLD: 0 % (ref 0–2)
BILIRUB SERPL-MCNC: 0.6 MG/DL (ref 0–1.2)
BIOFIRE TEST COMMENT: ABNORMAL
BUN SERPL-MCNC: 45 MG/DL (ref 6–23)
C ALBICANS DNA BLD POS QL NAA+NON-PROBE: NOT DETECTED
C AURIS DNA BLD POS QL NAA+NON-PROBE: NOT DETECTED
C GATTII+NEOFOR DNA BLD POS QL NAA+N-PRB: NOT DETECTED
C GLABRATA DNA BLD POS QL NAA+NON-PROBE: NOT DETECTED
C KRUSEI DNA BLD POS QL NAA+NON-PROBE: NOT DETECTED
C PARAP DNA BLD POS QL NAA+NON-PROBE: NOT DETECTED
C TROPICLS DNA BLD POS QL NAA+NON-PROBE: NOT DETECTED
CALCIUM SERPL-MCNC: 7.9 MG/DL (ref 8.6–10.2)
CHLORIDE SERPL-SCNC: 104 MMOL/L (ref 98–107)
CO2 SERPL-SCNC: 24 MMOL/L (ref 22–29)
CREAT SERPL-MCNC: 1.4 MG/DL (ref 0.5–1)
E CLOAC COMP DNA BLD POS NAA+NON-PROBE: NOT DETECTED
E COLI DNA BLD POS QL NAA+NON-PROBE: NOT DETECTED
E FAECALIS DNA BLD POS QL NAA+NON-PROBE: NOT DETECTED
E FAECIUM DNA BLD POS QL NAA+NON-PROBE: NOT DETECTED
ENTEROBACTERALES DNA BLD POS NAA+N-PRB: NOT DETECTED
EOSINOPHIL # BLD: 0.13 K/UL (ref 0.05–0.5)
EOSINOPHILS RELATIVE PERCENT: 1 % (ref 0–6)
ERYTHROCYTE [DISTWIDTH] IN BLOOD BY AUTOMATED COUNT: 18.9 % (ref 11.5–15)
GFR, ESTIMATED: 39 ML/MIN/1.73M2
GLUCOSE SERPL-MCNC: 58 MG/DL (ref 74–99)
GP B STREP DNA BLD POS QL NAA+NON-PROBE: NOT DETECTED
HAEM INFLU DNA BLD POS QL NAA+NON-PROBE: NOT DETECTED
HCT VFR BLD AUTO: 30.4 % (ref 34–48)
HGB BLD-MCNC: 8.9 G/DL (ref 11.5–15.5)
IMM GRANULOCYTES # BLD AUTO: 0.17 K/UL (ref 0–0.58)
IMM GRANULOCYTES NFR BLD: 2 % (ref 0–5)
K OXYTOCA DNA BLD POS QL NAA+NON-PROBE: NOT DETECTED
KLEBSIELLA SP DNA BLD POS QL NAA+NON-PRB: NOT DETECTED
KLEBSIELLA SP DNA BLD POS QL NAA+NON-PRB: NOT DETECTED
L MONOCYTOG DNA BLD POS QL NAA+NON-PROBE: NOT DETECTED
LYMPHOCYTES NFR BLD: 1.83 K/UL (ref 1.5–4)
LYMPHOCYTES RELATIVE PERCENT: 17 % (ref 20–42)
MCH RBC QN AUTO: 25.6 PG (ref 26–35)
MCHC RBC AUTO-ENTMCNC: 29.3 G/DL (ref 32–34.5)
MCV RBC AUTO: 87.6 FL (ref 80–99.9)
MICROORGANISM SPEC CULT: ABNORMAL
MICROORGANISM SPEC CULT: NORMAL
MICROORGANISM SPEC CULT: NORMAL
MICROORGANISM/AGENT SPEC: ABNORMAL
MONOCYTES NFR BLD: 0.6 K/UL (ref 0.1–0.95)
MONOCYTES NFR BLD: 5 % (ref 2–12)
N MEN DNA BLD POS QL NAA+NON-PROBE: NOT DETECTED
NEUTROPHILS NFR BLD: 75 % (ref 43–80)
NEUTS SEG NFR BLD: 8.26 K/UL (ref 1.8–7.3)
P AERUGINOSA DNA BLD POS NAA+NON-PROBE: NOT DETECTED
PLATELET # BLD AUTO: 305 K/UL (ref 130–450)
PMV BLD AUTO: 9.5 FL (ref 7–12)
POTASSIUM SERPL-SCNC: 3.6 MMOL/L (ref 3.5–5)
PROT SERPL-MCNC: 4.6 G/DL (ref 6.4–8.3)
PROTEUS SP DNA BLD POS QL NAA+NON-PROBE: NOT DETECTED
RBC # BLD AUTO: 3.47 M/UL (ref 3.5–5.5)
S AUREUS DNA BLD POS QL NAA+NON-PROBE: NOT DETECTED
S AUREUS+CONS DNA BLD POS NAA+NON-PROBE: NOT DETECTED
S EPIDERMIDIS DNA BLD POS QL NAA+NON-PRB: NOT DETECTED
S LUGDUNENSIS DNA BLD POS QL NAA+NON-PRB: NOT DETECTED
S MALTOPHILIA DNA BLD POS QL NAA+NON-PRB: NOT DETECTED
S MARCESCENS DNA BLD POS NAA+NON-PROBE: NOT DETECTED
S PNEUM DNA BLD POS QL NAA+NON-PROBE: NOT DETECTED
S PYO DNA BLD POS QL NAA+NON-PROBE: NOT DETECTED
SALMONELLA DNA BLD POS QL NAA+NON-PROBE: NOT DETECTED
SERVICE CMNT-IMP: ABNORMAL
SERVICE CMNT-IMP: NORMAL
SERVICE CMNT-IMP: NORMAL
SODIUM SERPL-SCNC: 139 MMOL/L (ref 132–146)
SPECIMEN DESCRIPTION: ABNORMAL
SPECIMEN DESCRIPTION: NORMAL
SPECIMEN DESCRIPTION: NORMAL
STREPTOCOCCUS DNA BLD POS NAA+NON-PROBE: NOT DETECTED
WBC OTHER # BLD: 11 K/UL (ref 4.5–11.5)

## 2024-10-25 PROCEDURE — 6370000000 HC RX 637 (ALT 250 FOR IP)

## 2024-10-25 PROCEDURE — 6360000002 HC RX W HCPCS: Performed by: NURSE PRACTITIONER

## 2024-10-25 PROCEDURE — 97112 NEUROMUSCULAR REEDUCATION: CPT

## 2024-10-25 PROCEDURE — 77001 FLUOROGUIDE FOR VEIN DEVICE: CPT

## 2024-10-25 PROCEDURE — 2700000000 HC OXYGEN THERAPY PER DAY

## 2024-10-25 PROCEDURE — 05H533Z INSERTION OF INFUSION DEVICE INTO RIGHT SUBCLAVIAN VEIN, PERCUTANEOUS APPROACH: ICD-10-PCS | Performed by: INTERNAL MEDICINE

## 2024-10-25 PROCEDURE — 6370000000 HC RX 637 (ALT 250 FOR IP): Performed by: NURSE PRACTITIONER

## 2024-10-25 PROCEDURE — 36415 COLL VENOUS BLD VENIPUNCTURE: CPT

## 2024-10-25 PROCEDURE — 97535 SELF CARE MNGMENT TRAINING: CPT

## 2024-10-25 PROCEDURE — 6360000002 HC RX W HCPCS: Performed by: INTERNAL MEDICINE

## 2024-10-25 PROCEDURE — 2580000003 HC RX 258: Performed by: INTERNAL MEDICINE

## 2024-10-25 PROCEDURE — C1894 INTRO/SHEATH, NON-LASER: HCPCS

## 2024-10-25 PROCEDURE — 2580000003 HC RX 258: Performed by: NURSE PRACTITIONER

## 2024-10-25 PROCEDURE — 6360000002 HC RX W HCPCS: Performed by: RADIOLOGY

## 2024-10-25 PROCEDURE — 71045 X-RAY EXAM CHEST 1 VIEW: CPT

## 2024-10-25 PROCEDURE — P9047 ALBUMIN (HUMAN), 25%, 50ML: HCPCS | Performed by: NURSE PRACTITIONER

## 2024-10-25 PROCEDURE — 85025 COMPLETE CBC W/AUTO DIFF WBC: CPT

## 2024-10-25 PROCEDURE — 76937 US GUIDE VASCULAR ACCESS: CPT

## 2024-10-25 PROCEDURE — 2500000003 HC RX 250 WO HCPCS: Performed by: RADIOLOGY

## 2024-10-25 PROCEDURE — 80053 COMPREHEN METABOLIC PANEL: CPT

## 2024-10-25 PROCEDURE — 1200000000 HC SEMI PRIVATE

## 2024-10-25 PROCEDURE — 2500000003 HC RX 250 WO HCPCS: Performed by: INTERNAL MEDICINE

## 2024-10-25 PROCEDURE — 36558 INSERT TUNNELED CV CATH: CPT

## 2024-10-25 RX ORDER — BUMETANIDE 1 MG/1
1 TABLET ORAL DAILY
Qty: 30 TABLET | Refills: 3 | Status: SHIPPED
Start: 2024-10-27 | End: 2024-11-01

## 2024-10-25 RX ORDER — LIDOCAINE HYDROCHLORIDE 20 MG/ML
INJECTION, SOLUTION INFILTRATION; PERINEURAL PRN
Status: COMPLETED | OUTPATIENT
Start: 2024-10-25 | End: 2024-10-25

## 2024-10-25 RX ORDER — LACTOBACILLUS RHAMNOSUS GG 10B CELL
1 CAPSULE ORAL DAILY
Status: DISCONTINUED | OUTPATIENT
Start: 2024-10-25 | End: 2024-10-31 | Stop reason: HOSPADM

## 2024-10-25 RX ORDER — FENTANYL CITRATE 50 UG/ML
INJECTION, SOLUTION INTRAMUSCULAR; INTRAVENOUS PRN
Status: COMPLETED | OUTPATIENT
Start: 2024-10-25 | End: 2024-10-25

## 2024-10-25 RX ORDER — SODIUM CHLORIDE 9 MG/ML
INJECTION, SOLUTION INTRAVENOUS CONTINUOUS
Status: ACTIVE | OUTPATIENT
Start: 2024-10-25 | End: 2024-10-25

## 2024-10-25 RX ORDER — LOPERAMIDE HYDROCHLORIDE 2 MG/1
2 CAPSULE ORAL 4 TIMES DAILY PRN
Status: DISCONTINUED | OUTPATIENT
Start: 2024-10-25 | End: 2024-10-31 | Stop reason: HOSPADM

## 2024-10-25 RX ORDER — LIDOCAINE HYDROCHLORIDE AND EPINEPHRINE BITARTRATE 20; .01 MG/ML; MG/ML
INJECTION, SOLUTION SUBCUTANEOUS PRN
Status: COMPLETED | OUTPATIENT
Start: 2024-10-25 | End: 2024-10-25

## 2024-10-25 RX ORDER — ALBUMIN (HUMAN) 12.5 G/50ML
25 SOLUTION INTRAVENOUS EVERY 6 HOURS
Status: COMPLETED | OUTPATIENT
Start: 2024-10-25 | End: 2024-10-25

## 2024-10-25 RX ORDER — MIDAZOLAM HYDROCHLORIDE 2 MG/2ML
INJECTION, SOLUTION INTRAMUSCULAR; INTRAVENOUS PRN
Status: COMPLETED | OUTPATIENT
Start: 2024-10-25 | End: 2024-10-25

## 2024-10-25 RX ORDER — DIPHENHYDRAMINE HYDROCHLORIDE 50 MG/ML
INJECTION INTRAMUSCULAR; INTRAVENOUS PRN
Status: COMPLETED | OUTPATIENT
Start: 2024-10-25 | End: 2024-10-25

## 2024-10-25 RX ORDER — MICONAZOLE NITRATE 20 MG/G
CREAM TOPICAL
DISCHARGE
Start: 2024-10-25 | End: 2024-11-01

## 2024-10-25 RX ADMIN — Medication 2000 UNITS: at 11:39

## 2024-10-25 RX ADMIN — APIXABAN 5 MG: 5 TABLET, FILM COATED ORAL at 11:38

## 2024-10-25 RX ADMIN — LOPERAMIDE HYDROCHLORIDE 2 MG: 2 CAPSULE ORAL at 21:54

## 2024-10-25 RX ADMIN — SODIUM CHLORIDE, PRESERVATIVE FREE 10 ML: 5 INJECTION INTRAVENOUS at 22:02

## 2024-10-25 RX ADMIN — FENTANYL CITRATE 25 MCG: 50 INJECTION, SOLUTION INTRAMUSCULAR; INTRAVENOUS at 08:14

## 2024-10-25 RX ADMIN — Medication 500 MG: at 11:38

## 2024-10-25 RX ADMIN — ALBUMIN (HUMAN) 25 G: 0.25 INJECTION, SOLUTION INTRAVENOUS at 18:33

## 2024-10-25 RX ADMIN — OXYCODONE HYDROCHLORIDE AND ACETAMINOPHEN 1 TABLET: 5; 325 TABLET ORAL at 11:38

## 2024-10-25 RX ADMIN — MICONAZOLE NITRATE: 20 CREAM TOPICAL at 22:01

## 2024-10-25 RX ADMIN — Medication 1 CAPSULE: at 23:18

## 2024-10-25 RX ADMIN — OXYCODONE HYDROCHLORIDE AND ACETAMINOPHEN 1 TABLET: 5; 325 TABLET ORAL at 00:37

## 2024-10-25 RX ADMIN — FENTANYL CITRATE 25 MCG: 50 INJECTION, SOLUTION INTRAMUSCULAR; INTRAVENOUS at 08:19

## 2024-10-25 RX ADMIN — METRONIDAZOLE 500 MG: 500 INJECTION, SOLUTION INTRAVENOUS at 15:21

## 2024-10-25 RX ADMIN — METOPROLOL TARTRATE 25 MG: 25 TABLET, FILM COATED ORAL at 21:54

## 2024-10-25 RX ADMIN — DIPHENHYDRAMINE HYDROCHLORIDE 25 MG: 50 INJECTION, SOLUTION INTRAMUSCULAR; INTRAVENOUS at 08:15

## 2024-10-25 RX ADMIN — ALBUMIN (HUMAN) 25 G: 0.25 INJECTION, SOLUTION INTRAVENOUS at 13:58

## 2024-10-25 RX ADMIN — OXYCODONE HYDROCHLORIDE AND ACETAMINOPHEN 1 TABLET: 5; 325 TABLET ORAL at 23:17

## 2024-10-25 RX ADMIN — METRONIDAZOLE 500 MG: 500 INJECTION, SOLUTION INTRAVENOUS at 23:23

## 2024-10-25 RX ADMIN — DIPHENHYDRAMINE HYDROCHLORIDE 25 MG: 50 INJECTION, SOLUTION INTRAMUSCULAR; INTRAVENOUS at 08:19

## 2024-10-25 RX ADMIN — METOPROLOL TARTRATE 25 MG: 25 TABLET, FILM COATED ORAL at 11:38

## 2024-10-25 RX ADMIN — OXYCODONE HYDROCHLORIDE AND ACETAMINOPHEN 1 TABLET: 5; 325 TABLET ORAL at 17:24

## 2024-10-25 RX ADMIN — LIDOCAINE HYDROCHLORIDE,EPINEPHRINE BITARTRATE 8 ML: 20; .01 INJECTION, SOLUTION INFILTRATION; PERINEURAL at 08:16

## 2024-10-25 RX ADMIN — NAFCILLIN SODIUM 2000 MG: 2 INJECTION, POWDER, LYOPHILIZED, FOR SOLUTION INTRAMUSCULAR; INTRAVENOUS at 02:10

## 2024-10-25 RX ADMIN — MIDAZOLAM HYDROCHLORIDE 0.5 MG: 1 INJECTION, SOLUTION INTRAMUSCULAR; INTRAVENOUS at 08:19

## 2024-10-25 RX ADMIN — SODIUM CHLORIDE: 9 INJECTION, SOLUTION INTRAVENOUS at 11:59

## 2024-10-25 RX ADMIN — LOPERAMIDE HYDROCHLORIDE 2 MG: 2 CAPSULE ORAL at 14:15

## 2024-10-25 RX ADMIN — LIDOCAINE HYDROCHLORIDE 10 ML: 20 INJECTION, SOLUTION INFILTRATION; PERINEURAL at 08:15

## 2024-10-25 RX ADMIN — MICONAZOLE NITRATE: 20 OINTMENT TOPICAL at 22:00

## 2024-10-25 RX ADMIN — METRONIDAZOLE 500 MG: 500 INJECTION, SOLUTION INTRAVENOUS at 06:59

## 2024-10-25 RX ADMIN — APIXABAN 5 MG: 5 TABLET, FILM COATED ORAL at 21:55

## 2024-10-25 RX ADMIN — NAFCILLIN SODIUM 2000 MG: 2 INJECTION, POWDER, LYOPHILIZED, FOR SOLUTION INTRAMUSCULAR; INTRAVENOUS at 22:00

## 2024-10-25 RX ADMIN — Medication 1 TABLET: at 11:38

## 2024-10-25 RX ADMIN — NAFCILLIN SODIUM 2000 MG: 2 INJECTION, POWDER, LYOPHILIZED, FOR SOLUTION INTRAMUSCULAR; INTRAVENOUS at 17:18

## 2024-10-25 RX ADMIN — NAFCILLIN SODIUM 2000 MG: 2 INJECTION, POWDER, LYOPHILIZED, FOR SOLUTION INTRAMUSCULAR; INTRAVENOUS at 12:03

## 2024-10-25 RX ADMIN — CALCIUM CARBONATE-VITAMIN D TAB 500 MG-200 UNIT 1 TABLET: 500-200 TAB at 11:39

## 2024-10-25 RX ADMIN — MIDAZOLAM HYDROCHLORIDE 0.5 MG: 1 INJECTION, SOLUTION INTRAMUSCULAR; INTRAVENOUS at 08:15

## 2024-10-25 RX ADMIN — CLOBETASOL PROPIONATE: 0.5 CREAM TOPICAL at 22:01

## 2024-10-25 RX ADMIN — FERROUS SULFATE TAB 325 MG (65 MG ELEMENTAL FE) 325 MG: 325 (65 FE) TAB at 11:38

## 2024-10-25 ASSESSMENT — PAIN SCALES - GENERAL
PAINLEVEL_OUTOF10: 8
PAINLEVEL_OUTOF10: 7
PAINLEVEL_OUTOF10: 0
PAINLEVEL_OUTOF10: 6
PAINLEVEL_OUTOF10: 6
PAINLEVEL_OUTOF10: 3
PAINLEVEL_OUTOF10: 0

## 2024-10-25 ASSESSMENT — PAIN DESCRIPTION - DESCRIPTORS
DESCRIPTORS: ACHING;DISCOMFORT;SORE
DESCRIPTORS: SHARP;SHOOTING;SORE
DESCRIPTORS: SPASM;SHARP;SHOOTING
DESCRIPTORS: DISCOMFORT;ACHING;SORE

## 2024-10-25 ASSESSMENT — PAIN DESCRIPTION - LOCATION
LOCATION: BACK;LEG
LOCATION: KNEE
LOCATION: LEG
LOCATION: KNEE

## 2024-10-25 ASSESSMENT — PAIN SCALES - WONG BAKER
WONGBAKER_NUMERICALRESPONSE: NO HURT
WONGBAKER_NUMERICALRESPONSE: NO HURT

## 2024-10-25 ASSESSMENT — PAIN - FUNCTIONAL ASSESSMENT
PAIN_FUNCTIONAL_ASSESSMENT: PREVENTS OR INTERFERES SOME ACTIVE ACTIVITIES AND ADLS

## 2024-10-25 ASSESSMENT — PAIN DESCRIPTION - FREQUENCY: FREQUENCY: INTERMITTENT

## 2024-10-25 ASSESSMENT — PAIN DESCRIPTION - PAIN TYPE
TYPE: ACUTE PAIN;SURGICAL PAIN
TYPE: ACUTE PAIN;SURGICAL PAIN

## 2024-10-25 ASSESSMENT — PAIN DESCRIPTION - ORIENTATION
ORIENTATION: RIGHT

## 2024-10-25 ASSESSMENT — PAIN DESCRIPTION - ONSET: ONSET: GRADUAL

## 2024-10-25 NOTE — PROGRESS NOTES
4 Eyes Skin Assessment     NAME:  Gabi Madrigal  YOB: 1940  MEDICAL RECORD NUMBER:  [id number]    The patient is being assessed for  Transfer to New Unit    I agree that at least one RN has performed a thorough Head to Toe Skin Assessment on the patient. ALL assessment sites listed below have been assessed.      Areas assessed by both nurses:    Head, Face, Ears, Shoulders, Back, Chest, Arms, Elbows, Hands, Sacrum. Buttock, Coccyx, Ischium, Legs. Feet and Heels, and Under Medical Devices         Does the Patient have a Wound? Yes wound(s) were present on assessment. LDA wound assessment was Initiated and completed by RN       Sathya Prevention initiated by RN: Yes  Wound Care Orders initiated by RN: Yes    Pressure Injury (Stage 3,4, Unstageable, DTI, NWPT, and Complex wounds) if present, place Wound referral order by RN under : Yes    New Ostomies, if present place, Ostomy referral order under : No     Nurse 1 eSignature: Electronically signed by Chasidy Dorado RN on 10/25/24 at 7:01 AM EDT    **SHARE this note so that the co-signing nurse can place an eSignature**    Nurse 2 eSignature: Electronically signed by MARION MASON RN on 10/25/24 at 7:02 AM EDT

## 2024-10-25 NOTE — PROGRESS NOTES
Located within [facility]  ASHISHIDA  Progress Note      Chief Complaint   Patient presents with    Joint Swelling     Patient sent from  with fluid around knee , sent to have knee drained        SUBJECTIVE:    Patient is tolerating medications. No reported adverse drug reactions.  No nausea, vomiting, diarrhea. Resting in bed. Daughter present. Concerned about multiple loose stools daily contaminating her surgical and coccyx wound.  Spoke wit RN, Stool is not watery. Patient is lactose intolerant.   Review of systems:  As stated above in the chief complaint, otherwise negative.    Medications:  Scheduled Meds:   apixaban  5 mg Oral BID    miconazole nitrate   Topical BID    lidocaine-EPINEPHrine  20 mL IntraDERmal Once    miconazole   Topical BID    sodium chloride flush  5-40 mL IntraVENous 2 times per day    nafcillin  2,000 mg IntraVENous Q4H    metroNIDAZOLE  500 mg IntraVENous Q8H    sodium chloride flush  5-40 mL IntraVENous 2 times per day    collagenase   Topical Daily    [Held by provider] bumetanide  1 mg Oral Daily    oyster shell calcium w/D  1 tablet Oral Daily    clobetasol   Topical BID    ferrous sulfate  325 mg Oral Daily with breakfast    levothyroxine  88 mcg Oral Daily    lidocaine  1 patch TransDERmal Daily    metoprolol tartrate  25 mg Oral BID    therapeutic multivitamin-minerals  1 tablet Oral Daily    vitamin C  500 mg Oral Daily    vitamin D  2,000 Units Oral Daily    sodium chloride flush  5-40 mL IntraVENous 2 times per day     Continuous Infusions:   sodium chloride      sodium chloride      sodium chloride      sodium chloride      sodium chloride       PRN Meds:miconazole nitrate **AND** miconazole nitrate, sodium chloride flush, sodium chloride, sodium chloride flush, sodium chloride, sodium chloride, oxyCODONE-acetaminophen, morphine **OR** morphine, sodium chloride flush, sodium chloride, potassium chloride **OR** potassium alternative oral replacement, magnesium      WOUND/ABSCESS   Date Value Ref Range Status   06/30/2023 (A)  Final    Mixed candice isolated. Further workup and sensitivity testing  is not routinely indicated and will not be performed.  Mixed candice isolated includes:  Alpha hemolytic Strep species  Corynebacteria     06/30/2023   Final    Heavy growth  Methicillin resistant Staph aureus isolated. Most Methicillin  resistant Staphylococcus are usually resistant to multiple  antibiotics including other B-Lactams, Aminoglycosides,  Macrolides, Clindamycin and Tetracycline. Contact isolation  is indicated.  This isolate is presumed to be resistant based on the  detection of inducible Clindamycin resistance.  Clindamycin  may still be effective in some patients.     06/09/2023   Final    Moderate growth  Methicillin resistant Staph aureus isolated. Most Methicillin  resistant Staphylococcus are usually resistant to multiple  antibiotics including other B-Lactams, Aminoglycosides,  Macrolides, Clindamycin and Tetracycline. Contact isolation  is indicated.     06/09/2023 Moderate growth  Final   06/09/2023 Light growth  Final   06/09/2023   Final    Light growth  Susceptibility testing for this isolate is restricted to  normally sterile sources.       No results found for: \"RESPSMEAR\"      Component Value Date/Time    LABFUNG 4 Weeks- no fungus isolated 05/29/2014 1545     No results found for: \"CULTRESP\"  No results found for: \"CXCATHTIP\"  No results found for: \"BFCS\"  Culture Surgical   Date Value Ref Range Status   10/18/2017 Heavy growth  Final   10/17/2014   Final    Growth not present  There are no organisms present as previously reported     03/31/2014 Light growth  Final   03/31/2014 Moderate growth  Final     Urine Culture, Routine   Date Value Ref Range Status   09/20/2022   Final    10 to 100,000 CFU/mL  Mixed candice isolated. Further workup and sensitivity testing  is not routinely indicated and will not be performed.  Mixed candice isolated includes:  Mixed

## 2024-10-25 NOTE — CARE COORDINATION
Care Coordination  The patient was admitted with a right knee effusion. She is post op right distal femur I/d on 10/19/24. Id is following and is on Iv Nafcillin  and iv Flagyl. Plan for 6 weeks of iv atb. She had a right tunneled IJ  picc line placed. The patient will be a private pay bed hold from [facility]. No auth needed to return. Return envelope done.       Addendum- The patient has a Right IJ tunneled picc line and [facility] will not accept the patient back with a tunneled cath. GENEVA Valentino was notified of this.

## 2024-10-25 NOTE — BRIEF OP NOTE
Brief Postoperative Note      Patient: Gabi Madrigal  YOB: 1940  MRN: [id number]    Date of Procedure: 10/25/2024    Sepsis, osteomyelitis    Post-Op Diagnosis: Same  Tunneled CVC    VIPIN Root    Assistant:  * No surgical staff found *    Anesthesia: * No anesthesia type entered *    Estimated Blood Loss (mL): Minimal    Complications: None    Specimens:   * No specimens in log *    Implants:  * No implants in log *      Drains:   Urinary Catheter 10/23/24 Isbell (Active)   $ Urethral catheter insertion $ Not inserted for procedure 10/23/24 1610   Catheter Indications Assist in healing of open sacral or perineal wounds (Stage III, IV, or unstageable documented by a provider or enterostomal therapy) and/or full thickness perineal and lower extremity burns in incontinent patients 10/25/24 0515   Site Assessment Red;Swelling 10/25/24 0515   Urine Color Yellow 10/25/24 0515   Urine Appearance Clear 10/25/24 0515   Collection Container Standard 10/25/24 0515   Securement Method Securing device (Describe) 10/25/24 0515   Catheter Care  Soap and water 10/25/24 0515   Catheter Best Practices  Drainage tube clipped to bed;Catheter secured to thigh;Tamper seal intact;Bag below bladder;Lack of dependent loop in tubing;Bag not on floor;Drainage bag less than half full 10/25/24 0515   Status Draining 10/25/24 0515   Output (mL) 300 mL 10/25/24 0630       [REMOVED] Negative Pressure Wound Therapy (Removed)   Wound Type Surgical 10/23/24 0750   Dressing Type Other (Comment) 10/21/24 1439   Cycle Continuous 10/23/24 0750   Target Pressure (mmHg) 125 10/23/24 0750       [REMOVED] External Urinary Catheter (Removed)   Site Assessment Clean,dry & intact 10/22/24 2000   Placement Repositioned 10/19/24 0035   Securement Method Tape 10/19/24 0035   Catheter Care Catheter/Wick replaced;Suction Canister/Tubing changed 10/19/24 0035   Perineal Care Yes 10/19/24 0035   Suction 40 mmgHg continuous 10/19/24 0035   Urine Color

## 2024-10-25 NOTE — PROGRESS NOTES
OT BEDSIDE TREATMENT NOTE   [facility]  [address], [place]      Date:10/25/2024  Patient Name: Gabi Madrigal  MRN: [id number]  : 1940  Room: [address] [place] [address]     Evaluating OT: Arianna Alanis OTD, OTR/L, [id number]       Referring Provider: Aquilino Dyer DO     Specific Provider Orders/Date: OT eval and treat (10/21/24)    Diagnosis: Effusion of right knee [M25.461]  Infected hardware in right leg, initial encounter (HCC) [T84.7XXA]  Effusion, right knee [M25.461]    Surgeries/Procedures:   10/19:  Excisional debridement right leg area measuring 30 x 8 cm including skin, subcutaneous tissue, fat, fascia, muscle, bone  Right knee arthrotomy with irrigation debridement and synovectomy  Right distal femur hardware retention.       Pertinent Medical History:       has a past medical history of Acquired hypothyroidism, Arthritis, Blood transfusion reaction, Chronic kidney disease, History of blood transfusion, Hypertension, Lymphedema of both lower extremities, Open wound of left great toe, Other disorders of kidney and ureter in diseases classified elsewhere, Pelvic fracture (HCC), Positive culture findings in wound, Skin ulcer of left calf with fat layer exposed (HCC), Skin ulcer of left calf, limited to breakdown of skin (HCC), Ulcer of left lower leg (HCC), and Venous stasis ulcer of left calf limited to breakdown of skin (HCC).           Precautions:  Fall Risk, NWB RLE, hinged ROM brace locked at 30 degrees, , sacral wound, BLE wounds, alarms+      Assessment of current deficits    [x] Functional mobility            [x]ADLs           [x] Strength                   [x]Cognition    [x] Functional transfers          [x] IADLs          [x] Safety Awareness   [x]Endurance    [x] Fine Coordination              [x] Balance      [] Vision/perception    [x]Sensation      []Gross Motor Coordination  [x] ROM           [] Delirium

## 2024-10-25 NOTE — PROGRESS NOTES
Palliative Care Department  Progress Note  Provider: TRAVIS Capps CNP  [phone]    Hospital Day: 8    Palliative Medicine was consulted for assistance with: Assist with goals of care      Gabi had her tunneled catheter placed.  He code status was changed to full code for the procedure.  She confirms that she wishes for it to return to DNR-CCA status.  Goals have otherwise been addressed, with planned for return to SNF with IV antibiotics.  There are no further PM needs at this time.  PM will now sign off.  If new PM needs arise, please re-consult.  Thank you.      TRAVIS Capps CNP  Palliative Medicine    Thank you for allowing Palliative Medicine to participate in the care of Gabi Madrigal.    Note: This report was completed using computerBluefly voiced recognition software.  Every effort has been made to ensure accuracy; however, inadvertent computerized transcription errors may be present.

## 2024-10-25 NOTE — PROGRESS NOTES
Physician Progress Note      PATIENT:               IMAN BABB  CSN #:                  [id number]  :                       1940  ADMIT DATE:       10/18/2024 3:03 PM  DISCH DATE:  RESPONDING  PROVIDER #:        SANDRA DOWNING          QUERY TEXT:    Patient admitted with sacral ulcer. Per Op note dated 10/23 documentation of   debridement. To accurately reflect the procedure performed please document if   debridement was excisional or nonexcisional and the deepest depth of tissue   removed as down to and including:  The medical record reflects the following:  Risk Factors: sacral pressure ulcer  Clinical Indicators: per procedure note 10/23 Sacral wound debridement sharp   debridement with a scalpel was used to debride the necrotic tissue.he tissue   was debrided back until healthy, viable, bleeding tissue was obtained.    Removed approximately 10 cm x 2cm  Treatment: Debridement  Options provided:  -- Nonexcisional debridement of subcutaneous tissue  -- Excisional debridement of subcutaneous tissue  -- Other - I will add my own diagnosis  -- Disagree - Not applicable / Not valid  -- Disagree - Clinically unable to determine / Unknown  -- Refer to Clinical Documentation Reviewer    PROVIDER RESPONSE TEXT:    Excisional debridement of subcutaneous tissue of sacrum was performed during   procedure on 10/23    Query created by: Dena Gilbert on 10/24/2024 12:29 PM      Electronically signed by:  SANDRA DOWNING 10/25/2024 12:23 PM

## 2024-10-25 NOTE — PROGRESS NOTES
saline over 2 hours  -H&H stable  -Plan for PICC line today  -Consult dietitian for protein malnutrition  -Eliquis restarted today  -Remains on Flagyl and nafcillin  -Discharge planning back to nursing facility once PICC line has been placed and final antibiotics have been reconciled by ID    10/25/24  -Kidney function remains elevated 45/1.4  -Significant protein calorie malnutrition,   -albumin 1.7, 25 g q6h x2 doses  -C. difficile ordered by infectious disease  -As needed Imodium  -Chest x-ray CHF  -Heart rates elevated secondary to doses of metoprolol being held yesterday by nursing  -Continue IV antibiotics  -PICC line placed today in IR  -Check labs and vitals in a.m.    Code Status: Full code  Consultants: Infectious disease, orthopedics, cardiology, GS    DVT Prophylaxis Eliquis  PT/OT  Discharge planning         I have spent a total time of 25 minutes of this patient encounter reviewing chart, labs, radiological reports coordinating care with interdisciplinary teams, face to face encounter with patient, providing counseling/education to patient/family, and formulating plan.       TRAVIS Salazar - CNP  4:58 PM  10/25/2024

## 2024-10-25 NOTE — PROCEDURES
0840 Patient returned from tunneled PICC placement. Report received from SURINDER Johns. Dressing checked, clean, dry, and intact. Patient stable. No s/s of complications noted or reported. Vitals will be checked q 15min, see flow sheets. Last medications given at 0819. Patient will recover for at least 30 minutes after sedation medications.     0906 Placed in transport queue.    0915 Spoke with bedside nurse and patient no longer needs to be NPO. Patient drinking well with no s/s complications.    0930 Site was checked with every set of vitals. Site clean dry and intact. Patient in stable condition, no s/s of complications noted or reported. Patient taken back to inpatient room via Transport.

## 2024-10-26 LAB
ALBUMIN SERPL-MCNC: 2.7 G/DL (ref 3.5–5.2)
ALP SERPL-CCNC: 80 U/L (ref 35–104)
ALT SERPL-CCNC: <5 U/L (ref 0–32)
ANION GAP SERPL CALCULATED.3IONS-SCNC: 11 MMOL/L (ref 7–16)
AST SERPL-CCNC: 16 U/L (ref 0–31)
BILIRUB SERPL-MCNC: 0.8 MG/DL (ref 0–1.2)
BUN SERPL-MCNC: 48 MG/DL (ref 6–23)
CALCIUM SERPL-MCNC: 8.4 MG/DL (ref 8.6–10.2)
CHLORIDE SERPL-SCNC: 103 MMOL/L (ref 98–107)
CO2 SERPL-SCNC: 26 MMOL/L (ref 22–29)
CREAT SERPL-MCNC: 1.7 MG/DL (ref 0.5–1)
ERYTHROCYTE [DISTWIDTH] IN BLOOD BY AUTOMATED COUNT: 19 % (ref 11.5–15)
GFR, ESTIMATED: 30 ML/MIN/1.73M2
GLUCOSE BLD-MCNC: 171 MG/DL (ref 74–99)
GLUCOSE SERPL-MCNC: 114 MG/DL (ref 74–99)
HCT VFR BLD AUTO: 28.4 % (ref 34–48)
HGB BLD-MCNC: 8.2 G/DL (ref 11.5–15.5)
MCH RBC QN AUTO: 25.5 PG (ref 26–35)
MCHC RBC AUTO-ENTMCNC: 28.9 G/DL (ref 32–34.5)
MCV RBC AUTO: 88.2 FL (ref 80–99.9)
MICROORGANISM SPEC CULT: ABNORMAL
MICROORGANISM SPEC CULT: ABNORMAL
MICROORGANISM/AGENT SPEC: ABNORMAL
PLATELET # BLD AUTO: 310 K/UL (ref 130–450)
PMV BLD AUTO: 9.5 FL (ref 7–12)
POTASSIUM SERPL-SCNC: 3.4 MMOL/L (ref 3.5–5)
PROT SERPL-MCNC: 5.3 G/DL (ref 6.4–8.3)
RBC # BLD AUTO: 3.22 M/UL (ref 3.5–5.5)
SERVICE CMNT-IMP: ABNORMAL
SODIUM SERPL-SCNC: 140 MMOL/L (ref 132–146)
SPECIMEN DESCRIPTION: ABNORMAL
WBC OTHER # BLD: 9.9 K/UL (ref 4.5–11.5)

## 2024-10-26 PROCEDURE — 1200000000 HC SEMI PRIVATE

## 2024-10-26 PROCEDURE — 6360000002 HC RX W HCPCS: Performed by: INTERNAL MEDICINE

## 2024-10-26 PROCEDURE — 97530 THERAPEUTIC ACTIVITIES: CPT

## 2024-10-26 PROCEDURE — 6360000002 HC RX W HCPCS

## 2024-10-26 PROCEDURE — 80053 COMPREHEN METABOLIC PANEL: CPT

## 2024-10-26 PROCEDURE — 82962 GLUCOSE BLOOD TEST: CPT

## 2024-10-26 PROCEDURE — 6370000000 HC RX 637 (ALT 250 FOR IP)

## 2024-10-26 PROCEDURE — 85027 COMPLETE CBC AUTOMATED: CPT

## 2024-10-26 PROCEDURE — 2500000003 HC RX 250 WO HCPCS: Performed by: INTERNAL MEDICINE

## 2024-10-26 PROCEDURE — 6370000000 HC RX 637 (ALT 250 FOR IP): Performed by: NURSE PRACTITIONER

## 2024-10-26 PROCEDURE — 2580000003 HC RX 258: Performed by: INTERNAL MEDICINE

## 2024-10-26 PROCEDURE — 2580000003 HC RX 258

## 2024-10-26 PROCEDURE — 36415 COLL VENOUS BLD VENIPUNCTURE: CPT

## 2024-10-26 PROCEDURE — 51702 INSERT TEMP BLADDER CATH: CPT

## 2024-10-26 RX ADMIN — OXYCODONE HYDROCHLORIDE AND ACETAMINOPHEN 1 TABLET: 5; 325 TABLET ORAL at 14:30

## 2024-10-26 RX ADMIN — SODIUM CHLORIDE, PRESERVATIVE FREE 10 ML: 5 INJECTION INTRAVENOUS at 20:52

## 2024-10-26 RX ADMIN — NAFCILLIN SODIUM 2000 MG: 2 INJECTION, POWDER, LYOPHILIZED, FOR SOLUTION INTRAMUSCULAR; INTRAVENOUS at 14:42

## 2024-10-26 RX ADMIN — CLOBETASOL PROPIONATE: 0.5 CREAM TOPICAL at 20:53

## 2024-10-26 RX ADMIN — LOPERAMIDE HYDROCHLORIDE 2 MG: 2 CAPSULE ORAL at 14:30

## 2024-10-26 RX ADMIN — Medication 1 CAPSULE: at 08:03

## 2024-10-26 RX ADMIN — METRONIDAZOLE 500 MG: 500 INJECTION, SOLUTION INTRAVENOUS at 14:37

## 2024-10-26 RX ADMIN — NAFCILLIN SODIUM 2000 MG: 2 INJECTION, POWDER, LYOPHILIZED, FOR SOLUTION INTRAMUSCULAR; INTRAVENOUS at 06:37

## 2024-10-26 RX ADMIN — METOPROLOL TARTRATE 25 MG: 25 TABLET, FILM COATED ORAL at 20:43

## 2024-10-26 RX ADMIN — NAFCILLIN SODIUM 2000 MG: 2 INJECTION, POWDER, LYOPHILIZED, FOR SOLUTION INTRAMUSCULAR; INTRAVENOUS at 02:09

## 2024-10-26 RX ADMIN — OXYCODONE HYDROCHLORIDE AND ACETAMINOPHEN 1 TABLET: 5; 325 TABLET ORAL at 08:04

## 2024-10-26 RX ADMIN — Medication 2000 UNITS: at 08:04

## 2024-10-26 RX ADMIN — LOPERAMIDE HYDROCHLORIDE 2 MG: 2 CAPSULE ORAL at 08:03

## 2024-10-26 RX ADMIN — MICONAZOLE NITRATE: 20 CREAM TOPICAL at 11:22

## 2024-10-26 RX ADMIN — Medication 500 MG: at 08:03

## 2024-10-26 RX ADMIN — SODIUM CHLORIDE, PRESERVATIVE FREE 10 ML: 5 INJECTION INTRAVENOUS at 20:42

## 2024-10-26 RX ADMIN — MICONAZOLE NITRATE: 20 CREAM TOPICAL at 20:50

## 2024-10-26 RX ADMIN — LEVOTHYROXINE SODIUM 88 MCG: 0.09 TABLET ORAL at 06:34

## 2024-10-26 RX ADMIN — NAFCILLIN SODIUM 2000 MG: 2 INJECTION, POWDER, LYOPHILIZED, FOR SOLUTION INTRAMUSCULAR; INTRAVENOUS at 11:20

## 2024-10-26 RX ADMIN — OXYCODONE HYDROCHLORIDE AND ACETAMINOPHEN 1 TABLET: 5; 325 TABLET ORAL at 20:44

## 2024-10-26 RX ADMIN — NAFCILLIN SODIUM 2000 MG: 2 INJECTION, POWDER, LYOPHILIZED, FOR SOLUTION INTRAMUSCULAR; INTRAVENOUS at 18:13

## 2024-10-26 RX ADMIN — POTASSIUM CHLORIDE 40 MEQ: 1500 TABLET, EXTENDED RELEASE ORAL at 20:43

## 2024-10-26 RX ADMIN — METRONIDAZOLE 500 MG: 500 INJECTION, SOLUTION INTRAVENOUS at 07:09

## 2024-10-26 RX ADMIN — CALCIUM CARBONATE-VITAMIN D TAB 500 MG-200 UNIT 1 TABLET: 500-200 TAB at 08:03

## 2024-10-26 RX ADMIN — FERROUS SULFATE TAB 325 MG (65 MG ELEMENTAL FE) 325 MG: 325 (65 FE) TAB at 08:03

## 2024-10-26 RX ADMIN — APIXABAN 5 MG: 5 TABLET, FILM COATED ORAL at 20:43

## 2024-10-26 RX ADMIN — BUMETANIDE 1 MG: 1 TABLET ORAL at 11:22

## 2024-10-26 RX ADMIN — APIXABAN 5 MG: 5 TABLET, FILM COATED ORAL at 08:03

## 2024-10-26 RX ADMIN — MORPHINE SULFATE 4 MG: 4 INJECTION, SOLUTION INTRAMUSCULAR; INTRAVENOUS at 11:37

## 2024-10-26 RX ADMIN — CLOBETASOL PROPIONATE: 0.5 CREAM TOPICAL at 11:22

## 2024-10-26 RX ADMIN — NAFCILLIN SODIUM 2000 MG: 2 INJECTION, POWDER, LYOPHILIZED, FOR SOLUTION INTRAMUSCULAR; INTRAVENOUS at 22:29

## 2024-10-26 RX ADMIN — METRONIDAZOLE 500 MG: 500 INJECTION, SOLUTION INTRAVENOUS at 22:32

## 2024-10-26 RX ADMIN — LOPERAMIDE HYDROCHLORIDE 2 MG: 2 CAPSULE ORAL at 20:43

## 2024-10-26 RX ADMIN — Medication 1 TABLET: at 08:03

## 2024-10-26 RX ADMIN — METOPROLOL TARTRATE 25 MG: 25 TABLET, FILM COATED ORAL at 08:04

## 2024-10-26 ASSESSMENT — PAIN SCALES - GENERAL
PAINLEVEL_OUTOF10: 0
PAINLEVEL_OUTOF10: 5
PAINLEVEL_OUTOF10: 7
PAINLEVEL_OUTOF10: 8
PAINLEVEL_OUTOF10: 0
PAINLEVEL_OUTOF10: 10

## 2024-10-26 ASSESSMENT — PAIN DESCRIPTION - DESCRIPTORS
DESCRIPTORS: SHARP;SORE
DESCRIPTORS: SHARP;SHOOTING;SORE
DESCRIPTORS: SHARP;SHOOTING;SORE
DESCRIPTORS: ACHING;DISCOMFORT
DESCRIPTORS: SHARP;SHOOTING;SORE

## 2024-10-26 ASSESSMENT — PAIN SCALES - WONG BAKER
WONGBAKER_NUMERICALRESPONSE: HURTS A LITTLE BIT
WONGBAKER_NUMERICALRESPONSE: NO HURT
WONGBAKER_NUMERICALRESPONSE: HURTS A LITTLE BIT
WONGBAKER_NUMERICALRESPONSE: HURTS A LITTLE BIT
WONGBAKER_NUMERICALRESPONSE: NO HURT

## 2024-10-26 ASSESSMENT — PAIN DESCRIPTION - PAIN TYPE: TYPE: ACUTE PAIN

## 2024-10-26 ASSESSMENT — PAIN DESCRIPTION - ORIENTATION
ORIENTATION: LOWER;MID
ORIENTATION: RIGHT
ORIENTATION: MID
ORIENTATION: MID
ORIENTATION: RIGHT

## 2024-10-26 ASSESSMENT — PAIN DESCRIPTION - ONSET: ONSET: GRADUAL

## 2024-10-26 ASSESSMENT — PAIN DESCRIPTION - LOCATION
LOCATION: LEG
LOCATION: SACRUM
LOCATION: KNEE
LOCATION: BUTTOCKS
LOCATION: KNEE

## 2024-10-26 ASSESSMENT — PAIN DESCRIPTION - FREQUENCY: FREQUENCY: INTERMITTENT

## 2024-10-26 NOTE — CARE COORDINATION
Social Work Discharge Planning:  BETTIE met with patient's daughter , Mendy, as requested by nursing regarding transition of care. Patient's daughter Mendy relayed she has been private paying at [facility] $360.00 a day, it is paid until Nov. 1st, she is frustrated that they will not take the patient back with IJ tunneled picc line. She would like to speak with palliative care again.  Mendy prefers 1. [facility] 2. [facility] [place] or any other suggestions for placement.  CM to follow-up with Mendy on Monday.   Electronically signed by ALBINO Medina on 10/26/2024 at 11:32 AM

## 2024-10-26 NOTE — PROGRESS NOTES
[facility]   Progress note      Subjective:    Resting comfortably in bed  States she is still having diarrhea  Complaining of sacral pain and burning    Objective:    /62   Pulse (!) 110   Temp 98.1 °F (36.7 °C) (Temporal)   Resp 16   Ht 1.6 m (5' 3\")   Wt 90.7 kg (200 lb)   SpO2 94%   BMI 35.43 kg/m²     In: 1560 [P.O.:600; I.V.:960]  Out: 700   In: 1560   Out: 700 [Urine:700]    General appearance: NAD, conversant  HEENT: AT/NC, MMM  Neck: FROM, supple  Lungs: Clear to auscultation  CV: RRR, no MRGs  Vasc: Radial pulses 2+  Abdomen: Soft, non-tender; no masses or HSM  Extremities: R Knee with surgical dressing, RUE PICC  Skin: Sacral wound that has been debrided  Psych: Alert and oriented to person, place and time  Neuro: Alert and interactive     Recent Labs     10/24/24  0525 10/25/24  0534   WBC 9.4 11.0   HGB 8.4* 8.9*   HCT 28.7* 30.4*    305       Recent Labs     10/23/24  1957 10/24/24  0525 10/25/24  0534    143 139   K 3.2* 4.2 3.6    107 104   CO2 28 29 24   BUN 43* 44* 45*   CREATININE 1.3* 1.4* 1.4*   CALCIUM 7.8* 8.2* 7.9*       Assessment:    Principal Problem:    Effusion, right knee  Active Problems:    Infected hardware in right leg, initial encounter ([facility])    Chronic heart failure with preserved ejection fraction ([facility])    Sacral wound  Resolved Problems:    * No resolved hospital problems. *      Plan:    84 year old female admitted to med surg unit with      Right knee effusion/infected hardware  Pain control  Consult orthopedic surgery-surgical procedure planned for today  Encourage ISP  Bowel regimen  IV hydration postop  Consult ID-postop for IV antibiotics  From medicine standpoint okay to proceed with orthopedic incision and drainage of infected hardware today  Follow inflammatory markers including sed rate/CRP/lactate and procalcitonin     A-fib  Continue metoprolol  Eliquis on hold will resume once cleared by orthopedic surgery  Monitor

## 2024-10-26 NOTE — PROGRESS NOTES
[facility]  ASHISHIDA  Progress Note      Chief Complaint   Patient presents with    Joint Swelling     Patient sent from  with fluid around knee , sent to have knee drained        SUBJECTIVE:    Patient is tolerating medications. No reported adverse drug reactions.  No nausea, vomiting, diarrhea. Resting in bed. RN in room.Stools better with imodium. Stool yesterday did not meet criteria to send for c diff.   Review of systems:  As stated above in the chief complaint, otherwise negative.    Medications:  Scheduled Meds:   lactobacillus  1 capsule Oral Daily    apixaban  5 mg Oral BID    miconazole nitrate   Topical BID    lidocaine-EPINEPHrine  20 mL IntraDERmal Once    miconazole   Topical BID    sodium chloride flush  5-40 mL IntraVENous 2 times per day    nafcillin  2,000 mg IntraVENous Q4H    metroNIDAZOLE  500 mg IntraVENous Q8H    sodium chloride flush  5-40 mL IntraVENous 2 times per day    collagenase   Topical Daily    bumetanide  1 mg Oral Daily    oyster shell calcium w/D  1 tablet Oral Daily    clobetasol   Topical BID    ferrous sulfate  325 mg Oral Daily with breakfast    levothyroxine  88 mcg Oral Daily    lidocaine  1 patch TransDERmal Daily    metoprolol tartrate  25 mg Oral BID    therapeutic multivitamin-minerals  1 tablet Oral Daily    vitamin C  500 mg Oral Daily    vitamin D  2,000 Units Oral Daily    sodium chloride flush  5-40 mL IntraVENous 2 times per day     Continuous Infusions:   sodium chloride      sodium chloride      sodium chloride      sodium chloride       PRN Meds:loperamide, miconazole nitrate **AND** miconazole nitrate, sodium chloride flush, sodium chloride, sodium chloride flush, sodium chloride, sodium chloride, oxyCODONE-acetaminophen, morphine **OR** morphine, sodium chloride flush, sodium chloride, potassium chloride **OR** potassium alternative oral replacement, magnesium sulfate, ondansetron **OR** ondansetron, polyethylene glycol,  CREATININE 1.4 10/24/2024 05:25 AM    CREATININE 1.3 10/23/2024 07:57 PM    GFRAA >60 10/04/2022 04:55 AM    LABGLOM 39 10/25/2024 05:34 AM    LABGLOM 48 04/09/2024 03:55 AM    GLUCOSE 58 10/25/2024 05:34 AM    GLUCOSE 127 07/12/2011 05:45 AM    CALCIUM 7.9 10/25/2024 05:34 AM    BILITOT 0.6 10/25/2024 05:34 AM    ALKPHOS 75 10/25/2024 05:34 AM    AST 14 10/25/2024 05:34 AM    ALT <5 10/25/2024 05:34 AM     Lab Results   Component Value Date    .0 (H) 10/18/2024    .0 (H) 09/17/2024    .0 (H) 09/11/2024     Lab Results   Component Value Date    SEDRATE 100 (H) 10/18/2024    SEDRATE 81 (H) 09/17/2024    SEDRATE 53 (H) 09/11/2024     Radiology:      Microbiology:   Lab Results   Component Value Date/Time    BC 5 Days no growth 09/21/2022 04:55 AM    BC 5 Days- no growth 08/13/2017 09:39 PM    BC 5 Days- no growth 08/13/2014 02:10 PM    ORG Staphylococcus aureus 06/30/2023 01:50 PM    ORG Anaerobic gram negative kath 06/30/2023 01:50 PM    ORG Anaerobic gram negative kath 06/09/2023 02:00 PM    ORG Anaerobic gram negative kath 06/09/2023 02:00 PM    ORG Staphylococcus aureus 06/09/2023 02:00 PM    ORG Enterococcus faecalis 06/09/2023 02:00 PM    ORG Streptococcus mitis/oralis 06/09/2023 02:00 PM    ORG Corynebacterium 06/09/2023 02:00 PM     Lab Results   Component Value Date/Time    BLOODCULT2 5 Days no growth 09/21/2022 05:00 AM    BLOODCULT2 5 Days- no growth 08/13/2017 09:43 PM    BLOODCULT2 5 Days- no growth 08/13/2014 02:37 PM    ORG Staphylococcus aureus 06/30/2023 01:50 PM    ORG Anaerobic gram negative kath 06/30/2023 01:50 PM    ORG Anaerobic gram negative kath 06/09/2023 02:00 PM    ORG Anaerobic gram negative kath 06/09/2023 02:00 PM    ORG Staphylococcus aureus 06/09/2023 02:00 PM    ORG Enterococcus faecalis 06/09/2023 02:00 PM    ORG Streptococcus mitis/oralis 06/09/2023 02:00 PM    ORG Corynebacterium 06/09/2023 02:00 PM     WOUND/ABSCESS   Date Value Ref Range Status   06/30/2023 (A)

## 2024-10-26 NOTE — PROGRESS NOTES
Nutrition Note    Nutrition consult note for nutritional supplements.    Recommend to continue Ensure plus high protein supplement BID and Manfred wound healing supplement BID to help meet increased nutritional needs from wound healing.      See full nutrition assessment on 10/22 if needed.      Electronically signed by Lee Bowser RD, MAHSA on 10/26/24 at 10:01 AM EDT    Contact: [phone]

## 2024-10-26 NOTE — PROGRESS NOTES
Eval/treatment? Yes Yes    Does pt have pain? Mild R knee pain, increased with mobility 10/10 RLE and sacral pain    Bed Mobility  Rolling: ModAx2  Supine to sit: ModAx2  Sit to supine: ModAx2  Scooting: ModA Rolling: ModA  Supine to sit: ModA x2  Sit to supine: MaxA x2  Scooting: NT Rolling: Independent  Supine to sit: Independent  Sit to supine: Independent  Scooting: Independent   Transfers Sit to stand: MaxAx2 (attempt)  Stand to sit: MaxAx2  Stand pivot: NT Sit to stand: NT  Stand to sit: NT  Stand pivot: NT Sit to stand: Moy  Stand to sit: Moy  Stand pivot: ModA with AAD   Ambulation    NT NT >25 feet with AAD and ModA   Stair negotiation: ascended and descended NT NT >2 steps with bilateral rails and ModA   ROM BUE:  Refer to OT  LLE:  WFL  RLE: NT d/t KI     Strength BUE:  Refer to OT  LLE: Grossly 4-/5  RLE: NT     Balance Sitting EOB:  Moy  Dynamic Standing:  NT Sitting EOB:  SBA  Dynamic Standing:  NT Sitting EOB:  Independent  Dynamic Standing:  ModA with AAD     Pt is A & O x 4  Sensation:  No reports of numbness/tingling to extremities  Edema:  Unremarkable    Vitals:   SpO2 88-93% on RA, HR 88-90 bpm EOB    Therapeutic Exercises/Activities:  BLE AROM PF/DF x10 reps seated EOB  L knee AROM flexion/extension x10 reps seated EOB  Static sitting EOB 8 min    Patient education  Pt educated on NWB RLE precautions, PLB, safety during functional mobility, use of call light for assistance.    Patient response to education:   Pt expressed understanding Pt demonstrated skill Pt requires further education in this area   Yes Yes Yes     ASSESSMENT:    Comments:  Patient in bed upon arrival; agreeable to PT session. Pt performed rolling several times for hygiene to be provided and pads to be changed as pt was found incontinent of stool. Nurse notified that stool found on hinged knee brace as well as sacral dressing. Required increased time, assistance of trunk and BLE to perform bed mobility. SpO2 monitored,  dropped to 88% on RA at EOB, improved with cues for PLB. Pt required less assistance to perform supine>sit this session and demonstrated improved sitting balance. Patient left in bed with alarm on and daughter present with call light in reach.    Treatment:  Patient practiced and was instructed in the following treatment:    Bed mobility - verbal cues to facilitate proper positioning and sequencing; physical assistance provided during activity  Static/dynamic sitting - performed to promote activity tolerance and balance maintenance  Therapeutic exercises/activities - performed to maintain/improve strength  Skilled positioning - patient positioned optimally to protect skin/joint integrity and promote comfort  Skilled monitoring of vitals    PLAN:    Patient is making fair progress towards established goals.  Will continue with current POC.      Time in  0825  Time out  0910    Total Treatment Time  45 minutes    CPT codes:  [] Gait training 31115 0 minutes  [] Manual therapy 80421 0 minutes  [x] Therapeutic activities 72493 45 minutes  [] Therapeutic exercises 55428 0 minutes  [] Neuromuscular reeducation 70391 0 minutes    Betty Aldridge, SPT    Dimitris Aggarwal, PT, DPT  [id number]

## 2024-10-27 LAB
ALBUMIN SERPL-MCNC: 2.5 G/DL (ref 3.5–5.2)
ALP SERPL-CCNC: 80 U/L (ref 35–104)
ALT SERPL-CCNC: <5 U/L (ref 0–32)
ANION GAP SERPL CALCULATED.3IONS-SCNC: 9 MMOL/L (ref 7–16)
AST SERPL-CCNC: 18 U/L (ref 0–31)
BILIRUB SERPL-MCNC: 0.8 MG/DL (ref 0–1.2)
BUN SERPL-MCNC: 52 MG/DL (ref 6–23)
CALCIUM SERPL-MCNC: 8.3 MG/DL (ref 8.6–10.2)
CHLORIDE SERPL-SCNC: 106 MMOL/L (ref 98–107)
CO2 SERPL-SCNC: 26 MMOL/L (ref 22–29)
CREAT SERPL-MCNC: 1.9 MG/DL (ref 0.5–1)
ERYTHROCYTE [DISTWIDTH] IN BLOOD BY AUTOMATED COUNT: 19.8 % (ref 11.5–15)
GFR, ESTIMATED: 26 ML/MIN/1.73M2
GLUCOSE SERPL-MCNC: 96 MG/DL (ref 74–99)
HCT VFR BLD AUTO: 30 % (ref 34–48)
HGB BLD-MCNC: 8.7 G/DL (ref 11.5–15.5)
MCH RBC QN AUTO: 25.9 PG (ref 26–35)
MCHC RBC AUTO-ENTMCNC: 29 G/DL (ref 32–34.5)
MCV RBC AUTO: 89.3 FL (ref 80–99.9)
PLATELET # BLD AUTO: 302 K/UL (ref 130–450)
PMV BLD AUTO: 9.7 FL (ref 7–12)
POTASSIUM SERPL-SCNC: 3.5 MMOL/L (ref 3.5–5)
PROT SERPL-MCNC: 5.2 G/DL (ref 6.4–8.3)
RBC # BLD AUTO: 3.36 M/UL (ref 3.5–5.5)
SODIUM SERPL-SCNC: 141 MMOL/L (ref 132–146)
WBC OTHER # BLD: 7.9 K/UL (ref 4.5–11.5)

## 2024-10-27 PROCEDURE — 6370000000 HC RX 637 (ALT 250 FOR IP): Performed by: NURSE PRACTITIONER

## 2024-10-27 PROCEDURE — 6370000000 HC RX 637 (ALT 250 FOR IP): Performed by: INTERNAL MEDICINE

## 2024-10-27 PROCEDURE — 6360000002 HC RX W HCPCS

## 2024-10-27 PROCEDURE — 6370000000 HC RX 637 (ALT 250 FOR IP)

## 2024-10-27 PROCEDURE — 2580000003 HC RX 258

## 2024-10-27 PROCEDURE — 2580000003 HC RX 258: Performed by: INTERNAL MEDICINE

## 2024-10-27 PROCEDURE — 1200000000 HC SEMI PRIVATE

## 2024-10-27 PROCEDURE — 6360000002 HC RX W HCPCS: Performed by: INTERNAL MEDICINE

## 2024-10-27 PROCEDURE — 2500000003 HC RX 250 WO HCPCS: Performed by: INTERNAL MEDICINE

## 2024-10-27 PROCEDURE — 80053 COMPREHEN METABOLIC PANEL: CPT

## 2024-10-27 PROCEDURE — 85027 COMPLETE CBC AUTOMATED: CPT

## 2024-10-27 RX ORDER — SODIUM CHLORIDE 9 MG/ML
INJECTION, SOLUTION INTRAVENOUS CONTINUOUS
Status: DISCONTINUED | OUTPATIENT
Start: 2024-10-27 | End: 2024-10-29

## 2024-10-27 RX ADMIN — Medication 1 TABLET: at 08:55

## 2024-10-27 RX ADMIN — NAFCILLIN SODIUM 2000 MG: 2 INJECTION, POWDER, LYOPHILIZED, FOR SOLUTION INTRAMUSCULAR; INTRAVENOUS at 15:28

## 2024-10-27 RX ADMIN — METRONIDAZOLE 500 MG: 500 INJECTION, SOLUTION INTRAVENOUS at 06:58

## 2024-10-27 RX ADMIN — SODIUM CHLORIDE, PRESERVATIVE FREE 10 ML: 5 INJECTION INTRAVENOUS at 21:33

## 2024-10-27 RX ADMIN — MICONAZOLE NITRATE: 20 OINTMENT TOPICAL at 21:32

## 2024-10-27 RX ADMIN — CLOBETASOL PROPIONATE: 0.5 CREAM TOPICAL at 09:01

## 2024-10-27 RX ADMIN — BUMETANIDE 1 MG: 1 TABLET ORAL at 08:55

## 2024-10-27 RX ADMIN — Medication 2000 UNITS: at 08:55

## 2024-10-27 RX ADMIN — OXYCODONE HYDROCHLORIDE AND ACETAMINOPHEN 1 TABLET: 5; 325 TABLET ORAL at 08:56

## 2024-10-27 RX ADMIN — CALCIUM CARBONATE-VITAMIN D TAB 500 MG-200 UNIT 1 TABLET: 500-200 TAB at 08:57

## 2024-10-27 RX ADMIN — LOPERAMIDE HYDROCHLORIDE 2 MG: 2 CAPSULE ORAL at 08:55

## 2024-10-27 RX ADMIN — OXYCODONE HYDROCHLORIDE AND ACETAMINOPHEN 1 TABLET: 5; 325 TABLET ORAL at 21:31

## 2024-10-27 RX ADMIN — FERROUS SULFATE TAB 325 MG (65 MG ELEMENTAL FE) 325 MG: 325 (65 FE) TAB at 08:55

## 2024-10-27 RX ADMIN — LEVOTHYROXINE SODIUM 88 MCG: 0.09 TABLET ORAL at 06:39

## 2024-10-27 RX ADMIN — MICONAZOLE NITRATE: 20 OINTMENT TOPICAL at 09:00

## 2024-10-27 RX ADMIN — APIXABAN 5 MG: 5 TABLET, FILM COATED ORAL at 21:32

## 2024-10-27 RX ADMIN — SODIUM CHLORIDE: 9 INJECTION, SOLUTION INTRAVENOUS at 18:12

## 2024-10-27 RX ADMIN — NAFCILLIN SODIUM 2000 MG: 2 INJECTION, POWDER, LYOPHILIZED, FOR SOLUTION INTRAMUSCULAR; INTRAVENOUS at 18:14

## 2024-10-27 RX ADMIN — MICONAZOLE NITRATE: 20 CREAM TOPICAL at 21:32

## 2024-10-27 RX ADMIN — COLLAGENASE SANTYL: 250 OINTMENT TOPICAL at 09:00

## 2024-10-27 RX ADMIN — LOPERAMIDE HYDROCHLORIDE 2 MG: 2 CAPSULE ORAL at 21:32

## 2024-10-27 RX ADMIN — METOPROLOL TARTRATE 25 MG: 25 TABLET, FILM COATED ORAL at 08:56

## 2024-10-27 RX ADMIN — APIXABAN 5 MG: 5 TABLET, FILM COATED ORAL at 08:56

## 2024-10-27 RX ADMIN — NAFCILLIN SODIUM 2000 MG: 2 INJECTION, POWDER, LYOPHILIZED, FOR SOLUTION INTRAMUSCULAR; INTRAVENOUS at 10:33

## 2024-10-27 RX ADMIN — Medication 1 CAPSULE: at 08:55

## 2024-10-27 RX ADMIN — METRONIDAZOLE 500 MG: 500 INJECTION, SOLUTION INTRAVENOUS at 15:25

## 2024-10-27 RX ADMIN — SODIUM CHLORIDE, PRESERVATIVE FREE 10 ML: 5 INJECTION INTRAVENOUS at 21:34

## 2024-10-27 RX ADMIN — OXYCODONE HYDROCHLORIDE AND ACETAMINOPHEN 1 TABLET: 5; 325 TABLET ORAL at 15:22

## 2024-10-27 RX ADMIN — MICONAZOLE NITRATE: 20 CREAM TOPICAL at 09:01

## 2024-10-27 RX ADMIN — NAFCILLIN SODIUM 2000 MG: 2 INJECTION, POWDER, LYOPHILIZED, FOR SOLUTION INTRAMUSCULAR; INTRAVENOUS at 05:54

## 2024-10-27 RX ADMIN — METRONIDAZOLE 500 MG: 500 INJECTION, SOLUTION INTRAVENOUS at 21:45

## 2024-10-27 RX ADMIN — CLOBETASOL PROPIONATE: 0.5 CREAM TOPICAL at 21:48

## 2024-10-27 RX ADMIN — MORPHINE SULFATE 4 MG: 4 INJECTION, SOLUTION INTRAMUSCULAR; INTRAVENOUS at 05:49

## 2024-10-27 RX ADMIN — Medication 500 MG: at 08:56

## 2024-10-27 RX ADMIN — LOPERAMIDE HYDROCHLORIDE 2 MG: 2 CAPSULE ORAL at 15:23

## 2024-10-27 RX ADMIN — NAFCILLIN SODIUM 2000 MG: 2 INJECTION, POWDER, LYOPHILIZED, FOR SOLUTION INTRAMUSCULAR; INTRAVENOUS at 02:07

## 2024-10-27 RX ADMIN — SODIUM CHLORIDE, PRESERVATIVE FREE 10 ML: 5 INJECTION INTRAVENOUS at 09:00

## 2024-10-27 ASSESSMENT — PAIN SCALES - GENERAL
PAINLEVEL_OUTOF10: 6
PAINLEVEL_OUTOF10: 2
PAINLEVEL_OUTOF10: 7
PAINLEVEL_OUTOF10: 7
PAINLEVEL_OUTOF10: 9
PAINLEVEL_OUTOF10: 0
PAINLEVEL_OUTOF10: 9
PAINLEVEL_OUTOF10: 6
PAINLEVEL_OUTOF10: 0
PAINLEVEL_OUTOF10: 0

## 2024-10-27 ASSESSMENT — PAIN DESCRIPTION - LOCATION
LOCATION: SACRUM
LOCATION: COCCYX
LOCATION: LEG
LOCATION: SACRUM

## 2024-10-27 ASSESSMENT — PAIN DESCRIPTION - DESCRIPTORS
DESCRIPTORS: ACHING;DISCOMFORT;THROBBING
DESCRIPTORS: SHOOTING;SORE;SHARP
DESCRIPTORS: ACHING;DISCOMFORT;SORE
DESCRIPTORS: ACHING;SORE;THROBBING

## 2024-10-27 ASSESSMENT — PAIN SCALES - WONG BAKER
WONGBAKER_NUMERICALRESPONSE: NO HURT
WONGBAKER_NUMERICALRESPONSE: HURTS A LITTLE BIT
WONGBAKER_NUMERICALRESPONSE: NO HURT
WONGBAKER_NUMERICALRESPONSE: NO HURT

## 2024-10-27 ASSESSMENT — PAIN - FUNCTIONAL ASSESSMENT
PAIN_FUNCTIONAL_ASSESSMENT: PREVENTS OR INTERFERES SOME ACTIVE ACTIVITIES AND ADLS

## 2024-10-27 ASSESSMENT — PAIN DESCRIPTION - ORIENTATION
ORIENTATION: MID
ORIENTATION: MID
ORIENTATION: MID;POSTERIOR
ORIENTATION: RIGHT

## 2024-10-27 NOTE — PROGRESS NOTES
CFU/mL  Mixed candice isolated. Further workup and sensitivity testing  is not routinely indicated and will not be performed.  Mixed candice isolated includes:  Mixed gram negative rods  Lactobacillus species     08/13/2014 Growth not present  Final     MRSA Culture Only   Date Value Ref Range Status   09/28/2017 Methicillin resistant Staph aureus not isolated  Final   08/14/2017 Methicillin resistant Staph aureus not isolated  Final   08/08/2014 Methicillin resistant Staph aureus not isolated  Final       Assessment:  Right knee PJI  ORIF right distal femur 07/17/2024 was found to have swelling and drainage from her right leg 3 months after distal femur fixation.  CT scan outpatient showed fluid collection around lateral thigh and right knee.  Status post excisional debridement of the right leg area skin subcutaneous tissue fat fascia and bone, right knee arthrotomy with I&D, right distal femur hardware retention 10/19.  Bacteroides fragilis bacteremia  MSSA prosthetic joint infection  Decub Ulcer -s/p debridement-culture proteus and enterococcus faecalis  LLL PNA   On immodium    stools slowing per nurses     Plan:    Continue nafcillin and Flagyl  Monitor creat climbing up  Taking imodium for diarrhea   probiotic  Check c diff-Stool yesterday did not meet criteria to send for c diff.   CT abdomen pelvis didn't showed a clear source of infection to explain bacteroides bacteremia   Anticipate 6 weeks of IV antibiotic therapy  PICC line  placed   Prescription in chart  Follow-up with ID clinic within 7 days  Hold off of discharge until diarrhea managed  Patient can be discharged from ID standpoint    TRAVIS Duncan - CNP  8:43 AM  10/27/2024  Pt seen and examined. Above discussed agree with advanced practice nurse. Labs, cultures, and radiographs reviewed.  Face to Face encounter occurred. Changes made as necessary.     GARRETT EDWARDS MD

## 2024-10-27 NOTE — PROGRESS NOTES
Left leg dressing and right heel dressings  changed per order. Patient tolerated well. Patient was incontinent of stool, incontinence care and peterson care performed. Call light left within reach and patient resting comfortably with daughter at bedside .

## 2024-10-27 NOTE — PROGRESS NOTES
[facility]   Progress note      Subjective:    Resting comfortably in bed  Help set patient up for breakfast  States that she is still having diarrhea    Objective:    /62   Pulse 84   Temp 98 °F (36.7 °C) (Temporal)   Resp 16   Ht 1.6 m (5' 3\")   Wt 90.7 kg (200 lb)   SpO2 92%   BMI 35.43 kg/m²     In: 970 [P.O.:960; I.V.:10]  Out: 950   In: 970   Out: 950 [Urine:950]    General appearance: NAD, conversant  HEENT: AT/NC, MMM  Neck: FROM, supple  Lungs: Clear to auscultation  CV: RRR, no MRGs  Vasc: Radial pulses 2+  Abdomen: Soft, non-tender; no masses or HSM  Extremities: R Knee with surgical dressing, RUE PICC  Skin: Sacral wound that has been debrided  Psych: Alert and oriented to person, place and time  Neuro: Alert and interactive     Recent Labs     10/25/24  0534 10/26/24  0817 10/27/24  0545   WBC 11.0 9.9 7.9   HGB 8.9* 8.2* 8.7*   HCT 30.4* 28.4* 30.0*    310 302       Recent Labs     10/25/24  0534 10/26/24  0817 10/27/24  0545    140 141   K 3.6 3.4* 3.5    103 106   CO2 24 26 26   BUN 45* 48* 52*   CREATININE 1.4* 1.7* 1.9*   CALCIUM 7.9* 8.4* 8.3*       Assessment:    Principal Problem:    Effusion, right knee  Active Problems:    Infected hardware in right leg, initial encounter ([facility])    Chronic heart failure with preserved ejection fraction ([facility])    Sacral wound  Resolved Problems:    * No resolved hospital problems. *      Plan:    84 year old female admitted to med surg unit with      Right knee effusion/infected hardware  Pain control  Consult orthopedic surgery-surgical procedure planned for today  Encourage ISP  Bowel regimen  IV hydration postop  Consult ID-postop for IV antibiotics  From medicine standpoint okay to proceed with orthopedic incision and drainage of infected hardware today  Follow inflammatory markers including sed rate/CRP/lactate and procalcitonin     A-fib  Continue metoprolol  Eliquis on hold will resume once cleared by

## 2024-10-28 LAB
ALBUMIN SERPL-MCNC: 2.2 G/DL (ref 3.5–5.2)
ALP SERPL-CCNC: 98 U/L (ref 35–104)
ALT SERPL-CCNC: <5 U/L (ref 0–32)
ANION GAP SERPL CALCULATED.3IONS-SCNC: 12 MMOL/L (ref 7–16)
AST SERPL-CCNC: 14 U/L (ref 0–31)
BACTERIA URNS QL MICRO: ABNORMAL
BILIRUB SERPL-MCNC: 0.5 MG/DL (ref 0–1.2)
BILIRUB UR QL STRIP: NEGATIVE
BUN SERPL-MCNC: 56 MG/DL (ref 6–23)
CALCIUM SERPL-MCNC: 8.2 MG/DL (ref 8.6–10.2)
CHLORIDE SERPL-SCNC: 109 MMOL/L (ref 98–107)
CLARITY UR: ABNORMAL
CO2 SERPL-SCNC: 25 MMOL/L (ref 22–29)
COLOR UR: YELLOW
CREAT SERPL-MCNC: 2.1 MG/DL (ref 0.5–1)
CREAT UR-MCNC: 24.3 MG/DL (ref 29–226)
CREAT UR-MCNC: 24.5 MG/DL (ref 29–226)
CREAT UR-MCNC: 24.7 MG/DL (ref 29–226)
EPI CELLS #/AREA URNS HPF: ABNORMAL /HPF
ERYTHROCYTE [DISTWIDTH] IN BLOOD BY AUTOMATED COUNT: 20.3 % (ref 11.5–15)
GFR, ESTIMATED: 23 ML/MIN/1.73M2
GLUCOSE SERPL-MCNC: 149 MG/DL (ref 74–99)
GLUCOSE UR STRIP-MCNC: NEGATIVE MG/DL
HCT VFR BLD AUTO: 30.6 % (ref 34–48)
HGB BLD-MCNC: 8.7 G/DL (ref 11.5–15.5)
HGB UR QL STRIP.AUTO: ABNORMAL
KETONES UR STRIP-MCNC: NEGATIVE MG/DL
LEUKOCYTE ESTERASE UR QL STRIP: ABNORMAL
MCH RBC QN AUTO: 26 PG (ref 26–35)
MCHC RBC AUTO-ENTMCNC: 28.4 G/DL (ref 32–34.5)
MCV RBC AUTO: 91.3 FL (ref 80–99.9)
MICROALBUMIN UR-MCNC: 73 MG/L (ref 0–19)
MICROALBUMIN/CREAT UR-RTO: 294 MCG/MG CREAT (ref 0–30)
NITRITE UR QL STRIP: NEGATIVE
PH UR STRIP: 6 [PH] (ref 5–9)
PHOSPHATE SERPL-MCNC: 4.5 MG/DL (ref 2.5–4.5)
PLATELET # BLD AUTO: 253 K/UL (ref 130–450)
PMV BLD AUTO: 9.9 FL (ref 7–12)
POTASSIUM SERPL-SCNC: 3.2 MMOL/L (ref 3.5–5)
PROT SERPL-MCNC: 4.7 G/DL (ref 6.4–8.3)
PROT UR STRIP-MCNC: NEGATIVE MG/DL
RBC # BLD AUTO: 3.35 M/UL (ref 3.5–5.5)
RBC #/AREA URNS HPF: ABNORMAL /HPF
SODIUM SERPL-SCNC: 146 MMOL/L (ref 132–146)
SODIUM UR-SCNC: 77 MMOL/L
SP GR UR STRIP: 1.01 (ref 1–1.03)
TOTAL PROTEIN, URINE: 26 MG/DL (ref 0–12)
URINE TOTAL PROTEIN CREATININE RATIO: 1.06 (ref 0–0.2)
UROBILINOGEN UR STRIP-ACNC: 0.2 EU/DL (ref 0–1)
UUN UR-MCNC: 195 MG/DL (ref 800–1666)
WBC #/AREA URNS HPF: ABNORMAL /HPF
WBC OTHER # BLD: 7.2 K/UL (ref 4.5–11.5)
YEAST URNS QL MICRO: PRESENT

## 2024-10-28 PROCEDURE — 6360000002 HC RX W HCPCS

## 2024-10-28 PROCEDURE — 81001 URINALYSIS AUTO W/SCOPE: CPT

## 2024-10-28 PROCEDURE — 84300 ASSAY OF URINE SODIUM: CPT

## 2024-10-28 PROCEDURE — 1200000000 HC SEMI PRIVATE

## 2024-10-28 PROCEDURE — 6370000000 HC RX 637 (ALT 250 FOR IP): Performed by: NURSE PRACTITIONER

## 2024-10-28 PROCEDURE — 6370000000 HC RX 637 (ALT 250 FOR IP)

## 2024-10-28 PROCEDURE — 2580000003 HC RX 258: Performed by: INTERNAL MEDICINE

## 2024-10-28 PROCEDURE — 6370000000 HC RX 637 (ALT 250 FOR IP): Performed by: INTERNAL MEDICINE

## 2024-10-28 PROCEDURE — 84156 ASSAY OF PROTEIN URINE: CPT

## 2024-10-28 PROCEDURE — 82570 ASSAY OF URINE CREATININE: CPT

## 2024-10-28 PROCEDURE — 2500000003 HC RX 250 WO HCPCS: Performed by: INTERNAL MEDICINE

## 2024-10-28 PROCEDURE — 2580000003 HC RX 258

## 2024-10-28 PROCEDURE — 97530 THERAPEUTIC ACTIVITIES: CPT

## 2024-10-28 PROCEDURE — 36592 COLLECT BLOOD FROM PICC: CPT

## 2024-10-28 PROCEDURE — 97112 NEUROMUSCULAR REEDUCATION: CPT

## 2024-10-28 PROCEDURE — 84540 ASSAY OF URINE/UREA-N: CPT

## 2024-10-28 PROCEDURE — 97535 SELF CARE MNGMENT TRAINING: CPT

## 2024-10-28 PROCEDURE — 6360000002 HC RX W HCPCS: Performed by: INTERNAL MEDICINE

## 2024-10-28 PROCEDURE — 82043 UR ALBUMIN QUANTITATIVE: CPT

## 2024-10-28 PROCEDURE — 6370000000 HC RX 637 (ALT 250 FOR IP): Performed by: STUDENT IN AN ORGANIZED HEALTH CARE EDUCATION/TRAINING PROGRAM

## 2024-10-28 PROCEDURE — 84100 ASSAY OF PHOSPHORUS: CPT

## 2024-10-28 PROCEDURE — 80053 COMPREHEN METABOLIC PANEL: CPT

## 2024-10-28 PROCEDURE — 85027 COMPLETE CBC AUTOMATED: CPT

## 2024-10-28 RX ORDER — POTASSIUM CHLORIDE 1500 MG/1
40 TABLET, EXTENDED RELEASE ORAL 2 TIMES DAILY WITH MEALS
Status: COMPLETED | OUTPATIENT
Start: 2024-10-28 | End: 2024-10-28

## 2024-10-28 RX ADMIN — NAFCILLIN SODIUM 2000 MG: 2 INJECTION, POWDER, LYOPHILIZED, FOR SOLUTION INTRAMUSCULAR; INTRAVENOUS at 05:14

## 2024-10-28 RX ADMIN — POTASSIUM CHLORIDE 40 MEQ: 1500 TABLET, EXTENDED RELEASE ORAL at 14:55

## 2024-10-28 RX ADMIN — Medication 1 TABLET: at 07:48

## 2024-10-28 RX ADMIN — POTASSIUM CHLORIDE 40 MEQ: 1500 TABLET, EXTENDED RELEASE ORAL at 17:32

## 2024-10-28 RX ADMIN — METOPROLOL TARTRATE 25 MG: 25 TABLET, FILM COATED ORAL at 19:56

## 2024-10-28 RX ADMIN — LOPERAMIDE HYDROCHLORIDE 2 MG: 2 CAPSULE ORAL at 17:47

## 2024-10-28 RX ADMIN — MICONAZOLE NITRATE: 20 OINTMENT TOPICAL at 10:58

## 2024-10-28 RX ADMIN — SODIUM CHLORIDE, PRESERVATIVE FREE 10 ML: 5 INJECTION INTRAVENOUS at 19:58

## 2024-10-28 RX ADMIN — METRONIDAZOLE 500 MG: 500 INJECTION, SOLUTION INTRAVENOUS at 06:27

## 2024-10-28 RX ADMIN — APIXABAN 5 MG: 5 TABLET, FILM COATED ORAL at 07:48

## 2024-10-28 RX ADMIN — OXYCODONE HYDROCHLORIDE AND ACETAMINOPHEN 1 TABLET: 5; 325 TABLET ORAL at 17:32

## 2024-10-28 RX ADMIN — CLOBETASOL PROPIONATE: 0.5 CREAM TOPICAL at 07:52

## 2024-10-28 RX ADMIN — METOPROLOL TARTRATE 25 MG: 25 TABLET, FILM COATED ORAL at 07:48

## 2024-10-28 RX ADMIN — CLOBETASOL PROPIONATE: 0.5 CREAM TOPICAL at 20:07

## 2024-10-28 RX ADMIN — NAFCILLIN SODIUM 2000 MG: 2 INJECTION, POWDER, LYOPHILIZED, FOR SOLUTION INTRAMUSCULAR; INTRAVENOUS at 23:26

## 2024-10-28 RX ADMIN — NAFCILLIN SODIUM 2000 MG: 2 INJECTION, POWDER, LYOPHILIZED, FOR SOLUTION INTRAMUSCULAR; INTRAVENOUS at 18:34

## 2024-10-28 RX ADMIN — FERROUS SULFATE TAB 325 MG (65 MG ELEMENTAL FE) 325 MG: 325 (65 FE) TAB at 10:59

## 2024-10-28 RX ADMIN — BUMETANIDE 1 MG: 1 TABLET ORAL at 07:48

## 2024-10-28 RX ADMIN — APIXABAN 5 MG: 5 TABLET, FILM COATED ORAL at 19:56

## 2024-10-28 RX ADMIN — SODIUM CHLORIDE, PRESERVATIVE FREE 10 ML: 5 INJECTION INTRAVENOUS at 06:27

## 2024-10-28 RX ADMIN — NAFCILLIN SODIUM 2000 MG: 2 INJECTION, POWDER, LYOPHILIZED, FOR SOLUTION INTRAMUSCULAR; INTRAVENOUS at 11:06

## 2024-10-28 RX ADMIN — NAFCILLIN SODIUM 2000 MG: 2 INJECTION, POWDER, LYOPHILIZED, FOR SOLUTION INTRAMUSCULAR; INTRAVENOUS at 14:55

## 2024-10-28 RX ADMIN — MICONAZOLE NITRATE: 20 OINTMENT TOPICAL at 19:56

## 2024-10-28 RX ADMIN — COLLAGENASE SANTYL: 250 OINTMENT TOPICAL at 10:58

## 2024-10-28 RX ADMIN — SODIUM CHLORIDE, PRESERVATIVE FREE 10 ML: 5 INJECTION INTRAVENOUS at 19:57

## 2024-10-28 RX ADMIN — MICONAZOLE NITRATE: 20 CREAM TOPICAL at 19:57

## 2024-10-28 RX ADMIN — MORPHINE SULFATE 2 MG: 2 INJECTION, SOLUTION INTRAMUSCULAR; INTRAVENOUS at 15:25

## 2024-10-28 RX ADMIN — Medication 2000 UNITS: at 07:48

## 2024-10-28 RX ADMIN — Medication 500 MG: at 07:48

## 2024-10-28 RX ADMIN — Medication 1 CAPSULE: at 10:59

## 2024-10-28 RX ADMIN — ONDANSETRON 4 MG: 2 INJECTION INTRAMUSCULAR; INTRAVENOUS at 17:32

## 2024-10-28 RX ADMIN — METRONIDAZOLE 500 MG: 500 INJECTION, SOLUTION INTRAVENOUS at 16:26

## 2024-10-28 RX ADMIN — METRONIDAZOLE 500 MG: 500 INJECTION, SOLUTION INTRAVENOUS at 23:22

## 2024-10-28 RX ADMIN — LEVOTHYROXINE SODIUM 88 MCG: 0.09 TABLET ORAL at 05:15

## 2024-10-28 RX ADMIN — OXYCODONE HYDROCHLORIDE AND ACETAMINOPHEN 1 TABLET: 5; 325 TABLET ORAL at 10:59

## 2024-10-28 RX ADMIN — MICONAZOLE NITRATE: 20 CREAM TOPICAL at 07:52

## 2024-10-28 RX ADMIN — CALCIUM CARBONATE-VITAMIN D TAB 500 MG-200 UNIT 1 TABLET: 500-200 TAB at 07:48

## 2024-10-28 RX ADMIN — SODIUM CHLORIDE, PRESERVATIVE FREE 10 ML: 5 INJECTION INTRAVENOUS at 05:12

## 2024-10-28 ASSESSMENT — PAIN SCALES - GENERAL
PAINLEVEL_OUTOF10: 7
PAINLEVEL_OUTOF10: 3
PAINLEVEL_OUTOF10: 6

## 2024-10-28 ASSESSMENT — PAIN DESCRIPTION - ORIENTATION
ORIENTATION: RIGHT

## 2024-10-28 ASSESSMENT — PAIN - FUNCTIONAL ASSESSMENT
PAIN_FUNCTIONAL_ASSESSMENT: PREVENTS OR INTERFERES SOME ACTIVE ACTIVITIES AND ADLS

## 2024-10-28 ASSESSMENT — PAIN DESCRIPTION - DESCRIPTORS
DESCRIPTORS: ACHING;DULL;GNAWING
DESCRIPTORS: ACHING;SORE;TENDER

## 2024-10-28 ASSESSMENT — PAIN DESCRIPTION - LOCATION
LOCATION: LEG
LOCATION: KNEE;LEG
LOCATION: LEG

## 2024-10-28 NOTE — PLAN OF CARE
Problem: ABCDS Injury Assessment  Goal: Absence of physical injury  Outcome: Progressing     Problem: Skin/Tissue Integrity  Goal: Absence of new skin breakdown  Description: 1.  Monitor for areas of redness and/or skin breakdown  2.  Assess vascular access sites hourly  3.  Every 4-6 hours minimum:  Change oxygen saturation probe site  4.  Every 4-6 hours:  If on nasal continuous positive airway pressure, respiratory therapy assess nares and determine need for appliance change or resting period.  Outcome: Progressing     Problem: Safety - Adult  Goal: Free from fall injury  Outcome: Progressing     Problem: Discharge Planning  Goal: Discharge to home or other facility with appropriate resources  Outcome: Progressing     Problem: Pain  Goal: Verbalizes/displays adequate comfort level or baseline comfort level  Outcome: Progressing     Problem: Nutrition Deficit:  Goal: Optimize nutritional status  Outcome: Progressing     Problem: Wound:  Goal: Will show signs of wound healing; wound closure and no evidence of infection  Description: Will show signs of wound healing; wound closure and no evidence of infection  Outcome: Progressing     Problem: Pressure Ulcer:  Goal: Signs of wound healing will improve  Description: Signs of wound healing will improve  Outcome: Progressing  Goal: Absence of new pressure ulcer  Description: Absence of new pressure ulcer  Outcome: Progressing  Goal: Will show no infection signs and symptoms  Description: Will show no infection signs and symptoms  Outcome: Progressing

## 2024-10-28 NOTE — PROGRESS NOTES
OT BEDSIDE TREATMENT NOTE   [facility]  [address], [place]      Date:10/28/2024  Patient Name: Gabi Madrigal  MRN: [id number]  : 1940  Room: [address] [place] [address]     Evaluating OT: Arianna Alanis OTD, OTR/L, [id number]       Referring Provider: Aquilino Dyer DO     Specific Provider Orders/Date: OT eval and treat (10/21/24)    Diagnosis: Effusion of right knee [M25.461]  Infected hardware in right leg, initial encounter ([facility]CC) [T84.7XXA]  Effusion, right knee [M25.461]    Surgeries/Procedures:   10/19:  Excisional debridement right leg area measuring 30 x 8 cm including skin, subcutaneous tissue, fat, fascia, muscle, bone  Right knee arthrotomy with irrigation debridement and synovectomy  Right distal femur hardware retention.       Pertinent Medical History:       has a past medical history of Acquired hypothyroidism, Arthritis, Blood transfusion reaction, Chronic kidney disease, History of blood transfusion, Hypertension, Lymphedema of both lower extremities, Open wound of left great toe, Other disorders of kidney and ureter in diseases classified elsewhere, Pelvic fracture (HCC), Positive culture findings in wound, Skin ulcer of left calf with fat layer exposed (HCC), Skin ulcer of left calf, limited to breakdown of skin (HCC), Ulcer of left lower leg (HCC), and Venous stasis ulcer of left calf limited to breakdown of skin (HCC).           Precautions:  Fall Risk, NWB RLE, hinged ROM brace locked at 30 degrees, , sacral wound, BLE wounds, alarms+      Assessment of current deficits    [x] Functional mobility            [x]ADLs           [x] Strength                   [x]Cognition    [x] Functional transfers          [x] IADLs          [x] Safety Awareness   [x]Endurance    [x] Fine Coordination              [x] Balance      [] Vision/perception    [x]Sensation      []Gross Motor Coordination  [x] ROM           [] Delirium                Charges: Mins Units   Ther Ex  [id number]     Manual Therapy 10787     Thera Activities 39817     ADL/Home Mgt [id number] 12 1   Neuro Re-ed 00887 26 2   Group Therapy      Orthotic manage/training  62769     Non-Billable Time     Total Timed Treatment 38 3     David MONTANEZ/FRACISCO [id number]

## 2024-10-28 NOTE — CONSULTS
5 YEARS   Final Result   1. Successful placement of tunneled cuffed central venous catheter.         IR ULTRASOUND GUIDANCE VASCULAR ACCESS   Final Result   1. Successful placement of tunneled cuffed central venous catheter.         IR FLUORO GUIDED CVA DEVICE PLMT/REPLACE/REMOVAL   Final Result   1. Successful placement of tunneled cuffed central venous catheter.         CT ABDOMEN PELVIS WO CONTRAST Additional Contrast? None   Final Result   Ill-defined opacities along the visualized lower lungs greatest in the left   lower lobe.  Could be infectious/inflammatory in nature.  Advised short-term   follow-up imaging to assess for resolution.      Moderate to large colonic stool burden which can be seen with constipation.   Colonic diverticulosis.      Gas in the tissues overlying the coccyx, likely reflecting pressure injury.   Note that there is a 2.2 cm linear radiodensity in this region which is   likely artifactual but cannot entirely exclude foreign body.  Correlate with   exam.      Edema in the flanks left greater than right.      Additional observations as above.         US RETROPERITONEAL LIMITED    (Results Pending)        EKG: atrial fibrillation, rate controlled 99.    Resident's Assessment and Plan     Assessment:  HARRISON stage 2 on CKD stage 3a in setting of infection and post-operative state  Good urine output 0.6 cc/kg/hr  Baseline Creatinine 0.9-1.0  Today Cr 2.1, GFR 23  Has been on nafcillin since 10/19/24, received a dose of vanc and zosyn on 10/19/24   Hypokalemia K 3.2   Normocytic anemia (baseline Hg 9-11), current Hg 8.7   Hx of vitamin d deficiency, Vit D 25OH (9/17/24) 31 wnl   Right distal femur hardware infection 2/2 MSSA   Bacteroides fragilis bacteremia  Decubitus ulcer s/p debridement 10/23/24, has camacho catheter   HTN  Persistent Afib   HFpEF     Plan:  Strict I/O  Follow results of U/S retroperitoneal and urine studies   Monitor creatinine daily   Continue bumex 1 mg daily for now, will  continue to monitor kidney function   Potassium replaced  Continue vitamin D and calcium supplements  Agree with camacho cath in setting of decubitus ulcer  Antibiotics per FILIPPO Jenkins MD, PGY-3  Attending physician: Dr. Engel

## 2024-10-28 NOTE — PROGRESS NOTES
Changed all dressings at this time. Also changed RLE incision per family request. Tolerated fairly well.

## 2024-10-28 NOTE — PROGRESS NOTES
was requested to visit patient by daughter as I walked passed Ms. Madrigal's room with a request for prayer.  When I entered the room I found Gabi lying in bed and sleeping but she woke up quickly as I entered.  We talked about her health and time at the hospital.  We also talked about her [employer]al michael and how she received the Sacrament of the Anointing of the Sick by Father Rustam Grider during her stay.  I provided prayer and words of encouragement. Ms. Madrigal expressed appreciation.    If additional support is requested or needed please reach out to Spiritual Health ([phone]).    Chap. Cesar Blank MDIV, [facility]       10/28/24 3587   Encounter Summary   Encounter Overview/Reason Follow-up;Spiritual/Emotional Needs;Palliative Care   Service Provided For Patient   Referral/Consult From Palliative Care;Family;Rounding   Support System Children;Family members   Last Encounter  10/28/24  (p-DS)   Complexity of Encounter Moderate   Spiritual/Emotional needs   Type Spiritual Support   Assessment/Intervention/Outcome   Assessment Calm;Anxious   Intervention Active listening;Discussed illness injury and it’s impact;Nurtured Hope;Discussed belief system/Rastafarian practices/michael;Prayer (assurance of)/Carbon   Outcome Comfort;Expressed Gratitude;Encouraged;Engaged in conversation;Receptive   Plan and Referrals   Plan/Referrals Provided reading/devotional materials

## 2024-10-28 NOTE — PROGRESS NOTES
Comprehensive Nutrition Assessment    Type and Reason for Visit:  Reassess    Nutrition Recommendations/Plan:   Continue current diet  Modify ONS to magic cup BID and kay BID per discussion w/ pt and family at bedside ; pt dislikes Ensure ONS.   Will monitor     Malnutrition Assessment:  Malnutrition Status:  At risk for malnutrition (Comment) (10/28/24 1529)    Context:  Acute Illness     Findings of the 6 clinical characteristics of malnutrition:  Energy Intake:  50% or less of estimated energy requirements for 5 or more days  Weight Loss:  Unable to assess (d/t wt flux per EMR wt hx likely r/t fluid shifts)     Body Fat Loss:  No significant body fat loss     Muscle Mass Loss:  No significant muscle mass loss    Fluid Accumulation:  No significant fluid accumulation     Strength:  Not Performed    Nutrition Assessment:    Pt admit d/t infected harware/R knee effusion s/p recent ORIF R femur 9/28;  pt now s/p I&D 10/19; Multiple pressure injuries & wound vac in place to R leg. PMHx lymphedema, CHF, CKD; pt s/p bedside I&D 10/23; pt w/ ongoing poor oral intake this adm- spoke w/ pt about oral intake and she tells me she \"just isn't hungry\"; pt reports lactose intolerance and multiple episodes of diarrhea this adm; pt and famly member at bedside, pt dislikes ensure but willing to continue kay ONS ; pt willing to trial magic cup; will start ONS per discussion and monitor.    Nutrition Related Findings:    hypokalemia; elevated BUN/Cr; hyperglycemia; A&Ox4; U/L dentures; active BS; diarrhea; +2 edema; I/O WNL Wound Type: Multiple, Venous Stasis, Unstageable, Surgical Incision       Current Nutrition Intake & Therapies:    Average Meal Intake: 26-50%  Average Supplements Intake: 26-50% (per discussion w/ pt and family at bedside)  ADULT DIET; Regular  ADULT ORAL NUTRITION SUPPLEMENT; Lunch, Dinner; Frozen Oral Supplement  ADULT ORAL NUTRITION SUPPLEMENT; Breakfast, Lunch; Wound Healing Oral

## 2024-10-28 NOTE — PROGRESS NOTES
Physical Therapy  Physical Therapy Treatment    Name: Gabi Madrigal  : 1940  MRN: [id number]      Date of Service: 10/28/2024    Evaluating PT:  Desiree Mills PT, DPT [id number]    Room #:  5421/5421-A  Diagnosis:  Effusion of right knee [M25.461]  Infected hardware in right leg, initial encounter ([facility]) [T84.7XXA]  Effusion, right knee [M25.461]  PMHx/PSHx:   has a past medical history of Acquired hypothyroidism, Arthritis, Blood transfusion reaction, Chronic kidney disease, History of blood transfusion, Hypertension, Lymphedema of both lower extremities, Open wound of left great toe, Other disorders of kidney and ureter in diseases classified elsewhere, Pelvic fracture ([facility]), Positive culture findings in wound, Skin ulcer of left calf with fat layer exposed ([facility]), Skin ulcer of left calf, limited to breakdown of skin ([facility]), Ulcer of left lower leg ([facility]), and Venous stasis ulcer of left calf limited to breakdown of skin ([facility]).   has a past surgical history that includes fracture surgery (); Tonsillectomy; Hip fracture surgery (Left, 2014); Colonoscopy; Endoscopy, colon, diagnostic; Total hip arthroplasty (Left, 10/17/2014); eye surgery; Femur Surgery (Right, 2024); and knee surgery (Right, 10/19/2024).  Procedure/Surgery:  Irrigation and debridement of R distal femur (10/19/24); Sacral wound debridement (10/23/2024)  Precautions:  Fall risk, alarms, NWB RLE, hinged knee brace (20-30 degrees), sacral wound, incontinent, camacho catheter  Equipment Needs:  TBD    SUBJECTIVE:    Pt lives alone in a 2 story home with 2 steps to enter and 2 rail(s). Pt has a first-floor setup. Pt ambulated short distances PTA. She does report spending most of her time in her power versus manual WC. Pt owns a FWW, a SPC, a powered WC, and a manual WC.    OBJECTIVE:   Initial Evaluation  Date: 10/22/24 Treatment Date: 10/28/24 Short Term/ Long Term   Goals   AM-PAC 6 Clicks     Was pt agreeable to

## 2024-10-28 NOTE — PROGRESS NOTES
10/28/2024 05:30 AM    CREATININE 2.1 10/28/2024 05:30 AM    CREATININE 1.9 10/27/2024 05:45 AM    CREATININE 1.7 10/26/2024 08:17 AM    GFRAA >60 10/04/2022 04:55 AM    LABGLOM 23 10/28/2024 05:30 AM    LABGLOM 48 04/09/2024 03:55 AM    GLUCOSE 149 10/28/2024 05:30 AM    GLUCOSE 127 07/12/2011 05:45 AM    CALCIUM 8.2 10/28/2024 05:30 AM    BILITOT 0.5 10/28/2024 05:30 AM    ALKPHOS 98 10/28/2024 05:30 AM    AST 14 10/28/2024 05:30 AM    ALT <5 10/28/2024 05:30 AM     Lab Results   Component Value Date    .0 (H) 10/18/2024    .0 (H) 09/17/2024    .0 (H) 09/11/2024     Lab Results   Component Value Date    SEDRATE 100 (H) 10/18/2024    SEDRATE 81 (H) 09/17/2024    SEDRATE 53 (H) 09/11/2024     Radiology:      Microbiology:   Lab Results   Component Value Date/Time    BC 5 Days no growth 09/21/2022 04:55 AM    BC 5 Days- no growth 08/13/2017 09:39 PM    BC 5 Days- no growth 08/13/2014 02:10 PM    ORG Staphylococcus aureus 06/30/2023 01:50 PM    ORG Anaerobic gram negative kath 06/30/2023 01:50 PM    ORG Anaerobic gram negative kath 06/09/2023 02:00 PM    ORG Anaerobic gram negative kath 06/09/2023 02:00 PM    ORG Staphylococcus aureus 06/09/2023 02:00 PM    ORG Enterococcus faecalis 06/09/2023 02:00 PM    ORG Streptococcus mitis/oralis 06/09/2023 02:00 PM    ORG Corynebacterium 06/09/2023 02:00 PM     Lab Results   Component Value Date/Time    BLOODCULT2 5 Days no growth 09/21/2022 05:00 AM    BLOODCULT2 5 Days- no growth 08/13/2017 09:43 PM    BLOODCULT2 5 Days- no growth 08/13/2014 02:37 PM    ORG Staphylococcus aureus 06/30/2023 01:50 PM    ORG Anaerobic gram negative kath 06/30/2023 01:50 PM    ORG Anaerobic gram negative kath 06/09/2023 02:00 PM    ORG Anaerobic gram negative kath 06/09/2023 02:00 PM    ORG Staphylococcus aureus 06/09/2023 02:00 PM    ORG Enterococcus faecalis 06/09/2023 02:00 PM    ORG Streptococcus mitis/oralis 06/09/2023 02:00 PM    ORG Corynebacterium 06/09/2023 02:00 PM

## 2024-10-28 NOTE — PROGRESS NOTES
metoprolol  Eliquis on hold will resume once cleared by orthopedic surgery  Monitor for A-fib RVR and stress state  Resume Eliquis postoperatively as part of DVT prophylaxis    10/20/2024  Status post excisional debridement of right leg, right knee arthrotomy with irrigation and debridement and synovectomy, hardware retained  On Zosyn and vancomycin per infectious disease  Reactive leukocytosis of 14,000 noted  Cardiology consulted at request of family  Awaiting TAYLOR tomorrow- Unclear why-ordered by cardiology  Okay to resume Eliquis if okay with orthopedic service for DVT prophylaxis and history of A-fib    10/21/24  -s/p right knee I&D  -Improving leukocytosis, 12.4  -Positive blood cultures for Bacteroides fragilis   -Sacral wound with possible need for debridement, consult general surgery  -CT abdomen pelvis ordered per ID  -Will require 6 weeks of IV antibiotics, PICC line can be placed tomorrow per ID  -H&H remained stable  -Vital signs stable  -Weaned off supplemental O2  -Patient will return to [facility] on discharge when medically stable    10/22/24  -Mild improvement in kidney function, 41/1.1  -Resolution of leukocytosis today, 7.7  -H&H remained stable at 9.9/34.2  -PICC line unable to be placed by IV team, IR consulted for placement of PICC  -Hold Eliquis  -Remains on IV Flagyl  -Bedside sacral debridement planned for tomorrow with surgery  -Patient is a bed hold at her facility and can return when medically stable    10/23/24  -K+ 2.7, 40 meq PO x2, recheck BMP this evening  -Bedside sacral debridement with surgery today  -PICC line unable to be placed in IR today  -Make n.p.o. after midnight for placement of PICC line tomorrow  -Remains on IV Flagyl and nafcillin per infectious disease  -Check blood count tomorrow given bleeding occurring during debridement  -Isbell catheter ordered for wound healing as patient has had multiple episodes of incontinence  -Patient is a bed hold at her facility and can          I have spent a total time of 25 minutes of this patient encounter reviewing chart, labs, radiological reports coordinating care with interdisciplinary teams, face to face encounter with patient, providing counseling/education to patient/family, and formulating plan.       TRAVIS Abdul - CNP  3:00 PM  10/28/2024

## 2024-10-28 NOTE — CARE COORDINATION
Care Coordination  Called Christina who is covering for Amy at [facility] about the Picc line. They cannot accept the patient back due to the Right IJ picc line.  The patient remains on Iv Nafcillin q 4 hrs and iv Flagyl 500 mg q8 hrs. Spoke to the patients daughter Mendy at the bedside If [facility] is unable to accept she wants referrals made to [facility] and [facility]. Return envelope done. [facility] cannot accept due to the Right IJ picc. Referral made to Leanne Judge  at [facility] but the patient has [employer] and will be tough for an approval. [facility] accepted and plan to start precert today. Referral made to Qiana deras voice message await a call back.       Addendum-  Spoke to the patients daughter about a referral to [facility] she will let this cm know.

## 2024-10-28 NOTE — PLAN OF CARE
Problem: ABCDS Injury Assessment  Goal: Absence of physical injury  10/28/2024 0016 by Jaylene Delong RN  Outcome: Progressing  10/28/2024 0013 by Jaylene Delong RN  Outcome: Progressing     Problem: Skin/Tissue Integrity  Goal: Absence of new skin breakdown  Description: 1.  Monitor for areas of redness and/or skin breakdown  2.  Assess vascular access sites hourly  3.  Every 4-6 hours minimum:  Change oxygen saturation probe site  4.  Every 4-6 hours:  If on nasal continuous positive airway pressure, respiratory therapy assess nares and determine need for appliance change or resting period.  10/28/2024 0016 by Jaylene Delong RN  Outcome: Progressing  10/28/2024 0013 by Jaylene Delong RN  Outcome: Progressing     Problem: Safety - Adult  Goal: Free from fall injury  10/28/2024 0016 by Jaylene Delong RN  Outcome: Progressing  10/28/2024 0013 by Jaylene Delong RN  Outcome: Progressing     Problem: Discharge Planning  Goal: Discharge to home or other facility with appropriate resources  10/28/2024 0016 by Jaylene Delong RN  Outcome: Progressing  10/28/2024 0013 by Jaylene Delong RN  Outcome: Progressing     Problem: Pain  Goal: Verbalizes/displays adequate comfort level or baseline comfort level  10/28/2024 0016 by Jaylene Delong RN  Outcome: Progressing  10/28/2024 0013 by Jaylene Delong RN  Outcome: Progressing     Problem: Nutrition Deficit:  Goal: Optimize nutritional status  10/28/2024 0016 by Jaylene Delong RN  Outcome: Progressing  10/28/2024 0013 by Jaylene Delong RN  Outcome: Progressing     Problem: Wound:  Goal: Will show signs of wound healing; wound closure and no evidence of infection  Description: Will show signs of wound healing; wound closure and no evidence of infection  10/28/2024 0016 by Jaylene Delong RN  Outcome: Progressing  10/28/2024 0013 by Jaylene Delong RN  Outcome: Progressing     Problem: Pressure Ulcer:  Goal: Signs of wound healing will

## 2024-10-29 ENCOUNTER — APPOINTMENT (OUTPATIENT)
Dept: ULTRASOUND IMAGING | Age: 84
DRG: 463 | End: 2024-10-29
Payer: MEDICARE

## 2024-10-29 ENCOUNTER — APPOINTMENT (OUTPATIENT)
Dept: GENERAL RADIOLOGY | Age: 84
DRG: 463 | End: 2024-10-29
Payer: MEDICARE

## 2024-10-29 ENCOUNTER — TELEPHONE (OUTPATIENT)
Dept: ORTHOPEDIC SURGERY | Age: 84
End: 2024-10-29

## 2024-10-29 LAB
ALBUMIN SERPL-MCNC: 1.9 G/DL (ref 3.5–5.2)
ALP SERPL-CCNC: 93 U/L (ref 35–104)
ALT SERPL-CCNC: <5 U/L (ref 0–32)
ANION GAP SERPL CALCULATED.3IONS-SCNC: 9 MMOL/L (ref 7–16)
AST SERPL-CCNC: 14 U/L (ref 0–31)
BILIRUB SERPL-MCNC: 0.7 MG/DL (ref 0–1.2)
BNP SERPL-MCNC: ABNORMAL PG/ML (ref 0–450)
BUN SERPL-MCNC: 60 MG/DL (ref 6–23)
CALCIUM SERPL-MCNC: 8.2 MG/DL (ref 8.6–10.2)
CHLORIDE SERPL-SCNC: 112 MMOL/L (ref 98–107)
CO2 SERPL-SCNC: 25 MMOL/L (ref 22–29)
CREAT SERPL-MCNC: 2.2 MG/DL (ref 0.5–1)
ERYTHROCYTE [DISTWIDTH] IN BLOOD BY AUTOMATED COUNT: 20.9 % (ref 11.5–15)
GFR, ESTIMATED: 22 ML/MIN/1.73M2
GLUCOSE SERPL-MCNC: 97 MG/DL (ref 74–99)
HCT VFR BLD AUTO: 29.3 % (ref 34–48)
HGB BLD-MCNC: 8.3 G/DL (ref 11.5–15.5)
MCH RBC QN AUTO: 26 PG (ref 26–35)
MCHC RBC AUTO-ENTMCNC: 28.3 G/DL (ref 32–34.5)
MCV RBC AUTO: 91.8 FL (ref 80–99.9)
MICROORGANISM SPEC CULT: NORMAL
MICROORGANISM SPEC CULT: NORMAL
MICROORGANISM/AGENT SPEC: NORMAL
MICROORGANISM/AGENT SPEC: NORMAL
PHOSPHATE SERPL-MCNC: 4.8 MG/DL (ref 2.5–4.5)
PLATELET # BLD AUTO: 217 K/UL (ref 130–450)
PMV BLD AUTO: 10 FL (ref 7–12)
POTASSIUM SERPL-SCNC: 4.1 MMOL/L (ref 3.5–5)
PROT SERPL-MCNC: 4.4 G/DL (ref 6.4–8.3)
RBC # BLD AUTO: 3.19 M/UL (ref 3.5–5.5)
SERVICE CMNT-IMP: NORMAL
SERVICE CMNT-IMP: NORMAL
SODIUM SERPL-SCNC: 146 MMOL/L (ref 132–146)
SPECIMEN DESCRIPTION: NORMAL
SPECIMEN DESCRIPTION: NORMAL
WBC OTHER # BLD: 7.8 K/UL (ref 4.5–11.5)

## 2024-10-29 PROCEDURE — 80053 COMPREHEN METABOLIC PANEL: CPT

## 2024-10-29 PROCEDURE — 6370000000 HC RX 637 (ALT 250 FOR IP): Performed by: NURSE PRACTITIONER

## 2024-10-29 PROCEDURE — 6360000002 HC RX W HCPCS: Performed by: STUDENT IN AN ORGANIZED HEALTH CARE EDUCATION/TRAINING PROGRAM

## 2024-10-29 PROCEDURE — 2580000003 HC RX 258: Performed by: FAMILY MEDICINE

## 2024-10-29 PROCEDURE — 97535 SELF CARE MNGMENT TRAINING: CPT

## 2024-10-29 PROCEDURE — P9047 ALBUMIN (HUMAN), 25%, 50ML: HCPCS | Performed by: STUDENT IN AN ORGANIZED HEALTH CARE EDUCATION/TRAINING PROGRAM

## 2024-10-29 PROCEDURE — 2580000003 HC RX 258

## 2024-10-29 PROCEDURE — 6370000000 HC RX 637 (ALT 250 FOR IP)

## 2024-10-29 PROCEDURE — 85027 COMPLETE CBC AUTOMATED: CPT

## 2024-10-29 PROCEDURE — 2500000003 HC RX 250 WO HCPCS: Performed by: INTERNAL MEDICINE

## 2024-10-29 PROCEDURE — 51702 INSERT TEMP BLADDER CATH: CPT

## 2024-10-29 PROCEDURE — 6360000002 HC RX W HCPCS: Performed by: INTERNAL MEDICINE

## 2024-10-29 PROCEDURE — 73552 X-RAY EXAM OF FEMUR 2/>: CPT

## 2024-10-29 PROCEDURE — 83880 ASSAY OF NATRIURETIC PEPTIDE: CPT

## 2024-10-29 PROCEDURE — 6360000002 HC RX W HCPCS

## 2024-10-29 PROCEDURE — 99232 SBSQ HOSP IP/OBS MODERATE 35: CPT | Performed by: EMERGENCY MEDICINE

## 2024-10-29 PROCEDURE — 97530 THERAPEUTIC ACTIVITIES: CPT

## 2024-10-29 PROCEDURE — 1200000000 HC SEMI PRIVATE

## 2024-10-29 PROCEDURE — 2700000000 HC OXYGEN THERAPY PER DAY

## 2024-10-29 PROCEDURE — 2580000003 HC RX 258: Performed by: INTERNAL MEDICINE

## 2024-10-29 PROCEDURE — 76770 US EXAM ABDO BACK WALL COMP: CPT

## 2024-10-29 PROCEDURE — 84100 ASSAY OF PHOSPHORUS: CPT

## 2024-10-29 PROCEDURE — 71045 X-RAY EXAM CHEST 1 VIEW: CPT

## 2024-10-29 RX ORDER — ALBUMIN (HUMAN) 12.5 G/50ML
25 SOLUTION INTRAVENOUS ONCE
Status: COMPLETED | OUTPATIENT
Start: 2024-10-29 | End: 2024-10-29

## 2024-10-29 RX ORDER — ALBUMIN (HUMAN) 12.5 G/50ML
25 SOLUTION INTRAVENOUS ONCE
Status: COMPLETED | OUTPATIENT
Start: 2024-10-30 | End: 2024-10-30

## 2024-10-29 RX ORDER — SODIUM CHLORIDE 9 MG/ML
INJECTION, SOLUTION INTRAVENOUS ONCE
Status: COMPLETED | OUTPATIENT
Start: 2024-10-29 | End: 2024-10-29

## 2024-10-29 RX ADMIN — NAFCILLIN SODIUM 2000 MG: 2 INJECTION, POWDER, LYOPHILIZED, FOR SOLUTION INTRAMUSCULAR; INTRAVENOUS at 14:21

## 2024-10-29 RX ADMIN — SODIUM CHLORIDE, PRESERVATIVE FREE 10 ML: 5 INJECTION INTRAVENOUS at 09:17

## 2024-10-29 RX ADMIN — APIXABAN 5 MG: 5 TABLET, FILM COATED ORAL at 09:14

## 2024-10-29 RX ADMIN — OXYCODONE HYDROCHLORIDE AND ACETAMINOPHEN 1 TABLET: 5; 325 TABLET ORAL at 09:45

## 2024-10-29 RX ADMIN — NAFCILLIN SODIUM 2000 MG: 2 INJECTION, POWDER, LYOPHILIZED, FOR SOLUTION INTRAMUSCULAR; INTRAVENOUS at 11:28

## 2024-10-29 RX ADMIN — METOPROLOL TARTRATE 25 MG: 25 TABLET, FILM COATED ORAL at 09:13

## 2024-10-29 RX ADMIN — SODIUM CHLORIDE, PRESERVATIVE FREE 10 ML: 5 INJECTION INTRAVENOUS at 13:13

## 2024-10-29 RX ADMIN — Medication 1 TABLET: at 09:15

## 2024-10-29 RX ADMIN — LOPERAMIDE HYDROCHLORIDE 2 MG: 2 CAPSULE ORAL at 09:14

## 2024-10-29 RX ADMIN — NAFCILLIN SODIUM 2000 MG: 2 INJECTION, POWDER, LYOPHILIZED, FOR SOLUTION INTRAMUSCULAR; INTRAVENOUS at 02:17

## 2024-10-29 RX ADMIN — APIXABAN 5 MG: 5 TABLET, FILM COATED ORAL at 20:43

## 2024-10-29 RX ADMIN — MORPHINE SULFATE 2 MG: 2 INJECTION, SOLUTION INTRAMUSCULAR; INTRAVENOUS at 14:22

## 2024-10-29 RX ADMIN — SODIUM CHLORIDE, PRESERVATIVE FREE 20 ML: 5 INJECTION INTRAVENOUS at 09:14

## 2024-10-29 RX ADMIN — MICONAZOLE NITRATE: 20 CREAM TOPICAL at 09:20

## 2024-10-29 RX ADMIN — LEVOTHYROXINE SODIUM 88 MCG: 0.09 TABLET ORAL at 05:36

## 2024-10-29 RX ADMIN — SODIUM CHLORIDE, PRESERVATIVE FREE 10 ML: 5 INJECTION INTRAVENOUS at 20:43

## 2024-10-29 RX ADMIN — MORPHINE SULFATE 2 MG: 2 INJECTION, SOLUTION INTRAMUSCULAR; INTRAVENOUS at 18:20

## 2024-10-29 RX ADMIN — BUMETANIDE 1 MG: 1 TABLET ORAL at 09:15

## 2024-10-29 RX ADMIN — FERROUS SULFATE TAB 325 MG (65 MG ELEMENTAL FE) 325 MG: 325 (65 FE) TAB at 09:15

## 2024-10-29 RX ADMIN — NAFCILLIN SODIUM 2000 MG: 2 INJECTION, POWDER, LYOPHILIZED, FOR SOLUTION INTRAMUSCULAR; INTRAVENOUS at 19:00

## 2024-10-29 RX ADMIN — MICONAZOLE NITRATE: 20 CREAM TOPICAL at 20:46

## 2024-10-29 RX ADMIN — CLOBETASOL PROPIONATE: 0.5 CREAM TOPICAL at 09:20

## 2024-10-29 RX ADMIN — SODIUM CHLORIDE: 9 INJECTION, SOLUTION INTRAVENOUS at 21:47

## 2024-10-29 RX ADMIN — SODIUM CHLORIDE, PRESERVATIVE FREE 10 ML: 5 INJECTION INTRAVENOUS at 20:47

## 2024-10-29 RX ADMIN — CALCIUM CARBONATE-VITAMIN D TAB 500 MG-200 UNIT 1 TABLET: 500-200 TAB at 09:15

## 2024-10-29 RX ADMIN — Medication 500 MG: at 09:13

## 2024-10-29 RX ADMIN — METRONIDAZOLE 500 MG: 500 INJECTION, SOLUTION INTRAVENOUS at 06:45

## 2024-10-29 RX ADMIN — Medication 2000 UNITS: at 09:15

## 2024-10-29 RX ADMIN — MICONAZOLE NITRATE: 20 OINTMENT TOPICAL at 20:45

## 2024-10-29 RX ADMIN — COLLAGENASE SANTYL: 250 OINTMENT TOPICAL at 09:22

## 2024-10-29 RX ADMIN — Medication 1 CAPSULE: at 09:14

## 2024-10-29 RX ADMIN — ALBUMIN (HUMAN) 25 G: 0.25 INJECTION, SOLUTION INTRAVENOUS at 13:12

## 2024-10-29 RX ADMIN — MICONAZOLE NITRATE: 20 OINTMENT TOPICAL at 09:19

## 2024-10-29 RX ADMIN — NAFCILLIN SODIUM 2000 MG: 2 INJECTION, POWDER, LYOPHILIZED, FOR SOLUTION INTRAMUSCULAR; INTRAVENOUS at 05:36

## 2024-10-29 RX ADMIN — NAFCILLIN SODIUM 2000 MG: 2 INJECTION, POWDER, LYOPHILIZED, FOR SOLUTION INTRAMUSCULAR; INTRAVENOUS at 23:19

## 2024-10-29 RX ADMIN — METRONIDAZOLE 500 MG: 500 INJECTION, SOLUTION INTRAVENOUS at 18:23

## 2024-10-29 RX ADMIN — CLOBETASOL PROPIONATE: 0.5 CREAM TOPICAL at 20:45

## 2024-10-29 ASSESSMENT — PAIN DESCRIPTION - DESCRIPTORS
DESCRIPTORS: THROBBING;SHARP;PRESSURE
DESCRIPTORS: BURNING;THROBBING;STABBING
DESCRIPTORS: THROBBING;ACHING;SHARP

## 2024-10-29 ASSESSMENT — PAIN DESCRIPTION - ORIENTATION
ORIENTATION: RIGHT;LEFT;LOWER
ORIENTATION: RIGHT
ORIENTATION: LEFT

## 2024-10-29 ASSESSMENT — PAIN DESCRIPTION - LOCATION
LOCATION: LEG
LOCATION: LEG;ARM
LOCATION: LEG;BACK

## 2024-10-29 ASSESSMENT — PAIN - FUNCTIONAL ASSESSMENT
PAIN_FUNCTIONAL_ASSESSMENT: PREVENTS OR INTERFERES SOME ACTIVE ACTIVITIES AND ADLS

## 2024-10-29 ASSESSMENT — PAIN SCALES - WONG BAKER
WONGBAKER_NUMERICALRESPONSE: NO HURT

## 2024-10-29 ASSESSMENT — PAIN SCALES - GENERAL
PAINLEVEL_OUTOF10: 9
PAINLEVEL_OUTOF10: 6
PAINLEVEL_OUTOF10: 10
PAINLEVEL_OUTOF10: 6

## 2024-10-29 NOTE — PROGRESS NOTES
OT BEDSIDE TREATMENT NOTE   [facility]  [address], [place]      Date:10/29/2024  Patient Name: Gabi Madrigal  MRN: [id number]  : 1940  Room: [address] [place] [address]     Evaluating OT: Arianna Alanis OTD, OTR/L, [id number]       Referring Provider: Aquilino Dyer DO     Specific Provider Orders/Date: OT eval and treat (10/21/24)    Diagnosis: Effusion of right knee [M25.461]  Infected hardware in right leg, initial encounter (HCC) [T84.7XXA]  Effusion, right knee [M25.461]    Surgeries/Procedures:   10/19:  Excisional debridement right leg area measuring 30 x 8 cm including skin, subcutaneous tissue, fat, fascia, muscle, bone  Right knee arthrotomy with irrigation debridement and synovectomy  Right distal femur hardware retention.       Pertinent Medical History:       has a past medical history of Acquired hypothyroidism, Arthritis, Blood transfusion reaction, Chronic kidney disease, History of blood transfusion, Hypertension, Lymphedema of both lower extremities, Open wound of left great toe, Other disorders of kidney and ureter in diseases classified elsewhere, Pelvic fracture (HCC), Positive culture findings in wound, Skin ulcer of left calf with fat layer exposed (HCC), Skin ulcer of left calf, limited to breakdown of skin (HCC), Ulcer of left lower leg (HCC), and Venous stasis ulcer of left calf limited to breakdown of skin (HCC).           Precautions:  Fall Risk, NWB RLE, hinged ROM brace locked at 30 degrees, , sacral wound, BLE wounds, alarms+      Assessment of current deficits    [x] Functional mobility            [x]ADLs           [x] Strength                   [x]Cognition    [x] Functional transfers          [x] IADLs          [x] Safety Awareness   [x]Endurance    [x] Fine Coordination              [x] Balance      [] Vision/perception    [x]Sensation      []Gross Motor Coordination  [x] ROM           [] Delirium

## 2024-10-29 NOTE — CONSULTS
Palliative Care Department  [phone]  Palliative Care Re-consult    Palliative Care Progress Note  Provider Emmy Hermosillo DO    Gabi Madrigal  [id number]  Hospital Day: 12  Date of Initial Consult: 10/19/2024  Referring Provider: Aquilino Dyer DO  Palliative Medicine was consulted for assistance with: Goals of care, end stage disease     HPI:   Gabi Madrigal is a 84 y.o. with a medical history of hypothyroidism, hypertension, lymphedema, pelvic fracture, venous stasis ulcer of left, cellulitis of right knee-infected hardware  who was admitted on 10/18/2024 from home with a CHIEF COMPLAINT of joint swelling.  Patient has history of right distal femur ORIF on 9- and recurrent infection of the right knee.  Patient was seen outpatient by orthopedic surgery and on 10- CT of the right femur was concerning for infection.  Patient was sent to the hospital and was admitted for further evaluation management.  On 10- patient underwent right knee arthrotomy, synovectomy, and debridement with orthopedic surgery.  Surgical cultures positive for MSSA and bacteroides fragilis and antibiotics were switched to nafcillin and Flagyl on 10-21.  Patient underwent sacral wound debridement on 10- per general surgery.  Patient underwent PICC line placement on 10-25, ID plan for 6 weeks of IV antibiotics.  Patient developed HARRISON with increasing creatinine from 1.4-2.1 and nephrology was consulted.  Palliative medicine was reconsulted 10-28 for goals of care.     ASSESSMENT/PLAN:     Pertinent Hospital Diagnoses     Right knee prosthetic joint infection  Bacteroides fragilis bacteremia  MSSA prosthetic joint infection  Decubitus ulcer status post debridement  Left lower lobe pneumonia  HARRISON    Palliative Care Encounter / Counseling Regarding Goals of Care  Please see detailed goals of care discussion as below  At this time, Gabi Madrigal, Does have capacity for medical decision-making.   Capacity is time limited and situation/question specific  During encounter there was no surrogate medical decision-maker needed  Outcome of goals of care meeting:   Continue current management  Goal to go to rehab/SNF with IV antibiotics and ultimate goal to return home  Code status DNR-CCA  Advanced Directives: no POA or living will in [facility]  Surrogate/Legal NOK:  Mendy Armando (child) [phone]  Silverio Madrigal (child) [phone]  Kimani Medina (child) [phone]  Paolo Madrigal (child) [phone]    Spiritual assessment: no spiritual distress identified  Bereavement and grief: to be determined  Referrals to: none today  SUBJECTIVE:     Current medical issues leading to Palliative Medicine involvement include   Active Hospital Problems    Diagnosis Date Noted    Sacral wound [S31.000A] 10/22/2024    Chronic heart failure with preserved ejection fraction (HCC) [I50.32] 10/19/2024    Effusion, right knee [M25.461] 10/18/2024    Infected hardware in right leg, initial encounter ([facility]) [T84.7XXA] 10/18/2024     10/29/2024:  Chart reviewed.  The patient was seen and examined today at bedside.  Patient's bedside nurse is also present during evaluation and provided update.  The patient is lying in bed and is awake, alert and oriented x 4.  She complains of having diarrhea, described as having multiple loose stools, patient taking Imodium as needed.  She denies any other complaints or issues at this time.  She reports she is eating and drinking well and without difficulty.     In-depth discussion regarding current condition and comorbidities.  She is status post PICC line placement, status post wound debridement.  Readdressed goals of care and patient wants to continue with current management and IV antibiotics.  Continue DNR-CCA. She reports plan is go to skilled nursing facility/rehab. Ultimate goal is to return home.  Her questions were answered.  Support provided.  She is appreciative.    Discussed with nursing.

## 2024-10-29 NOTE — ACP (ADVANCE CARE PLANNING)
Advance Care Planning     Palliative Team Advance Care Planning (ACP) Conversation    Date of Conversation: 10/29/24    Individuals present for the conversation: Patient with decision making capacity and Daughter Mendy     ACP documents on file prior to discussion:  -None    Previously completed document/s not on file: Patient / participant reports that there are no previously executed ACP documents.    Healthcare Decision Maker:    Primary Decision Maker: Mendy Armando - Child - [phone]    Secondary Decision Maker: MariowoodrowSilverio rothman - Child - [phone]     Conversation Summary:  ALBINO met with pt who is a/ox4 and her daughter Mendy to complete HCPOA and Living Will. Agents as named above. Copy placed in soft chart, original given to daughter per pt request.    Resuscitation Status:   Code Status: DNR-CCA     Documentation Completed:  -Power of  for Healthcare and -Living Will    I spent 30 minutes with the patient and/or surrogate decision maker discussing the patient's wishes and goals.      ALBINO Munroe

## 2024-10-29 NOTE — CARE COORDINATION
10/29/2024social work transition of care planning  Sw followed up with pt's dtr to discuss transition of care planning. Sw discussed other tomas choices that will accept pt's current line and atb regimen. Referrals to [facility] and [facility],both are able to accept. Awaiting dtr's decision and pt's medical stability.  Electronically signed by ALBINO Archibald on 10/29/2024 at 3:08 PM

## 2024-10-29 NOTE — PROGRESS NOTES
Oliver served EVENS Aguirre in regard to Gabi. I left a voicemail in regard to her progress. Gabi is having BUE/ Bilateral hands swelling. It is pitting at a 3+. She is having gargling sounds when she takes sips of a drink.  She is having constant shaking in her right hand. Her daughter expressed many concerns in regard to her mother, Gabi.

## 2024-10-29 NOTE — PROGRESS NOTES
[facility]  ASHISHIDA  Progress Note      Chief Complaint   Patient presents with    Joint Swelling     Patient sent from  with fluid around knee , sent to have knee drained        SUBJECTIVE:    Patient is tolerating medications. No reported adverse drug reactions.  No nausea, vomiting.  Resting in bed. Eating breakfast. She is unsure if diarrhea is still improving it usually happens after meal  Review of systems:  As stated above in the chief complaint, otherwise negative.    Medications:  Scheduled Meds:   albumin human 25%  25 g IntraVENous Once    [START ON 10/30/2024] albumin human 25%  25 g IntraVENous Once    lactobacillus  1 capsule Oral Daily    apixaban  5 mg Oral BID    miconazole nitrate   Topical BID    lidocaine-EPINEPHrine  20 mL IntraDERmal Once    miconazole   Topical BID    sodium chloride flush  5-40 mL IntraVENous 2 times per day    nafcillin  2,000 mg IntraVENous Q4H    metroNIDAZOLE  500 mg IntraVENous Q8H    sodium chloride flush  5-40 mL IntraVENous 2 times per day    collagenase   Topical Daily    bumetanide  1 mg Oral Daily    oyster shell calcium w/D  1 tablet Oral Daily    clobetasol   Topical BID    ferrous sulfate  325 mg Oral Daily with breakfast    levothyroxine  88 mcg Oral Daily    lidocaine  1 patch TransDERmal Daily    metoprolol tartrate  25 mg Oral BID    therapeutic multivitamin-minerals  1 tablet Oral Daily    vitamin C  500 mg Oral Daily    vitamin D  2,000 Units Oral Daily    sodium chloride flush  5-40 mL IntraVENous 2 times per day     Continuous Infusions:   sodium chloride 60 mL/hr at 10/28/24 1300    sodium chloride      sodium chloride      sodium chloride      sodium chloride       PRN Meds:loperamide, miconazole nitrate **AND** miconazole nitrate, sodium chloride flush, sodium chloride, sodium chloride flush, sodium chloride, sodium chloride, oxyCODONE-acetaminophen, morphine **OR** morphine, sodium chloride flush, sodium chloride,  and will not be performed.  Mixed candice isolated includes:  Mixed gram negative rods  Mixed gram positive organisms     08/13/2017   Final    ,000 CFU/mL  Mixed candice isolated. Further workup and sensitivity testing  is not routinely indicated and will not be performed.  Mixed candice isolated includes:  Mixed gram negative rods  Lactobacillus species     08/13/2014 Growth not present  Final     MRSA Culture Only   Date Value Ref Range Status   09/28/2017 Methicillin resistant Staph aureus not isolated  Final   08/14/2017 Methicillin resistant Staph aureus not isolated  Final   08/08/2014 Methicillin resistant Staph aureus not isolated  Final       Assessment:  Right knee PJI  ORIF right distal femur 07/17/2024 was found to have swelling and drainage from her right leg 3 months after distal femur fixation.  CT scan outpatient showed fluid collection around lateral thigh and right knee.  Status post excisional debridement of the right leg area skin subcutaneous tissue fat fascia and bone, right knee arthrotomy with I&D, right distal femur hardware retention 10/19.  Bacteroides fragilis bacteremia  MSSA prosthetic joint infection  Decub Ulcer -s/p debridement-culture proteus and enterococcus faecalis  LLL PNA   On immodium    stools slowing per nurses  HARRISON     Plan:    Continue nafcillin and Flagyl  Nephrology consult for HARRISON  Taking imodium for diarrhea   probiotic  Check c diff-Stool yesterday did not meet criteria to send for c diff.   CT abdomen pelvis didn't showed a clear source of infection to explain bacteroides bacteremia   Anticipate 6 weeks of IV antibiotic therapy  PICC line  placed   Prescription in chart  Follow-up with ID clinic within 7 days  Hold off of discharge until diarrhea managed  Patient can be discharged from ID standpoint    Octavio Avilez MD  1:07 PM  10/29/2024

## 2024-10-29 NOTE — PROGRESS NOTES
her facility and can return when able    10/24/24  -Bump kidney function of 44/1.4, hold home Bumex  -250 cc bolus of normal saline over 2 hours  -H&H stable  -Plan for PICC line today  -Consult dietitian for protein malnutrition  -Eliquis restarted today  -Remains on Flagyl and nafcillin  -Discharge planning back to nursing facility once PICC line has been placed and final antibiotics have been reconciled by ID    10/25/24  -Kidney function remains elevated 45/1.4  -Significant protein calorie malnutrition,   -albumin 1.7, 25 g q6h x2 doses  -C. difficile ordered by infectious disease  -As needed Imodium  -Chest x-ray CHF  -Heart rates elevated secondary to doses of metoprolol being held yesterday by nursing  -Continue IV antibiotics  -PICC line placed today in IR  -Check labs and vitals in a.m.    10/26/24  -Awaiting morning labs  -Started on probiotic by infectious disease  -Remains on IV nafcillin and Flagyl per ID with PICC line in place  -Resume Bumex  -C. difficile ordered by infectious disease, spoke with nursing and they state will likely be canceled by lab due to criteria  -Maintain Camacho for wound healing  -If ongoing diarrhea may need GI evaluation    10/27/24  -Worsening kidney function, 52/1.9, consult nephrology for assistance with fluid management  -H&H remained stable  -Continues to have diarrhea, continue as needed Imodium  -Weaned off supplemental O2  -Family would like to speak with palliative medicine, nursing communication placed yesterday  -Remains on IV nafcillin and Flagyl per infectious disease  -Will require ERIC on discharge    10/28/2024  Worsening BUN/Creat 56/2.1  Nephrology following  Supplement potassium  Positive urinalysis  Complaining of multiple bowel movements  [facility] - 8/24  Strict I/O  Bumex 1 mg daily  Continue camacho catheter   Consult palliative care     10/29/2024  Vital signs stable  BUN/Creat: worsening - 60/2.2  ID has reconciled atb's  Patient will need 6 weeks  atb's  Continue Bumex 1 mg daily per nephrology  RCK am labs  Continue IV hydration.      Code Status: Full code  Consultants: Infectious disease, orthopedics, cardiology, GS    DVT Prophylaxis Eliquis  PT/OT  Discharge planning         I have spent a total time of 25 minutes of this patient encounter reviewing chart, labs, radiological reports coordinating care with interdisciplinary teams, face to face encounter with patient, providing counseling/education to patient/family, and formulating plan.       Jaleesa Snow, APRN - CNP  2:55 PM  10/29/2024

## 2024-10-29 NOTE — TELEPHONE ENCOUNTER
Procedure(s):  Excisional debridement right leg area measuring 30 x 8 cm including skin, subcutaneous tissue, fat, fascia, muscle, bone  Right knee arthrotomy with irrigation debridement and synovectomy  Right distal femur hardware retention  Date:  10/19/2024    Daughter Sharon called stating she is very concerned about the amount of pain the her Mom is having in her right knee / leg. Has been in hospital for 12 days since surgery. States she is taking Morphine and Percocet for pain.  She is also being treated for kidney and heart issues and has a deep wound on her back.      Daughter states she does not know how they expect to move her to an ECF when she is complaining about so much pain right knee / leg.

## 2024-10-29 NOTE — PROGRESS NOTES
Final Result   Findings most consistent with congestive heart failure.         IR PICC WO SQ PORT/PUMP > 5 YEARS   Final Result   1. Successful placement of tunneled cuffed central venous catheter.         IR ULTRASOUND GUIDANCE VASCULAR ACCESS   Final Result   1. Successful placement of tunneled cuffed central venous catheter.         IR FLUORO GUIDED CVA DEVICE PLMT/REPLACE/REMOVAL   Final Result   1. Successful placement of tunneled cuffed central venous catheter.         CT ABDOMEN PELVIS WO CONTRAST Additional Contrast? None   Final Result   Ill-defined opacities along the visualized lower lungs greatest in the left   lower lobe.  Could be infectious/inflammatory in nature.  Advised short-term   follow-up imaging to assess for resolution.      Moderate to large colonic stool burden which can be seen with constipation.   Colonic diverticulosis.      Gas in the tissues overlying the coccyx, likely reflecting pressure injury.   Note that there is a 2.2 cm linear radiodensity in this region which is   likely artifactual but cannot entirely exclude foreign body.  Correlate with   exam.      Edema in the flanks left greater than right.      Additional observations as above.         US RETROPERITONEAL LIMITED    (Results Pending)        EKG: atrial fibrillation, rate controlled 99.    Resident's Assessment and Plan     Assessment:  HARRISON stage 2 with microproteinuria on CKD stage 3a in setting of infection and post-operative state  Good urine output 0.5 cc/kg/hr yesterday  Baseline Creatinine 0.9-1.0  Today Cr 2.2, GFR 22  Has been on nafcillin since 10/19/24, received a dose of vanc and zosyn on 10/19/24   Urine microalbumin/creatinine ratio 294  Hypokalemia, improved, K 4.1 today  Normocytic anemia (baseline Hg 9-11), current Hg 8.3   Hx of vitamin d deficiency, Vit D 25OH (9/17/24) 31 wnl   Right distal femur hardware infection 2/2 MSSA   Bacteroides fragilis bacteremia  Decubitus ulcer s/p debridement 10/23/24,

## 2024-10-29 NOTE — PROGRESS NOTES
I have seen and evaluated the patient and agree with the above assessment on today's visit. I have performed the key components of the history and physical examination and concur completely with the findings and plans as documented.    Agree with ROS, examination, FMH, PMH, PSH, SocHx, and allergies as above.            Dima Newton DO   Orthopaedic Surgery   10/30/2024  7:20 AM    Note: This report was completed using eToro voiced recognition software.  Every effort has been made to ensure accuracy; however, inadvertent computerized transcription errors may be present.       Department of Orthopedic Surgery  Two-Week Postoperative Note  CC: Two week f/u visit s/p I&D R distal femur orthopedic hardware  HPI: Patient is being seen today for postoperative follow-up care s/p I&D right distal femur orthopedic hardware done on 10/18/2024.  Patient has continued her nonweightbearing status to the right lower extremity and has been in a right knee immobilizer.  States that pain is still significant but that it has improved.  She was initially placed in a incisional VAC that was removed day 5 postop.  Patient seen and examined. Pain appropriately controlled. No new complaints.  Denies chest pain, shortness of breath, dizziness/lightheadedness. Denies fever or chills.  ROS:  CONSTITUTIONAL:  negative for  fevers, chills  EYES:  positive for blurred vision, visual disturbance  HEENT:  positive for  hearing loss, voice change  RESPIRATORY:  negative for  dyspnea, wheezing  CARDIOVASCULAR:  negative for  chest pain, positive for palpitations  GASTROINTESTINAL:  negative for nausea, vomiting  GENITOURINARY:  negative for frequency, positive for urinary incontinence  HEMATOLOGIC/LYMPHATIC:  negative for bleeding and petechiae  MUSCULOSKELETAL:  positive for  joint swelling  NEUROLOGICAL:  negative for headaches, dizziness  BEHAVIOR/PSYCH:  negative for increased agitation and anxiety   VITALS:  /68   Pulse 88   Temp

## 2024-10-30 VITALS
HEART RATE: 74 BPM | OXYGEN SATURATION: 79 % | RESPIRATION RATE: 20 BRPM | WEIGHT: 201 LBS | TEMPERATURE: 97.8 F | HEIGHT: 63 IN | BODY MASS INDEX: 35.61 KG/M2 | DIASTOLIC BLOOD PRESSURE: 75 MMHG | SYSTOLIC BLOOD PRESSURE: 97 MMHG

## 2024-10-30 LAB
ALBUMIN SERPL-MCNC: 2.4 G/DL (ref 3.5–5.2)
ALP SERPL-CCNC: 73 U/L (ref 35–104)
ALT SERPL-CCNC: <5 U/L (ref 0–32)
ANION GAP SERPL CALCULATED.3IONS-SCNC: 11 MMOL/L (ref 7–16)
AST SERPL-CCNC: 13 U/L (ref 0–31)
B PARAP IS1001 DNA NPH QL NAA+NON-PROBE: NOT DETECTED
B PERT DNA SPEC QL NAA+PROBE: NOT DETECTED
BILIRUB SERPL-MCNC: 1.2 MG/DL (ref 0–1.2)
BUN SERPL-MCNC: 64 MG/DL (ref 6–23)
C PNEUM DNA NPH QL NAA+NON-PROBE: NOT DETECTED
CALCIUM SERPL-MCNC: 8 MG/DL (ref 8.6–10.2)
CHLORIDE SERPL-SCNC: 108 MMOL/L (ref 98–107)
CO2 SERPL-SCNC: 23 MMOL/L (ref 22–29)
CREAT SERPL-MCNC: 2.7 MG/DL (ref 0.5–1)
ERYTHROCYTE [DISTWIDTH] IN BLOOD BY AUTOMATED COUNT: 22 % (ref 11.5–15)
FLUAV RNA NPH QL NAA+NON-PROBE: NOT DETECTED
FLUBV RNA NPH QL NAA+NON-PROBE: NOT DETECTED
GFR, ESTIMATED: 17 ML/MIN/1.73M2
GLUCOSE SERPL-MCNC: 54 MG/DL (ref 74–99)
HADV DNA NPH QL NAA+NON-PROBE: NOT DETECTED
HCOV 229E RNA NPH QL NAA+NON-PROBE: NOT DETECTED
HCOV HKU1 RNA NPH QL NAA+NON-PROBE: NOT DETECTED
HCOV NL63 RNA NPH QL NAA+NON-PROBE: NOT DETECTED
HCOV OC43 RNA NPH QL NAA+NON-PROBE: NOT DETECTED
HCT VFR BLD AUTO: 26.5 % (ref 34–48)
HGB BLD-MCNC: 7.5 G/DL (ref 11.5–15.5)
HMPV RNA NPH QL NAA+NON-PROBE: NOT DETECTED
HPIV1 RNA NPH QL NAA+NON-PROBE: NOT DETECTED
HPIV2 RNA NPH QL NAA+NON-PROBE: NOT DETECTED
HPIV3 RNA NPH QL NAA+NON-PROBE: NOT DETECTED
HPIV4 RNA NPH QL NAA+NON-PROBE: NOT DETECTED
LACTATE BLDV-SCNC: 0.8 MMOL/L (ref 0.5–2.2)
M PNEUMO DNA NPH QL NAA+NON-PROBE: NOT DETECTED
MCH RBC QN AUTO: 26 PG (ref 26–35)
MCHC RBC AUTO-ENTMCNC: 28.3 G/DL (ref 32–34.5)
MCV RBC AUTO: 91.7 FL (ref 80–99.9)
PHOSPHATE SERPL-MCNC: 5.3 MG/DL (ref 2.5–4.5)
PLATELET # BLD AUTO: 197 K/UL (ref 130–450)
PMV BLD AUTO: 9.9 FL (ref 7–12)
POTASSIUM SERPL-SCNC: 4.3 MMOL/L (ref 3.5–5)
PROCALCITONIN SERPL-MCNC: 0.48 NG/ML (ref 0–0.08)
PROT SERPL-MCNC: 4.7 G/DL (ref 6.4–8.3)
RBC # BLD AUTO: 2.89 M/UL (ref 3.5–5.5)
RSV RNA NPH QL NAA+NON-PROBE: NOT DETECTED
RV+EV RNA NPH QL NAA+NON-PROBE: NOT DETECTED
SARS-COV-2 RNA NPH QL NAA+NON-PROBE: NOT DETECTED
SODIUM SERPL-SCNC: 142 MMOL/L (ref 132–146)
SPECIMEN DESCRIPTION: NORMAL
WBC OTHER # BLD: 9.3 K/UL (ref 4.5–11.5)

## 2024-10-30 PROCEDURE — 83605 ASSAY OF LACTIC ACID: CPT

## 2024-10-30 PROCEDURE — 6360000002 HC RX W HCPCS: Performed by: FAMILY MEDICINE

## 2024-10-30 PROCEDURE — 2580000003 HC RX 258

## 2024-10-30 PROCEDURE — 87040 BLOOD CULTURE FOR BACTERIA: CPT

## 2024-10-30 PROCEDURE — P9047 ALBUMIN (HUMAN), 25%, 50ML: HCPCS | Performed by: STUDENT IN AN ORGANIZED HEALTH CARE EDUCATION/TRAINING PROGRAM

## 2024-10-30 PROCEDURE — 6360000002 HC RX W HCPCS: Performed by: STUDENT IN AN ORGANIZED HEALTH CARE EDUCATION/TRAINING PROGRAM

## 2024-10-30 PROCEDURE — 99233 SBSQ HOSP IP/OBS HIGH 50: CPT | Performed by: EMERGENCY MEDICINE

## 2024-10-30 PROCEDURE — 2500000003 HC RX 250 WO HCPCS: Performed by: INTERNAL MEDICINE

## 2024-10-30 PROCEDURE — 0202U NFCT DS 22 TRGT SARS-COV-2: CPT

## 2024-10-30 PROCEDURE — 51702 INSERT TEMP BLADDER CATH: CPT

## 2024-10-30 PROCEDURE — 1200000000 HC SEMI PRIVATE

## 2024-10-30 PROCEDURE — 2580000003 HC RX 258: Performed by: INTERNAL MEDICINE

## 2024-10-30 PROCEDURE — 85027 COMPLETE CBC AUTOMATED: CPT

## 2024-10-30 PROCEDURE — 80053 COMPREHEN METABOLIC PANEL: CPT

## 2024-10-30 PROCEDURE — 84100 ASSAY OF PHOSPHORUS: CPT

## 2024-10-30 PROCEDURE — 2700000000 HC OXYGEN THERAPY PER DAY

## 2024-10-30 PROCEDURE — 84145 PROCALCITONIN (PCT): CPT

## 2024-10-30 PROCEDURE — 6360000002 HC RX W HCPCS: Performed by: INTERNAL MEDICINE

## 2024-10-30 PROCEDURE — 6370000000 HC RX 637 (ALT 250 FOR IP)

## 2024-10-30 RX ORDER — ALBUMIN (HUMAN) 12.5 G/50ML
25 SOLUTION INTRAVENOUS EVERY 8 HOURS
Status: DISCONTINUED | OUTPATIENT
Start: 2024-10-30 | End: 2024-10-31 | Stop reason: HOSPADM

## 2024-10-30 RX ORDER — MORPHINE SULFATE 2 MG/ML
2 INJECTION, SOLUTION INTRAMUSCULAR; INTRAVENOUS
Status: DISCONTINUED | OUTPATIENT
Start: 2024-10-30 | End: 2024-10-31 | Stop reason: HOSPADM

## 2024-10-30 RX ORDER — GLYCOPYRROLATE 0.2 MG/ML
0.1 INJECTION INTRAMUSCULAR; INTRAVENOUS EVERY 4 HOURS PRN
Status: DISCONTINUED | OUTPATIENT
Start: 2024-10-30 | End: 2024-10-31 | Stop reason: HOSPADM

## 2024-10-30 RX ORDER — LORAZEPAM 2 MG/ML
0.5 INJECTION INTRAMUSCULAR EVERY 4 HOURS PRN
Status: DISCONTINUED | OUTPATIENT
Start: 2024-10-30 | End: 2024-10-31 | Stop reason: HOSPADM

## 2024-10-30 RX ADMIN — LORAZEPAM 0.5 MG: 2 INJECTION INTRAMUSCULAR; INTRAVENOUS at 14:59

## 2024-10-30 RX ADMIN — LORAZEPAM 0.5 MG: 2 INJECTION INTRAMUSCULAR; INTRAVENOUS at 20:35

## 2024-10-30 RX ADMIN — METRONIDAZOLE 500 MG: 500 INJECTION, SOLUTION INTRAVENOUS at 02:02

## 2024-10-30 RX ADMIN — GLYCOPYRROLATE 0.1 MG: 0.2 INJECTION INTRAMUSCULAR; INTRAVENOUS at 21:06

## 2024-10-30 RX ADMIN — MORPHINE SULFATE 2 MG: 2 INJECTION, SOLUTION INTRAMUSCULAR; INTRAVENOUS at 16:45

## 2024-10-30 RX ADMIN — ALBUMIN (HUMAN) 25 G: 0.25 INJECTION, SOLUTION INTRAVENOUS at 13:37

## 2024-10-30 RX ADMIN — NAFCILLIN SODIUM 2000 MG: 2 INJECTION, POWDER, LYOPHILIZED, FOR SOLUTION INTRAMUSCULAR; INTRAVENOUS at 11:44

## 2024-10-30 RX ADMIN — NAFCILLIN SODIUM 2000 MG: 2 INJECTION, POWDER, LYOPHILIZED, FOR SOLUTION INTRAMUSCULAR; INTRAVENOUS at 03:11

## 2024-10-30 RX ADMIN — LEVOTHYROXINE SODIUM 88 MCG: 0.09 TABLET ORAL at 05:17

## 2024-10-30 RX ADMIN — SODIUM CHLORIDE, PRESERVATIVE FREE 10 ML: 5 INJECTION INTRAVENOUS at 09:47

## 2024-10-30 RX ADMIN — NAFCILLIN SODIUM 2000 MG: 2 INJECTION, POWDER, LYOPHILIZED, FOR SOLUTION INTRAMUSCULAR; INTRAVENOUS at 06:32

## 2024-10-30 RX ADMIN — MORPHINE SULFATE 2 MG: 2 INJECTION, SOLUTION INTRAMUSCULAR; INTRAVENOUS at 13:34

## 2024-10-30 RX ADMIN — ALBUMIN (HUMAN) 25 G: 0.25 INJECTION, SOLUTION INTRAVENOUS at 05:17

## 2024-10-30 RX ADMIN — MORPHINE SULFATE 2 MG: 2 INJECTION, SOLUTION INTRAMUSCULAR; INTRAVENOUS at 20:35

## 2024-10-30 RX ADMIN — METRONIDAZOLE 500 MG: 500 INJECTION, SOLUTION INTRAVENOUS at 11:08

## 2024-10-30 RX ADMIN — GLYCOPYRROLATE 0.1 MG: 0.2 INJECTION INTRAMUSCULAR; INTRAVENOUS at 16:48

## 2024-10-30 RX ADMIN — SODIUM CHLORIDE, PRESERVATIVE FREE 10 ML: 5 INJECTION INTRAVENOUS at 09:46

## 2024-10-30 ASSESSMENT — PAIN SCALES - GENERAL
PAINLEVEL_OUTOF10: 6
PAINLEVEL_OUTOF10: 5

## 2024-10-30 ASSESSMENT — PAIN DESCRIPTION - LOCATION: LOCATION: LEG

## 2024-10-30 ASSESSMENT — PAIN SCALES - WONG BAKER
WONGBAKER_NUMERICALRESPONSE: HURTS A LITTLE BIT
WONGBAKER_NUMERICALRESPONSE: NO HURT
WONGBAKER_NUMERICALRESPONSE: NO HURT

## 2024-10-30 ASSESSMENT — PAIN DESCRIPTION - ORIENTATION: ORIENTATION: LEFT

## 2024-10-30 ASSESSMENT — PAIN - FUNCTIONAL ASSESSMENT: PAIN_FUNCTIONAL_ASSESSMENT: PREVENTS OR INTERFERES SOME ACTIVE ACTIVITIES AND ADLS

## 2024-10-30 NOTE — PROGRESS NOTES
[facility]   Progress note      Subjective:    Patient is lying in bed appearing quite miserable  Family at bedside      Objective:    BP (!) 86/51   Pulse (!) 119   Temp 97 °F (36.1 °C) (Temporal)   Resp 19   Ht 1.6 m (5' 2.99\")   Wt 91.2 kg (201 lb)   SpO2 92%   BMI 35.61 kg/m²     In: 240 [P.O.:240]  Out: 80   In: 240   Out: 80 [Urine:80]    General appearance: NAD, conversant, ill appearing  HEENT: AT/NC, MMM, exopthalmos  Neck: FROM, supple  Lungs: Diminished on 4 liters OD  CV: RRR, no MRGs  Vasc: Radial pulses 2+  Abdomen: Soft, non-tender; no masses or HSM  Extremities: R Knee with surgical dressing, RUE PICC, BUE 3+ pitting edema  Skin: Sacral wound that has been debrided  Psych: Alert and oriented to person, place and time  Neuro: Alert and interactive     Recent Labs     10/28/24  0530 10/29/24  0530 10/30/24  0715   WBC 7.2 7.8 9.3   HGB 8.7* 8.3* 7.5*   HCT 30.6* 29.3* 26.5*    217 197       Recent Labs     10/28/24  0530 10/29/24  0530 10/30/24  0715    146 142   K 3.2* 4.1 4.3   * 112* 108*   CO2 25 25 23   BUN 56* 60* 64*   CREATININE 2.1* 2.2* 2.7*   CALCIUM 8.2* 8.2* 8.0*       Assessment:    Principal Problem:    Effusion, right knee  Active Problems:    Infected hardware in right leg, initial encounter ([facility])    Chronic heart failure with preserved ejection fraction ([facility])    Sacral wound  Resolved Problems:    * No resolved hospital problems. *      Plan:    84 year old female admitted to med surg unit with      Right knee effusion/infected hardware  Pain control  Consult orthopedic surgery-surgical procedure planned for today  Encourage ISP  Bowel regimen  IV hydration postop  Consult ID-postop for IV antibiotics  From medicine standpoint okay to proceed with orthopedic incision and drainage of infected hardware today  Follow inflammatory markers including sed rate/CRP/lactate and procalcitonin     A-fib  Continue metoprolol  Eliquis on hold will

## 2024-10-30 NOTE — PROGRESS NOTES
Occupational Therapy     Date:10/30/2024  Patient Name: Gabi Madrigal  MRN: [id number]  : 1940  Room: [address] [place] [address]-A     Completed chart review & attempted to see pt /Per RN hold therapy medical decline/ Will attempt to see pt when appropriate.  David INGRAM [id number]

## 2024-10-30 NOTE — PROGRESS NOTES
Palliative Care Department  [phone]  Palliative Care Progress Note  Provider Emmy Hermosillo DO    Gabi Madrigal  [id number]  Hospital Day: 13  Date of Initial Consult: 10/19/2024  Referring Provider: Aquilino Dyer DO  Palliative Medicine was consulted for assistance with: Goals of care, end stage disease     HPI:   Gabi Madrigal is a 84 y.o. with a medical history of hypothyroidism, hypertension, lymphedema, pelvic fracture, venous stasis ulcer of left, cellulitis of right knee-infected hardware  who was admitted on 10/18/2024 from home with a CHIEF COMPLAINT of joint swelling.  Patient has history of right distal femur ORIF on 9- and recurrent infection of the right knee.  Patient was seen outpatient by orthopedic surgery and on 10- CT of the right femur was concerning for infection.  Patient was sent to the hospital and was admitted for further evaluation management.  On 10- patient underwent right knee arthrotomy, synovectomy, and debridement with orthopedic surgery.  Surgical cultures positive for MSSA and bacteroides fragilis and antibiotics were switched to nafcillin and Flagyl on 10-21.  Patient underwent sacral wound debridement on 10- per general surgery.  Patient underwent PICC line placement on 10-25, ID plan for 6 weeks of IV antibiotics.  Patient developed HARRISON with increasing creatinine from 1.4-2.1 and nephrology was consulted.  Palliative medicine was reconsulted 10-28 for goals of care.     ASSESSMENT/PLAN:     Pertinent Hospital Diagnoses     Right knee prosthetic joint infection  Bacteroides fragilis bacteremia  MSSA prosthetic joint infection  Decubitus ulcer status post debridement  Left lower lobe pneumonia  HARRISON    Palliative Care Encounter / Counseling Regarding Goals of Care  Please see detailed goals of care discussion as below  At this time, Gabi Madrigal, Does have capacity for medical decision-making.  Capacity is time limited and  Patient and daughter report her goal is to go to rehab/SNF with IV antibiotics and ultimate goals to return home.  Questions were answered.  Support provided.    Discussed and updated nursing who paged primary team to bedside.  Case discussed and updated with primary team.      OBJECTIVE:   Prognosis: unknown    Physical Exam:  BP (!) 76/47   Pulse (!) 108   Temp 97.2 °F (36.2 °C) (Temporal)   Resp 16   Ht 1.6 m (5' 2.99\")   Wt 91.2 kg (201 lb)   SpO2 94%   BMI 35.61 kg/m²     Constitutional: Elderly female lying in bed in no acute distress. Daughter Mendy present.   HEENT: Normocephalic, atraumatic.  Conjunctival clear.  Lungs: No respiratory distress.  Clear to auscultation bilaterally.  No wheezes, rales or rhonchi.  Heart: RRR, no murmur.  Abd: Bowel sounds present.  Soft, no distention.  No tenderness to palpation.  Neuro: Lethargic, alert and oriented x4.   No gross focal neurological deficit.  No active tremulous or seizure-like activity.  Skin: Warm and dry.  MSK: Knee immobilizer to right lower extremity.  Wound dressing noted to bilateral lower extremity. Patient able to wiggle toes bilaterally.   Psych:   No acute agitation observed.    Objective data reviewed: labs, images, records, medication use, vitals, and chart    Discussed patient and the plan of care with the other IDT members: Palliative Medicine IDT Team, Primary Team, Floor Nurse, Patient, and Family    Time/Communication  Greater than 50% of time spent, total 50  minutes in counseling and coordination of care at the bedside regarding goals of care, diagnosis and prognosis, and see above.    Thank you for allowing Palliative Medicine to participate in the care of Gabi Madrigal.

## 2024-10-30 NOTE — PROGRESS NOTES
[facility]  Nephrology  Progress Note    Patient:  Gabi Madrigal 84 y.o. female MRN: [id number]     Date of Service: 10/30/2024    Hospital Day: 13      Chief complaint: had concerns including Joint Swelling (Patient sent from  with fluid around knee , sent to have knee drained ).    History of Present Illness   The patient is a 84 y.o. female with a past medical history of CKD, HTN, lymphedema, venous stasis and hypothyroidism who was initially admitted on 10/19/24 for concern for right knee infection.     Of note she has a history of right distal femur ORIF on September 28, 2024 and recurrent infection of the right knee. She was being seen outpatient by ortho. On 10/19/24 CT of the right femur w/o contrast was concerning for infection. She was sent to ED for admission. She underwent right knee arthrotomy, synovectomy and debridement per orthopedic surgery on 10/1924. She initially received vanc and zosyn. Surgical cultures were positive for MSSA and bacteroides fragilis and antibiotics were switched to nafcillin and flagyl on 10/21/24. She also underwent sacral wound debridement on 10/23/24 per general surgery service. Her creatinine increased from 1.4 on 10/25/24 to 2.1 this morning hence nephrology consult.     Subjective:  10/29/24: She is able to eat but is still having bowel movements after every meal.   10/30/24: She appears more tired this morning. Daughter at bedside said she did not eat much last night and this morning.     Past Medical History:      Diagnosis Date    Acquired hypothyroidism 8/14/2017    Arthritis     Blood transfusion reaction     Chronic kidney disease     History of blood transfusion     Hypertension     Lymphedema of both lower extremities 9/6/2017    Open wound of left great toe 10/18/2017    Other disorders of kidney and ureter in diseases classified elsewhere     Pelvic fracture (HCC)     Positive culture findings in wound 5/2/2023    Skin ulcer of left  1 tablet by mouth daily    Yaw Gao MD   levothyroxine (SYNTHROID) 88 MCG tablet Take 1 tablet by mouth Daily    Yaw Gao MD   Calcium Carb-Cholecalciferol (OYSTER SHELL CALCIUM W/D) 500-5 MG-MCG TABS tablet Take 1 tablet by mouth daily    Yaw Gao MD   clobetasol (TEMOVATE) 0.05 % cream Apply topically 2 times daily    Yaw Gao MD   ferrous sulfate (IRON 325) 325 (65 Fe) MG tablet Take 1 tablet by mouth daily (with breakfast)    Yaw Gao MD   Multiple Vitamins-Minerals (THERAPEUTIC MULTIVITAMIN-MINERALS) tablet Take 1 tablet by mouth daily    Yaw Gao MD   vitamin C (ASCORBIC ACID) 500 MG tablet Take 1 tablet by mouth daily    Yaw Gao MD   acetaminophen (TYLENOL) 325 MG tablet Take 2 tablets by mouth every 4 hours as needed for Pain or Fever    Yaw Gao MD   apixaban (ELIQUIS) 5 MG TABS tablet Take 1 tablet by mouth 2 times daily 8/28/17   Abraham Goodman MD   metoprolol tartrate (LOPRESSOR) 25 MG tablet Take 1 tablet by mouth 2 times daily 8/21/17   Abraham Goodman MD       Allergies:  Aspirin, Other, and Tape [adhesive tape]    Social History:   TOBACCO:   reports that she has never smoked. She has never used smokeless tobacco.  ETOH:   reports that she does not currently use alcohol.      Family History:       Problem Relation Age of Onset    Mult Sclerosis Father        REVIEW OF SYSTEMS:    Constitutional: No fever, no chills, no change in weight; good appetite  HEENT: No blurred vision, no ear problems, no sore throat, no rhinorrhea.  Respiratory: No cough, no sputum production, no pleuritic chest pain, no shortness of breath  Cardiology: No angina, no dyspnea on exertion, no paroxysmal nocturnal dyspnea, no orthopnea, no palpitation, no leg swelling.   Gastroenterology: No dysphagia, no reflux; no abdominal pain, no nausea or vomiting; no constipation or diarrhea. No hematochezia   Genitourinary: No

## 2024-10-30 NOTE — PROGRESS NOTES
Physical Therapy    Pt on PT caseload for follow up. RN requested hold on PT treatment this date d/t medical instability. Will follow and reattempt as appropriate.     Nuris Guido PT, DPT  [id number]

## 2024-10-30 NOTE — PROGRESS NOTES
Rfhrr-Rvzspn-Bktihvlyt notified of patients BP of 100/50 after 250ml bolus completed. Awaiting response at this time.

## 2024-10-30 NOTE — PROGRESS NOTES
RN spoke with Pts daughter at bedside. They do not wish to transfer patient and want to pursue comfort measures. Anita Learn NP notified and code status changed. Palliative on for patient. Transfer cancelled and receiving floor/bed coordinator notified.     Palliative updated

## 2024-10-30 NOTE — PLAN OF CARE
Problem: ABCDS Injury Assessment  Goal: Absence of physical injury  Outcome: Progressing  Flowsheets (Taken 10/30/2024 0154)  Absence of Physical Injury: Implement safety measures based on patient assessment     Problem: Skin/Tissue Integrity  Goal: Absence of new skin breakdown  Description: 1.  Monitor for areas of redness and/or skin breakdown  2.  Assess vascular access sites hourly  3.  Every 4-6 hours minimum:  Change oxygen saturation probe site  4.  Every 4-6 hours:  If on nasal continuous positive airway pressure, respiratory therapy assess nares and determine need for appliance change or resting period.  Outcome: Progressing     Problem: Safety - Adult  Goal: Free from fall injury  Outcome: Progressing     Problem: Discharge Planning  Goal: Discharge to home or other facility with appropriate resources  Outcome: Progressing     Problem: Pain  Goal: Verbalizes/displays adequate comfort level or baseline comfort level  Outcome: Progressing     Problem: Nutrition Deficit:  Goal: Optimize nutritional status  Outcome: Progressing     Problem: Wound:  Goal: Will show signs of wound healing; wound closure and no evidence of infection  Description: Will show signs of wound healing; wound closure and no evidence of infection  Outcome: Progressing     Problem: Pressure Ulcer:  Goal: Signs of wound healing will improve  Description: Signs of wound healing will improve  Outcome: Progressing  Goal: Absence of new pressure ulcer  Description: Absence of new pressure ulcer  Outcome: Progressing  Goal: Will show no infection signs and symptoms  Description: Will show no infection signs and symptoms  Outcome: Progressing

## 2024-10-30 NOTE — PROGRESS NOTES
loperamide, [Held by provider] miconazole nitrate **AND** [Held by provider] miconazole nitrate, sodium chloride flush, sodium chloride, sodium chloride flush, sodium chloride, sodium chloride, [Held by provider] oxyCODONE-acetaminophen, [Held by provider] morphine **OR** [Held by provider] morphine, sodium chloride flush, sodium chloride, potassium chloride **OR** potassium alternative oral replacement, magnesium sulfate, ondansetron **OR** ondansetron, polyethylene glycol, acetaminophen **OR** acetaminophen    OBJECTIVE:  BP (!) 86/51   Pulse (!) 119   Temp 97 °F (36.1 °C) (Temporal)   Resp 19   Ht 1.6 m (5' 2.99\")   Wt 91.2 kg (201 lb)   SpO2 92%   BMI 35.61 kg/m²   Temp  Av.2 °F (36.2 °C)  Min: 97 °F (36.1 °C)  Max: 97.3 °F (36.3 °C)  Constitutional: The patient is awake, alert, and oriented.   Skin: Warm and dry. No rashes were noted. No jaundice.  HEENT: Eyes show round, and reactive pupils. Moist mucous membranes, no ulcerations, no thrush.   Neck: Supple to movements. No lymphadenopathy.   Chest: No use of accessory muscles to breathe. Symmetrical expansion. Auscultation reveals no wheezing, crackles, or rhonchi.   Cardiovascular: S1 and S2 are rhythmic and regular. No murmurs appreciated.   Abdomen: Positive bowel sounds to auscultation. Benign to palpation. No masses felt. No hepatosplenomegaly.  Genitourinary: No pain in the lower abdomen  Extremities: No clubbing, no cyanosis, no edema.  Musculoskeletal: Right knee bandaged  Neurological: Following commands, no focal neurodeficit  Lines: peripheral, picc  Sacral wound    Laboratory and Tests Review:  Lab Results   Component Value Date    WBC 9.3 10/30/2024    WBC 7.8 10/29/2024    WBC 7.2 10/28/2024    HGB 7.5 (L) 10/30/2024    HCT 26.5 (L) 10/30/2024    MCV 91.7 10/30/2024     10/30/2024     Lab Results   Component Value Date    NEUTROABS 8.26 (H) 10/25/2024    NEUTROABS 7.32 (H) 10/24/2024    NEUTROABS 6.68 10/23/2024     Lab Results  weeks of IV antibiotic therapy  PICC line  placed   Prescription in chart  Follow-up with ID clinic within 7 days  CODE STATUS DNR CC  ID will sign off    Octavio Avilez MD  1:37 PM  10/30/2024

## 2024-10-31 LAB
SEND OUT REPORT: NORMAL
TEST NAME: NORMAL

## 2024-10-31 PROCEDURE — 6360000002 HC RX W HCPCS: Performed by: FAMILY MEDICINE

## 2024-10-31 RX ADMIN — LORAZEPAM 0.5 MG: 2 INJECTION INTRAMUSCULAR; INTRAVENOUS at 00:33

## 2024-10-31 RX ADMIN — GLYCOPYRROLATE 0.1 MG: 0.2 INJECTION INTRAMUSCULAR; INTRAVENOUS at 01:08

## 2024-10-31 RX ADMIN — MORPHINE SULFATE 2 MG: 2 INJECTION, SOLUTION INTRAMUSCULAR; INTRAVENOUS at 00:33

## 2024-11-03 LAB
MICROORGANISM SPEC CULT: NORMAL
MICROORGANISM/AGENT SPEC: NORMAL
MICROORGANISM/AGENT SPEC: NORMAL
SERVICE CMNT-IMP: NORMAL
SPECIMEN DESCRIPTION: NORMAL

## 2024-11-04 LAB
MICROORGANISM SPEC CULT: NORMAL
MICROORGANISM SPEC CULT: NORMAL
SERVICE CMNT-IMP: NORMAL
SERVICE CMNT-IMP: NORMAL
SPECIMEN DESCRIPTION: NORMAL
SPECIMEN DESCRIPTION: NORMAL

## 2024-11-05 LAB
MICROORGANISM SPEC CULT: NORMAL
MICROORGANISM SPEC CULT: NORMAL
MICROORGANISM/AGENT SPEC: NORMAL
MICROORGANISM/AGENT SPEC: NORMAL
SERVICE CMNT-IMP: NORMAL
SERVICE CMNT-IMP: NORMAL
SPECIMEN DESCRIPTION: NORMAL
SPECIMEN DESCRIPTION: NORMAL

## 2024-11-19 LAB
MICROORGANISM SPEC CULT: NORMAL
MICROORGANISM/AGENT SPEC: NORMAL
SERVICE CMNT-IMP: NORMAL
SPECIMEN DESCRIPTION: NORMAL

## 2024-12-03 LAB
MICROORGANISM SPEC CULT: NORMAL
MICROORGANISM SPEC CULT: NORMAL
MICROORGANISM/AGENT SPEC: NORMAL
SERVICE CMNT-IMP: NORMAL
SERVICE CMNT-IMP: NORMAL
SPECIMEN DESCRIPTION: NORMAL

## (undated) DEVICE — Device

## (undated) DEVICE — SCREW BNE L38MM DIA4.5MM PROX CORT TIB S STL ST LOK FULL
Type: IMPLANTABLE DEVICE | Site: FEMUR | Status: NON-FUNCTIONAL
Removed: 2024-07-19

## (undated) DEVICE — C-ARMOR C-ARM EQUIPMENT COVERS CLEAR STERILE UNIVERSAL FIT 12 PER CASE: Brand: C-ARMOR

## (undated) DEVICE — BIT DRL L300MM DIA4.3MM QUIK CPL PERC CALIB W/O STP REUSE

## (undated) DEVICE — BIT DRL L180MM DIA4.3MM QUIK CPL W/O STP REUSE

## (undated) DEVICE — SPLINT KNEE UNIV FOR LESS THAN 36IN L24IN FOAM LAM E CNTCT

## (undated) DEVICE — LOWER EXT HIP DRAPE: Brand: MEDLINE INDUSTRIES, INC.

## (undated) DEVICE — SOLUTION IRRIG 1000ML STRL H2O USP PLAS POUR BTL

## (undated) DEVICE — SPONGE LAP W18XL18IN WHT COT 4 PLY FLD STRUNG RADPQ DISP ST 2 PER PACK

## (undated) DEVICE — GAUZE,SPONGE,AVANT,4"X4",4PLY,STRL,10/TR: Brand: MEDLINE

## (undated) DEVICE — ELECTRODE PT RET AD L9FT HI MOIST COND ADH HYDRGEL CORDED

## (undated) DEVICE — BIT DRL L145MM DIA3.2MM ST QUIK CPL W/O STP REUSE

## (undated) DEVICE — SUTURE STRATAFIX SYMMETRIC SZ 1 L18IN ABSRB VLT CT1 L36CM SXPP1A404

## (undated) DEVICE — SWAB SPEC COLL SHFT L5.25IN POLYUR FOAM TIP SFT DBL MEDIA

## (undated) DEVICE — CONTAINER SPEC ANAERB VACTNR

## (undated) DEVICE — TRAP,MUCUS SPECIMEN,40CC: Brand: MEDLINE

## (undated) DEVICE — GOWN,BREATHABLE SLV,AURORA,XLG,STRL: Brand: MEDLINE

## (undated) DEVICE — BIT DRL L300MM DIA3.2MM PERC QUIK CPL CALIB W/O STP REUSE

## (undated) DEVICE — PADDING,UNDERCAST,COTTON, 4"X4YD STERILE: Brand: MEDLINE

## (undated) DEVICE — GUIDEWIRE ORTH DIA2.5MM LOK SMOOTH DRL TIP FLX THRD FOR

## (undated) DEVICE — DRESSING,GAUZE,XEROFORM,CURAD,5"X9",ST: Brand: CURAD

## (undated) DEVICE — DRAPE EQUIP CARM 72X42 IN RUBBER BND CLP

## (undated) DEVICE — STRAP POS MP 30X3 IN HK LOOP CLOSURE FOAM DISP

## (undated) DEVICE — SUTURE ETHILON SZ 2-0 L18IN NONABSORBABLE BLK L26MM PS 3/8 585H

## (undated) DEVICE — BANDAGE COMPR W4INXL10YD WHITE/BEIGE E MTRX HK LOOP CLSR

## (undated) DEVICE — PAD,ABDOMINAL,5"X9",ST,LF,25/BX: Brand: MEDLINE INDUSTRIES, INC.

## (undated) DEVICE — DRESSING KIT PREVENA +

## (undated) DEVICE — TUBING SUCT 12FR MAL ALUM SHFT FN CAP VENT UNIV CONN W/ OBT

## (undated) DEVICE — SYRINGE MED 10ML TRNSLUC BRL PLUNG BLK MRK POLYPR CTRL

## (undated) DEVICE — SUTURE MONOCRYL SZ 2-0 L36IN ABSRB UD L36MM CT-1 1/2 CIR Y945H

## (undated) DEVICE — SPLINT KNEE UNIV FOR LESS THAN 36IN L24IN FOAM LAM ELAS CNTCT

## (undated) DEVICE — SOLUTION IRRIG 1000ML 0.9% SOD CHL USP POUR PLAS BTL

## (undated) DEVICE — DRAPE,HIP,W/POUCHES,STERILE: Brand: MEDLINE

## (undated) DEVICE — LOWER EXT KNEE DRAPE: Brand: MEDLINE INDUSTRIES, INC.